# Patient Record
Sex: FEMALE | Race: WHITE | Employment: UNEMPLOYED | URBAN - METROPOLITAN AREA
[De-identification: names, ages, dates, MRNs, and addresses within clinical notes are randomized per-mention and may not be internally consistent; named-entity substitution may affect disease eponyms.]

---

## 2017-12-12 PROBLEM — E11.10 DKA, TYPE 2, NOT AT GOAL (HCC): Status: ACTIVE | Noted: 2017-12-12

## 2018-11-11 ENCOUNTER — APPOINTMENT (OUTPATIENT)
Dept: CT IMAGING | Age: 53
DRG: 263 | End: 2018-11-11
Payer: COMMERCIAL

## 2018-11-11 ENCOUNTER — HOSPITAL ENCOUNTER (INPATIENT)
Age: 53
LOS: 5 days | Discharge: HOME | DRG: 263 | End: 2018-11-16
Attending: EMERGENCY MEDICINE | Admitting: INTERNAL MEDICINE
Payer: COMMERCIAL

## 2018-11-11 DIAGNOSIS — K92.0 COFFEE GROUND EMESIS: ICD-10-CM

## 2018-11-11 DIAGNOSIS — K85.90 ACUTE PANCREATITIS, UNSPECIFIED COMPLICATION STATUS, UNSPECIFIED PANCREATITIS TYPE: ICD-10-CM

## 2018-11-11 DIAGNOSIS — E10.10 TYPE 1 DIABETES MELLITUS WITH KETOACIDOSIS WITHOUT COMA (HCC): Primary | ICD-10-CM

## 2018-11-11 DIAGNOSIS — D69.6 THROMBOCYTOPENIA (HCC): ICD-10-CM

## 2018-11-11 PROBLEM — R73.9 HYPERGLYCEMIA: Status: ACTIVE | Noted: 2018-11-11

## 2018-11-11 LAB
A/G RATIO: 0.8 (ref 1.1–2.2)
ABO/RH: NORMAL
ALBUMIN SERPL-MCNC: 3.8 G/DL (ref 3.4–5)
ALP BLD-CCNC: 122 U/L (ref 40–129)
ALT SERPL-CCNC: 39 U/L (ref 10–40)
ANION GAP SERPL CALCULATED.3IONS-SCNC: 38 MMOL/L (ref 3–16)
ANTIBODY SCREEN: NORMAL
AST SERPL-CCNC: 36 U/L (ref 15–37)
BASE EXCESS ARTERIAL: -4.2 MMOL/L (ref -3–3)
BASE EXCESS VENOUS: -2.4 MMOL/L (ref -3–3)
BASOPHILS ABSOLUTE: 0.1 K/UL (ref 0–0.2)
BASOPHILS RELATIVE PERCENT: 0.4 %
BETA-HYDROXYBUTYRATE: >8 MMOL/L (ref 0–0.27)
BILIRUB SERPL-MCNC: 0.5 MG/DL (ref 0–1)
BILIRUBIN URINE: NEGATIVE
BLOOD, URINE: NEGATIVE
BUN BLDV-MCNC: 17 MG/DL (ref 7–20)
CALCIUM SERPL-MCNC: 10.2 MG/DL (ref 8.3–10.6)
CARBOXYHEMOGLOBIN ARTERIAL: 1.5 % (ref 0–1.5)
CARBOXYHEMOGLOBIN: 3.1 % (ref 0–1.5)
CHLORIDE BLD-SCNC: 87 MMOL/L (ref 99–110)
CHP ED QC CHECK: YES
CLARITY: CLEAR
CO2: 18 MMOL/L (ref 21–32)
COLOR: YELLOW
CREAT SERPL-MCNC: 0.7 MG/DL (ref 0.6–1.1)
EOSINOPHILS ABSOLUTE: 0 K/UL (ref 0–0.6)
EOSINOPHILS RELATIVE PERCENT: 0 %
GFR AFRICAN AMERICAN: >60
GFR NON-AFRICAN AMERICAN: >60
GLOBULIN: 5 G/DL
GLUCOSE BLD-MCNC: 353 MG/DL
GLUCOSE BLD-MCNC: 353 MG/DL (ref 70–99)
GLUCOSE BLD-MCNC: 363 MG/DL (ref 70–99)
GLUCOSE BLD-MCNC: 440 MG/DL (ref 70–99)
GLUCOSE BLD-MCNC: 507 MG/DL (ref 70–99)
GLUCOSE BLD-MCNC: 595 MG/DL (ref 70–99)
GLUCOSE URINE: >=1000 MG/DL
HCO3 ARTERIAL: 19.1 MMOL/L (ref 21–29)
HCO3 VENOUS: 19.4 MMOL/L (ref 23–29)
HCT VFR BLD CALC: 40.3 % (ref 36–48)
HCT VFR BLD CALC: 45.9 % (ref 36–48)
HEMOGLOBIN, ART, EXTENDED: 13.4 G/DL (ref 12–16)
HEMOGLOBIN: 13.2 G/DL (ref 12–16)
HEMOGLOBIN: 15.2 G/DL (ref 12–16)
KETONES, URINE: >=80 MG/DL
LACTIC ACID: 1.4 MMOL/L (ref 0.4–2)
LACTIC ACID: 2.6 MMOL/L (ref 0.4–2)
LEUKOCYTE ESTERASE, URINE: NEGATIVE
LIPASE: 170 U/L (ref 13–60)
LYMPHOCYTES ABSOLUTE: 1.6 K/UL (ref 1–5.1)
LYMPHOCYTES RELATIVE PERCENT: 7.7 %
MCH RBC QN AUTO: 30.4 PG (ref 26–34)
MCHC RBC AUTO-ENTMCNC: 33.1 G/DL (ref 31–36)
MCV RBC AUTO: 92 FL (ref 80–100)
METHEMOGLOBIN ARTERIAL: 0.1 %
METHEMOGLOBIN VENOUS: 0.1 %
MICROSCOPIC EXAMINATION: ABNORMAL
MONOCYTES ABSOLUTE: 0.8 K/UL (ref 0–1.3)
MONOCYTES RELATIVE PERCENT: 3.7 %
NEUTROPHILS ABSOLUTE: 18.2 K/UL (ref 1.7–7.7)
NEUTROPHILS RELATIVE PERCENT: 88.2 %
NITRITE, URINE: NEGATIVE
O2 CONTENT ARTERIAL: 18 ML/DL
O2 CONTENT, VEN: 22 VOL %
O2 SAT, ARTERIAL: 97.5 %
O2 SAT, VEN: ABNORMAL %
O2 THERAPY: ABNORMAL
O2 THERAPY: ABNORMAL
OCCULT BLOOD, OTHER: ABNORMAL
PCO2 ARTERIAL: 29.6 MMHG (ref 35–45)
PCO2, VEN: 26.3 MMHG (ref 40–50)
PDW BLD-RTO: 13.4 % (ref 12.4–15.4)
PERFORMED ON: ABNORMAL
PH ARTERIAL: 7.42 (ref 7.35–7.45)
PH UA: 5.5
PH VENOUS: 7.48 (ref 7.35–7.45)
PH, GASTRIC: ABNORMAL
PLATELET # BLD: 248 K/UL (ref 135–450)
PMV BLD AUTO: 12.1 FL (ref 5–10.5)
PO2 ARTERIAL: 89.1 MMHG (ref 75–108)
PO2, VEN: 163 MMHG (ref 25–40)
POTASSIUM SERPL-SCNC: 3.8 MMOL/L (ref 3.5–5.1)
PROTEIN UA: NEGATIVE MG/DL
RBC # BLD: 4.99 M/UL (ref 4–5.2)
SODIUM BLD-SCNC: 143 MMOL/L (ref 136–145)
SPECIFIC GRAVITY UA: >1.03
TCO2 ARTERIAL: 44.9 MMOL/L
TCO2 CALC VENOUS: 45 MMOL/L
TOTAL PROTEIN: 8.8 G/DL (ref 6.4–8.2)
URINE REFLEX TO CULTURE: ABNORMAL
URINE TYPE: ABNORMAL
UROBILINOGEN, URINE: 0.2 E.U./DL
WBC # BLD: 20.6 K/UL (ref 4–11)

## 2018-11-11 PROCEDURE — C9113 INJ PANTOPRAZOLE SODIUM, VIA: HCPCS | Performed by: INTERNAL MEDICINE

## 2018-11-11 PROCEDURE — 6370000000 HC RX 637 (ALT 250 FOR IP): Performed by: INTERNAL MEDICINE

## 2018-11-11 PROCEDURE — 85014 HEMATOCRIT: CPT

## 2018-11-11 PROCEDURE — 36600 WITHDRAWAL OF ARTERIAL BLOOD: CPT

## 2018-11-11 PROCEDURE — 83690 ASSAY OF LIPASE: CPT

## 2018-11-11 PROCEDURE — 80053 COMPREHEN METABOLIC PANEL: CPT

## 2018-11-11 PROCEDURE — 36415 COLL VENOUS BLD VENIPUNCTURE: CPT

## 2018-11-11 PROCEDURE — 82803 BLOOD GASES ANY COMBINATION: CPT

## 2018-11-11 PROCEDURE — 6360000002 HC RX W HCPCS: Performed by: EMERGENCY MEDICINE

## 2018-11-11 PROCEDURE — 2580000003 HC RX 258: Performed by: PHYSICIAN ASSISTANT

## 2018-11-11 PROCEDURE — 86901 BLOOD TYPING SEROLOGIC RH(D): CPT

## 2018-11-11 PROCEDURE — 96361 HYDRATE IV INFUSION ADD-ON: CPT

## 2018-11-11 PROCEDURE — 96372 THER/PROPH/DIAG INJ SC/IM: CPT

## 2018-11-11 PROCEDURE — 86900 BLOOD TYPING SEROLOGIC ABO: CPT

## 2018-11-11 PROCEDURE — 2580000003 HC RX 258: Performed by: EMERGENCY MEDICINE

## 2018-11-11 PROCEDURE — 74176 CT ABD & PELVIS W/O CONTRAST: CPT

## 2018-11-11 PROCEDURE — 84478 ASSAY OF TRIGLYCERIDES: CPT

## 2018-11-11 PROCEDURE — 2000000000 HC ICU R&B

## 2018-11-11 PROCEDURE — 6360000002 HC RX W HCPCS: Performed by: INTERNAL MEDICINE

## 2018-11-11 PROCEDURE — 96375 TX/PRO/DX INJ NEW DRUG ADDON: CPT

## 2018-11-11 PROCEDURE — 83605 ASSAY OF LACTIC ACID: CPT

## 2018-11-11 PROCEDURE — 96376 TX/PRO/DX INJ SAME DRUG ADON: CPT

## 2018-11-11 PROCEDURE — 96374 THER/PROPH/DIAG INJ IV PUSH: CPT

## 2018-11-11 PROCEDURE — 6360000002 HC RX W HCPCS

## 2018-11-11 PROCEDURE — 6360000002 HC RX W HCPCS: Performed by: PHYSICIAN ASSISTANT

## 2018-11-11 PROCEDURE — 82271 OCCULT BLOOD OTHER SOURCES: CPT

## 2018-11-11 PROCEDURE — C9113 INJ PANTOPRAZOLE SODIUM, VIA: HCPCS | Performed by: EMERGENCY MEDICINE

## 2018-11-11 PROCEDURE — 85018 HEMOGLOBIN: CPT

## 2018-11-11 PROCEDURE — 81003 URINALYSIS AUTO W/O SCOPE: CPT

## 2018-11-11 PROCEDURE — 4500000025 HC ED LEVEL 5 PROCEDURE

## 2018-11-11 PROCEDURE — 86850 RBC ANTIBODY SCREEN: CPT

## 2018-11-11 PROCEDURE — 85025 COMPLETE CBC W/AUTO DIFF WBC: CPT

## 2018-11-11 PROCEDURE — 6370000000 HC RX 637 (ALT 250 FOR IP): Performed by: PHYSICIAN ASSISTANT

## 2018-11-11 PROCEDURE — 2580000003 HC RX 258: Performed by: INTERNAL MEDICINE

## 2018-11-11 PROCEDURE — 99285 EMERGENCY DEPT VISIT HI MDM: CPT

## 2018-11-11 PROCEDURE — 93005 ELECTROCARDIOGRAM TRACING: CPT | Performed by: EMERGENCY MEDICINE

## 2018-11-11 PROCEDURE — 82010 KETONE BODYS QUAN: CPT

## 2018-11-11 RX ORDER — ONDANSETRON 4 MG/1
8 TABLET, ORALLY DISINTEGRATING ORAL ONCE
Status: COMPLETED | OUTPATIENT
Start: 2018-11-11 | End: 2018-11-11

## 2018-11-11 RX ORDER — MORPHINE SULFATE 4 MG/ML
4 INJECTION, SOLUTION INTRAMUSCULAR; INTRAVENOUS ONCE
Status: COMPLETED | OUTPATIENT
Start: 2018-11-11 | End: 2018-11-11

## 2018-11-11 RX ORDER — 0.9 % SODIUM CHLORIDE 0.9 %
1000 INTRAVENOUS SOLUTION INTRAVENOUS ONCE
Status: COMPLETED | OUTPATIENT
Start: 2018-11-11 | End: 2018-11-11

## 2018-11-11 RX ORDER — MIRTAZAPINE 15 MG/1
35 TABLET, FILM COATED ORAL NIGHTLY
Status: DISCONTINUED | OUTPATIENT
Start: 2018-11-11 | End: 2018-11-16 | Stop reason: HOSPADM

## 2018-11-11 RX ORDER — MORPHINE SULFATE 4 MG/ML
INJECTION, SOLUTION INTRAMUSCULAR; INTRAVENOUS
Status: COMPLETED
Start: 2018-11-11 | End: 2018-11-11

## 2018-11-11 RX ORDER — CARVEDILOL 6.25 MG/1
6.25 TABLET ORAL 2 TIMES DAILY WITH MEALS
Status: ON HOLD | COMMUNITY
End: 2019-02-24 | Stop reason: HOSPADM

## 2018-11-11 RX ORDER — PANTOPRAZOLE SODIUM 40 MG/10ML
40 INJECTION, POWDER, LYOPHILIZED, FOR SOLUTION INTRAVENOUS 2 TIMES DAILY
Status: DISCONTINUED | OUTPATIENT
Start: 2018-11-11 | End: 2018-11-11

## 2018-11-11 RX ORDER — PANTOPRAZOLE SODIUM 40 MG/10ML
80 INJECTION, POWDER, LYOPHILIZED, FOR SOLUTION INTRAVENOUS ONCE
Status: COMPLETED | OUTPATIENT
Start: 2018-11-11 | End: 2018-11-11

## 2018-11-11 RX ORDER — ONDANSETRON 2 MG/ML
4 INJECTION INTRAMUSCULAR; INTRAVENOUS ONCE
Status: COMPLETED | OUTPATIENT
Start: 2018-11-11 | End: 2018-11-11

## 2018-11-11 RX ORDER — ONDANSETRON 4 MG/1
TABLET, ORALLY DISINTEGRATING ORAL
Status: COMPLETED
Start: 2018-11-11 | End: 2018-11-11

## 2018-11-11 RX ORDER — EPLERENONE 25 MG/1
25 TABLET, FILM COATED ORAL DAILY
Status: ON HOLD | COMMUNITY
End: 2019-02-24 | Stop reason: HOSPADM

## 2018-11-11 RX ORDER — LAMOTRIGINE 100 MG/1
100 TABLET ORAL DAILY
Status: DISCONTINUED | OUTPATIENT
Start: 2018-11-11 | End: 2018-11-11

## 2018-11-11 RX ORDER — SODIUM CHLORIDE 0.9 % (FLUSH) 0.9 %
10 SYRINGE (ML) INJECTION EVERY 12 HOURS SCHEDULED
Status: DISCONTINUED | OUTPATIENT
Start: 2018-11-11 | End: 2018-11-16 | Stop reason: HOSPADM

## 2018-11-11 RX ORDER — MORPHINE SULFATE 2 MG/ML
4 INJECTION, SOLUTION INTRAMUSCULAR; INTRAVENOUS ONCE
Status: COMPLETED | OUTPATIENT
Start: 2018-11-11 | End: 2018-11-11

## 2018-11-11 RX ORDER — SODIUM CHLORIDE, SODIUM LACTATE, POTASSIUM CHLORIDE, CALCIUM CHLORIDE 600; 310; 30; 20 MG/100ML; MG/100ML; MG/100ML; MG/100ML
INJECTION, SOLUTION INTRAVENOUS CONTINUOUS
Status: DISCONTINUED | OUTPATIENT
Start: 2018-11-11 | End: 2018-11-16

## 2018-11-11 RX ORDER — SODIUM CHLORIDE 0.9 % (FLUSH) 0.9 %
10 SYRINGE (ML) INJECTION PRN
Status: DISCONTINUED | OUTPATIENT
Start: 2018-11-11 | End: 2018-11-16 | Stop reason: HOSPADM

## 2018-11-11 RX ORDER — DEXTROSE MONOHYDRATE 50 MG/ML
100 INJECTION, SOLUTION INTRAVENOUS PRN
Status: DISCONTINUED | OUTPATIENT
Start: 2018-11-11 | End: 2018-11-16 | Stop reason: HOSPADM

## 2018-11-11 RX ORDER — DEXTROSE MONOHYDRATE 25 G/50ML
12.5 INJECTION, SOLUTION INTRAVENOUS PRN
Status: DISCONTINUED | OUTPATIENT
Start: 2018-11-11 | End: 2018-11-16 | Stop reason: HOSPADM

## 2018-11-11 RX ORDER — LOSARTAN POTASSIUM 50 MG/1
50 TABLET ORAL DAILY
Status: ON HOLD | COMMUNITY
End: 2019-02-24 | Stop reason: HOSPADM

## 2018-11-11 RX ORDER — ATORVASTATIN CALCIUM 40 MG/1
40 TABLET, FILM COATED ORAL DAILY
COMMUNITY
End: 2020-03-12 | Stop reason: SDUPTHER

## 2018-11-11 RX ORDER — ONDANSETRON 2 MG/ML
4 INJECTION INTRAMUSCULAR; INTRAVENOUS EVERY 6 HOURS PRN
Status: DISCONTINUED | OUTPATIENT
Start: 2018-11-11 | End: 2018-11-16 | Stop reason: HOSPADM

## 2018-11-11 RX ORDER — MORPHINE SULFATE 4 MG/ML
4 INJECTION, SOLUTION INTRAMUSCULAR; INTRAVENOUS ONCE
Status: DISCONTINUED | OUTPATIENT
Start: 2018-11-11 | End: 2018-11-11

## 2018-11-11 RX ORDER — NICOTINE POLACRILEX 4 MG
15 LOZENGE BUCCAL PRN
Status: DISCONTINUED | OUTPATIENT
Start: 2018-11-11 | End: 2018-11-16 | Stop reason: HOSPADM

## 2018-11-11 RX ADMIN — ONDANSETRON 4 MG: 2 INJECTION INTRAMUSCULAR; INTRAVENOUS at 17:38

## 2018-11-11 RX ADMIN — HYDROMORPHONE HYDROCHLORIDE 0.5 MG: 1 INJECTION, SOLUTION INTRAMUSCULAR; INTRAVENOUS; SUBCUTANEOUS at 20:56

## 2018-11-11 RX ADMIN — SODIUM CHLORIDE, POTASSIUM CHLORIDE, SODIUM LACTATE AND CALCIUM CHLORIDE: 600; 310; 30; 20 INJECTION, SOLUTION INTRAVENOUS at 20:42

## 2018-11-11 RX ADMIN — SODIUM CHLORIDE 1000 ML: 9 INJECTION, SOLUTION INTRAVENOUS at 17:39

## 2018-11-11 RX ADMIN — MORPHINE SULFATE 4 MG: 2 INJECTION, SOLUTION INTRAMUSCULAR; INTRAVENOUS at 16:43

## 2018-11-11 RX ADMIN — INSULIN LISPRO 5 UNITS: 100 INJECTION, SOLUTION INTRAVENOUS; SUBCUTANEOUS at 20:58

## 2018-11-11 RX ADMIN — SODIUM CHLORIDE 1000 ML: 9 INJECTION, SOLUTION INTRAVENOUS at 17:08

## 2018-11-11 RX ADMIN — PANTOPRAZOLE SODIUM 80 MG: 40 INJECTION, POWDER, FOR SOLUTION INTRAVENOUS at 17:19

## 2018-11-11 RX ADMIN — Medication 10 ML: at 21:47

## 2018-11-11 RX ADMIN — ONDANSETRON HYDROCHLORIDE 4 MG: 2 INJECTION, SOLUTION INTRAMUSCULAR; INTRAVENOUS at 21:30

## 2018-11-11 RX ADMIN — ONDANSETRON 8 MG: 4 TABLET, ORALLY DISINTEGRATING ORAL at 17:07

## 2018-11-11 RX ADMIN — MORPHINE SULFATE 4 MG: 4 INJECTION, SOLUTION INTRAMUSCULAR; INTRAVENOUS at 17:51

## 2018-11-11 RX ADMIN — MORPHINE SULFATE 4 MG: 4 INJECTION INTRAVENOUS at 17:51

## 2018-11-11 RX ADMIN — INSULIN GLARGINE 20 UNITS: 100 INJECTION, SOLUTION SUBCUTANEOUS at 20:58

## 2018-11-11 RX ADMIN — MIRTAZAPINE 37.5 MG: 15 TABLET, FILM COATED ORAL at 21:46

## 2018-11-11 RX ADMIN — SODIUM CHLORIDE 8 MG/HR: 9 INJECTION, SOLUTION INTRAVENOUS at 20:58

## 2018-11-11 RX ADMIN — MORPHINE SULFATE 4 MG: 4 INJECTION INTRAVENOUS at 19:20

## 2018-11-11 RX ADMIN — INSULIN HUMAN 10 UNITS: 100 INJECTION, SOLUTION PARENTERAL at 18:53

## 2018-11-11 ASSESSMENT — PAIN SCALES - GENERAL
PAINLEVEL_OUTOF10: 10

## 2018-11-11 ASSESSMENT — PAIN DESCRIPTION - PAIN TYPE
TYPE: ACUTE PAIN

## 2018-11-11 ASSESSMENT — ENCOUNTER SYMPTOMS
DIARRHEA: 0
NAUSEA: 1
COUGH: 0
VOMITING: 1
SHORTNESS OF BREATH: 0
BLOOD IN STOOL: 0
WHEEZING: 0
ABDOMINAL PAIN: 1
RHINORRHEA: 0

## 2018-11-11 ASSESSMENT — PAIN DESCRIPTION - PROGRESSION: CLINICAL_PROGRESSION: NOT CHANGED

## 2018-11-11 ASSESSMENT — PAIN DESCRIPTION - DESCRIPTORS
DESCRIPTORS: ACHING;BURNING
DESCRIPTORS: BURNING

## 2018-11-11 ASSESSMENT — PAIN DESCRIPTION - ORIENTATION: ORIENTATION: LEFT

## 2018-11-11 ASSESSMENT — PAIN DESCRIPTION - LOCATION
LOCATION: ABDOMEN

## 2018-11-11 NOTE — ED NOTES
Bed: 09  Expected date:   Expected time:   Means of arrival: Eliud  Comments:  [address]  11/11/18 1534

## 2018-11-11 NOTE — ED PROVIDER NOTES
urinating, dysuria and hematuria. Musculoskeletal: Negative for neck pain and neck stiffness. Skin: Negative for rash. Neurological: Positive for weakness. Negative for dizziness, light-headedness and headaches. Positives and Pertinent negatives as per HPI. Except as noted abovein the ROS, all other systems were reviewed and negative. PAST MEDICAL HISTORY     Past Medical History:   Diagnosis Date    Arthritis     CHF (congestive heart failure) ([facility] [address]     Depression     Diabetes mellitus (HCC)     Hepatitis C     Dr Kraig Fontaine follows; contracted from ex- (drug user)    MRSA (methicillin resistant Staphylococcus aureus) infection in 2000    was in urine and nares    Pneumonia     S/P colonoscopy with polypectomy 2/11    one polyp, Dr Kraig Fontaine. Recall 3 years. SURGICAL HISTORY     Past Surgical History:   Procedure Laterality Date    ABOVE KNEE AMPUTATION      AMPUTATION      right bka    JOINT REPLACEMENT  2006, 2010    right knee; Dr Rowena Lion.  KNEE ARTHROSCOPY      ridht knee X6    REVISION TOTAL KNEE ARTHROPLASTY  2/9/11    right         CURRENTMEDICATIONS       Previous Medications    ACETAMINOPHEN (TYLENOL) 325 MG TABLET    Take  by mouth every 4 hours as needed. B-D INS SYR HALF-UNIT .3CC/31G 31G X 5/16\" 0.3 ML MISC    145 Units    CALCIUM CARBONATE (TUMS) 500 MG CHEWABLE TABLET    Take 1 tablet by mouth daily. GABAPENTIN (NEURONTIN) 300 MG CAPSULE    Take 300 mg by mouth 3 times daily. INSULIN GLARGINE (LANTUS) 100 UNIT/ML INJECTION    Inject 45 Units into the skin nightly. INSULIN LISPRO (HUMALOG) 100 UNIT/ML INJECTION    Inject 5 Units into the skin 3 times daily (before meals). Give only if blood sugar is greater than 150    LAMOTRIGINE (LAMICTAL) 100 MG TABLET    Take 100 mg by mouth daily. MIRTAZAPINE (REMERON SOLTAB) 30 MG DISINTEGRATING TABLET    Take 35 mg by mouth nightly. NONFORMULARY    BP medication. Unknown name.     POTASSIUM CHLORIDE to auscultation bilaterally. She does report generalized abdominal pain, worse in the epigastrium with some voluntary guarding. No other peritoneal signs. She was given morphine and Zofran for symptomatically relief and will be reevaluated. Please see attending note for EKG interpretation. POC glucose 507. Patient started on empiric for resuscitation. CBC and CMP are remarkable for leukocytosis of 12.6, hyperglycemia at 595, gap of 38 and bicarb of 18. Beta hydroxybutyrate is greater than 8. ABG does show respiratory compensation with a pH of 7.47. Lipase 170. Gastric occult was positive. Patient was started on Protonix bolus and infusion, given total of 2 L normal saline and 10 units of insulin. On reevaluation, she was still complaining of pain and having active emesis. She given second dose of morphine and Zofran. I do believe she warrants admission for further evaluation and management of DKA as well as possible upper GI bleed with coffee-ground emesis and acute pancreatitis. I attending spoke with the hospitalist, Dr. Deri Gaucher, who is agreeable to this plan and will resume care of the patient at this time. Patient was also informed and is agreeable. She is stable for admission. The patient tolerated their visit well. They were seen and evaluated by the attending physician who agreed with the assessment and plan. The patient and / or thefamily were informed of the results of any tests, a time was given to answer questions, a plan was proposed and they agreed with plan. FINAL IMPRESSION      1. Type 1 diabetes mellitus with ketoacidosis without coma ([facility] [address]    2. Coffee ground emesis    3.  Acute pancreatitis, unspecified complication status, unspecified pancreatitis type          DISPOSITION/PLAN   DISPOSITION Admitted 11/11/2018 06:22:13 PM      PATIENT REFERREDTO:  Nishi Rodriguez MD  [address] [place] [address]  [phone]            DISCHARGE MEDICATIONS:  New Prescriptions    No

## 2018-11-12 ENCOUNTER — APPOINTMENT (OUTPATIENT)
Dept: GENERAL RADIOLOGY | Age: 53
DRG: 263 | End: 2018-11-12
Payer: COMMERCIAL

## 2018-11-12 LAB
A/G RATIO: 0.9 (ref 1.1–2.2)
ALBUMIN SERPL-MCNC: 3.1 G/DL (ref 3.4–5)
ALP BLD-CCNC: 77 U/L (ref 40–129)
ALT SERPL-CCNC: 28 U/L (ref 10–40)
AMYLASE: 54 U/L (ref 25–115)
ANION GAP SERPL CALCULATED.3IONS-SCNC: 17 MMOL/L (ref 3–16)
AST SERPL-CCNC: 34 U/L (ref 15–37)
BILIRUB SERPL-MCNC: 0.3 MG/DL (ref 0–1)
BUN BLDV-MCNC: 15 MG/DL (ref 7–20)
CALCIUM SERPL-MCNC: 8.4 MG/DL (ref 8.3–10.6)
CHLORIDE BLD-SCNC: 105 MMOL/L (ref 99–110)
CO2: 23 MMOL/L (ref 21–32)
CREAT SERPL-MCNC: 0.5 MG/DL (ref 0.6–1.1)
GFR AFRICAN AMERICAN: >60
GFR NON-AFRICAN AMERICAN: >60
GLOBULIN: 3.5 G/DL
GLUCOSE BLD-MCNC: 130 MG/DL (ref 70–99)
GLUCOSE BLD-MCNC: 191 MG/DL (ref 70–99)
GLUCOSE BLD-MCNC: 220 MG/DL (ref 70–99)
GLUCOSE BLD-MCNC: 238 MG/DL (ref 70–99)
GLUCOSE BLD-MCNC: 250 MG/DL (ref 70–99)
GLUCOSE BLD-MCNC: 291 MG/DL (ref 70–99)
HCT VFR BLD CALC: 34.5 % (ref 36–48)
HCT VFR BLD CALC: 35.9 % (ref 36–48)
HCT VFR BLD CALC: 37 % (ref 36–48)
HCT VFR BLD CALC: 38.9 % (ref 36–48)
HEMOGLOBIN: 11.3 G/DL (ref 12–16)
HEMOGLOBIN: 12 G/DL (ref 12–16)
HEMOGLOBIN: 12.2 G/DL (ref 12–16)
HEMOGLOBIN: 12.6 G/DL (ref 12–16)
LACTIC ACID: 1.1 MMOL/L (ref 0.4–2)
LIPASE: 103 U/L (ref 13–60)
MCH RBC QN AUTO: 29.6 PG (ref 26–34)
MCHC RBC AUTO-ENTMCNC: 33 G/DL (ref 31–36)
MCV RBC AUTO: 89.7 FL (ref 80–100)
PDW BLD-RTO: 13.3 % (ref 12.4–15.4)
PERFORMED ON: ABNORMAL
PLATELET # BLD: 168 K/UL (ref 135–450)
PMV BLD AUTO: 10 FL (ref 5–10.5)
POTASSIUM SERPL-SCNC: 3.5 MMOL/L (ref 3.5–5.1)
RBC # BLD: 4.12 M/UL (ref 4–5.2)
SODIUM BLD-SCNC: 145 MMOL/L (ref 136–145)
TOTAL PROTEIN: 6.6 G/DL (ref 6.4–8.2)
TRIGL SERPL-MCNC: 135 MG/DL (ref 0–150)
WBC # BLD: 14.8 K/UL (ref 4–11)

## 2018-11-12 PROCEDURE — 71045 X-RAY EXAM CHEST 1 VIEW: CPT

## 2018-11-12 PROCEDURE — 2580000003 HC RX 258: Performed by: INTERNAL MEDICINE

## 2018-11-12 PROCEDURE — 6370000000 HC RX 637 (ALT 250 FOR IP): Performed by: INTERNAL MEDICINE

## 2018-11-12 PROCEDURE — 36569 INSJ PICC 5 YR+ W/O IMAGING: CPT

## 2018-11-12 PROCEDURE — 93010 ELECTROCARDIOGRAM REPORT: CPT | Performed by: INTERNAL MEDICINE

## 2018-11-12 PROCEDURE — 1200000000 HC SEMI PRIVATE

## 2018-11-12 PROCEDURE — 80053 COMPREHEN METABOLIC PANEL: CPT

## 2018-11-12 PROCEDURE — C9113 INJ PANTOPRAZOLE SODIUM, VIA: HCPCS | Performed by: INTERNAL MEDICINE

## 2018-11-12 PROCEDURE — 82150 ASSAY OF AMYLASE: CPT

## 2018-11-12 PROCEDURE — 85018 HEMOGLOBIN: CPT

## 2018-11-12 PROCEDURE — 02HV33Z INSERTION OF INFUSION DEVICE INTO SUPERIOR VENA CAVA, PERCUTANEOUS APPROACH: ICD-10-PCS | Performed by: INTERNAL MEDICINE

## 2018-11-12 PROCEDURE — 6360000002 HC RX W HCPCS: Performed by: INTERNAL MEDICINE

## 2018-11-12 PROCEDURE — 85027 COMPLETE CBC AUTOMATED: CPT

## 2018-11-12 PROCEDURE — 83605 ASSAY OF LACTIC ACID: CPT

## 2018-11-12 PROCEDURE — 83690 ASSAY OF LIPASE: CPT

## 2018-11-12 PROCEDURE — 85014 HEMATOCRIT: CPT

## 2018-11-12 RX ORDER — PANTOPRAZOLE SODIUM 40 MG/10ML
40 INJECTION, POWDER, LYOPHILIZED, FOR SOLUTION INTRAVENOUS 2 TIMES DAILY
Status: DISCONTINUED | OUTPATIENT
Start: 2018-11-12 | End: 2018-11-15

## 2018-11-12 RX ORDER — SODIUM CHLORIDE 0.9 % (FLUSH) 0.9 %
10 SYRINGE (ML) INJECTION PRN
Status: DISCONTINUED | OUTPATIENT
Start: 2018-11-12 | End: 2018-11-16 | Stop reason: HOSPADM

## 2018-11-12 RX ORDER — LIDOCAINE HYDROCHLORIDE 10 MG/ML
5 INJECTION, SOLUTION EPIDURAL; INFILTRATION; INTRACAUDAL; PERINEURAL ONCE
Status: DISCONTINUED | OUTPATIENT
Start: 2018-11-12 | End: 2018-11-16 | Stop reason: HOSPADM

## 2018-11-12 RX ORDER — SODIUM CHLORIDE 0.9 % (FLUSH) 0.9 %
10 SYRINGE (ML) INJECTION EVERY 12 HOURS SCHEDULED
Status: DISCONTINUED | OUTPATIENT
Start: 2018-11-12 | End: 2018-11-16 | Stop reason: HOSPADM

## 2018-11-12 RX ADMIN — HYDROMORPHONE HYDROCHLORIDE 0.5 MG: 1 INJECTION, SOLUTION INTRAMUSCULAR; INTRAVENOUS; SUBCUTANEOUS at 20:44

## 2018-11-12 RX ADMIN — HYDROMORPHONE HYDROCHLORIDE 0.5 MG: 1 INJECTION, SOLUTION INTRAMUSCULAR; INTRAVENOUS; SUBCUTANEOUS at 15:28

## 2018-11-12 RX ADMIN — INSULIN LISPRO 2 UNITS: 100 INJECTION, SOLUTION INTRAVENOUS; SUBCUTANEOUS at 16:53

## 2018-11-12 RX ADMIN — Medication 10 ML: at 20:47

## 2018-11-12 RX ADMIN — SODIUM CHLORIDE, POTASSIUM CHLORIDE, SODIUM LACTATE AND CALCIUM CHLORIDE: 600; 310; 30; 20 INJECTION, SOLUTION INTRAVENOUS at 02:51

## 2018-11-12 RX ADMIN — ONDANSETRON HYDROCHLORIDE 4 MG: 2 INJECTION, SOLUTION INTRAMUSCULAR; INTRAVENOUS at 09:41

## 2018-11-12 RX ADMIN — SODIUM CHLORIDE, POTASSIUM CHLORIDE, SODIUM LACTATE AND CALCIUM CHLORIDE: 600; 310; 30; 20 INJECTION, SOLUTION INTRAVENOUS at 11:18

## 2018-11-12 RX ADMIN — SODIUM CHLORIDE, POTASSIUM CHLORIDE, SODIUM LACTATE AND CALCIUM CHLORIDE: 600; 310; 30; 20 INJECTION, SOLUTION INTRAVENOUS at 17:52

## 2018-11-12 RX ADMIN — Medication 10 ML: at 08:04

## 2018-11-12 RX ADMIN — INSULIN GLARGINE 20 UNITS: 100 INJECTION, SOLUTION SUBCUTANEOUS at 20:44

## 2018-11-12 RX ADMIN — SODIUM CHLORIDE 8 MG/HR: 9 INJECTION, SOLUTION INTRAVENOUS at 04:10

## 2018-11-12 RX ADMIN — HYDROMORPHONE HYDROCHLORIDE 0.5 MG: 1 INJECTION, SOLUTION INTRAMUSCULAR; INTRAVENOUS; SUBCUTANEOUS at 00:16

## 2018-11-12 RX ADMIN — HYDROMORPHONE HYDROCHLORIDE 0.5 MG: 1 INJECTION, SOLUTION INTRAMUSCULAR; INTRAVENOUS; SUBCUTANEOUS at 10:35

## 2018-11-12 RX ADMIN — HYDROMORPHONE HYDROCHLORIDE 0.5 MG: 1 INJECTION, SOLUTION INTRAMUSCULAR; INTRAVENOUS; SUBCUTANEOUS at 03:51

## 2018-11-12 RX ADMIN — SODIUM CHLORIDE, POTASSIUM CHLORIDE, SODIUM LACTATE AND CALCIUM CHLORIDE: 600; 310; 30; 20 INJECTION, SOLUTION INTRAVENOUS at 15:28

## 2018-11-12 RX ADMIN — MIRTAZAPINE 37.5 MG: 15 TABLET, FILM COATED ORAL at 20:42

## 2018-11-12 RX ADMIN — HYDROMORPHONE HYDROCHLORIDE 0.5 MG: 1 INJECTION, SOLUTION INTRAMUSCULAR; INTRAVENOUS; SUBCUTANEOUS at 07:41

## 2018-11-12 RX ADMIN — PANTOPRAZOLE SODIUM 40 MG: 40 INJECTION, POWDER, FOR SOLUTION INTRAVENOUS at 20:43

## 2018-11-12 RX ADMIN — ONDANSETRON HYDROCHLORIDE 4 MG: 2 INJECTION, SOLUTION INTRAMUSCULAR; INTRAVENOUS at 20:43

## 2018-11-12 RX ADMIN — INSULIN LISPRO 4 UNITS: 100 INJECTION, SOLUTION INTRAVENOUS; SUBCUTANEOUS at 07:43

## 2018-11-12 RX ADMIN — INSULIN LISPRO 4 UNITS: 100 INJECTION, SOLUTION INTRAVENOUS; SUBCUTANEOUS at 11:27

## 2018-11-12 RX ADMIN — ONDANSETRON HYDROCHLORIDE 4 MG: 2 INJECTION, SOLUTION INTRAMUSCULAR; INTRAVENOUS at 02:51

## 2018-11-12 ASSESSMENT — PAIN DESCRIPTION - ONSET: ONSET: ON-GOING

## 2018-11-12 ASSESSMENT — PAIN SCALES - GENERAL
PAINLEVEL_OUTOF10: 9
PAINLEVEL_OUTOF10: 0
PAINLEVEL_OUTOF10: 7
PAINLEVEL_OUTOF10: 9
PAINLEVEL_OUTOF10: 0
PAINLEVEL_OUTOF10: 8
PAINLEVEL_OUTOF10: 0

## 2018-11-12 ASSESSMENT — PAIN DESCRIPTION - ORIENTATION
ORIENTATION: LEFT
ORIENTATION: LEFT

## 2018-11-12 ASSESSMENT — PAIN DESCRIPTION - LOCATION
LOCATION: ABDOMEN

## 2018-11-12 ASSESSMENT — PAIN DESCRIPTION - DESCRIPTORS: DESCRIPTORS: ACHING;THROBBING;BURNING

## 2018-11-12 ASSESSMENT — PAIN DESCRIPTION - PAIN TYPE
TYPE: ACUTE PAIN

## 2018-11-12 NOTE — PROGRESS NOTES
30 DAY [facility] VISIT    Message left for patient to return call     Assessment: Pt meeting objective benchmarks, with the exception of mask leak.     Action plan: Waiting for patient to return call.  and Pt to have 6 month STM visit  Patient has no follow up visit scheduled.  Device settings:     Bilevel S ()    EPAP Fixed 11    IPAP Fixed 15          Objective measures: 14 day rolling measures      % compliance greater than four hours rolling average 14 days: 78.%     95% OF Leak in litres Rolling Average 14 Days: 40.24 lpm  last  upload     AHI Rolling Average 14 Day: 3.65 last  upload     Time mask on face 14 day average: 313 min        Objective measure goal  Compliance   Goal >70%  Leak   Goal < 10%  AHI  Goal < 5  Usage  Goal >240         None  · ONS intake: NPO  · Anthropometric Measures:  · Ht: 5' 2\" (157.5 cm)   · Current Body Wt: 115 lb (52.2 kg)  · BMI Classification: BMI 18.5 - 24.9 Normal Weight    Nutrition Interventions:   Continue NPO  Continued Inpatient Monitoring, Education declined    Nutrition Evaluation:   · Evaluation: Goals set   · Goals: Diet advanced by f/u with po 50% at meals    · Monitoring: Meal Intake, Weight, Nutrition Progression      Electronically signed by Vick Guillen RD, CNSC, LD on 11/12/18 at 11:45 AM    Contact Number: [phone]

## 2018-11-12 NOTE — PLAN OF CARE
Problem: Nutrition  Goal: Optimal nutrition therapy  Outcome: Ongoing  Nutrition Problem: Inadequate oral intake  Intervention: Food and/or Nutrient Delivery: Continue NPO  Nutritional Goals: Diet advanced by f/u with po 50% at meals

## 2018-11-12 NOTE — PROGRESS NOTES
Patient had emesis at start of shift, emesis clear, slightly yellow color, will give zofran when due again. Pt c/o abdominal pain, PRN dilaudid given. VSS, Pt updated on POC, v/u. Awaiting GI to round on patient.

## 2018-11-12 NOTE — ED NOTES
Pt still vomiting blood. pts sheets cleaned up and changed. Pt still refusing to change into gown. Pt given ice chips upon request. Patient resting comfortably with no signs of distress. Denies any needs at this time. Bed locked and in lowest position with both side rails raised. Call light within reach.      Mell Villarreal RN  11/11/18 0075

## 2018-11-12 NOTE — PROGRESS NOTES
Hospitalist Progress Note      PCP: Denver Neas, MD    Date of Admission: 11/11/2018    Chief Complaint: Nausea/vomiting/abdominal pain, high sugars       Hospital Course:   52 y.o. female with a history of uncontrolled diabetes mellitus, IV drug use, hepatitis C, depression started having upper abdominal pain moderate to severe 3 days ago. Since then she has been nauseous and throwing up. Not able to keep much down. She ran  out of her insulin yesterday. Still her sugars have been high to 400s. Has heartburn. Throwing up blood this morning. Has some diarrhea yesterday. She came in today as his symptoms are not getting any better.       Subjective: doing much better today. Still with some abdominal discmfort.        Medications:  Reviewed    Infusion Medications    lactated ringers 200 mL/hr at 11/12/18 1528    dextrose       Scheduled Medications    [START ON 11/13/2018] influenza virus vaccine  0.5 mL Intramuscular Once    lidocaine 1 % injection  5 mL Intradermal Once    sodium chloride flush  10 mL Intravenous 2 times per day    pantoprazole  40 mg Intravenous BID    mirtazapine  37.5 mg Oral Nightly    sodium chloride flush  10 mL Intravenous 2 times per day    insulin lispro  0-12 Units Subcutaneous TID WC    insulin lispro  0-6 Units Subcutaneous Nightly    insulin glargine  20 Units Subcutaneous Nightly     PRN Meds: sodium chloride flush, sodium chloride flush, magnesium hydroxide, ondansetron, HYDROmorphone, glucose, dextrose, glucagon (rDNA), dextrose      Intake/Output Summary (Last 24 hours) at 11/12/18 1611  Last data filed at 11/12/18 0807   Gross per 24 hour   Intake          1558.34 ml   Output             1150 ml   Net           408.34 ml       Physical Exam Performed:    /80   Pulse 74   Temp 97.8 °F (36.6 °C) (Temporal)   Resp 18   Ht 5' 2\" (1.575 m)   Wt 115 lb 4.8 oz (52.3 kg)   LMP 11/15/2017   SpO2 96%   BMI 21.09 kg/m²     General appearance: No apparent

## 2018-11-12 NOTE — ED NOTES
Assumed care of pt. Pt returned from CT. ST noted on monitor. Respirations even and unlabored. RXOV=467 at present. Pt continues to c/o epigastric burning 10/10 - no vomiting noted. Pain meds given per orders. Pt to be admitted to CVU. Will attempt to call report.       Tasha Haq RN  11/11/18 5624

## 2018-11-12 NOTE — PROGRESS NOTES
Assessment complete. Patient assisted back to bed with bed alarm on and call light in reach. Patient medicated for pain per order. Patient asking about food. Explained to patient that GI does not want her to eat or drink while she is still requiring pain and nausea medication. Vitals are stable. PICC nurse in to see patient.

## 2018-11-12 NOTE — CONSULTS
mouth      lamoTRIgine (LAMICTAL) 100 MG tablet Take 100 mg by mouth daily.  mirtazapine (REMERON SOLTAB) 30 MG disintegrating tablet Take 35 mg by mouth nightly.  gabapentin (NEURONTIN) 300 MG capsule Take 300 mg by mouth 3 times daily.  potassium chloride (KLOR-CON) 20 MEQ packet Take 20 mEq by mouth daily.  insulin lispro (HUMALOG) 100 UNIT/ML injection Inject 5 Units into the skin 3 times daily (before meals). Give only if blood sugar is greater than 150      insulin glargine (LANTUS) 100 UNIT/ML injection Inject 45 Units into the skin nightly.  B-D INS SYR HALF-UNIT .3CC/31G 31G X 5/16\" 0.3 ML MISC 145 Units      NONFORMULARY BP medication. Unknown name.  calcium carbonate (TUMS) 500 MG chewable tablet Take 1 tablet by mouth daily.  acetaminophen (TYLENOL) 325 MG tablet Take  by mouth every 4 hours as needed. Allergies  Allergies   Allergen Reactions    Darvocet [Propoxyphene N-Acetaminophen] Nausea Only    Naprosyn [Naproxen] Rash    Ultram [Tramadol Hcl] Rash        Family history     Family History   Problem Relation Age of Onset    Cancer Father     Seizures Brother     Cancer Sister         colon        Social   Social History   Substance Use Topics    Smoking status: Former Smoker     Quit date: 3/1/2011    Smokeless tobacco: Never Used      Comment: congratulated on quitting smoking1    Alcohol use No          Review of Systems    Constitutional: negative  Respiratory: negative  Cardiovascular: negative  Gastrointestinal: as above  Musculoskeletal:negative  Neurological: negative         Physical Exam  Blood pressure (!) 146/94, pulse 93, temperature 98.8 °F (37.1 °C), temperature source Temporal, resp. rate 23, height 5' 2\" (1.575 m), weight 115 lb 4.8 oz (52.3 kg), last menstrual period 11/15/2017, SpO2 99 %.     General appearance: alert, cooperative, no distress, appears stated age  Anicteric, No Jaundice  Head: Normocephalic, without obvious abnormality  Lungs: clear to auscultation bilaterally  Heart: regular rate and rhythm  Abdomen: soft, mild/mod epigastric tender with palpation, no rebound/gaurding. Extremities: no edema  Skin: warm and dry  Neuro: alert and oriented      Data Review:    Recent Labs      11/11/18   1705   11/12/18   0410  11/12/18   1015  11/12/18   1200   WBC  20.6*   --    --    --   14.8*   HGB  15.2   < >  12.6  12.0  12.2   HCT  45.9   < >  38.9  35.9*  37.0   MCV  92.0   --    --    --   89.7   PLT  248   --    --    --   168    < > = values in this interval not displayed. Recent Labs      11/11/18   1705  11/12/18   1200   NA  143  145   K  3.8  3.5   CL  87*  105   CO2  18*  23   BUN  17  15   CREATININE  0.7  0.5*     Recent Labs      11/11/18   1705  11/12/18   1200   AST  36  34   ALT  39  28   BILITOT  0.5  0.3   ALKPHOS  122  77     Recent Labs      11/11/18   1705  11/12/18   1200   LIPASE  170.0*  103.0*   AMYLASE   --   54     No results for input(s): PROTIME, INR in the last 72 hours. No results for input(s): PTT in the last 72 hours. No results for input(s): OCCULTBLD in the last 72 hours. Assessment / Plan :    1. Pancreatitis: initial episode mid October 2018 admit [facility], etiology there unclear although u/s did show sludge and admit lft mild elevated, no etoh, trig here normal, calcium here normal, lft here normal.   Rec:  - npo  - ivf  - pain control  - wean off pain meds  - as pain improves can start clears  - pharm d review ? meds trigger  - consider surg eval gb  - will repeat ct with iv contrast/pancreas protocol since recurrent and prior ct here and [facility] noncontrast; eval for underlying mass, etc.  [facility]    2. DKA: felt triggered by pancreatitis per [facility] note, here may have been pancreatitis although ran out of insulin as well. Rec:  - correction per primary team    3. Leukocytosis: improved, eval per primary team.    4. Hx hep c, ?  Hx hep

## 2018-11-13 ENCOUNTER — APPOINTMENT (OUTPATIENT)
Dept: CT IMAGING | Age: 53
DRG: 263 | End: 2018-11-13
Payer: COMMERCIAL

## 2018-11-13 ENCOUNTER — ANESTHESIA (OUTPATIENT)
Dept: ENDOSCOPY | Age: 53
DRG: 263 | End: 2018-11-13
Payer: COMMERCIAL

## 2018-11-13 ENCOUNTER — ANESTHESIA EVENT (OUTPATIENT)
Dept: ENDOSCOPY | Age: 53
DRG: 263 | End: 2018-11-13
Payer: COMMERCIAL

## 2018-11-13 VITALS — DIASTOLIC BLOOD PRESSURE: 72 MMHG | SYSTOLIC BLOOD PRESSURE: 118 MMHG | OXYGEN SATURATION: 100 %

## 2018-11-13 LAB
ANION GAP SERPL CALCULATED.3IONS-SCNC: 8 MMOL/L (ref 3–16)
BUN BLDV-MCNC: 13 MG/DL (ref 7–20)
CALCIUM SERPL-MCNC: 8.1 MG/DL (ref 8.3–10.6)
CHLORIDE BLD-SCNC: 107 MMOL/L (ref 99–110)
CO2: 30 MMOL/L (ref 21–32)
CREAT SERPL-MCNC: <0.5 MG/DL (ref 0.6–1.1)
GFR AFRICAN AMERICAN: >60
GFR NON-AFRICAN AMERICAN: >60
GLUCOSE BLD-MCNC: 107 MG/DL (ref 70–99)
GLUCOSE BLD-MCNC: 113 MG/DL (ref 70–99)
GLUCOSE BLD-MCNC: 114 MG/DL (ref 70–99)
GLUCOSE BLD-MCNC: 122 MG/DL (ref 70–99)
GLUCOSE BLD-MCNC: 134 MG/DL (ref 70–99)
GLUCOSE BLD-MCNC: 134 MG/DL (ref 70–99)
GLUCOSE BLD-MCNC: 232 MG/DL (ref 70–99)
GLUCOSE BLD-MCNC: 78 MG/DL (ref 70–99)
GLUCOSE BLD-MCNC: 93 MG/DL (ref 70–99)
HCT VFR BLD CALC: 35.1 % (ref 36–48)
HEMOGLOBIN: 11.7 G/DL (ref 12–16)
MAGNESIUM: 1.8 MG/DL (ref 1.8–2.4)
MCH RBC QN AUTO: 30.1 PG (ref 26–34)
MCHC RBC AUTO-ENTMCNC: 33.3 G/DL (ref 31–36)
MCV RBC AUTO: 90.3 FL (ref 80–100)
PDW BLD-RTO: 13.8 % (ref 12.4–15.4)
PERFORMED ON: ABNORMAL
PERFORMED ON: NORMAL
PLATELET # BLD: 151 K/UL (ref 135–450)
PMV BLD AUTO: 10.3 FL (ref 5–10.5)
POTASSIUM SERPL-SCNC: 2.9 MMOL/L (ref 3.5–5.1)
POTASSIUM SERPL-SCNC: 3.4 MMOL/L (ref 3.5–5.1)
RBC # BLD: 3.88 M/UL (ref 4–5.2)
SODIUM BLD-SCNC: 145 MMOL/L (ref 136–145)
WBC # BLD: 10.6 K/UL (ref 4–11)

## 2018-11-13 PROCEDURE — 83036 HEMOGLOBIN GLYCOSYLATED A1C: CPT

## 2018-11-13 PROCEDURE — C9113 INJ PANTOPRAZOLE SODIUM, VIA: HCPCS | Performed by: INTERNAL MEDICINE

## 2018-11-13 PROCEDURE — 3700000001 HC ADD 15 MINUTES (ANESTHESIA): Performed by: INTERNAL MEDICINE

## 2018-11-13 PROCEDURE — 7100000001 HC PACU RECOVERY - ADDTL 15 MIN: Performed by: INTERNAL MEDICINE

## 2018-11-13 PROCEDURE — 2580000003 HC RX 258: Performed by: INTERNAL MEDICINE

## 2018-11-13 PROCEDURE — 88312 SPECIAL STAINS GROUP 1: CPT

## 2018-11-13 PROCEDURE — 0DB58ZX EXCISION OF ESOPHAGUS, VIA NATURAL OR ARTIFICIAL OPENING ENDOSCOPIC, DIAGNOSTIC: ICD-10-PCS | Performed by: INTERNAL MEDICINE

## 2018-11-13 PROCEDURE — 99253 IP/OBS CNSLTJ NEW/EST LOW 45: CPT | Performed by: SURGERY

## 2018-11-13 PROCEDURE — 3609012400 HC EGD TRANSORAL BIOPSY SINGLE/MULTIPLE: Performed by: INTERNAL MEDICINE

## 2018-11-13 PROCEDURE — 2709999900 HC NON-CHARGEABLE SUPPLY: Performed by: INTERNAL MEDICINE

## 2018-11-13 PROCEDURE — 1200000000 HC SEMI PRIVATE

## 2018-11-13 PROCEDURE — 6370000000 HC RX 637 (ALT 250 FOR IP): Performed by: INTERNAL MEDICINE

## 2018-11-13 PROCEDURE — 6360000002 HC RX W HCPCS: Performed by: INTERNAL MEDICINE

## 2018-11-13 PROCEDURE — APPSS60 APP SPLIT SHARED TIME 46-60 MINUTES: Performed by: NURSE PRACTITIONER

## 2018-11-13 PROCEDURE — 6360000002 HC RX W HCPCS: Performed by: HOSPITALIST

## 2018-11-13 PROCEDURE — 6360000004 HC RX CONTRAST MEDICATION: Performed by: INTERNAL MEDICINE

## 2018-11-13 PROCEDURE — 7100000000 HC PACU RECOVERY - FIRST 15 MIN: Performed by: INTERNAL MEDICINE

## 2018-11-13 PROCEDURE — 6360000002 HC RX W HCPCS: Performed by: NURSE ANESTHETIST, CERTIFIED REGISTERED

## 2018-11-13 PROCEDURE — 88305 TISSUE EXAM BY PATHOLOGIST: CPT

## 2018-11-13 PROCEDURE — 83735 ASSAY OF MAGNESIUM: CPT

## 2018-11-13 PROCEDURE — 80048 BASIC METABOLIC PNL TOTAL CA: CPT

## 2018-11-13 PROCEDURE — 84132 ASSAY OF SERUM POTASSIUM: CPT

## 2018-11-13 PROCEDURE — APPNB30 APP NON BILLABLE TIME 0-30 MINS: Performed by: NURSE PRACTITIONER

## 2018-11-13 PROCEDURE — 36415 COLL VENOUS BLD VENIPUNCTURE: CPT

## 2018-11-13 PROCEDURE — 85027 COMPLETE CBC AUTOMATED: CPT

## 2018-11-13 PROCEDURE — 2580000003 HC RX 258: Performed by: NURSE ANESTHETIST, CERTIFIED REGISTERED

## 2018-11-13 PROCEDURE — 3700000000 HC ANESTHESIA ATTENDED CARE: Performed by: INTERNAL MEDICINE

## 2018-11-13 PROCEDURE — 74170 CT ABD WO CNTRST FLWD CNTRST: CPT

## 2018-11-13 PROCEDURE — 2500000003 HC RX 250 WO HCPCS: Performed by: NURSE ANESTHETIST, CERTIFIED REGISTERED

## 2018-11-13 RX ORDER — MEPERIDINE HYDROCHLORIDE 25 MG/ML
12.5 INJECTION INTRAMUSCULAR; INTRAVENOUS; SUBCUTANEOUS EVERY 5 MIN PRN
Status: DISCONTINUED | OUTPATIENT
Start: 2018-11-13 | End: 2018-11-13

## 2018-11-13 RX ORDER — SODIUM CHLORIDE 9 MG/ML
INJECTION, SOLUTION INTRAVENOUS CONTINUOUS PRN
Status: DISCONTINUED | OUTPATIENT
Start: 2018-11-13 | End: 2018-11-13 | Stop reason: SDUPTHER

## 2018-11-13 RX ORDER — HYDROMORPHONE HCL 110MG/55ML
0.5 PATIENT CONTROLLED ANALGESIA SYRINGE INTRAVENOUS
Status: DISCONTINUED | OUTPATIENT
Start: 2018-11-13 | End: 2018-11-15

## 2018-11-13 RX ORDER — FENTANYL CITRATE 50 UG/ML
INJECTION, SOLUTION INTRAMUSCULAR; INTRAVENOUS PRN
Status: DISCONTINUED | OUTPATIENT
Start: 2018-11-13 | End: 2018-11-13 | Stop reason: SDUPTHER

## 2018-11-13 RX ORDER — POTASSIUM CHLORIDE 7.45 MG/ML
10 INJECTION INTRAVENOUS
Status: COMPLETED | OUTPATIENT
Start: 2018-11-13 | End: 2018-11-14

## 2018-11-13 RX ORDER — PROPOFOL 10 MG/ML
INJECTION, EMULSION INTRAVENOUS PRN
Status: DISCONTINUED | OUTPATIENT
Start: 2018-11-13 | End: 2018-11-13 | Stop reason: SDUPTHER

## 2018-11-13 RX ORDER — POTASSIUM CHLORIDE 29.8 MG/ML
20 INJECTION INTRAVENOUS
Status: COMPLETED | OUTPATIENT
Start: 2018-11-13 | End: 2018-11-13

## 2018-11-13 RX ORDER — HYDRALAZINE HYDROCHLORIDE 20 MG/ML
5 INJECTION INTRAMUSCULAR; INTRAVENOUS EVERY 10 MIN PRN
Status: DISCONTINUED | OUTPATIENT
Start: 2018-11-13 | End: 2018-11-13

## 2018-11-13 RX ORDER — LIDOCAINE HYDROCHLORIDE 20 MG/ML
INJECTION, SOLUTION INFILTRATION; PERINEURAL PRN
Status: DISCONTINUED | OUTPATIENT
Start: 2018-11-13 | End: 2018-11-13 | Stop reason: SDUPTHER

## 2018-11-13 RX ORDER — POTASSIUM CHLORIDE 29.8 MG/ML
60 INJECTION INTRAVENOUS
Status: DISCONTINUED | OUTPATIENT
Start: 2018-11-13 | End: 2018-11-13

## 2018-11-13 RX ORDER — ONDANSETRON 2 MG/ML
4 INJECTION INTRAMUSCULAR; INTRAVENOUS
Status: DISCONTINUED | OUTPATIENT
Start: 2018-11-13 | End: 2018-11-13

## 2018-11-13 RX ORDER — LABETALOL HYDROCHLORIDE 5 MG/ML
5 INJECTION, SOLUTION INTRAVENOUS EVERY 10 MIN PRN
Status: DISCONTINUED | OUTPATIENT
Start: 2018-11-13 | End: 2018-11-13

## 2018-11-13 RX ADMIN — PROPOFOL 50 MG: 10 INJECTION, EMULSION INTRAVENOUS at 10:19

## 2018-11-13 RX ADMIN — PANTOPRAZOLE SODIUM 40 MG: 40 INJECTION, POWDER, FOR SOLUTION INTRAVENOUS at 22:23

## 2018-11-13 RX ADMIN — FENTANYL CITRATE 50 MCG: 50 INJECTION, SOLUTION INTRAMUSCULAR; INTRAVENOUS at 10:12

## 2018-11-13 RX ADMIN — PROPOFOL 50 MG: 10 INJECTION, EMULSION INTRAVENOUS at 10:09

## 2018-11-13 RX ADMIN — ONDANSETRON HYDROCHLORIDE 4 MG: 2 INJECTION, SOLUTION INTRAMUSCULAR; INTRAVENOUS at 18:14

## 2018-11-13 RX ADMIN — PROPOFOL 50 MG: 10 INJECTION, EMULSION INTRAVENOUS at 10:15

## 2018-11-13 RX ADMIN — Medication 10 ML: at 22:28

## 2018-11-13 RX ADMIN — Medication 10 ML: at 11:02

## 2018-11-13 RX ADMIN — PROPOFOL 50 MG: 10 INJECTION, EMULSION INTRAVENOUS at 10:07

## 2018-11-13 RX ADMIN — HYDROMORPHONE HYDROCHLORIDE 0.5 MG: 1 INJECTION, SOLUTION INTRAMUSCULAR; INTRAVENOUS; SUBCUTANEOUS at 07:38

## 2018-11-13 RX ADMIN — MIRTAZAPINE 37.5 MG: 15 TABLET, FILM COATED ORAL at 22:23

## 2018-11-13 RX ADMIN — POTASSIUM CHLORIDE 20 MEQ: 29.8 INJECTION, SOLUTION INTRAVENOUS at 06:11

## 2018-11-13 RX ADMIN — POTASSIUM CHLORIDE 10 MEQ: 7.46 INJECTION, SOLUTION INTRAVENOUS at 19:54

## 2018-11-13 RX ADMIN — INSULIN LISPRO 2 UNITS: 100 INJECTION, SOLUTION INTRAVENOUS; SUBCUTANEOUS at 22:39

## 2018-11-13 RX ADMIN — SODIUM CHLORIDE: 9 INJECTION, SOLUTION INTRAVENOUS at 10:04

## 2018-11-13 RX ADMIN — DEXTROSE MONOHYDRATE 100 ML/HR: 50 INJECTION, SOLUTION INTRAVENOUS at 01:25

## 2018-11-13 RX ADMIN — POTASSIUM CHLORIDE 10 MEQ: 7.46 INJECTION, SOLUTION INTRAVENOUS at 20:59

## 2018-11-13 RX ADMIN — INSULIN GLARGINE 20 UNITS: 100 INJECTION, SOLUTION SUBCUTANEOUS at 22:40

## 2018-11-13 RX ADMIN — PANTOPRAZOLE SODIUM 40 MG: 40 INJECTION, POWDER, FOR SOLUTION INTRAVENOUS at 11:02

## 2018-11-13 RX ADMIN — PROPOFOL 50 MG: 10 INJECTION, EMULSION INTRAVENOUS at 10:12

## 2018-11-13 RX ADMIN — HYDROMORPHONE HYDROCHLORIDE 0.5 MG: 1 INJECTION, SOLUTION INTRAMUSCULAR; INTRAVENOUS; SUBCUTANEOUS at 11:02

## 2018-11-13 RX ADMIN — HYDROMORPHONE HYDROCHLORIDE 0.5 MG: 2 INJECTION, SOLUTION INTRAMUSCULAR; INTRAVENOUS; SUBCUTANEOUS at 18:14

## 2018-11-13 RX ADMIN — HYDROMORPHONE HYDROCHLORIDE 0.5 MG: 1 INJECTION, SOLUTION INTRAMUSCULAR; INTRAVENOUS; SUBCUTANEOUS at 14:21

## 2018-11-13 RX ADMIN — POTASSIUM CHLORIDE 20 MEQ: 29.8 INJECTION, SOLUTION INTRAVENOUS at 07:37

## 2018-11-13 RX ADMIN — IOPAMIDOL 75 ML: 755 INJECTION, SOLUTION INTRAVENOUS at 22:55

## 2018-11-13 RX ADMIN — LIDOCAINE HYDROCHLORIDE 100 MG: 20 INJECTION, SOLUTION INFILTRATION; PERINEURAL at 10:07

## 2018-11-13 RX ADMIN — Medication 10 ML: at 22:10

## 2018-11-13 RX ADMIN — HYDROMORPHONE HYDROCHLORIDE 0.5 MG: 1 INJECTION, SOLUTION INTRAMUSCULAR; INTRAVENOUS; SUBCUTANEOUS at 01:25

## 2018-11-13 RX ADMIN — POTASSIUM CHLORIDE 20 MEQ: 29.8 INJECTION, SOLUTION INTRAVENOUS at 05:22

## 2018-11-13 RX ADMIN — ONDANSETRON HYDROCHLORIDE 4 MG: 2 INJECTION, SOLUTION INTRAMUSCULAR; INTRAVENOUS at 11:15

## 2018-11-13 RX ADMIN — HYDROMORPHONE HYDROCHLORIDE 0.5 MG: 2 INJECTION, SOLUTION INTRAMUSCULAR; INTRAVENOUS; SUBCUTANEOUS at 22:09

## 2018-11-13 RX ADMIN — Medication 10 ML: at 11:03

## 2018-11-13 ASSESSMENT — PULMONARY FUNCTION TESTS
PIF_VALUE: 0

## 2018-11-13 ASSESSMENT — PAIN DESCRIPTION - LOCATION
LOCATION: ABDOMEN

## 2018-11-13 ASSESSMENT — PAIN SCALES - GENERAL
PAINLEVEL_OUTOF10: 0
PAINLEVEL_OUTOF10: 9
PAINLEVEL_OUTOF10: 0
PAINLEVEL_OUTOF10: 9
PAINLEVEL_OUTOF10: 9
PAINLEVEL_OUTOF10: 8
PAINLEVEL_OUTOF10: 0
PAINLEVEL_OUTOF10: 0
PAINLEVEL_OUTOF10: 8
PAINLEVEL_OUTOF10: 9
PAINLEVEL_OUTOF10: 9
PAINLEVEL_OUTOF10: 0

## 2018-11-13 ASSESSMENT — PAIN DESCRIPTION - PROGRESSION
CLINICAL_PROGRESSION: NOT CHANGED
CLINICAL_PROGRESSION: GRADUALLY IMPROVING

## 2018-11-13 ASSESSMENT — PAIN DESCRIPTION - PAIN TYPE
TYPE: ACUTE PAIN

## 2018-11-13 ASSESSMENT — PAIN DESCRIPTION - DESCRIPTORS
DESCRIPTORS: BURNING;SHARP
DESCRIPTORS: ACHING
DESCRIPTORS: BURNING;SHARP
DESCRIPTORS: ACHING

## 2018-11-13 ASSESSMENT — PAIN DESCRIPTION - ORIENTATION
ORIENTATION: LEFT
ORIENTATION: MID;RIGHT
ORIENTATION: MID;RIGHT
ORIENTATION: LEFT

## 2018-11-13 ASSESSMENT — PAIN - FUNCTIONAL ASSESSMENT: PAIN_FUNCTIONAL_ASSESSMENT: 0-10

## 2018-11-13 NOTE — PLAN OF CARE
guarding absent, no masses palpated, normal bowel sounds,  no scars, and hernia absent  Extremities: no edema noted in bilateral lower extremities  SKIN:  no bruising or bleeding and normal skin color, texture, turgor    Labs:  CBC:    Recent Labs      11/11/18   1705   11/12/18   1200  11/12/18   1648  11/13/18   0325   WBC  20.6*   --   14.8*   --   10.6   HGB  15.2   < >  12.2  11.3*  11.7*   HCT  45.9   < >  37.0  34.5*  35.1*   PLT  248   --   168   --   151    < > = values in this interval not displayed. BMP:  Recent Labs      11/11/18   1705  11/11/18   1925  11/12/18   1200  11/13/18   0325   NA  143   --   145  145   K  3.8   --   3.5  2.9*   CL  87*   --   105  107   CO2  18*   --   23  30   BUN  17   --   15  13   CREATININE  0.7   --   0.5*  <0.5*   GLUCOSE  595*  353  250*  93     Hepatic: Recent Labs      11/11/18   1705  11/12/18   1200   AST  36  34   ALT  39  28   BILITOT  0.5  0.3   ALKPHOS  122  77     Amylase:   Recent Labs      11/12/18   1200   AMYLASE  54     Lipase:   Recent Labs      11/11/18   1705  11/12/18   1200   LIPASE  170.0*  103.0*     Mag:    No results for input(s): MG in the last 72 hours. Phos:   No results for input(s): PHOS in the last 72 hours. Coags: No results for input(s): INR, APTT in the last 72 hours. Imaging:  I have personally reviewed the following films:  XR CHEST PORTABLE  Narrative: EXAMINATION:  SINGLE XRAY VIEW OF THE CHEST    11/12/2018 4:29 pm    COMPARISON:  12/12/2017    HISTORY:  ORDERING SYSTEM PROVIDED HISTORY: PICC Placement - tip verification  TECHNOLOGIST PROVIDED HISTORY:  Reason for exam:->PICC Placement - tip verification  Reason for exam:->unable to print 3CG  Ordering Physician Provided Reason for Exam: PICC Placement - tip  verification - right; unable to print 3CG  Acuity: Unknown  Type of Exam: Unknown    FINDINGS:  A right arm PICC is in place with its tip projecting over the cavoatrial  junction. The heart size is normal, stable. Minimal left mid to lower lung  airspace disease has developed, likely atelectasis. The lungs are otherwise  clear. Impression: Interval placement of right arm PICC. Interval development of minimal left mid to lower lung airspace disease,  likely atelectasis. Pneumonia is considered less likely but not excluded. CT ABDOMEN PELVIS WO CONTRAST Additional Contrast? None  Addendum: ADDENDUM:   The results were reported to Dr. Reena Fink at 1:42 a.m. on November 12, 2018. Narrative: EXAMINATION:  CT OF THE ABDOMEN AND PELVIS WITHOUT CONTRAST 11/11/2018 7:14 pm    TECHNIQUE:  CT of the abdomen and pelvis was performed without the administration of  intravenous contrast. Multiplanar reformatted images are provided for review. Dose modulation, iterative reconstruction, and/or weight based adjustment of  the mA/kV was utilized to reduce the radiation dose to as low as reasonably  achievable. COMPARISON:  None    HISTORY:  ORDERING SYSTEM PROVIDED HISTORY: abd pain and hematemesis  TECHNOLOGIST PROVIDED HISTORY:  Additional Contrast?->None  Ordering Physician Provided Reason for Exam: nausea vomiting  abd pain  Acuity: Chronic  Type of Exam: Initial    FINDINGS:  Lower Chest: There is discoid atelectasis in the lingula. The lung bases are  clear. The heart is of normal size. No evidence of pericardial effusion. Organs: Limited evaluation of the solid organs without IV contrast.  There is  mild infiltration of fat surrounding the pancreas. Acute pancreatitis cannot  be excluded. The liver, gallbladder, spleen, and bilateral adrenal glands  are within normal limits. There is question of mild medullary nephrocalcinosis. There is right-sided  extra renal pelvis/parapelvic cyst.  There is no evidence of hydronephrosis  or obstructive uropathy. GI/Bowel: There is thickening of the distal esophagus, may be related to  esophagitis. There is a small hiatal hernia.   There is no evidence of bowel  obstruction or antiemetics--minimizing narcotics as tolerated  9. DVT prophylaxis with SCD's  10. Management of medical comorbid etiologies per primary team and consulting services    EDUCATION:  Educated patient on plan of care and disease process--all questions answered. The patient is not currently smoking. Recommend maintaining a smoke-free lifestyle. Plans discussed with patient and nursing. Reviewed and discuss with Dr. Melissa Barlow consult to follow.        Signed:  CHINA Lugo - CNP  11/13/2018 12:59 PM

## 2018-11-13 NOTE — PROGRESS NOTES
apparent distress, appears stated age and cooperative. HEENT: Pupils equal, round, and reactive to light. Conjunctivae/corneas clear. Neck: Supple, with full range of motion. No jugular venous distention. Trachea midline. Respiratory:  Normal respiratory effort. Clear to auscultation, bilaterally without Rales/Wheezes/Rhonchi. Cardiovascular: Regular rate and rhythm with normal S1/S2 without murmurs, rubs or gallops. Abdomen: Soft, non-tender, non-distended with normal bowel sounds. Musculoskeletal: No clubbing, cyanosis or edema bilaterally. Full range of motion without deformity. Skin: Skin color, texture, turgor normal.  No rashes or lesions. Neurologic:  Neurovascularly intact without any focal sensory/motor deficits. Cranial nerves: II-XII intact, grossly non-focal.  Psychiatric: Alert and oriented, thought content appropriate, normal insight  Capillary Refill: Brisk,< 3 seconds   Peripheral Pulses: +2 palpable, equal bilaterally       Labs:   Recent Labs      11/11/18   1705   11/12/18   1200  11/12/18   1648  11/13/18   0325   WBC  20.6*   --   14.8*   --   10.6   HGB  15.2   < >  12.2  11.3*  11.7*   HCT  45.9   < >  37.0  34.5*  35.1*   PLT  248   --   168   --   151    < > = values in this interval not displayed. Recent Labs      11/11/18   1705  11/12/18   1200  11/13/18   0325   NA  143  145  145   K  3.8  3.5  2.9*   CL  87*  105  107   CO2  18*  23  30   BUN  17  15  13   CREATININE  0.7  0.5*  <0.5*   CALCIUM  10.2  8.4  8.1*     Recent Labs      11/11/18   1705  11/12/18   1200   AST  36  34   ALT  39  28   BILITOT  0.5  0.3   ALKPHOS  122  77     No results for input(s): INR in the last 72 hours. No results for input(s): Marylen Silvan in the last 72 hours.     Urinalysis:      Lab Results   Component Value Date    NITRU Negative 11/11/2018    WBCUA 194 12/06/2017    BACTERIA 4+ 12/06/2017    RBCUA 5-10 12/06/2017    BLOODU Negative 11/11/2018    SPECGRAV >1.030 11/11/2018 Eval Status: eval and treat     Coty Fiore MD

## 2018-11-13 NOTE — PROGRESS NOTES
improve may need consider nj vs tpn next day or so  - ivf; decrease rate tomorrow  - pain control  - wean off pain meds as able  - as pain improves can start clears  - pharm d review ? meds trigger  - surg eval gb since sludge on u/s at [facility]  - will repeat ct with iv contrast/pancreas protocol since recurrent and prior ct here and [facility] noncontrast; eval for underlying mass, etc.-- not done yet, apparently to be done today  - later f/u [facility] eus as planned     2. DKA: felt triggered by pancreatitis per [facility] note, here may have been pancreatitis although ran out of insulin as well. Rec:  - correction per primary team     3. Leukocytosis: resolved.     4. Hx hep c, ? Hx hep b: I can't find hep b dna result in care everywhere. lft currently normal. Liver nl on noncontrast ct. F/u [facility] as scheduled.     5. ? Coffee ground emesis: egd today dictated, appears reflux/emesis induced esophagitis. Rec:  - iv ppi  - check mg  - follow up biopsies  - future tight glycemic control.       Derrick Jean MD  [facility] and [facility] [address] [facility]

## 2018-11-13 NOTE — ANESTHESIA PRE PROCEDURE
injection pen 0-6 Units  0-6 Units Subcutaneous Nightly Hira Aguilar MD   5 Units at 11/11/18 2058    insulin glargine (LANTUS) injection pen 20 Units  20 Units Subcutaneous Nightly Hira Aguilar MD   20 Units at 11/12/18 2044    glucose (GLUTOSE) 40 % oral gel 15 g  15 g Oral PRN Hira Aguilar MD        dextrose 50 % solution 12.5 g  12.5 g Intravenous PRN Hira Aguilar MD        glucagon (rDNA) injection 1 mg  1 mg Intramuscular PRN Hira Aguilar MD        dextrose 5 % solution  100 mL/hr Intravenous PRN Hira Aguilar  mL/hr at 11/13/18 0125 100 mL/hr at 11/13/18 0125       Allergies: Allergies   Allergen Reactions    Darvocet [Propoxyphene N-Acetaminophen] Nausea Only    Naprosyn [Naproxen] Rash    Ultram [Tramadol Hcl] Rash       Problem List:    Patient Active Problem List   Diagnosis Code    Diabetes mellitus ([facility] E11.9    Knee pain M25.569    Degenerative arthritis of knee M17.10    HTN (hypertension) I10    Other specified anemias D64.89    Gastroenteritis K52.9    DKA, type 2, not at goal ([facility] E11.10    Hyperglycemia R73.9       Past Medical History:        Diagnosis Date    Arthritis     CHF (congestive heart failure) ([facility]     Depression     Diabetes mellitus (HCC)     Hepatitis C     Dr Salvador Mitchell follows; contracted from ex- (drug user)    MRSA (methicillin resistant Staphylococcus aureus) infection in 2000    was in urine and nares    Pneumonia     S/P colonoscopy with polypectomy 2/11    one polyp, Dr Salvador Mitchell. Recall 3 years. Past Surgical History:        Procedure Laterality Date    ABOVE KNEE AMPUTATION      AMPUTATION      right bka    JOINT REPLACEMENT  2006, 2010    right knee; Dr Gwen Nieves.     KNEE ARTHROSCOPY      ridht knee X6    REVISION TOTAL KNEE ARTHROPLASTY  2/9/11    right       Social History:    Social History   Substance Use Topics    Smoking status: Former Smoker     Quit date: 3/1/2011    Smokeless tobacco: Never Used      Comment: congratulated on quitting smoking1    Alcohol use No                                Counseling given: Not Answered      Vital Signs (Current):   Vitals:    11/12/18 1530 11/12/18 1855 11/12/18 2343 11/13/18 0519   BP: 124/80 118/73 120/73 124/78   Pulse: 74 74 84 68   Resp: 18 16 20 18   Temp: 97.8 °F (36.6 °C) 98.9 °F (37.2 °C) 98.3 °F (36.8 °C) 98.2 °F (36.8 °C)   TempSrc: Temporal Oral Temporal Temporal   SpO2: 96% 95% 95% 95%   Weight:       Height:                                                  BP Readings from Last 3 Encounters:   11/13/18 124/78   12/14/17 (!) 151/88   12/06/17 111/75       NPO Status:                                                                                 BMI:   Wt Readings from Last 3 Encounters:   11/12/18 115 lb 4.8 oz (52.3 kg)   12/14/17 130 lb 1.1 oz (59 kg)   12/06/17 135 lb (61.2 kg)     Body mass index is 21.09 kg/m².     CBC:   Lab Results   Component Value Date    WBC 10.6 11/13/2018    RBC 3.88 11/13/2018    HGB 11.7 11/13/2018    HCT 35.1 11/13/2018    MCV 90.3 11/13/2018    RDW 13.8 11/13/2018     11/13/2018       CMP:   Lab Results   Component Value Date     11/13/2018    K 2.9 11/13/2018     11/13/2018    CO2 30 11/13/2018    BUN 13 11/13/2018    CREATININE <0.5 11/13/2018    GFRAA >60 11/13/2018    GFRAA >60 02/14/2012    AGRATIO 0.9 11/12/2018    LABGLOM >60 11/13/2018    LABGLOM 131.7 05/20/2011    GLUCOSE 93 11/13/2018    GLUCOSE 329 05/20/2011    PROT 6.6 11/12/2018    PROT 7.6 02/14/2012    CALCIUM 8.1 11/13/2018    BILITOT 0.3 11/12/2018    ALKPHOS 77 11/12/2018    AST 34 11/12/2018    ALT 28 11/12/2018       POC Tests:   Recent Labs      11/13/18   0605   POCGLU  107*       Coags:   Lab Results   Component Value Date    PROTIME 11.5 01/27/2011    INR 1.05 01/27/2011    APTT 27.6 01/27/2011       HCG (If Applicable):   Lab Results   Component Value Date    PREGTESTUR Neg 02/14/2012        ABGs:   Lab Results   Component Value Date    PHART 7.418 11/11/2018    PO2ART 89.1 11/11/2018    OXA0PAD 29.6 11/11/2018    FZH1LPN 19.1 11/11/2018    BEART -4.2 11/11/2018    L5JPQBRQ 97.5 11/11/2018        Type & Screen (If Applicable):  Lab Results   Component Value Date    LABABO B 01/27/2011    [address] Positive 01/27/2011       Anesthesia Evaluation  Patient summary reviewed and Nursing notes reviewed  Airway: Mallampati: II        Dental:          Pulmonary:   (+) pneumonia:                             Cardiovascular:  Exercise tolerance: poor (<4 METS),   (+) hypertension:, CHF:,                   Neuro/Psych:   (+) psychiatric history:            GI/Hepatic/Renal:   (+) hepatitis:, liver disease:,           Endo/Other:    (+) Diabetes, . Abdominal:           Vascular:                                        Anesthesia Plan      MAC     ASA 3       Induction: intravenous. MIPS: Prophylactic antiemetics administered. Anesthetic plan and risks discussed with patient. Plan discussed with CRNA.                   Leah Avalos MD   11/13/2018

## 2018-11-13 NOTE — OP NOTE
removed. The patient tolerated the procedure well with  no immediate complication. IMPRESSION:  1. Long linear esophageal ulcerations of the distal and mid esophagus  that appear most consistent with emesis and reflux change. 2.  Small hiatal hernia. 3.  Otherwise normal EGD. RECOMMENDATION:  1. Continue acid suppression medication. 2.  In the long term, we will need better glycemic control of her  diabetes to decrease gastroparesis and nausea and vomiting that can  exacerbate this condition. 3.  Follow up biopsy results. 4.  See pancreatitis recommendations per today's progress note.         [place] MD Ashley    D: 11/13/2018 10:36:23       T: 11/13/2018 11:13:23     [email or web address]  Job#: [id number]     Doc#: [id number]    CC:  Nishi Rodriguez MD

## 2018-11-14 ENCOUNTER — ANESTHESIA (OUTPATIENT)
Dept: OPERATING ROOM | Age: 53
DRG: 263 | End: 2018-11-14
Payer: COMMERCIAL

## 2018-11-14 ENCOUNTER — ANESTHESIA EVENT (OUTPATIENT)
Dept: OPERATING ROOM | Age: 53
DRG: 263 | End: 2018-11-14
Payer: COMMERCIAL

## 2018-11-14 ENCOUNTER — APPOINTMENT (OUTPATIENT)
Dept: GENERAL RADIOLOGY | Age: 53
DRG: 263 | End: 2018-11-14
Payer: COMMERCIAL

## 2018-11-14 VITALS
RESPIRATION RATE: 15 BRPM | OXYGEN SATURATION: 100 % | DIASTOLIC BLOOD PRESSURE: 83 MMHG | SYSTOLIC BLOOD PRESSURE: 136 MMHG | TEMPERATURE: 98.6 F

## 2018-11-14 PROBLEM — I25.10 CORONARY ARTERY DISEASE INVOLVING NATIVE CORONARY ARTERY OF NATIVE HEART WITHOUT ANGINA PECTORIS: Status: ACTIVE | Noted: 2018-11-14

## 2018-11-14 LAB
ANION GAP SERPL CALCULATED.3IONS-SCNC: 11 MMOL/L (ref 3–16)
BUN BLDV-MCNC: 6 MG/DL (ref 7–20)
CALCIUM SERPL-MCNC: 7.8 MG/DL (ref 8.3–10.6)
CHLORIDE BLD-SCNC: 105 MMOL/L (ref 99–110)
CO2: 24 MMOL/L (ref 21–32)
CREAT SERPL-MCNC: <0.5 MG/DL (ref 0.6–1.1)
EKG ATRIAL RATE: 83 BPM
EKG DIAGNOSIS: NORMAL
EKG P AXIS: 20 DEGREES
EKG P-R INTERVAL: 144 MS
EKG Q-T INTERVAL: 392 MS
EKG QRS DURATION: 76 MS
EKG QTC CALCULATION (BAZETT): 460 MS
EKG R AXIS: -28 DEGREES
EKG T AXIS: 63 DEGREES
EKG VENTRICULAR RATE: 83 BPM
ESTIMATED AVERAGE GLUCOSE: 446.9 MG/DL
GFR AFRICAN AMERICAN: >60
GFR NON-AFRICAN AMERICAN: >60
GLUCOSE BLD-MCNC: 122 MG/DL (ref 70–99)
GLUCOSE BLD-MCNC: 131 MG/DL (ref 70–99)
GLUCOSE BLD-MCNC: 139 MG/DL (ref 70–99)
GLUCOSE BLD-MCNC: 142 MG/DL (ref 70–99)
GLUCOSE BLD-MCNC: 151 MG/DL (ref 70–99)
GLUCOSE BLD-MCNC: 212 MG/DL (ref 70–99)
GLUCOSE BLD-MCNC: 214 MG/DL (ref 70–99)
HBA1C MFR BLD: 17.2 %
PERFORMED ON: ABNORMAL
POTASSIUM REFLEX MAGNESIUM: 3.8 MMOL/L (ref 3.5–5.1)
SODIUM BLD-SCNC: 140 MMOL/L (ref 136–145)
TROPONIN: <0.01 NG/ML

## 2018-11-14 PROCEDURE — 88307 TISSUE EXAM BY PATHOLOGIST: CPT

## 2018-11-14 PROCEDURE — 3700000000 HC ANESTHESIA ATTENDED CARE: Performed by: SURGERY

## 2018-11-14 PROCEDURE — 6370000000 HC RX 637 (ALT 250 FOR IP): Performed by: INTERNAL MEDICINE

## 2018-11-14 PROCEDURE — 80048 BASIC METABOLIC PNL TOTAL CA: CPT

## 2018-11-14 PROCEDURE — 47001 NDL BIOPSY LVR TM OTH MAJ PX: CPT | Performed by: SURGERY

## 2018-11-14 PROCEDURE — 94760 N-INVAS EAR/PLS OXIMETRY 1: CPT

## 2018-11-14 PROCEDURE — 6360000002 HC RX W HCPCS: Performed by: NURSE ANESTHETIST, CERTIFIED REGISTERED

## 2018-11-14 PROCEDURE — 2580000003 HC RX 258: Performed by: INTERNAL MEDICINE

## 2018-11-14 PROCEDURE — 0FB14ZX EXCISION OF RIGHT LOBE LIVER, PERCUTANEOUS ENDOSCOPIC APPROACH, DIAGNOSTIC: ICD-10-PCS | Performed by: SURGERY

## 2018-11-14 PROCEDURE — 2500000003 HC RX 250 WO HCPCS: Performed by: SURGERY

## 2018-11-14 PROCEDURE — 6360000002 HC RX W HCPCS: Performed by: SURGERY

## 2018-11-14 PROCEDURE — 2720000010 HC SURG SUPPLY STERILE: Performed by: SURGERY

## 2018-11-14 PROCEDURE — 3700000001 HC ADD 15 MINUTES (ANESTHESIA): Performed by: SURGERY

## 2018-11-14 PROCEDURE — 6360000002 HC RX W HCPCS: Performed by: INTERNAL MEDICINE

## 2018-11-14 PROCEDURE — 90686 IIV4 VACC NO PRSV 0.5 ML IM: CPT | Performed by: INTERNAL MEDICINE

## 2018-11-14 PROCEDURE — 6360000002 HC RX W HCPCS: Performed by: HOSPITALIST

## 2018-11-14 PROCEDURE — 2580000003 HC RX 258: Performed by: ANESTHESIOLOGY

## 2018-11-14 PROCEDURE — G0008 ADMIN INFLUENZA VIRUS VAC: HCPCS | Performed by: INTERNAL MEDICINE

## 2018-11-14 PROCEDURE — C9113 INJ PANTOPRAZOLE SODIUM, VIA: HCPCS | Performed by: INTERNAL MEDICINE

## 2018-11-14 PROCEDURE — 99254 IP/OBS CNSLTJ NEW/EST MOD 60: CPT | Performed by: INTERNAL MEDICINE

## 2018-11-14 PROCEDURE — 6360000002 HC RX W HCPCS: Performed by: ANESTHESIOLOGY

## 2018-11-14 PROCEDURE — 3600000004 HC SURGERY LEVEL 4 BASE: Performed by: SURGERY

## 2018-11-14 PROCEDURE — 6360000002 HC RX W HCPCS

## 2018-11-14 PROCEDURE — 93010 ELECTROCARDIOGRAM REPORT: CPT | Performed by: INTERNAL MEDICINE

## 2018-11-14 PROCEDURE — BF101ZZ FLUOROSCOPY OF BILE DUCTS USING LOW OSMOLAR CONTRAST: ICD-10-PCS | Performed by: SURGERY

## 2018-11-14 PROCEDURE — C1726 CATH, BAL DIL, NON-VASCULAR: HCPCS | Performed by: SURGERY

## 2018-11-14 PROCEDURE — 2580000003 HC RX 258: Performed by: SURGERY

## 2018-11-14 PROCEDURE — 1200000000 HC SEMI PRIVATE

## 2018-11-14 PROCEDURE — 2500000003 HC RX 250 WO HCPCS: Performed by: NURSE ANESTHETIST, CERTIFIED REGISTERED

## 2018-11-14 PROCEDURE — 84484 ASSAY OF TROPONIN QUANT: CPT

## 2018-11-14 PROCEDURE — 93005 ELECTROCARDIOGRAM TRACING: CPT | Performed by: INTERNAL MEDICINE

## 2018-11-14 PROCEDURE — 74300 X-RAY BILE DUCTS/PANCREAS: CPT

## 2018-11-14 PROCEDURE — 7100000000 HC PACU RECOVERY - FIRST 15 MIN: Performed by: SURGERY

## 2018-11-14 PROCEDURE — 0FT44ZZ RESECTION OF GALLBLADDER, PERCUTANEOUS ENDOSCOPIC APPROACH: ICD-10-PCS | Performed by: SURGERY

## 2018-11-14 PROCEDURE — 7100000001 HC PACU RECOVERY - ADDTL 15 MIN: Performed by: SURGERY

## 2018-11-14 PROCEDURE — 88313 SPECIAL STAINS GROUP 2: CPT

## 2018-11-14 PROCEDURE — 2709999900 HC NON-CHARGEABLE SUPPLY: Performed by: SURGERY

## 2018-11-14 PROCEDURE — 2580000003 HC RX 258: Performed by: NURSE ANESTHETIST, CERTIFIED REGISTERED

## 2018-11-14 PROCEDURE — 88304 TISSUE EXAM BY PATHOLOGIST: CPT

## 2018-11-14 PROCEDURE — 6370000000 HC RX 637 (ALT 250 FOR IP)

## 2018-11-14 PROCEDURE — C1773 RET DEV, INSERTABLE: HCPCS | Performed by: SURGERY

## 2018-11-14 PROCEDURE — C1894 INTRO/SHEATH, NON-LASER: HCPCS | Performed by: SURGERY

## 2018-11-14 PROCEDURE — 6360000004 HC RX CONTRAST MEDICATION: Performed by: SURGERY

## 2018-11-14 PROCEDURE — 47563 LAPARO CHOLECYSTECTOMY/GRAPH: CPT | Performed by: SURGERY

## 2018-11-14 PROCEDURE — 3600000014 HC SURGERY LEVEL 4 ADDTL 15MIN: Performed by: SURGERY

## 2018-11-14 RX ORDER — FENTANYL CITRATE 50 UG/ML
INJECTION, SOLUTION INTRAMUSCULAR; INTRAVENOUS
Status: COMPLETED
Start: 2018-11-14 | End: 2018-11-14

## 2018-11-14 RX ORDER — HYDROMORPHONE HCL 110MG/55ML
0.5 PATIENT CONTROLLED ANALGESIA SYRINGE INTRAVENOUS EVERY 5 MIN PRN
Status: COMPLETED | OUTPATIENT
Start: 2018-11-14 | End: 2018-11-14

## 2018-11-14 RX ORDER — CEFAZOLIN SODIUM 2 G/100ML
2 INJECTION, SOLUTION INTRAVENOUS ONCE
Status: COMPLETED | OUTPATIENT
Start: 2018-11-14 | End: 2018-11-14

## 2018-11-14 RX ORDER — SODIUM CHLORIDE 9 MG/ML
INJECTION, SOLUTION INTRAVENOUS CONTINUOUS PRN
Status: DISCONTINUED | OUTPATIENT
Start: 2018-11-14 | End: 2018-11-14 | Stop reason: SDUPTHER

## 2018-11-14 RX ORDER — ACETAMINOPHEN 80 MG
TABLET,CHEWABLE ORAL
Status: COMPLETED
Start: 2018-11-14 | End: 2018-11-14

## 2018-11-14 RX ORDER — DEXAMETHASONE SODIUM PHOSPHATE 4 MG/ML
INJECTION, SOLUTION INTRA-ARTICULAR; INTRALESIONAL; INTRAMUSCULAR; INTRAVENOUS; SOFT TISSUE PRN
Status: DISCONTINUED | OUTPATIENT
Start: 2018-11-14 | End: 2018-11-14 | Stop reason: SDUPTHER

## 2018-11-14 RX ORDER — GLYCOPYRROLATE 0.2 MG/ML
INJECTION INTRAMUSCULAR; INTRAVENOUS PRN
Status: DISCONTINUED | OUTPATIENT
Start: 2018-11-14 | End: 2018-11-14 | Stop reason: SDUPTHER

## 2018-11-14 RX ORDER — HYDROMORPHONE HCL 110MG/55ML
0.25 PATIENT CONTROLLED ANALGESIA SYRINGE INTRAVENOUS EVERY 5 MIN PRN
Status: DISCONTINUED | OUTPATIENT
Start: 2018-11-14 | End: 2018-11-14 | Stop reason: HOSPADM

## 2018-11-14 RX ORDER — FENTANYL CITRATE 50 UG/ML
INJECTION, SOLUTION INTRAMUSCULAR; INTRAVENOUS PRN
Status: DISCONTINUED | OUTPATIENT
Start: 2018-11-14 | End: 2018-11-14 | Stop reason: SDUPTHER

## 2018-11-14 RX ORDER — MAGNESIUM HYDROXIDE 1200 MG/15ML
LIQUID ORAL CONTINUOUS PRN
Status: COMPLETED | OUTPATIENT
Start: 2018-11-14 | End: 2018-11-14

## 2018-11-14 RX ORDER — LIDOCAINE HYDROCHLORIDE 10 MG/ML
1 INJECTION, SOLUTION EPIDURAL; INFILTRATION; INTRACAUDAL; PERINEURAL
Status: DISCONTINUED | OUTPATIENT
Start: 2018-11-14 | End: 2018-11-14 | Stop reason: HOSPADM

## 2018-11-14 RX ORDER — SUCCINYLCHOLINE CHLORIDE 20 MG/ML
INJECTION INTRAMUSCULAR; INTRAVENOUS PRN
Status: DISCONTINUED | OUTPATIENT
Start: 2018-11-14 | End: 2018-11-14 | Stop reason: SDUPTHER

## 2018-11-14 RX ORDER — SODIUM CHLORIDE 9 MG/ML
INJECTION, SOLUTION INTRAVENOUS CONTINUOUS
Status: DISCONTINUED | OUTPATIENT
Start: 2018-11-14 | End: 2018-11-15

## 2018-11-14 RX ORDER — PROPOFOL 10 MG/ML
INJECTION, EMULSION INTRAVENOUS PRN
Status: DISCONTINUED | OUTPATIENT
Start: 2018-11-14 | End: 2018-11-14 | Stop reason: SDUPTHER

## 2018-11-14 RX ORDER — NITROGLYCERIN 0.4 MG/1
0.4 TABLET SUBLINGUAL EVERY 5 MIN PRN
Status: DISCONTINUED | OUTPATIENT
Start: 2018-11-14 | End: 2018-11-16 | Stop reason: HOSPADM

## 2018-11-14 RX ORDER — ONDANSETRON 2 MG/ML
INJECTION INTRAMUSCULAR; INTRAVENOUS PRN
Status: DISCONTINUED | OUTPATIENT
Start: 2018-11-14 | End: 2018-11-14 | Stop reason: SDUPTHER

## 2018-11-14 RX ORDER — HYDROCODONE BITARTRATE AND ACETAMINOPHEN 5; 325 MG/1; MG/1
1 TABLET ORAL
Status: DISCONTINUED | OUTPATIENT
Start: 2018-11-14 | End: 2018-11-14 | Stop reason: HOSPADM

## 2018-11-14 RX ORDER — FENTANYL CITRATE 50 UG/ML
25 INJECTION, SOLUTION INTRAMUSCULAR; INTRAVENOUS EVERY 5 MIN PRN
Status: COMPLETED | OUTPATIENT
Start: 2018-11-14 | End: 2018-11-14

## 2018-11-14 RX ORDER — NEOSTIGMINE METHYLSULFATE 5 MG/5 ML
SYRINGE (ML) INTRAVENOUS PRN
Status: DISCONTINUED | OUTPATIENT
Start: 2018-11-14 | End: 2018-11-14 | Stop reason: SDUPTHER

## 2018-11-14 RX ORDER — ONDANSETRON 2 MG/ML
4 INJECTION INTRAMUSCULAR; INTRAVENOUS
Status: COMPLETED | OUTPATIENT
Start: 2018-11-14 | End: 2018-11-14

## 2018-11-14 RX ORDER — SODIUM CHLORIDE, SODIUM LACTATE, POTASSIUM CHLORIDE, CALCIUM CHLORIDE 600; 310; 30; 20 MG/100ML; MG/100ML; MG/100ML; MG/100ML
INJECTION, SOLUTION INTRAVENOUS CONTINUOUS PRN
Status: COMPLETED | OUTPATIENT
Start: 2018-11-14 | End: 2018-11-14

## 2018-11-14 RX ORDER — BUPIVACAINE HYDROCHLORIDE AND EPINEPHRINE 5; 5 MG/ML; UG/ML
INJECTION, SOLUTION EPIDURAL; INTRACAUDAL; PERINEURAL
Status: COMPLETED | OUTPATIENT
Start: 2018-11-14 | End: 2018-11-14

## 2018-11-14 RX ADMIN — INSULIN GLARGINE 20 UNITS: 100 INJECTION, SOLUTION SUBCUTANEOUS at 20:58

## 2018-11-14 RX ADMIN — ONDANSETRON HYDROCHLORIDE 4 MG: 2 INJECTION, SOLUTION INTRAMUSCULAR; INTRAVENOUS at 11:46

## 2018-11-14 RX ADMIN — PANTOPRAZOLE SODIUM 40 MG: 40 INJECTION, POWDER, FOR SOLUTION INTRAVENOUS at 08:42

## 2018-11-14 RX ADMIN — ONDANSETRON HYDROCHLORIDE 4 MG: 2 INJECTION, SOLUTION INTRAMUSCULAR; INTRAVENOUS at 03:40

## 2018-11-14 RX ADMIN — FENTANYL CITRATE 50 MCG: 50 INJECTION, SOLUTION INTRAMUSCULAR; INTRAVENOUS at 16:24

## 2018-11-14 RX ADMIN — INFLUENZA A VIRUS A/MICHIGAN/45/2015 X-275 (H1N1) ANTIGEN (FORMALDEHYDE INACTIVATED), INFLUENZA A VIRUS A/SINGAPORE/INFIMH-16-0019/2016 IVR-186 (H3N2) ANTIGEN (FORMALDEHYDE INACTIVATED), INFLUENZA B VIRUS B/PHUKET/3073/2013 ANTIGEN (FORMALDEHYDE INACTIVATED), AND INFLUENZA B VIRUS B/MARYLAND/15/2016 BX-69A ANTIGEN (FORMALDEHYDE INACTIVATED) 0.5 ML: 15; 15; 15; 15 INJECTION, SUSPENSION INTRAMUSCULAR at 11:46

## 2018-11-14 RX ADMIN — DEXAMETHASONE SODIUM PHOSPHATE 8 MG: 4 INJECTION, SOLUTION INTRAMUSCULAR; INTRAVENOUS at 16:13

## 2018-11-14 RX ADMIN — FENTANYL CITRATE 50 MCG: 50 INJECTION, SOLUTION INTRAMUSCULAR; INTRAVENOUS at 16:58

## 2018-11-14 RX ADMIN — CEFAZOLIN SODIUM 2 G: 2 INJECTION, SOLUTION INTRAVENOUS at 15:57

## 2018-11-14 RX ADMIN — HYDROMORPHONE HYDROCHLORIDE 0.5 MG: 2 INJECTION, SOLUTION INTRAMUSCULAR; INTRAVENOUS; SUBCUTANEOUS at 09:20

## 2018-11-14 RX ADMIN — HYDROMORPHONE HYDROCHLORIDE 0.5 MG: 2 INJECTION, SOLUTION INTRAMUSCULAR; INTRAVENOUS; SUBCUTANEOUS at 17:22

## 2018-11-14 RX ADMIN — Medication 10 ML: at 23:55

## 2018-11-14 RX ADMIN — PROPOFOL 100 MG: 10 INJECTION, EMULSION INTRAVENOUS at 16:04

## 2018-11-14 RX ADMIN — Medication 3 MG: at 16:55

## 2018-11-14 RX ADMIN — INSULIN LISPRO 4 UNITS: 100 INJECTION, SOLUTION INTRAVENOUS; SUBCUTANEOUS at 19:19

## 2018-11-14 RX ADMIN — MIRTAZAPINE 37.5 MG: 15 TABLET, FILM COATED ORAL at 20:56

## 2018-11-14 RX ADMIN — HYDROMORPHONE HYDROCHLORIDE 0.5 MG: 2 INJECTION, SOLUTION INTRAMUSCULAR; INTRAVENOUS; SUBCUTANEOUS at 17:37

## 2018-11-14 RX ADMIN — Medication 10 ML: at 08:43

## 2018-11-14 RX ADMIN — PANTOPRAZOLE SODIUM 40 MG: 40 INJECTION, POWDER, FOR SOLUTION INTRAVENOUS at 20:57

## 2018-11-14 RX ADMIN — SODIUM CHLORIDE, POTASSIUM CHLORIDE, SODIUM LACTATE AND CALCIUM CHLORIDE: 600; 310; 30; 20 INJECTION, SOLUTION INTRAVENOUS at 02:15

## 2018-11-14 RX ADMIN — Medication 10 ML: at 08:42

## 2018-11-14 RX ADMIN — FENTANYL CITRATE 25 MCG: 50 INJECTION INTRAMUSCULAR; INTRAVENOUS at 17:53

## 2018-11-14 RX ADMIN — HYDROMORPHONE HYDROCHLORIDE 0.5 MG: 2 INJECTION, SOLUTION INTRAMUSCULAR; INTRAVENOUS; SUBCUTANEOUS at 01:08

## 2018-11-14 RX ADMIN — SODIUM CHLORIDE: 9 INJECTION, SOLUTION INTRAVENOUS at 19:17

## 2018-11-14 RX ADMIN — GLYCOPYRROLATE 0.6 MG: 0.2 INJECTION, SOLUTION INTRAMUSCULAR; INTRAVENOUS at 16:55

## 2018-11-14 RX ADMIN — ONDANSETRON HYDROCHLORIDE 4 MG: 2 SOLUTION INTRAMUSCULAR; INTRAVENOUS at 16:13

## 2018-11-14 RX ADMIN — FENTANYL CITRATE 25 MCG: 50 INJECTION INTRAMUSCULAR; INTRAVENOUS at 17:59

## 2018-11-14 RX ADMIN — HYDROMORPHONE HYDROCHLORIDE 0.5 MG: 2 INJECTION, SOLUTION INTRAMUSCULAR; INTRAVENOUS; SUBCUTANEOUS at 12:35

## 2018-11-14 RX ADMIN — INSULIN LISPRO 2 UNITS: 100 INJECTION, SOLUTION INTRAVENOUS; SUBCUTANEOUS at 12:15

## 2018-11-14 RX ADMIN — SODIUM CHLORIDE, POTASSIUM CHLORIDE, SODIUM LACTATE AND CALCIUM CHLORIDE: 600; 310; 30; 20 INJECTION, SOLUTION INTRAVENOUS at 12:39

## 2018-11-14 RX ADMIN — HYDROMORPHONE HYDROCHLORIDE 0.5 MG: 2 INJECTION, SOLUTION INTRAMUSCULAR; INTRAVENOUS; SUBCUTANEOUS at 06:01

## 2018-11-14 RX ADMIN — POTASSIUM CHLORIDE 10 MEQ: 7.46 INJECTION, SOLUTION INTRAVENOUS at 01:11

## 2018-11-14 RX ADMIN — Medication: at 23:56

## 2018-11-14 RX ADMIN — FENTANYL CITRATE 25 MCG: 50 INJECTION INTRAMUSCULAR; INTRAVENOUS at 18:05

## 2018-11-14 RX ADMIN — FENTANYL CITRATE 50 MCG: 50 INJECTION, SOLUTION INTRAMUSCULAR; INTRAVENOUS at 16:04

## 2018-11-14 RX ADMIN — SUCCINYLCHOLINE CHLORIDE 80 MG: 20 INJECTION, SOLUTION INTRAMUSCULAR; INTRAVENOUS at 16:05

## 2018-11-14 RX ADMIN — NITROGLYCERIN 0.4 MG: 0.4 TABLET SUBLINGUAL at 12:14

## 2018-11-14 RX ADMIN — ONDANSETRON 4 MG: 2 INJECTION INTRAMUSCULAR; INTRAVENOUS at 17:39

## 2018-11-14 RX ADMIN — HYDROMORPHONE HYDROCHLORIDE 0.5 MG: 2 INJECTION, SOLUTION INTRAMUSCULAR; INTRAVENOUS; SUBCUTANEOUS at 23:55

## 2018-11-14 RX ADMIN — FENTANYL CITRATE 50 MCG: 50 INJECTION, SOLUTION INTRAMUSCULAR; INTRAVENOUS at 17:05

## 2018-11-14 RX ADMIN — HYDROMORPHONE HYDROCHLORIDE 0.5 MG: 2 INJECTION, SOLUTION INTRAMUSCULAR; INTRAVENOUS; SUBCUTANEOUS at 17:30

## 2018-11-14 RX ADMIN — FENTANYL CITRATE 50 MCG: 50 INJECTION, SOLUTION INTRAMUSCULAR; INTRAVENOUS at 16:56

## 2018-11-14 RX ADMIN — SODIUM CHLORIDE, POTASSIUM CHLORIDE, SODIUM LACTATE AND CALCIUM CHLORIDE: 600; 310; 30; 20 INJECTION, SOLUTION INTRAVENOUS at 07:38

## 2018-11-14 RX ADMIN — HYDROMORPHONE HYDROCHLORIDE 0.5 MG: 2 INJECTION, SOLUTION INTRAMUSCULAR; INTRAVENOUS; SUBCUTANEOUS at 17:14

## 2018-11-14 RX ADMIN — HYDROMORPHONE HYDROCHLORIDE 0.5 MG: 2 INJECTION, SOLUTION INTRAMUSCULAR; INTRAVENOUS; SUBCUTANEOUS at 20:57

## 2018-11-14 RX ADMIN — SODIUM CHLORIDE: 9 INJECTION, SOLUTION INTRAVENOUS at 15:59

## 2018-11-14 RX ADMIN — INSULIN LISPRO 2 UNITS: 100 INJECTION, SOLUTION INTRAVENOUS; SUBCUTANEOUS at 20:58

## 2018-11-14 ASSESSMENT — PULMONARY FUNCTION TESTS
PIF_VALUE: 3
PIF_VALUE: 21
PIF_VALUE: 21
PIF_VALUE: 14
PIF_VALUE: 2
PIF_VALUE: 21
PIF_VALUE: 21
PIF_VALUE: 1
PIF_VALUE: 2
PIF_VALUE: 21
PIF_VALUE: 20
PIF_VALUE: 22
PIF_VALUE: 13
PIF_VALUE: 13
PIF_VALUE: 14
PIF_VALUE: 21
PIF_VALUE: 19
PIF_VALUE: 14
PIF_VALUE: 3
PIF_VALUE: 14
PIF_VALUE: 17
PIF_VALUE: 21
PIF_VALUE: 16
PIF_VALUE: 20
PIF_VALUE: 21
PIF_VALUE: 47
PIF_VALUE: 13
PIF_VALUE: 13
PIF_VALUE: 32
PIF_VALUE: 16
PIF_VALUE: 2
PIF_VALUE: 14
PIF_VALUE: 20
PIF_VALUE: 21
PIF_VALUE: 21
PIF_VALUE: 19
PIF_VALUE: 3
PIF_VALUE: 19
PIF_VALUE: 21
PIF_VALUE: 24
PIF_VALUE: 0
PIF_VALUE: 21
PIF_VALUE: 14
PIF_VALUE: 23
PIF_VALUE: 22
PIF_VALUE: 13
PIF_VALUE: 2
PIF_VALUE: 1
PIF_VALUE: 15
PIF_VALUE: 21
PIF_VALUE: 22
PIF_VALUE: 1
PIF_VALUE: 2
PIF_VALUE: 20
PIF_VALUE: 14
PIF_VALUE: 1
PIF_VALUE: 14
PIF_VALUE: 0
PIF_VALUE: 19
PIF_VALUE: 3
PIF_VALUE: 21
PIF_VALUE: 20
PIF_VALUE: 7
PIF_VALUE: 1
PIF_VALUE: 21
PIF_VALUE: 1
PIF_VALUE: 21
PIF_VALUE: 0
PIF_VALUE: 2
PIF_VALUE: 21
PIF_VALUE: 14

## 2018-11-14 ASSESSMENT — PAIN DESCRIPTION - ORIENTATION
ORIENTATION: RIGHT;MID
ORIENTATION: MID;RIGHT
ORIENTATION: RIGHT;MID
ORIENTATION: MID;RIGHT
ORIENTATION: MID;RIGHT

## 2018-11-14 ASSESSMENT — PAIN DESCRIPTION - PROGRESSION
CLINICAL_PROGRESSION: NOT CHANGED
CLINICAL_PROGRESSION: GRADUALLY WORSENING
CLINICAL_PROGRESSION: NOT CHANGED

## 2018-11-14 ASSESSMENT — PAIN SCALES - GENERAL
PAINLEVEL_OUTOF10: 8
PAINLEVEL_OUTOF10: 8
PAINLEVEL_OUTOF10: 10
PAINLEVEL_OUTOF10: 8
PAINLEVEL_OUTOF10: 9
PAINLEVEL_OUTOF10: 9
PAINLEVEL_OUTOF10: 8
PAINLEVEL_OUTOF10: 8
PAINLEVEL_OUTOF10: 3
PAINLEVEL_OUTOF10: 7
PAINLEVEL_OUTOF10: 7
PAINLEVEL_OUTOF10: 8
PAINLEVEL_OUTOF10: 8
PAINLEVEL_OUTOF10: 9

## 2018-11-14 ASSESSMENT — PAIN DESCRIPTION - DESCRIPTORS
DESCRIPTORS: BURNING;SHARP
DESCRIPTORS: ACHING;BURNING
DESCRIPTORS: BURNING;SHARP
DESCRIPTORS: BURNING;ACHING
DESCRIPTORS: BURNING;SHARP

## 2018-11-14 ASSESSMENT — PAIN DESCRIPTION - ONSET: ONSET: ON-GOING

## 2018-11-14 ASSESSMENT — PAIN DESCRIPTION - PAIN TYPE
TYPE: ACUTE PAIN

## 2018-11-14 ASSESSMENT — PAIN DESCRIPTION - LOCATION
LOCATION: ABDOMEN

## 2018-11-14 NOTE — PLAN OF CARE
Problem: Falls - Risk of:  Goal: Will remain free from falls  Will remain free from falls   Outcome: Ongoing  Patient in bed with wheels locked in lowest position with alarm on, call light and belongings within reach. Problem: Pain:  Goal: Pain level will decrease  Pain level will decrease   Outcome: Ongoing  Patient able to report pain, 0-10 scale used, pharmacologic and non pharmacologic  Interventions in use and discussed with patient. Patient calls for pain medication every three hours. Problem: Risk for Impaired Skin Integrity  Goal: Tissue integrity - skin and mucous membranes  Structural intactness and normal physiological function of skin and  mucous membranes. Outcome: Ongoing  Skin remains dry and intact, no signs of breakdown noted. Patient encouraged to reposition every 2 hours and is assisted to do so when unable to reposition independently.

## 2018-11-14 NOTE — ANESTHESIA PRE PROCEDURE
Department of Anesthesiology  Preprocedure Note       Name:  Karlene Khan   Age:  52 y.o.  :  1965                                          MRN:  [id number]         Date:  2018      Surgeon: Manny Murphy):  Esthela Officer, MD    Procedure: LAPAROSCOPIC CHOLECYSTECTOMY WITH INTRAOPERATIVE CHOLANGIOGRAM (N/A )    Medications prior to admission:   Prior to Admission medications    Medication Sig Start Date End Date Taking? Authorizing Provider   atorvastatin (LIPITOR) 40 MG tablet Take 40 mg by mouth daily   Yes Historical Provider, MD   carvedilol (COREG) 6.25 MG tablet Take 6.25 mg by mouth 2 times daily (with meals)   Yes Historical Provider, MD   eplerenone (INSPRA) 25 MG tablet Take 25 mg by mouth daily   Yes Historical Provider, MD   ticagrelor (BRILINTA) 90 MG TABS tablet Take 90 mg by mouth 2 times daily   Yes Historical Provider, MD   losartan (COZAAR) 50 MG tablet Take 50 mg by mouth daily   Yes Historical Provider, MD   aspirin 81 MG tablet Take 81 mg by mouth daily   Yes Historical Provider, MD   promethazine (PHENERGAN) 25 MG tablet Take 25 mg by mouth 14  Yes Historical Provider, MD   lamoTRIgine (LAMICTAL) 100 MG tablet Take 100 mg by mouth daily. Yes Historical Provider, MD   mirtazapine (REMERON SOLTAB) 30 MG disintegrating tablet Take 35 mg by mouth nightly. Yes Historical Provider, MD   gabapentin (NEURONTIN) 300 MG capsule Take 300 mg by mouth 3 times daily. Yes Historical Provider, MD   potassium chloride (KLOR-CON) 20 MEQ packet Take 20 mEq by mouth daily. Yes Historical Provider, MD   insulin lispro (HUMALOG) 100 UNIT/ML injection Inject 5 Units into the skin 3 times daily (before meals). Give only if blood sugar is greater than 150   Yes Historical Provider, MD   insulin glargine (LANTUS) 100 UNIT/ML injection Inject 45 Units into the skin nightly.    Yes Historical Provider, MD LEÓN INS SYR HALF-UNIT .3CC/31G 31G X \" 0.3 ML MISC 145 Units 12/3/17 Historical Provider, MD   NONFORMULARY BP medication. Unknown name. Historical Provider, MD   calcium carbonate (TUMS) 500 MG chewable tablet Take 1 tablet by mouth daily. Historical Provider, MD   acetaminophen (TYLENOL) 325 MG tablet Take  by mouth every 4 hours as needed.       Historical Provider, MD       Current medications:    Current Facility-Administered Medications   Medication Dose Route Frequency Provider Last Rate Last Dose    nitroGLYCERIN (NITROSTAT) SL tablet 0.4 mg  0.4 mg Sublingual Q5 Min PRN Ce Robbins MD   0.4 mg at 11/14/18 1214    HYDROcodone-acetaminophen (NORCO) 5-325 MG per tablet 1 tablet  1 tablet Oral Once PRN Ceciliamira Severe, MD        ondansetron [facility]) injection 4 mg  4 mg Intravenous Once PRN Ceciliara Severe, MD        HYDROmorphone (DILAUDID) injection 0.25 mg  0.25 mg Intravenous Q5 Min PRN Cecilia Severe, MD        HYDROmorphone (DILAUDID) injection 0.5 mg  0.5 mg Intravenous Q5 Min PRN Ceciliara Severe, MD        HYDROmorphone (DILAUDID) injection 0.5 mg  0.5 mg Intravenous Q3H PRN Gareth Mobley MD   0.5 mg at 11/14/18 1235    lidocaine PF 1 % injection 5 mL  5 mL Intradermal Once Luis Amaya MD        sodium chloride flush 0.9 % injection 10 mL  10 mL Intravenous 2 times per day Luis Amaya MD   10 mL at 11/14/18 0842    sodium chloride flush 0.9 % injection 10 mL  10 mL Intravenous PRN Luis Amaya MD        pantoprazole (PROTONIX) injection 40 mg  40 mg Intravenous BID Luis Amaya MD   40 mg at 11/14/18 6329    mirtazapine (REMERON) tablet 37.5 mg  37.5 mg Oral Nightly Gareth Mobley MD   37.5 mg at 11/13/18 2223    sodium chloride flush 0.9 % injection 10 mL  10 mL Intravenous 2 times per day Gareth Mobley MD   10 mL at 11/14/18 0843    sodium chloride flush 0.9 % injection 10 mL  10 mL Intravenous PRN Gareth Mobley MD        magnesium hydroxide (MILK OF MAGNESIA) 400 MG/5ML suspension 30 mL CMP:   Lab Results   Component Value Date     11/14/2018    K 3.8 11/14/2018     11/14/2018    CO2 24 11/14/2018    BUN 6 11/14/2018    CREATININE <0.5 11/14/2018    GFRAA >60 11/14/2018    GFRAA >60 02/14/2012    AGRATIO 0.9 11/12/2018    LABGLOM >60 11/14/2018    LABGLOM 131.7 05/20/2011    GLUCOSE 151 11/14/2018    GLUCOSE 329 05/20/2011    PROT 6.6 11/12/2018    PROT 7.6 02/14/2012    CALCIUM 7.8 11/14/2018    BILITOT 0.3 11/12/2018    ALKPHOS 77 11/12/2018    AST 34 11/12/2018    ALT 28 11/12/2018       POC Tests:   Recent Labs      11/14/18   1439   POCGLU  131*       Coags:   Lab Results   Component Value Date    PROTIME 11.5 01/27/2011    INR 1.05 01/27/2011    APTT 27.6 01/27/2011       HCG (If Applicable):   Lab Results   Component Value Date    PREGTESTUR Neg 02/14/2012        ABGs:   Lab Results   Component Value Date    PHART 7.418 11/11/2018    PO2ART 89.1 11/11/2018    ZEN4FUW 29.6 11/11/2018    EER1IMB 19.1 11/11/2018    BEART -4.2 11/11/2018    [id number] 97.5 11/11/2018        Type & Screen (If Applicable):  Lab Results   Component Value Date    LABABO B 01/27/2011    [facility] RAFAEL Positive 01/27/2011       Anesthesia Evaluation  Patient summary reviewed no history of anesthetic complications:   Airway: Mallampati: II  TM distance: >3 FB   Neck ROM: full  Mouth opening: > = 3 FB Dental:    (+) upper dentures      Pulmonary:Negative Pulmonary ROS breath sounds clear to auscultation                             Cardiovascular:    (+) hypertension:, CAD:, CHF:,       ECG reviewed  Rhythm: regular  Rate: normal      Cleared by cardiology           ROS comment: 6/22/18 EF 57-76%, grade 1 diastolic dysfunction  STEMI 6/18 PCI of LAD     Neuro/Psych:      (-) seizures, TIA and CVA            ROS comment: H/o IVDA GI/Hepatic/Renal:   (+) hepatitis: C, liver disease:,          ROS comment: Pancreatitis  No  Vomiting today.    Endo/Other:    (+) Diabetes, blood dyscrasia: anticoagulation therapy:.,

## 2018-11-14 NOTE — PROGRESS NOTES
Patient resting quietly in bed. VSS. Patient meets discharge criteria for phase 1. Seen by anesthesia. OK to transfer to floor.

## 2018-11-14 NOTE — CONSULTS
conj/corn clear Ext  Ext nl, AT, no C/C/E   Nose Nares nl, no drain, NT Pulse 2+ and symmetric   Throat Lips, mucosa, tongue nl Skin Color/text/turg nl, no rash/lesions   Neck S/S, TM, NT, no bruit/JVD Psych Nl mood and affect   Lung CTA-B, unlabored, no DTP Lymph   No cervical or axillary LA   Ch wall NT, no deform Neuro  Nl gross M/S exam     LABS     CBC:  hgb 11.7  CMP: Nl  Trop Neg    ASSESSMENT AND PLAN   ~CAD   Date EF Results   Sx      Hx 6/18  SVPCI LAD for STEMI   [facility] 6/18  LM-nl, LAD 90% mid, Cx nl, RCA nl  PCI of LAD with Synergy [id number] PD to 3.0   MPI   None   TTE 12/11 6/18 55%  35%   Anterior akinesis, apical akinesis    Trop   Negative   Plan   No evidence for ACS  ASA 81mg daily ASAP when safe from GI standpoint  Discontinue brilinta   Start plavix 75mg daily ASAP when safe from GI  Followup with Dr. Vicente [facility] at [facility]   ~Hematemasis/Hep C/Pancreatitis  Per GI/med  ~HTN  Today BP [x] Controlled [] Borderline [] Uncontrolled   Counseling [x] Diet/Salt [x] Exercise [x] Weight    Plan Continue current medications at doses detailed above  ~Hyperchol  Goal LDL [] <100 [x] <70     Counseling [x] Diet [x] Weight [x] Exercise   Plan Continue current doses of meds listed above

## 2018-11-15 LAB
AMPHETAMINE SCREEN, URINE: ABNORMAL
BARBITURATE SCREEN URINE: ABNORMAL
BENZODIAZEPINE SCREEN, URINE: ABNORMAL
CANNABINOID SCREEN URINE: ABNORMAL
COCAINE METABOLITE SCREEN URINE: ABNORMAL
GLUCOSE BLD-MCNC: 166 MG/DL (ref 70–99)
GLUCOSE BLD-MCNC: 196 MG/DL (ref 70–99)
GLUCOSE BLD-MCNC: 217 MG/DL (ref 70–99)
GLUCOSE BLD-MCNC: 266 MG/DL (ref 70–99)
HCT VFR BLD CALC: 33 % (ref 36–48)
HEMOGLOBIN: 10.9 G/DL (ref 12–16)
Lab: ABNORMAL
MCH RBC QN AUTO: 29.6 PG (ref 26–34)
MCHC RBC AUTO-ENTMCNC: 33 G/DL (ref 31–36)
MCV RBC AUTO: 89.7 FL (ref 80–100)
METHADONE SCREEN, URINE: ABNORMAL
OPIATE SCREEN URINE: POSITIVE
OXYCODONE URINE: ABNORMAL
PDW BLD-RTO: 13.3 % (ref 12.4–15.4)
PERFORMED ON: ABNORMAL
PH UA: 6
PHENCYCLIDINE SCREEN URINE: ABNORMAL
PLATELET # BLD: 114 K/UL (ref 135–450)
PMV BLD AUTO: 10.1 FL (ref 5–10.5)
PROPOXYPHENE SCREEN: ABNORMAL
RBC # BLD: 3.68 M/UL (ref 4–5.2)
WBC # BLD: 8.3 K/UL (ref 4–11)

## 2018-11-15 PROCEDURE — 2580000003 HC RX 258: Performed by: INTERNAL MEDICINE

## 2018-11-15 PROCEDURE — 2580000003 HC RX 258: Performed by: ANESTHESIOLOGY

## 2018-11-15 PROCEDURE — 6360000002 HC RX W HCPCS: Performed by: SURGERY

## 2018-11-15 PROCEDURE — 99232 SBSQ HOSP IP/OBS MODERATE 35: CPT | Performed by: INTERNAL MEDICINE

## 2018-11-15 PROCEDURE — 6360000002 HC RX W HCPCS: Performed by: INTERNAL MEDICINE

## 2018-11-15 PROCEDURE — 6370000000 HC RX 637 (ALT 250 FOR IP): Performed by: INTERNAL MEDICINE

## 2018-11-15 PROCEDURE — 85027 COMPLETE CBC AUTOMATED: CPT

## 2018-11-15 PROCEDURE — C9113 INJ PANTOPRAZOLE SODIUM, VIA: HCPCS | Performed by: INTERNAL MEDICINE

## 2018-11-15 PROCEDURE — 6370000000 HC RX 637 (ALT 250 FOR IP): Performed by: SURGERY

## 2018-11-15 PROCEDURE — 80307 DRUG TEST PRSMV CHEM ANLYZR: CPT

## 2018-11-15 PROCEDURE — 36415 COLL VENOUS BLD VENIPUNCTURE: CPT

## 2018-11-15 PROCEDURE — 1200000000 HC SEMI PRIVATE

## 2018-11-15 PROCEDURE — 99024 POSTOP FOLLOW-UP VISIT: CPT | Performed by: SURGERY

## 2018-11-15 RX ORDER — CLOPIDOGREL BISULFATE 75 MG/1
75 TABLET ORAL DAILY
Status: DISCONTINUED | OUTPATIENT
Start: 2018-11-15 | End: 2018-11-16 | Stop reason: HOSPADM

## 2018-11-15 RX ORDER — PANTOPRAZOLE SODIUM 40 MG/1
40 TABLET, DELAYED RELEASE ORAL
Status: DISCONTINUED | OUTPATIENT
Start: 2018-11-15 | End: 2018-11-16 | Stop reason: HOSPADM

## 2018-11-15 RX ORDER — ASPIRIN 81 MG/1
81 TABLET, CHEWABLE ORAL DAILY
Status: DISCONTINUED | OUTPATIENT
Start: 2018-11-15 | End: 2018-11-16 | Stop reason: HOSPADM

## 2018-11-15 RX ORDER — HYDROCODONE BITARTRATE AND ACETAMINOPHEN 5; 325 MG/1; MG/1
2 TABLET ORAL EVERY 4 HOURS PRN
Status: DISCONTINUED | OUTPATIENT
Start: 2018-11-15 | End: 2018-11-16 | Stop reason: HOSPADM

## 2018-11-15 RX ORDER — HYDROCODONE BITARTRATE AND ACETAMINOPHEN 5; 325 MG/1; MG/1
1 TABLET ORAL EVERY 4 HOURS PRN
Status: DISCONTINUED | OUTPATIENT
Start: 2018-11-15 | End: 2018-11-16 | Stop reason: HOSPADM

## 2018-11-15 RX ORDER — BLOOD-GLUCOSE METER
1 KIT MISCELLANEOUS DAILY
Qty: 1 KIT | Refills: 2 | Status: ON HOLD | OUTPATIENT
Start: 2018-11-15 | End: 2019-07-03 | Stop reason: SDUPTHER

## 2018-11-15 RX ORDER — INSULIN GLARGINE 100 [IU]/ML
20 INJECTION, SOLUTION SUBCUTANEOUS NIGHTLY
Qty: 1 VIAL | Refills: 3 | Status: ON HOLD | OUTPATIENT
Start: 2018-11-15 | End: 2019-02-24

## 2018-11-15 RX ORDER — HYDROMORPHONE HCL 110MG/55ML
1 PATIENT CONTROLLED ANALGESIA SYRINGE INTRAVENOUS
Status: DISCONTINUED | OUTPATIENT
Start: 2018-11-15 | End: 2018-11-16 | Stop reason: HOSPADM

## 2018-11-15 RX ORDER — HYDROCODONE BITARTRATE AND ACETAMINOPHEN 5; 325 MG/1; MG/1
1-2 TABLET ORAL EVERY 4 HOURS PRN
Qty: 30 TABLET | Refills: 0 | Status: SHIPPED | OUTPATIENT
Start: 2018-11-15 | End: 2018-11-16

## 2018-11-15 RX ORDER — CARVEDILOL 6.25 MG/1
6.25 TABLET ORAL 2 TIMES DAILY WITH MEALS
Status: DISCONTINUED | OUTPATIENT
Start: 2018-11-15 | End: 2018-11-16 | Stop reason: HOSPADM

## 2018-11-15 RX ORDER — LOSARTAN POTASSIUM 25 MG/1
50 TABLET ORAL DAILY
Status: DISCONTINUED | OUTPATIENT
Start: 2018-11-15 | End: 2018-11-16 | Stop reason: HOSPADM

## 2018-11-15 RX ADMIN — PANTOPRAZOLE SODIUM 40 MG: 40 INJECTION, POWDER, FOR SOLUTION INTRAVENOUS at 08:43

## 2018-11-15 RX ADMIN — HYDROCODONE BITARTRATE AND ACETAMINOPHEN 2 TABLET: 5; 325 TABLET ORAL at 08:43

## 2018-11-15 RX ADMIN — INSULIN GLARGINE 20 UNITS: 100 INJECTION, SOLUTION SUBCUTANEOUS at 22:00

## 2018-11-15 RX ADMIN — HYDROCODONE BITARTRATE AND ACETAMINOPHEN 2 TABLET: 5; 325 TABLET ORAL at 16:18

## 2018-11-15 RX ADMIN — INSULIN LISPRO 2 UNITS: 100 INJECTION, SOLUTION INTRAVENOUS; SUBCUTANEOUS at 12:40

## 2018-11-15 RX ADMIN — INSULIN LISPRO 3 UNITS: 100 INJECTION, SOLUTION INTRAVENOUS; SUBCUTANEOUS at 22:01

## 2018-11-15 RX ADMIN — SODIUM CHLORIDE: 9 INJECTION, SOLUTION INTRAVENOUS at 04:12

## 2018-11-15 RX ADMIN — Medication 10 ML: at 08:44

## 2018-11-15 RX ADMIN — INSULIN LISPRO 2 UNITS: 100 INJECTION, SOLUTION INTRAVENOUS; SUBCUTANEOUS at 08:37

## 2018-11-15 RX ADMIN — CLOPIDOGREL 75 MG: 75 TABLET, FILM COATED ORAL at 08:43

## 2018-11-15 RX ADMIN — ONDANSETRON HYDROCHLORIDE 4 MG: 2 INJECTION, SOLUTION INTRAMUSCULAR; INTRAVENOUS at 00:01

## 2018-11-15 RX ADMIN — HYDROMORPHONE HYDROCHLORIDE 1 MG: 2 INJECTION INTRAMUSCULAR; INTRAVENOUS; SUBCUTANEOUS at 06:50

## 2018-11-15 RX ADMIN — MIRTAZAPINE 37.5 MG: 15 TABLET, FILM COATED ORAL at 21:58

## 2018-11-15 RX ADMIN — ASPIRIN 81 MG 81 MG: 81 TABLET ORAL at 16:14

## 2018-11-15 RX ADMIN — PANTOPRAZOLE SODIUM 40 MG: 40 TABLET, DELAYED RELEASE ORAL at 16:14

## 2018-11-15 RX ADMIN — HYDROMORPHONE HYDROCHLORIDE 1 MG: 2 INJECTION INTRAMUSCULAR; INTRAVENOUS; SUBCUTANEOUS at 03:18

## 2018-11-15 RX ADMIN — Medication 10 ML: at 22:07

## 2018-11-15 RX ADMIN — HYDROMORPHONE HYDROCHLORIDE 1 MG: 2 INJECTION INTRAMUSCULAR; INTRAVENOUS; SUBCUTANEOUS at 10:02

## 2018-11-15 RX ADMIN — INSULIN LISPRO 2 UNITS: 100 INJECTION, SOLUTION INTRAVENOUS; SUBCUTANEOUS at 18:04

## 2018-11-15 ASSESSMENT — PAIN SCALES - GENERAL
PAINLEVEL_OUTOF10: 5
PAINLEVEL_OUTOF10: 8
PAINLEVEL_OUTOF10: 7
PAINLEVEL_OUTOF10: 8
PAINLEVEL_OUTOF10: 7
PAINLEVEL_OUTOF10: 0
PAINLEVEL_OUTOF10: 7
PAINLEVEL_OUTOF10: 8
PAINLEVEL_OUTOF10: 7
PAINLEVEL_OUTOF10: 5

## 2018-11-15 ASSESSMENT — PAIN DESCRIPTION - PROGRESSION
CLINICAL_PROGRESSION: GRADUALLY IMPROVING
CLINICAL_PROGRESSION: GRADUALLY IMPROVING
CLINICAL_PROGRESSION: NOT CHANGED
CLINICAL_PROGRESSION: GRADUALLY IMPROVING
CLINICAL_PROGRESSION: NOT CHANGED

## 2018-11-15 ASSESSMENT — PAIN DESCRIPTION - PAIN TYPE: TYPE: ACUTE PAIN

## 2018-11-15 ASSESSMENT — PAIN DESCRIPTION - ORIENTATION: ORIENTATION: RIGHT

## 2018-11-15 ASSESSMENT — PAIN DESCRIPTION - LOCATION: LOCATION: ABDOMEN

## 2018-11-15 ASSESSMENT — PAIN DESCRIPTION - DESCRIPTORS: DESCRIPTORS: ACHING

## 2018-11-15 NOTE — CARE COORDINATION
Met w/patient at her request-pt stated she needed a PCP list-provided her w/list.  She was also asking about a new glucometer. Informed her that would likely need script for this-messaged MD regarding this. Pt stated no further needs at this time.   Electronically signed by BRITTNEE Ceja on 11/15/2018 at 12:44 PM

## 2018-11-15 NOTE — PLAN OF CARE
Problem: Falls - Risk of:  Goal: Will remain free from falls  Will remain free from falls   Outcome: Ongoing  Fall precautions in place, hourly rounding, call light and belongings in reach, bed in lowest position, wheels locked in place, side rails up x 2, walkways free of clutter. Patient uses a bed pan ad calls for assistance. Problem: Pain:  Goal: Pain level will decrease  Pain level will decrease   Outcome: Ongoing  Patient able to report pain, 0-10 scale used, pharmacologic and non pharmacologic  Interventions in use and discussed with patient. Patient complaining of surgical incision pain, medicated per MAR. Problem: Risk for Impaired Skin Integrity  Goal: Tissue integrity - skin and mucous membranes  Structural intactness and normal physiological function of skin and  mucous membranes. Outcome: Ongoing  Skin remains dry and intact, no signs of breakdown noted. Patient encouraged to reposition every 2 hours and is assisted to do so when unable to reposition independently.

## 2018-11-15 NOTE — PROGRESS NOTES
88/56   Pulse 101   Temp 98.6 °F (37 °C) (Temporal)   Resp 18   Ht 5' 2\" (1.575 m)   Wt 114 lb 3.2 oz (51.8 kg)   LMP 11/15/2017   SpO2 90%   BMI 20.89 kg/m²     General appearance: No apparent distress, appears stated age and cooperative. HEENT: Pupils equal, round, and reactive to light. Conjunctivae/corneas clear. Neck: Supple, with full range of motion. No jugular venous distention. Trachea midline. Respiratory:  Normal respiratory effort. Clear to auscultation, bilaterally without Rales/Wheezes/Rhonchi. Cardiovascular: Regular rate and rhythm with normal S1/S2 without murmurs, rubs or gallops. Abdomen: Soft, non-tender, non-distended with normal bowel sounds. Musculoskeletal: No clubbing, cyanosis or edema bilaterally. Full range of motion without deformity. Skin: Skin color, texture, turgor normal.  No rashes or lesions. Neurologic:  Neurovascularly intact without any focal sensory/motor deficits. Cranial nerves: II-XII intact, grossly non-focal.  Psychiatric: Alert and oriented, thought content appropriate, normal insight  Capillary Refill: Brisk,< 3 seconds   Peripheral Pulses: +2 palpable, equal bilaterally       Labs:   Recent Labs      11/13/18   0325  11/15/18   1018   WBC  10.6  8.3   HGB  11.7*  10.9*   HCT  35.1*  33.0*   PLT  151  114*     Recent Labs      11/13/18   0325  11/13/18   1536  11/14/18   0608   NA  145   --   140   K  2.9*  3.4*  3.8   CL  107   --   105   CO2  30   --   24   BUN  13   --   6*   CREATININE  <0.5*   --   <0.5*   CALCIUM  8.1*   --   7.8*     No results for input(s): AST, ALT, BILIDIR, BILITOT, ALKPHOS in the last 72 hours. No results for input(s): INR in the last 72 hours.   Recent Labs      11/14/18   1212   TROPONINI  <0.01       Urinalysis:      Lab Results   Component Value Date    NITRU Negative 11/11/2018    WBCUA 194 12/06/2017    BACTERIA 4+ 12/06/2017    RBCUA 5-10 12/06/2017    BLOODU Negative 11/11/2018    SPECGRAV >1.030 11/11/2018    GLUCOSEU 11/11/2018     1. Hematemesis   - resolved  - EGD on 11/13/2018 with emesis induced esophagitis  - hg is stable  Gi following    2. Acute pancreatitis/  Biliary pancreatitis  Some stranding on ct scan  S/p lap armando  Gen. surgery consulted     3. Diabetes mellitis   -poorly controlled. - SSI      4. Hep C  - never had treatment  -states the she would like to get this treated  - she has not used drugs in more than 3 years. 5. Chest pain:- atypical for cardiac etiology  Appreciate cardiology eval    6. CAD:- on asa, plavix     7.  Thrombocytopenia:- continue to monitor    DVT Prophylaxis: SCDs  Diet: DIET CARB CONTROL;  Code Status: Full Code    PT/OT Eval Status: eval and treat     Dispo - inpatient, d/c tomorrow, wtching bp and tcp    Phil Servin MD

## 2018-11-15 NOTE — PROGRESS NOTES
Messaged hospitalist as follows: Pt is falling asleep sitting up. BP was 78/51. Rechecked bp it is now 89/62. Pt has hx of drug use. Sister was in a couple of hrs ago. Will monitor pt.

## 2018-11-15 NOTE — PROGRESS NOTES
Irene Fagan is a 52 y.o. female patient.     Current Facility-Administered Medications   Medication Dose Route Frequency Provider Last Rate Last Dose    HYDROcodone-acetaminophen (NORCO) 5-325 MG per tablet 1 tablet  1 tablet Oral Q4H PRN Honey Darling MD        Or    HYDROcodone-acetaminophen [facility]) 5-325 MG per tablet 2 tablet  2 tablet Oral Q4H PRN Honey Darling MD   2 tablet at 11/15/18 0843    HYDROmorphone (DILAUDID) injection 1 mg  1 mg Intravenous Q3H PRN Honey Darling MD   1 mg at 11/15/18 0650    clopidogrel (PLAVIX) tablet 75 mg  75 mg Oral Daily Bryant Sanchez MD   75 mg at 11/15/18 0843    nitroGLYCERIN (NITROSTAT) SL tablet 0.4 mg  0.4 mg Sublingual Q5 Min PRN Bryant Sanchez MD   0.4 mg at 11/14/18 1214    0.9 % sodium chloride infusion   Intravenous Continuous Ryne Gatica  mL/hr at 11/15/18 0412      lidocaine PF 1 % injection 5 mL  5 mL Intradermal Once Donovan Olvera MD        sodium chloride flush 0.9 % injection 10 mL  10 mL Intravenous 2 times per day Donovan Olvera MD   10 mL at 11/15/18 0844    sodium chloride flush 0.9 % injection 10 mL  10 mL Intravenous PRN Donovan Olvera MD        pantoprazole (PROTONIX) injection 40 mg  40 mg Intravenous BID Donovan Olvera MD   40 mg at 11/15/18 0843    mirtazapine (REMERON) tablet 37.5 mg  37.5 mg Oral Nightly Olive Lyon MD   37.5 mg at 11/14/18 2056    sodium chloride flush 0.9 % injection 10 mL  10 mL Intravenous 2 times per day Olive Lyon MD   10 mL at 11/14/18 2355    sodium chloride flush 0.9 % injection 10 mL  10 mL Intravenous PRN Olive Lyon MD        magnesium hydroxide (MILK OF MAGNESIA) 400 MG/5ML suspension 30 mL  30 mL Oral Daily PRN Olive Lyon MD        ondansetron [facility]) injection 4 mg  4 mg Intravenous Q6H PRN Olive Lyon MD   4 mg at 11/15/18 0001    lactated ringers infusion   Intravenous Continuous Metzger Gash,

## 2018-11-15 NOTE — PROGRESS NOTES
elevated, no etoh, then admit here and ct without contrast mild pancreatitis, trig here normal, calcium here normal, lft here normal. Repeat CT scan with IV contrast to assess for any mass lesions/necrosis/ etc (since prior ct [facility] and here noncontrast) reviewed with Dr Manjinder Vincent and pancreas now normal with no inflammation/ no mass lesions/ no cysts/ no necrosis. ? Pancreatitis resolved vs overcall admit noncontrast ct. Nonetheless now pain resolved, pancreas nl. Rec:  - low fat diet when ok surg team  - pharm d review ? meds trigger  - surg eval gb since sludge on u/s at [facility]-- appreciate surg eval  -  f/u [facility] gi decide about eus or not as prior planned.     2. DKA: felt triggered by pancreatitis per [facility] note, here may have been pancreatitis although ran out of insulin as well. Rec:  - correction per primary team     3. Leukocytosis: resolved.     4. Hx hep c, ? Hx hep b: I can't find hep b dna result in care everywhere. lft currently normal. Liver nl on noncontrast ct and contrast ct. intraop liver mild nodular so liver biopsy done. Pt already seen at [address] [place] [address], has plans for f/u there, can f/u hep c/ liver bx/ ?hep b there as scheduled.     5. Coffee ground emesis at admit: egd 11/13 with linear shallow ulcers likely from emesis, small hh, otherwise normal egd. Mg nl. Biopsies esophagitis, neg langston's. Rec:  - change to po protonix bid.  - future tight glycemic control.  - ok resume brilinta or plavix etc if needed    6. Chest pain: cardio eval noted. [id number] [place]  resume plavix/brilinta etc from gi standpoint. 7. ? Colitis on ct: not clinically, likely artifact on ct perhaps from hydration, ct also called gastritis and stomach normal on egd. If has loose still will check c diff but hadn't been presenting issue. No abd pain now. Overall presenting sx epigastric pain resolved, pancreatitis resolved, esophagitis being treated, resume diet and ok for dc from my standpoint.  Will sign off, call if needed.   Bhavik Lane MD  [facility] and [address]

## 2018-11-15 NOTE — PROGRESS NOTES
Pt assessment completed and morning medications given. Ice applied to surgical site. Pain medication given PRN. Will continue to monitor.

## 2018-11-15 NOTE — CONSULTS
distress. VITAL SIGNS:  Temperature is 97.5, pulse 84, respirations 16, and blood  pressure 134/86. PSYCHIATRIC:  Mood and affect are appropriate. SKIN:  Normal to inspection and palpation. NEUROLOGIC:  Cranial nerves II through XII are intact. Normal  sensation. HEENT:  Conjunctivae are pink. Sclerae are nonicteric. External ears  are normal.  Hearing is normal.  NECK:  Supple. There is no thyromegaly. There are no cervical,  supraclavicular, or axillary adenopathy. CARDIOVASCULAR:  Regular rate and rhythm. Normal to palpation. LUNGS:  Clear to auscultation bilaterally. Normal respiratory effort. ABDOMEN:  Soft with epigastric tenderness. There are no peritoneal  findings. LABORATORY EVALUATION:  Showed a white blood count of 20.6. Her lipase  was 170. Liver function tests were unremarkable. Calcium is normal at  10.2. Triglycerides were also normal at 135. ASSESSMENT AND PLAN:  The patient is a 57-year-old female who presented  to the hospital with severe epigastric and left upper quadrant abdominal  pain associated with nausea, vomiting, and diarrhea. She was admitted  for similar symptoms to [facility] in 10/2018. Physical  examination reveals epigastric and left upper quadrant tenderness  without peritoneal findings. White blood count is elevated at 20.5. The patient's lipase is also elevated. CAT scan of the abdomen and  pelvis shows findings consistent with acute pancreatitis. Right upper  quadrant ultrasound shows gallbladder sludge. Upper endoscopy was  unremarkable. Overall, there is no etiology for the patient's pancreatitis. She does  not take alcohol and is on no new medications. Both her calcium and  triglyceride levels are unremarkable. The enzyme pattern is somewhat  atypical for a biliary pancreatitis; however, the gallbladder remains a  possibility as the etiology of her symptoms.   After discussion with the  patient and gastroenterology, we have

## 2018-11-15 NOTE — PROGRESS NOTES
Shift assessment complete, patient awake in bed requesting popsicles, tolerating diet well, has pain at the incision site, pain medication given per [facility]. Patient is unable to find her dentures at this time. States that she had to take them out in surgery and that is the last  Time she saw them. Will call surgery in the morning.  Fall precautions in place, hourly rounding, call light and belongings in reach, bed in lowest position, wheels locked in place, side rails up x 2, walkways free of clutter

## 2018-11-16 VITALS
TEMPERATURE: 97.1 F | SYSTOLIC BLOOD PRESSURE: 128 MMHG | DIASTOLIC BLOOD PRESSURE: 81 MMHG | OXYGEN SATURATION: 96 % | HEIGHT: 62 IN | RESPIRATION RATE: 16 BRPM | WEIGHT: 115 LBS | HEART RATE: 89 BPM | BODY MASS INDEX: 21.16 KG/M2

## 2018-11-16 LAB
ANION GAP SERPL CALCULATED.3IONS-SCNC: 7 MMOL/L (ref 3–16)
BUN BLDV-MCNC: 10 MG/DL (ref 7–20)
CALCIUM SERPL-MCNC: 7.3 MG/DL (ref 8.3–10.6)
CHLORIDE BLD-SCNC: 106 MMOL/L (ref 99–110)
CO2: 26 MMOL/L (ref 21–32)
CREAT SERPL-MCNC: 0.5 MG/DL (ref 0.6–1.1)
GFR AFRICAN AMERICAN: >60
GFR NON-AFRICAN AMERICAN: >60
GLUCOSE BLD-MCNC: 168 MG/DL (ref 70–99)
GLUCOSE BLD-MCNC: 222 MG/DL (ref 70–99)
GLUCOSE BLD-MCNC: 235 MG/DL (ref 70–99)
GLUCOSE BLD-MCNC: 235 MG/DL (ref 70–99)
HCT VFR BLD CALC: 32 % (ref 36–48)
HEMOGLOBIN: 10.6 G/DL (ref 12–16)
MCH RBC QN AUTO: 30.1 PG (ref 26–34)
MCHC RBC AUTO-ENTMCNC: 33.1 G/DL (ref 31–36)
MCV RBC AUTO: 90.9 FL (ref 80–100)
PDW BLD-RTO: 13.9 % (ref 12.4–15.4)
PERFORMED ON: ABNORMAL
PLATELET # BLD: 89 K/UL (ref 135–450)
PLATELET SLIDE REVIEW: ABNORMAL
PMV BLD AUTO: 10.2 FL (ref 5–10.5)
POTASSIUM SERPL-SCNC: 3.2 MMOL/L (ref 3.5–5.1)
RBC # BLD: 3.52 M/UL (ref 4–5.2)
SLIDE REVIEW: ABNORMAL
SODIUM BLD-SCNC: 139 MMOL/L (ref 136–145)
WBC # BLD: 5.4 K/UL (ref 4–11)

## 2018-11-16 PROCEDURE — 6370000000 HC RX 637 (ALT 250 FOR IP): Performed by: INTERNAL MEDICINE

## 2018-11-16 PROCEDURE — 2580000003 HC RX 258: Performed by: INTERNAL MEDICINE

## 2018-11-16 PROCEDURE — 85027 COMPLETE CBC AUTOMATED: CPT

## 2018-11-16 PROCEDURE — APPSS15 APP SPLIT SHARED TIME 0-15 MINUTES: Performed by: NURSE PRACTITIONER

## 2018-11-16 PROCEDURE — 80048 BASIC METABOLIC PNL TOTAL CA: CPT

## 2018-11-16 PROCEDURE — APPNB30 APP NON BILLABLE TIME 0-30 MINS: Performed by: NURSE PRACTITIONER

## 2018-11-16 PROCEDURE — 6370000000 HC RX 637 (ALT 250 FOR IP): Performed by: SURGERY

## 2018-11-16 RX ORDER — PANTOPRAZOLE SODIUM 40 MG/1
40 TABLET, DELAYED RELEASE ORAL DAILY
Qty: 30 TABLET | Refills: 3 | Status: SHIPPED | OUTPATIENT
Start: 2018-11-16 | End: 2018-11-16

## 2018-11-16 RX ORDER — POTASSIUM CHLORIDE 20 MEQ/1
40 TABLET, EXTENDED RELEASE ORAL PRN
Status: DISCONTINUED | OUTPATIENT
Start: 2018-11-16 | End: 2018-11-16 | Stop reason: HOSPADM

## 2018-11-16 RX ORDER — CLOPIDOGREL BISULFATE 75 MG/1
75 TABLET ORAL DAILY
Qty: 30 TABLET | Refills: 3 | Status: ON HOLD | OUTPATIENT
Start: 2018-11-17 | End: 2020-02-22 | Stop reason: HOSPADM

## 2018-11-16 RX ORDER — HYDROCODONE BITARTRATE AND ACETAMINOPHEN 5; 325 MG/1; MG/1
1-2 TABLET ORAL EVERY 4 HOURS PRN
Qty: 30 TABLET | Refills: 0 | Status: SHIPPED | OUTPATIENT
Start: 2018-11-16 | End: 2018-11-19

## 2018-11-16 RX ORDER — PANTOPRAZOLE SODIUM 40 MG/1
40 TABLET, DELAYED RELEASE ORAL DAILY
Qty: 30 TABLET | Refills: 3 | Status: ON HOLD | OUTPATIENT
Start: 2018-11-16 | End: 2019-06-29

## 2018-11-16 RX ORDER — POTASSIUM CHLORIDE 7.45 MG/ML
10 INJECTION INTRAVENOUS PRN
Status: DISCONTINUED | OUTPATIENT
Start: 2018-11-16 | End: 2018-11-16 | Stop reason: HOSPADM

## 2018-11-16 RX ADMIN — ASPIRIN 81 MG 81 MG: 81 TABLET ORAL at 08:05

## 2018-11-16 RX ADMIN — HYDROCODONE BITARTRATE AND ACETAMINOPHEN 1 TABLET: 5; 325 TABLET ORAL at 08:06

## 2018-11-16 RX ADMIN — Medication 10 ML: at 08:05

## 2018-11-16 RX ADMIN — PANTOPRAZOLE SODIUM 40 MG: 40 TABLET, DELAYED RELEASE ORAL at 06:17

## 2018-11-16 RX ADMIN — CARVEDILOL 6.25 MG: 6.25 TABLET, FILM COATED ORAL at 08:05

## 2018-11-16 RX ADMIN — INSULIN LISPRO 4 UNITS: 100 INJECTION, SOLUTION INTRAVENOUS; SUBCUTANEOUS at 08:07

## 2018-11-16 RX ADMIN — POTASSIUM CHLORIDE 40 MEQ: 1500 TABLET, EXTENDED RELEASE ORAL at 09:53

## 2018-11-16 RX ADMIN — INSULIN LISPRO 4 UNITS: 100 INJECTION, SOLUTION INTRAVENOUS; SUBCUTANEOUS at 12:54

## 2018-11-16 RX ADMIN — LOSARTAN POTASSIUM 50 MG: 25 TABLET ORAL at 08:05

## 2018-11-16 RX ADMIN — CLOPIDOGREL 75 MG: 75 TABLET, FILM COATED ORAL at 08:05

## 2018-11-16 RX ADMIN — Medication 10 ML: at 08:06

## 2018-11-16 RX ADMIN — HYDROCODONE BITARTRATE AND ACETAMINOPHEN 1 TABLET: 5; 325 TABLET ORAL at 01:43

## 2018-11-16 RX ADMIN — Medication 10 ML: at 08:10

## 2018-11-16 ASSESSMENT — PAIN DESCRIPTION - PROGRESSION
CLINICAL_PROGRESSION: GRADUALLY IMPROVING

## 2018-11-16 ASSESSMENT — PAIN SCALES - GENERAL
PAINLEVEL_OUTOF10: 0
PAINLEVEL_OUTOF10: 2
PAINLEVEL_OUTOF10: 8
PAINLEVEL_OUTOF10: 0
PAINLEVEL_OUTOF10: 0
PAINLEVEL_OUTOF10: 8

## 2018-11-16 NOTE — PLAN OF CARE
Problem: Pain:  Goal: Pain level will decrease  Pain level will decrease   Outcome: Ongoing  Pain level assessed. Intervention if needed. Pain level reassessed. Intervention modified if needed.   Patient satisfied with pain relief from 1 norco.

## 2018-11-16 NOTE — CONSULTS
[facility]   CONSULT NOTE    11/16/2018 10:27 AM    Patient Information: Meredith Carrillo   Date of Admit:  11/11/2018  Primary Care Physician:  Michelle Mcdowell MD  Requesting Physician:  Hermelindo Darling MD    Reason for consult:   Evaluation and recommendations for thrombocytopenia    Chief complaint:    Chief Complaint   Patient presents with    Abdominal Pain     [facility] pain and n/v for two days, states hx of pancreatitis. squad states 's, insulin controlled diabetic. Pt given 4mg IM zofran in route. History of Present Illness:  SIMONE Rey is a 52 y.o. female on Hermelindo Darling MD service who was admitted on 11/11/2018 for nausea, vomiting and abdominal pain. She had hematemesis. EGD showed esophagitis. She was diagnosed with acute pancreatitis. On 11/14/18 she had a lap cholecystectomy with resolution of abdominal pain. Platelet count has been normal until 11/15/18 when it was 114, today platelets are 57N. She does have Hep C, but thrombocytopenia has not been a problem in the past. She has history of CAD on asa and plavix. She has not had any exposure to heparin. Past Medical History:     has a past medical history of Arthritis; CHF (congestive heart failure) ([facility] [address] Depression; Diabetes mellitus ([facility] [address] Hepatitis C; MRSA (methicillin resistant Staphylococcus aureus) infection; Pneumonia; and S/P colonoscopy with polypectomy. Past Surgical History:    Past Surgical History:   Procedure Laterality Date    ABOVE KNEE AMPUTATION      AMPUTATION      right bka    JOINT REPLACEMENT  2006, 2010    right knee; Dr Joanne Patel.     KNEE ARTHROSCOPY      ridht knee X6    LIVER BIOPSY N/A 11/14/2018    LIVER BIOPSY LAPAROSCOPIC performed by Tad Cruz MD at [address] [place] [address] N/A 11/14/2018    LAPAROSCOPIC CHOLECYSTECTOMY WITH INTRAOPERATIVE CHOLANGIOGRAM performed by Tad Cruz MD at [facility] itching, rash  Psychiatric:  Negative for depression, anxiety. Endocrine:  Negative for polydipsia, polyuria, heat /cold intolerance. PHYSICAL EXAM:    Vitals:  Vitals:    11/16/18 0745   BP: 112/77   Pulse: 82   Resp:    Temp:    SpO2:         Intake/Output Summary (Last 24 hours) at 11/16/18 1027  Last data filed at 11/16/18 0907   Gross per 24 hour   Intake             1169 ml   Output              500 ml   Net              669 ml    Wt Readings from Last 3 Encounters:   11/16/18 115 lb (52.2 kg)   12/14/17 130 lb 1.1 oz (59 kg)   12/06/17 135 lb (61.2 kg)        General appearance: Appears comfortable. Well nourished  Eyes: Sclera clear, pupils equal  ENT: Moist mucus membranes, no thrush  Neck: Trachea midline, symmetrical  Cardiovascular: Regular rhythm, normal S1, S2. No murmur, gallop, rub. No edema in  lower extremities  Respiratory: Clear to auscultation bilaterally. No wheeze. Good inspiratory effort  Gastrointestinal: Abdomen soft, not tender, not distended, normal bowel sounds  Musculoskeletal: No cyanosis in digits, warm extremities  Neurologic: Cranial nerves grossly intact, no motor or speech deficits. Psychiatric: Normal affect. Alert and oriented to time, place and person.   Skin: Warm, dry, normal turgor, no rash    DATA:  CBC: Lab Results   Component Value Date    WBC 5.4 11/16/2018    RBC 3.52 11/16/2018    HGB 10.6 11/16/2018    HCT 32.0 11/16/2018    MCV 90.9 11/16/2018    MCH 30.1 11/16/2018    MCHC 33.1 11/16/2018    RDW 13.9 11/16/2018    PLT 89 11/16/2018    MPV 10.2 11/16/2018     BMP:  Lab Results   Component Value Date     11/16/2018    K 3.2 11/16/2018    K 3.8 11/14/2018     11/16/2018    CO2 26 11/16/2018    BUN 10 11/16/2018    CREATININE 0.5 11/16/2018    CALCIUM 7.3 11/16/2018    GFRAA >60 11/16/2018    GFRAA >60 02/14/2012    LABGLOM >60 11/16/2018    LABGLOM 131.7 05/20/2011    GLUCOSE 235 11/16/2018    GLUCOSE 329 05/20/2011     Magnesium:   Lab Results Component Value Date    MG 1.80 11/13/2018    MG 1.80 12/13/2017    MG 2.00 12/13/2017     LIVER PROFILE: No results for input(s): AST, ALT, LIPASE, BILIDIR, BILITOT, ALKPHOS in the last 72 hours. Invalid input(s): AMYLASE,  ALB  PT/INR:    Lab Results   Component Value Date    PROTIME 11.5 01/27/2011    INR 1.05 01/27/2011       IMPRESSION/RECOMMENDATIONS:    Active Problems:    Hyperglycemia    Acute pancreatitis    Coronary artery disease involving native coronary artery of native heart without angina pectoris  Resolved Problems:    * No resolved hospital problems. *       Thrombocytopenia - likely consumptive, no signs of bleeding, no exposure to heparin,   would continue to monitor. OK to discharge as long as platelet count is closely monitored, would suggest checking plt count on Monday. She can f/u with [facility] as outpatient if platelets do not improve. This plan was discussed with the patient and he/she verbalized understanding. Thank you for allowing us to participate in the care of this patient. Lizy Champion, CNP  [facility]      Reviewed the labs with our nurse practitioner. Plan was discussed. Domenico Hodges.  Raj Farias MD, [address]  Hematology and Oncology  [facility]  [phone]

## 2018-11-16 NOTE — PROGRESS NOTES
CLINICAL PHARMACY NOTE: MEDS TO [address] Patient?: No  Total # of Prescriptions Filled: 5   The following medications were delivered to the patient:  · plavix  · freestlye meter, strips, lancets  · humalog   Total # of Interventions Completed: 1  Time Spent (min): 45    Additional Documentation:    Spoke with patient regarding norco script, she had been on suboxone previously. She agreed for us to hold norco incase she has pain but does not need at this time.

## 2018-11-16 NOTE — PROGRESS NOTES
4208  11/16/18   0623   NA   --   140  139   K  3.4*  3.8  3.2*   CL   --   105  106   CO2   --   24  26   BUN   --   6*  10   CREATININE   --   <0.5*  0.5*   GLUCOSE   --   151*  235*     Hepatic: No results for input(s): AST, ALT, ALB, BILITOT, ALKPHOS in the last 72 hours. Amylase: No results for input(s): AMYLASE in the last 72 hours. Lipase: No results for input(s): LIPASE in the last 72 hours. Mag:      Recent Labs      11/13/18   1536   MG  1.80      Phos:   No results for input(s): PHOS in the last 72 hours. Coags: No results for input(s): INR, APTT in the last 72 hours. Cultures:  Anaerobic culture  No results for input(s): LABANAE in the last 72 hours. Blood culture  No results for input(s): BC in the last 72 hours. Blood culture 2  No results for input(s): BLOODCULT2 in the last 72 hours. Body fluid culture  No results for input(s): BLOODCULT2 in the last 72 hours. Surgical culture  No results for input(s): CXSURG in the last 72 hours. Fecal occult  No results for input(s): OCCULTBLDFEC in the last 72 hours. Gram stain  No results for input(s): LABGRAM in the last 72 hours. Stool culture 1  No results for input(s): CXST in the last 72 hours. Stool culture 2  No results for input(s): STOOLCULT2 in the last 72 hours. Urine culture  No results for input(s): LABURIN in the last 72 hours. Wound abscess  No results for input(s): WNDABS in the last 72 hours. Pathology: In process    Imaging:  I have personally reviewed the following films:    Ct Abdomen Pelvis Wo Contrast Additional Contrast? None    Addendum Date: 11/12/2018    ADDENDUM: The results were reported to Dr. Nikole Echols at 1:42 a.m. on November 12, 2018.      Result Date: 11/12/2018  EXAMINATION: CT OF THE ABDOMEN AND PELVIS WITHOUT CONTRAST 11/11/2018 7:14 pm TECHNIQUE: CT of the abdomen and pelvis was performed without the administration of intravenous contrast. Multiplanar reformatted images are provided for

## 2018-11-17 NOTE — DISCHARGE SUMMARY
Additional Contrast?->None Ordering Physician Provided Reason for Exam: nausea vomiting  abd pain Acuity: Chronic Type of Exam: Initial FINDINGS: Lower Chest: There is discoid atelectasis in the lingula. The lung bases are clear. The heart is of normal size. No evidence of pericardial effusion. Organs: Limited evaluation of the solid organs without IV contrast.  There is mild infiltration of fat surrounding the pancreas. Acute pancreatitis cannot be excluded. The liver, gallbladder, spleen, and bilateral adrenal glands are within normal limits. There is question of mild medullary nephrocalcinosis. There is right-sided extra renal pelvis/parapelvic cyst.  There is no evidence of hydronephrosis or obstructive uropathy. GI/Bowel: There is thickening of the distal esophagus, may be related to esophagitis. There is a small hiatal hernia. There is no evidence of bowel obstruction or pneumoperitoneum. There is no evidence of acute appendicitis. There is no evidence of acute diverticulitis. Pelvis: The urinary bladder is unremarkable. The pelvic structures are otherwise grossly within normal limits. There is no evidence of free pelvic fluid. Peritoneum/Retroperitoneum: A right femoral catheter is in place. There is no evidence of ascites or pneumoperitoneum. The abdominal aorta is of normal size. There are nonenlarged mesenteric and retroperitoneal lymph nodes, likely reactive. Bones/Soft Tissues: There are old healed fractures of the left pelvis. There is no acute osseous abnormality. There is no acute soft tissue abnormality. Small hiatal hernia. Thickening of the distal esophagus, may be related to esophagitis. Recommend follow-up with endoscopy to exclude underlying mass. Mild infiltration of fat surrounding the pancreas. Mild acute pancreatitis cannot be excluded. No evidence of fluid collection or abscess. Recommend correlation with amylase lipase.  Limited evaluation of the solid organs and

## 2018-11-21 LAB
EKG ATRIAL RATE: 112 BPM
EKG DIAGNOSIS: NORMAL
EKG P AXIS: 57 DEGREES
EKG P-R INTERVAL: 140 MS
EKG Q-T INTERVAL: 354 MS
EKG QRS DURATION: 88 MS
EKG QTC CALCULATION (BAZETT): 483 MS
EKG R AXIS: -24 DEGREES
EKG T AXIS: 49 DEGREES
EKG VENTRICULAR RATE: 112 BPM

## 2019-02-14 ENCOUNTER — HOSPITAL ENCOUNTER (INPATIENT)
Age: 54
LOS: 9 days | Discharge: HOME | DRG: 420 | End: 2019-02-24
Attending: EMERGENCY MEDICINE | Admitting: INTERNAL MEDICINE
Payer: COMMERCIAL

## 2019-02-14 ENCOUNTER — APPOINTMENT (OUTPATIENT)
Dept: CT IMAGING | Age: 54
DRG: 420 | End: 2019-02-14
Payer: COMMERCIAL

## 2019-02-14 ENCOUNTER — APPOINTMENT (OUTPATIENT)
Dept: GENERAL RADIOLOGY | Age: 54
DRG: 420 | End: 2019-02-14
Payer: COMMERCIAL

## 2019-02-14 DIAGNOSIS — N17.9 AKI (ACUTE KIDNEY INJURY) (HCC): ICD-10-CM

## 2019-02-14 DIAGNOSIS — E11.01: Primary | ICD-10-CM

## 2019-02-14 LAB
A/G RATIO: 0.8 (ref 1.1–2.2)
ACETAMINOPHEN LEVEL: <5 UG/ML (ref 10–30)
ALBUMIN SERPL-MCNC: 3.6 G/DL (ref 3.4–5)
ALP BLD-CCNC: 144 U/L (ref 40–129)
ALT SERPL-CCNC: 55 U/L (ref 10–40)
AMMONIA: 22 UMOL/L (ref 11–51)
AMPHETAMINE SCREEN, URINE: NORMAL
ANION GAP SERPL CALCULATED.3IONS-SCNC: 27 MMOL/L (ref 3–16)
AST SERPL-CCNC: 105 U/L (ref 15–37)
BARBITURATE SCREEN URINE: NORMAL
BASE EXCESS VENOUS: 6.8 MMOL/L (ref -3–3)
BASOPHILS ABSOLUTE: 0 K/UL (ref 0–0.2)
BASOPHILS RELATIVE PERCENT: 0.2 %
BENZODIAZEPINE SCREEN, URINE: NORMAL
BETA-HYDROXYBUTYRATE: 5.2 MMOL/L (ref 0–0.27)
BILIRUB SERPL-MCNC: 0.4 MG/DL (ref 0–1)
BILIRUBIN URINE: ABNORMAL
BLOOD, URINE: NEGATIVE
BUN BLDV-MCNC: 81 MG/DL (ref 7–20)
CALCIUM SERPL-MCNC: 9.1 MG/DL (ref 8.3–10.6)
CANNABINOID SCREEN URINE: NORMAL
CARBOXYHEMOGLOBIN: 3.4 % (ref 0–1.5)
CHLORIDE BLD-SCNC: 90 MMOL/L (ref 99–110)
CLARITY: CLEAR
CO2: 31 MMOL/L (ref 21–32)
COCAINE METABOLITE SCREEN URINE: NORMAL
COLOR: YELLOW
CREAT SERPL-MCNC: 2 MG/DL (ref 0.6–1.1)
EOSINOPHILS ABSOLUTE: 0 K/UL (ref 0–0.6)
EOSINOPHILS RELATIVE PERCENT: 0 %
GFR AFRICAN AMERICAN: 31
GFR NON-AFRICAN AMERICAN: 26
GLOBULIN: 4.7 G/DL
GLUCOSE BLD-MCNC: 1148 MG/DL (ref 70–99)
GLUCOSE BLD-MCNC: >600 MG/DL (ref 70–99)
GLUCOSE URINE: >=1000 MG/DL
HCO3 VENOUS: 32.9 MMOL/L (ref 23–29)
HCT VFR BLD CALC: 56.1 % (ref 36–48)
HEMOGLOBIN, VEN, REDUCED: 13 %
HEMOGLOBIN: 16.9 G/DL (ref 12–16)
KETONES, URINE: 15 MG/DL
LACTIC ACID: 3.8 MMOL/L (ref 0.4–2)
LEUKOCYTE ESTERASE, URINE: NEGATIVE
LYMPHOCYTES ABSOLUTE: 1.5 K/UL (ref 1–5.1)
LYMPHOCYTES RELATIVE PERCENT: 9 %
Lab: NORMAL
MAGNESIUM: 4.4 MG/DL (ref 1.8–2.4)
MCH RBC QN AUTO: 29.6 PG (ref 26–34)
MCHC RBC AUTO-ENTMCNC: 30.2 G/DL (ref 31–36)
MCV RBC AUTO: 98.2 FL (ref 80–100)
METHADONE SCREEN, URINE: NORMAL
METHEMOGLOBIN VENOUS: 0 %
MICROSCOPIC EXAMINATION: ABNORMAL
MONOCYTES ABSOLUTE: 0.5 K/UL (ref 0–1.3)
MONOCYTES RELATIVE PERCENT: 3.2 %
NEUTROPHILS ABSOLUTE: 14.7 K/UL (ref 1.7–7.7)
NEUTROPHILS RELATIVE PERCENT: 87.6 %
NITRITE, URINE: NEGATIVE
O2 CONTENT, VEN: 21 VOL %
O2 SAT, VEN: 87 %
O2 THERAPY: ABNORMAL
OPIATE SCREEN URINE: NORMAL
OXYCODONE URINE: NORMAL
PCO2, VEN: 49.8 MMHG (ref 40–50)
PDW BLD-RTO: 14.2 % (ref 12.4–15.4)
PERFORMED ON: ABNORMAL
PH UA: 5
PH UA: 5
PH VENOUS: 7.43 (ref 7.35–7.45)
PHENCYCLIDINE SCREEN URINE: NORMAL
PLATELET # BLD: 221 K/UL (ref 135–450)
PMV BLD AUTO: 11.9 FL (ref 5–10.5)
PO2, VEN: 52.3 MMHG (ref 25–40)
POTASSIUM SERPL-SCNC: 4.3 MMOL/L (ref 3.5–5.1)
PROPOXYPHENE SCREEN: NORMAL
PROTEIN UA: NEGATIVE MG/DL
RBC # BLD: 5.71 M/UL (ref 4–5.2)
SALICYLATE, SERUM: <0.3 MG/DL (ref 15–30)
SODIUM BLD-SCNC: 148 MMOL/L (ref 136–145)
SPECIFIC GRAVITY UA: >1.03
TCO2 CALC VENOUS: 77 MMOL/L
TOTAL PROTEIN: 8.3 G/DL (ref 6.4–8.2)
TROPONIN: <0.01 NG/ML
URINE REFLEX TO CULTURE: ABNORMAL
URINE TYPE: ABNORMAL
UROBILINOGEN, URINE: 0.2 E.U./DL
WBC # BLD: 16.8 K/UL (ref 4–11)

## 2019-02-14 PROCEDURE — 02HV33Z INSERTION OF INFUSION DEVICE INTO SUPERIOR VENA CAVA, PERCUTANEOUS APPROACH: ICD-10-PCS | Performed by: RADIOLOGY

## 2019-02-14 PROCEDURE — 81003 URINALYSIS AUTO W/O SCOPE: CPT

## 2019-02-14 PROCEDURE — 85025 COMPLETE CBC W/AUTO DIFF WBC: CPT

## 2019-02-14 PROCEDURE — 83735 ASSAY OF MAGNESIUM: CPT

## 2019-02-14 PROCEDURE — 36415 COLL VENOUS BLD VENIPUNCTURE: CPT

## 2019-02-14 PROCEDURE — 96361 HYDRATE IV INFUSION ADD-ON: CPT

## 2019-02-14 PROCEDURE — 83930 ASSAY OF BLOOD OSMOLALITY: CPT

## 2019-02-14 PROCEDURE — 71045 X-RAY EXAM CHEST 1 VIEW: CPT

## 2019-02-14 PROCEDURE — 51702 INSERT TEMP BLADDER CATH: CPT

## 2019-02-14 PROCEDURE — 2580000003 HC RX 258

## 2019-02-14 PROCEDURE — 6370000000 HC RX 637 (ALT 250 FOR IP): Performed by: EMERGENCY MEDICINE

## 2019-02-14 PROCEDURE — 82803 BLOOD GASES ANY COMBINATION: CPT

## 2019-02-14 PROCEDURE — 96366 THER/PROPH/DIAG IV INF ADDON: CPT

## 2019-02-14 PROCEDURE — 99285 EMERGENCY DEPT VISIT HI MDM: CPT

## 2019-02-14 PROCEDURE — 2580000003 HC RX 258: Performed by: EMERGENCY MEDICINE

## 2019-02-14 PROCEDURE — G0480 DRUG TEST DEF 1-7 CLASSES: HCPCS

## 2019-02-14 PROCEDURE — 82140 ASSAY OF AMMONIA: CPT

## 2019-02-14 PROCEDURE — 96375 TX/PRO/DX INJ NEW DRUG ADDON: CPT

## 2019-02-14 PROCEDURE — 6360000002 HC RX W HCPCS: Performed by: EMERGENCY MEDICINE

## 2019-02-14 PROCEDURE — 80053 COMPREHEN METABOLIC PANEL: CPT

## 2019-02-14 PROCEDURE — 70450 CT HEAD/BRAIN W/O DYE: CPT

## 2019-02-14 PROCEDURE — 82010 KETONE BODYS QUAN: CPT

## 2019-02-14 PROCEDURE — 93005 ELECTROCARDIOGRAM TRACING: CPT | Performed by: INTERNAL MEDICINE

## 2019-02-14 PROCEDURE — 83605 ASSAY OF LACTIC ACID: CPT

## 2019-02-14 PROCEDURE — 80307 DRUG TEST PRSMV CHEM ANLYZR: CPT

## 2019-02-14 PROCEDURE — 96365 THER/PROPH/DIAG IV INF INIT: CPT

## 2019-02-14 PROCEDURE — 84484 ASSAY OF TROPONIN QUANT: CPT

## 2019-02-14 RX ORDER — NALOXONE HYDROCHLORIDE 1 MG/ML
2 INJECTION INTRAMUSCULAR; INTRAVENOUS; SUBCUTANEOUS ONCE
Status: COMPLETED | OUTPATIENT
Start: 2019-02-14 | End: 2019-02-14

## 2019-02-14 RX ORDER — 0.9 % SODIUM CHLORIDE 0.9 %
2000 INTRAVENOUS SOLUTION INTRAVENOUS ONCE
Status: COMPLETED | OUTPATIENT
Start: 2019-02-14 | End: 2019-02-15

## 2019-02-14 RX ORDER — 0.9 % SODIUM CHLORIDE 0.9 %
3000 INTRAVENOUS SOLUTION INTRAVENOUS ONCE
Status: COMPLETED | OUTPATIENT
Start: 2019-02-14 | End: 2019-02-15

## 2019-02-14 RX ORDER — NALOXONE HYDROCHLORIDE 1 MG/ML
INJECTION INTRAMUSCULAR; INTRAVENOUS; SUBCUTANEOUS
Status: DISCONTINUED
Start: 2019-02-14 | End: 2019-02-15

## 2019-02-14 RX ORDER — SODIUM CHLORIDE 9 MG/ML
INJECTION, SOLUTION INTRAVENOUS
Status: COMPLETED
Start: 2019-02-14 | End: 2019-02-15

## 2019-02-14 RX ADMIN — NALOXONE HYDROCHLORIDE 2 MG: 1 INJECTION PARENTERAL at 21:10

## 2019-02-14 RX ADMIN — SODIUM CHLORIDE 3000 ML: 9 INJECTION, SOLUTION INTRAVENOUS at 22:33

## 2019-02-14 RX ADMIN — NALOXONE HYDROCHLORIDE 2 MG: 1 INJECTION PARENTERAL at 21:05

## 2019-02-14 RX ADMIN — Medication 2000 ML: at 21:30

## 2019-02-14 RX ADMIN — SODIUM CHLORIDE 0.1 UNITS/KG/HR: 9 INJECTION, SOLUTION INTRAVENOUS at 22:29

## 2019-02-14 RX ADMIN — SODIUM CHLORIDE 2000 ML: 9 INJECTION, SOLUTION INTRAVENOUS at 21:30

## 2019-02-15 ENCOUNTER — APPOINTMENT (OUTPATIENT)
Dept: CT IMAGING | Age: 54
DRG: 420 | End: 2019-02-15
Payer: COMMERCIAL

## 2019-02-15 PROBLEM — E87.0 HYPERNATREMIA: Status: ACTIVE | Noted: 2019-02-15

## 2019-02-15 PROBLEM — E11.01: Status: ACTIVE | Noted: 2019-02-15

## 2019-02-15 PROBLEM — E86.0 DEHYDRATION: Status: ACTIVE | Noted: 2019-02-15

## 2019-02-15 PROBLEM — R79.89 ABNORMAL LFTS: Status: ACTIVE | Noted: 2019-02-15

## 2019-02-15 PROBLEM — N17.9 ACUTE KIDNEY INJURY (HCC): Status: ACTIVE | Noted: 2019-02-15

## 2019-02-15 LAB
ANION GAP SERPL CALCULATED.3IONS-SCNC: 10 MMOL/L (ref 3–16)
ANION GAP SERPL CALCULATED.3IONS-SCNC: 10 MMOL/L (ref 3–16)
ANION GAP SERPL CALCULATED.3IONS-SCNC: 8 MMOL/L (ref 3–16)
ANION GAP SERPL CALCULATED.3IONS-SCNC: 9 MMOL/L (ref 3–16)
ANION GAP SERPL CALCULATED.3IONS-SCNC: ABNORMAL MMOL/L (ref 3–16)
BASE EXCESS VENOUS: 8.3 MMOL/L (ref -3–3)
BETA-HYDROXYBUTYRATE: 0.7 MMOL/L (ref 0–0.27)
BUN BLDV-MCNC: 33 MG/DL (ref 7–20)
BUN BLDV-MCNC: 38 MG/DL (ref 7–20)
BUN BLDV-MCNC: 49 MG/DL (ref 7–20)
BUN BLDV-MCNC: 58 MG/DL (ref 7–20)
BUN BLDV-MCNC: 69 MG/DL (ref 7–20)
BUN BLDV-MCNC: ABNORMAL MG/DL (ref 7–20)
CALCIUM SERPL-MCNC: 7.4 MG/DL (ref 8.3–10.6)
CALCIUM SERPL-MCNC: 7.5 MG/DL (ref 8.3–10.6)
CALCIUM SERPL-MCNC: 7.6 MG/DL (ref 8.3–10.6)
CALCIUM SERPL-MCNC: 8 MG/DL (ref 8.3–10.6)
CALCIUM SERPL-MCNC: 8 MG/DL (ref 8.3–10.6)
CALCIUM SERPL-MCNC: ABNORMAL MG/DL (ref 8.3–10.6)
CARBOXYHEMOGLOBIN: 1.4 % (ref 0–1.5)
CHLORIDE BLD-SCNC: 112 MMOL/L (ref 99–110)
CHLORIDE BLD-SCNC: 114 MMOL/L (ref 99–110)
CHLORIDE BLD-SCNC: 115 MMOL/L (ref 99–110)
CHLORIDE BLD-SCNC: 117 MMOL/L (ref 99–110)
CHLORIDE BLD-SCNC: 119 MMOL/L (ref 99–110)
CHLORIDE BLD-SCNC: ABNORMAL MMOL/L (ref 99–110)
CO2: 30 MMOL/L (ref 21–32)
CO2: 31 MMOL/L (ref 21–32)
CO2: 32 MMOL/L (ref 21–32)
CO2: 33 MMOL/L (ref 21–32)
CO2: 36 MMOL/L (ref 21–32)
CO2: ABNORMAL MMOL/L (ref 21–32)
CREAT SERPL-MCNC: 0.7 MG/DL (ref 0.6–1.1)
CREAT SERPL-MCNC: 0.7 MG/DL (ref 0.6–1.1)
CREAT SERPL-MCNC: 0.9 MG/DL (ref 0.6–1.1)
CREAT SERPL-MCNC: 1.1 MG/DL (ref 0.6–1.1)
CREAT SERPL-MCNC: 1.5 MG/DL (ref 0.6–1.1)
CREAT SERPL-MCNC: ABNORMAL MG/DL (ref 0.6–1.1)
EKG ATRIAL RATE: 104 BPM
EKG DIAGNOSIS: NORMAL
EKG P AXIS: 43 DEGREES
EKG P-R INTERVAL: 120 MS
EKG Q-T INTERVAL: 398 MS
EKG QRS DURATION: 88 MS
EKG QTC CALCULATION (BAZETT): 523 MS
EKG R AXIS: -22 DEGREES
EKG T AXIS: 43 DEGREES
EKG VENTRICULAR RATE: 104 BPM
GFR AFRICAN AMERICAN: 43
GFR AFRICAN AMERICAN: >60
GFR AFRICAN AMERICAN: ABNORMAL
GFR NON-AFRICAN AMERICAN: 36
GFR NON-AFRICAN AMERICAN: 51
GFR NON-AFRICAN AMERICAN: >60
GFR NON-AFRICAN AMERICAN: ABNORMAL
GLUCOSE BLD-MCNC: 137 MG/DL (ref 70–99)
GLUCOSE BLD-MCNC: 157 MG/DL (ref 70–99)
GLUCOSE BLD-MCNC: 179 MG/DL (ref 70–99)
GLUCOSE BLD-MCNC: 200 MG/DL (ref 70–99)
GLUCOSE BLD-MCNC: 232 MG/DL (ref 70–99)
GLUCOSE BLD-MCNC: 236 MG/DL (ref 70–99)
GLUCOSE BLD-MCNC: 236 MG/DL (ref 70–99)
GLUCOSE BLD-MCNC: 282 MG/DL (ref 70–99)
GLUCOSE BLD-MCNC: 294 MG/DL (ref 70–99)
GLUCOSE BLD-MCNC: 301 MG/DL (ref 70–99)
GLUCOSE BLD-MCNC: 313 MG/DL (ref 70–99)
GLUCOSE BLD-MCNC: 318 MG/DL (ref 70–99)
GLUCOSE BLD-MCNC: 345 MG/DL (ref 70–99)
GLUCOSE BLD-MCNC: 375 MG/DL (ref 70–99)
GLUCOSE BLD-MCNC: 447 MG/DL (ref 70–99)
GLUCOSE BLD-MCNC: 470 MG/DL (ref 70–99)
GLUCOSE BLD-MCNC: 473 MG/DL (ref 70–99)
GLUCOSE BLD-MCNC: 536 MG/DL (ref 70–99)
GLUCOSE BLD-MCNC: 731 MG/DL (ref 70–99)
GLUCOSE BLD-MCNC: 82 MG/DL (ref 70–99)
GLUCOSE BLD-MCNC: 95 MG/DL (ref 70–99)
GLUCOSE BLD-MCNC: >600 MG/DL (ref 70–99)
GLUCOSE BLD-MCNC: ABNORMAL MG/DL (ref 70–99)
HCO3 VENOUS: 35.5 MMOL/L (ref 23–29)
HEMOGLOBIN, VEN, REDUCED: 39 %
LACTIC ACID: 1.5 MMOL/L (ref 0.4–2)
LACTIC ACID: 1.5 MMOL/L (ref 0.4–2)
LIPASE: 481 U/L (ref 13–60)
MAGNESIUM: 2.3 MG/DL (ref 1.8–2.4)
MAGNESIUM: 2.5 MG/DL (ref 1.8–2.4)
MAGNESIUM: 2.9 MG/DL (ref 1.8–2.4)
MAGNESIUM: 2.9 MG/DL (ref 1.8–2.4)
MAGNESIUM: ABNORMAL MG/DL (ref 1.8–2.4)
METHEMOGLOBIN VENOUS: 0.5 %
O2 CONTENT, VEN: 11 VOL %
O2 SAT, VEN: 61 %
O2 THERAPY: ABNORMAL
OSMOLALITY: 414 MOSM/KG (ref 278–305)
PCO2, VEN: 59.5 MMHG (ref 40–50)
PERFORMED ON: ABNORMAL
PERFORMED ON: NORMAL
PH VENOUS: 7.38 (ref 7.35–7.45)
PHOSPHORUS: 2.7 MG/DL (ref 2.5–4.9)
PHOSPHORUS: 3.4 MG/DL (ref 2.5–4.9)
PO2, VEN: 33.3 MMHG (ref 25–40)
POTASSIUM REFLEX MAGNESIUM: 3.1 MMOL/L (ref 3.5–5.1)
POTASSIUM SERPL-SCNC: 3 MMOL/L (ref 3.5–5.1)
POTASSIUM SERPL-SCNC: 3.3 MMOL/L (ref 3.5–5.1)
POTASSIUM SERPL-SCNC: 3.3 MMOL/L (ref 3.5–5.1)
POTASSIUM SERPL-SCNC: 3.9 MMOL/L (ref 3.5–5.1)
POTASSIUM SERPL-SCNC: ABNORMAL MMOL/L (ref 3.5–5.1)
SODIUM BLD-SCNC: 154 MMOL/L (ref 136–145)
SODIUM BLD-SCNC: 154 MMOL/L (ref 136–145)
SODIUM BLD-SCNC: 160 MMOL/L (ref 136–145)
SODIUM BLD-SCNC: 161 MMOL/L (ref 136–145)
SODIUM BLD-SCNC: 163 MMOL/L (ref 136–145)
SODIUM BLD-SCNC: ABNORMAL MMOL/L (ref 136–145)
TCO2 CALC VENOUS: 84 MMOL/L

## 2019-02-15 PROCEDURE — 93010 ELECTROCARDIOGRAM REPORT: CPT | Performed by: INTERNAL MEDICINE

## 2019-02-15 PROCEDURE — 83735 ASSAY OF MAGNESIUM: CPT

## 2019-02-15 PROCEDURE — 6360000002 HC RX W HCPCS: Performed by: INTERNAL MEDICINE

## 2019-02-15 PROCEDURE — 74176 CT ABD & PELVIS W/O CONTRAST: CPT

## 2019-02-15 PROCEDURE — 83690 ASSAY OF LIPASE: CPT

## 2019-02-15 PROCEDURE — C9113 INJ PANTOPRAZOLE SODIUM, VIA: HCPCS | Performed by: INTERNAL MEDICINE

## 2019-02-15 PROCEDURE — 82310 ASSAY OF CALCIUM: CPT

## 2019-02-15 PROCEDURE — 84100 ASSAY OF PHOSPHORUS: CPT

## 2019-02-15 PROCEDURE — 82010 KETONE BODYS QUAN: CPT

## 2019-02-15 PROCEDURE — 82803 BLOOD GASES ANY COMBINATION: CPT

## 2019-02-15 PROCEDURE — 84132 ASSAY OF SERUM POTASSIUM: CPT

## 2019-02-15 PROCEDURE — 82435 ASSAY OF BLOOD CHLORIDE: CPT

## 2019-02-15 PROCEDURE — 2000000000 HC ICU R&B

## 2019-02-15 PROCEDURE — 80048 BASIC METABOLIC PNL TOTAL CA: CPT

## 2019-02-15 PROCEDURE — 96366 THER/PROPH/DIAG IV INF ADDON: CPT

## 2019-02-15 PROCEDURE — 84520 ASSAY OF UREA NITROGEN: CPT

## 2019-02-15 PROCEDURE — 6370000000 HC RX 637 (ALT 250 FOR IP): Performed by: INTERNAL MEDICINE

## 2019-02-15 PROCEDURE — 83605 ASSAY OF LACTIC ACID: CPT

## 2019-02-15 PROCEDURE — 2580000003 HC RX 258: Performed by: INTERNAL MEDICINE

## 2019-02-15 PROCEDURE — 82565 ASSAY OF CREATININE: CPT

## 2019-02-15 PROCEDURE — 82374 ASSAY BLOOD CARBON DIOXIDE: CPT

## 2019-02-15 PROCEDURE — 99223 1ST HOSP IP/OBS HIGH 75: CPT | Performed by: INTERNAL MEDICINE

## 2019-02-15 PROCEDURE — 82947 ASSAY GLUCOSE BLOOD QUANT: CPT

## 2019-02-15 PROCEDURE — 2580000003 HC RX 258

## 2019-02-15 PROCEDURE — 2500000003 HC RX 250 WO HCPCS: Performed by: INTERNAL MEDICINE

## 2019-02-15 PROCEDURE — 36415 COLL VENOUS BLD VENIPUNCTURE: CPT

## 2019-02-15 PROCEDURE — 84295 ASSAY OF SERUM SODIUM: CPT

## 2019-02-15 RX ORDER — MIRTAZAPINE 15 MG/1
35 TABLET, FILM COATED ORAL NIGHTLY
Status: DISCONTINUED | OUTPATIENT
Start: 2019-02-15 | End: 2019-02-24 | Stop reason: HOSPADM

## 2019-02-15 RX ORDER — POTASSIUM CHLORIDE 7.45 MG/ML
10 INJECTION INTRAVENOUS PRN
Status: DISCONTINUED | OUTPATIENT
Start: 2019-02-15 | End: 2019-02-15

## 2019-02-15 RX ORDER — HEPARIN SODIUM 5000 [USP'U]/ML
5000 INJECTION, SOLUTION INTRAVENOUS; SUBCUTANEOUS EVERY 8 HOURS SCHEDULED
Status: COMPLETED | OUTPATIENT
Start: 2019-02-15 | End: 2019-02-15

## 2019-02-15 RX ORDER — DEXTROSE AND SODIUM CHLORIDE 5; .45 G/100ML; G/100ML
INJECTION, SOLUTION INTRAVENOUS CONTINUOUS
Status: DISCONTINUED | OUTPATIENT
Start: 2019-02-15 | End: 2019-02-17

## 2019-02-15 RX ORDER — CLOPIDOGREL BISULFATE 75 MG/1
75 TABLET ORAL DAILY
Status: DISCONTINUED | OUTPATIENT
Start: 2019-02-15 | End: 2019-02-24 | Stop reason: HOSPADM

## 2019-02-15 RX ORDER — DEXTROSE MONOHYDRATE 50 MG/ML
INJECTION, SOLUTION INTRAVENOUS CONTINUOUS
Status: DISCONTINUED | OUTPATIENT
Start: 2019-02-15 | End: 2019-02-15

## 2019-02-15 RX ORDER — SODIUM CHLORIDE 450 MG/100ML
INJECTION, SOLUTION INTRAVENOUS CONTINUOUS
Status: DISCONTINUED | OUTPATIENT
Start: 2019-02-15 | End: 2019-02-15

## 2019-02-15 RX ORDER — PANTOPRAZOLE SODIUM 40 MG/10ML
40 INJECTION, POWDER, LYOPHILIZED, FOR SOLUTION INTRAVENOUS 2 TIMES DAILY
Status: DISCONTINUED | OUTPATIENT
Start: 2019-02-15 | End: 2019-02-16

## 2019-02-15 RX ORDER — DEXTROSE MONOHYDRATE 25 G/50ML
12.5 INJECTION, SOLUTION INTRAVENOUS PRN
Status: DISCONTINUED | OUTPATIENT
Start: 2019-02-15 | End: 2019-02-24 | Stop reason: HOSPADM

## 2019-02-15 RX ORDER — PANTOPRAZOLE SODIUM 40 MG/1
40 TABLET, DELAYED RELEASE ORAL DAILY
Status: DISCONTINUED | OUTPATIENT
Start: 2019-02-15 | End: 2019-02-15

## 2019-02-15 RX ORDER — CARVEDILOL 6.25 MG/1
6.25 TABLET ORAL 2 TIMES DAILY WITH MEALS
Status: DISCONTINUED | OUTPATIENT
Start: 2019-02-15 | End: 2019-02-15

## 2019-02-15 RX ORDER — ONDANSETRON 2 MG/ML
4 INJECTION INTRAMUSCULAR; INTRAVENOUS EVERY 6 HOURS PRN
Status: DISCONTINUED | OUTPATIENT
Start: 2019-02-15 | End: 2019-02-24 | Stop reason: HOSPADM

## 2019-02-15 RX ORDER — SODIUM CHLORIDE 0.9 % (FLUSH) 0.9 %
SYRINGE (ML) INJECTION
Status: COMPLETED
Start: 2019-02-15 | End: 2019-02-15

## 2019-02-15 RX ORDER — ACETAMINOPHEN 325 MG/1
650 TABLET ORAL EVERY 4 HOURS PRN
Status: DISCONTINUED | OUTPATIENT
Start: 2019-02-15 | End: 2019-02-24 | Stop reason: HOSPADM

## 2019-02-15 RX ORDER — DEXTROSE AND SODIUM CHLORIDE 5; .45 G/100ML; G/100ML
INJECTION, SOLUTION INTRAVENOUS CONTINUOUS PRN
Status: DISCONTINUED | OUTPATIENT
Start: 2019-02-15 | End: 2019-02-15

## 2019-02-15 RX ORDER — MAGNESIUM SULFATE 1 G/100ML
1 INJECTION INTRAVENOUS PRN
Status: DISCONTINUED | OUTPATIENT
Start: 2019-02-15 | End: 2019-02-24 | Stop reason: HOSPADM

## 2019-02-15 RX ORDER — ASPIRIN 81 MG/1
81 TABLET, CHEWABLE ORAL DAILY
Status: DISCONTINUED | OUTPATIENT
Start: 2019-02-15 | End: 2019-02-24 | Stop reason: HOSPADM

## 2019-02-15 RX ORDER — LAMOTRIGINE 100 MG/1
100 TABLET ORAL DAILY
Status: DISCONTINUED | OUTPATIENT
Start: 2019-02-15 | End: 2019-02-24 | Stop reason: HOSPADM

## 2019-02-15 RX ORDER — POTASSIUM CHLORIDE 29.8 MG/ML
20 INJECTION INTRAVENOUS PRN
Status: DISCONTINUED | OUTPATIENT
Start: 2019-02-15 | End: 2019-02-24 | Stop reason: HOSPADM

## 2019-02-15 RX ORDER — GABAPENTIN 300 MG/1
300 CAPSULE ORAL 3 TIMES DAILY
Status: DISCONTINUED | OUTPATIENT
Start: 2019-02-15 | End: 2019-02-15

## 2019-02-15 RX ORDER — EPLERENONE 25 MG/1
25 TABLET, FILM COATED ORAL DAILY
Status: DISCONTINUED | OUTPATIENT
Start: 2019-02-15 | End: 2019-02-18

## 2019-02-15 RX ADMIN — Medication 10 ML: at 11:40

## 2019-02-15 RX ADMIN — HEPARIN SODIUM 5000 UNITS: 5000 INJECTION INTRAVENOUS; SUBCUTANEOUS at 14:38

## 2019-02-15 RX ADMIN — POTASSIUM CHLORIDE 20 MEQ: 29.8 INJECTION, SOLUTION INTRAVENOUS at 20:24

## 2019-02-15 RX ADMIN — DEXTROSE MONOHYDRATE: 50 INJECTION, SOLUTION INTRAVENOUS at 07:56

## 2019-02-15 RX ADMIN — POTASSIUM CHLORIDE 20 MEQ: 29.8 INJECTION, SOLUTION INTRAVENOUS at 06:39

## 2019-02-15 RX ADMIN — MIRTAZAPINE 37.5 MG: 15 TABLET, FILM COATED ORAL at 20:02

## 2019-02-15 RX ADMIN — HEPARIN SODIUM 5000 UNITS: 5000 INJECTION INTRAVENOUS; SUBCUTANEOUS at 21:09

## 2019-02-15 RX ADMIN — SODIUM CHLORIDE: 4.5 INJECTION, SOLUTION INTRAVENOUS at 03:16

## 2019-02-15 RX ADMIN — ONDANSETRON 4 MG: 2 INJECTION INTRAMUSCULAR; INTRAVENOUS at 17:49

## 2019-02-15 RX ADMIN — POTASSIUM CHLORIDE 20 MEQ: 29.8 INJECTION, SOLUTION INTRAVENOUS at 03:17

## 2019-02-15 RX ADMIN — PANTOPRAZOLE SODIUM 40 MG: 40 INJECTION, POWDER, FOR SOLUTION INTRAVENOUS at 20:04

## 2019-02-15 RX ADMIN — PANTOPRAZOLE SODIUM 40 MG: 40 INJECTION, POWDER, FOR SOLUTION INTRAVENOUS at 10:49

## 2019-02-15 RX ADMIN — CALCIUM GLUCONATE 1 G: 98 INJECTION, SOLUTION INTRAVENOUS at 14:38

## 2019-02-15 RX ADMIN — DEXTROSE AND SODIUM CHLORIDE: 5; 450 INJECTION, SOLUTION INTRAVENOUS at 16:53

## 2019-02-15 RX ADMIN — HEPARIN SODIUM 5000 UNITS: 5000 INJECTION INTRAVENOUS; SUBCUTANEOUS at 06:39

## 2019-02-15 RX ADMIN — POTASSIUM CHLORIDE 20 MEQ: 29.8 INJECTION, SOLUTION INTRAVENOUS at 21:32

## 2019-02-15 RX ADMIN — POTASSIUM CHLORIDE 20 MEQ: 29.8 INJECTION, SOLUTION INTRAVENOUS at 12:48

## 2019-02-15 RX ADMIN — LIDOCAINE HYDROCHLORIDE: 20 SOLUTION ORAL; TOPICAL at 10:37

## 2019-02-15 RX ADMIN — DEXTROSE MONOHYDRATE: 50 INJECTION, SOLUTION INTRAVENOUS at 12:48

## 2019-02-15 RX ADMIN — POTASSIUM CHLORIDE 20 MEQ: 29.8 INJECTION, SOLUTION INTRAVENOUS at 11:22

## 2019-02-15 RX ADMIN — ONDANSETRON 4 MG: 2 INJECTION INTRAMUSCULAR; INTRAVENOUS at 10:50

## 2019-02-15 RX ADMIN — SODIUM CHLORIDE 0.75 UNITS/HR: 9 INJECTION, SOLUTION INTRAVENOUS at 03:18

## 2019-02-15 RX ADMIN — Medication 0.01 MCG/KG/MIN: at 12:54

## 2019-02-15 RX ADMIN — ACETAMINOPHEN 650 MG: 325 TABLET, FILM COATED ORAL at 20:02

## 2019-02-15 RX ADMIN — ACETAMINOPHEN 650 MG: 325 TABLET, FILM COATED ORAL at 04:27

## 2019-02-15 ASSESSMENT — PAIN SCALES - GENERAL
PAINLEVEL_OUTOF10: 2
PAINLEVEL_OUTOF10: 7
PAINLEVEL_OUTOF10: 1
PAINLEVEL_OUTOF10: 7
PAINLEVEL_OUTOF10: 6
PAINLEVEL_OUTOF10: 10
PAINLEVEL_OUTOF10: 3
PAINLEVEL_OUTOF10: 2

## 2019-02-15 ASSESSMENT — PAIN DESCRIPTION - ONSET: ONSET: ON-GOING

## 2019-02-15 ASSESSMENT — PAIN DESCRIPTION - PROGRESSION: CLINICAL_PROGRESSION: NOT CHANGED

## 2019-02-15 ASSESSMENT — PAIN DESCRIPTION - FREQUENCY: FREQUENCY: CONTINUOUS

## 2019-02-15 ASSESSMENT — PAIN DESCRIPTION - ORIENTATION: ORIENTATION: MID;UPPER

## 2019-02-15 ASSESSMENT — PAIN DESCRIPTION - LOCATION: LOCATION: CHEST

## 2019-02-15 ASSESSMENT — PAIN DESCRIPTION - PAIN TYPE: TYPE: CHRONIC PAIN

## 2019-02-15 ASSESSMENT — PAIN DESCRIPTION - DESCRIPTORS: DESCRIPTORS: DISCOMFORT

## 2019-02-16 LAB
ANION GAP SERPL CALCULATED.3IONS-SCNC: 7 MMOL/L (ref 3–16)
BETA-HYDROXYBUTYRATE: 0.2 MMOL/L (ref 0–0.27)
BUN BLDV-MCNC: 15 MG/DL (ref 7–20)
BUN BLDV-MCNC: 23 MG/DL (ref 7–20)
BUN BLDV-MCNC: 27 MG/DL (ref 7–20)
CALCIUM SERPL-MCNC: 7.6 MG/DL (ref 8.3–10.6)
CALCIUM SERPL-MCNC: 7.8 MG/DL (ref 8.3–10.6)
CALCIUM SERPL-MCNC: 7.9 MG/DL (ref 8.3–10.6)
CHLORIDE BLD-SCNC: 111 MMOL/L (ref 99–110)
CHLORIDE BLD-SCNC: 113 MMOL/L (ref 99–110)
CHLORIDE BLD-SCNC: 114 MMOL/L (ref 99–110)
CO2: 26 MMOL/L (ref 21–32)
CO2: 28 MMOL/L (ref 21–32)
CO2: 30 MMOL/L (ref 21–32)
CREAT SERPL-MCNC: 0.5 MG/DL (ref 0.6–1.1)
CREAT SERPL-MCNC: 0.6 MG/DL (ref 0.6–1.1)
CREAT SERPL-MCNC: 0.6 MG/DL (ref 0.6–1.1)
ESTIMATED AVERAGE GLUCOSE: 432.6 MG/DL
GFR AFRICAN AMERICAN: >60
GFR NON-AFRICAN AMERICAN: >60
GLUCOSE BLD-MCNC: 114 MG/DL (ref 70–99)
GLUCOSE BLD-MCNC: 142 MG/DL (ref 70–99)
GLUCOSE BLD-MCNC: 147 MG/DL (ref 70–99)
GLUCOSE BLD-MCNC: 148 MG/DL (ref 70–99)
GLUCOSE BLD-MCNC: 156 MG/DL (ref 70–99)
GLUCOSE BLD-MCNC: 159 MG/DL (ref 70–99)
GLUCOSE BLD-MCNC: 160 MG/DL (ref 70–99)
GLUCOSE BLD-MCNC: 164 MG/DL (ref 70–99)
GLUCOSE BLD-MCNC: 166 MG/DL (ref 70–99)
GLUCOSE BLD-MCNC: 167 MG/DL (ref 70–99)
GLUCOSE BLD-MCNC: 180 MG/DL (ref 70–99)
GLUCOSE BLD-MCNC: 193 MG/DL (ref 70–99)
GLUCOSE BLD-MCNC: 217 MG/DL (ref 70–99)
GLUCOSE BLD-MCNC: 258 MG/DL (ref 70–99)
HBA1C MFR BLD: 16.7 %
MAGNESIUM: 2.2 MG/DL (ref 1.8–2.4)
PERFORMED ON: ABNORMAL
PHOSPHORUS: 0.7 MG/DL (ref 2.5–4.9)
PHOSPHORUS: 1.7 MG/DL (ref 2.5–4.9)
POTASSIUM SERPL-SCNC: 3.5 MMOL/L (ref 3.5–5.1)
POTASSIUM SERPL-SCNC: 4 MMOL/L (ref 3.5–5.1)
POTASSIUM SERPL-SCNC: 4.2 MMOL/L (ref 3.5–5.1)
SODIUM BLD-SCNC: 144 MMOL/L (ref 136–145)
SODIUM BLD-SCNC: 148 MMOL/L (ref 136–145)
SODIUM BLD-SCNC: 151 MMOL/L (ref 136–145)

## 2019-02-16 PROCEDURE — 6360000002 HC RX W HCPCS: Performed by: INTERNAL MEDICINE

## 2019-02-16 PROCEDURE — 83735 ASSAY OF MAGNESIUM: CPT

## 2019-02-16 PROCEDURE — 99233 SBSQ HOSP IP/OBS HIGH 50: CPT | Performed by: INTERNAL MEDICINE

## 2019-02-16 PROCEDURE — 80048 BASIC METABOLIC PNL TOTAL CA: CPT

## 2019-02-16 PROCEDURE — 6370000000 HC RX 637 (ALT 250 FOR IP): Performed by: INTERNAL MEDICINE

## 2019-02-16 PROCEDURE — 2580000003 HC RX 258: Performed by: INTERNAL MEDICINE

## 2019-02-16 PROCEDURE — 2000000000 HC ICU R&B

## 2019-02-16 PROCEDURE — 82010 KETONE BODYS QUAN: CPT

## 2019-02-16 PROCEDURE — 84100 ASSAY OF PHOSPHORUS: CPT

## 2019-02-16 PROCEDURE — 2500000003 HC RX 250 WO HCPCS: Performed by: INTERNAL MEDICINE

## 2019-02-16 PROCEDURE — 83036 HEMOGLOBIN GLYCOSYLATED A1C: CPT

## 2019-02-16 RX ORDER — NICOTINE POLACRILEX 4 MG
15 LOZENGE BUCCAL PRN
Status: DISCONTINUED | OUTPATIENT
Start: 2019-02-16 | End: 2019-02-24 | Stop reason: HOSPADM

## 2019-02-16 RX ORDER — ATORVASTATIN CALCIUM 40 MG/1
40 TABLET, FILM COATED ORAL NIGHTLY
Status: DISCONTINUED | OUTPATIENT
Start: 2019-02-16 | End: 2019-02-24 | Stop reason: HOSPADM

## 2019-02-16 RX ORDER — PANTOPRAZOLE SODIUM 40 MG/1
40 TABLET, DELAYED RELEASE ORAL
Status: DISCONTINUED | OUTPATIENT
Start: 2019-02-16 | End: 2019-02-24 | Stop reason: HOSPADM

## 2019-02-16 RX ORDER — DEXTROSE MONOHYDRATE 50 MG/ML
100 INJECTION, SOLUTION INTRAVENOUS PRN
Status: DISCONTINUED | OUTPATIENT
Start: 2019-02-16 | End: 2019-02-24 | Stop reason: HOSPADM

## 2019-02-16 RX ORDER — CALCIUM CARBONATE 200(500)MG
500 TABLET,CHEWABLE ORAL 3 TIMES DAILY PRN
Status: DISCONTINUED | OUTPATIENT
Start: 2019-02-16 | End: 2019-02-24 | Stop reason: HOSPADM

## 2019-02-16 RX ORDER — GABAPENTIN 300 MG/1
300 CAPSULE ORAL 3 TIMES DAILY
Status: DISCONTINUED | OUTPATIENT
Start: 2019-02-16 | End: 2019-02-24 | Stop reason: HOSPADM

## 2019-02-16 RX ORDER — BUPRENORPHINE AND NALOXONE 8; 2 MG/1; MG/1
1 FILM, SOLUBLE BUCCAL; SUBLINGUAL 2 TIMES DAILY
Status: DISCONTINUED | OUTPATIENT
Start: 2019-02-16 | End: 2019-02-24 | Stop reason: HOSPADM

## 2019-02-16 RX ORDER — DEXTROSE MONOHYDRATE 25 G/50ML
12.5 INJECTION, SOLUTION INTRAVENOUS PRN
Status: DISCONTINUED | OUTPATIENT
Start: 2019-02-16 | End: 2019-02-24 | Stop reason: HOSPADM

## 2019-02-16 RX ADMIN — CALCIUM GLUCONATE 2 G: 98 INJECTION, SOLUTION INTRAVENOUS at 16:20

## 2019-02-16 RX ADMIN — POTASSIUM CHLORIDE 20 MEQ: 29.8 INJECTION, SOLUTION INTRAVENOUS at 15:34

## 2019-02-16 RX ADMIN — INSULIN GLARGINE 20 UNITS: 100 INJECTION, SOLUTION SUBCUTANEOUS at 20:46

## 2019-02-16 RX ADMIN — CLOPIDOGREL 75 MG: 75 TABLET, FILM COATED ORAL at 08:31

## 2019-02-16 RX ADMIN — INSULIN LISPRO 2 UNITS: 100 INJECTION, SOLUTION INTRAVENOUS; SUBCUTANEOUS at 13:04

## 2019-02-16 RX ADMIN — GABAPENTIN 300 MG: 300 CAPSULE ORAL at 08:31

## 2019-02-16 RX ADMIN — ONDANSETRON 4 MG: 2 INJECTION INTRAMUSCULAR; INTRAVENOUS at 00:08

## 2019-02-16 RX ADMIN — BUPRENORPHINE HYDROCHLORIDE, NALOXONE HYDROCHLORIDE 1 FILM: 8; 2 FILM, SOLUBLE BUCCAL; SUBLINGUAL at 08:53

## 2019-02-16 RX ADMIN — ANTACID TABLETS 500 MG: 500 TABLET, CHEWABLE ORAL at 05:36

## 2019-02-16 RX ADMIN — GABAPENTIN 300 MG: 300 CAPSULE ORAL at 20:50

## 2019-02-16 RX ADMIN — DESMOPRESSIN ACETATE 40 MG: 0.2 TABLET ORAL at 20:50

## 2019-02-16 RX ADMIN — LAMOTRIGINE 100 MG: 100 TABLET ORAL at 08:31

## 2019-02-16 RX ADMIN — SODIUM PHOSPHATE, MONOBASIC, MONOHYDRATE 20 MMOL: 276; 142 INJECTION, SOLUTION INTRAVENOUS at 05:38

## 2019-02-16 RX ADMIN — INSULIN LISPRO 3 UNITS: 100 INJECTION, SOLUTION INTRAVENOUS; SUBCUTANEOUS at 08:37

## 2019-02-16 RX ADMIN — PANTOPRAZOLE SODIUM 40 MG: 40 TABLET, DELAYED RELEASE ORAL at 16:25

## 2019-02-16 RX ADMIN — ENOXAPARIN SODIUM 40 MG: 40 INJECTION SUBCUTANEOUS at 08:31

## 2019-02-16 RX ADMIN — MIRTAZAPINE 37.5 MG: 15 TABLET, FILM COATED ORAL at 20:50

## 2019-02-16 RX ADMIN — INSULIN LISPRO 4 UNITS: 100 INJECTION, SOLUTION INTRAVENOUS; SUBCUTANEOUS at 13:05

## 2019-02-16 RX ADMIN — GABAPENTIN 300 MG: 300 CAPSULE ORAL at 13:08

## 2019-02-16 RX ADMIN — BUPRENORPHINE HYDROCHLORIDE, NALOXONE HYDROCHLORIDE 1 FILM: 8; 2 FILM, SOLUBLE BUCCAL; SUBLINGUAL at 17:39

## 2019-02-16 RX ADMIN — DEXTROSE AND SODIUM CHLORIDE: 5; 450 INJECTION, SOLUTION INTRAVENOUS at 19:01

## 2019-02-16 RX ADMIN — ASPIRIN 81 MG 81 MG: 81 TABLET ORAL at 08:31

## 2019-02-16 RX ADMIN — INSULIN LISPRO 4 UNITS: 100 INJECTION, SOLUTION INTRAVENOUS; SUBCUTANEOUS at 18:04

## 2019-02-16 RX ADMIN — INSULIN LISPRO 4 UNITS: 100 INJECTION, SOLUTION INTRAVENOUS; SUBCUTANEOUS at 08:50

## 2019-02-16 RX ADMIN — DEXTROSE AND SODIUM CHLORIDE: 5; 450 INJECTION, SOLUTION INTRAVENOUS at 03:03

## 2019-02-16 RX ADMIN — POTASSIUM CHLORIDE 20 MEQ: 29.8 INJECTION, SOLUTION INTRAVENOUS at 16:38

## 2019-02-16 RX ADMIN — INSULIN HUMAN 10 UNITS: 100 INJECTION, SUSPENSION SUBCUTANEOUS at 08:55

## 2019-02-16 RX ADMIN — ACETAMINOPHEN 650 MG: 325 TABLET, FILM COATED ORAL at 01:58

## 2019-02-16 RX ADMIN — INSULIN LISPRO 3 UNITS: 100 INJECTION, SOLUTION INTRAVENOUS; SUBCUTANEOUS at 20:45

## 2019-02-16 ASSESSMENT — PAIN SCALES - WONG BAKER
WONGBAKER_NUMERICALRESPONSE: 2
WONGBAKER_NUMERICALRESPONSE: 2
WONGBAKER_NUMERICALRESPONSE: 0
WONGBAKER_NUMERICALRESPONSE: 2
WONGBAKER_NUMERICALRESPONSE: 4
WONGBAKER_NUMERICALRESPONSE: 2

## 2019-02-16 ASSESSMENT — PAIN SCALES - GENERAL
PAINLEVEL_OUTOF10: 2
PAINLEVEL_OUTOF10: 0
PAINLEVEL_OUTOF10: 3
PAINLEVEL_OUTOF10: 0

## 2019-02-17 LAB
ANION GAP SERPL CALCULATED.3IONS-SCNC: 9 MMOL/L (ref 3–16)
BUN BLDV-MCNC: 9 MG/DL (ref 7–20)
CALCIUM SERPL-MCNC: 8.4 MG/DL (ref 8.3–10.6)
CHLORIDE BLD-SCNC: 110 MMOL/L (ref 99–110)
CO2: 26 MMOL/L (ref 21–32)
CREAT SERPL-MCNC: 0.6 MG/DL (ref 0.6–1.1)
GFR AFRICAN AMERICAN: >60
GFR NON-AFRICAN AMERICAN: >60
GLUCOSE BLD-MCNC: 132 MG/DL (ref 70–99)
GLUCOSE BLD-MCNC: 133 MG/DL (ref 70–99)
GLUCOSE BLD-MCNC: 158 MG/DL (ref 70–99)
GLUCOSE BLD-MCNC: 224 MG/DL (ref 70–99)
GLUCOSE BLD-MCNC: 241 MG/DL (ref 70–99)
GLUCOSE BLD-MCNC: 342 MG/DL (ref 70–99)
HCT VFR BLD CALC: 38 % (ref 36–48)
HEMOGLOBIN: 12.3 G/DL (ref 12–16)
MCH RBC QN AUTO: 29.5 PG (ref 26–34)
MCHC RBC AUTO-ENTMCNC: 32.3 G/DL (ref 31–36)
MCV RBC AUTO: 91.4 FL (ref 80–100)
PDW BLD-RTO: 12.8 % (ref 12.4–15.4)
PERFORMED ON: ABNORMAL
PHOSPHORUS: 2.8 MG/DL (ref 2.5–4.9)
PLATELET # BLD: 94 K/UL (ref 135–450)
PLATELET SLIDE REVIEW: ABNORMAL
PMV BLD AUTO: 9.9 FL (ref 5–10.5)
POTASSIUM SERPL-SCNC: 3.4 MMOL/L (ref 3.5–5.1)
RBC # BLD: 4.16 M/UL (ref 4–5.2)
SLIDE REVIEW: ABNORMAL
SODIUM BLD-SCNC: 145 MMOL/L (ref 136–145)
WBC # BLD: 11.5 K/UL (ref 4–11)

## 2019-02-17 PROCEDURE — 36592 COLLECT BLOOD FROM PICC: CPT

## 2019-02-17 PROCEDURE — 6370000000 HC RX 637 (ALT 250 FOR IP): Performed by: INTERNAL MEDICINE

## 2019-02-17 PROCEDURE — 2580000003 HC RX 258: Performed by: INTERNAL MEDICINE

## 2019-02-17 PROCEDURE — 2580000003 HC RX 258

## 2019-02-17 PROCEDURE — 80048 BASIC METABOLIC PNL TOTAL CA: CPT

## 2019-02-17 PROCEDURE — 6360000002 HC RX W HCPCS: Performed by: INTERNAL MEDICINE

## 2019-02-17 PROCEDURE — 84100 ASSAY OF PHOSPHORUS: CPT

## 2019-02-17 PROCEDURE — 2000000000 HC ICU R&B

## 2019-02-17 PROCEDURE — 99233 SBSQ HOSP IP/OBS HIGH 50: CPT | Performed by: INTERNAL MEDICINE

## 2019-02-17 PROCEDURE — 2500000003 HC RX 250 WO HCPCS: Performed by: INTERNAL MEDICINE

## 2019-02-17 PROCEDURE — 85027 COMPLETE CBC AUTOMATED: CPT

## 2019-02-17 RX ORDER — SODIUM CHLORIDE 450 MG/100ML
INJECTION, SOLUTION INTRAVENOUS CONTINUOUS
Status: DISCONTINUED | OUTPATIENT
Start: 2019-02-17 | End: 2019-02-18

## 2019-02-17 RX ORDER — SODIUM CHLORIDE 0.9 % (FLUSH) 0.9 %
SYRINGE (ML) INJECTION
Status: COMPLETED
Start: 2019-02-17 | End: 2019-02-17

## 2019-02-17 RX ADMIN — INSULIN LISPRO 4 UNITS: 100 INJECTION, SOLUTION INTRAVENOUS; SUBCUTANEOUS at 12:21

## 2019-02-17 RX ADMIN — DESMOPRESSIN ACETATE 40 MG: 0.2 TABLET ORAL at 20:22

## 2019-02-17 RX ADMIN — INSULIN LISPRO 4 UNITS: 100 INJECTION, SOLUTION INTRAVENOUS; SUBCUTANEOUS at 16:51

## 2019-02-17 RX ADMIN — INSULIN GLARGINE 20 UNITS: 100 INJECTION, SOLUTION SUBCUTANEOUS at 20:23

## 2019-02-17 RX ADMIN — PANTOPRAZOLE SODIUM 40 MG: 40 TABLET, DELAYED RELEASE ORAL at 05:18

## 2019-02-17 RX ADMIN — INSULIN LISPRO 1 UNITS: 100 INJECTION, SOLUTION INTRAVENOUS; SUBCUTANEOUS at 20:22

## 2019-02-17 RX ADMIN — PANTOPRAZOLE SODIUM 40 MG: 40 TABLET, DELAYED RELEASE ORAL at 16:50

## 2019-02-17 RX ADMIN — GABAPENTIN 300 MG: 300 CAPSULE ORAL at 16:45

## 2019-02-17 RX ADMIN — Medication 0.2 MCG/KG/MIN: at 21:59

## 2019-02-17 RX ADMIN — ENOXAPARIN SODIUM 40 MG: 40 INJECTION SUBCUTANEOUS at 10:04

## 2019-02-17 RX ADMIN — POTASSIUM CHLORIDE 20 MEQ: 29.8 INJECTION, SOLUTION INTRAVENOUS at 06:12

## 2019-02-17 RX ADMIN — LAMOTRIGINE 100 MG: 100 TABLET ORAL at 10:04

## 2019-02-17 RX ADMIN — ASPIRIN 81 MG 81 MG: 81 TABLET ORAL at 10:04

## 2019-02-17 RX ADMIN — INSULIN LISPRO 4 UNITS: 100 INJECTION, SOLUTION INTRAVENOUS; SUBCUTANEOUS at 16:50

## 2019-02-17 RX ADMIN — INSULIN LISPRO 4 UNITS: 100 INJECTION, SOLUTION INTRAVENOUS; SUBCUTANEOUS at 10:24

## 2019-02-17 RX ADMIN — POTASSIUM CHLORIDE 20 MEQ: 29.8 INJECTION, SOLUTION INTRAVENOUS at 07:15

## 2019-02-17 RX ADMIN — GABAPENTIN 300 MG: 300 CAPSULE ORAL at 20:22

## 2019-02-17 RX ADMIN — BUPRENORPHINE HYDROCHLORIDE, NALOXONE HYDROCHLORIDE 1 FILM: 8; 2 FILM, SOLUBLE BUCCAL; SUBLINGUAL at 16:45

## 2019-02-17 RX ADMIN — MIRTAZAPINE 37.5 MG: 15 TABLET, FILM COATED ORAL at 20:22

## 2019-02-17 RX ADMIN — Medication 10 ML: at 10:05

## 2019-02-17 RX ADMIN — CLOPIDOGREL 75 MG: 75 TABLET, FILM COATED ORAL at 10:04

## 2019-02-17 RX ADMIN — SODIUM CHLORIDE: 4.5 INJECTION, SOLUTION INTRAVENOUS at 10:12

## 2019-02-17 RX ADMIN — GABAPENTIN 300 MG: 300 CAPSULE ORAL at 10:04

## 2019-02-17 RX ADMIN — BUPRENORPHINE HYDROCHLORIDE, NALOXONE HYDROCHLORIDE 1 FILM: 8; 2 FILM, SOLUBLE BUCCAL; SUBLINGUAL at 08:43

## 2019-02-17 RX ADMIN — INSULIN LISPRO 8 UNITS: 100 INJECTION, SOLUTION INTRAVENOUS; SUBCUTANEOUS at 12:20

## 2019-02-17 ASSESSMENT — PAIN SCALES - GENERAL
PAINLEVEL_OUTOF10: 0

## 2019-02-18 LAB
ANION GAP SERPL CALCULATED.3IONS-SCNC: 10 MMOL/L (ref 3–16)
BUN BLDV-MCNC: 9 MG/DL (ref 7–20)
CALCIUM SERPL-MCNC: 8.6 MG/DL (ref 8.3–10.6)
CHLORIDE BLD-SCNC: 107 MMOL/L (ref 99–110)
CO2: 26 MMOL/L (ref 21–32)
CREAT SERPL-MCNC: 0.6 MG/DL (ref 0.6–1.1)
GFR AFRICAN AMERICAN: >60
GFR NON-AFRICAN AMERICAN: >60
GLUCOSE BLD-MCNC: 186 MG/DL (ref 70–99)
GLUCOSE BLD-MCNC: 189 MG/DL (ref 70–99)
GLUCOSE BLD-MCNC: 211 MG/DL (ref 70–99)
GLUCOSE BLD-MCNC: 322 MG/DL (ref 70–99)
GLUCOSE BLD-MCNC: 330 MG/DL (ref 70–99)
GLUCOSE BLD-MCNC: 384 MG/DL (ref 70–99)
HCT VFR BLD CALC: 37.4 % (ref 36–48)
HEMOGLOBIN: 12.2 G/DL (ref 12–16)
MCH RBC QN AUTO: 29.7 PG (ref 26–34)
MCHC RBC AUTO-ENTMCNC: 32.6 G/DL (ref 31–36)
MCV RBC AUTO: 91.2 FL (ref 80–100)
PDW BLD-RTO: 13 % (ref 12.4–15.4)
PERFORMED ON: ABNORMAL
PLATELET # BLD: 83 K/UL (ref 135–450)
PMV BLD AUTO: 10.6 FL (ref 5–10.5)
POTASSIUM SERPL-SCNC: 4 MMOL/L (ref 3.5–5.1)
RBC # BLD: 4.1 M/UL (ref 4–5.2)
SODIUM BLD-SCNC: 143 MMOL/L (ref 136–145)
WBC # BLD: 9.9 K/UL (ref 4–11)

## 2019-02-18 PROCEDURE — 2580000003 HC RX 258: Performed by: INTERNAL MEDICINE

## 2019-02-18 PROCEDURE — 6370000000 HC RX 637 (ALT 250 FOR IP): Performed by: INTERNAL MEDICINE

## 2019-02-18 PROCEDURE — 85027 COMPLETE CBC AUTOMATED: CPT

## 2019-02-18 PROCEDURE — 2000000000 HC ICU R&B

## 2019-02-18 PROCEDURE — 36592 COLLECT BLOOD FROM PICC: CPT

## 2019-02-18 PROCEDURE — 80048 BASIC METABOLIC PNL TOTAL CA: CPT

## 2019-02-18 PROCEDURE — 99233 SBSQ HOSP IP/OBS HIGH 50: CPT | Performed by: INTERNAL MEDICINE

## 2019-02-18 PROCEDURE — 6360000002 HC RX W HCPCS: Performed by: INTERNAL MEDICINE

## 2019-02-18 RX ORDER — SODIUM CHLORIDE 9 MG/ML
INJECTION, SOLUTION INTRAVENOUS CONTINUOUS
Status: DISCONTINUED | OUTPATIENT
Start: 2019-02-18 | End: 2019-02-18

## 2019-02-18 RX ORDER — SODIUM CHLORIDE 9 MG/ML
INJECTION, SOLUTION INTRAVENOUS ONCE
Status: COMPLETED | OUTPATIENT
Start: 2019-02-18 | End: 2019-02-18

## 2019-02-18 RX ORDER — SODIUM CHLORIDE 9 MG/ML
INJECTION, SOLUTION INTRAVENOUS CONTINUOUS
Status: DISCONTINUED | OUTPATIENT
Start: 2019-02-18 | End: 2019-02-19

## 2019-02-18 RX ADMIN — PANTOPRAZOLE SODIUM 40 MG: 40 TABLET, DELAYED RELEASE ORAL at 17:10

## 2019-02-18 RX ADMIN — ACETAMINOPHEN 650 MG: 325 TABLET, FILM COATED ORAL at 20:12

## 2019-02-18 RX ADMIN — INSULIN LISPRO 2 UNITS: 100 INJECTION, SOLUTION INTRAVENOUS; SUBCUTANEOUS at 17:13

## 2019-02-18 RX ADMIN — BUPRENORPHINE HYDROCHLORIDE, NALOXONE HYDROCHLORIDE 1 FILM: 8; 2 FILM, SOLUBLE BUCCAL; SUBLINGUAL at 17:49

## 2019-02-18 RX ADMIN — SODIUM CHLORIDE: 4.5 INJECTION, SOLUTION INTRAVENOUS at 05:21

## 2019-02-18 RX ADMIN — PANTOPRAZOLE SODIUM 40 MG: 40 TABLET, DELAYED RELEASE ORAL at 05:21

## 2019-02-18 RX ADMIN — CLOPIDOGREL 75 MG: 75 TABLET, FILM COATED ORAL at 08:35

## 2019-02-18 RX ADMIN — ENOXAPARIN SODIUM 40 MG: 40 INJECTION SUBCUTANEOUS at 10:56

## 2019-02-18 RX ADMIN — BUPRENORPHINE HYDROCHLORIDE, NALOXONE HYDROCHLORIDE 1 FILM: 8; 2 FILM, SOLUBLE BUCCAL; SUBLINGUAL at 08:43

## 2019-02-18 RX ADMIN — MIRTAZAPINE 37.5 MG: 15 TABLET, FILM COATED ORAL at 20:11

## 2019-02-18 RX ADMIN — INSULIN LISPRO 10 UNITS: 100 INJECTION, SOLUTION INTRAVENOUS; SUBCUTANEOUS at 13:01

## 2019-02-18 RX ADMIN — SODIUM CHLORIDE: 9 INJECTION, SOLUTION INTRAVENOUS at 10:56

## 2019-02-18 RX ADMIN — INSULIN LISPRO 8 UNITS: 100 INJECTION, SOLUTION INTRAVENOUS; SUBCUTANEOUS at 08:34

## 2019-02-18 RX ADMIN — LAMOTRIGINE 100 MG: 100 TABLET ORAL at 08:36

## 2019-02-18 RX ADMIN — SODIUM CHLORIDE: 9 INJECTION, SOLUTION INTRAVENOUS at 17:57

## 2019-02-18 RX ADMIN — GABAPENTIN 300 MG: 300 CAPSULE ORAL at 20:10

## 2019-02-18 RX ADMIN — DESMOPRESSIN ACETATE 40 MG: 0.2 TABLET ORAL at 20:10

## 2019-02-18 RX ADMIN — GABAPENTIN 300 MG: 300 CAPSULE ORAL at 13:04

## 2019-02-18 RX ADMIN — SODIUM CHLORIDE: 9 INJECTION, SOLUTION INTRAVENOUS at 21:27

## 2019-02-18 RX ADMIN — ASPIRIN 81 MG 81 MG: 81 TABLET ORAL at 08:36

## 2019-02-18 RX ADMIN — INSULIN LISPRO 4 UNITS: 100 INJECTION, SOLUTION INTRAVENOUS; SUBCUTANEOUS at 08:35

## 2019-02-18 RX ADMIN — GABAPENTIN 300 MG: 300 CAPSULE ORAL at 08:35

## 2019-02-18 RX ADMIN — INSULIN LISPRO 1 UNITS: 100 INJECTION, SOLUTION INTRAVENOUS; SUBCUTANEOUS at 21:23

## 2019-02-18 ASSESSMENT — PAIN SCALES - GENERAL
PAINLEVEL_OUTOF10: 0
PAINLEVEL_OUTOF10: 7
PAINLEVEL_OUTOF10: 7
PAINLEVEL_OUTOF10: 0

## 2019-02-18 ASSESSMENT — PAIN DESCRIPTION - DESCRIPTORS: DESCRIPTORS: HEADACHE

## 2019-02-18 ASSESSMENT — PAIN DESCRIPTION - LOCATION
LOCATION: HEAD
LOCATION: NECK

## 2019-02-19 ENCOUNTER — APPOINTMENT (OUTPATIENT)
Dept: GENERAL RADIOLOGY | Age: 54
DRG: 420 | End: 2019-02-19
Payer: COMMERCIAL

## 2019-02-19 ENCOUNTER — APPOINTMENT (OUTPATIENT)
Dept: CT IMAGING | Age: 54
DRG: 420 | End: 2019-02-19
Payer: COMMERCIAL

## 2019-02-19 LAB
ANION GAP SERPL CALCULATED.3IONS-SCNC: 10 MMOL/L (ref 3–16)
BILIRUBIN URINE: NEGATIVE
BLOOD, URINE: NEGATIVE
BUN BLDV-MCNC: 10 MG/DL (ref 7–20)
CALCIUM SERPL-MCNC: 8.1 MG/DL (ref 8.3–10.6)
CHLORIDE BLD-SCNC: 113 MMOL/L (ref 99–110)
CLARITY: CLEAR
CO2: 25 MMOL/L (ref 21–32)
COLOR: YELLOW
CREAT SERPL-MCNC: <0.5 MG/DL (ref 0.6–1.1)
EPITHELIAL CELLS, UA: 3 /HPF (ref 0–5)
GFR AFRICAN AMERICAN: >60
GFR NON-AFRICAN AMERICAN: >60
GLUCOSE BLD-MCNC: 139 MG/DL (ref 70–99)
GLUCOSE BLD-MCNC: 140 MG/DL (ref 70–99)
GLUCOSE BLD-MCNC: 146 MG/DL (ref 70–99)
GLUCOSE BLD-MCNC: 218 MG/DL (ref 70–99)
GLUCOSE BLD-MCNC: 271 MG/DL (ref 70–99)
GLUCOSE URINE: 500 MG/DL
HCT VFR BLD CALC: 32.6 % (ref 36–48)
HEMOGLOBIN: 10.8 G/DL (ref 12–16)
HYALINE CASTS: 1 /LPF (ref 0–8)
KETONES, URINE: NEGATIVE MG/DL
LACTIC ACID: 2.5 MMOL/L (ref 0.4–2)
LEUKOCYTE ESTERASE, URINE: ABNORMAL
MAGNESIUM: 2.1 MG/DL (ref 1.8–2.4)
MCH RBC QN AUTO: 30.4 PG (ref 26–34)
MCHC RBC AUTO-ENTMCNC: 33 G/DL (ref 31–36)
MCV RBC AUTO: 92 FL (ref 80–100)
MICROSCOPIC EXAMINATION: YES
NITRITE, URINE: NEGATIVE
PDW BLD-RTO: 12.7 % (ref 12.4–15.4)
PERFORMED ON: ABNORMAL
PH UA: 5.5
PLATELET # BLD: 89 K/UL (ref 135–450)
PMV BLD AUTO: 11.2 FL (ref 5–10.5)
POTASSIUM REFLEX MAGNESIUM: 3.2 MMOL/L (ref 3.5–5.1)
PROTEIN UA: NEGATIVE MG/DL
RBC # BLD: 3.54 M/UL (ref 4–5.2)
RBC UA: 1 /HPF (ref 0–4)
SODIUM BLD-SCNC: 148 MMOL/L (ref 136–145)
SPECIFIC GRAVITY UA: >1.03
URINE REFLEX TO CULTURE: YES
URINE TYPE: ABNORMAL
UROBILINOGEN, URINE: 0.2 E.U./DL
WBC # BLD: 7.6 K/UL (ref 4–11)
WBC UA: 23 /HPF (ref 0–5)
YEAST: PRESENT /HPF

## 2019-02-19 PROCEDURE — 6370000000 HC RX 637 (ALT 250 FOR IP): Performed by: INTERNAL MEDICINE

## 2019-02-19 PROCEDURE — 71045 X-RAY EXAM CHEST 1 VIEW: CPT

## 2019-02-19 PROCEDURE — 2580000003 HC RX 258: Performed by: INTERNAL MEDICINE

## 2019-02-19 PROCEDURE — 83735 ASSAY OF MAGNESIUM: CPT

## 2019-02-19 PROCEDURE — 87086 URINE CULTURE/COLONY COUNT: CPT

## 2019-02-19 PROCEDURE — 6360000004 HC RX CONTRAST MEDICATION: Performed by: INTERNAL MEDICINE

## 2019-02-19 PROCEDURE — 81001 URINALYSIS AUTO W/SCOPE: CPT

## 2019-02-19 PROCEDURE — 70491 CT SOFT TISSUE NECK W/DYE: CPT

## 2019-02-19 PROCEDURE — 6360000002 HC RX W HCPCS: Performed by: INTERNAL MEDICINE

## 2019-02-19 PROCEDURE — 80048 BASIC METABOLIC PNL TOTAL CA: CPT

## 2019-02-19 PROCEDURE — 85027 COMPLETE CBC AUTOMATED: CPT

## 2019-02-19 PROCEDURE — 99233 SBSQ HOSP IP/OBS HIGH 50: CPT | Performed by: INTERNAL MEDICINE

## 2019-02-19 PROCEDURE — 2000000000 HC ICU R&B

## 2019-02-19 PROCEDURE — 83605 ASSAY OF LACTIC ACID: CPT

## 2019-02-19 RX ORDER — LINEZOLID 600 MG/1
600 TABLET, FILM COATED ORAL EVERY 12 HOURS SCHEDULED
Status: DISCONTINUED | OUTPATIENT
Start: 2019-02-19 | End: 2019-02-21

## 2019-02-19 RX ORDER — KETOROLAC TROMETHAMINE 15 MG/ML
30 INJECTION, SOLUTION INTRAMUSCULAR; INTRAVENOUS EVERY 6 HOURS PRN
Status: DISCONTINUED | OUTPATIENT
Start: 2019-02-19 | End: 2019-02-24 | Stop reason: HOSPADM

## 2019-02-19 RX ORDER — SODIUM CHLORIDE 9 MG/ML
INJECTION, SOLUTION INTRAVENOUS
Status: DISPENSED
Start: 2019-02-19 | End: 2019-02-20

## 2019-02-19 RX ORDER — DEXTROSE AND SODIUM CHLORIDE 5; .45 G/100ML; G/100ML
INJECTION, SOLUTION INTRAVENOUS CONTINUOUS
Status: DISCONTINUED | OUTPATIENT
Start: 2019-02-19 | End: 2019-02-20

## 2019-02-19 RX ADMIN — POTASSIUM CHLORIDE 20 MEQ: 29.8 INJECTION, SOLUTION INTRAVENOUS at 09:39

## 2019-02-19 RX ADMIN — ENOXAPARIN SODIUM 40 MG: 40 INJECTION SUBCUTANEOUS at 09:38

## 2019-02-19 RX ADMIN — BUPRENORPHINE HYDROCHLORIDE, NALOXONE HYDROCHLORIDE 1 FILM: 8; 2 FILM, SOLUBLE BUCCAL; SUBLINGUAL at 10:25

## 2019-02-19 RX ADMIN — DESMOPRESSIN ACETATE 40 MG: 0.2 TABLET ORAL at 20:21

## 2019-02-19 RX ADMIN — KETOROLAC TROMETHAMINE 30 MG: 15 INJECTION, SOLUTION INTRAMUSCULAR; INTRAVENOUS at 18:35

## 2019-02-19 RX ADMIN — POTASSIUM CHLORIDE 20 MEQ: 29.8 INJECTION, SOLUTION INTRAVENOUS at 12:45

## 2019-02-19 RX ADMIN — GABAPENTIN 300 MG: 300 CAPSULE ORAL at 20:22

## 2019-02-19 RX ADMIN — BUPRENORPHINE HYDROCHLORIDE, NALOXONE HYDROCHLORIDE 1 FILM: 8; 2 FILM, SOLUBLE BUCCAL; SUBLINGUAL at 16:36

## 2019-02-19 RX ADMIN — LAMOTRIGINE 100 MG: 100 TABLET ORAL at 09:38

## 2019-02-19 RX ADMIN — DEXTROSE AND SODIUM CHLORIDE: 5; 450 INJECTION, SOLUTION INTRAVENOUS at 09:39

## 2019-02-19 RX ADMIN — LINEZOLID 600 MG: 600 TABLET, FILM COATED ORAL at 20:21

## 2019-02-19 RX ADMIN — DEXTROSE AND SODIUM CHLORIDE: 5; 450 INJECTION, SOLUTION INTRAVENOUS at 18:35

## 2019-02-19 RX ADMIN — GABAPENTIN 300 MG: 300 CAPSULE ORAL at 09:38

## 2019-02-19 RX ADMIN — ACETAMINOPHEN 650 MG: 325 TABLET, FILM COATED ORAL at 14:38

## 2019-02-19 RX ADMIN — GABAPENTIN 300 MG: 300 CAPSULE ORAL at 14:38

## 2019-02-19 RX ADMIN — CLOPIDOGREL 75 MG: 75 TABLET, FILM COATED ORAL at 09:38

## 2019-02-19 RX ADMIN — INSULIN LISPRO 2 UNITS: 100 INJECTION, SOLUTION INTRAVENOUS; SUBCUTANEOUS at 16:36

## 2019-02-19 RX ADMIN — IOPAMIDOL 75 ML: 755 INJECTION, SOLUTION INTRAVENOUS at 15:40

## 2019-02-19 RX ADMIN — INSULIN LISPRO 6 UNITS: 100 INJECTION, SOLUTION INTRAVENOUS; SUBCUTANEOUS at 09:51

## 2019-02-19 RX ADMIN — PANTOPRAZOLE SODIUM 40 MG: 40 TABLET, DELAYED RELEASE ORAL at 16:53

## 2019-02-19 RX ADMIN — ACETAMINOPHEN 650 MG: 325 TABLET, FILM COATED ORAL at 21:52

## 2019-02-19 RX ADMIN — ASPIRIN 81 MG 81 MG: 81 TABLET ORAL at 09:38

## 2019-02-19 RX ADMIN — MIRTAZAPINE 37.5 MG: 15 TABLET, FILM COATED ORAL at 20:21

## 2019-02-19 RX ADMIN — INSULIN LISPRO 2 UNITS: 100 INJECTION, SOLUTION INTRAVENOUS; SUBCUTANEOUS at 20:30

## 2019-02-19 RX ADMIN — PANTOPRAZOLE SODIUM 40 MG: 40 TABLET, DELAYED RELEASE ORAL at 06:39

## 2019-02-19 RX ADMIN — SODIUM CHLORIDE 3 G: 900 INJECTION INTRAVENOUS at 20:30

## 2019-02-19 RX ADMIN — ACETAMINOPHEN 650 MG: 325 TABLET, FILM COATED ORAL at 06:39

## 2019-02-19 ASSESSMENT — PAIN SCALES - GENERAL
PAINLEVEL_OUTOF10: 0
PAINLEVEL_OUTOF10: 4
PAINLEVEL_OUTOF10: 7
PAINLEVEL_OUTOF10: 3
PAINLEVEL_OUTOF10: 8
PAINLEVEL_OUTOF10: 5
PAINLEVEL_OUTOF10: 5
PAINLEVEL_OUTOF10: 0
PAINLEVEL_OUTOF10: 3
PAINLEVEL_OUTOF10: 0
PAINLEVEL_OUTOF10: 5
PAINLEVEL_OUTOF10: 2
PAINLEVEL_OUTOF10: 8
PAINLEVEL_OUTOF10: 3

## 2019-02-19 ASSESSMENT — PAIN DESCRIPTION - LOCATION
LOCATION: NECK

## 2019-02-19 ASSESSMENT — PAIN DESCRIPTION - PAIN TYPE
TYPE: ACUTE PAIN

## 2019-02-19 ASSESSMENT — PAIN DESCRIPTION - ORIENTATION
ORIENTATION: RIGHT
ORIENTATION: RIGHT

## 2019-02-20 LAB
A/G RATIO: 0.6 (ref 1.1–2.2)
ALBUMIN SERPL-MCNC: 2 G/DL (ref 3.4–5)
ALP BLD-CCNC: 121 U/L (ref 40–129)
ALT SERPL-CCNC: 14 U/L (ref 10–40)
ANION GAP SERPL CALCULATED.3IONS-SCNC: 10 MMOL/L (ref 3–16)
ANION GAP SERPL CALCULATED.3IONS-SCNC: 9 MMOL/L (ref 3–16)
AST SERPL-CCNC: 15 U/L (ref 15–37)
BILIRUB SERPL-MCNC: <0.2 MG/DL (ref 0–1)
BUN BLDV-MCNC: 13 MG/DL (ref 7–20)
BUN BLDV-MCNC: 18 MG/DL (ref 7–20)
CALCIUM SERPL-MCNC: 7.6 MG/DL (ref 8.3–10.6)
CALCIUM SERPL-MCNC: 7.7 MG/DL (ref 8.3–10.6)
CHLORIDE BLD-SCNC: 106 MMOL/L (ref 99–110)
CHLORIDE BLD-SCNC: 108 MMOL/L (ref 99–110)
CO2: 21 MMOL/L (ref 21–32)
CO2: 23 MMOL/L (ref 21–32)
CREAT SERPL-MCNC: 0.5 MG/DL (ref 0.6–1.1)
CREAT SERPL-MCNC: 0.6 MG/DL (ref 0.6–1.1)
GFR AFRICAN AMERICAN: >60
GFR AFRICAN AMERICAN: >60
GFR NON-AFRICAN AMERICAN: >60
GFR NON-AFRICAN AMERICAN: >60
GLOBULIN: 3.4 G/DL
GLUCOSE BLD-MCNC: 106 MG/DL (ref 70–99)
GLUCOSE BLD-MCNC: 116 MG/DL (ref 70–99)
GLUCOSE BLD-MCNC: 140 MG/DL (ref 70–99)
GLUCOSE BLD-MCNC: 142 MG/DL (ref 70–99)
GLUCOSE BLD-MCNC: 227 MG/DL (ref 70–99)
GLUCOSE BLD-MCNC: 256 MG/DL (ref 70–99)
GLUCOSE BLD-MCNC: 277 MG/DL (ref 70–99)
GLUCOSE BLD-MCNC: 295 MG/DL (ref 70–99)
LIPASE: 69 U/L (ref 13–60)
PERFORMED ON: ABNORMAL
POTASSIUM REFLEX MAGNESIUM: 4.3 MMOL/L (ref 3.5–5.1)
POTASSIUM SERPL-SCNC: 4.4 MMOL/L (ref 3.5–5.1)
SODIUM BLD-SCNC: 137 MMOL/L (ref 136–145)
SODIUM BLD-SCNC: 140 MMOL/L (ref 136–145)
TOTAL PROTEIN: 5.4 G/DL (ref 6.4–8.2)

## 2019-02-20 PROCEDURE — 2500000003 HC RX 250 WO HCPCS: Performed by: INTERNAL MEDICINE

## 2019-02-20 PROCEDURE — 6360000002 HC RX W HCPCS: Performed by: INTERNAL MEDICINE

## 2019-02-20 PROCEDURE — 6370000000 HC RX 637 (ALT 250 FOR IP): Performed by: INTERNAL MEDICINE

## 2019-02-20 PROCEDURE — 80053 COMPREHEN METABOLIC PANEL: CPT

## 2019-02-20 PROCEDURE — 2580000003 HC RX 258: Performed by: INTERNAL MEDICINE

## 2019-02-20 PROCEDURE — 83690 ASSAY OF LIPASE: CPT

## 2019-02-20 PROCEDURE — 36592 COLLECT BLOOD FROM PICC: CPT

## 2019-02-20 PROCEDURE — 2580000003 HC RX 258

## 2019-02-20 PROCEDURE — 2000000000 HC ICU R&B

## 2019-02-20 PROCEDURE — 99233 SBSQ HOSP IP/OBS HIGH 50: CPT | Performed by: INTERNAL MEDICINE

## 2019-02-20 RX ORDER — POLYVINYL ALCOHOL 14 MG/ML
1 SOLUTION/ DROPS OPHTHALMIC PRN
Status: DISCONTINUED | OUTPATIENT
Start: 2019-02-20 | End: 2019-02-24 | Stop reason: HOSPADM

## 2019-02-20 RX ORDER — 0.9 % SODIUM CHLORIDE 0.9 %
1000 INTRAVENOUS SOLUTION INTRAVENOUS ONCE
Status: DISCONTINUED | OUTPATIENT
Start: 2019-02-20 | End: 2019-02-24 | Stop reason: HOSPADM

## 2019-02-20 RX ORDER — SODIUM CHLORIDE 0.9 % (FLUSH) 0.9 %
SYRINGE (ML) INJECTION
Status: DISPENSED
Start: 2019-02-20 | End: 2019-02-20

## 2019-02-20 RX ORDER — SODIUM CHLORIDE 9 MG/ML
INJECTION, SOLUTION INTRAVENOUS
Status: DISPENSED
Start: 2019-02-20 | End: 2019-02-21

## 2019-02-20 RX ORDER — SODIUM CHLORIDE 450 MG/100ML
INJECTION, SOLUTION INTRAVENOUS
Status: COMPLETED
Start: 2019-02-20 | End: 2019-02-20

## 2019-02-20 RX ORDER — SODIUM CHLORIDE 450 MG/100ML
INJECTION, SOLUTION INTRAVENOUS CONTINUOUS
Status: DISCONTINUED | OUTPATIENT
Start: 2019-02-20 | End: 2019-02-23

## 2019-02-20 RX ADMIN — SODIUM CHLORIDE: 4.5 INJECTION, SOLUTION INTRAVENOUS at 09:34

## 2019-02-20 RX ADMIN — ENOXAPARIN SODIUM 40 MG: 40 INJECTION SUBCUTANEOUS at 09:36

## 2019-02-20 RX ADMIN — LINEZOLID 600 MG: 600 TABLET, FILM COATED ORAL at 20:23

## 2019-02-20 RX ADMIN — ASPIRIN 81 MG 81 MG: 81 TABLET ORAL at 09:35

## 2019-02-20 RX ADMIN — CLOPIDOGREL 75 MG: 75 TABLET, FILM COATED ORAL at 09:35

## 2019-02-20 RX ADMIN — SODIUM CHLORIDE: 4.5 INJECTION, SOLUTION INTRAVENOUS at 00:28

## 2019-02-20 RX ADMIN — ACETAMINOPHEN 650 MG: 325 TABLET, FILM COATED ORAL at 20:23

## 2019-02-20 RX ADMIN — GABAPENTIN 300 MG: 300 CAPSULE ORAL at 20:23

## 2019-02-20 RX ADMIN — PANTOPRAZOLE SODIUM 40 MG: 40 TABLET, DELAYED RELEASE ORAL at 15:59

## 2019-02-20 RX ADMIN — TAZOBACTAM SODIUM AND PIPERACILLIN SODIUM 3.38 G: 375; 3 INJECTION, SOLUTION INTRAVENOUS at 13:03

## 2019-02-20 RX ADMIN — GABAPENTIN 300 MG: 300 CAPSULE ORAL at 09:35

## 2019-02-20 RX ADMIN — BUPRENORPHINE HYDROCHLORIDE, NALOXONE HYDROCHLORIDE 1 FILM: 8; 2 FILM, SOLUBLE BUCCAL; SUBLINGUAL at 15:59

## 2019-02-20 RX ADMIN — GABAPENTIN 300 MG: 300 CAPSULE ORAL at 14:42

## 2019-02-20 RX ADMIN — MIRTAZAPINE 37.5 MG: 15 TABLET, FILM COATED ORAL at 20:23

## 2019-02-20 RX ADMIN — PANTOPRAZOLE SODIUM 40 MG: 40 TABLET, DELAYED RELEASE ORAL at 06:15

## 2019-02-20 RX ADMIN — SODIUM CHLORIDE 3 G: 900 INJECTION INTRAVENOUS at 02:48

## 2019-02-20 RX ADMIN — LINEZOLID 600 MG: 600 TABLET, FILM COATED ORAL at 09:35

## 2019-02-20 RX ADMIN — SODIUM CHLORIDE 3 G: 900 INJECTION INTRAVENOUS at 09:34

## 2019-02-20 RX ADMIN — KETOROLAC TROMETHAMINE 30 MG: 15 INJECTION, SOLUTION INTRAMUSCULAR; INTRAVENOUS at 09:35

## 2019-02-20 RX ADMIN — KETOROLAC TROMETHAMINE 30 MG: 15 INJECTION, SOLUTION INTRAMUSCULAR; INTRAVENOUS at 00:27

## 2019-02-20 RX ADMIN — DESMOPRESSIN ACETATE 40 MG: 0.2 TABLET ORAL at 20:23

## 2019-02-20 RX ADMIN — TAZOBACTAM SODIUM AND PIPERACILLIN SODIUM 3.38 G: 375; 3 INJECTION, SOLUTION INTRAVENOUS at 19:31

## 2019-02-20 RX ADMIN — KETOROLAC TROMETHAMINE 30 MG: 15 INJECTION, SOLUTION INTRAMUSCULAR; INTRAVENOUS at 17:53

## 2019-02-20 RX ADMIN — BUPRENORPHINE HYDROCHLORIDE, NALOXONE HYDROCHLORIDE 1 FILM: 8; 2 FILM, SOLUBLE BUCCAL; SUBLINGUAL at 09:59

## 2019-02-20 RX ADMIN — SODIUM CHLORIDE: 4.5 INJECTION, SOLUTION INTRAVENOUS at 20:06

## 2019-02-20 RX ADMIN — LAMOTRIGINE 100 MG: 100 TABLET ORAL at 09:35

## 2019-02-20 ASSESSMENT — PAIN SCALES - GENERAL
PAINLEVEL_OUTOF10: 4
PAINLEVEL_OUTOF10: 5
PAINLEVEL_OUTOF10: 6
PAINLEVEL_OUTOF10: 6
PAINLEVEL_OUTOF10: 3
PAINLEVEL_OUTOF10: 0
PAINLEVEL_OUTOF10: 9
PAINLEVEL_OUTOF10: 5

## 2019-02-20 ASSESSMENT — PAIN DESCRIPTION - ORIENTATION
ORIENTATION: RIGHT
ORIENTATION: RIGHT;ANTERIOR
ORIENTATION: RIGHT

## 2019-02-20 ASSESSMENT — PAIN DESCRIPTION - PAIN TYPE
TYPE: ACUTE PAIN

## 2019-02-20 ASSESSMENT — PAIN DESCRIPTION - LOCATION
LOCATION: NECK

## 2019-02-20 ASSESSMENT — PAIN DESCRIPTION - FREQUENCY: FREQUENCY: CONTINUOUS

## 2019-02-21 LAB
ANION GAP SERPL CALCULATED.3IONS-SCNC: 8 MMOL/L (ref 3–16)
BUN BLDV-MCNC: 21 MG/DL (ref 7–20)
CALCIUM SERPL-MCNC: 7.5 MG/DL (ref 8.3–10.6)
CHLORIDE BLD-SCNC: 111 MMOL/L (ref 99–110)
CO2: 23 MMOL/L (ref 21–32)
CORTISOL TOTAL: 3.4 UG/DL
CREAT SERPL-MCNC: 0.7 MG/DL (ref 0.6–1.1)
GFR AFRICAN AMERICAN: >60
GFR NON-AFRICAN AMERICAN: >60
GLUCOSE BLD-MCNC: 139 MG/DL (ref 70–99)
GLUCOSE BLD-MCNC: 169 MG/DL (ref 70–99)
GLUCOSE BLD-MCNC: 187 MG/DL (ref 70–99)
GLUCOSE BLD-MCNC: 194 MG/DL (ref 70–99)
GLUCOSE BLD-MCNC: 218 MG/DL (ref 70–99)
GLUCOSE BLD-MCNC: 279 MG/DL (ref 70–99)
GLUCOSE BLD-MCNC: 75 MG/DL (ref 70–99)
HCT VFR BLD CALC: 30.2 % (ref 36–48)
HEMOGLOBIN: 10 G/DL (ref 12–16)
LACTIC ACID: 1.4 MMOL/L (ref 0.4–2)
LEFT VENTRICULAR EJECTION FRACTION HIGH VALUE: 65 %
LEFT VENTRICULAR EJECTION FRACTION MODE: NORMAL
LV EF: 60 %
LV EF: 63 %
LVEF MODALITY: NORMAL
MCH RBC QN AUTO: 30 PG (ref 26–34)
MCHC RBC AUTO-ENTMCNC: 33 G/DL (ref 31–36)
MCV RBC AUTO: 90.7 FL (ref 80–100)
ORGANISM: ABNORMAL
PDW BLD-RTO: 12.9 % (ref 12.4–15.4)
PERFORMED ON: ABNORMAL
PERFORMED ON: NORMAL
PLATELET # BLD: 172 K/UL (ref 135–450)
PMV BLD AUTO: 9.6 FL (ref 5–10.5)
POTASSIUM REFLEX MAGNESIUM: 4.7 MMOL/L (ref 3.5–5.1)
RBC # BLD: 3.33 M/UL (ref 4–5.2)
SODIUM BLD-SCNC: 142 MMOL/L (ref 136–145)
URINE CULTURE, ROUTINE: ABNORMAL
URINE CULTURE, ROUTINE: ABNORMAL
WBC # BLD: 6.7 K/UL (ref 4–11)

## 2019-02-21 PROCEDURE — 36592 COLLECT BLOOD FROM PICC: CPT

## 2019-02-21 PROCEDURE — 80048 BASIC METABOLIC PNL TOTAL CA: CPT

## 2019-02-21 PROCEDURE — 2500000003 HC RX 250 WO HCPCS: Performed by: INTERNAL MEDICINE

## 2019-02-21 PROCEDURE — 82533 TOTAL CORTISOL: CPT

## 2019-02-21 PROCEDURE — 6360000002 HC RX W HCPCS: Performed by: INTERNAL MEDICINE

## 2019-02-21 PROCEDURE — 6370000000 HC RX 637 (ALT 250 FOR IP): Performed by: INTERNAL MEDICINE

## 2019-02-21 PROCEDURE — 99233 SBSQ HOSP IP/OBS HIGH 50: CPT | Performed by: INTERNAL MEDICINE

## 2019-02-21 PROCEDURE — 6370000000 HC RX 637 (ALT 250 FOR IP): Performed by: OTOLARYNGOLOGY

## 2019-02-21 PROCEDURE — 93306 TTE W/DOPPLER COMPLETE: CPT

## 2019-02-21 PROCEDURE — 85027 COMPLETE CBC AUTOMATED: CPT

## 2019-02-21 PROCEDURE — 2000000000 HC ICU R&B

## 2019-02-21 PROCEDURE — 99255 IP/OBS CONSLTJ NEW/EST HI 80: CPT | Performed by: INTERNAL MEDICINE

## 2019-02-21 PROCEDURE — 83605 ASSAY OF LACTIC ACID: CPT

## 2019-02-21 PROCEDURE — 99253 IP/OBS CNSLTJ NEW/EST LOW 45: CPT | Performed by: OTOLARYNGOLOGY

## 2019-02-21 PROCEDURE — 2580000003 HC RX 258: Performed by: INTERNAL MEDICINE

## 2019-02-21 RX ORDER — GUAIFENESIN 600 MG/1
600 TABLET, EXTENDED RELEASE ORAL 2 TIMES DAILY
Status: DISCONTINUED | OUTPATIENT
Start: 2019-02-21 | End: 2019-02-24 | Stop reason: HOSPADM

## 2019-02-21 RX ORDER — METHYLPREDNISOLONE SODIUM SUCCINATE 40 MG/ML
40 INJECTION, POWDER, LYOPHILIZED, FOR SOLUTION INTRAMUSCULAR; INTRAVENOUS EVERY 8 HOURS
Status: DISCONTINUED | OUTPATIENT
Start: 2019-02-21 | End: 2019-02-22

## 2019-02-21 RX ADMIN — KETOROLAC TROMETHAMINE 30 MG: 15 INJECTION, SOLUTION INTRAMUSCULAR; INTRAVENOUS at 19:06

## 2019-02-21 RX ADMIN — MIRTAZAPINE 37.5 MG: 15 TABLET, FILM COATED ORAL at 20:29

## 2019-02-21 RX ADMIN — METHYLPREDNISOLONE SODIUM SUCCINATE 40 MG: 40 INJECTION, POWDER, FOR SOLUTION INTRAMUSCULAR; INTRAVENOUS at 17:34

## 2019-02-21 RX ADMIN — PANTOPRAZOLE SODIUM 40 MG: 40 TABLET, DELAYED RELEASE ORAL at 15:38

## 2019-02-21 RX ADMIN — CLOPIDOGREL 75 MG: 75 TABLET, FILM COATED ORAL at 09:14

## 2019-02-21 RX ADMIN — TAZOBACTAM SODIUM AND PIPERACILLIN SODIUM 3.38 G: 375; 3 INJECTION, SOLUTION INTRAVENOUS at 17:44

## 2019-02-21 RX ADMIN — SODIUM CHLORIDE: 4.5 INJECTION, SOLUTION INTRAVENOUS at 19:07

## 2019-02-21 RX ADMIN — GABAPENTIN 300 MG: 300 CAPSULE ORAL at 15:38

## 2019-02-21 RX ADMIN — BUPRENORPHINE HYDROCHLORIDE, NALOXONE HYDROCHLORIDE 1 FILM: 8; 2 FILM, SOLUBLE BUCCAL; SUBLINGUAL at 07:39

## 2019-02-21 RX ADMIN — Medication 0.03 MCG/KG/MIN: at 09:31

## 2019-02-21 RX ADMIN — KETOROLAC TROMETHAMINE 30 MG: 15 INJECTION, SOLUTION INTRAMUSCULAR; INTRAVENOUS at 00:16

## 2019-02-21 RX ADMIN — PANTOPRAZOLE SODIUM 40 MG: 40 TABLET, DELAYED RELEASE ORAL at 06:54

## 2019-02-21 RX ADMIN — POLYVINYL ALCOHOL 1 DROP: 14 SOLUTION/ DROPS OPHTHALMIC at 00:06

## 2019-02-21 RX ADMIN — ACETAMINOPHEN 650 MG: 325 TABLET, FILM COATED ORAL at 22:11

## 2019-02-21 RX ADMIN — TAZOBACTAM SODIUM AND PIPERACILLIN SODIUM 3.38 G: 375; 3 INJECTION, SOLUTION INTRAVENOUS at 09:31

## 2019-02-21 RX ADMIN — GUAIFENESIN 600 MG: 600 TABLET, EXTENDED RELEASE ORAL at 09:14

## 2019-02-21 RX ADMIN — GUAIFENESIN 600 MG: 600 TABLET, EXTENDED RELEASE ORAL at 20:29

## 2019-02-21 RX ADMIN — ASPIRIN 81 MG 81 MG: 81 TABLET ORAL at 09:14

## 2019-02-21 RX ADMIN — TAZOBACTAM SODIUM AND PIPERACILLIN SODIUM 3.38 G: 375; 3 INJECTION, SOLUTION INTRAVENOUS at 01:42

## 2019-02-21 RX ADMIN — GABAPENTIN 300 MG: 300 CAPSULE ORAL at 20:30

## 2019-02-21 RX ADMIN — LAMOTRIGINE 100 MG: 100 TABLET ORAL at 09:33

## 2019-02-21 RX ADMIN — GABAPENTIN 300 MG: 300 CAPSULE ORAL at 09:31

## 2019-02-21 RX ADMIN — KETOROLAC TROMETHAMINE 30 MG: 15 INJECTION, SOLUTION INTRAMUSCULAR; INTRAVENOUS at 06:54

## 2019-02-21 RX ADMIN — ENOXAPARIN SODIUM 40 MG: 40 INJECTION SUBCUTANEOUS at 09:30

## 2019-02-21 RX ADMIN — DESMOPRESSIN ACETATE 40 MG: 0.2 TABLET ORAL at 20:30

## 2019-02-21 RX ADMIN — KETOROLAC TROMETHAMINE 30 MG: 15 INJECTION, SOLUTION INTRAMUSCULAR; INTRAVENOUS at 12:45

## 2019-02-21 RX ADMIN — SODIUM CHLORIDE: 4.5 INJECTION, SOLUTION INTRAVENOUS at 07:54

## 2019-02-21 RX ADMIN — BUPRENORPHINE HYDROCHLORIDE, NALOXONE HYDROCHLORIDE 1 FILM: 8; 2 FILM, SOLUBLE BUCCAL; SUBLINGUAL at 16:07

## 2019-02-21 ASSESSMENT — PAIN DESCRIPTION - LOCATION
LOCATION: NECK

## 2019-02-21 ASSESSMENT — PAIN SCALES - GENERAL
PAINLEVEL_OUTOF10: 6
PAINLEVEL_OUTOF10: 0
PAINLEVEL_OUTOF10: 3
PAINLEVEL_OUTOF10: 5
PAINLEVEL_OUTOF10: 7
PAINLEVEL_OUTOF10: 7
PAINLEVEL_OUTOF10: 6
PAINLEVEL_OUTOF10: 7
PAINLEVEL_OUTOF10: 7
PAINLEVEL_OUTOF10: 0
PAINLEVEL_OUTOF10: 3
PAINLEVEL_OUTOF10: 4
PAINLEVEL_OUTOF10: 5
PAINLEVEL_OUTOF10: 3
PAINLEVEL_OUTOF10: 7

## 2019-02-21 ASSESSMENT — PAIN DESCRIPTION - PAIN TYPE
TYPE: ACUTE PAIN

## 2019-02-21 ASSESSMENT — PAIN DESCRIPTION - ONSET: ONSET: ON-GOING

## 2019-02-21 ASSESSMENT — PAIN DESCRIPTION - DESCRIPTORS: DESCRIPTORS: HEADACHE

## 2019-02-21 ASSESSMENT — PAIN DESCRIPTION - ORIENTATION
ORIENTATION: RIGHT;ANTERIOR
ORIENTATION: RIGHT

## 2019-02-22 LAB
CORTISOL TOTAL: 6.9 UG/DL
GLUCOSE BLD-MCNC: 139 MG/DL (ref 70–99)
GLUCOSE BLD-MCNC: 166 MG/DL (ref 70–99)
GLUCOSE BLD-MCNC: 217 MG/DL (ref 70–99)
GLUCOSE BLD-MCNC: 222 MG/DL (ref 70–99)
GLUCOSE BLD-MCNC: 363 MG/DL (ref 70–99)
GLUCOSE BLD-MCNC: 407 MG/DL (ref 70–99)
PERFORMED ON: ABNORMAL

## 2019-02-22 PROCEDURE — 6360000002 HC RX W HCPCS: Performed by: INTERNAL MEDICINE

## 2019-02-22 PROCEDURE — 6370000000 HC RX 637 (ALT 250 FOR IP): Performed by: INTERNAL MEDICINE

## 2019-02-22 PROCEDURE — 2000000000 HC ICU R&B

## 2019-02-22 PROCEDURE — 2580000003 HC RX 258: Performed by: INTERNAL MEDICINE

## 2019-02-22 PROCEDURE — 99233 SBSQ HOSP IP/OBS HIGH 50: CPT | Performed by: INTERNAL MEDICINE

## 2019-02-22 PROCEDURE — 2500000003 HC RX 250 WO HCPCS: Performed by: INTERNAL MEDICINE

## 2019-02-22 PROCEDURE — 6370000000 HC RX 637 (ALT 250 FOR IP): Performed by: OTOLARYNGOLOGY

## 2019-02-22 PROCEDURE — 2580000003 HC RX 258

## 2019-02-22 PROCEDURE — 82533 TOTAL CORTISOL: CPT

## 2019-02-22 RX ORDER — MIDODRINE HYDROCHLORIDE 5 MG/1
10 TABLET ORAL
Status: DISCONTINUED | OUTPATIENT
Start: 2019-02-22 | End: 2019-02-23

## 2019-02-22 RX ORDER — AMOXICILLIN AND CLAVULANATE POTASSIUM 875; 125 MG/1; MG/1
1 TABLET, FILM COATED ORAL EVERY 12 HOURS SCHEDULED
Status: DISCONTINUED | OUTPATIENT
Start: 2019-02-22 | End: 2019-02-24 | Stop reason: HOSPADM

## 2019-02-22 RX ORDER — METHYLPREDNISOLONE 4 MG/1
8 TABLET ORAL DAILY
Status: DISCONTINUED | OUTPATIENT
Start: 2019-02-23 | End: 2019-02-24 | Stop reason: HOSPADM

## 2019-02-22 RX ORDER — METHYLPREDNISOLONE SODIUM SUCCINATE 40 MG/ML
40 INJECTION, POWDER, LYOPHILIZED, FOR SOLUTION INTRAMUSCULAR; INTRAVENOUS DAILY
Status: DISCONTINUED | OUTPATIENT
Start: 2019-02-23 | End: 2019-02-22

## 2019-02-22 RX ORDER — SODIUM CHLORIDE 0.9 % (FLUSH) 0.9 %
SYRINGE (ML) INJECTION
Status: COMPLETED
Start: 2019-02-22 | End: 2019-02-22

## 2019-02-22 RX ORDER — POLYETHYLENE GLYCOL 3350 17 G/17G
17 POWDER, FOR SOLUTION ORAL ONCE
Status: COMPLETED | OUTPATIENT
Start: 2019-02-22 | End: 2019-02-22

## 2019-02-22 RX ORDER — METHYLPREDNISOLONE 4 MG/1
16 TABLET ORAL DAILY
Status: DISCONTINUED | OUTPATIENT
Start: 2019-02-23 | End: 2019-02-24

## 2019-02-22 RX ADMIN — ACETAMINOPHEN 650 MG: 325 TABLET, FILM COATED ORAL at 20:47

## 2019-02-22 RX ADMIN — INSULIN LISPRO 2 UNITS: 100 INJECTION, SOLUTION INTRAVENOUS; SUBCUTANEOUS at 13:15

## 2019-02-22 RX ADMIN — KETOROLAC TROMETHAMINE 30 MG: 15 INJECTION, SOLUTION INTRAMUSCULAR; INTRAVENOUS at 05:17

## 2019-02-22 RX ADMIN — TAZOBACTAM SODIUM AND PIPERACILLIN SODIUM 3.38 G: 375; 3 INJECTION, SOLUTION INTRAVENOUS at 01:59

## 2019-02-22 RX ADMIN — METHYLPREDNISOLONE SODIUM SUCCINATE 40 MG: 40 INJECTION, POWDER, FOR SOLUTION INTRAMUSCULAR; INTRAVENOUS at 00:59

## 2019-02-22 RX ADMIN — SODIUM CHLORIDE: 4.5 INJECTION, SOLUTION INTRAVENOUS at 04:30

## 2019-02-22 RX ADMIN — POLYETHYLENE GLYCOL 3350 17 G: 17 POWDER, FOR SOLUTION ORAL at 08:45

## 2019-02-22 RX ADMIN — GABAPENTIN 300 MG: 300 CAPSULE ORAL at 08:46

## 2019-02-22 RX ADMIN — DESMOPRESSIN ACETATE 40 MG: 0.2 TABLET ORAL at 20:41

## 2019-02-22 RX ADMIN — GABAPENTIN 300 MG: 300 CAPSULE ORAL at 20:41

## 2019-02-22 RX ADMIN — ENOXAPARIN SODIUM 40 MG: 40 INJECTION SUBCUTANEOUS at 08:45

## 2019-02-22 RX ADMIN — GUAIFENESIN 600 MG: 600 TABLET, EXTENDED RELEASE ORAL at 08:46

## 2019-02-22 RX ADMIN — LAMOTRIGINE 100 MG: 100 TABLET ORAL at 08:46

## 2019-02-22 RX ADMIN — CLOPIDOGREL 75 MG: 75 TABLET, FILM COATED ORAL at 08:46

## 2019-02-22 RX ADMIN — BUPRENORPHINE HYDROCHLORIDE, NALOXONE HYDROCHLORIDE 1 FILM: 8; 2 FILM, SOLUBLE BUCCAL; SUBLINGUAL at 09:16

## 2019-02-22 RX ADMIN — AMOXICILLIN AND CLAVULANATE POTASSIUM 1 TABLET: 875; 125 TABLET, FILM COATED ORAL at 20:41

## 2019-02-22 RX ADMIN — GABAPENTIN 300 MG: 300 CAPSULE ORAL at 17:31

## 2019-02-22 RX ADMIN — MIDODRINE HYDROCHLORIDE 10 MG: 5 TABLET ORAL at 17:31

## 2019-02-22 RX ADMIN — Medication 10 ML: at 05:17

## 2019-02-22 RX ADMIN — INSULIN LISPRO 4 UNITS: 100 INJECTION, SOLUTION INTRAVENOUS; SUBCUTANEOUS at 16:55

## 2019-02-22 RX ADMIN — ASPIRIN 81 MG 81 MG: 81 TABLET ORAL at 08:47

## 2019-02-22 RX ADMIN — MIRTAZAPINE 37.5 MG: 15 TABLET, FILM COATED ORAL at 20:41

## 2019-02-22 RX ADMIN — GUAIFENESIN 600 MG: 600 TABLET, EXTENDED RELEASE ORAL at 20:41

## 2019-02-22 RX ADMIN — SODIUM CHLORIDE: 4.5 INJECTION, SOLUTION INTRAVENOUS at 19:34

## 2019-02-22 RX ADMIN — METHYLPREDNISOLONE SODIUM SUCCINATE 40 MG: 40 INJECTION, POWDER, FOR SOLUTION INTRAMUSCULAR; INTRAVENOUS at 08:46

## 2019-02-22 RX ADMIN — PANTOPRAZOLE SODIUM 40 MG: 40 TABLET, DELAYED RELEASE ORAL at 08:46

## 2019-02-22 RX ADMIN — PANTOPRAZOLE SODIUM 40 MG: 40 TABLET, DELAYED RELEASE ORAL at 17:31

## 2019-02-22 RX ADMIN — TAZOBACTAM SODIUM AND PIPERACILLIN SODIUM 3.38 G: 375; 3 INJECTION, SOLUTION INTRAVENOUS at 08:46

## 2019-02-22 RX ADMIN — BUPRENORPHINE HYDROCHLORIDE, NALOXONE HYDROCHLORIDE 1 FILM: 8; 2 FILM, SOLUBLE BUCCAL; SUBLINGUAL at 16:56

## 2019-02-22 ASSESSMENT — PAIN DESCRIPTION - PROGRESSION
CLINICAL_PROGRESSION: NOT CHANGED

## 2019-02-22 ASSESSMENT — PAIN DESCRIPTION - FREQUENCY: FREQUENCY: INTERMITTENT

## 2019-02-22 ASSESSMENT — PAIN SCALES - GENERAL
PAINLEVEL_OUTOF10: 0
PAINLEVEL_OUTOF10: 8
PAINLEVEL_OUTOF10: 0
PAINLEVEL_OUTOF10: 3
PAINLEVEL_OUTOF10: 3
PAINLEVEL_OUTOF10: 0
PAINLEVEL_OUTOF10: 0
PAINLEVEL_OUTOF10: 2

## 2019-02-22 ASSESSMENT — PAIN DESCRIPTION - PAIN TYPE: TYPE: ACUTE PAIN

## 2019-02-22 ASSESSMENT — PAIN DESCRIPTION - LOCATION: LOCATION: NECK

## 2019-02-22 ASSESSMENT — PAIN DESCRIPTION - DESCRIPTORS: DESCRIPTORS: HEADACHE

## 2019-02-22 ASSESSMENT — PAIN DESCRIPTION - ONSET: ONSET: ON-GOING

## 2019-02-23 LAB
ANION GAP SERPL CALCULATED.3IONS-SCNC: 7 MMOL/L (ref 3–16)
BUN BLDV-MCNC: 17 MG/DL (ref 7–20)
CALCIUM SERPL-MCNC: 7.6 MG/DL (ref 8.3–10.6)
CHLORIDE BLD-SCNC: 113 MMOL/L (ref 99–110)
CO2: 24 MMOL/L (ref 21–32)
CREAT SERPL-MCNC: <0.5 MG/DL (ref 0.6–1.1)
GFR AFRICAN AMERICAN: >60
GFR NON-AFRICAN AMERICAN: >60
GLUCOSE BLD-MCNC: 115 MG/DL (ref 70–99)
GLUCOSE BLD-MCNC: 138 MG/DL (ref 70–99)
GLUCOSE BLD-MCNC: 162 MG/DL (ref 70–99)
GLUCOSE BLD-MCNC: 193 MG/DL (ref 70–99)
GLUCOSE BLD-MCNC: 221 MG/DL (ref 70–99)
HCT VFR BLD CALC: 26.7 % (ref 36–48)
HEMOGLOBIN: 8.9 G/DL (ref 12–16)
MCH RBC QN AUTO: 30.8 PG (ref 26–34)
MCHC RBC AUTO-ENTMCNC: 33.3 G/DL (ref 31–36)
MCV RBC AUTO: 92.6 FL (ref 80–100)
PDW BLD-RTO: 13 % (ref 12.4–15.4)
PERFORMED ON: ABNORMAL
PLATELET # BLD: 274 K/UL (ref 135–450)
PMV BLD AUTO: 9 FL (ref 5–10.5)
POTASSIUM REFLEX MAGNESIUM: 3.9 MMOL/L (ref 3.5–5.1)
RBC # BLD: 2.88 M/UL (ref 4–5.2)
SODIUM BLD-SCNC: 144 MMOL/L (ref 136–145)
WBC # BLD: 8.5 K/UL (ref 4–11)

## 2019-02-23 PROCEDURE — 2000000000 HC ICU R&B

## 2019-02-23 PROCEDURE — 6370000000 HC RX 637 (ALT 250 FOR IP): Performed by: INTERNAL MEDICINE

## 2019-02-23 PROCEDURE — 6360000002 HC RX W HCPCS: Performed by: INTERNAL MEDICINE

## 2019-02-23 PROCEDURE — 99232 SBSQ HOSP IP/OBS MODERATE 35: CPT | Performed by: INTERNAL MEDICINE

## 2019-02-23 PROCEDURE — 80048 BASIC METABOLIC PNL TOTAL CA: CPT

## 2019-02-23 PROCEDURE — 85027 COMPLETE CBC AUTOMATED: CPT

## 2019-02-23 PROCEDURE — 2580000003 HC RX 258: Performed by: INTERNAL MEDICINE

## 2019-02-23 PROCEDURE — 6370000000 HC RX 637 (ALT 250 FOR IP): Performed by: OTOLARYNGOLOGY

## 2019-02-23 RX ORDER — LACTOBACILLUS RHAMNOSUS GG 10B CELL
1 CAPSULE ORAL 2 TIMES DAILY WITH MEALS
Status: DISCONTINUED | OUTPATIENT
Start: 2019-02-23 | End: 2019-02-24 | Stop reason: HOSPADM

## 2019-02-23 RX ORDER — MIDODRINE HYDROCHLORIDE 5 MG/1
10 TABLET ORAL
Status: DISCONTINUED | OUTPATIENT
Start: 2019-02-23 | End: 2019-02-24

## 2019-02-23 RX ADMIN — ENOXAPARIN SODIUM 40 MG: 40 INJECTION SUBCUTANEOUS at 08:38

## 2019-02-23 RX ADMIN — AMOXICILLIN AND CLAVULANATE POTASSIUM 1 TABLET: 875; 125 TABLET, FILM COATED ORAL at 08:39

## 2019-02-23 RX ADMIN — GUAIFENESIN 600 MG: 600 TABLET, EXTENDED RELEASE ORAL at 08:39

## 2019-02-23 RX ADMIN — Medication 1 CAPSULE: at 13:24

## 2019-02-23 RX ADMIN — INSULIN LISPRO 4 UNITS: 100 INJECTION, SOLUTION INTRAVENOUS; SUBCUTANEOUS at 08:29

## 2019-02-23 RX ADMIN — BUPRENORPHINE HYDROCHLORIDE, NALOXONE HYDROCHLORIDE 1 FILM: 8; 2 FILM, SOLUBLE BUCCAL; SUBLINGUAL at 09:06

## 2019-02-23 RX ADMIN — GABAPENTIN 300 MG: 300 CAPSULE ORAL at 21:35

## 2019-02-23 RX ADMIN — PANTOPRAZOLE SODIUM 40 MG: 40 TABLET, DELAYED RELEASE ORAL at 06:16

## 2019-02-23 RX ADMIN — MIDODRINE HYDROCHLORIDE 10 MG: 5 TABLET ORAL at 17:04

## 2019-02-23 RX ADMIN — BUPRENORPHINE HYDROCHLORIDE, NALOXONE HYDROCHLORIDE 1 FILM: 8; 2 FILM, SOLUBLE BUCCAL; SUBLINGUAL at 17:01

## 2019-02-23 RX ADMIN — INSULIN LISPRO 2 UNITS: 100 INJECTION, SOLUTION INTRAVENOUS; SUBCUTANEOUS at 18:30

## 2019-02-23 RX ADMIN — MIRTAZAPINE 37.5 MG: 15 TABLET, FILM COATED ORAL at 21:34

## 2019-02-23 RX ADMIN — GUAIFENESIN 600 MG: 600 TABLET, EXTENDED RELEASE ORAL at 21:35

## 2019-02-23 RX ADMIN — GABAPENTIN 300 MG: 300 CAPSULE ORAL at 13:24

## 2019-02-23 RX ADMIN — ASPIRIN 81 MG 81 MG: 81 TABLET ORAL at 08:38

## 2019-02-23 RX ADMIN — Medication 1 CAPSULE: at 17:04

## 2019-02-23 RX ADMIN — AMOXICILLIN AND CLAVULANATE POTASSIUM 1 TABLET: 875; 125 TABLET, FILM COATED ORAL at 21:35

## 2019-02-23 RX ADMIN — MIDODRINE HYDROCHLORIDE 10 MG: 5 TABLET ORAL at 13:24

## 2019-02-23 RX ADMIN — LAMOTRIGINE 100 MG: 100 TABLET ORAL at 08:39

## 2019-02-23 RX ADMIN — METHYLPREDNISOLONE 8 MG: 4 TABLET ORAL at 18:06

## 2019-02-23 RX ADMIN — SODIUM CHLORIDE: 4.5 INJECTION, SOLUTION INTRAVENOUS at 03:33

## 2019-02-23 RX ADMIN — CLOPIDOGREL 75 MG: 75 TABLET, FILM COATED ORAL at 08:39

## 2019-02-23 RX ADMIN — INSULIN LISPRO 2 UNITS: 100 INJECTION, SOLUTION INTRAVENOUS; SUBCUTANEOUS at 13:17

## 2019-02-23 RX ADMIN — METHYLPREDNISOLONE 16 MG: 4 TABLET ORAL at 06:16

## 2019-02-23 RX ADMIN — DESMOPRESSIN ACETATE 40 MG: 0.2 TABLET ORAL at 21:34

## 2019-02-23 RX ADMIN — PANTOPRAZOLE SODIUM 40 MG: 40 TABLET, DELAYED RELEASE ORAL at 17:04

## 2019-02-23 RX ADMIN — MIDODRINE HYDROCHLORIDE 10 MG: 5 TABLET ORAL at 08:39

## 2019-02-23 RX ADMIN — GABAPENTIN 300 MG: 300 CAPSULE ORAL at 08:38

## 2019-02-23 ASSESSMENT — PAIN SCALES - GENERAL
PAINLEVEL_OUTOF10: 0

## 2019-02-24 VITALS
RESPIRATION RATE: 17 BRPM | DIASTOLIC BLOOD PRESSURE: 85 MMHG | OXYGEN SATURATION: 99 % | HEART RATE: 84 BPM | HEIGHT: 62 IN | BODY MASS INDEX: 27.02 KG/M2 | TEMPERATURE: 97.5 F | WEIGHT: 146.83 LBS | SYSTOLIC BLOOD PRESSURE: 132 MMHG

## 2019-02-24 LAB
GLUCOSE BLD-MCNC: 282 MG/DL (ref 70–99)
PERFORMED ON: ABNORMAL

## 2019-02-24 PROCEDURE — 6370000000 HC RX 637 (ALT 250 FOR IP): Performed by: INTERNAL MEDICINE

## 2019-02-24 PROCEDURE — 6360000002 HC RX W HCPCS: Performed by: INTERNAL MEDICINE

## 2019-02-24 PROCEDURE — 6370000000 HC RX 637 (ALT 250 FOR IP): Performed by: OTOLARYNGOLOGY

## 2019-02-24 RX ORDER — INSULIN GLARGINE 100 [IU]/ML
35 INJECTION, SOLUTION SUBCUTANEOUS NIGHTLY
Qty: 1 VIAL | Refills: 3 | Status: SHIPPED | OUTPATIENT
Start: 2019-02-24 | End: 2019-09-11 | Stop reason: DRUGHIGH

## 2019-02-24 RX ORDER — LACTOBACILLUS RHAMNOSUS GG 10B CELL
1 CAPSULE ORAL 2 TIMES DAILY WITH MEALS
Qty: 10 CAPSULE | Refills: 0 | Status: SHIPPED | OUTPATIENT
Start: 2019-02-24 | End: 2019-03-01

## 2019-02-24 RX ORDER — AMOXICILLIN AND CLAVULANATE POTASSIUM 875; 125 MG/1; MG/1
1 TABLET, FILM COATED ORAL EVERY 12 HOURS SCHEDULED
Qty: 10 TABLET | Refills: 0 | Status: SHIPPED | OUTPATIENT
Start: 2019-02-24 | End: 2019-02-24

## 2019-02-24 RX ORDER — MIRTAZAPINE 30 MG/1
35 TABLET, FILM COATED ORAL NIGHTLY
Qty: 30 TABLET | Refills: 3 | Status: SHIPPED | OUTPATIENT
Start: 2019-02-24 | End: 2020-05-11 | Stop reason: ALTCHOICE

## 2019-02-24 RX ORDER — GUAIFENESIN 600 MG/1
600 TABLET, EXTENDED RELEASE ORAL 2 TIMES DAILY
Qty: 6 TABLET | Refills: 0 | Status: SHIPPED | OUTPATIENT
Start: 2019-02-24 | End: 2019-02-27

## 2019-02-24 RX ORDER — MIDODRINE HYDROCHLORIDE 2.5 MG/1
2.5 TABLET ORAL 3 TIMES DAILY
Qty: 21 TABLET | Refills: 0 | Status: ON HOLD | OUTPATIENT
Start: 2019-02-24 | End: 2019-07-03 | Stop reason: HOSPADM

## 2019-02-24 RX ORDER — METHYLPREDNISOLONE 4 MG/1
4 TABLET ORAL NIGHTLY
Qty: 30 TABLET | Refills: 0 | Status: SHIPPED | OUTPATIENT
Start: 2019-02-24 | End: 2019-03-26

## 2019-02-24 RX ORDER — METHYLPREDNISOLONE 8 MG/1
8 TABLET ORAL EVERY MORNING
Qty: 30 TABLET | Refills: 0 | Status: SHIPPED | OUTPATIENT
Start: 2019-02-24 | End: 2019-03-26

## 2019-02-24 RX ORDER — AMOXICILLIN AND CLAVULANATE POTASSIUM 875; 125 MG/1; MG/1
1 TABLET, FILM COATED ORAL EVERY 12 HOURS SCHEDULED
Qty: 10 TABLET | Refills: 0 | Status: SHIPPED | OUTPATIENT
Start: 2019-02-24 | End: 2019-03-01

## 2019-02-24 RX ORDER — METHYLPREDNISOLONE 4 MG/1
4 TABLET ORAL NIGHTLY
Qty: 30 TABLET | Refills: 0 | Status: SHIPPED | OUTPATIENT
Start: 2019-02-24 | End: 2019-02-24

## 2019-02-24 RX ORDER — INSULIN GLARGINE 100 [IU]/ML
35 INJECTION, SOLUTION SUBCUTANEOUS NIGHTLY
Qty: 1 VIAL | Refills: 3 | Status: SHIPPED | OUTPATIENT
Start: 2019-02-24 | End: 2019-02-24

## 2019-02-24 RX ADMIN — CLOPIDOGREL 75 MG: 75 TABLET, FILM COATED ORAL at 10:07

## 2019-02-24 RX ADMIN — PANTOPRAZOLE SODIUM 40 MG: 40 TABLET, DELAYED RELEASE ORAL at 06:28

## 2019-02-24 RX ADMIN — ASPIRIN 81 MG 81 MG: 81 TABLET ORAL at 10:07

## 2019-02-24 RX ADMIN — METHYLPREDNISOLONE 8 MG: 4 TABLET ORAL at 10:25

## 2019-02-24 RX ADMIN — Medication 1 CAPSULE: at 10:07

## 2019-02-24 RX ADMIN — INSULIN LISPRO 6 UNITS: 100 INJECTION, SOLUTION INTRAVENOUS; SUBCUTANEOUS at 09:18

## 2019-02-24 RX ADMIN — AMOXICILLIN AND CLAVULANATE POTASSIUM 1 TABLET: 875; 125 TABLET, FILM COATED ORAL at 10:07

## 2019-02-24 RX ADMIN — MIDODRINE HYDROCHLORIDE 10 MG: 5 TABLET ORAL at 10:06

## 2019-02-24 RX ADMIN — ENOXAPARIN SODIUM 40 MG: 40 INJECTION SUBCUTANEOUS at 10:08

## 2019-02-24 RX ADMIN — GUAIFENESIN 600 MG: 600 TABLET, EXTENDED RELEASE ORAL at 10:07

## 2019-02-24 RX ADMIN — BUPRENORPHINE HYDROCHLORIDE, NALOXONE HYDROCHLORIDE 1 FILM: 8; 2 FILM, SOLUBLE BUCCAL; SUBLINGUAL at 10:06

## 2019-02-24 RX ADMIN — LAMOTRIGINE 100 MG: 100 TABLET ORAL at 10:07

## 2019-02-24 RX ADMIN — GABAPENTIN 300 MG: 300 CAPSULE ORAL at 10:07

## 2019-02-24 ASSESSMENT — PAIN SCALES - GENERAL
PAINLEVEL_OUTOF10: 0
PAINLEVEL_OUTOF10: 0

## 2019-03-17 PROBLEM — E86.0 DEHYDRATION: Status: RESOLVED | Noted: 2019-02-15 | Resolved: 2019-03-17

## 2019-06-28 ENCOUNTER — APPOINTMENT (OUTPATIENT)
Dept: CT IMAGING | Age: 54
DRG: 175 | End: 2019-06-28
Payer: COMMERCIAL

## 2019-06-28 ENCOUNTER — HOSPITAL ENCOUNTER (INPATIENT)
Age: 54
LOS: 4 days | Discharge: HOME | DRG: 175 | End: 2019-07-03
Attending: EMERGENCY MEDICINE | Admitting: INTERNAL MEDICINE
Payer: COMMERCIAL

## 2019-06-28 DIAGNOSIS — R10.30 LOWER ABDOMINAL PAIN: ICD-10-CM

## 2019-06-28 DIAGNOSIS — R73.9 HYPERGLYCEMIA: Primary | ICD-10-CM

## 2019-06-28 DIAGNOSIS — E27.40 ADRENAL INSUFFICIENCY (HCC): ICD-10-CM

## 2019-06-28 DIAGNOSIS — I95.9 HYPOTENSION, UNSPECIFIED HYPOTENSION TYPE: ICD-10-CM

## 2019-06-28 PROBLEM — R10.9 ABDOMINAL PAIN: Status: ACTIVE | Noted: 2019-06-28

## 2019-06-28 LAB
A/G RATIO: 0.7 (ref 1.1–2.2)
ALBUMIN SERPL-MCNC: 3.7 G/DL (ref 3.4–5)
ALP BLD-CCNC: 128 U/L (ref 40–129)
ALT SERPL-CCNC: 17 U/L (ref 10–40)
ANION GAP SERPL CALCULATED.3IONS-SCNC: 18 MMOL/L (ref 3–16)
AST SERPL-CCNC: 18 U/L (ref 15–37)
BACTERIA: ABNORMAL /HPF
BASE EXCESS VENOUS: -2 MMOL/L (ref -3–3)
BASOPHILS ABSOLUTE: 0 K/UL (ref 0–0.2)
BASOPHILS RELATIVE PERCENT: 0.4 %
BETA-HYDROXYBUTYRATE: 3.4 MMOL/L (ref 0–0.27)
BILIRUB SERPL-MCNC: 0.4 MG/DL (ref 0–1)
BILIRUBIN URINE: ABNORMAL
BLOOD, URINE: NEGATIVE
BUN BLDV-MCNC: 37 MG/DL (ref 7–20)
CALCIUM SERPL-MCNC: 9.4 MG/DL (ref 8.3–10.6)
CARBOXYHEMOGLOBIN: 4.5 % (ref 0–1.5)
CHLORIDE BLD-SCNC: 90 MMOL/L (ref 99–110)
CLARITY: CLEAR
CO2: 22 MMOL/L (ref 21–32)
COLOR: YELLOW
CREAT SERPL-MCNC: 0.7 MG/DL (ref 0.6–1.1)
EOSINOPHILS ABSOLUTE: 0 K/UL (ref 0–0.6)
EOSINOPHILS RELATIVE PERCENT: 0 %
EPITHELIAL CELLS, UA: 12 /HPF (ref 0–5)
GFR AFRICAN AMERICAN: >60
GFR NON-AFRICAN AMERICAN: >60
GLOBULIN: 5 G/DL
GLUCOSE BLD-MCNC: 341 MG/DL (ref 70–99)
GLUCOSE BLD-MCNC: 383 MG/DL (ref 70–99)
GLUCOSE BLD-MCNC: 421 MG/DL (ref 70–99)
GLUCOSE BLD-MCNC: 518 MG/DL (ref 70–99)
GLUCOSE BLD-MCNC: 560 MG/DL (ref 70–99)
GLUCOSE BLD-MCNC: >600 MG/DL (ref 70–99)
GLUCOSE URINE: >=1000 MG/DL
HCO3 VENOUS: 21.7 MMOL/L (ref 23–29)
HCT VFR BLD CALC: 40.2 % (ref 36–48)
HEMOGLOBIN: 13.4 G/DL (ref 12–16)
HYALINE CASTS: 4 /LPF (ref 0–8)
KETONES, URINE: 15 MG/DL
LACTIC ACID: 1.3 MMOL/L (ref 0.4–2)
LEUKOCYTE ESTERASE, URINE: NEGATIVE
LIPASE: 34 U/L (ref 13–60)
LYMPHOCYTES ABSOLUTE: 1.1 K/UL (ref 1–5.1)
LYMPHOCYTES RELATIVE PERCENT: 17.1 %
MCH RBC QN AUTO: 29.5 PG (ref 26–34)
MCHC RBC AUTO-ENTMCNC: 33.4 G/DL (ref 31–36)
MCV RBC AUTO: 88.3 FL (ref 80–100)
METHEMOGLOBIN VENOUS: 0.2 %
MICROSCOPIC EXAMINATION: YES
MONOCYTES ABSOLUTE: 0.1 K/UL (ref 0–1.3)
MONOCYTES RELATIVE PERCENT: 2.2 %
NEUTROPHILS ABSOLUTE: 5.3 K/UL (ref 1.7–7.7)
NEUTROPHILS RELATIVE PERCENT: 80.3 %
NITRITE, URINE: NEGATIVE
O2 CONTENT, VEN: 20 VOL %
O2 SAT, VEN: 100 %
O2 THERAPY: ABNORMAL
PCO2, VEN: 33.6 MMHG (ref 40–50)
PDW BLD-RTO: 13.9 % (ref 12.4–15.4)
PERFORMED ON: ABNORMAL
PH UA: 5.5 (ref 5–8)
PH VENOUS: 7.42 (ref 7.35–7.45)
PLATELET # BLD: 197 K/UL (ref 135–450)
PMV BLD AUTO: 11.1 FL (ref 5–10.5)
PO2, VEN: 147 MMHG (ref 25–40)
POTASSIUM REFLEX MAGNESIUM: 4.6 MMOL/L (ref 3.5–5.1)
PROTEIN UA: 30 MG/DL
RBC # BLD: 4.55 M/UL (ref 4–5.2)
RBC UA: 1 /HPF (ref 0–4)
REASON FOR REJECTION: NORMAL
REJECTED TEST: NORMAL
SODIUM BLD-SCNC: 130 MMOL/L (ref 136–145)
SPECIFIC GRAVITY UA: >1.03 (ref 1–1.03)
TCO2 CALC VENOUS: 51 MMOL/L
TOTAL PROTEIN: 8.7 G/DL (ref 6.4–8.2)
URINE REFLEX TO CULTURE: ABNORMAL
URINE TYPE: ABNORMAL
UROBILINOGEN, URINE: 0.2 E.U./DL
WBC # BLD: 6.6 K/UL (ref 4–11)
WBC UA: 4 /HPF (ref 0–5)
YEAST: PRESENT /HPF

## 2019-06-28 PROCEDURE — 36415 COLL VENOUS BLD VENIPUNCTURE: CPT

## 2019-06-28 PROCEDURE — 96374 THER/PROPH/DIAG INJ IV PUSH: CPT

## 2019-06-28 PROCEDURE — 99285 EMERGENCY DEPT VISIT HI MDM: CPT

## 2019-06-28 PROCEDURE — 74177 CT ABD & PELVIS W/CONTRAST: CPT

## 2019-06-28 PROCEDURE — 83605 ASSAY OF LACTIC ACID: CPT

## 2019-06-28 PROCEDURE — 85025 COMPLETE CBC W/AUTO DIFF WBC: CPT

## 2019-06-28 PROCEDURE — 81001 URINALYSIS AUTO W/SCOPE: CPT

## 2019-06-28 PROCEDURE — 6370000000 HC RX 637 (ALT 250 FOR IP): Performed by: NURSE PRACTITIONER

## 2019-06-28 PROCEDURE — 6360000004 HC RX CONTRAST MEDICATION: Performed by: NURSE PRACTITIONER

## 2019-06-28 PROCEDURE — 6360000002 HC RX W HCPCS: Performed by: NURSE PRACTITIONER

## 2019-06-28 PROCEDURE — 96376 TX/PRO/DX INJ SAME DRUG ADON: CPT

## 2019-06-28 PROCEDURE — G0378 HOSPITAL OBSERVATION PER HR: HCPCS

## 2019-06-28 PROCEDURE — 82010 KETONE BODYS QUAN: CPT

## 2019-06-28 PROCEDURE — 83690 ASSAY OF LIPASE: CPT

## 2019-06-28 PROCEDURE — 96375 TX/PRO/DX INJ NEW DRUG ADDON: CPT

## 2019-06-28 PROCEDURE — 6360000002 HC RX W HCPCS: Performed by: EMERGENCY MEDICINE

## 2019-06-28 PROCEDURE — 96361 HYDRATE IV INFUSION ADD-ON: CPT

## 2019-06-28 PROCEDURE — 80053 COMPREHEN METABOLIC PANEL: CPT

## 2019-06-28 PROCEDURE — 2580000003 HC RX 258: Performed by: NURSE PRACTITIONER

## 2019-06-28 PROCEDURE — 82803 BLOOD GASES ANY COMBINATION: CPT

## 2019-06-28 RX ORDER — 0.9 % SODIUM CHLORIDE 0.9 %
1000 INTRAVENOUS SOLUTION INTRAVENOUS ONCE
Status: COMPLETED | OUTPATIENT
Start: 2019-06-28 | End: 2019-06-28

## 2019-06-28 RX ORDER — ASPIRIN 81 MG/1
81 TABLET ORAL DAILY
Status: DISCONTINUED | OUTPATIENT
Start: 2019-06-29 | End: 2019-07-03 | Stop reason: HOSPADM

## 2019-06-28 RX ORDER — ONDANSETRON 2 MG/ML
4 INJECTION INTRAMUSCULAR; INTRAVENOUS EVERY 6 HOURS PRN
Status: DISCONTINUED | OUTPATIENT
Start: 2019-06-28 | End: 2019-07-03 | Stop reason: HOSPADM

## 2019-06-28 RX ORDER — NICOTINE POLACRILEX 4 MG
15 LOZENGE BUCCAL PRN
Status: DISCONTINUED | OUTPATIENT
Start: 2019-06-28 | End: 2019-07-03 | Stop reason: HOSPADM

## 2019-06-28 RX ORDER — OXYCODONE HYDROCHLORIDE AND ACETAMINOPHEN 5; 325 MG/1; MG/1
1 TABLET ORAL ONCE
Status: COMPLETED | OUTPATIENT
Start: 2019-06-28 | End: 2019-06-28

## 2019-06-28 RX ORDER — PANTOPRAZOLE SODIUM 40 MG/1
40 TABLET, DELAYED RELEASE ORAL
Status: DISCONTINUED | OUTPATIENT
Start: 2019-06-29 | End: 2019-07-03 | Stop reason: HOSPADM

## 2019-06-28 RX ORDER — MIRTAZAPINE 15 MG/1
30 TABLET, FILM COATED ORAL NIGHTLY
Status: DISCONTINUED | OUTPATIENT
Start: 2019-06-29 | End: 2019-07-03 | Stop reason: HOSPADM

## 2019-06-28 RX ORDER — MORPHINE SULFATE 4 MG/ML
4 INJECTION, SOLUTION INTRAMUSCULAR; INTRAVENOUS EVERY 30 MIN PRN
Status: DISCONTINUED | OUTPATIENT
Start: 2019-06-28 | End: 2019-06-28

## 2019-06-28 RX ORDER — DEXTROSE MONOHYDRATE 50 MG/ML
100 INJECTION, SOLUTION INTRAVENOUS PRN
Status: DISCONTINUED | OUTPATIENT
Start: 2019-06-28 | End: 2019-07-03 | Stop reason: HOSPADM

## 2019-06-28 RX ORDER — DEXTROSE MONOHYDRATE 25 G/50ML
12.5 INJECTION, SOLUTION INTRAVENOUS PRN
Status: DISCONTINUED | OUTPATIENT
Start: 2019-06-28 | End: 2019-07-03 | Stop reason: HOSPADM

## 2019-06-28 RX ORDER — ATORVASTATIN CALCIUM 40 MG/1
40 TABLET, FILM COATED ORAL NIGHTLY
Status: DISCONTINUED | OUTPATIENT
Start: 2019-06-29 | End: 2019-06-29

## 2019-06-28 RX ORDER — MIDODRINE HYDROCHLORIDE 5 MG/1
2.5 TABLET ORAL 3 TIMES DAILY
Status: DISCONTINUED | OUTPATIENT
Start: 2019-06-29 | End: 2019-06-28 | Stop reason: ALTCHOICE

## 2019-06-28 RX ORDER — SODIUM CHLORIDE 0.9 % (FLUSH) 0.9 %
10 SYRINGE (ML) INJECTION PRN
Status: DISCONTINUED | OUTPATIENT
Start: 2019-06-28 | End: 2019-07-03 | Stop reason: HOSPADM

## 2019-06-28 RX ORDER — CLOPIDOGREL BISULFATE 75 MG/1
75 TABLET ORAL DAILY
Status: DISCONTINUED | OUTPATIENT
Start: 2019-06-29 | End: 2019-07-03 | Stop reason: HOSPADM

## 2019-06-28 RX ORDER — SODIUM CHLORIDE 0.9 % (FLUSH) 0.9 %
10 SYRINGE (ML) INJECTION EVERY 12 HOURS SCHEDULED
Status: DISCONTINUED | OUTPATIENT
Start: 2019-06-29 | End: 2019-07-03 | Stop reason: HOSPADM

## 2019-06-28 RX ORDER — LAMOTRIGINE 100 MG/1
100 TABLET ORAL DAILY
Status: DISCONTINUED | OUTPATIENT
Start: 2019-06-29 | End: 2019-07-03 | Stop reason: HOSPADM

## 2019-06-28 RX ORDER — ONDANSETRON 2 MG/ML
4 INJECTION INTRAMUSCULAR; INTRAVENOUS EVERY 30 MIN PRN
Status: COMPLETED | OUTPATIENT
Start: 2019-06-28 | End: 2019-06-28

## 2019-06-28 RX ADMIN — MORPHINE SULFATE 4 MG: 4 INJECTION INTRAVENOUS at 13:43

## 2019-06-28 RX ADMIN — SODIUM CHLORIDE 1000 ML: 9 INJECTION, SOLUTION INTRAVENOUS at 19:28

## 2019-06-28 RX ADMIN — INSULIN HUMAN 10 UNITS: 100 INJECTION, SOLUTION PARENTERAL at 19:28

## 2019-06-28 RX ADMIN — MORPHINE SULFATE 4 MG: 4 INJECTION INTRAVENOUS at 17:39

## 2019-06-28 RX ADMIN — HYDROCORTISONE SODIUM SUCCINATE 100 MG: 100 INJECTION, POWDER, FOR SOLUTION INTRAMUSCULAR; INTRAVENOUS at 22:00

## 2019-06-28 RX ADMIN — ONDANSETRON 4 MG: 2 INJECTION INTRAMUSCULAR; INTRAVENOUS at 13:43

## 2019-06-28 RX ADMIN — OXYCODONE HYDROCHLORIDE AND ACETAMINOPHEN 1 TABLET: 5; 325 TABLET ORAL at 19:31

## 2019-06-28 RX ADMIN — ONDANSETRON 4 MG: 2 INJECTION INTRAMUSCULAR; INTRAVENOUS at 17:39

## 2019-06-28 RX ADMIN — IOPAMIDOL 75 ML: 755 INJECTION, SOLUTION INTRAVENOUS at 17:26

## 2019-06-28 RX ADMIN — SODIUM CHLORIDE 1000 ML: 9 INJECTION, SOLUTION INTRAVENOUS at 18:41

## 2019-06-28 RX ADMIN — SODIUM CHLORIDE 1000 ML: 9 INJECTION, SOLUTION INTRAVENOUS at 20:52

## 2019-06-28 ASSESSMENT — PAIN SCALES - GENERAL
PAINLEVEL_OUTOF10: 2
PAINLEVEL_OUTOF10: 9
PAINLEVEL_OUTOF10: 9
PAINLEVEL_OUTOF10: 6
PAINLEVEL_OUTOF10: 9

## 2019-06-28 ASSESSMENT — ENCOUNTER SYMPTOMS
VOMITING: 0
RHINORRHEA: 0
SHORTNESS OF BREATH: 0
NAUSEA: 1
ABDOMINAL PAIN: 1
SORE THROAT: 0
CONSTIPATION: 0
DIARRHEA: 0
BLOOD IN STOOL: 0

## 2019-06-28 NOTE — ED PROVIDER NOTES
person, place, and time. Coordination normal.   Skin: Skin is warm and dry. She is not diaphoretic. No pallor. Psychiatric: She has a normal mood and affect. Her behavior is normal.   Nursing note and vitals reviewed.       DIAGNOSTIC RESULTS   LABS:    Labs Reviewed   URINE RT REFLEX TO CULTURE - Abnormal; Notable for the following components:       Result Value    Glucose, Ur >=1000 (*)     Bilirubin Urine SMALL (*)     Ketones, Urine 15 (*)     Protein, UA 30 (*)     All other components within normal limits    Narrative:     Performed at:  [facility] [address],  [facility]   Phone [phone]   CBC WITH AUTO DIFFERENTIAL - Abnormal; Notable for the following components:    MPV 11.1 (*)     All other components within normal limits    Narrative:     Performed at:  [facility] [address],  [facility]   Phone [phone]   MICROSCOPIC URINALYSIS - Abnormal; Notable for the following components:    Bacteria, UA RARE (*)     Yeast, UA Present (*)     Epi Cells 12 (*)     All other components within normal limits    Narrative:     Performed at:  [facility] [address],  [facility]   Phone [phone]   COMPREHENSIVE METABOLIC PANEL W/ REFLEX TO MG FOR LOW K - Abnormal; Notable for the following components:    Sodium 130 (*)     Chloride 90 (*)     Anion Gap 18 (*)     Glucose 560 (*)     BUN 37 (*)     Total Protein 8.7 (*)     Albumin/Globulin Ratio 0.7 (*)     All other components within normal limits    Narrative:     Performed at:  [facility] [address],  [facility]   Phone [phone]   BETA-HYDROXYBUTYRATE - Abnormal; Notable for the following components:    Beta-Hydroxybutyrate 3.40 (*)     All other components within normal limits    Narrative:     Performed at:  [facility]  [address]   Phone [phone]   BLOOD GAS, VENOUS - Abnormal; Notable for the following components:    pCO2, Rowdy 33.6 (*)     pO2, Rowdy 147.0 (*)     HCO3, Venous 21.7 (*)     Carboxyhemoglobin 4.5 (*)     All other components within normal limits    Narrative:     Performed at:  [facility]  [address], [place]   Phone [phone]   POCT GLUCOSE - Abnormal; Notable for the following components:    POC Glucose 518 (*)     All other components within normal limits    Narrative:     Performed at:  [facility]  [address], [place]   Phone [phone]   POCT GLUCOSE - Abnormal; Notable for the following components:    POC Glucose >600 (*)     All other components within normal limits    Narrative:     Performed at:  [facility]  [address], [place]   Phone [phone]   LACTIC ACID, PLASMA    Narrative:     Performed at:  [facility]  [address] [employer] [place] [employer], [place]   Phone [phone]    Narrative:     Felton Cassidy. [id number],  Rejected Test Name humblechana waters/Called to: makayla pérez, 06/28/2019 14:02,  by Cara Perez  Performed at:  [facility]  [address], [place]   Phone [phone]   LIPASE    Narrative:     Performed at:  [facility]  [address] [place] [employer], [place]   Phone [phone]   POCT GLUCOSE   POCT GLUCOSE       All other labs werewithin normal range or not returned as of this dictation. EKG: All EKG's are interpreted by the Emergency Department Physician who either signs or Co-signs this chart in the absence of acardiologist.  Please see their note for interpretation of EKG.       RADIOLOGY:   Interpretation per the Radiologist below, if available

## 2019-06-29 PROBLEM — E43 SEVERE MALNUTRITION (HCC): Status: ACTIVE | Noted: 2019-06-29

## 2019-06-29 PROBLEM — E27.40 ADRENAL INSUFFICIENCY (HCC): Status: ACTIVE | Noted: 2019-06-29

## 2019-06-29 LAB
ANION GAP SERPL CALCULATED.3IONS-SCNC: 9 MMOL/L (ref 3–16)
BUN BLDV-MCNC: 32 MG/DL (ref 7–20)
CALCIUM SERPL-MCNC: 8.2 MG/DL (ref 8.3–10.6)
CHLORIDE BLD-SCNC: 103 MMOL/L (ref 99–110)
CO2: 24 MMOL/L (ref 21–32)
CREAT SERPL-MCNC: 0.6 MG/DL (ref 0.6–1.1)
ESTIMATED AVERAGE GLUCOSE: 392.4 MG/DL
GFR AFRICAN AMERICAN: >60
GFR NON-AFRICAN AMERICAN: >60
GLUCOSE BLD-MCNC: 122 MG/DL (ref 70–99)
GLUCOSE BLD-MCNC: 224 MG/DL (ref 70–99)
GLUCOSE BLD-MCNC: 249 MG/DL (ref 70–99)
GLUCOSE BLD-MCNC: 329 MG/DL (ref 70–99)
GLUCOSE BLD-MCNC: 465 MG/DL (ref 70–99)
GLUCOSE BLD-MCNC: 51 MG/DL (ref 70–99)
GLUCOSE BLD-MCNC: 85 MG/DL (ref 70–99)
GLUCOSE BLD-MCNC: 94 MG/DL (ref 70–99)
GLUCOSE BLD-MCNC: >600 MG/DL (ref 70–99)
HBA1C MFR BLD: 15.3 %
PERFORMED ON: ABNORMAL
PERFORMED ON: NORMAL
PERFORMED ON: NORMAL
POTASSIUM SERPL-SCNC: 4.2 MMOL/L (ref 3.5–5.1)
SODIUM BLD-SCNC: 136 MMOL/L (ref 136–145)

## 2019-06-29 PROCEDURE — 6370000000 HC RX 637 (ALT 250 FOR IP): Performed by: INTERNAL MEDICINE

## 2019-06-29 PROCEDURE — 6360000002 HC RX W HCPCS: Performed by: INTERNAL MEDICINE

## 2019-06-29 PROCEDURE — 6360000002 HC RX W HCPCS: Performed by: PHYSICIAN ASSISTANT

## 2019-06-29 PROCEDURE — 83036 HEMOGLOBIN GLYCOSYLATED A1C: CPT

## 2019-06-29 PROCEDURE — 80048 BASIC METABOLIC PNL TOTAL CA: CPT

## 2019-06-29 PROCEDURE — 6370000000 HC RX 637 (ALT 250 FOR IP): Performed by: PHYSICIAN ASSISTANT

## 2019-06-29 PROCEDURE — 2700000000 HC OXYGEN THERAPY PER DAY

## 2019-06-29 PROCEDURE — 1200000000 HC SEMI PRIVATE

## 2019-06-29 PROCEDURE — 2580000003 HC RX 258: Performed by: PHYSICIAN ASSISTANT

## 2019-06-29 PROCEDURE — 2580000003 HC RX 258: Performed by: INTERNAL MEDICINE

## 2019-06-29 PROCEDURE — 36415 COLL VENOUS BLD VENIPUNCTURE: CPT

## 2019-06-29 PROCEDURE — 96376 TX/PRO/DX INJ SAME DRUG ADON: CPT

## 2019-06-29 PROCEDURE — 93005 ELECTROCARDIOGRAM TRACING: CPT | Performed by: INTERNAL MEDICINE

## 2019-06-29 RX ORDER — ROSUVASTATIN CALCIUM 20 MG/1
20 TABLET, COATED ORAL NIGHTLY
Status: DISCONTINUED | OUTPATIENT
Start: 2019-06-29 | End: 2019-07-03 | Stop reason: HOSPADM

## 2019-06-29 RX ORDER — BUPRENORPHINE AND NALOXONE 8; 2 MG/1; MG/1
1 FILM, SOLUBLE BUCCAL; SUBLINGUAL 2 TIMES DAILY
Status: DISCONTINUED | OUTPATIENT
Start: 2019-06-29 | End: 2019-07-03 | Stop reason: HOSPADM

## 2019-06-29 RX ORDER — METHYLPREDNISOLONE 4 MG/1
2 TABLET ORAL
Status: DISCONTINUED | OUTPATIENT
Start: 2019-06-29 | End: 2019-07-03 | Stop reason: HOSPADM

## 2019-06-29 RX ORDER — NITROGLYCERIN 80 MG/1
1 PATCH TRANSDERMAL DAILY
Status: DISCONTINUED | OUTPATIENT
Start: 2019-06-30 | End: 2019-07-03 | Stop reason: HOSPADM

## 2019-06-29 RX ORDER — NITROGLYCERIN 0.4 MG/1
TABLET SUBLINGUAL
Status: DISPENSED
Start: 2019-06-29 | End: 2019-06-30

## 2019-06-29 RX ORDER — POTASSIUM CHLORIDE 20 MEQ/1
20 TABLET, EXTENDED RELEASE ORAL
Status: DISCONTINUED | OUTPATIENT
Start: 2019-06-29 | End: 2019-06-30

## 2019-06-29 RX ORDER — METHYLPREDNISOLONE 4 MG/1
4 TABLET ORAL DAILY
Status: DISCONTINUED | OUTPATIENT
Start: 2019-06-30 | End: 2019-07-03 | Stop reason: HOSPADM

## 2019-06-29 RX ORDER — NITROGLYCERIN 0.4 MG/1
0.4 TABLET SUBLINGUAL EVERY 5 MIN PRN
Status: DISCONTINUED | OUTPATIENT
Start: 2019-06-29 | End: 2019-07-03 | Stop reason: HOSPADM

## 2019-06-29 RX ORDER — SODIUM CHLORIDE 9 MG/ML
INJECTION, SOLUTION INTRAVENOUS CONTINUOUS
Status: DISCONTINUED | OUTPATIENT
Start: 2019-06-29 | End: 2019-07-03 | Stop reason: HOSPADM

## 2019-06-29 RX ORDER — ACETAMINOPHEN 500 MG
1000 TABLET ORAL EVERY 6 HOURS PRN
Status: DISCONTINUED | OUTPATIENT
Start: 2019-06-29 | End: 2019-07-03 | Stop reason: HOSPADM

## 2019-06-29 RX ADMIN — ONDANSETRON 4 MG: 2 INJECTION INTRAMUSCULAR; INTRAVENOUS at 12:50

## 2019-06-29 RX ADMIN — MIRTAZAPINE 30 MG: 15 TABLET, FILM COATED ORAL at 00:59

## 2019-06-29 RX ADMIN — POTASSIUM CHLORIDE 20 MEQ: 1500 TABLET, EXTENDED RELEASE ORAL at 18:20

## 2019-06-29 RX ADMIN — MIRTAZAPINE 30 MG: 15 TABLET, FILM COATED ORAL at 22:18

## 2019-06-29 RX ADMIN — POTASSIUM CHLORIDE 20 MEQ: 1500 TABLET, EXTENDED RELEASE ORAL at 12:48

## 2019-06-29 RX ADMIN — ACETAMINOPHEN 1000 MG: 500 TABLET, FILM COATED ORAL at 12:50

## 2019-06-29 RX ADMIN — METHYLPREDNISOLONE 2 MG: 4 TABLET ORAL at 22:19

## 2019-06-29 RX ADMIN — POTASSIUM CHLORIDE 20 MEQ: 1500 TABLET, EXTENDED RELEASE ORAL at 08:40

## 2019-06-29 RX ADMIN — INSULIN LISPRO 6 UNITS: 100 INJECTION, SOLUTION INTRAVENOUS; SUBCUTANEOUS at 15:50

## 2019-06-29 RX ADMIN — ACETAMINOPHEN 1000 MG: 500 TABLET, FILM COATED ORAL at 02:36

## 2019-06-29 RX ADMIN — ASPIRIN 81 MG: 81 TABLET ORAL at 08:40

## 2019-06-29 RX ADMIN — LAMOTRIGINE 100 MG: 100 TABLET ORAL at 08:41

## 2019-06-29 RX ADMIN — INSULIN LISPRO 4 UNITS: 100 INJECTION, SOLUTION INTRAVENOUS; SUBCUTANEOUS at 08:48

## 2019-06-29 RX ADMIN — HYDROCORTISONE SODIUM SUCCINATE 10 MG: 100 INJECTION, POWDER, FOR SOLUTION INTRAMUSCULAR; INTRAVENOUS at 12:47

## 2019-06-29 RX ADMIN — INSULIN LISPRO 10 UNITS: 100 INJECTION, SOLUTION INTRAVENOUS; SUBCUTANEOUS at 08:48

## 2019-06-29 RX ADMIN — CLOPIDOGREL 75 MG: 75 TABLET, FILM COATED ORAL at 08:41

## 2019-06-29 RX ADMIN — INSULIN GLARGINE 35 UNITS: 100 INJECTION, SOLUTION SUBCUTANEOUS at 01:00

## 2019-06-29 RX ADMIN — HYDROCORTISONE SODIUM SUCCINATE 15 MG: 100 INJECTION, POWDER, FOR SOLUTION INTRAMUSCULAR; INTRAVENOUS at 08:39

## 2019-06-29 RX ADMIN — NITROGLYCERIN 0.4 MG: 0.4 TABLET, ORALLY DISINTEGRATING SUBLINGUAL at 20:06

## 2019-06-29 RX ADMIN — HYDROCORTISONE SODIUM SUCCINATE 5 MG: 100 INJECTION, POWDER, FOR SOLUTION INTRAMUSCULAR; INTRAVENOUS at 18:21

## 2019-06-29 RX ADMIN — ROSUVASTATIN CALCIUM 20 MG: 20 TABLET, FILM COATED ORAL at 22:18

## 2019-06-29 RX ADMIN — BUPRENORPHINE HYDROCHLORIDE, NALOXONE HYDROCHLORIDE 1 FILM: 8; 2 FILM, SOLUBLE BUCCAL; SUBLINGUAL at 22:19

## 2019-06-29 RX ADMIN — INSULIN LISPRO 4 UNITS: 100 INJECTION, SOLUTION INTRAVENOUS; SUBCUTANEOUS at 01:00

## 2019-06-29 RX ADMIN — SODIUM CHLORIDE: 9 INJECTION, SOLUTION INTRAVENOUS at 17:09

## 2019-06-29 RX ADMIN — DESMOPRESSIN ACETATE 40 MG: 0.2 TABLET ORAL at 00:59

## 2019-06-29 RX ADMIN — SODIUM CHLORIDE: 9 INJECTION, SOLUTION INTRAVENOUS at 02:36

## 2019-06-29 RX ADMIN — Medication 10 ML: at 22:18

## 2019-06-29 RX ADMIN — ENOXAPARIN SODIUM 40 MG: 40 INJECTION SUBCUTANEOUS at 08:41

## 2019-06-29 RX ADMIN — BUPRENORPHINE HYDROCHLORIDE, NALOXONE HYDROCHLORIDE 1 FILM: 8; 2 FILM, SOLUBLE BUCCAL; SUBLINGUAL at 08:41

## 2019-06-29 RX ADMIN — INSULIN LISPRO 6 UNITS: 100 INJECTION, SOLUTION INTRAVENOUS; SUBCUTANEOUS at 18:29

## 2019-06-29 RX ADMIN — Medication 10 ML: at 00:59

## 2019-06-29 RX ADMIN — ONDANSETRON 4 MG: 2 INJECTION INTRAMUSCULAR; INTRAVENOUS at 00:58

## 2019-06-29 ASSESSMENT — PAIN SCALES - GENERAL
PAINLEVEL_OUTOF10: 2
PAINLEVEL_OUTOF10: 7
PAINLEVEL_OUTOF10: 5
PAINLEVEL_OUTOF10: 6
PAINLEVEL_OUTOF10: 7
PAINLEVEL_OUTOF10: 0

## 2019-06-29 ASSESSMENT — PAIN DESCRIPTION - DESCRIPTORS
DESCRIPTORS_2: THROBBING
DESCRIPTORS: ACHING
DESCRIPTORS: POUNDING
DESCRIPTORS_2: THROBBING
DESCRIPTORS: POUNDING

## 2019-06-29 ASSESSMENT — PAIN DESCRIPTION - LOCATION
LOCATION: ABDOMEN
LOCATION: ABDOMEN
LOCATION_2: BACK
LOCATION: ABDOMEN
LOCATION_2: BACK

## 2019-06-29 ASSESSMENT — PAIN DESCRIPTION - PAIN TYPE
TYPE: ACUTE PAIN
TYPE_2: ACUTE PAIN

## 2019-06-29 ASSESSMENT — PAIN DESCRIPTION - ORIENTATION
ORIENTATION_2: LEFT
ORIENTATION_2: LEFT
ORIENTATION: LEFT

## 2019-06-29 ASSESSMENT — PAIN DESCRIPTION - FREQUENCY: FREQUENCY: INTERMITTENT

## 2019-06-29 ASSESSMENT — PAIN DESCRIPTION - INTENSITY
RATING_2: 7
RATING_2: 7

## 2019-06-29 NOTE — PROGRESS NOTES
[facility] PROGRESS NOTE    6/29/2019 12:21 PM        Name: Shalini Baez . Admitted: 6/28/2019  Primary Care Provider: Paz Watts MD (Tel: [phone])      Subjective:  .   Patient is lying in bed  Patient report improvement of her abdominal cramps  Patient has not had her breakfast or her meal yet      Reviewed interval ancillary notes    Current Medications    buprenorphine-naloxone (SUBOXONE) 8-2 MG SL film 1 Film BID   0.9 % sodium chloride infusion Continuous   acetaminophen (TYLENOL) tablet 1,000 mg Q6H PRN   hydrocortisone sodium succinate PF (SOLU-CORTEF) injection 15 mg QAM AC   hydrocortisone sodium succinate PF (SOLU-CORTEF) injection 10 mg Lunch   hydrocortisone sodium succinate PF (SOLU-CORTEF) injection 5 mg QPM   potassium chloride (KLOR-CON M) extended release tablet 20 mEq TID WC   aspirin EC tablet 81 mg Daily   atorvastatin (LIPITOR) tablet 40 mg Nightly   clopidogrel (PLAVIX) tablet 75 mg Daily   insulin glargine (LANTUS) injection pen 35 Units Nightly   insulin lispro (HUMALOG) injection pen 10 Units TID AC   lamoTRIgine (LAMICTAL) tablet 100 mg Daily   mirtazapine (REMERON) tablet 30 mg Nightly   pantoprazole (PROTONIX) tablet 40 mg QAM AC   sodium chloride flush 0.9 % injection 10 mL 2 times per day   sodium chloride flush 0.9 % injection 10 mL PRN   ondansetron (ZOFRAN) injection 4 mg Q6H PRN   enoxaparin (LOVENOX) injection 40 mg Daily   insulin lispro (HUMALOG) injection pen 0-12 Units TID WC   insulin lispro (HUMALOG) injection pen 0-6 Units Nightly   glucose (GLUTOSE) 40 % oral gel 15 g PRN   dextrose 50 % IV solution PRN   glucagon (rDNA) injection 1 mg PRN   dextrose 5 % solution PRN       Objective:  /75   Pulse 82   Temp 98.4 °F (36.9 °C) (Oral)   Resp 14   Ht 5' 10\" (1.778 m)   Wt 121 lb 8 oz (55.1 kg)   LMP 11/15/2017   SpO2 95%   BMI 17.43 kg/m²     Intake/Output

## 2019-06-29 NOTE — ED PROVIDER NOTES
greater than 150 1 vial 3    insulin lispro (HUMALOG) 100 UNIT/ML pen Inject 0-12 Units into the skin 3 times daily (with meals) 5 pen 3    clopidogrel (PLAVIX) 75 MG tablet Take 1 tablet by mouth daily 30 tablet 3    atorvastatin (LIPITOR) 40 MG tablet Take 40 mg by mouth daily      aspirin 81 MG tablet Take 81 mg by mouth daily      promethazine (PHENERGAN) 25 MG tablet Take 25 mg by mouth      lamoTRIgine (LAMICTAL) 100 MG tablet Take 100 mg by mouth daily.  gabapentin (NEURONTIN) 300 MG capsule Take 300 mg by mouth 3 times daily.  midodrine (PROAMATINE) 2.5 MG tablet Take 1 tablet by mouth 3 times daily for 7 days 21 tablet 0    glucose monitoring kit (FREESTYLE) monitoring kit 1 kit by Does not apply route daily 1 kit 2    B-D INS SYR HALF-UNIT .3CC/31G 31G X 5/16\" 0.3 ML MISC 145 Units      calcium carbonate (TUMS) 500 MG chewable tablet Take 1 tablet by mouth daily.  acetaminophen (TYLENOL) 325 MG tablet Take  by mouth every 4 hours as needed. PHYSICAL EXAM  Vitals: /88  Pulse 109   Temp 98.6 (Oral)   Resp 12   Ht 5' 10\" (1.778 m)   Wt 134 lb (60.8 kg)   LMP 11/15/2017   SpO2 97%   BMI 19.23 kg/m²   Constitutional:  62 y.o. female alert, cooperative  HENT:  Atraumatic scalp, mucous membranes   Eyes:   Conjunctiva clear, no icterus  Neck:  Supple, no visible JVD, no signs of injury  Cardiovascular:  Regular, no rubs, no discernible murmur  Thorax & Lungs:  No accessory muscle usage, clear  Abdomen:  Soft, non distended, NT  Back:  No deformity  Genitalia:  Deferred  Rectal:  Deferred  Extremities:  No cyanosis, no edema, amputee on right  Skin:  Warm, dry  Neurologic:  Alert, no slurred speech  Psychiatric:  Affect appropriate    Medical Decision Making and Plan:  Briefly, this is an 62 y. o.female who presented with nausea and vomiting. Uncontrolled DM noted. Also had hypotension.   Review of chart shows that she has adrenal insufficiency, endocrine mirtazapine (REMERON) tablet 30 mg (30 mg Oral Given 6/29/19 0059)   pantoprazole (PROTONIX) tablet 40 mg (has no administration in time range)   sodium chloride flush 0.9 % injection 10 mL (has no administration in time range)   sodium chloride flush 0.9 % injection 10 mL (10 mLs Intravenous Given 6/29/19 0059)   ondansetron (ZOFRAN) injection 4 mg (4 mg Intravenous Given 6/29/19 0058)   enoxaparin (LOVENOX) injection 40 mg (has no administration in time range)   insulin lispro (HUMALOG) injection pen 0-12 Units (has no administration in time range)   insulin lispro (HUMALOG) injection pen 0-6 Units (4 Units Subcutaneous Given 6/29/19 0100)   glucose (GLUTOSE) 40 % oral gel 15 g (has no administration in time range)   dextrose 50 % IV solution (has no administration in time range)   glucagon (rDNA) injection 1 mg (has no administration in time range)   dextrose 5 % solution (has no administration in time range)   buprenorphine-naloxone (SUBOXONE) 8-2 MG SL film 1 Film (has no administration in time range)   0.9 % sodium chloride infusion (has no administration in time range)   ondansetron (ZOFRAN) injection 4 mg (4 mg Intravenous Given 6/28/19 1739)   iopamidol (ISOVUE-370) 76 % injection 75 mL (75 mLs Intravenous Given 6/28/19 1726)   0.9 % sodium chloride bolus (0 mLs Intravenous Stopped 6/28/19 1920)   insulin regular (HUMULIN R;NOVOLIN R) injection 10 Units (10 Units Intravenous Given 6/28/19 1928)   0.9 % sodium chloride bolus (0 mLs Intravenous Stopped 6/28/19 2042)   oxyCODONE-acetaminophen (PERCOCET) 5-325 MG per tablet 1 tablet (1 tablet Oral Given 6/28/19 1931)   0.9 % sodium chloride bolus (0 mLs Intravenous Stopped 6/28/19 2201)   hydrocortisone sodium succinate PF (SOLU-CORTEF) injection 100 mg (100 mg Intravenous Given 6/28/19 2200)     FINAL IMPRESSION:    1. Hyperglycemia    2. Lower abdominal pain    3. Hypotension, unspecified hypotension type    4.  Adrenal insufficiency ([address]          Kalee

## 2019-06-29 NOTE — PROGRESS NOTES
Received report from ER, admitted to 5T, admission paperwork complete. Oriented to room, bed, and call light. Complaints of nausea, gave prn med. Assisted with bedpan. Passed scheduled meds. Complaints of abdominal pain and back pain, plan to get order for pan pain med. Got snack and drink per request. Denies other needs, call light in reach, bed alarm engaged.

## 2019-06-29 NOTE — PROGRESS NOTES
Nutrition Assessment    Type and Reason for Visit: Initial, Positive Nutrition Screen    Nutrition Recommendations:   1. Continue current diet  2. Encourage po  3. RD ordered Ensure HP BID - Chocolate    Nutrition Assessment: +IP for poor appetite and wt loss. Pt states that her appetite has been poor for the past few weeks. Pt haslost 25lbs in 5 months, -17% change in wt. Pt was experiencing n/v pta. Pt would like a supplement during admission. Pt is nutritionally compromised d/t poor intake and wt loss. Malnutrition Assessment:  · Malnutrition Status: Meets the criteria for severe malnutrition  · Context: Chronic illness  · Findings of the 6 clinical characteristics of malnutrition (Minimum of 2 out of 6 clinical characteristics is required to make the diagnosis of moderate or severe Protein Calorie Malnutrition based on AND/ASPEN Guidelines):  1. Energy Intake-Less than or equal to 75% of estimated energy requirement, Greater than or equal to 3 months    2. Weight Loss-10% loss or greater(-17%),    3. Fat Loss-Mild subcutaneous fat loss, Orbital  4. Muscle Loss-Mild muscle mass loss, Temples (temporalis muscle), Clavicles (pectoralis and deltoids)  5. Fluid Accumulation-No significant fluid accumulation,    6.  Strength-Not measured    Nutrition Risk Level: High    Nutrient Needs:  · Estimated Daily Total Kcal: 1650 -1925  · Estimated Daily Protein (g): 102 gms(1.5gms)  · Estimated Daily Total Fluid (ml/day): 1 ml/kcal    Nutrition Diagnosis:   · Problem: Severe malnutrition  · Etiology: related to Insufficient energy/nutrient consumption     Signs and symptoms:  as evidenced by Weight loss greater than or equal to 10% in 6 months, Diet history of poor intake    Objective Information:  · Nutrition-Focused Physical Findings: No edema.  I/O's: -150ml  · Wound Type: None  · Current Nutrition Therapies:  · Oral Diet Orders: Carb Control 4 Carbs/Meal   · Oral Diet intake: 26-50%  · Oral Nutrition

## 2019-06-29 NOTE — ED NOTES
Report called to ANNETTE, RN verbalized understanding and denied any need for further information, patient visible on tele monitor on unit, patient to be transported to unit at this time      Юлия Aragon RN  06/28/19 [id number]

## 2019-06-29 NOTE — H&P
midodrine (PROAMATINE) 2.5 MG tablet Take 1 tablet by mouth 3 times daily for 7 days 2/24/19 3/3/19  Reina Vera MD   insulin glargine (LANTUS) 100 UNIT/ML injection vial Inject 35 Units into the skin nightly 2/24/19   Andra Vera MD   insulin lispro (HUMALOG) 100 UNIT/ML injection vial Inject 10 Units into the skin 3 times daily (before meals) Give only if blood sugar is greater than 150 2/24/19   Andra Vera MD   insulin lispro (HUMALOG) 100 UNIT/ML pen Inject 0-12 Units into the skin 3 times daily (with meals) 2/24/19   Gordon Cunningham MD   clopidogrel (PLAVIX) 75 MG tablet Take 1 tablet by mouth daily 11/17/18   Miguelina Saul MD   pantoprazole (PROTONIX) 40 MG tablet Take 1 tablet by mouth daily 11/16/18   Miguelina Saul MD   glucose monitoring kit (FREESTYLE) monitoring kit 1 kit by Does not apply route daily 11/15/18   Miguelina Saul MD   atorvastatin (LIPITOR) 40 MG tablet Take 40 mg by mouth daily    Historical Provider, MD   aspirin 81 MG tablet Take 81 mg by mouth daily    Historical Provider, MD   promethazine (PHENERGAN) 25 MG tablet Take 25 mg by mouth 12/6/14   Historical Provider, MD LEÓN INS SYR HALF-UNIT .3CC/31G 31G X 5/16\" 0.3 ML MISC 145 Units 12/3/17   Historical Provider, MD   lamoTRIgine (LAMICTAL) 100 MG tablet Take 100 mg by mouth daily. Historical Provider, MD   gabapentin (NEURONTIN) 300 MG capsule Take 300 mg by mouth 3 times daily. Historical Provider, MD   calcium carbonate (TUMS) 500 MG chewable tablet Take 1 tablet by mouth daily. Historical Provider, MD   acetaminophen (TYLENOL) 325 MG tablet Take  by mouth every 4 hours as needed. Historical Provider, MD   potassium chloride (KLOR-CON) 20 MEQ packet Take 20 mEq by mouth daily. Historical Provider, MD       Allergies:  Darvocet [propoxyphene n-acetaminophen];  Naprosyn [naproxen]; and Ultram [tramadol hcl]    Social History:      TOBACCO:   reports that she quit smoking about 8 years ENZYMES  No results for input(s): TROPONINI in the last 72 hours. LIPIDS  Cholesterol, Total   Date/Time Value Ref Range Status   05/20/2011 12:50  0 - 200 mg/dL Final     Triglycerides   Date/Time Value Ref Range Status   11/11/2018 10:15  0 - 150 mg/dL Final     HDL   Date/Time Value Ref Range Status   05/20/2011 12:50 PM 36 (L) >40 mg/dL Final     LDL Calculated   Date/Time Value Ref Range Status   05/20/2011 12:50 PM 92 0 - 100 mg/dL Final         Radiology:     CT ABDOMEN PELVIS W IV CONTRAST Additional Contrast? None   Final Result   No evidence of acute intra-abdominopelvic inflammatory process or fluid   collection. ASSESSMENT/PLAN:    DM2 with hyperglycemia  Last HbA1c was 16.7 (eAG 432.6 mg/dL)  Continue home lantus   Accuchecks, SSI  Hypoglycemia protocol   Check HbA1c    Adrenal insufficiency  Solu-cortef given in ED  Endocrinology consulted    Abdominal pain  LFTs, lipase wnl  UA negative for UTI  CT abdomen negative for acute process  Treat symptomatically; improved per patient  Recommend avoiding additional narcotics if possible given history    Elevated BUN/Cr ratio  Likely d/t dehydration  Check fecal occult blood  IVF    Hyponatremia  Due to pseudohyponatremia  Repeat BMP in AM    Hx of IVDU  Continue home suboxone    CAD  Continue home ASA, plavix, statin  No BB d/t hx of hypotension      DVT prophylaxis: Lovenox   GI prophylaxis: Protonix  Probiotic if on abx: Not indicated    Diet: DIET CARB CONTROL;  Code Status: Full Code    Consults:  IP CONSULT TO HOSPITALIST  IP CONSULT TO HOSPITALIST  IP CONSULT TO ENDOCRINOLOGY    Disposition: Admit to Inpatient  ELOS: Greater than two midnights due to medical therapy    Shree Hannah PA-C    Thank you Jillian Ramey MD for the opportunity to be involved in this patient's care. If you have any questions or concerns please feel free to contact me at [phone].

## 2019-06-30 LAB
ALBUMIN SERPL-MCNC: 3 G/DL (ref 3.4–5)
ANION GAP SERPL CALCULATED.3IONS-SCNC: 10 MMOL/L (ref 3–16)
BASOPHILS ABSOLUTE: 0 K/UL (ref 0–0.2)
BASOPHILS RELATIVE PERCENT: 0.3 %
BUN BLDV-MCNC: 18 MG/DL (ref 7–20)
CALCIUM SERPL-MCNC: 8 MG/DL (ref 8.3–10.6)
CHLORIDE BLD-SCNC: 104 MMOL/L (ref 99–110)
CO2: 24 MMOL/L (ref 21–32)
CREAT SERPL-MCNC: <0.5 MG/DL (ref 0.6–1.1)
EOSINOPHILS ABSOLUTE: 0 K/UL (ref 0–0.6)
EOSINOPHILS RELATIVE PERCENT: 0.4 %
GFR AFRICAN AMERICAN: >60
GFR NON-AFRICAN AMERICAN: >60
GLUCOSE BLD-MCNC: 102 MG/DL (ref 70–99)
GLUCOSE BLD-MCNC: 107 MG/DL (ref 70–99)
GLUCOSE BLD-MCNC: 115 MG/DL (ref 70–99)
GLUCOSE BLD-MCNC: 98 MG/DL (ref 70–99)
HCT VFR BLD CALC: 42.3 % (ref 36–48)
HEMOGLOBIN: 14.1 G/DL (ref 12–16)
LYMPHOCYTES ABSOLUTE: 3.4 K/UL (ref 1–5.1)
LYMPHOCYTES RELATIVE PERCENT: 49.5 %
MAGNESIUM: 1.9 MG/DL (ref 1.8–2.4)
MCH RBC QN AUTO: 29.7 PG (ref 26–34)
MCHC RBC AUTO-ENTMCNC: 33.3 G/DL (ref 31–36)
MCV RBC AUTO: 89.1 FL (ref 80–100)
MONOCYTES ABSOLUTE: 0.4 K/UL (ref 0–1.3)
MONOCYTES RELATIVE PERCENT: 6.2 %
NEUTROPHILS ABSOLUTE: 3 K/UL (ref 1.7–7.7)
NEUTROPHILS RELATIVE PERCENT: 43.6 %
PDW BLD-RTO: 13.7 % (ref 12.4–15.4)
PERFORMED ON: ABNORMAL
PERFORMED ON: ABNORMAL
PERFORMED ON: NORMAL
PHOSPHORUS: 2 MG/DL (ref 2.5–4.9)
PLATELET # BLD: 168 K/UL (ref 135–450)
PMV BLD AUTO: 10.7 FL (ref 5–10.5)
POTASSIUM SERPL-SCNC: 4.3 MMOL/L (ref 3.5–5.1)
RBC # BLD: 4.75 M/UL (ref 4–5.2)
SODIUM BLD-SCNC: 138 MMOL/L (ref 136–145)
TROPONIN: 0.03 NG/ML
TROPONIN: 0.04 NG/ML
WBC # BLD: 6.9 K/UL (ref 4–11)

## 2019-06-30 PROCEDURE — 6360000002 HC RX W HCPCS: Performed by: PHYSICIAN ASSISTANT

## 2019-06-30 PROCEDURE — 6370000000 HC RX 637 (ALT 250 FOR IP): Performed by: INTERNAL MEDICINE

## 2019-06-30 PROCEDURE — 2580000003 HC RX 258: Performed by: PHYSICIAN ASSISTANT

## 2019-06-30 PROCEDURE — 36415 COLL VENOUS BLD VENIPUNCTURE: CPT

## 2019-06-30 PROCEDURE — 84484 ASSAY OF TROPONIN QUANT: CPT

## 2019-06-30 PROCEDURE — 83735 ASSAY OF MAGNESIUM: CPT

## 2019-06-30 PROCEDURE — 6360000002 HC RX W HCPCS: Performed by: INTERNAL MEDICINE

## 2019-06-30 PROCEDURE — 1200000000 HC SEMI PRIVATE

## 2019-06-30 PROCEDURE — 85025 COMPLETE CBC W/AUTO DIFF WBC: CPT

## 2019-06-30 PROCEDURE — 80069 RENAL FUNCTION PANEL: CPT

## 2019-06-30 PROCEDURE — 2580000003 HC RX 258: Performed by: INTERNAL MEDICINE

## 2019-06-30 PROCEDURE — 99255 IP/OBS CONSLTJ NEW/EST HI 80: CPT | Performed by: INTERNAL MEDICINE

## 2019-06-30 RX ORDER — PREGABALIN 50 MG/1
50 CAPSULE ORAL 2 TIMES DAILY
Status: DISCONTINUED | OUTPATIENT
Start: 2019-06-30 | End: 2019-07-03 | Stop reason: HOSPADM

## 2019-06-30 RX ADMIN — PANTOPRAZOLE SODIUM 40 MG: 40 TABLET, DELAYED RELEASE ORAL at 07:35

## 2019-06-30 RX ADMIN — POTASSIUM CHLORIDE 20 MEQ: 1500 TABLET, EXTENDED RELEASE ORAL at 08:31

## 2019-06-30 RX ADMIN — POTASSIUM CHLORIDE 20 MEQ: 1500 TABLET, EXTENDED RELEASE ORAL at 11:51

## 2019-06-30 RX ADMIN — ENOXAPARIN SODIUM 40 MG: 40 INJECTION SUBCUTANEOUS at 08:31

## 2019-06-30 RX ADMIN — INSULIN GLARGINE 15 UNITS: 100 INJECTION, SOLUTION SUBCUTANEOUS at 09:02

## 2019-06-30 RX ADMIN — LAMOTRIGINE 100 MG: 100 TABLET ORAL at 08:31

## 2019-06-30 RX ADMIN — METHYLPREDNISOLONE 4 MG: 4 TABLET ORAL at 07:35

## 2019-06-30 RX ADMIN — CLOPIDOGREL 75 MG: 75 TABLET, FILM COATED ORAL at 08:31

## 2019-06-30 RX ADMIN — SODIUM CHLORIDE: 9 INJECTION, SOLUTION INTRAVENOUS at 03:27

## 2019-06-30 RX ADMIN — METHYLPREDNISOLONE 2 MG: 4 TABLET ORAL at 19:07

## 2019-06-30 RX ADMIN — BUPRENORPHINE HYDROCHLORIDE, NALOXONE HYDROCHLORIDE 1 FILM: 8; 2 FILM, SOLUBLE BUCCAL; SUBLINGUAL at 22:19

## 2019-06-30 RX ADMIN — Medication 10 ML: at 22:20

## 2019-06-30 RX ADMIN — POTASSIUM & SODIUM PHOSPHATES POWDER PACK 280-160-250 MG 250 MG: 280-160-250 PACK at 19:07

## 2019-06-30 RX ADMIN — MIRTAZAPINE 30 MG: 15 TABLET, FILM COATED ORAL at 22:20

## 2019-06-30 RX ADMIN — ROSUVASTATIN CALCIUM 20 MG: 20 TABLET, FILM COATED ORAL at 22:20

## 2019-06-30 RX ADMIN — ASPIRIN 81 MG: 81 TABLET ORAL at 08:31

## 2019-06-30 RX ADMIN — BUPRENORPHINE HYDROCHLORIDE, NALOXONE HYDROCHLORIDE 1 FILM: 8; 2 FILM, SOLUBLE BUCCAL; SUBLINGUAL at 08:28

## 2019-06-30 RX ADMIN — PREGABALIN 50 MG: 50 CAPSULE ORAL at 22:19

## 2019-06-30 ASSESSMENT — PAIN SCALES - GENERAL
PAINLEVEL_OUTOF10: 5
PAINLEVEL_OUTOF10: 0
PAINLEVEL_OUTOF10: 0

## 2019-06-30 NOTE — PROGRESS NOTES
Endocrinology Progress    Patient feels better with now normal blood sugars and normotensive and no further chest pain on nitroglycerin patch. Pulse 85 and /75 Resp 18; Temp 98.2 deg F    HEENT: EOM's intact  Neck: No goiter nor adenopathy  Chest: Clear  Heart: S1 and S2  Abdomen nontender  Ext: right AKA    Assessment:  1. Had troponin 0.03 slightly elevated and progress cardiac work-up; pain relief with nitroglycerin and the abnormal troponin are consistent with angina but her EKG did not suggest acute MI to my eye; agree further cardiac evaluation very reasonable in light of known CAD history with prior MI and PTCA last July (2018). 2. Secondary adrenal insufficiency, doing well so far with Medrol in usual maintenance doses. 3. Opiate dependence controlled on suboxone 8 mg daily  4. Type 2 DM with improved glycemic cotrol, lucia would like an insulin pump and Trena is currently overing VGo so we should be able to help with this. She's doing better with Lantus 15 units twice daily so far. 5. Hypophosphatemia, hypoalbumnemia. Plan:  1. Cardiac eval per cardiology (Dr. Ras Headley) incudng repeat EKG and echocardiogram, probable stress test  2. See in offie about possible insulin pump. 3. Continue current glycemic control  and steroid replacement. Will check HbA1c.  4. Add LDH to be sure no MI in last 5 days. 5. Would continue rosuvastatin for hyperlipidemia. 6. Lipid profile can be pursued outpatient. 7. Add phosphate replacement to meals. RAHEEL Juarez M.D.

## 2019-06-30 NOTE — CONSULTS
displaced  +CWT L 3/4SC joints  Heart tones are crisp and normal  Cervical veins are not engorged  Normal S1S2, No S3, No Murmur  Peripheral pulses are symmetrical and full  Extremities: There is no clubbing, cyanosis of the extremities. No edema  Femoral Arteries: 2+ and equal  RAKA  Abdomen:  No masses or tenderness  Liver/Spleen: No Abnormalities Noted  Neurological/Psychiatric:  Alert and oriented in all spheres  Moves all extremities well  Exhibits normal gait balance and coordination  No abnormalities of mood, affect, memory    All testing and labs listed below were personally reviewed. Assessment:     1. Hyperglycemia    2. Lower abdominal pain    3. Hypotension, unspecified hypotension type    4. Adrenal insufficiency (HCC)    5.      Chest pain-Atypical clinical features with reproducible discomfort with palpation of the chest wall. Perhaps exacerbated by prolonged vomiting. 6.      Hx of AMI with Stenting of LAD with POBA of jailed diagonal  7/2018 at [facility]. 7.      Apical WMA with EF 50%    Plan: Ice to sore chest wall  Miky ECG and troponin in AM  Lexiscan Myoview  Echo    Thank you for allowing me to participate in the care of this individual.      Jeana Siddiqi M.D., [facility].

## 2019-07-01 PROBLEM — I20.0 UNSTABLE ANGINA (HCC): Status: ACTIVE | Noted: 2019-07-01

## 2019-07-01 PROBLEM — Z79.4 UNCONTROLLED TYPE 2 DIABETES MELLITUS WITH HYPERGLYCEMIA, WITH LONG-TERM CURRENT USE OF INSULIN (HCC): Status: ACTIVE | Noted: 2019-07-01

## 2019-07-01 PROBLEM — E11.65 UNCONTROLLED TYPE 2 DIABETES MELLITUS WITH HYPERGLYCEMIA, WITH LONG-TERM CURRENT USE OF INSULIN (HCC): Status: ACTIVE | Noted: 2019-07-01

## 2019-07-01 PROBLEM — G89.4 CHRONIC PAIN SYNDROME: Status: ACTIVE | Noted: 2019-07-01

## 2019-07-01 PROBLEM — K59.00 CONSTIPATION: Status: ACTIVE | Noted: 2019-07-01

## 2019-07-01 LAB
ALBUMIN SERPL-MCNC: 2.7 G/DL (ref 3.4–5)
ANION GAP SERPL CALCULATED.3IONS-SCNC: 9 MMOL/L (ref 3–16)
BASOPHILS ABSOLUTE: 0 K/UL (ref 0–0.2)
BASOPHILS RELATIVE PERCENT: 0.5 %
BUN BLDV-MCNC: 12 MG/DL (ref 7–20)
CALCIUM SERPL-MCNC: 8.1 MG/DL (ref 8.3–10.6)
CHLORIDE BLD-SCNC: 105 MMOL/L (ref 99–110)
CO2: 23 MMOL/L (ref 21–32)
CREAT SERPL-MCNC: <0.5 MG/DL (ref 0.6–1.1)
EKG ATRIAL RATE: 100 BPM
EKG ATRIAL RATE: 82 BPM
EKG DIAGNOSIS: NORMAL
EKG DIAGNOSIS: NORMAL
EKG P AXIS: 18 DEGREES
EKG P AXIS: 32 DEGREES
EKG P-R INTERVAL: 144 MS
EKG P-R INTERVAL: 146 MS
EKG Q-T INTERVAL: 346 MS
EKG Q-T INTERVAL: 394 MS
EKG QRS DURATION: 76 MS
EKG QRS DURATION: 86 MS
EKG QTC CALCULATION (BAZETT): 446 MS
EKG QTC CALCULATION (BAZETT): 460 MS
EKG R AXIS: -53 DEGREES
EKG R AXIS: -58 DEGREES
EKG T AXIS: 8 DEGREES
EKG T AXIS: 83 DEGREES
EKG VENTRICULAR RATE: 100 BPM
EKG VENTRICULAR RATE: 82 BPM
EOSINOPHILS ABSOLUTE: 0 K/UL (ref 0–0.6)
EOSINOPHILS RELATIVE PERCENT: 0.9 %
ESTIMATED AVERAGE GLUCOSE: 369.5 MG/DL
GFR AFRICAN AMERICAN: >60
GFR NON-AFRICAN AMERICAN: >60
GLUCOSE BLD-MCNC: 188 MG/DL (ref 70–99)
GLUCOSE BLD-MCNC: 195 MG/DL (ref 70–99)
GLUCOSE BLD-MCNC: 251 MG/DL (ref 70–99)
GLUCOSE BLD-MCNC: 279 MG/DL (ref 70–99)
GLUCOSE BLD-MCNC: 290 MG/DL (ref 70–99)
GLUCOSE BLD-MCNC: 379 MG/DL (ref 70–99)
HBA1C MFR BLD: 14.5 %
HCT VFR BLD CALC: 38.8 % (ref 36–48)
HEMOGLOBIN: 12.9 G/DL (ref 12–16)
LACTATE DEHYDROGENASE: 166 U/L (ref 100–190)
LEFT VENTRICULAR EJECTION FRACTION MODE: NORMAL
LV EF: 35 %
LYMPHOCYTES ABSOLUTE: 3 K/UL (ref 1–5.1)
LYMPHOCYTES RELATIVE PERCENT: 55.3 %
MAGNESIUM: 1.9 MG/DL (ref 1.8–2.4)
MCH RBC QN AUTO: 29 PG (ref 26–34)
MCHC RBC AUTO-ENTMCNC: 33.3 G/DL (ref 31–36)
MCV RBC AUTO: 87.3 FL (ref 80–100)
MONOCYTES ABSOLUTE: 0.4 K/UL (ref 0–1.3)
MONOCYTES RELATIVE PERCENT: 6.5 %
NEUTROPHILS ABSOLUTE: 2 K/UL (ref 1.7–7.7)
NEUTROPHILS RELATIVE PERCENT: 36.8 %
PDW BLD-RTO: 13.9 % (ref 12.4–15.4)
PERFORMED ON: ABNORMAL
PHOSPHORUS: 2.6 MG/DL (ref 2.5–4.9)
PLATELET # BLD: 159 K/UL (ref 135–450)
PMV BLD AUTO: 10 FL (ref 5–10.5)
POTASSIUM SERPL-SCNC: 4.5 MMOL/L (ref 3.5–5.1)
RBC # BLD: 4.44 M/UL (ref 4–5.2)
SODIUM BLD-SCNC: 137 MMOL/L (ref 136–145)
TROPONIN: 0.03 NG/ML
WBC # BLD: 5.5 K/UL (ref 4–11)

## 2019-07-01 PROCEDURE — 83036 HEMOGLOBIN GLYCOSYLATED A1C: CPT

## 2019-07-01 PROCEDURE — 6360000002 HC RX W HCPCS

## 2019-07-01 PROCEDURE — 02703ZZ DILATION OF CORONARY ARTERY, ONE ARTERY, PERCUTANEOUS APPROACH: ICD-10-PCS | Performed by: ANESTHESIOLOGY

## 2019-07-01 PROCEDURE — 6370000000 HC RX 637 (ALT 250 FOR IP): Performed by: INTERNAL MEDICINE

## 2019-07-01 PROCEDURE — 2500000003 HC RX 250 WO HCPCS

## 2019-07-01 PROCEDURE — 2140000000 HC CCU INTERMEDIATE R&B

## 2019-07-01 PROCEDURE — 99153 MOD SED SAME PHYS/QHP EA: CPT

## 2019-07-01 PROCEDURE — 6360000002 HC RX W HCPCS: Performed by: PHYSICIAN ASSISTANT

## 2019-07-01 PROCEDURE — 99152 MOD SED SAME PHYS/QHP 5/>YRS: CPT | Performed by: INTERNAL MEDICINE

## 2019-07-01 PROCEDURE — 2580000003 HC RX 258: Performed by: PHYSICIAN ASSISTANT

## 2019-07-01 PROCEDURE — 2709999900 HC NON-CHARGEABLE SUPPLY

## 2019-07-01 PROCEDURE — 99152 MOD SED SAME PHYS/QHP 5/>YRS: CPT

## 2019-07-01 PROCEDURE — 6360000004 HC RX CONTRAST MEDICATION: Performed by: INTERNAL MEDICINE

## 2019-07-01 PROCEDURE — 85025 COMPLETE CBC W/AUTO DIFF WBC: CPT

## 2019-07-01 PROCEDURE — 84484 ASSAY OF TROPONIN QUANT: CPT

## 2019-07-01 PROCEDURE — 83735 ASSAY OF MAGNESIUM: CPT

## 2019-07-01 PROCEDURE — 93010 ELECTROCARDIOGRAM REPORT: CPT | Performed by: INTERNAL MEDICINE

## 2019-07-01 PROCEDURE — 93005 ELECTROCARDIOGRAM TRACING: CPT | Performed by: INTERNAL MEDICINE

## 2019-07-01 PROCEDURE — 36415 COLL VENOUS BLD VENIPUNCTURE: CPT

## 2019-07-01 PROCEDURE — 93458 L HRT ARTERY/VENTRICLE ANGIO: CPT | Performed by: INTERNAL MEDICINE

## 2019-07-01 PROCEDURE — 6360000002 HC RX W HCPCS: Performed by: INTERNAL MEDICINE

## 2019-07-01 PROCEDURE — 99232 SBSQ HOSP IP/OBS MODERATE 35: CPT | Performed by: INTERNAL MEDICINE

## 2019-07-01 PROCEDURE — 6370000000 HC RX 637 (ALT 250 FOR IP)

## 2019-07-01 PROCEDURE — 93458 L HRT ARTERY/VENTRICLE ANGIO: CPT

## 2019-07-01 PROCEDURE — 4A023N7 MEASUREMENT OF CARDIAC SAMPLING AND PRESSURE, LEFT HEART, PERCUTANEOUS APPROACH: ICD-10-PCS | Performed by: ANESTHESIOLOGY

## 2019-07-01 PROCEDURE — B2111ZZ FLUOROSCOPY OF MULTIPLE CORONARY ARTERIES USING LOW OSMOLAR CONTRAST: ICD-10-PCS | Performed by: ANESTHESIOLOGY

## 2019-07-01 PROCEDURE — C1769 GUIDE WIRE: HCPCS

## 2019-07-01 PROCEDURE — C1894 INTRO/SHEATH, NON-LASER: HCPCS

## 2019-07-01 PROCEDURE — 83615 LACTATE (LD) (LDH) ENZYME: CPT

## 2019-07-01 PROCEDURE — 80069 RENAL FUNCTION PANEL: CPT

## 2019-07-01 RX ORDER — POLYETHYLENE GLYCOL 3350 17 G/17G
17 POWDER, FOR SOLUTION ORAL 2 TIMES DAILY
Status: DISCONTINUED | OUTPATIENT
Start: 2019-07-01 | End: 2019-07-03 | Stop reason: HOSPADM

## 2019-07-01 RX ADMIN — METHYLPREDNISOLONE 2 MG: 4 TABLET ORAL at 17:46

## 2019-07-01 RX ADMIN — INSULIN GLARGINE 15 UNITS: 100 INJECTION, SOLUTION SUBCUTANEOUS at 08:40

## 2019-07-01 RX ADMIN — LAMOTRIGINE 100 MG: 100 TABLET ORAL at 08:11

## 2019-07-01 RX ADMIN — PREGABALIN 50 MG: 50 CAPSULE ORAL at 21:17

## 2019-07-01 RX ADMIN — POLYETHYLENE GLYCOL 3350 17 G: 17 POWDER, FOR SOLUTION ORAL at 21:17

## 2019-07-01 RX ADMIN — PANTOPRAZOLE SODIUM 40 MG: 40 TABLET, DELAYED RELEASE ORAL at 05:42

## 2019-07-01 RX ADMIN — INSULIN LISPRO 1 UNITS: 100 INJECTION, SOLUTION INTRAVENOUS; SUBCUTANEOUS at 17:43

## 2019-07-01 RX ADMIN — CLOPIDOGREL 75 MG: 75 TABLET, FILM COATED ORAL at 08:11

## 2019-07-01 RX ADMIN — SODIUM CHLORIDE: 9 INJECTION, SOLUTION INTRAVENOUS at 01:11

## 2019-07-01 RX ADMIN — POTASSIUM & SODIUM PHOSPHATES POWDER PACK 280-160-250 MG 250 MG: 280-160-250 PACK at 17:44

## 2019-07-01 RX ADMIN — INSULIN GLARGINE 15 UNITS: 100 INJECTION, SOLUTION SUBCUTANEOUS at 01:11

## 2019-07-01 RX ADMIN — INSULIN LISPRO 3 UNITS: 100 INJECTION, SOLUTION INTRAVENOUS; SUBCUTANEOUS at 12:11

## 2019-07-01 RX ADMIN — SODIUM CHLORIDE: 9 INJECTION, SOLUTION INTRAVENOUS at 11:39

## 2019-07-01 RX ADMIN — METHYLPREDNISOLONE 4 MG: 4 TABLET ORAL at 05:42

## 2019-07-01 RX ADMIN — ROSUVASTATIN CALCIUM 20 MG: 20 TABLET, FILM COATED ORAL at 21:17

## 2019-07-01 RX ADMIN — PREGABALIN 50 MG: 50 CAPSULE ORAL at 08:11

## 2019-07-01 RX ADMIN — IOPAMIDOL 31 ML: 755 INJECTION, SOLUTION INTRAVENOUS at 16:57

## 2019-07-01 RX ADMIN — ASPIRIN 81 MG: 81 TABLET ORAL at 08:11

## 2019-07-01 RX ADMIN — INSULIN LISPRO 5 UNITS: 100 INJECTION, SOLUTION INTRAVENOUS; SUBCUTANEOUS at 08:39

## 2019-07-01 RX ADMIN — INSULIN GLARGINE 15 UNITS: 100 INJECTION, SOLUTION SUBCUTANEOUS at 21:24

## 2019-07-01 RX ADMIN — BUPRENORPHINE HYDROCHLORIDE, NALOXONE HYDROCHLORIDE 1 FILM: 8; 2 FILM, SOLUBLE BUCCAL; SUBLINGUAL at 21:17

## 2019-07-01 RX ADMIN — BUPRENORPHINE HYDROCHLORIDE, NALOXONE HYDROCHLORIDE 1 FILM: 8; 2 FILM, SOLUBLE BUCCAL; SUBLINGUAL at 08:11

## 2019-07-01 RX ADMIN — MIRTAZAPINE 30 MG: 15 TABLET, FILM COATED ORAL at 21:17

## 2019-07-01 ASSESSMENT — PAIN SCALES - GENERAL
PAINLEVEL_OUTOF10: 0

## 2019-07-01 NOTE — PRE SEDATION
right bka    JOINT REPLACEMENT  2006, 2010    right knee; Dr Palmira Schafer.     KNEE ARTHROSCOPY      ridht knee X6    LIVER BIOPSY N/A 11/14/2018    LIVER BIOPSY LAPAROSCOPIC performed by Sofia Choudhary MD at [facility] N/A 11/14/2018    LAPAROSCOPIC CHOLECYSTECTOMY WITH INTRAOPERATIVE CHOLANGIOGRAM performed by Sofia Choudhary MD at [facility]  2/9/11    right    UPPER GASTROINTESTINAL ENDOSCOPY  11/13/2018    UPPER GASTROINTESTINAL ENDOSCOPY N/A 11/13/2018    EGD BIOPSY performed by Ammy Dinh MD at [id number] [facility] ENDOSCOPY         Medications:  Current Facility-Administered Medications   Medication Dose Route Frequency Provider Last Rate Last Dose    regadenoson (LEXISCAN) injection 0.4 mg  0.4 mg Intravenous ONCE PRN Sumeet Lewis MD        naloxegol (MOVANTIK) tablet 25 mg  25 mg Oral QAM Karina Dorantes MD        polyethylene glycol (GLYCOLAX) packet 17 g  17 g Oral BID Karina Dorantes MD        perflutren lipid microspheres (DEFINITY) injection 1.65 mg  1.5 mL Intravenous ONCE PRN Sumeet Lewis MD        pregabalin (LYRICA) capsule 50 mg  50 mg Oral BID Rafael Cooper MD   50 mg at 07/01/19 0811    potassium & sodium phosphates (PHOS-NAK) 280-160-250 MG packet 250 mg  1 packet Oral TID  Rafael Cooper MD   250 mg at 06/30/19 1907    buprenorphine-naloxone (SUBOXONE) 8-2 MG SL film 1 Film  1 Film Sublingual BID Deja Young PA-C   1 Film at 07/01/19 0811    0.9 % sodium chloride infusion   Intravenous Continuous Deja Young PA-C 100 mL/hr at 07/01/19 1139      acetaminophen (TYLENOL) tablet 1,000 mg  1,000 mg Oral Q6H PRN ZARINA Del CidC   1,000 mg at 06/29/19 1250    insulin lispro (HUMALOG) injection pen 0-6 Units  0-6 Units Subcutaneous TID  Reina Persaud MD   3 Units at 07/01/19 1211    insulin lispro (HUMALOG) injection pen 8 Units  8 Units Subcutaneous TID  Reina Persaud MD   8 Units at 06/30/19 1727    methylPREDNISolone (MEDROL) tablet 4 mg  4 mg Oral Daily Lady Jabier MD   4 mg at 07/01/19 0542    methylPREDNISolone (MEDROL) tablet 2 mg  2 mg Oral Dinner Lady Jabier MD   2 mg at 06/30/19 1907    nitroGLYCERIN (NITROSTAT) SL tablet 0.4 mg  0.4 mg Sublingual Q5 Min PRN Lady Jabier MD   0.4 mg at 06/29/19 2006    rosuvastatin (CRESTOR) tablet 20 mg  20 mg Oral Nightly Lady Jabier MD   20 mg at 06/30/19 2220    nitroGLYCERIN (NITRODUR) patch REMOVAL   Transdermal Nightly Lady Jabier MD        nitroGLYCERIN (NITRODUR) 0.4 MG/HR 1 patch  1 patch Transdermal Daily Lady Jaiber MD   1 patch at 06/30/19 0829    insulin glargine (LANTUS) injection pen 15 Units  15 Units Subcutaneous Q12H Lady Jabier MD   15 Units at 07/01/19 0840    aspirin EC tablet 81 mg  81 mg Oral Daily Santosh Millan MD   81 mg at 07/01/19 0811    clopidogrel (PLAVIX) tablet 75 mg  75 mg Oral Daily Santosh Millan MD   75 mg at 07/01/19 0811    lamoTRIgine (LAMICTAL) tablet 100 mg  100 mg Oral Daily Santosh Millan MD   100 mg at 07/01/19 0811    mirtazapine (REMERON) tablet 30 mg  30 mg Oral Nightly Santosh Millan MD   30 mg at 06/30/19 2220    pantoprazole (PROTONIX) tablet 40 mg  40 mg Oral QAM AC Santosh Millan MD   40 mg at 07/01/19 0542    sodium chloride flush 0.9 % injection 10 mL  10 mL Intravenous 2 times per day Christian Clarke MD   10 mL at 06/30/19 2220    sodium chloride flush 0.9 % injection 10 mL  10 mL Intravenous PRN Santosh Millan MD   10 mL at 06/29/19 0059    ondansetron (ZOFRAN) injection 4 mg  4 mg Intravenous Q6H PRN Santosh Millan MD   4 mg at 06/29/19 1250    enoxaparin (LOVENOX) injection 40 mg  40 mg Subcutaneous Daily Santosh Millan MD   40 mg at 06/30/19 0831    glucose (GLUTOSE) 40 % oral gel 15 g  15 g Oral PRN Santosh Millan MD        dextrose 50 % IV solution  12.5 g

## 2019-07-01 NOTE — PROGRESS NOTES
[facility] PROGRESS NOTE    7/1/2019 1:31 PM        Name: Tatiana Childs . Admitted: 6/28/2019  Primary Care Provider: Tenisha Sultana MD (Tel: [phone])    Brief Course:  63 yo F with UC DM 2 on insulin with neuropathy and retinopathy, CAD, secondary adrenal insufficiency, CPS on Suboxone, bipolar disorder who p/w abdominal pain. Admitted as inpatient. Followed by Endocrine and Cardiology. [place] to have unstable angina, for [facility] on 7/1/19. CC:  Ab pain    Subjective:  . Patient is anxious. No CP, SOB, HA or abdominal pain. No fevers.     Reviewed interval ancillary notes    Current Medications    regadenoson (LEXISCAN) injection 0.4 mg ONCE PRN   naloxegol (MOVANTIK) tablet 25 mg QAM   polyethylene glycol (GLYCOLAX) packet 17 g BID   perflutren lipid microspheres (DEFINITY) injection 1.65 mg ONCE PRN   pregabalin (LYRICA) capsule 50 mg BID   potassium & sodium phosphates (PHOS-NAK) 280-160-250 MG packet 250 mg TID WC   buprenorphine-naloxone (SUBOXONE) 8-2 MG SL film 1 Film BID   0.9 % sodium chloride infusion Continuous   acetaminophen (TYLENOL) tablet 1,000 mg Q6H PRN   insulin lispro (HUMALOG) injection pen 0-6 Units TID WC   insulin lispro (HUMALOG) injection pen 8 Units TID AC   methylPREDNISolone (MEDROL) tablet 4 mg Daily   methylPREDNISolone (MEDROL) tablet 2 mg Dinner   nitroGLYCERIN (NITROSTAT) SL tablet 0.4 mg Q5 Min PRN   rosuvastatin (CRESTOR) tablet 20 mg Nightly   nitroGLYCERIN (NITRODUR) patch REMOVAL Nightly   nitroGLYCERIN (NITRODUR) 0.4 MG/HR 1 patch Daily   insulin glargine (LANTUS) injection pen 15 Units Q12H   aspirin EC tablet 81 mg Daily   clopidogrel (PLAVIX) tablet 75 mg Daily   lamoTRIgine (LAMICTAL) tablet 100 mg Daily   mirtazapine (REMERON) tablet 30 mg Nightly   pantoprazole (PROTONIX) tablet 40 mg QAM AC   sodium chloride flush 0.9 % injection 10 mL 2 times per day

## 2019-07-01 NOTE — OP NOTE
[address]   Procedure Note      Procedure: [facility]  Indication: Cp, abnormal EKG  Consent: Verbal and/or written consent obtained prior to procedure. Sedation: Minimal conscious sedation utilized for comfort. Complic: None  EBL:  <79YG  Specimens: None  Fluoro: 3.4min  Contrast: 31 cc  Access: RCFA - RRA too small <2mm  Findings:    LM Normal   % instent mid, R-L collaterals   Cx Normal   RCA Normal   LVG 35-40%, anteroapical and inferoapical akinesis   EDP 12 mmHg  Severe Ca++:None  Post Cath Dx:100% LAD  Intervention: None  Med Rec: Viability study to determine whether PCI of LAD merited    Will order for tomorrow.

## 2019-07-01 NOTE — PROGRESS NOTES
[address]  Daily Progress Note    Admit Date:  6/28/2019     CC:  CP/CAD, HTN, CHOL  Subj:  Today, feeling better. Reports presenting constellation of sx similar to MI.  EKG with marked changes from 2/19 with elevated troponin.       Obj:   /87   Pulse 77   Temp 98.4 °F (36.9 °C) (Oral)   Resp 16   Ht 5' 10\" (1.778 m)   Wt 121 lb 8 oz (55.1 kg)   LMP 11/15/2017   SpO2 97%   BMI 17.43 kg/m²       Intake/Output Summary (Last 24 hours) at 7/1/2019 0803  Last data filed at 7/1/2019 0753  Gross per 24 hour   Intake 3006 ml   Output 3700 ml   Net -694 ml     Gen Alert, coop, no distress Heart  RRR, no MRG, nl apical impulse   Head NC, AT, no abnorm Abd  Soft, NT, +BS, no mass, no OM   Eyes PERRLA, conj/corn clear Ext  R amp   Nose Nares nl, no drain, NT Pulse 2+ and symmetric   Throat Lips, mucosa, tongue nl Skin Color/text/turg nl, no rash/lesions   Neck S/S, TM, NT, no bruit/JVD Psych Nl mood and affect   Lung CTA-B, unlabored, no DTP Lymph   No cervical or axillary LA   Ch wall NT, no deform Neuro  Nl gross M/S exam     Medications:    pregabalin  50 mg Oral BID    potassium & sodium phosphates  1 packet Oral TID WC    buprenorphine-naloxone  1 Film Sublingual BID    insulin lispro  0-6 Units Subcutaneous TID WC    insulin lispro  8 Units Subcutaneous TID AC    methylPREDNISolone  4 mg Oral Daily    methylPREDNISolone  2 mg Oral Dinner    rosuvastatin  20 mg Oral Nightly    nitroGLYCERIN   Transdermal Nightly    nitroGLYCERIN  1 patch Transdermal Daily    insulin glargine  15 Units Subcutaneous Q12H    aspirin  81 mg Oral Daily    clopidogrel  75 mg Oral Daily    lamoTRIgine  100 mg Oral Daily    mirtazapine  30 mg Oral Nightly    pantoprazole  40 mg Oral QAM AC    sodium chloride flush  10 mL Intravenous 2 times per day    enoxaparin  40 mg Subcutaneous Daily      sodium chloride 100 mL/hr at 07/01/19 0111    dextrose         Lab Data:  CBC:   Recent Labs

## 2019-07-02 LAB
ALBUMIN SERPL-MCNC: 3.1 G/DL (ref 3.4–5)
ANION GAP SERPL CALCULATED.3IONS-SCNC: 10 MMOL/L (ref 3–16)
BASOPHILS ABSOLUTE: 0 K/UL (ref 0–0.2)
BASOPHILS RELATIVE PERCENT: 0.4 %
BUN BLDV-MCNC: 17 MG/DL (ref 7–20)
CALCIUM SERPL-MCNC: 8.3 MG/DL (ref 8.3–10.6)
CHLORIDE BLD-SCNC: 104 MMOL/L (ref 99–110)
CHOLESTEROL, TOTAL: 102 MG/DL (ref 0–199)
CO2: 29 MMOL/L (ref 21–32)
CREAT SERPL-MCNC: 0.6 MG/DL (ref 0.6–1.1)
EOSINOPHILS ABSOLUTE: 0.1 K/UL (ref 0–0.6)
EOSINOPHILS RELATIVE PERCENT: 1 %
GFR AFRICAN AMERICAN: >60
GFR NON-AFRICAN AMERICAN: >60
GLUCOSE BLD-MCNC: 127 MG/DL (ref 70–99)
GLUCOSE BLD-MCNC: 171 MG/DL (ref 70–99)
GLUCOSE BLD-MCNC: 330 MG/DL (ref 70–99)
GLUCOSE BLD-MCNC: 382 MG/DL (ref 70–99)
GLUCOSE BLD-MCNC: 78 MG/DL (ref 70–99)
HCT VFR BLD CALC: 38.4 % (ref 36–48)
HDLC SERPL-MCNC: 39 MG/DL (ref 40–60)
HEMOGLOBIN: 12.6 G/DL (ref 12–16)
LDL CHOLESTEROL CALCULATED: 39 MG/DL
LEFT VENTRICULAR EJECTION FRACTION HIGH VALUE: 40 %
LEFT VENTRICULAR EJECTION FRACTION MODE: NORMAL
LV EF: 40 %
LVEF MODALITY: NORMAL
LYMPHOCYTES ABSOLUTE: 2.6 K/UL (ref 1–5.1)
LYMPHOCYTES RELATIVE PERCENT: 42.4 %
MAGNESIUM: 1.8 MG/DL (ref 1.8–2.4)
MCH RBC QN AUTO: 28.9 PG (ref 26–34)
MCHC RBC AUTO-ENTMCNC: 33 G/DL (ref 31–36)
MCV RBC AUTO: 87.7 FL (ref 80–100)
MONOCYTES ABSOLUTE: 0.4 K/UL (ref 0–1.3)
MONOCYTES RELATIVE PERCENT: 6.3 %
NEUTROPHILS ABSOLUTE: 3 K/UL (ref 1.7–7.7)
NEUTROPHILS RELATIVE PERCENT: 49.9 %
PDW BLD-RTO: 13.9 % (ref 12.4–15.4)
PERFORMED ON: ABNORMAL
PERFORMED ON: NORMAL
PHOSPHORUS: 3.7 MG/DL (ref 2.5–4.9)
PLATELET # BLD: 155 K/UL (ref 135–450)
PMV BLD AUTO: 10.5 FL (ref 5–10.5)
POTASSIUM SERPL-SCNC: 5.1 MMOL/L (ref 3.5–5.1)
RBC # BLD: 4.37 M/UL (ref 4–5.2)
SODIUM BLD-SCNC: 143 MMOL/L (ref 136–145)
TRIGL SERPL-MCNC: 119 MG/DL (ref 0–150)
VLDLC SERPL CALC-MCNC: 24 MG/DL
WBC # BLD: 6.1 K/UL (ref 4–11)

## 2019-07-02 PROCEDURE — 6360000002 HC RX W HCPCS: Performed by: PHYSICIAN ASSISTANT

## 2019-07-02 PROCEDURE — 99232 SBSQ HOSP IP/OBS MODERATE 35: CPT | Performed by: INTERNAL MEDICINE

## 2019-07-02 PROCEDURE — 85025 COMPLETE CBC W/AUTO DIFF WBC: CPT

## 2019-07-02 PROCEDURE — 83735 ASSAY OF MAGNESIUM: CPT

## 2019-07-02 PROCEDURE — 6360000002 HC RX W HCPCS: Performed by: INTERNAL MEDICINE

## 2019-07-02 PROCEDURE — 6370000000 HC RX 637 (ALT 250 FOR IP): Performed by: INTERNAL MEDICINE

## 2019-07-02 PROCEDURE — 3430000000 HC RX DIAGNOSTIC RADIOPHARMACEUTICAL: Performed by: INTERNAL MEDICINE

## 2019-07-02 PROCEDURE — A9505 TL201 THALLIUM: HCPCS | Performed by: INTERNAL MEDICINE

## 2019-07-02 PROCEDURE — 2580000003 HC RX 258: Performed by: INTERNAL MEDICINE

## 2019-07-02 PROCEDURE — 2140000000 HC CCU INTERMEDIATE R&B

## 2019-07-02 PROCEDURE — 93306 TTE W/DOPPLER COMPLETE: CPT

## 2019-07-02 PROCEDURE — 80069 RENAL FUNCTION PANEL: CPT

## 2019-07-02 PROCEDURE — 80061 LIPID PANEL: CPT

## 2019-07-02 PROCEDURE — 94760 N-INVAS EAR/PLS OXIMETRY 1: CPT

## 2019-07-02 PROCEDURE — 78452 HT MUSCLE IMAGE SPECT MULT: CPT | Performed by: INTERNAL MEDICINE

## 2019-07-02 RX ORDER — ACETAMINOPHEN 80 MG
TABLET,CHEWABLE ORAL
Status: COMPLETED
Start: 2019-07-02 | End: 2019-07-02

## 2019-07-02 RX ORDER — THALLOUS CHLORIDE TL-201 1 MCI/ML
4 INJECTION, POWDER, LYOPHILIZED, FOR SOLUTION INTRAVENOUS
Status: COMPLETED | OUTPATIENT
Start: 2019-07-02 | End: 2019-07-02

## 2019-07-02 RX ADMIN — INSULIN GLARGINE 16 UNITS: 100 INJECTION, SOLUTION SUBCUTANEOUS at 21:41

## 2019-07-02 RX ADMIN — PANTOPRAZOLE SODIUM 40 MG: 40 TABLET, DELAYED RELEASE ORAL at 05:48

## 2019-07-02 RX ADMIN — METHYLPREDNISOLONE 4 MG: 4 TABLET ORAL at 05:48

## 2019-07-02 RX ADMIN — MAGNESIUM OXIDE TAB 400 MG (241.3 MG ELEMENTAL MG) 400 MG: 400 (241.3 MG) TAB at 08:41

## 2019-07-02 RX ADMIN — Medication: at 16:47

## 2019-07-02 RX ADMIN — MIRTAZAPINE 30 MG: 15 TABLET, FILM COATED ORAL at 21:37

## 2019-07-02 RX ADMIN — ENOXAPARIN SODIUM 40 MG: 40 INJECTION SUBCUTANEOUS at 08:42

## 2019-07-02 RX ADMIN — METHYLPREDNISOLONE 2 MG: 4 TABLET ORAL at 16:48

## 2019-07-02 RX ADMIN — NALOXEGOL OXALATE 25 MG: 25 TABLET, FILM COATED ORAL at 08:42

## 2019-07-02 RX ADMIN — ROSUVASTATIN CALCIUM 20 MG: 20 TABLET, FILM COATED ORAL at 21:38

## 2019-07-02 RX ADMIN — Medication 10 ML: at 08:40

## 2019-07-02 RX ADMIN — LAMOTRIGINE 100 MG: 100 TABLET ORAL at 08:41

## 2019-07-02 RX ADMIN — POTASSIUM & SODIUM PHOSPHATES POWDER PACK 280-160-250 MG 250 MG: 280-160-250 PACK at 16:49

## 2019-07-02 RX ADMIN — CLOPIDOGREL 75 MG: 75 TABLET, FILM COATED ORAL at 08:41

## 2019-07-02 RX ADMIN — INSULIN GLARGINE 16 UNITS: 100 INJECTION, SOLUTION SUBCUTANEOUS at 08:29

## 2019-07-02 RX ADMIN — INSULIN LISPRO 5 UNITS: 100 INJECTION, SOLUTION INTRAVENOUS; SUBCUTANEOUS at 08:30

## 2019-07-02 RX ADMIN — POTASSIUM & SODIUM PHOSPHATES POWDER PACK 280-160-250 MG 250 MG: 280-160-250 PACK at 08:42

## 2019-07-02 RX ADMIN — ASPIRIN 81 MG: 81 TABLET ORAL at 08:41

## 2019-07-02 RX ADMIN — BUPRENORPHINE HYDROCHLORIDE, NALOXONE HYDROCHLORIDE 1 FILM: 8; 2 FILM, SOLUBLE BUCCAL; SUBLINGUAL at 08:45

## 2019-07-02 RX ADMIN — THALLOUS CHLORIDE TL-201 4 MILLICURIE: 1 INJECTION, POWDER, LYOPHILIZED, FOR SOLUTION INTRAVENOUS at 10:55

## 2019-07-02 RX ADMIN — INSULIN LISPRO 1 UNITS: 100 INJECTION, SOLUTION INTRAVENOUS; SUBCUTANEOUS at 13:03

## 2019-07-02 RX ADMIN — BUPRENORPHINE HYDROCHLORIDE, NALOXONE HYDROCHLORIDE 1 FILM: 8; 2 FILM, SOLUBLE BUCCAL; SUBLINGUAL at 21:37

## 2019-07-02 RX ADMIN — PREGABALIN 50 MG: 50 CAPSULE ORAL at 08:40

## 2019-07-02 RX ADMIN — Medication 10 ML: at 21:39

## 2019-07-02 RX ADMIN — PREGABALIN 50 MG: 50 CAPSULE ORAL at 21:37

## 2019-07-02 RX ADMIN — POTASSIUM & SODIUM PHOSPHATES POWDER PACK 280-160-250 MG 250 MG: 280-160-250 PACK at 13:01

## 2019-07-02 ASSESSMENT — PAIN SCALES - GENERAL
PAINLEVEL_OUTOF10: 0

## 2019-07-03 VITALS
HEIGHT: 70 IN | RESPIRATION RATE: 16 BRPM | SYSTOLIC BLOOD PRESSURE: 91 MMHG | WEIGHT: 121.5 LBS | BODY MASS INDEX: 17.39 KG/M2 | OXYGEN SATURATION: 98 % | DIASTOLIC BLOOD PRESSURE: 65 MMHG | TEMPERATURE: 97.6 F | HEART RATE: 108 BPM

## 2019-07-03 LAB
ALBUMIN SERPL-MCNC: 3.2 G/DL (ref 3.4–5)
ANION GAP SERPL CALCULATED.3IONS-SCNC: 12 MMOL/L (ref 3–16)
BASOPHILS ABSOLUTE: 0 K/UL (ref 0–0.2)
BASOPHILS RELATIVE PERCENT: 0.5 %
BUN BLDV-MCNC: 19 MG/DL (ref 7–20)
CALCIUM SERPL-MCNC: 8.8 MG/DL (ref 8.3–10.6)
CHLORIDE BLD-SCNC: 100 MMOL/L (ref 99–110)
CO2: 28 MMOL/L (ref 21–32)
CREAT SERPL-MCNC: <0.5 MG/DL (ref 0.6–1.1)
EOSINOPHILS ABSOLUTE: 0.1 K/UL (ref 0–0.6)
EOSINOPHILS RELATIVE PERCENT: 1.4 %
GFR AFRICAN AMERICAN: >60
GFR NON-AFRICAN AMERICAN: >60
GLUCOSE BLD-MCNC: 111 MG/DL (ref 70–99)
GLUCOSE BLD-MCNC: 193 MG/DL (ref 70–99)
GLUCOSE BLD-MCNC: 215 MG/DL (ref 70–99)
GLUCOSE BLD-MCNC: 251 MG/DL (ref 70–99)
GLUCOSE BLD-MCNC: 345 MG/DL (ref 70–99)
HCT VFR BLD CALC: 39.6 % (ref 36–48)
HEMOGLOBIN: 13.2 G/DL (ref 12–16)
LYMPHOCYTES ABSOLUTE: 3.3 K/UL (ref 1–5.1)
LYMPHOCYTES RELATIVE PERCENT: 43.5 %
MAGNESIUM: 2 MG/DL (ref 1.8–2.4)
MCH RBC QN AUTO: 29.6 PG (ref 26–34)
MCHC RBC AUTO-ENTMCNC: 33.3 G/DL (ref 31–36)
MCV RBC AUTO: 89 FL (ref 80–100)
MONOCYTES ABSOLUTE: 0.4 K/UL (ref 0–1.3)
MONOCYTES RELATIVE PERCENT: 4.7 %
NEUTROPHILS ABSOLUTE: 3.7 K/UL (ref 1.7–7.7)
NEUTROPHILS RELATIVE PERCENT: 49.9 %
PDW BLD-RTO: 14.2 % (ref 12.4–15.4)
PERFORMED ON: ABNORMAL
PHOSPHORUS: 4.6 MG/DL (ref 2.5–4.9)
PLATELET # BLD: 148 K/UL (ref 135–450)
PMV BLD AUTO: 10.7 FL (ref 5–10.5)
POTASSIUM SERPL-SCNC: 4.8 MMOL/L (ref 3.5–5.1)
RBC # BLD: 4.45 M/UL (ref 4–5.2)
SODIUM BLD-SCNC: 140 MMOL/L (ref 136–145)
WBC # BLD: 7.5 K/UL (ref 4–11)

## 2019-07-03 PROCEDURE — 6370000000 HC RX 637 (ALT 250 FOR IP): Performed by: INTERNAL MEDICINE

## 2019-07-03 PROCEDURE — 6360000002 HC RX W HCPCS: Performed by: INTERNAL MEDICINE

## 2019-07-03 PROCEDURE — 83735 ASSAY OF MAGNESIUM: CPT

## 2019-07-03 PROCEDURE — 2580000003 HC RX 258: Performed by: INTERNAL MEDICINE

## 2019-07-03 PROCEDURE — 99232 SBSQ HOSP IP/OBS MODERATE 35: CPT | Performed by: INTERNAL MEDICINE

## 2019-07-03 PROCEDURE — 85025 COMPLETE CBC W/AUTO DIFF WBC: CPT

## 2019-07-03 PROCEDURE — 6360000002 HC RX W HCPCS: Performed by: PHYSICIAN ASSISTANT

## 2019-07-03 PROCEDURE — 94760 N-INVAS EAR/PLS OXIMETRY 1: CPT

## 2019-07-03 PROCEDURE — 80069 RENAL FUNCTION PANEL: CPT

## 2019-07-03 RX ORDER — METHYLPREDNISOLONE 4 MG/1
4 TABLET ORAL DAILY
Qty: 6 TABLET | Refills: 0 | Status: SHIPPED | OUTPATIENT
Start: 2019-07-04 | End: 2019-07-10

## 2019-07-03 RX ORDER — POLYVINYL ALCOHOL 14 MG/ML
1 SOLUTION/ DROPS OPHTHALMIC PRN
Status: DISCONTINUED | OUTPATIENT
Start: 2019-07-03 | End: 2019-07-03 | Stop reason: HOSPADM

## 2019-07-03 RX ORDER — PANTOPRAZOLE SODIUM 40 MG/1
40 TABLET, DELAYED RELEASE ORAL
Qty: 30 TABLET | Refills: 0 | Status: SHIPPED | OUTPATIENT
Start: 2019-07-04 | End: 2020-05-11 | Stop reason: ALTCHOICE

## 2019-07-03 RX ORDER — NITROGLYCERIN 0.4 MG/1
TABLET SUBLINGUAL
Qty: 25 TABLET | Refills: 3 | Status: SHIPPED | OUTPATIENT
Start: 2019-07-03 | End: 2020-08-04 | Stop reason: SDUPTHER

## 2019-07-03 RX ORDER — BLOOD-GLUCOSE METER
1 KIT MISCELLANEOUS
Qty: 1 KIT | Refills: 0 | Status: SHIPPED | OUTPATIENT
Start: 2019-07-03 | End: 2020-05-11 | Stop reason: ALTCHOICE

## 2019-07-03 RX ORDER — LANCETS 28 GAUGE
1 EACH MISCELLANEOUS
Qty: 100 EACH | Refills: 5 | Status: SHIPPED | OUTPATIENT
Start: 2019-07-03

## 2019-07-03 RX ADMIN — POTASSIUM & SODIUM PHOSPHATES POWDER PACK 280-160-250 MG 250 MG: 280-160-250 PACK at 12:20

## 2019-07-03 RX ADMIN — PREGABALIN 50 MG: 50 CAPSULE ORAL at 08:20

## 2019-07-03 RX ADMIN — PANTOPRAZOLE SODIUM 40 MG: 40 TABLET, DELAYED RELEASE ORAL at 07:10

## 2019-07-03 RX ADMIN — INSULIN LISPRO 4 UNITS: 100 INJECTION, SOLUTION INTRAVENOUS; SUBCUTANEOUS at 08:28

## 2019-07-03 RX ADMIN — Medication 10 ML: at 08:22

## 2019-07-03 RX ADMIN — POTASSIUM & SODIUM PHOSPHATES POWDER PACK 280-160-250 MG 250 MG: 280-160-250 PACK at 17:53

## 2019-07-03 RX ADMIN — CLOPIDOGREL 75 MG: 75 TABLET, FILM COATED ORAL at 08:20

## 2019-07-03 RX ADMIN — INSULIN GLARGINE 16 UNITS: 100 INJECTION, SOLUTION SUBCUTANEOUS at 08:29

## 2019-07-03 RX ADMIN — LAMOTRIGINE 100 MG: 100 TABLET ORAL at 08:21

## 2019-07-03 RX ADMIN — METHYLPREDNISOLONE 4 MG: 4 TABLET ORAL at 07:34

## 2019-07-03 RX ADMIN — MAGNESIUM OXIDE TAB 400 MG (241.3 MG ELEMENTAL MG) 400 MG: 400 (241.3 MG) TAB at 08:23

## 2019-07-03 RX ADMIN — ENOXAPARIN SODIUM 40 MG: 40 INJECTION SUBCUTANEOUS at 08:22

## 2019-07-03 RX ADMIN — METHYLPREDNISOLONE 2 MG: 4 TABLET ORAL at 18:32

## 2019-07-03 RX ADMIN — ASPIRIN 81 MG: 81 TABLET ORAL at 08:20

## 2019-07-03 RX ADMIN — POTASSIUM & SODIUM PHOSPHATES POWDER PACK 280-160-250 MG 250 MG: 280-160-250 PACK at 08:58

## 2019-07-03 RX ADMIN — NALOXEGOL OXALATE 25 MG: 25 TABLET, FILM COATED ORAL at 08:20

## 2019-07-03 RX ADMIN — INSULIN LISPRO 2 UNITS: 100 INJECTION, SOLUTION INTRAVENOUS; SUBCUTANEOUS at 12:06

## 2019-07-03 RX ADMIN — BUPRENORPHINE HYDROCHLORIDE, NALOXONE HYDROCHLORIDE 1 FILM: 8; 2 FILM, SOLUBLE BUCCAL; SUBLINGUAL at 08:22

## 2019-07-03 ASSESSMENT — PAIN SCALES - GENERAL
PAINLEVEL_OUTOF10: 0

## 2019-07-03 NOTE — DISCHARGE SUMMARY
Hospital Medicine Discharge Summary    Patient: Елена Larios     Gender: female  : 1965   Age: 62 y.o. MRN: [id number]    Admitting Physician: Lois Ho MD  Discharge Physician: Dionne Serna MD     Code Status: Full Code     Admit Date: 2019   Discharge Date:   7/3/19    Disposition:  Home    Discharge Diagnoses: Active Hospital Problems    Diagnosis Date Noted    Unstable angina ([facility] [address] [I20.0] 2019    Chronic pain syndrome [G89.4] 2019    Constipation [K59.00] 2019    Uncontrolled type 2 diabetes mellitus with hyperglycemia, with long-term current use of insulin (HCC) [E11.65, Z79.4] 2019    Adrenal insufficiency ([facility] [address] [E27.40] 2019    Severe malnutrition ([facility] [address] [E43] 2019    Abdominal pain [R10.9] 2019    Coronary artery disease involving native coronary artery of native heart without angina pectoris [I25.10] 2018    HTN (hypertension) [I10] 2012       Follow-up appointments:  one week    Outpatient to do list: F/U with PCP, Endocrine and Cardio    Condition at Discharge:  Stable    Hospital Course:   63 yo F with UC DM 2 on insulin with neuropathy and retinopathy, CAD, secondary adrenal insufficiency, CPS on Suboxone, bipolar disorder who p/w abdominal pain. Admitted as inpatient. Followed by Endocrine and Cardiology. [place] to have unstable angina, S/P LHC on 19 with 100% LAD occlusion. NM Viability study on  into 7/3 demonstrated that that PCI not possible. Will continued medical management. F/U with PCP, Cardio and Endocrine. Discharge Medications:   Current Discharge Medication List      START taking these medications    Details   FREESTYLE LANCETS MISC 1 each by Other route 4 times daily (before meals and nightly) Patient to check blood sugar 4 times a day and PRN, he is treated with multiple daily injections of insulin that require correction dosing and has had hypoglycemia.              A1C turgor  Extremity exam shows brisk capillary refill. Peripheral pulses are palpable in lower extremities         Labs: For convenience and continuity at follow-up the following most recent labs are provided:    Lab Results   Component Value Date    WBC 7.5 07/03/2019    HGB 13.2 07/03/2019    HCT 39.6 07/03/2019    MCV 89.0 07/03/2019     07/03/2019     07/03/2019    K 4.8 07/03/2019    K 4.6 06/28/2019     07/03/2019    CO2 28 07/03/2019    BUN 19 07/03/2019    CREATININE <0.5 07/03/2019    CALCIUM 8.8 07/03/2019    PHOS 4.6 07/03/2019    BNP 15 11/10/2013    ALKPHOS 128 06/28/2019    ALT 17 06/28/2019    AST 18 06/28/2019    BILITOT 0.4 06/28/2019    BILIDIR 0.30 11/09/2013    LABALBU 3.2 07/03/2019    LDLCALC 39 07/02/2019    TRIG 119 07/02/2019     Lab Results   Component Value Date    INR 1.05 01/27/2011       Radiology:  Ct Abdomen Pelvis W Iv Contrast Additional Contrast? None    Result Date: 6/28/2019  EXAMINATION: CT OF THE ABDOMEN AND PELVIS WITH CONTRAST 6/28/2019 5:26 pm TECHNIQUE: CT of the abdomen and pelvis was performed with the administration of intravenous contrast. Multiplanar reformatted images are provided for review. Dose modulation, iterative reconstruction, and/or weight based adjustment of the mA/kV was utilized to reduce the radiation dose to as low as reasonably achievable. COMPARISON: Prior studies including most recent 02/15/2019 HISTORY: ORDERING SYSTEM PROVIDED HISTORY: abd pain LLQ TECHNOLOGIST PROVIDED HISTORY: Additional Contrast?->None Ordering Physician Provided Reason for Exam: abd pain LLQ Acuity: Unknown Type of Exam: Unknown Abdominal pain, nausea and vomiting for the past 2 days. History of cholecystectomy November 2018, liver biopsy November 2018 FINDINGS: Lower Chest: The pendant lower lobe airspace disease likely represents atelectasis. 4 mm noncalcified right middle lobe nodule is unchanged since November 2013 CT chest Organs:  The liver, spleen,

## 2019-07-03 NOTE — PROGRESS NOTES
Per Frederic Randle to d/c pt. MD stated to have hospitalist notified to complete d/c documentation. Dr. Marlene Griffin notified per [employer].     Brecken Floyde Ken  7/3/2019

## 2019-07-03 NOTE — PROGRESS NOTES
[facility] Education Nurse  Consult Note       NAME:  Dori Viveros  MEDICAL RECORD NUMBER:  [id number]  AGE: 62 y.o. GENDER: female  : 1965  TODAY'S DATE:  7/3/2019    Subjective:     Reason for Educator consult ---  Evaluation and Assessment:    Dori Viveros is a 62 y.o. female referred by:   [] Physician  [x] Nursing  [] Other:      Patient states diagnosed with diabetes 17 years ago. Patient was previously a pain pill addict, has been clean for two years. Patient reports high stress, was living in a motel up until a month ago, reports improving stress. Glucometer has been lost since the move, patient reports needing new glucometer, strips, and lancets. Patient has enough pen needles, but is requesting pens instead of vial and syringe due to past history. Patient appreciated endocrinology consult this admission, and plans to follow with Dr. Hector Lopez after discharge with hopes for an insulin pump. A1C is 14.5- patient reports she follows a carb control diet at home, takes her insulin as prescribed, and was checking her blood sugar 3x a day before she lost her glucometer, blood sugars reading in the 300s and 400s. Endocrinology is consulted, previously adjusted patient's insulin. Patient given booklet of written material regarding diabetes titled \"Living Well With Diabetes\". PAST MEDICAL HISTORY      Diagnosis Date    Arthritis     [facility]congestive heart failure) ([facility] [address]     Depression     Diabetes mellitus (HCC)     Hepatitis C     Dr Olga Vitale follows; contracted from ex- (drug user)    Hypertension     MRSA (methicillin resistant Staphylococcus aureus) infection in     was in urine and nares    Pneumonia     S/P colonoscopy with polypectomy     one polyp, Dr Olga Vitale. Recall 3 years.        PAST SURGICAL HISTORY  Past Surgical History:   Procedure Laterality Date    ABOVE KNEE AMPUTATION      AMPUTATION      right bka    JOINT REPLACEMENT  ,     right knee; appointment.

## 2019-07-03 NOTE — PROGRESS NOTES
lamoTRIgine (LAMICTAL) tablet 100 mg Daily   mirtazapine (REMERON) tablet 30 mg Nightly   pantoprazole (PROTONIX) tablet 40 mg QAM AC   sodium chloride flush 0.9 % injection 10 mL 2 times per day   sodium chloride flush 0.9 % injection 10 mL PRN   ondansetron (ZOFRAN) injection 4 mg Q6H PRN   enoxaparin (LOVENOX) injection 40 mg Daily   glucose (GLUTOSE) 40 % oral gel 15 g PRN   dextrose 50 % IV solution PRN   glucagon (rDNA) injection 1 mg PRN   dextrose 5 % solution PRN       Objective:  /69   Pulse 98   Temp 97.3 °F (36.3 °C) (Temporal)   Resp 13   Ht 5' 10\" (1.778 m)   Wt 121 lb 8 oz (55.1 kg)   LMP 11/15/2017   SpO2 98%   BMI 17.43 kg/m²     Intake/Output Summary (Last 24 hours) at 7/2/2019 2345  Last data filed at 7/2/2019 0445  Gross per 24 hour   Intake 240 ml   Output --   Net 240 ml      Wt Readings from Last 3 Encounters:   06/28/19 121 lb 8 oz (55.1 kg)   02/24/19 146 lb 13.2 oz (66.6 kg)   11/16/18 115 lb (52.2 kg)       General appearance:  Appears comfortable, pleasant, NAD, sitting in chair  Eyes: Sclera clear. Pupils equal.  ENT: Moist oral mucosa. Trachea midline, no adenopathy. Cardiovascular: Regular rhythm, normal S1, S2. No murmur. No edema in lower extremities  Respiratory: Not using accessory muscles. Good inspiratory effort. Clear to auscultation bilaterally, no wheeze or crackles. GI: Abdomen soft, no tenderness, not distended, normal bowel sounds  Musculoskeletal: R AKA  Neurology: Grossly intact. No speech or motor deficits  Psych: Normal affect. Alert and oriented in time, place and person  Skin: Warm, dry, normal turgor  Extremity exam shows brisk capillary refill.   Peripheral pulses are palpable in lower extremities     Labs and Tests:  CBC:   Recent Labs     06/30/19  0608 07/01/19  0232 07/02/19  0528   WBC 6.9 5.5 6.1   HGB 14.1 12.9 12.6    159 155     BMP:    Recent Labs     06/30/19  0608 07/01/19  0232 07/02/19  0528    137 143   K 4.3 4.5 5.1  105 104   CO2 24 23 29   BUN 18 12 17   CREATININE <0.5* <0.5* 0.6   GLUCOSE 107* 279* 330*     Hepatic:   No results for input(s): AST, ALT, ALB, BILITOT, ALKPHOS in the last 72 hours. CT ABDOMEN PELVIS W IV CONTRAST Additional Contrast? None   Final Result   No evidence of acute intra-abdominopelvic inflammatory process or fluid   collection. NM MYOCARDIAL VIABILITY    (Results Pending)         Problem List  Principal Problem:    Unstable angina (HCC)  Active Problems:    HTN (hypertension)    Coronary artery disease involving native coronary artery of native heart without angina pectoris    Abdominal pain    Adrenal insufficiency (HCC)    Severe malnutrition (HCC)    Chronic pain syndrome    Constipation    Uncontrolled type 2 diabetes mellitus with hyperglycemia, with long-term current use of insulin ([facility] [address]  Resolved Problems:    * No resolved hospital problems. *       Assessment & Plan:   1. F/U NM Viability study  2. Cont Movantik for opiate induced constipation  3. Cont Medrol per Endocrine  4. Cont Lantus 16 U bid and Humalog 9 U with meals  5. Cont ASA, Plavix, Crestor. Add B-blocker when ok with Cardio  6. Resumed Miralax bid    IV Access: Peripheral  Pulido: No  Diet: DIET CARDIAC;  Code:Full Code  DVT PPX Lovenox  Disposition Home    Discussed with patient, nursing and CM. [facility] tomorrow is possible. Will see what Cardio thinks. Home when ok with Cardiology.       Rissa Mccormick MD   7/2/2019 11:45 PM

## 2019-07-03 NOTE — PROGRESS NOTES
Shift assessment complete. VSS. Pt denies any pain at this time. Call light within reach, non skid sock on, bed alarm engaged. R groin site CDI. Pt denies any needs at this time. Will continue to monitor.

## 2019-07-03 NOTE — PROGRESS NOTES
Nutrition Assessment    Type and Reason for Visit: Reassess    Nutrition Recommendations:   1. Continue Cardiac diet  2. Encourage po  3. Should po decline, restart supplement- Ensure Enlive    Nutrition Assessment: Pt reports improved appetite. PO has increased to % at meals. Pt had been receiving supplement and liked it, however it was d/c'ed after being NPO. Pt declines restart of supplement. Pt denies any current n/v. Pt feeling better and wt stable at 121lbs. Although pt appetite has increased, pt remains nutritionally compromised d/t previous poor intake and wt change of -17% in 5 months. Malnutrition Assessment:  · Malnutrition Status: Meets the criteria for severe malnutrition  · Context: Chronic illness  · Findings of the 6 clinical characteristics of malnutrition (Minimum of 2 out of 6 clinical characteristics is required to make the diagnosis of moderate or severe Protein Calorie Malnutrition based on AND/ASPEN Guidelines):  1. Energy Intake-Less than or equal to 75% of estimated energy requirement, Greater than or equal to 3 months    2. Weight Loss-10% loss or greater(-17%),    3. Fat Loss-Mild subcutaneous fat loss, Orbital  4. Muscle Loss-Mild muscle mass loss, Temples (temporalis muscle), Clavicles (pectoralis and deltoids)  5. Fluid Accumulation-No significant fluid accumulation,    6.  Strength-Not measured    Nutrition Risk Level: High    Nutrient Needs:  · Estimated Daily Total Kcal: 9626-7097  · Estimated Daily Protein (g): 102gms(1.5gms)  · Estimated Daily Total Fluid (ml/day): 1 ml/kcal    Nutrition Diagnosis:   · Problem: Severe malnutrition  · Etiology: related to Insufficient energy/nutrient consumption     Signs and symptoms:  as evidenced by Diet history of poor intake, Weight loss greater than or equal to 10% in 6 months(and alternating po of 50-75% to %)    Objective Information:  · Nutrition-Focused Physical Findings: No edema.  I/O's: +655 ml  · Wound Type:

## 2019-07-03 NOTE — PROGRESS NOTES
Discharge paperwork reviewed with pt. Answered questions. PIV removed, pt disconnected from heart monitor. Awaiting pt ride to d/c to home.     Scot Clay  7/3/2019

## 2019-07-03 NOTE — PROGRESS NOTES
concerning   Hx 6/18   SVPCI LAD for STEMI   [facility] 6/18   LM-nl, LAD 90% mid, Cx nl, RCA nl  PCI of LAD with Synergy [id number] PD to 3.0   MPI     None   TTE 12/11 6/18 55%  35%    Anterior akinesis, apical akinesis    Trop     0.03-0.04-0.03   Plan     Viability study pending  If anteroapical wall viable, consider PCI   ~Hep C/IVDU  Per GI/med  ~HTN  Today BP Controlled   Counseling Counseled on diet/salt, exercise and ideal body weight   Plan Continue current meds at doses above   ~Hyperchol  Goal LDL <70   Counseling Counseled on diet, exercise and ideal body weight   Plan Continue statin

## 2019-07-03 NOTE — PLAN OF CARE
Problem: Falls - Risk of:  Goal: Will remain free from falls  Description  Will remain free from falls  7/3/2019 0846 by Donte Forrester RN  Outcome: Ongoing  Note:   Pt is medium fall risk, see Jack score. Pt A&O x4, independent in ADLs, has right AKA, pt able to safely stand and pivot for transfers independently. Non-skid footwear on left foot. Instructed pt to call with needs or for assistance when necessary. Expressed understanding. Call light and personal belongings within reach. Will continue to monitor. Problem: Pain:  Description  Pain management should include both nonpharmacologic and pharmacologic interventions. Goal: Pain level will decrease  Description  Pain level will decrease  Outcome: Ongoing  Note:   Pt denies pain so far this AM.  Pt able to appropriately identify and properly notify RN if pain occurs. Will continue to monitor. Problem: Serum Glucose Level - Abnormal:  Goal: Ability to maintain appropriate glucose levels will improve  Description  Ability to maintain appropriate glucose levels will improve  7/3/2019 0846 by Donte Forrester RN  Outcome: Ongoing  Note:   AM blood glucose 345. Insulin given as ordered, see MAR. Will continue to monitor blood sugar ACHS. Diabetes educator consulted. Endocrinology on board. Will continue to monitor.

## 2019-09-11 ENCOUNTER — APPOINTMENT (OUTPATIENT)
Dept: GENERAL RADIOLOGY | Age: 54
DRG: 145 | End: 2019-09-11
Payer: COMMERCIAL

## 2019-09-11 ENCOUNTER — HOSPITAL ENCOUNTER (INPATIENT)
Age: 54
LOS: 1 days | Discharge: HOME | DRG: 145 | End: 2019-09-12
Attending: EMERGENCY MEDICINE | Admitting: INTERNAL MEDICINE
Payer: COMMERCIAL

## 2019-09-11 ENCOUNTER — APPOINTMENT (OUTPATIENT)
Dept: CT IMAGING | Age: 54
DRG: 145 | End: 2019-09-11
Payer: COMMERCIAL

## 2019-09-11 DIAGNOSIS — R07.9 ACUTE CHEST PAIN: ICD-10-CM

## 2019-09-11 DIAGNOSIS — J06.9 VIRAL UPPER RESPIRATORY ILLNESS: Primary | ICD-10-CM

## 2019-09-11 PROBLEM — J96.21 ACUTE ON CHRONIC RESPIRATORY FAILURE WITH HYPOXIA (HCC): Status: ACTIVE | Noted: 2019-09-11

## 2019-09-11 LAB
A/G RATIO: 0.9 (ref 1.1–2.2)
ALBUMIN SERPL-MCNC: 3.9 G/DL (ref 3.4–5)
ALP BLD-CCNC: 134 U/L (ref 40–129)
ALT SERPL-CCNC: 22 U/L (ref 10–40)
ANION GAP SERPL CALCULATED.3IONS-SCNC: 12 MMOL/L (ref 3–16)
AST SERPL-CCNC: 27 U/L (ref 15–37)
BASE EXCESS ARTERIAL: -1.1 MMOL/L (ref -3–3)
BASOPHILS ABSOLUTE: 0 K/UL (ref 0–0.2)
BASOPHILS RELATIVE PERCENT: 0.4 %
BETA-HYDROXYBUTYRATE: 0.3 MMOL/L (ref 0–0.27)
BILIRUB SERPL-MCNC: 0.4 MG/DL (ref 0–1)
BILIRUBIN URINE: NEGATIVE
BLOOD, URINE: NEGATIVE
BUN BLDV-MCNC: 20 MG/DL (ref 7–20)
CALCIUM SERPL-MCNC: 9.1 MG/DL (ref 8.3–10.6)
CARBOXYHEMOGLOBIN ARTERIAL: 0.1 % (ref 0–1.5)
CHLORIDE BLD-SCNC: 99 MMOL/L (ref 99–110)
CLARITY: CLEAR
CO2: 23 MMOL/L (ref 21–32)
COLOR: YELLOW
CREAT SERPL-MCNC: 0.6 MG/DL (ref 0.6–1.1)
EKG ATRIAL RATE: 94 BPM
EKG DIAGNOSIS: NORMAL
EKG P AXIS: 31 DEGREES
EKG P-R INTERVAL: 134 MS
EKG Q-T INTERVAL: 352 MS
EKG QRS DURATION: 80 MS
EKG QTC CALCULATION (BAZETT): 440 MS
EKG R AXIS: -65 DEGREES
EKG T AXIS: 48 DEGREES
EKG VENTRICULAR RATE: 94 BPM
EOSINOPHILS ABSOLUTE: 0.1 K/UL (ref 0–0.6)
EOSINOPHILS RELATIVE PERCENT: 0.9 %
GFR AFRICAN AMERICAN: >60
GFR NON-AFRICAN AMERICAN: >60
GLOBULIN: 4.3 G/DL
GLUCOSE BLD-MCNC: 186 MG/DL (ref 70–99)
GLUCOSE BLD-MCNC: 328 MG/DL (ref 70–99)
GLUCOSE BLD-MCNC: 354 MG/DL (ref 70–99)
GLUCOSE BLD-MCNC: 632 MG/DL (ref 70–99)
GLUCOSE URINE: >=1000 MG/DL
HCO3 ARTERIAL: 22.7 MMOL/L (ref 21–29)
HCT VFR BLD CALC: 48.7 % (ref 36–48)
HEMOGLOBIN, ART, EXTENDED: 14 G/DL (ref 12–16)
HEMOGLOBIN: 15.9 G/DL (ref 12–16)
KETONES, URINE: NEGATIVE MG/DL
LEUKOCYTE ESTERASE, URINE: NEGATIVE
LIPASE: 13 U/L (ref 13–60)
LYMPHOCYTES ABSOLUTE: 2.8 K/UL (ref 1–5.1)
LYMPHOCYTES RELATIVE PERCENT: 29.6 %
MAGNESIUM: 2 MG/DL (ref 1.8–2.4)
MCH RBC QN AUTO: 29.9 PG (ref 26–34)
MCHC RBC AUTO-ENTMCNC: 32.8 G/DL (ref 31–36)
MCV RBC AUTO: 91.3 FL (ref 80–100)
METHEMOGLOBIN ARTERIAL: 0.2 %
MICROSCOPIC EXAMINATION: ABNORMAL
MONOCYTES ABSOLUTE: 0.5 K/UL (ref 0–1.3)
MONOCYTES RELATIVE PERCENT: 5.6 %
NEUTROPHILS ABSOLUTE: 6.1 K/UL (ref 1.7–7.7)
NEUTROPHILS RELATIVE PERCENT: 63.5 %
NITRITE, URINE: NEGATIVE
O2 CONTENT ARTERIAL: 19 ML/DL
O2 SAT, ARTERIAL: 94.4 %
O2 THERAPY: ABNORMAL
PCO2 ARTERIAL: 34.6 MMHG (ref 35–45)
PDW BLD-RTO: 13.2 % (ref 12.4–15.4)
PERFORMED ON: ABNORMAL
PH ARTERIAL: 7.43 (ref 7.35–7.45)
PH UA: 6 (ref 5–8)
PLATELET # BLD: 112 K/UL (ref 135–450)
PMV BLD AUTO: 11.4 FL (ref 5–10.5)
PO2 ARTERIAL: 80.2 MMHG (ref 75–108)
POTASSIUM REFLEX MAGNESIUM: 3.5 MMOL/L (ref 3.5–5.1)
PROCALCITONIN: 0.08 NG/ML (ref 0–0.15)
PROTEIN UA: NEGATIVE MG/DL
RBC # BLD: 5.33 M/UL (ref 4–5.2)
SODIUM BLD-SCNC: 134 MMOL/L (ref 136–145)
SPECIFIC GRAVITY UA: >1.03 (ref 1–1.03)
TCO2 ARTERIAL: 53.2 MMOL/L
TOTAL PROTEIN: 8.2 G/DL (ref 6.4–8.2)
TROPONIN: <0.01 NG/ML
TROPONIN: <0.01 NG/ML
URINE REFLEX TO CULTURE: ABNORMAL
URINE TYPE: ABNORMAL
UROBILINOGEN, URINE: 0.2 E.U./DL
WBC # BLD: 9.6 K/UL (ref 4–11)

## 2019-09-11 PROCEDURE — 80053 COMPREHEN METABOLIC PANEL: CPT

## 2019-09-11 PROCEDURE — 96361 HYDRATE IV INFUSION ADD-ON: CPT

## 2019-09-11 PROCEDURE — 84145 PROCALCITONIN (PCT): CPT

## 2019-09-11 PROCEDURE — 83036 HEMOGLOBIN GLYCOSYLATED A1C: CPT

## 2019-09-11 PROCEDURE — 6370000000 HC RX 637 (ALT 250 FOR IP): Performed by: EMERGENCY MEDICINE

## 2019-09-11 PROCEDURE — 2580000003 HC RX 258: Performed by: EMERGENCY MEDICINE

## 2019-09-11 PROCEDURE — 6370000000 HC RX 637 (ALT 250 FOR IP): Performed by: INTERNAL MEDICINE

## 2019-09-11 PROCEDURE — 96375 TX/PRO/DX INJ NEW DRUG ADDON: CPT

## 2019-09-11 PROCEDURE — 36415 COLL VENOUS BLD VENIPUNCTURE: CPT

## 2019-09-11 PROCEDURE — 93005 ELECTROCARDIOGRAM TRACING: CPT | Performed by: EMERGENCY MEDICINE

## 2019-09-11 PROCEDURE — 85025 COMPLETE CBC W/AUTO DIFF WBC: CPT

## 2019-09-11 PROCEDURE — 6360000002 HC RX W HCPCS: Performed by: INTERNAL MEDICINE

## 2019-09-11 PROCEDURE — 6360000002 HC RX W HCPCS: Performed by: EMERGENCY MEDICINE

## 2019-09-11 PROCEDURE — 2060000000 HC ICU INTERMEDIATE R&B

## 2019-09-11 PROCEDURE — 2580000003 HC RX 258: Performed by: INTERNAL MEDICINE

## 2019-09-11 PROCEDURE — 36600 WITHDRAWAL OF ARTERIAL BLOOD: CPT

## 2019-09-11 PROCEDURE — 83735 ASSAY OF MAGNESIUM: CPT

## 2019-09-11 PROCEDURE — 83690 ASSAY OF LIPASE: CPT

## 2019-09-11 PROCEDURE — 82010 KETONE BODYS QUAN: CPT

## 2019-09-11 PROCEDURE — 84484 ASSAY OF TROPONIN QUANT: CPT

## 2019-09-11 PROCEDURE — 82803 BLOOD GASES ANY COMBINATION: CPT

## 2019-09-11 PROCEDURE — 6360000004 HC RX CONTRAST MEDICATION: Performed by: EMERGENCY MEDICINE

## 2019-09-11 PROCEDURE — 99285 EMERGENCY DEPT VISIT HI MDM: CPT

## 2019-09-11 PROCEDURE — 93010 ELECTROCARDIOGRAM REPORT: CPT | Performed by: INTERNAL MEDICINE

## 2019-09-11 PROCEDURE — 81003 URINALYSIS AUTO W/O SCOPE: CPT

## 2019-09-11 PROCEDURE — 94761 N-INVAS EAR/PLS OXIMETRY MLT: CPT

## 2019-09-11 PROCEDURE — 71046 X-RAY EXAM CHEST 2 VIEWS: CPT

## 2019-09-11 PROCEDURE — 71260 CT THORAX DX C+: CPT

## 2019-09-11 PROCEDURE — 96374 THER/PROPH/DIAG INJ IV PUSH: CPT

## 2019-09-11 PROCEDURE — 94640 AIRWAY INHALATION TREATMENT: CPT

## 2019-09-11 RX ORDER — BUPRENORPHINE HYDROCHLORIDE AND NALOXONE HYDROCHLORIDE DIHYDRATE 8; 2 MG/1; MG/1
1 TABLET SUBLINGUAL 2 TIMES DAILY
COMMUNITY

## 2019-09-11 RX ORDER — PANTOPRAZOLE SODIUM 40 MG/1
40 TABLET, DELAYED RELEASE ORAL
Status: DISCONTINUED | OUTPATIENT
Start: 2019-09-12 | End: 2019-09-12 | Stop reason: HOSPADM

## 2019-09-11 RX ORDER — METHYLPREDNISOLONE 4 MG/1
4 TABLET ORAL DAILY
Status: ON HOLD | COMMUNITY
End: 2019-09-12 | Stop reason: HOSPADM

## 2019-09-11 RX ORDER — SODIUM CHLORIDE 9 MG/ML
INJECTION, SOLUTION INTRAVENOUS CONTINUOUS
Status: DISCONTINUED | OUTPATIENT
Start: 2019-09-11 | End: 2019-09-12

## 2019-09-11 RX ORDER — MIRTAZAPINE 15 MG/1
35 TABLET, FILM COATED ORAL NIGHTLY
Status: DISCONTINUED | OUTPATIENT
Start: 2019-09-11 | End: 2019-09-12 | Stop reason: HOSPADM

## 2019-09-11 RX ORDER — DEXTROSE MONOHYDRATE 50 MG/ML
100 INJECTION, SOLUTION INTRAVENOUS PRN
Status: DISCONTINUED | OUTPATIENT
Start: 2019-09-11 | End: 2019-09-12 | Stop reason: HOSPADM

## 2019-09-11 RX ORDER — ONDANSETRON 2 MG/ML
4 INJECTION INTRAMUSCULAR; INTRAVENOUS EVERY 6 HOURS PRN
Status: DISCONTINUED | OUTPATIENT
Start: 2019-09-11 | End: 2019-09-12 | Stop reason: HOSPADM

## 2019-09-11 RX ORDER — PREGABALIN 75 MG/1
75 CAPSULE ORAL 3 TIMES DAILY
COMMUNITY
End: 2020-05-11 | Stop reason: ALTCHOICE

## 2019-09-11 RX ORDER — GUAIFENESIN 600 MG/1
600 TABLET, EXTENDED RELEASE ORAL ONCE
Status: COMPLETED | OUTPATIENT
Start: 2019-09-11 | End: 2019-09-11

## 2019-09-11 RX ORDER — 0.9 % SODIUM CHLORIDE 0.9 %
1000 INTRAVENOUS SOLUTION INTRAVENOUS ONCE
Status: COMPLETED | OUTPATIENT
Start: 2019-09-11 | End: 2019-09-11

## 2019-09-11 RX ORDER — ALBUTEROL SULFATE 2.5 MG/3ML
2.5 SOLUTION RESPIRATORY (INHALATION)
Status: DISCONTINUED | OUTPATIENT
Start: 2019-09-11 | End: 2019-09-12 | Stop reason: HOSPADM

## 2019-09-11 RX ORDER — PREGABALIN 75 MG/1
75 CAPSULE ORAL 3 TIMES DAILY
Status: DISCONTINUED | OUTPATIENT
Start: 2019-09-11 | End: 2019-09-12 | Stop reason: HOSPADM

## 2019-09-11 RX ORDER — DEXTROSE MONOHYDRATE 25 G/50ML
12.5 INJECTION, SOLUTION INTRAVENOUS PRN
Status: DISCONTINUED | OUTPATIENT
Start: 2019-09-11 | End: 2019-09-12 | Stop reason: HOSPADM

## 2019-09-11 RX ORDER — LAMOTRIGINE 100 MG/1
100 TABLET ORAL DAILY
Status: DISCONTINUED | OUTPATIENT
Start: 2019-09-11 | End: 2019-09-12 | Stop reason: HOSPADM

## 2019-09-11 RX ORDER — BUPRENORPHINE AND NALOXONE 8; 2 MG/1; MG/1
1 FILM, SOLUBLE BUCCAL; SUBLINGUAL 2 TIMES DAILY
Status: DISCONTINUED | OUTPATIENT
Start: 2019-09-11 | End: 2019-09-12 | Stop reason: HOSPADM

## 2019-09-11 RX ORDER — METHYLPREDNISOLONE 4 MG/1
2 TABLET ORAL EVERY EVENING
Status: ON HOLD | COMMUNITY
End: 2020-02-22 | Stop reason: HOSPADM

## 2019-09-11 RX ORDER — ONDANSETRON 2 MG/ML
4 INJECTION INTRAMUSCULAR; INTRAVENOUS ONCE
Status: COMPLETED | OUTPATIENT
Start: 2019-09-11 | End: 2019-09-11

## 2019-09-11 RX ORDER — MORPHINE SULFATE 4 MG/ML
4 INJECTION, SOLUTION INTRAMUSCULAR; INTRAVENOUS ONCE
Status: COMPLETED | OUTPATIENT
Start: 2019-09-11 | End: 2019-09-11

## 2019-09-11 RX ORDER — POTASSIUM CHLORIDE 20 MEQ/1
40 TABLET, EXTENDED RELEASE ORAL 2 TIMES DAILY WITH MEALS
Status: DISCONTINUED | OUTPATIENT
Start: 2019-09-11 | End: 2019-09-11

## 2019-09-11 RX ORDER — CLOPIDOGREL BISULFATE 75 MG/1
75 TABLET ORAL DAILY
Status: DISCONTINUED | OUTPATIENT
Start: 2019-09-12 | End: 2019-09-12 | Stop reason: HOSPADM

## 2019-09-11 RX ORDER — MIRTAZAPINE 15 MG/1
TABLET, FILM COATED ORAL
Status: DISPENSED
Start: 2019-09-11 | End: 2019-09-12

## 2019-09-11 RX ORDER — ATORVASTATIN CALCIUM 40 MG/1
40 TABLET, FILM COATED ORAL DAILY
Status: DISCONTINUED | OUTPATIENT
Start: 2019-09-12 | End: 2019-09-12 | Stop reason: HOSPADM

## 2019-09-11 RX ORDER — NICOTINE POLACRILEX 4 MG
15 LOZENGE BUCCAL PRN
Status: DISCONTINUED | OUTPATIENT
Start: 2019-09-11 | End: 2019-09-12 | Stop reason: HOSPADM

## 2019-09-11 RX ORDER — ASPIRIN 81 MG/1
81 TABLET ORAL DAILY
Status: DISCONTINUED | OUTPATIENT
Start: 2019-09-12 | End: 2019-09-12 | Stop reason: HOSPADM

## 2019-09-11 RX ORDER — ACETAMINOPHEN 325 MG/1
650 TABLET ORAL EVERY 4 HOURS PRN
Status: DISCONTINUED | OUTPATIENT
Start: 2019-09-11 | End: 2019-09-12 | Stop reason: HOSPADM

## 2019-09-11 RX ORDER — SODIUM CHLORIDE 0.9 % (FLUSH) 0.9 %
10 SYRINGE (ML) INJECTION PRN
Status: DISCONTINUED | OUTPATIENT
Start: 2019-09-11 | End: 2019-09-12 | Stop reason: HOSPADM

## 2019-09-11 RX ORDER — SODIUM CHLORIDE 0.9 % (FLUSH) 0.9 %
10 SYRINGE (ML) INJECTION EVERY 12 HOURS SCHEDULED
Status: DISCONTINUED | OUTPATIENT
Start: 2019-09-11 | End: 2019-09-12 | Stop reason: HOSPADM

## 2019-09-11 RX ORDER — IPRATROPIUM BROMIDE AND ALBUTEROL SULFATE 2.5; .5 MG/3ML; MG/3ML
1 SOLUTION RESPIRATORY (INHALATION)
Status: DISCONTINUED | OUTPATIENT
Start: 2019-09-11 | End: 2019-09-12 | Stop reason: HOSPADM

## 2019-09-11 RX ORDER — CALCIUM CARBONATE 200(500)MG
1 TABLET,CHEWABLE ORAL DAILY
Status: DISCONTINUED | OUTPATIENT
Start: 2019-09-12 | End: 2019-09-12 | Stop reason: HOSPADM

## 2019-09-11 RX ADMIN — LAMOTRIGINE 100 MG: 100 TABLET ORAL at 19:05

## 2019-09-11 RX ADMIN — INSULIN LISPRO 8 UNITS: 100 INJECTION, SOLUTION INTRAVENOUS; SUBCUTANEOUS at 19:06

## 2019-09-11 RX ADMIN — SODIUM CHLORIDE 1000 ML: 9 INJECTION, SOLUTION INTRAVENOUS at 18:29

## 2019-09-11 RX ADMIN — IOPAMIDOL 75 ML: 755 INJECTION, SOLUTION INTRAVENOUS at 16:03

## 2019-09-11 RX ADMIN — ACETAMINOPHEN 650 MG: 325 TABLET, FILM COATED ORAL at 18:12

## 2019-09-11 RX ADMIN — INSULIN LISPRO 1 UNITS: 100 INJECTION, SOLUTION INTRAVENOUS; SUBCUTANEOUS at 22:58

## 2019-09-11 RX ADMIN — Medication 10 ML: at 22:47

## 2019-09-11 RX ADMIN — IPRATROPIUM BROMIDE AND ALBUTEROL SULFATE 1 AMPULE: .5; 3 SOLUTION RESPIRATORY (INHALATION) at 21:49

## 2019-09-11 RX ADMIN — GUAIFENESIN 600 MG: 600 TABLET, EXTENDED RELEASE ORAL at 19:05

## 2019-09-11 RX ADMIN — MIRTAZAPINE 37.5 MG: 15 TABLET, FILM COATED ORAL at 22:42

## 2019-09-11 RX ADMIN — ENOXAPARIN SODIUM 40 MG: 40 INJECTION SUBCUTANEOUS at 22:47

## 2019-09-11 RX ADMIN — PREGABALIN 75 MG: 75 CAPSULE ORAL at 22:46

## 2019-09-11 RX ADMIN — INSULIN GLARGINE 20 UNITS: 100 INJECTION, SOLUTION SUBCUTANEOUS at 22:59

## 2019-09-11 RX ADMIN — MORPHINE SULFATE 4 MG: 4 INJECTION INTRAVENOUS at 12:50

## 2019-09-11 RX ADMIN — SODIUM CHLORIDE: 9 INJECTION, SOLUTION INTRAVENOUS at 18:50

## 2019-09-11 RX ADMIN — ONDANSETRON 4 MG: 2 INJECTION INTRAMUSCULAR; INTRAVENOUS at 12:48

## 2019-09-11 RX ADMIN — SODIUM CHLORIDE 1000 ML: 9 INJECTION, SOLUTION INTRAVENOUS at 12:48

## 2019-09-11 RX ADMIN — INSULIN LISPRO 10 UNITS: 100 INJECTION, SOLUTION INTRAVENOUS; SUBCUTANEOUS at 19:08

## 2019-09-11 RX ADMIN — INSULIN HUMAN 6 UNITS: 100 INJECTION, SOLUTION PARENTERAL at 13:25

## 2019-09-11 RX ADMIN — BUPRENORPHINE HYDROCHLORIDE, NALOXONE HYDROCHLORIDE 1 FILM: 8; 2 FILM, SOLUBLE BUCCAL; SUBLINGUAL at 22:46

## 2019-09-11 ASSESSMENT — PAIN SCALES - GENERAL
PAINLEVEL_OUTOF10: 7
PAINLEVEL_OUTOF10: 0
PAINLEVEL_OUTOF10: 7
PAINLEVEL_OUTOF10: 7

## 2019-09-11 ASSESSMENT — PAIN DESCRIPTION - PAIN TYPE
TYPE: ACUTE PAIN
TYPE: ACUTE PAIN

## 2019-09-11 ASSESSMENT — PAIN DESCRIPTION - LOCATION
LOCATION: CHEST
LOCATION: BREAST

## 2019-09-11 ASSESSMENT — PAIN DESCRIPTION - DESCRIPTORS: DESCRIPTORS: PRESSURE

## 2019-09-11 ASSESSMENT — PAIN - FUNCTIONAL ASSESSMENT: PAIN_FUNCTIONAL_ASSESSMENT: ACTIVITIES ARE NOT PREVENTED

## 2019-09-11 ASSESSMENT — HEART SCORE: ECG: 1

## 2019-09-11 NOTE — H&P
sputum, wheezing without fever or chills. Symptoms have been present for about 2 days. She was recently prescribed steroid, presents to the emergency room with blood glucose greater than 600 today. She has had some headache with cough. Chest x-ray was unremarkable in the emergency room. She was noted to be hypoxic with oxygen saturation as low as 82% on room air during ER evaluation. Past Medical History:      Diagnosis Date    Arthritis     CHF (congestive heart failure) ([address]     Depression     Diabetes mellitus (HCC)     Hepatitis C     Dr Lurline Schirmer follows; contracted from ex- (drug user)    Hypertension     MRSA (methicillin resistant Staphylococcus aureus) infection in 2000    was in urine and nares    Pneumonia     S/P colonoscopy with polypectomy 2/11    one polyp, Dr Lurline Schirmer. Recall 3 years. Past Surgical History:      Procedure Laterality Date    ABOVE KNEE AMPUTATION      AMPUTATION      right bka    JOINT REPLACEMENT  2006, 2010    right knee; Dr Luis E Read.  KNEE ARTHROSCOPY      ridht knee X6    LIVER BIOPSY N/A 11/14/2018    LIVER BIOPSY LAPAROSCOPIC performed by Rafal Adam MD at [address] [place] [address] N/A 11/14/2018    LAPAROSCOPIC CHOLECYSTECTOMY WITH INTRAOPERATIVE CHOLANGIOGRAM performed by Rafal Adam MD at [address] [place] [address]  2/9/11    right    UPPER GASTROINTESTINAL ENDOSCOPY  11/13/2018    UPPER GASTROINTESTINAL ENDOSCOPY N/A 11/13/2018    EGD BIOPSY performed by Lexy Herron MD at [address] [place] [address]       Medications (prior to admission):  Prior to Admission medications    Medication Sig Start Date End Date Taking? Authorizing Provider   pregabalin (LYRICA) 75 MG capsule Take 75 mg by mouth 3 times daily.    Yes Historical Provider, MD   insulin detemir (LEVEMIR FLEXTOUCH) 100 UNIT/ML injection pen Inject 15 Units into the skin 2 times daily   Yes Historical Provider, MD   methylPREDNISolone (MEDROL) 4 MG tablet Take 4 mg by mouth daily   Yes Historical Provider, MD   methylPREDNISolone (MEDROL) 4 MG tablet Take 2 mg by mouth every evening   Yes Historical Provider, MD   buprenorphine-naloxone (SUBOXONE) 8-2 MG SUBL SL tablet Place 1 tablet under the tongue 2 times daily. Yes Historical Provider, MD   pantoprazole (PROTONIX) 40 MG tablet Take 1 tablet by mouth every morning (before breakfast) 7/4/19  Yes Jean Carlos Romeo MD   mirtazapine (REMERON SOLTAB) 30 MG disintegrating tablet Take 1 tablet by mouth nightly 2/24/19  Yes Reina Wagoner MD   insulin lispro (HUMALOG) 100 UNIT/ML injection vial Inject 10 Units into the skin 3 times daily (before meals) Give only if blood sugar is greater than 150 2/24/19  Yes Tracy Wagoner MD   clopidogrel (PLAVIX) 75 MG tablet Take 1 tablet by mouth daily 11/17/18  Yes Malvin Gonzales MD   atorvastatin (LIPITOR) 40 MG tablet Take 40 mg by mouth daily   Yes Historical Provider, MD   aspirin 81 MG tablet Take 81 mg by mouth daily   Yes Historical Provider, MD   lamoTRIgine (LAMICTAL) 100 MG tablet Take 100 mg by mouth daily. Yes Historical Provider, MD   acetaminophen (TYLENOL) 325 MG tablet Take  by mouth every 4 hours as needed. Yes Historical Provider, MD   FREESTYLE LANCETS MISC 1 each by Other route 4 times daily (before meals and nightly) Patient to check blood sugar 4 times a day and PRN, he is treated with multiple daily injections of insulin that require correction dosing and has had hypoglycemia. A1C  14.5  ICD code- E11.65, Z79.4 7/3/19   Jean Carlos Romeo MD   blood glucose test strips (FREESTYLE LITE) strip 1 each by Other route 4 times daily (before meals and nightly) Patient to check blood sugar 4 times a day and PRN, he is treated with multiple daily injections of insulin that require correction dosing and has had hypoglycemia.              A1C  14.5  ICD code- E11.65, Z79.4 7/3/19   Jean Carlos Romeo MD   glucose monitoring kit (FREESTYLE) monitoring kit

## 2019-09-11 NOTE — ED NOTES
Patient into ER for \"overall feeling bad\" patient has a long medical history, recently here for high blood glucose, patient reports head ache and chest pain. IV placed, medicated per MAR, medicated per MAR. Will continue to monitor.       Nancy Bautista RN  09/11/19 7703
by mouth every 4 hours as needed. FREESTYLE LANCETS MISC 1 each by Other route 4 times daily (before meals and nightly) Patient to check blood sugar 4 times a day and PRN, he is treated with multiple daily injections of insulin that require correction dosing and has had hypoglycemia. A1C  14.5  ICD code- E11.65, Z79.4 100 each 5    blood glucose test strips (FREESTYLE LITE) strip 1 each by Other route 4 times daily (before meals and nightly) Patient to check blood sugar 4 times a day and PRN, he is treated with multiple daily injections of insulin that require correction dosing and has had hypoglycemia. A1C  14.5  ICD code- E11.65, Z79.4 100 each 5    glucose monitoring kit (FREESTYLE) monitoring kit 1 kit by Does not apply route 4 times daily (before meals and nightly) Patient to check blood sugar 4 times a day and PRN, he is treated with multiple daily injections of insulin that require correction dosing and has had hypoglycemia. A1C  14.5  ICD code- E11.65, Z79.4 1 kit 0    nitroGLYCERIN (NITROSTAT) 0.4 MG SL tablet up to max of 3 total doses. If no relief after 1 dose, call 911. 25 tablet 3    [DISCONTINUED] Buprenorphine HCl-Naloxone HCl (SUBOXONE SL) Place 8 mg under the tongue 2 times daily      [DISCONTINUED] insulin glargine (LANTUS) 100 UNIT/ML injection vial Inject 35 Units into the skin nightly 1 vial 3    [DISCONTINUED] insulin lispro (HUMALOG) 100 UNIT/ML pen Inject 0-12 Units into the skin 3 times daily (with meals) 5 pen 3    B-D INS SYR HALF-UNIT .3CC/31G 31G X 5/16\" 0.3 ML MISC 145 Units      calcium carbonate (TUMS) 500 MG chewable tablet Take 1 tablet by mouth daily. [DISCONTINUED] gabapentin (NEURONTIN) 300 MG capsule Take 300 mg by mouth 3 times daily.

## 2019-09-11 NOTE — ED PROVIDER NOTES
bibasilar atelectasis. XR CHEST STANDARD (2 VW)   Final Result   1. Redemonstration of subsegmental atelectasis versus scarring in the lingula. 2. The lungs are without pneumothorax, pleural effusion or new focal airspace   opacity.              Labs  Results for orders placed or performed during the hospital encounter of 09/11/19   CBC Auto Differential   Result Value Ref Range    WBC 9.6 4.0 - 11.0 K/uL    RBC 5.33 (H) 4.00 - 5.20 M/uL    Hemoglobin 15.9 12.0 - 16.0 g/dL    Hematocrit 48.7 (H) 36.0 - 48.0 %    MCV 91.3 80.0 - 100.0 fL    MCH 29.9 26.0 - 34.0 pg    MCHC 32.8 31.0 - 36.0 g/dL    RDW 13.2 12.4 - 15.4 %    Platelets 668 (L) 611 - 450 K/uL    MPV 11.4 (H) 5.0 - 10.5 fL    Neutrophils % 63.5 %    Lymphocytes % 29.6 %    Monocytes % 5.6 %    Eosinophils % 0.9 %    Basophils % 0.4 %    Neutrophils Absolute 6.1 1.7 - 7.7 K/uL    Lymphocytes Absolute 2.8 1.0 - 5.1 K/uL    Monocytes Absolute 0.5 0.0 - 1.3 K/uL    Eosinophils Absolute 0.1 0.0 - 0.6 K/uL    Basophils Absolute 0.0 0.0 - 0.2 K/uL   Comprehensive Metabolic Panel w/ Reflex to MG   Result Value Ref Range    Sodium 134 (L) 136 - 145 mmol/L    Potassium reflex Magnesium 3.5 3.5 - 5.1 mmol/L    Chloride 99 99 - 110 mmol/L    CO2 23 21 - 32 mmol/L    Anion Gap 12 3 - 16    Glucose 632 (HH) 70 - 99 mg/dL    BUN 20 7 - 20 mg/dL    CREATININE 0.6 0.6 - 1.1 mg/dL    GFR Non-African American >60 >60    GFR African American >60 >60    Calcium 9.1 8.3 - 10.6 mg/dL    Total Protein 8.2 6.4 - 8.2 g/dL    Alb 3.9 3.4 - 5.0 g/dL    Albumin/Globulin Ratio 0.9 (L) 1.1 - 2.2    Total Bilirubin 0.4 0.0 - 1.0 mg/dL    Alkaline Phosphatase 134 (H) 40 - 129 U/L    ALT 22 10 - 40 U/L    AST 27 15 - 37 U/L    Globulin 4.3 g/dL   Lipase   Result Value Ref Range    Lipase 13.0 13.0 - 60.0 U/L   Troponin   Result Value Ref Range    Troponin <0.01 <0.01 ng/mL   Beta-Hydroxybutyrate   Result Value Ref Range    Beta-Hydroxybutyrate 0.30 (H) 0.00 - 0.27 mmol/L 5' 2\" (1.575 m), weight 130 lb (59 kg), last menstrual period 11/15/2017, SpO2 96 %. Disposition:  DISPOSITION Admitted 09/11/2019 02:52:11 PM      Patient Referrals:  No follow-up provider specified. Discharge Medications:  Current Discharge Medication List          Discontinued Medications:  Current Discharge Medication List          This chart was generated using the [address] [place] [address] dictation system. I created this record but it may contain dictation errors given the limitations of this technology.     Angela Romero DO (electronically signed)  Attending Emergency Physician       Angela Romero DO  09/12/19 0136

## 2019-09-12 VITALS
BODY MASS INDEX: 23.98 KG/M2 | TEMPERATURE: 97 F | RESPIRATION RATE: 16 BRPM | HEART RATE: 80 BPM | SYSTOLIC BLOOD PRESSURE: 161 MMHG | DIASTOLIC BLOOD PRESSURE: 100 MMHG | OXYGEN SATURATION: 98 % | WEIGHT: 130.3 LBS | HEIGHT: 62 IN

## 2019-09-12 PROBLEM — I10 HYPERTENSION, UNCONTROLLED: Status: ACTIVE | Noted: 2019-09-12

## 2019-09-12 PROBLEM — T17.500A MUCUS PLUGGING OF BRONCHI: Status: ACTIVE | Noted: 2019-09-12

## 2019-09-12 PROBLEM — Z89.611 HX OF AKA (ABOVE KNEE AMPUTATION), RIGHT (HCC): Status: ACTIVE | Noted: 2019-09-12

## 2019-09-12 PROBLEM — Z87.891 FORMER SMOKER: Status: ACTIVE | Noted: 2019-09-12

## 2019-09-12 PROBLEM — D69.6 THROMBOCYTOPENIA (HCC): Status: ACTIVE | Noted: 2019-09-12

## 2019-09-12 PROBLEM — J98.11 ATELECTASIS: Status: ACTIVE | Noted: 2019-09-12

## 2019-09-12 LAB
ANION GAP SERPL CALCULATED.3IONS-SCNC: 9 MMOL/L (ref 3–16)
BASOPHILS ABSOLUTE: 0 K/UL (ref 0–0.2)
BASOPHILS RELATIVE PERCENT: 0.4 %
BUN BLDV-MCNC: 18 MG/DL (ref 7–20)
CALCIUM SERPL-MCNC: 8.3 MG/DL (ref 8.3–10.6)
CHLORIDE BLD-SCNC: 103 MMOL/L (ref 99–110)
CO2: 25 MMOL/L (ref 21–32)
CREAT SERPL-MCNC: <0.5 MG/DL (ref 0.6–1.1)
EOSINOPHILS ABSOLUTE: 0.1 K/UL (ref 0–0.6)
EOSINOPHILS RELATIVE PERCENT: 1.7 %
ESTIMATED AVERAGE GLUCOSE: 312.1 MG/DL
GFR AFRICAN AMERICAN: >60
GFR NON-AFRICAN AMERICAN: >60
GLUCOSE BLD-MCNC: 197 MG/DL (ref 70–99)
GLUCOSE BLD-MCNC: 249 MG/DL (ref 70–99)
GLUCOSE BLD-MCNC: 268 MG/DL (ref 70–99)
GLUCOSE BLD-MCNC: 366 MG/DL (ref 70–99)
GLUCOSE BLD-MCNC: 382 MG/DL (ref 70–99)
HBA1C MFR BLD: 12.5 %
HCT VFR BLD CALC: 39.5 % (ref 36–48)
HEMOGLOBIN: 13.1 G/DL (ref 12–16)
LYMPHOCYTES ABSOLUTE: 2.9 K/UL (ref 1–5.1)
LYMPHOCYTES RELATIVE PERCENT: 41.7 %
MCH RBC QN AUTO: 29.9 PG (ref 26–34)
MCHC RBC AUTO-ENTMCNC: 33.3 G/DL (ref 31–36)
MCV RBC AUTO: 89.7 FL (ref 80–100)
MONOCYTES ABSOLUTE: 0.3 K/UL (ref 0–1.3)
MONOCYTES RELATIVE PERCENT: 4.9 %
NEUTROPHILS ABSOLUTE: 3.6 K/UL (ref 1.7–7.7)
NEUTROPHILS RELATIVE PERCENT: 51.3 %
PDW BLD-RTO: 12.7 % (ref 12.4–15.4)
PERFORMED ON: ABNORMAL
PLATELET # BLD: 93 K/UL (ref 135–450)
PLATELET SLIDE REVIEW: ABNORMAL
PMV BLD AUTO: 12.2 FL (ref 5–10.5)
POTASSIUM SERPL-SCNC: 3.6 MMOL/L (ref 3.5–5.1)
RAPID INFLUENZA  B AGN: NEGATIVE
RAPID INFLUENZA A AGN: NEGATIVE
RBC # BLD: 4.4 M/UL (ref 4–5.2)
SLIDE REVIEW: ABNORMAL
SODIUM BLD-SCNC: 137 MMOL/L (ref 136–145)
TROPONIN: <0.01 NG/ML
WBC # BLD: 7 K/UL (ref 4–11)

## 2019-09-12 PROCEDURE — 94640 AIRWAY INHALATION TREATMENT: CPT

## 2019-09-12 PROCEDURE — 87804 INFLUENZA ASSAY W/OPTIC: CPT

## 2019-09-12 PROCEDURE — 2580000003 HC RX 258: Performed by: INTERNAL MEDICINE

## 2019-09-12 PROCEDURE — 99254 IP/OBS CNSLTJ NEW/EST MOD 60: CPT | Performed by: INTERNAL MEDICINE

## 2019-09-12 PROCEDURE — 94761 N-INVAS EAR/PLS OXIMETRY MLT: CPT

## 2019-09-12 PROCEDURE — 6370000000 HC RX 637 (ALT 250 FOR IP): Performed by: INTERNAL MEDICINE

## 2019-09-12 PROCEDURE — 80048 BASIC METABOLIC PNL TOTAL CA: CPT

## 2019-09-12 PROCEDURE — 94669 MECHANICAL CHEST WALL OSCILL: CPT

## 2019-09-12 PROCEDURE — 6360000002 HC RX W HCPCS: Performed by: INTERNAL MEDICINE

## 2019-09-12 PROCEDURE — 94150 VITAL CAPACITY TEST: CPT

## 2019-09-12 PROCEDURE — 84484 ASSAY OF TROPONIN QUANT: CPT

## 2019-09-12 PROCEDURE — 85025 COMPLETE CBC W/AUTO DIFF WBC: CPT

## 2019-09-12 PROCEDURE — 36415 COLL VENOUS BLD VENIPUNCTURE: CPT

## 2019-09-12 RX ORDER — GUAIFENESIN 600 MG/1
600 TABLET, EXTENDED RELEASE ORAL 2 TIMES DAILY
Qty: 10 TABLET | Refills: 0 | Status: SHIPPED | OUTPATIENT
Start: 2019-09-12 | End: 2019-09-17

## 2019-09-12 RX ORDER — METHYLPREDNISOLONE 4 MG/1
2 TABLET ORAL
Status: DISCONTINUED | OUTPATIENT
Start: 2019-09-12 | End: 2019-09-12 | Stop reason: HOSPADM

## 2019-09-12 RX ORDER — METHYLPREDNISOLONE 4 MG/1
4 TABLET ORAL EVERY MORNING
Status: DISCONTINUED | OUTPATIENT
Start: 2019-09-12 | End: 2019-09-12

## 2019-09-12 RX ORDER — METHYLPREDNISOLONE 4 MG/1
2 TABLET ORAL
Status: DISCONTINUED | OUTPATIENT
Start: 2019-09-13 | End: 2019-09-12

## 2019-09-12 RX ADMIN — SODIUM CHLORIDE: 9 INJECTION, SOLUTION INTRAVENOUS at 02:04

## 2019-09-12 RX ADMIN — METHYLPREDNISOLONE 2 MG: 4 TABLET ORAL at 17:38

## 2019-09-12 RX ADMIN — DESMOPRESSIN ACETATE 40 MG: 0.2 TABLET ORAL at 08:55

## 2019-09-12 RX ADMIN — IPRATROPIUM BROMIDE AND ALBUTEROL SULFATE 1 AMPULE: .5; 3 SOLUTION RESPIRATORY (INHALATION) at 15:24

## 2019-09-12 RX ADMIN — PREGABALIN 75 MG: 75 CAPSULE ORAL at 08:55

## 2019-09-12 RX ADMIN — INSULIN LISPRO 6 UNITS: 100 INJECTION, SOLUTION INTRAVENOUS; SUBCUTANEOUS at 09:01

## 2019-09-12 RX ADMIN — Medication 10 ML: at 08:56

## 2019-09-12 RX ADMIN — PREGABALIN 75 MG: 75 CAPSULE ORAL at 12:46

## 2019-09-12 RX ADMIN — INSULIN LISPRO 3 UNITS: 100 INJECTION, SOLUTION INTRAVENOUS; SUBCUTANEOUS at 17:33

## 2019-09-12 RX ADMIN — IPRATROPIUM BROMIDE AND ALBUTEROL SULFATE 1 AMPULE: .5; 3 SOLUTION RESPIRATORY (INHALATION) at 09:10

## 2019-09-12 RX ADMIN — INSULIN GLARGINE 20 UNITS: 100 INJECTION, SOLUTION SUBCUTANEOUS at 09:01

## 2019-09-12 RX ADMIN — BUPRENORPHINE HYDROCHLORIDE, NALOXONE HYDROCHLORIDE 1 FILM: 8; 2 FILM, SOLUBLE BUCCAL; SUBLINGUAL at 08:55

## 2019-09-12 RX ADMIN — INSULIN LISPRO 6 UNITS: 100 INJECTION, SOLUTION INTRAVENOUS; SUBCUTANEOUS at 12:22

## 2019-09-12 RX ADMIN — LAMOTRIGINE 100 MG: 100 TABLET ORAL at 08:55

## 2019-09-12 RX ADMIN — IPRATROPIUM BROMIDE AND ALBUTEROL SULFATE 1 AMPULE: .5; 3 SOLUTION RESPIRATORY (INHALATION) at 11:39

## 2019-09-12 RX ADMIN — METHYLPREDNISOLONE 2 MG: 4 TABLET ORAL at 12:46

## 2019-09-12 RX ADMIN — PANTOPRAZOLE SODIUM 40 MG: 40 TABLET, DELAYED RELEASE ORAL at 06:47

## 2019-09-12 RX ADMIN — CLOPIDOGREL 75 MG: 75 TABLET, FILM COATED ORAL at 08:55

## 2019-09-12 RX ADMIN — ASPIRIN 81 MG: 81 TABLET, COATED ORAL at 08:55

## 2019-09-12 RX ADMIN — INSULIN LISPRO 10 UNITS: 100 INJECTION, SOLUTION INTRAVENOUS; SUBCUTANEOUS at 09:06

## 2019-09-12 RX ADMIN — ANTACID TABLETS 500 MG: 500 TABLET, CHEWABLE ORAL at 08:55

## 2019-09-12 ASSESSMENT — PAIN SCALES - GENERAL
PAINLEVEL_OUTOF10: 0

## 2019-09-12 NOTE — DISCHARGE SUMMARY
mirtazapine (REMERON SOLTAB) 30 MG disintegrating tablet Take 1 tablet by mouth nightly  Qty: 30 tablet, Refills: 3      insulin lispro (HUMALOG) 100 UNIT/ML injection vial Inject 10 Units into the skin 3 times daily (before meals) Give only if blood sugar is greater than 150  Qty: 1 vial, Refills: 3      clopidogrel (PLAVIX) 75 MG tablet Take 1 tablet by mouth daily  Qty: 30 tablet, Refills: 3      atorvastatin (LIPITOR) 40 MG tablet Take 40 mg by mouth daily      aspirin 81 MG tablet Take 81 mg by mouth daily      lamoTRIgine (LAMICTAL) 100 MG tablet Take 100 mg by mouth daily. acetaminophen (TYLENOL) 325 MG tablet Take  by mouth every 4 hours as needed. FREESTYLE LANCETS MISC 1 each by Other route 4 times daily (before meals and nightly) Patient to check blood sugar 4 times a day and PRN, he is treated with multiple daily injections of insulin that require correction dosing and has had hypoglycemia. A1C  14.5  ICD code- E11.65, Z79.4  Qty: 100 each, Refills: 5      blood glucose test strips (FREESTYLE LITE) strip 1 each by Other route 4 times daily (before meals and nightly) Patient to check blood sugar 4 times a day and PRN, he is treated with multiple daily injections of insulin that require correction dosing and has had hypoglycemia. A1C  14.5  ICD code- E11.65, Z79.4  Qty: 100 each, Refills: 5      glucose monitoring kit (FREESTYLE) monitoring kit 1 kit by Does not apply route 4 times daily (before meals and nightly) Patient to check blood sugar 4 times a day and PRN, he is treated with multiple daily injections of insulin that require correction dosing and has had hypoglycemia. A1C  14.5  ICD code- E11.65, Z79.4  Qty: 1 kit, Refills: 0      nitroGLYCERIN (NITROSTAT) 0.4 MG SL tablet up to max of 3 total doses. If no relief after 1 dose, call 911.   Qty: 25 tablet, Refills: 3      B-D INS SYR HALF-UNIT .3CC/31G 31G X 5/16\" 0.3 ML MISC 145 Units      calcium

## 2019-09-12 NOTE — CONSULTS
INPATIENT PULMONARY CRITICAL CARE CONSULT NOTE      Chief Complaint/Referring Provider:  Patient is being seen at the request of Marino Mccracken for a consultation for COPD exacerbation     Presenting HPI: Patient came to the hospital with increasing cough/chestion/sob/headaches     As per admitting provider-This is a pleasant 62 y.o. female with history of CAD, diabetes type 2, prior tobacco use disorder (quit tobacco use last year), who presents to the emergency room with complaints of sharp left-sided chest pain that is associated with cough that is productive of greenish sputum, wheezing without fever or chills. Symptoms have been present for about 2 days. She was recently prescribed steroid, presents to the emergency room with blood glucose greater than 600 today. She has had some headache with cough. Chest x-ray was unremarkable in the emergency room. She was noted to be hypoxic with oxygen saturation as low as 82% on room air during ER evaluation.     Patient when seen this morning states that she was not feeling well for last 2 days time, patient was having increased headaches along with that patient did not have any blurring of vision or any sensation as if there is a curtain in the front of the eyes, patient did have some sinus congestion, patient was not having any otalgia, patient on questioning further states that she was also having increased cough with chest congestion along with shortness of breath and wheezing, patient was not able to expectorate any secretions before the time of admission, patient was also having some pleuritic chest pain, patient had a feverish feeling, patient does not have any epistaxis or hemoptysis, patient did not have any significant abdominal pain nausea vomiting, patient did not have any significant dysuria or hematuria, patient did not have any hematochezia or melena, patient did not have any confusion or lethargy, patient's symptoms were progressive in nature which made pancreatitis     Hyperglycemia 2018    DKA, type 2, not at goal [facility]) 2017    HTN (hypertension) 2012    Other specified anemias 2012    Diabetes mellitus ([facility] 2011    Knee pain 2011    Degenerative arthritis of knee 2011       Past Medical History:   Diagnosis Date    Arthritis     CHF (congestive heart failure) ([facility]     Depression     Diabetes mellitus (HCC)     Hepatitis C     Dr Lu Rogers follows; contracted from ex- (drug user)    Hypertension     MRSA (methicillin resistant Staphylococcus aureus) infection in     was in urine and nares    Pneumonia     S/P colonoscopy with polypectomy     one polyp, Dr Lu Rogers. Recall 3 years. Past Surgical History:   Procedure Laterality Date    ABOVE KNEE AMPUTATION      AMPUTATION      right bka    JOINT REPLACEMENT  ,     right knee; Dr Prabhu Gatica.     KNEE ARTHROSCOPY      ridht knee X6    LIVER BIOPSY N/A 2018    LIVER BIOPSY LAPAROSCOPIC performed by Bakari Dang MD at [facility] N/A 2018    LAPAROSCOPIC CHOLECYSTECTOMY WITH INTRAOPERATIVE CHOLANGIOGRAM performed by Bakari Dang MD at [address] [facility]  11    right    UPPER GASTROINTESTINAL ENDOSCOPY  2018    UPPER GASTROINTESTINAL ENDOSCOPY N/A 2018    EGD BIOPSY performed by Antony Alonzo MD at [address]        Family History   Problem Relation Age of Onset    Cancer Father     Seizures Brother     Cancer Sister         colon        Social History     Tobacco Use    Smoking status: Former Smoker     Last attempt to quit: 3/1/2011     Years since quittin.5    Smokeless tobacco: Never Used    Tobacco comment: congratulated on quitting smoking1   Substance Use Topics    Alcohol use: No        Allergies   Allergen Reactions    Darvocet [Propoxyphene N-Acetaminophen] Nausea Only    Naprosyn [Naproxen] Rash    Ultram mg/dL  8.3       Results for [facility] (MRN [id number]) as of 9/12/2019 14:05   Ref. Range 7/1/2019 02:32 7/2/2019 05:28 7/3/2019 05:12 9/11/2019 12:12 9/12/2019 05:04   WBC Latest Ref Range: 4.0 - 11.0 K/uL 5.5 6.1 7.5 9.6 7.0   RBC Latest Ref Range: 4.00 - 5.20 M/uL 4.44 4.37 4.45 5.33 (H) 4.40   Hemoglobin Quant Latest Ref Range: 12.0 - 16.0 g/dL 12.9 12.6 13.2 15.9 13.1   Hematocrit Latest Ref Range: 36.0 - 48.0 % 38.8 38.4 39.6 48.7 (H) 39.5   MCV Latest Ref Range: 80.0 - 100.0 fL 87.3 87.7 89.0 91.3 89.7   MCH Latest Ref Range: 26.0 - 34.0 pg 29.0 28.9 29.6 29.9 29.9   MCHC Latest Ref Range: 31.0 - 36.0 g/dL 33.3 33.0 33.3 32.8 33.3   MPV Latest Ref Range: 5.0 - 10.5 fL 10.0 10.5 10.7 (H) 11.4 (H) 12.2 (H)   RDW Latest Ref Range: 12.4 - 15.4 % 13.9 13.9 14.2 13.2 12.7   Platelet Count Latest Ref Range: 135 - 450 K/uL 159 155 148 112 (L) 93 (L)   Neutrophils % Latest Units: % 36.8 49.9 49.9 63.5 51.3   Lymphocyte % Latest Units: % 55.3 42.4 43.5 29.6 41.7   Monocytes % Latest Units: % 6.5 6.3 4.7 5.6 4.9   Eosinophils % Latest Units: % 0.9 1.0 1.4 0.9 1.7   Basophils % Latest Units: % 0.5 0.4 0.5 0.4 0.4   Neutrophils Absolute Latest Ref Range: 1.7 - 7.7 K/uL 2.0 3.0 3.7 6.1 3.6   Results for [facility] (MRN [id number]) as of 9/12/2019 14:05   Ref. Range 9/12/2019 06:50   Rapid Influenza A Ag Latest Ref Range: Negative  Negative   Rapid Influenza B Ag Latest Ref Range: Negative  Negative   RAPID INFLUENZA A/B ANTIGENS Unknown Rpt   Results for [facility] (MRN [id number]) as of 9/12/2019 14:05   Ref.  Range 9/11/2019 20:49   O2 Therapy Unknown See comment   Hemoglobin, Art, Extended Latest Ref Range: 12.0 - 16.0 g/dL 14.0   pH, Arterial Latest Ref Range: 7.350 - 7.450  7.426   pCO2, Arterial Latest Ref Range: 35.0 - 45.0 mmHg 34.6 (L)   pO2, Arterial Latest Ref Range: 75.0 - 108.0 mmHg 80.2   HCO3, Arterial Latest Ref Range: 21.0 - 29.0 mmol/L 22.7   TCO2 (calc), Art Latest Ref Range: Not Hypertension, uncontrolled    Hx of AKA (above knee amputation), right (HCC)  Resolved Problems:    * No resolved hospital problems.  *          Plan:   · Oxygen supplementation, if required, to keep saturation between 90 to 94% only  · Pulmonary toilet  · Patient was told about the CT of the chest along with findings, differential diagnosis and implications  · Patient's chest congestion has improved  · No need for any bronchoscopy  · Patient to be given incentive spirometry  · Patient does not have any white count or fever at this time and will not subject the patient to any antibiotic therapy from pulmonary standpoint of view  · Bronchodilators  · Patient is on a low-dose of Medrol at this time which can be continued and to be reassessed as per clinical status and blood sugars  · Will reassess about continuation of steroids  · Patient's blood pressure is not controlled and patient is not taking any medications for that-would recommend PCP /internal medicine team to trended and do the needful  · Lantus insulin as per blood glucose monitoring  · Blood glucose monitoring with sliding scale insulin  · The dose of Lantus insulin may have to be titrated up as patient's blood sugars are not controlled  · Incentive spirometry added to the regimen  · Patient's platelet counts are progressively becoming worse and needs to be trended  · Will hold on Lovenox for now  · Patient has a history of opiate use in the past and has been on Suboxone-management as per IM  · SCDs in the left leg to be placed  · PUD prophylaxis    Case d/w patient/family and nursing         Electronically signed by:  Anusha Short MD    9/12/2019    2:10 PM.

## 2020-02-19 ENCOUNTER — HOSPITAL ENCOUNTER (INPATIENT)
Age: 55
LOS: 2 days | Discharge: HOME | DRG: 175 | End: 2020-02-22
Attending: EMERGENCY MEDICINE | Admitting: INTERNAL MEDICINE
Payer: COMMERCIAL

## 2020-02-19 ENCOUNTER — APPOINTMENT (OUTPATIENT)
Dept: GENERAL RADIOLOGY | Age: 55
DRG: 175 | End: 2020-02-19
Payer: COMMERCIAL

## 2020-02-19 PROBLEM — R07.9 CHEST PAIN: Status: ACTIVE | Noted: 2020-02-19

## 2020-02-19 LAB
ANION GAP SERPL CALCULATED.3IONS-SCNC: 11 MMOL/L (ref 3–16)
BACTERIA: ABNORMAL /HPF
BASE EXCESS VENOUS: 4.2 MMOL/L (ref -3–3)
BASOPHILS ABSOLUTE: 0.1 K/UL (ref 0–0.2)
BASOPHILS RELATIVE PERCENT: 1.2 %
BETA-HYDROXYBUTYRATE: 0.1 MMOL/L (ref 0–0.27)
BILIRUBIN URINE: ABNORMAL
BLOOD, URINE: NEGATIVE
BUN BLDV-MCNC: 21 MG/DL (ref 7–20)
CALCIUM SERPL-MCNC: 8.7 MG/DL (ref 8.3–10.6)
CARBOXYHEMOGLOBIN: 6.9 % (ref 0–1.5)
CHLORIDE BLD-SCNC: 96 MMOL/L (ref 99–110)
CLARITY: ABNORMAL
CO2: 25 MMOL/L (ref 21–32)
COLOR: YELLOW
CREAT SERPL-MCNC: <0.5 MG/DL (ref 0.6–1.1)
EOSINOPHILS ABSOLUTE: 0.1 K/UL (ref 0–0.6)
EOSINOPHILS RELATIVE PERCENT: 1.3 %
EPITHELIAL CELLS, UA: 9 /HPF (ref 0–5)
GFR AFRICAN AMERICAN: >60
GFR NON-AFRICAN AMERICAN: >60
GLUCOSE BLD-MCNC: 179 MG/DL (ref 70–99)
GLUCOSE BLD-MCNC: 459 MG/DL (ref 70–99)
GLUCOSE BLD-MCNC: 510 MG/DL (ref 70–99)
GLUCOSE URINE: >=1000 MG/DL
HCO3 VENOUS: 28.3 MMOL/L (ref 23–29)
HCT VFR BLD CALC: 44.4 % (ref 36–48)
HEMOGLOBIN: 14.7 G/DL (ref 12–16)
HYALINE CASTS: 6 /LPF (ref 0–8)
KETONES, URINE: ABNORMAL MG/DL
LEUKOCYTE ESTERASE, URINE: NEGATIVE
LIPASE: 11 U/L (ref 13–60)
LYMPHOCYTES ABSOLUTE: 3.5 K/UL (ref 1–5.1)
LYMPHOCYTES RELATIVE PERCENT: 44.5 %
MCH RBC QN AUTO: 29.6 PG (ref 26–34)
MCHC RBC AUTO-ENTMCNC: 33.1 G/DL (ref 31–36)
MCV RBC AUTO: 89.4 FL (ref 80–100)
METHEMOGLOBIN VENOUS: 0.1 %
MICROSCOPIC EXAMINATION: YES
MONOCYTES ABSOLUTE: 0.4 K/UL (ref 0–1.3)
MONOCYTES RELATIVE PERCENT: 4.7 %
NEUTROPHILS ABSOLUTE: 3.8 K/UL (ref 1.7–7.7)
NEUTROPHILS RELATIVE PERCENT: 48.3 %
NITRITE, URINE: NEGATIVE
O2 CONTENT, VEN: 19 VOL %
O2 SAT, VEN: 100 %
O2 THERAPY: ABNORMAL
PCO2, VEN: 39.2 MMHG (ref 40–50)
PDW BLD-RTO: 13.2 % (ref 12.4–15.4)
PERFORMED ON: ABNORMAL
PERFORMED ON: ABNORMAL
PH UA: 6 (ref 5–8)
PH VENOUS: 7.47 (ref 7.35–7.45)
PLATELET # BLD: 148 K/UL (ref 135–450)
PLATELET SLIDE REVIEW: ADEQUATE
PMV BLD AUTO: 11.2 FL (ref 5–10.5)
PO2, VEN: 141 MMHG (ref 25–40)
POTASSIUM SERPL-SCNC: 4.8 MMOL/L (ref 3.5–5.1)
PROTEIN UA: 30 MG/DL
RBC # BLD: 4.96 M/UL (ref 4–5.2)
RBC UA: 5 /HPF (ref 0–4)
REASON FOR REJECTION: NORMAL
REJECTED TEST: NORMAL
SLIDE REVIEW: ABNORMAL
SODIUM BLD-SCNC: 132 MMOL/L (ref 136–145)
SPECIFIC GRAVITY UA: >1.03 (ref 1–1.03)
TCO2 CALC VENOUS: 66 MMOL/L
TROPONIN: <0.01 NG/ML
TROPONIN: <0.01 NG/ML
URINE REFLEX TO CULTURE: ABNORMAL
URINE TYPE: ABNORMAL
UROBILINOGEN, URINE: 2 E.U./DL
WBC # BLD: 7.9 K/UL (ref 4–11)
WBC UA: 5 /HPF (ref 0–5)

## 2020-02-19 PROCEDURE — 83690 ASSAY OF LIPASE: CPT

## 2020-02-19 PROCEDURE — G0378 HOSPITAL OBSERVATION PER HR: HCPCS

## 2020-02-19 PROCEDURE — 84484 ASSAY OF TROPONIN QUANT: CPT

## 2020-02-19 PROCEDURE — 71046 X-RAY EXAM CHEST 2 VIEWS: CPT

## 2020-02-19 PROCEDURE — 85025 COMPLETE CBC W/AUTO DIFF WBC: CPT

## 2020-02-19 PROCEDURE — 6370000000 HC RX 637 (ALT 250 FOR IP): Performed by: EMERGENCY MEDICINE

## 2020-02-19 PROCEDURE — 6360000002 HC RX W HCPCS: Performed by: NURSE PRACTITIONER

## 2020-02-19 PROCEDURE — 93005 ELECTROCARDIOGRAM TRACING: CPT | Performed by: EMERGENCY MEDICINE

## 2020-02-19 PROCEDURE — 96374 THER/PROPH/DIAG INJ IV PUSH: CPT

## 2020-02-19 PROCEDURE — 99285 EMERGENCY DEPT VISIT HI MDM: CPT

## 2020-02-19 PROCEDURE — 36415 COLL VENOUS BLD VENIPUNCTURE: CPT

## 2020-02-19 PROCEDURE — 82803 BLOOD GASES ANY COMBINATION: CPT

## 2020-02-19 PROCEDURE — 2580000003 HC RX 258: Performed by: NURSE PRACTITIONER

## 2020-02-19 PROCEDURE — 82010 KETONE BODYS QUAN: CPT

## 2020-02-19 PROCEDURE — 2500000003 HC RX 250 WO HCPCS: Performed by: NURSE PRACTITIONER

## 2020-02-19 PROCEDURE — 96375 TX/PRO/DX INJ NEW DRUG ADDON: CPT

## 2020-02-19 PROCEDURE — 80048 BASIC METABOLIC PNL TOTAL CA: CPT

## 2020-02-19 PROCEDURE — 81001 URINALYSIS AUTO W/SCOPE: CPT

## 2020-02-19 RX ORDER — ATORVASTATIN CALCIUM 40 MG/1
40 TABLET, FILM COATED ORAL NIGHTLY
Status: DISCONTINUED | OUTPATIENT
Start: 2020-02-19 | End: 2020-02-22 | Stop reason: HOSPADM

## 2020-02-19 RX ORDER — POLYETHYLENE GLYCOL 3350 17 G/17G
17 POWDER, FOR SOLUTION ORAL DAILY PRN
Status: DISCONTINUED | OUTPATIENT
Start: 2020-02-19 | End: 2020-02-22 | Stop reason: HOSPADM

## 2020-02-19 RX ORDER — CLOPIDOGREL BISULFATE 75 MG/1
75 TABLET ORAL DAILY
Status: DISCONTINUED | OUTPATIENT
Start: 2020-02-20 | End: 2020-02-20

## 2020-02-19 RX ORDER — SODIUM CHLORIDE 0.9 % (FLUSH) 0.9 %
10 SYRINGE (ML) INJECTION EVERY 12 HOURS SCHEDULED
Status: DISCONTINUED | OUTPATIENT
Start: 2020-02-19 | End: 2020-02-22 | Stop reason: HOSPADM

## 2020-02-19 RX ORDER — ASPIRIN 81 MG/1
243 TABLET, CHEWABLE ORAL ONCE
Status: COMPLETED | OUTPATIENT
Start: 2020-02-19 | End: 2020-02-19

## 2020-02-19 RX ORDER — 0.9 % SODIUM CHLORIDE 0.9 %
1000 INTRAVENOUS SOLUTION INTRAVENOUS ONCE
Status: COMPLETED | OUTPATIENT
Start: 2020-02-19 | End: 2020-02-19

## 2020-02-19 RX ORDER — ASPIRIN 81 MG/1
81 TABLET, CHEWABLE ORAL DAILY
Status: DISCONTINUED | OUTPATIENT
Start: 2020-02-20 | End: 2020-02-22 | Stop reason: HOSPADM

## 2020-02-19 RX ORDER — ACETAMINOPHEN 325 MG/1
650 TABLET ORAL EVERY 6 HOURS PRN
Status: DISCONTINUED | OUTPATIENT
Start: 2020-02-19 | End: 2020-02-22 | Stop reason: HOSPADM

## 2020-02-19 RX ORDER — PANTOPRAZOLE SODIUM 40 MG/1
40 TABLET, DELAYED RELEASE ORAL
Status: DISCONTINUED | OUTPATIENT
Start: 2020-02-20 | End: 2020-02-22 | Stop reason: HOSPADM

## 2020-02-19 RX ORDER — MIRTAZAPINE 15 MG/1
30 TABLET, FILM COATED ORAL NIGHTLY
Status: DISCONTINUED | OUTPATIENT
Start: 2020-02-19 | End: 2020-02-22 | Stop reason: HOSPADM

## 2020-02-19 RX ORDER — ASPIRIN 81 MG/1
81 TABLET, CHEWABLE ORAL DAILY
Status: DISCONTINUED | OUTPATIENT
Start: 2020-02-20 | End: 2020-02-19 | Stop reason: SDUPTHER

## 2020-02-19 RX ORDER — ONDANSETRON 2 MG/ML
4 INJECTION INTRAMUSCULAR; INTRAVENOUS EVERY 30 MIN PRN
Status: DISCONTINUED | OUTPATIENT
Start: 2020-02-19 | End: 2020-02-19 | Stop reason: HOSPADM

## 2020-02-19 RX ORDER — ATORVASTATIN CALCIUM 40 MG/1
40 TABLET, FILM COATED ORAL NIGHTLY
Status: DISCONTINUED | OUTPATIENT
Start: 2020-02-19 | End: 2020-02-19 | Stop reason: SDUPTHER

## 2020-02-19 RX ORDER — PROMETHAZINE HYDROCHLORIDE 25 MG/1
12.5 TABLET ORAL EVERY 6 HOURS PRN
Status: DISCONTINUED | OUTPATIENT
Start: 2020-02-19 | End: 2020-02-22 | Stop reason: HOSPADM

## 2020-02-19 RX ORDER — ACETAMINOPHEN 650 MG/1
650 SUPPOSITORY RECTAL EVERY 6 HOURS PRN
Status: DISCONTINUED | OUTPATIENT
Start: 2020-02-19 | End: 2020-02-22 | Stop reason: HOSPADM

## 2020-02-19 RX ORDER — LAMOTRIGINE 100 MG/1
100 TABLET ORAL DAILY
Status: DISCONTINUED | OUTPATIENT
Start: 2020-02-20 | End: 2020-02-22 | Stop reason: HOSPADM

## 2020-02-19 RX ORDER — MORPHINE SULFATE 4 MG/ML
4 INJECTION, SOLUTION INTRAMUSCULAR; INTRAVENOUS ONCE
Status: COMPLETED | OUTPATIENT
Start: 2020-02-19 | End: 2020-02-19

## 2020-02-19 RX ORDER — SODIUM CHLORIDE 0.9 % (FLUSH) 0.9 %
10 SYRINGE (ML) INJECTION PRN
Status: DISCONTINUED | OUTPATIENT
Start: 2020-02-19 | End: 2020-02-22 | Stop reason: HOSPADM

## 2020-02-19 RX ORDER — ACETAMINOPHEN 325 MG/1
650 TABLET ORAL EVERY 6 HOURS PRN
Status: DISCONTINUED | OUTPATIENT
Start: 2020-02-19 | End: 2020-02-19 | Stop reason: ALTCHOICE

## 2020-02-19 RX ORDER — ONDANSETRON 2 MG/ML
4 INJECTION INTRAMUSCULAR; INTRAVENOUS EVERY 6 HOURS PRN
Status: DISCONTINUED | OUTPATIENT
Start: 2020-02-19 | End: 2020-02-22 | Stop reason: HOSPADM

## 2020-02-19 RX ORDER — NITROGLYCERIN 0.4 MG/1
0.4 TABLET SUBLINGUAL EVERY 5 MIN PRN
Status: DISCONTINUED | OUTPATIENT
Start: 2020-02-19 | End: 2020-02-22 | Stop reason: HOSPADM

## 2020-02-19 RX ORDER — PREGABALIN 75 MG/1
75 CAPSULE ORAL 3 TIMES DAILY
Status: DISCONTINUED | OUTPATIENT
Start: 2020-02-19 | End: 2020-02-22 | Stop reason: HOSPADM

## 2020-02-19 RX ADMIN — FAMOTIDINE 20 MG: 10 INJECTION, SOLUTION INTRAVENOUS at 20:17

## 2020-02-19 RX ADMIN — SODIUM CHLORIDE 1000 ML: 9 INJECTION, SOLUTION INTRAVENOUS at 20:17

## 2020-02-19 RX ADMIN — ASPIRIN 81 MG 243 MG: 81 TABLET ORAL at 22:02

## 2020-02-19 RX ADMIN — INSULIN HUMAN 8 UNITS: 100 INJECTION, SOLUTION PARENTERAL at 22:10

## 2020-02-19 RX ADMIN — ONDANSETRON 4 MG: 2 INJECTION INTRAMUSCULAR; INTRAVENOUS at 20:17

## 2020-02-19 RX ADMIN — MORPHINE SULFATE 4 MG: 4 INJECTION INTRAVENOUS at 20:17

## 2020-02-19 ASSESSMENT — ENCOUNTER SYMPTOMS
CHEST TIGHTNESS: 0
VOMITING: 1
DIARRHEA: 0
NAUSEA: 1
SHORTNESS OF BREATH: 0
ABDOMINAL PAIN: 1

## 2020-02-19 ASSESSMENT — PAIN DESCRIPTION - DESCRIPTORS: DESCRIPTORS: STABBING

## 2020-02-19 ASSESSMENT — PAIN SCALES - GENERAL
PAINLEVEL_OUTOF10: 8
PAINLEVEL_OUTOF10: 0
PAINLEVEL_OUTOF10: 8

## 2020-02-19 ASSESSMENT — PAIN DESCRIPTION - LOCATION: LOCATION: CHEST

## 2020-02-19 ASSESSMENT — PAIN DESCRIPTION - PAIN TYPE
TYPE: ACUTE PAIN
TYPE: ACUTE PAIN

## 2020-02-19 ASSESSMENT — PAIN DESCRIPTION - FREQUENCY: FREQUENCY: INTERMITTENT

## 2020-02-20 LAB
CHOLESTEROL, TOTAL: 121 MG/DL (ref 0–199)
EKG ATRIAL RATE: 76 BPM
EKG ATRIAL RATE: 89 BPM
EKG DIAGNOSIS: NORMAL
EKG DIAGNOSIS: NORMAL
EKG P AXIS: 42 DEGREES
EKG P AXIS: 50 DEGREES
EKG P-R INTERVAL: 150 MS
EKG P-R INTERVAL: 158 MS
EKG Q-T INTERVAL: 380 MS
EKG Q-T INTERVAL: 402 MS
EKG QRS DURATION: 88 MS
EKG QRS DURATION: 90 MS
EKG QTC CALCULATION (BAZETT): 427 MS
EKG QTC CALCULATION (BAZETT): 489 MS
EKG R AXIS: -45 DEGREES
EKG R AXIS: 252 DEGREES
EKG T AXIS: 84 DEGREES
EKG T AXIS: 96 DEGREES
EKG VENTRICULAR RATE: 76 BPM
EKG VENTRICULAR RATE: 89 BPM
GLUCOSE BLD-MCNC: 187 MG/DL (ref 70–99)
GLUCOSE BLD-MCNC: 327 MG/DL (ref 70–99)
HCT VFR BLD CALC: 40.5 % (ref 36–48)
HDLC SERPL-MCNC: 34 MG/DL (ref 40–60)
HEMOGLOBIN: 13.4 G/DL (ref 12–16)
LDL CHOLESTEROL CALCULATED: 64 MG/DL
LEFT VENTRICULAR EJECTION FRACTION MODE: NORMAL
LV EF: 40 %
MCH RBC QN AUTO: 29.6 PG (ref 26–34)
MCHC RBC AUTO-ENTMCNC: 33.1 G/DL (ref 31–36)
MCV RBC AUTO: 89.3 FL (ref 80–100)
PDW BLD-RTO: 13.1 % (ref 12.4–15.4)
PERFORMED ON: ABNORMAL
PERFORMED ON: ABNORMAL
PLATELET # BLD: 113 K/UL (ref 135–450)
PMV BLD AUTO: 11.2 FL (ref 5–10.5)
POC ACT LR: 171 SEC
POC ACT LR: 261 SEC
RBC # BLD: 4.53 M/UL (ref 4–5.2)
TRIGL SERPL-MCNC: 117 MG/DL (ref 0–150)
TROPONIN: <0.01 NG/ML
VLDLC SERPL CALC-MCNC: 23 MG/DL
WBC # BLD: 4.5 K/UL (ref 4–11)

## 2020-02-20 PROCEDURE — 97530 THERAPEUTIC ACTIVITIES: CPT

## 2020-02-20 PROCEDURE — 2500000003 HC RX 250 WO HCPCS

## 2020-02-20 PROCEDURE — C1753 CATH, INTRAVAS ULTRASOUND: HCPCS

## 2020-02-20 PROCEDURE — 80061 LIPID PANEL: CPT

## 2020-02-20 PROCEDURE — B2111ZZ FLUOROSCOPY OF MULTIPLE CORONARY ARTERIES USING LOW OSMOLAR CONTRAST: ICD-10-PCS | Performed by: INTERNAL MEDICINE

## 2020-02-20 PROCEDURE — 2709999900 HC NON-CHARGEABLE SUPPLY

## 2020-02-20 PROCEDURE — 97165 OT EVAL LOW COMPLEX 30 MIN: CPT

## 2020-02-20 PROCEDURE — 36415 COLL VENOUS BLD VENIPUNCTURE: CPT

## 2020-02-20 PROCEDURE — 99152 MOD SED SAME PHYS/QHP 5/>YRS: CPT

## 2020-02-20 PROCEDURE — 84484 ASSAY OF TROPONIN QUANT: CPT

## 2020-02-20 PROCEDURE — 2580000003 HC RX 258: Performed by: INTERNAL MEDICINE

## 2020-02-20 PROCEDURE — 6360000004 HC RX CONTRAST MEDICATION: Performed by: INTERNAL MEDICINE

## 2020-02-20 PROCEDURE — 92928 PRQ TCAT PLMT NTRAC ST 1 LES: CPT

## 2020-02-20 PROCEDURE — 4A023N7 MEASUREMENT OF CARDIAC SAMPLING AND PRESSURE, LEFT HEART, PERCUTANEOUS APPROACH: ICD-10-PCS | Performed by: INTERNAL MEDICINE

## 2020-02-20 PROCEDURE — 93005 ELECTROCARDIOGRAM TRACING: CPT | Performed by: INTERNAL MEDICINE

## 2020-02-20 PROCEDURE — C1725 CATH, TRANSLUMIN NON-LASER: HCPCS

## 2020-02-20 PROCEDURE — 92943 PRQ TRLUML REVSC CH OCC ANT: CPT | Performed by: INTERNAL MEDICINE

## 2020-02-20 PROCEDURE — 92978 ENDOLUMINL IVUS OCT C 1ST: CPT | Performed by: INTERNAL MEDICINE

## 2020-02-20 PROCEDURE — C1874 STENT, COATED/COV W/DEL SYS: HCPCS

## 2020-02-20 PROCEDURE — 93010 ELECTROCARDIOGRAM REPORT: CPT | Performed by: INTERNAL MEDICINE

## 2020-02-20 PROCEDURE — C1769 GUIDE WIRE: HCPCS

## 2020-02-20 PROCEDURE — B2151ZZ FLUOROSCOPY OF LEFT HEART USING LOW OSMOLAR CONTRAST: ICD-10-PCS | Performed by: INTERNAL MEDICINE

## 2020-02-20 PROCEDURE — 2580000003 HC RX 258

## 2020-02-20 PROCEDURE — 85347 COAGULATION TIME ACTIVATED: CPT

## 2020-02-20 PROCEDURE — 92921 HC PRQ CARDIAC ANGIO ADDL ART: CPT

## 2020-02-20 PROCEDURE — 76937 US GUIDE VASCULAR ACCESS: CPT | Performed by: INTERNAL MEDICINE

## 2020-02-20 PROCEDURE — 93458 L HRT ARTERY/VENTRICLE ANGIO: CPT

## 2020-02-20 PROCEDURE — 92978 ENDOLUMINL IVUS OCT C 1ST: CPT

## 2020-02-20 PROCEDURE — 6370000000 HC RX 637 (ALT 250 FOR IP): Performed by: NURSE PRACTITIONER

## 2020-02-20 PROCEDURE — 027034Z DILATION OF CORONARY ARTERY, ONE ARTERY WITH DRUG-ELUTING INTRALUMINAL DEVICE, PERCUTANEOUS APPROACH: ICD-10-PCS | Performed by: INTERNAL MEDICINE

## 2020-02-20 PROCEDURE — 92928 PRQ TCAT PLMT NTRAC ST 1 LES: CPT | Performed by: INTERNAL MEDICINE

## 2020-02-20 PROCEDURE — 6370000000 HC RX 637 (ALT 250 FOR IP): Performed by: INTERNAL MEDICINE

## 2020-02-20 PROCEDURE — 2580000003 HC RX 258: Performed by: NURSE PRACTITIONER

## 2020-02-20 PROCEDURE — 99152 MOD SED SAME PHYS/QHP 5/>YRS: CPT | Performed by: INTERNAL MEDICINE

## 2020-02-20 PROCEDURE — C1894 INTRO/SHEATH, NON-LASER: HCPCS

## 2020-02-20 PROCEDURE — 6370000000 HC RX 637 (ALT 250 FOR IP)

## 2020-02-20 PROCEDURE — 93458 L HRT ARTERY/VENTRICLE ANGIO: CPT | Performed by: INTERNAL MEDICINE

## 2020-02-20 PROCEDURE — 99254 IP/OBS CNSLTJ NEW/EST MOD 60: CPT | Performed by: INTERNAL MEDICINE

## 2020-02-20 PROCEDURE — 1200000000 HC SEMI PRIVATE

## 2020-02-20 PROCEDURE — 6360000002 HC RX W HCPCS

## 2020-02-20 PROCEDURE — 99153 MOD SED SAME PHYS/QHP EA: CPT

## 2020-02-20 PROCEDURE — 85027 COMPLETE CBC AUTOMATED: CPT

## 2020-02-20 PROCEDURE — 6360000002 HC RX W HCPCS: Performed by: INTERNAL MEDICINE

## 2020-02-20 PROCEDURE — C1887 CATHETER, GUIDING: HCPCS

## 2020-02-20 RX ORDER — BUPRENORPHINE AND NALOXONE 8; 2 MG/1; MG/1
1 FILM, SOLUBLE BUCCAL; SUBLINGUAL 2 TIMES DAILY
Status: DISCONTINUED | OUTPATIENT
Start: 2020-02-20 | End: 2020-02-22 | Stop reason: HOSPADM

## 2020-02-20 RX ORDER — INSULIN LISPRO 100 [IU]/ML
0-6 INJECTION, SOLUTION INTRAVENOUS; SUBCUTANEOUS
Status: DISCONTINUED | OUTPATIENT
Start: 2020-02-20 | End: 2020-02-21

## 2020-02-20 RX ORDER — DEXTROSE MONOHYDRATE 50 MG/ML
100 INJECTION, SOLUTION INTRAVENOUS PRN
Status: DISCONTINUED | OUTPATIENT
Start: 2020-02-20 | End: 2020-02-22 | Stop reason: HOSPADM

## 2020-02-20 RX ORDER — CARVEDILOL 3.12 MG/1
3.12 TABLET ORAL 2 TIMES DAILY WITH MEALS
Status: DISCONTINUED | OUTPATIENT
Start: 2020-02-20 | End: 2020-02-22 | Stop reason: HOSPADM

## 2020-02-20 RX ORDER — 0.9 % SODIUM CHLORIDE 0.9 %
1000 INTRAVENOUS SOLUTION INTRAVENOUS ONCE
Status: COMPLETED | OUTPATIENT
Start: 2020-02-20 | End: 2020-02-20

## 2020-02-20 RX ORDER — INSULIN LISPRO 100 [IU]/ML
10 INJECTION, SOLUTION INTRAVENOUS; SUBCUTANEOUS
Status: DISCONTINUED | OUTPATIENT
Start: 2020-02-20 | End: 2020-02-22 | Stop reason: HOSPADM

## 2020-02-20 RX ORDER — LISINOPRIL 5 MG/1
2.5 TABLET ORAL DAILY
Status: DISCONTINUED | OUTPATIENT
Start: 2020-02-20 | End: 2020-02-21

## 2020-02-20 RX ORDER — INSULIN LISPRO 100 [IU]/ML
0-3 INJECTION, SOLUTION INTRAVENOUS; SUBCUTANEOUS NIGHTLY
Status: DISCONTINUED | OUTPATIENT
Start: 2020-02-20 | End: 2020-02-21

## 2020-02-20 RX ORDER — DEXTROSE MONOHYDRATE 25 G/50ML
12.5 INJECTION, SOLUTION INTRAVENOUS PRN
Status: DISCONTINUED | OUTPATIENT
Start: 2020-02-20 | End: 2020-02-22 | Stop reason: HOSPADM

## 2020-02-20 RX ORDER — NICOTINE POLACRILEX 4 MG
15 LOZENGE BUCCAL PRN
Status: DISCONTINUED | OUTPATIENT
Start: 2020-02-20 | End: 2020-02-22 | Stop reason: HOSPADM

## 2020-02-20 RX ADMIN — PREGABALIN 75 MG: 75 CAPSULE ORAL at 09:41

## 2020-02-20 RX ADMIN — INSULIN GLARGINE 18 UNITS: 100 INJECTION, SOLUTION SUBCUTANEOUS at 00:52

## 2020-02-20 RX ADMIN — CARVEDILOL 3.12 MG: 3.12 TABLET, FILM COATED ORAL at 17:00

## 2020-02-20 RX ADMIN — LISINOPRIL 2.5 MG: 5 TABLET ORAL at 17:00

## 2020-02-20 RX ADMIN — INSULIN LISPRO 10 UNITS: 100 INJECTION, SOLUTION INTRAVENOUS; SUBCUTANEOUS at 17:21

## 2020-02-20 RX ADMIN — IOPAMIDOL 128 ML: 755 INJECTION, SOLUTION INTRAVENOUS at 16:05

## 2020-02-20 RX ADMIN — INSULIN LISPRO 4 UNITS: 100 INJECTION, SOLUTION INTRAVENOUS; SUBCUTANEOUS at 17:21

## 2020-02-20 RX ADMIN — Medication 10 ML: at 09:42

## 2020-02-20 RX ADMIN — MIRTAZAPINE 30 MG: 15 TABLET, FILM COATED ORAL at 22:58

## 2020-02-20 RX ADMIN — MIRTAZAPINE 30 MG: 15 TABLET, FILM COATED ORAL at 00:52

## 2020-02-20 RX ADMIN — Medication 10 ML: at 00:55

## 2020-02-20 RX ADMIN — LAMOTRIGINE 100 MG: 100 TABLET ORAL at 09:42

## 2020-02-20 RX ADMIN — ASPIRIN 81 MG 81 MG: 81 TABLET ORAL at 09:42

## 2020-02-20 RX ADMIN — CLOPIDOGREL 75 MG: 75 TABLET, FILM COATED ORAL at 09:42

## 2020-02-20 RX ADMIN — BUPRENORPHINE AND NALOXONE 1 FILM: 8; 2 FILM BUCCAL; SUBLINGUAL at 00:52

## 2020-02-20 RX ADMIN — INSULIN LISPRO 1 UNITS: 100 INJECTION, SOLUTION INTRAVENOUS; SUBCUTANEOUS at 20:40

## 2020-02-20 RX ADMIN — SODIUM CHLORIDE 1000 ML: 9 INJECTION, SOLUTION INTRAVENOUS at 19:00

## 2020-02-20 RX ADMIN — PREGABALIN 75 MG: 75 CAPSULE ORAL at 17:01

## 2020-02-20 RX ADMIN — INSULIN GLARGINE 18 UNITS: 100 INJECTION, SOLUTION SUBCUTANEOUS at 20:40

## 2020-02-20 RX ADMIN — DESMOPRESSIN ACETATE 40 MG: 0.2 TABLET ORAL at 00:52

## 2020-02-20 RX ADMIN — INSULIN GLARGINE 18 UNITS: 100 INJECTION, SOLUTION SUBCUTANEOUS at 09:42

## 2020-02-20 RX ADMIN — PREGABALIN 75 MG: 75 CAPSULE ORAL at 00:52

## 2020-02-20 RX ADMIN — BUPRENORPHINE AND NALOXONE 1 FILM: 8; 2 FILM BUCCAL; SUBLINGUAL at 09:46

## 2020-02-20 RX ADMIN — BUPRENORPHINE AND NALOXONE 1 FILM: 8; 2 FILM BUCCAL; SUBLINGUAL at 22:58

## 2020-02-20 RX ADMIN — ENOXAPARIN SODIUM 40 MG: 40 INJECTION SUBCUTANEOUS at 09:41

## 2020-02-20 RX ADMIN — DESMOPRESSIN ACETATE 40 MG: 0.2 TABLET ORAL at 22:58

## 2020-02-20 ASSESSMENT — PAIN SCALES - GENERAL
PAINLEVEL_OUTOF10: 0
PAINLEVEL_OUTOF10: 0
PAINLEVEL_OUTOF10: 7
PAINLEVEL_OUTOF10: 7

## 2020-02-20 ASSESSMENT — PAIN DESCRIPTION - LOCATION
LOCATION: CHEST
LOCATION: CHEST

## 2020-02-20 ASSESSMENT — PAIN DESCRIPTION - PAIN TYPE
TYPE: ACUTE PAIN
TYPE: ACUTE PAIN

## 2020-02-20 ASSESSMENT — HEART SCORE: ECG: 1

## 2020-02-20 NOTE — PROGRESS NOTES
balloons  IVUS of LAD performed for instent thrombosis revealing 2.25stent in 4.0 vessel. IVUS reveals vessel 4.0 just proximal to stent and tapering to 2.25 just distal to stent    CXR 2/19/2020:   FINDINGS:   The heart and pulmonary vascularity are within normal limits.  There are no   focal areas of consolidation or pleural effusion. The osseous structures are   intact. Lingular scarring is similar to the prior exam.       Problem List  Active Problems:    Chest pain  Resolved Problems:    * No resolved hospital problems. *       Assessment & Plan:   1. Chest pain / CAD. [facility] today revealed in-stent stenosis, ISIDRO placed o LAD/D1. Continue DAPT. BMP in am.   2. Diabetes mellitus II. Uncontrolled.  on admission. Continue BID Lantus, resume mealtime coverage and add low correction sliding scale. Continue to monitor. Consult diabetic educator and dietician. Limit carbs to 60/meal.   3. Hypertension. Controlled. Continue lisinopril. Continue to follow. 4. Hyperlipidemia. LDL 64 (2/20/20). Continue statin. 5. Hx opoid addiction. On suboxone. Continue. 6. Bacteriuria. 1+ bacteria on UA. Asymptomatic. Afebrile, no leukocytosis.      Admit patient to hospital.      Diet: DIET CARDIAC;  Code:Full Code  DVT PPX: on enoxaparin      CHINA Shukla - CNP   2/20/2020 3:59 PM

## 2020-02-20 NOTE — DISCHARGE INSTR - COC
Continuity of Care Form    Patient Name: Sophia Paz   :  1965  MRN:  [id number]    Admit date:  2020  Discharge date:  ***    Code Status Order: Full Code   Advance Directives:   Advance Care Flowsheet Documentation     Date/Time Healthcare Directive Type of Healthcare Directive Copy in [facility] [address] Agent's Name Healthcare Agent's Phone Number    [phone]  No, patient does not have an advance directive for healthcare treatment -- -- -- -- --          Admitting Physician:  Lauren Wolf MD  PCP: No primary care provider on file. Discharging Nurse: [facility] Unit/Room#: [id number]/3321-01  Discharging Unit Phone Number: ***    Emergency Contact:   Extended Emergency Contact Information  Primary Emergency Contact: Allen Yung   [address] Phone: [phone]  Mobile Phone: [phone]  Relation: Brother/Sister  Secondary Emergency Contact: Donna Warren  Address: [address] [place] [address] Phone: [phone]  Mobile Phone: [phone]  Relation: Child    Past Surgical History:  Past Surgical History:   Procedure Laterality Date    ABOVE KNEE AMPUTATION      AMPUTATION      right bka    JOINT REPLACEMENT  ,     right knee; Dr Ruby Desir.     KNEE ARTHROSCOPY      ridht knee X6    LIVER BIOPSY N/A 2018    LIVER BIOPSY LAPAROSCOPIC performed by Yasmeen Jimenez MD at [address] [facility] N/A 2018    LAPAROSCOPIC CHOLECYSTECTOMY WITH INTRAOPERATIVE CHOLANGIOGRAM performed by Yasmeen Jimenez MD at [address] [place] [address]  11    right    UPPER GASTROINTESTINAL ENDOSCOPY  2018    UPPER GASTROINTESTINAL ENDOSCOPY N/A 2018    EGD BIOPSY performed by Mandy Helms MD at [address] [place] [address]       Immunization History:   Immunization History   Administered Date(s) Administered    Influenza Virus Vaccine 11/28/2011    Influenza, Zaid Pena, 6 mo and older, IM, PF (Flulaval, Fluarix) 11/14/2018    Pneumococcal Polysaccharide ([id number]) 11/28/2011, 09/15/2014       Active Problems:  Patient Active Problem List   Diagnosis Code    Diabetes mellitus ([facility] E11.9    Knee pain M25.569    Degenerative arthritis of knee M17.10    HTN (hypertension) I10    Other specified anemias D64.89    DKA, type 2, not at goal [facility]) E11.10    Hyperglycemia R73.9    Acute pancreatitis K85.90    Coronary artery disease involving native coronary artery of native heart without angina pectoris I25.10    Diabetes, coma, hyperosmolar ([facility] E11.01    Hypernatremia E87.0    Acute kidney injury ([facility] N17.9    Abnormal LFTs R94.5    Diabetic ketoacidosis without coma associated with type 1 diabetes mellitus ([facility]) E10.10    Esophagitis K20.9    Hypotension I95.9    Controlled type 1 diabetes mellitus with hyperglycemia ([facility]) E10.65    Sialadenitis K11.20    Abdominal pain R10.9    Adrenal insufficiency ([facility]) E27.40    Severe malnutrition ([facility]) E43    Unstable angina ([facility]) I20.0    Chronic pain syndrome G89.4    Constipation K59.00    Uncontrolled type 2 diabetes mellitus with hyperglycemia, with long-term current use of insulin ([facility]) E11.65, Z79.4    Acute on chronic respiratory failure with hypoxia ([facility]) J96.21    Mucus plugging of bronchi J98.09    Atelectasis J98.11    Former smoker Z87.891    DM (diabetes mellitus), secondary uncontrolled ([facility] E13.65    Thrombocytopenia ([facility]) D69.6    Hypertension, uncontrolled I10    Hx of AKA (above knee amputation), right ([facility] Z89.611    Chest pain R07.9       Isolation/Infection:   Isolation          No Isolation        Patient Infection Status     Infection Onset Added Last Indicated Last Indicated By Review Planned Expiration Resolved Resolved By    None active    Resolved    MRSA  02/04/11 02/04/11 Carin Roam Justice   11/12/18 Yesy Hood RN Urine CX MRSA + 2011  2:08 PM [facility]. Nurse Assessment:  Last Vital Signs: BP (!) 147/88   Pulse 70   Temp 98 °F (36.7 °C) (Oral)   Resp 18   Ht 5' 4\" (1.626 m)   Wt 126 lb (57.2 kg)   LMP 11/15/2017   SpO2 93%   BMI 21.63 kg/m²     Last documented pain score (0-10 scale): Pain Level: 7  Last Weight:   Wt Readings from Last 1 Encounters:   20 126 lb (57.2 kg)     Mental Status:  {IP PT MENTAL STATUS:}    IV Access:  { WANDA IV ACCESS:850834437}    Nursing Mobility/ADLs:  Walking   {CHP DME UOZ}  Transfer  {CHP DME TSPV:579149177}  Bathing  {CHP DME MKVO:710787889}  Dressing  {CHP DME LGDZ:261782955}  Toileting  {CHP DME FFLL:457012129}  Feeding  {CHP DME FQNK:660529683}  Med Admin  {P DME XDIX:859011278}  Med Delivery   { WANDA MED Delivery:711318274}    Wound Care Documentation and Therapy:  Incision 18 Abdomen (Active)   Number of days: 462       Wound 02/15/19 Sacrum Medial Stage II (Active)   Number of days: 370        Elimination:  Continence:   · Bowel: {YES / SB:15776}  · Bladder: {YES / AA:27337}  Urinary Catheter: {Urinary Catheter:521141573}   Colostomy/Ileostomy/Ileal Conduit: {YES / WON:66453}       Date of Last BM: ***    Intake/Output Summary (Last 24 hours) at 2020 1115  Last data filed at 2020 2222  Gross per 24 hour   Intake 1000 ml   Output --   Net 1000 ml     I/O last 3 completed shifts:   In: 1000 [IV Piggyback:1000]  Out: -     Safety Concerns:     [address] Safety Concerns:824261821}    Impairments/Disabilities:      [address] Impairments/Disabilities:219763175}    Nutrition Therapy:  Current Nutrition Therapy:   [address] [employer] Diet List:907981055}    Routes of Feeding: {CHP DME Other Feedings:913701753}  Liquids: {Slp liquid thickness:72480}  Daily Fluid Restriction: {CHP DME Yes amt example:372138938}  Last Modified Barium Swallow with Video (Video Swallowing Test): {Done Not Done QUCC:193937975}    Treatments at the Time of Hospital Discharge:   Respiratory Treatments: ***  Oxygen Therapy:  {Therapy; copd oxygen:75701}  Ventilator:    { CC Vent RZUM:218150420}    Rehab Therapies: {THERAPEUTIC INTERVENTION:5594219668}  Weight Bearing Status/Restrictions: 50Cristal DWYER Weight Bearin}  Other Medical Equipment (for information only, NOT a DME order):  {EQUIPMENT:041932533}  Other Treatments: ***    Patient's personal belongings (please select all that are sent with patient):  {P DME Belongings:214924613}    RN SIGNATURE:  {Esignature:933913271}    CASE MANAGEMENT/SOCIAL WORK SECTION    Inpatient Status Date: ***    Readmission Risk Assessment Score:  Readmission Risk              Risk of Unplanned Readmission:        19           Discharging to Facility/ Agency   · Name:   · Address:  · Phone:  · Fax:    Dialysis Facility (if applicable)   · Name:  · Address:  · Dialysis Schedule:  · Phone:  · Fax:    / signature: {Esignature:512183526}    PHYSICIAN SECTION    Prognosis: {Prognosis:9927092786}    Condition at Discharge: 50Cristal Gentile Patient Condition:831839584}    Rehab Potential (if transferring to Rehab): {Prognosis:8178675328}    Recommended Labs or Other Treatments After Discharge: ***    Physician Certification: I certify the above information and transfer of Kelsie De La O  is necessary for the continuing treatment of the diagnosis listed and that she requires {Admit to Appropriate Level of Care:01446} for {GREATER/LESS:533760617} 30 days.      Update Admission H&P: {CHP DME Changes in VZBFE:941873273}    PHYSICIAN SIGNATURE:  {Esignature:823590439}

## 2020-02-20 NOTE — H&P
Hospital Medicine History & Physical      PCP: No primary care provider on file. Date of Admission: 2/19/2020    Date of Service: Pt seen/examined on 2/20/2020 and Placed in Observation. Chief Complaint:  chext pain       History Of Present Illness:    Cricket Reyes is a 62 y.o. female presents the emergency department complaint of left-sided abdominal pain for 3 or 4 days with nausea vomiting. States that her blood sugar is high. She is also complaining of some left-sided chest pain associated possibly with vomiting. She reports onset of chest pain and abdominal pain 3 or 4 days ago, denies diarrhea or constipation. She rates her pain as an 8 of 10. States that it is sharp and stabbing. 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     Denies any headache, fever, lightheadedness, dizziness, visual disturbances. No neck or back pain. No shortness of breath, cough, or congestion. No diarrhea, constipation, or dysuria. No rash.       Past Medical History:        Diagnosis Date    Arthritis     [facility]est[facility] failure) ([facility] [address]     Depression     Diabetes mellitus (HCC)     Hepatitis C     Dr Angelia Schwarz follows; contracted from ex- (drug user)    Hypertension     MRSA (methicillin resistant Staphylococcus aureus) infection in 2000    was in urine and nares    Pneumonia     S/P colonoscopy with polypectomy 2/11    one polyp, Dr Angelia Schwarz. Recall 3 years. Past Surgical History:        Procedure Laterality Date    ABOVE KNEE AMPUTATION      AMPUTATION      right bka    JOINT REPLACEMENT  2006, 2010    right knee; Dr Gay Zapata.     KNEE ARTHROSCOPY      ridht knee X6    LIVER BIOPSY N/A 11/14/2018    LIVER BIOPSY LAPAROSCOPIC performed by Nella Mcdonnell MD at [facility] Where  MO LAP,CHOLECYSTECTOMY N/A 11/14/2018    LAPAROSCOPIC CHOLECYSTECTOMY WITH INTRAOPERATIVE CHOLANGIOGRAM performed by Marcus Garnica MD at [facility]  2/9/11    right    UPPER GASTROINTESTINAL ENDOSCOPY  11/13/2018    UPPER GASTROINTESTINAL ENDOSCOPY N/A 11/13/2018    EGD BIOPSY performed by Sandie Love MD at [address] [place] [address]       Medications Prior to Admission:    Prior to Admission medications    Medication Sig Start Date End Date Taking? Authorizing Provider   insulin lispro (HUMALOG) 100 UNIT/ML pen Inject 12 Units into the skin 3 times daily (before meals) 9/12/19  Yes Jaida Hassan MD   methylPREDNISolone (MEDROL) 2 MG tablet Take 1 tablet by mouth daily (with breakfast) 9/13/19  Yes Jaida Hassan MD   pregabalin (LYRICA) 75 MG capsule Take 75 mg by mouth 3 times daily. Yes Historical Provider, MD   methylPREDNISolone (MEDROL) 4 MG tablet Take 2 mg by mouth every evening   Yes Historical Provider, MD   buprenorphine-naloxone (SUBOXONE) 8-2 MG SUBL SL tablet Place 1 tablet under the tongue 2 times daily. Yes Historical Provider, MD   nitroGLYCERIN (NITROSTAT) 0.4 MG SL tablet up to max of 3 total doses. If no relief after 1 dose, call 911. 7/3/19  Yes Allison Loya MD   mirtazapine (REMERON SOLTAB) 30 MG disintegrating tablet Take 1 tablet by mouth nightly 2/24/19  Yes Reina Lazo MD   insulin lispro (HUMALOG) 100 UNIT/ML injection vial Inject 10 Units into the skin 3 times daily (before meals) Give only if blood sugar is greater than 150 2/24/19  Yes Melinda Lazo MD   clopidogrel (PLAVIX) 75 MG tablet Take 1 tablet by mouth daily 11/17/18  Yes Dunia Vargas MD   atorvastatin (LIPITOR) 40 MG tablet Take 40 mg by mouth daily   Yes Historical Provider, MD   aspirin 81 MG tablet Take 81 mg by mouth daily   Yes Historical Provider, MD   lamoTRIgine (LAMICTAL) 100 MG tablet Take 100 mg by mouth daily.    Yes Historical Provider, MD insulin glargine (LANTUS) 100 UNIT/ML injection pen Inject 18 Units into the skin 2 times daily 9/12/19   Meredith Malhotra MD   FREESTYLE LANCETS MISC 1 each by Other route 4 times daily (before meals and nightly) Patient to check blood sugar 4 times a day and PRN, he is treated with multiple daily injections of insulin that require correction dosing and has had hypoglycemia. A1C  14.5  ICD code- E11.65, Z79.4 7/3/19   Andre Malone MD   blood glucose test strips (FREESTYLE LITE) strip 1 each by Other route 4 times daily (before meals and nightly) Patient to check blood sugar 4 times a day and PRN, he is treated with multiple daily injections of insulin that require correction dosing and has had hypoglycemia. A1C  14.5  ICD code- E11.65, Z79.4 7/3/19   Andre Malone MD   glucose monitoring kit (FREESTYLE) monitoring kit 1 kit by Does not apply route 4 times daily (before meals and nightly) Patient to check blood sugar 4 times a day and PRN, he is treated with multiple daily injections of insulin that require correction dosing and has had hypoglycemia. A1C  14.5  ICD code- E11.65, Z79.4 7/3/19   Andre Malone MD   pantoprazole (PROTONIX) 40 MG tablet Take 1 tablet by mouth every morning (before breakfast) 7/4/19   Andre Malone MD   B-D INS SYR HALF-UNIT .3CC/31G 31G X 5/16\" 0.3 ML MISC 145 Units 12/3/17   Historical Provider, MD   calcium carbonate (TUMS) 500 MG chewable tablet Take 1 tablet by mouth daily. Historical Provider, MD   acetaminophen (TYLENOL) 325 MG tablet Take  by mouth every 4 hours as needed. Historical Provider, MD       Allergies:  Darvocet [propoxyphene n-acetaminophen]; Naprosyn [naproxen]; and Ultram [tramadol hcl]    Social History:  The patient currently lives at home     TOBACCO:   reports that she quit smoking about 8 years ago. She has never used smokeless tobacco.  ETOH:   reports no history of alcohol use.       Family History:  Reviewed in undeterminedLateral infarct , age undeterminedAbnormal ECG    CBC   Recent Labs     02/19/20  1845   WBC 7.9   HGB 14.7   HCT 44.4         RENAL  Recent Labs     02/19/20 1944   *   K 4.8   CL 96*   CO2 25   BUN 21*   CREATININE <0.5*     LFT'S  No results for input(s): AST, ALT, ALB, BILIDIR, BILITOT, ALKPHOS in the last 72 hours. COAG  No results for input(s): INR in the last 72 hours. CARDIAC ENZYMES  Recent Labs     02/19/20  1944 02/19/20  2309 02/20/20  0201   TROPONINI <0.01 <0.01 <0.01       U/A:    Lab Results   Component Value Date    COLORU YELLOW 02/19/2020    WBCUA 5 02/19/2020    RBCUA 5 02/19/2020    BACTERIA 1+ 02/19/2020    CLARITYU CLOUDY 02/19/2020    SPECGRAV >1.030 02/19/2020    LEUKOCYTESUR Negative 02/19/2020    BLOODU Negative 02/19/2020    GLUCOSEU >=1000 02/19/2020    GLUCOSEU NEGATIVE 02/14/2012       ABG    Lab Results   Component Value Date    RSG4IXA 22.7 09/11/2019    BEART -1.1 09/11/2019    U2NXBMFG 94.4 09/11/2019    PHART 7.426 09/11/2019    XWA5KYY 34.6 09/11/2019    PO2ART 80.2 09/11/2019    SXZ2LIA 53.2 09/11/2019           Active Hospital Problems    Diagnosis Date Noted    Chest pain [R07.9] 02/19/2020         PHYSICIANS CERTIFICATION:    I certify that Fernando Casarez is expected to be hospitalized for less than 2 midnights based on the following assessment and plan:      ASSESSMENT/PLAN:    Chest pain  - patient with recent stenting in July  -most worriesome would be in stent restenosis  - trend enzymes  - consult to cardiology  ? Stress or angiogram    Diabetes mellitus 2  - resume home meds  - lantus and prandial insulin      Suboxone  - taking it  -can cover for 72 hours here. DVT Prophylaxis: lovneox  Diet: Diet NPO, After Midnight  Code Status: Full Code  PT/OT Eval Status: not at this time     Dispo - rule out MI        Jesica Love MD    Thank you No primary care provider on file. for the opportunity to be involved in this patient's care.  If you have any questions or concerns please feel free to contact me at [phone]

## 2020-02-20 NOTE — PROGRESS NOTES
Occupational Therapy   Occupational Therapy Initial Assessment/Discharge Summary  Date: 2020   Patient Name: Huong Rosas  MRN: [id number]     : 1965    Date of Service: 2020    Discharge Recommendations: Huong Rosas scored a 24/24 on the AM-Located within [facility] ADL Inpatient form. At this time, no further OT is recommended upon discharge due to pt functioning at baseline level. Recommend patient returns to prior setting with prior services. OT Equipment Recommendations  Equipment Needed: Yes  Mobility Devices: Wheelchair;ADL Assistive Devices  Wheelchair: Standard  ADL Assistive Devices: Transfer Tub Bench  Other: Pt's manual w/c is from  and has malfunctioning L break. Recommend new standard w/c for pt to maximize pt's safety in functional mobility as this is pt's primary means of mobility. Assessment   Assessment: Pt is at or near baseline level of function. Anticipate no further OT at this time. Prognosis: Good  Decision Making: Low Complexity  OT Education: OT Role;Equipment  Patient Education: d/c  Patient verbalized and demonstrated independent understanding of all recommendations taught. No Skilled OT: At baseline function; Safe to return home; No OT goals identified  REQUIRES OT FOLLOW UP: No  Activity Tolerance  Activity Tolerance: Patient Tolerated treatment well  Safety Devices  Safety Devices in place: Yes  Type of devices: Left in bed;Call light within reach;Nurse notified(low fall risk)           Patient Diagnosis(es): The primary encounter diagnosis was Generalized abdominal pain. Diagnoses of Nausea and vomiting, intractability of vomiting not specified, unspecified vomiting type, Hyperglycemia, and Acute chest pain were also pertinent to this visit.      has a past medical history of Arthritis, CHF (congestive heart failure) ([facility] Depression, Diabetes mellitus ([facility] [address] Hepatitis C, Hypertension, MRSA (methicillin resistant Staphylococcus aureus) infection, Pneumonia, and S/P baseline  ADL  Additional Comments: pt declined additional ADLs, reported that she recently toileted from [facility] without difficulty  Tone RUE  RUE Tone: Normotonic  Tone LUE  LUE Tone: Normotonic  Coordination  Movements Are Fluid And Coordinated: Yes     Bed mobility  Scooting: Independent  Comment: pt seated upright in bed upon arrival, and returned to sitting upright after BSC transfer     Vision - Basic Assessment  Prior Vision: Wears glasses only for reading  Visual History: Cataracts  Patient Visual Report: No visual complaint reported. Cognition  Overall Cognitive Status: WFL        Sensation  Overall Sensation Status: WFL(UEs WFL)        LUE AROM (degrees)  LUE AROM : WFL  RUE AROM (degrees)  RUE AROM : WFL  LUE Strength  Gross LUE Strength: WFL  RUE Strength  Gross RUE Strength: WFL                                 AM-PAC Score        AM-PAC Inpatient Daily Activity Raw Score: 24 (02/20/20 1048)  AM-PAC Inpatient ADL T-Scale Score : 57.54 (02/20/20 1048)  ADL Inpatient CMS 0-100% Score: 0 (02/20/20 1048)  ADL Inpatient CMS G-Code Modifier : [address] (02/20/20 1048)    Goals  Short term goals  Time Frame for Short term goals: No acute OT goals identified.        Therapy Time   Individual Concurrent Group Co-treatment   Time In 1004         Time Out 1027         Minutes 23               Timed Code Treatment Minutes:   8    Total Treatment Minutes:  [address] [facility] [address] [facility], [place], [place] [id number]

## 2020-02-20 NOTE — CARE COORDINATION
[facility]    Spoke with patient regarding [facility] services. Patient declined services. DC planner made aware. Electronically signed by Hanna Kaur LPN on 8/95/23 at 51:35 AM        [facility] Nurse  [phone]

## 2020-02-20 NOTE — CARE COORDINATION
Discharge Planning Assessment  Discharge Planning Assessment  RN/SW discharge planner met with patient/ (and family member) to discuss reason for admission, current living situation, and potential needs at the time of discharge    Demographics/Insurance verified Yes [employer]     Current type of dwelling:first floor apartment     Patient from ECF/SW confirmed with:n/a    Living arrangements:lives with brother and sister     Level of function/Support:independent and family is supportive. PCP:pt states she sees Dr. Orma Schlatter (unsure of spelling)  in [facility] -pt has listed in system no pcp CM contacted Denilson East to help verify and change if needed     Last Visit to PCP: 2 months ago    DME: has a wheelchair, wants a new one d/t broken brakes. Pt cannot remember name of company that supplied. Brian East with [employer], pt not active with him. Physician notified of situation. CM recommended finding out what company w/c obtained from and getting new one through them or fixed. States her brother tried to fix it. Also recommended medical supply stores such as [employer] which can offer monthly payments. Pt verbalizes understanding     Active with any community resources/agencies/skilled home care:none - per physician pt declined home care     Medication compliance issues: participating in meds to bed     Financial issues that could impact healthcare:none         Tentative discharge plan:  Discussed and provided facilities of choice if transition to a skilled nursing facility is required at the time of discharge - n/a      Discussed with patient and/or family that on the day of discharge home tentative time of discharge will be between 10 AM and noon. Transportation at the time of discharge: friend in private vehicle.      Marianna Regalado RN, BSN  [phone]

## 2020-02-20 NOTE — ED PROVIDER NOTES
Rafa Jules        Pt Name: Ade Paulino  MRN: [id number]  [place] 1965  Date of evaluation: 2/19/2020  Provider: CHINA Parada - CNP  PCP: Rhea De La Rosa MD    This patient was seen and evaluated by the attending physician Temi Yao, [address]       Chief Complaint   Patient presents with    Chest Pain     pt into ER with chest pain and nausea, pt took 1 nitro and ASA at home, also thinks her bloos sugar is high       HISTORY OF PRESENT ILLNESS   (Location, Timing/Onset, Context/Setting, Quality, Duration, Modifying Factors, Severity, Associated Signs and Symptoms)  Note limiting factors. Ade Paulino is a 62 y.o. female presents the emergency department complaint of left-sided abdominal pain for 3 or 4 days with nausea vomiting. States that her blood sugar is high. She is also complaining of some left-sided chest pain associated possibly with vomiting. She reports onset of chest pain and abdominal pain 3 or 4 days ago, denies diarrhea or constipation. She rates her pain as an 8 of 10. States that it is sharp and stabbing. 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 Denies any headache, fever, lightheadedness, dizziness, visual disturbances. No neck or back pain. No shortness of breath, cough, or congestion. No diarrhea, constipation, or dysuria. No rash. Nursing Notes were all reviewed and agreed with or any disagreements were addressed in the HPI. REVIEW OF SYSTEMS    (2-9 systems for level 4, 10 or more for level 5)     Review of Systems   Constitutional: Negative for activity change, chills and fever. Respiratory: Negative for chest tightness and shortness of breath. Cardiovascular: Positive for chest pain. Gastrointestinal: Positive for abdominal pain, nausea and vomiting. Negative for diarrhea. buprenorphine-naloxone (SUBOXONE) 8-2 MG SUBL SL tablet Place 1 tablet under the tongue 2 times daily. nitroGLYCERIN (NITROSTAT) 0.4 MG SL tablet up to max of 3 total doses. If no relief after 1 dose, call 911. Qty: 25 tablet, Refills: 3      mirtazapine (REMERON SOLTAB) 30 MG disintegrating tablet Take 1 tablet by mouth nightly  Qty: 30 tablet, Refills: 3      insulin lispro (HUMALOG) 100 UNIT/ML injection vial Inject 10 Units into the skin 3 times daily (before meals) Give only if blood sugar is greater than 150  Qty: 1 vial, Refills: 3      clopidogrel (PLAVIX) 75 MG tablet Take 1 tablet by mouth daily  Qty: 30 tablet, Refills: 3      atorvastatin (LIPITOR) 40 MG tablet Take 40 mg by mouth daily      aspirin 81 MG tablet Take 81 mg by mouth daily      lamoTRIgine (LAMICTAL) 100 MG tablet Take 100 mg by mouth daily. insulin glargine (LANTUS) 100 UNIT/ML injection pen Inject 18 Units into the skin 2 times daily  Qty: 5 pen, Refills: 3      FREESTYLE LANCETS MISC 1 each by Other route 4 times daily (before meals and nightly) Patient to check blood sugar 4 times a day and PRN, he is treated with multiple daily injections of insulin that require correction dosing and has had hypoglycemia. A1C  14.5  ICD code- E11.65, Z79.4  Qty: 100 each, Refills: 5      blood glucose test strips (FREESTYLE LITE) strip 1 each by Other route 4 times daily (before meals and nightly) Patient to check blood sugar 4 times a day and PRN, he is treated with multiple daily injections of insulin that require correction dosing and has had hypoglycemia. A1C  14.5  ICD code- E11.65, Z79.4  Qty: 100 each, Refills: 5      glucose monitoring kit (FREESTYLE) monitoring kit 1 kit by Does not apply route 4 times daily (before meals and nightly) Patient to check blood sugar 4 times a day and PRN, he is treated with multiple daily injections of insulin that require correction dosing and has had hypoglycemia. A1C  14.5  ICD code- E11.65, Z79.4  Qty: 1 kit, Refills: 0      pantoprazole (PROTONIX) 40 MG tablet Take 1 tablet by mouth every morning (before breakfast)  Qty: 30 tablet, Refills: 0      B-D INS SYR HALF-UNIT .3CC/31G 31G X /16\" 0.3 ML MISC 145 Units      calcium carbonate (TUMS) 500 MG chewable tablet Take 1 tablet by mouth daily. acetaminophen (TYLENOL) 325 MG tablet Take  by mouth every 4 hours as needed. !! - Potential duplicate medications found. Please discuss with provider. ALLERGIES     Darvocet [propoxyphene n-acetaminophen]; Naprosyn [naproxen]; and Ultram [tramadol hcl]    FAMILYHISTORY       Family History   Problem Relation Age of Onset    Cancer Father     Seizures Brother     Cancer Sister         colon          SOCIAL HISTORY       Social History     Tobacco Use    Smoking status: Former Smoker     Last attempt to quit: 3/1/2011     Years since quittin.9    Smokeless tobacco: Never Used    Tobacco comment: congratulated on quitting smoking1   Substance Use Topics    Alcohol use: No    Drug use: Yes     Types: Opiates      Comment: clean for 2 years        SCREENINGS    [place] Coma Scale  Eye Opening: Spontaneous  Best Verbal Response: Oriented  Best Motor Response: Obeys commands  Nelson Coma Scale Score: 15 Heart Score for chest pain patients  History: Moderately Suspicious  ECG: Non-Specifc repolarization disturbance/LBTB/PM  Patient Age: > 39 and < 65 years  *Risk factors for Atherosclerotic disease: Diabetes Mellitus, Coronary Artery Disease  Risk Factors: > 3 Risk factors or history of atherosclerotic disease*  Troponin: < 1X normal limit  Heart Score Total: 5      PHYSICAL EXAM    (up to 7 for level 4, 8 or more for level 5)     ED Triage Vitals [20 1549]   BP Temp Temp src Pulse Resp SpO2 Height Weight   105/68 99.2 °F (37.3 °C) -- 94 17 96 % -- --       Physical Exam  Vitals signs and nursing note reviewed.    Constitutional: Appearance: She is well-developed. She is not diaphoretic. HENT:      Head: Normocephalic and atraumatic. Right Ear: External ear normal.      Left Ear: External ear normal.   Eyes:      General:         Right eye: No discharge. Left eye: No discharge. Neck:      Musculoskeletal: Normal range of motion and neck supple. Vascular: No JVD. Cardiovascular:      Rate and Rhythm: Normal rate and regular rhythm. Pulses: Normal pulses. Heart sounds: Normal heart sounds. Pulmonary:      Effort: Pulmonary effort is normal. No respiratory distress. Breath sounds: Normal breath sounds. Abdominal:      Palpations: Abdomen is soft. Tenderness: There is abdominal tenderness. Musculoskeletal: Normal range of motion. Skin:     General: Skin is warm and dry. Coloration: Skin is not pale. Neurological:      Mental Status: She is alert and oriented to person, place, and time. Psychiatric:         Behavior: Behavior normal.         DIAGNOSTIC RESULTS   LABS:    Labs Reviewed   CBC WITH AUTO DIFFERENTIAL - Abnormal; Notable for the following components:       Result Value    MPV 11.2 (*)     All other components within normal limits    Narrative:     CALL  [facility] tel. [phone],  Rejected BMP TROP Name/Called to:  Alfonso Damon, 02/19/2020 19:05, by  [facility]  Performed at:  [facility] [address], [facility], [address]   Phone [phone]   BASIC METABOLIC PANEL - Abnormal; Notable for the following components:    Sodium 132 (*)     Chloride 96 (*)     Glucose 459 (*)     BUN 21 (*)     CREATININE <0.5 (*)     All other components within normal limits    Narrative:     Performed at:  [facility]  [address], [address]   Phone [phone]   LIPASE - Abnormal; Notable for the following components:    Lipase 11.0 (*)     All other components within normal limits    Narrative: Performed at:  [facility] [address],  [place], [address]   Phone [phone]   POCT GLUCOSE - Abnormal; Notable for the following components:    POC Glucose 179 (*)     All other components within normal limits    Narrative:     Performed at:  [facility] [address],  [place], [address]   Phone [phone]   POCT GLUCOSE - Abnormal; Notable for the following components:    POC Glucose 327 (*)     All other components within normal limits    Narrative:     Performed at:  [facility] [address],  [place], [address]   Phone [phone]    Narrative:     Savana Quintero  [facility] tel. [phone],  Rejected BMP TROP Name/Called to:  Baudilio Williamson, 02/19/2020 19:05, by  [facility]  Performed at:  [facility] [address],  [place], [address]   Phone [phone]   TROPONIN    Narrative:     Performed at:  [facility] [address],  [place], [address]   Phone [phone]   BETA-HYDROXYBUTYRATE    Narrative:     Performed at:  [facility] [address],  [place], [address]   Phone [phone]   TROPONIN    Narrative:     Performed at:  [facility] [address],  [place], [address]   Phone [phone]   TROPONIN    Narrative:     Performed at:  [facility] [address],  [place], [address]   Phone [phone]   CBC   BASIC METABOLIC PANEL   POC ACT-LR    Narrative:     Performed at:  [facility] [address],  [place], [address]   Phone [phone]   POC ACT-LR    Narrative:     Performed at:  [facility] [address],  [place], [address]   Phone [phone]   POCT GLUCOSE       All other labs were within normal range or not returned as of this dictation. EKG: All EKG's are interpreted by the Emergency Department Physician in the absence of a cardiologist.  Please see their note for interpretation of EKG. RADIOLOGY:   Non-plain film images such as CT, Ultrasound and MRI are read by the radiologist. Plain radiographic images are visualized and preliminarily interpreted by the ED Provider with the below findings:        Interpretation per the Radiologist below, if available at the time of this note:    XR CHEST STANDARD (2 VW)   Final Result   No acute abnormality           Xr Chest Standard (2 Vw)    Result Date: 2/19/2020  EXAMINATION: TWO XRAY VIEWS OF THE CHEST 2/19/2020 4:31 pm COMPARISON: 09/11/2019 HISTORY: ORDERING SYSTEM PROVIDED HISTORY: chest pain TECHNOLOGIST PROVIDED HISTORY: Reason for exam:->chest pain Reason for Exam: Chest Pain (pt into ER with chest pain and nausea, pt took 1 nitro and ASA at home, also thinks her bloos sugar is high Acuity: Unknown Type of Exam: Unknown FINDINGS: The heart and pulmonary vascularity are within normal limits. There are no focal areas of consolidation or pleural effusion. The osseous structures are intact. Lingular scarring is similar to the prior exam.     No acute abnormality           PROCEDURES   Unless otherwise noted below, none     Procedures   PROCEDURE:  CANNULATION OF EXTERNAL JUGULAR VEIN  Raquel Craig or their surrogate had an opportunity to ask questions, and the risks, benefits, and alternatives were discussed. The puncture site was prepped to maintain a clean field. I accessed the 18 external jugular vein with an left-gauge catheter. The catheter was easily flushed with normal saline. There were no complications during the procedure and an acceptable blood specimen was obtained.       CRITICAL CARE TIME   N/A    CONSULTS:  IP CONSULT TO HOSPITALIST  IP CONSULT TO CARDIOLOGY  IP CONSULT TO SPIRITUAL SERVICES  IP CONSULT TO SOCIAL WORK  IP CONSULT TO 1700)   ticagrelor (BRILINTA) tablet 90 mg (has no administration in time range)   insulin lispro (1 Unit Dial) 10 Units (10 Units Subcutaneous Given 2/20/20 1721)   glucose (GLUTOSE) 40 % oral gel 15 g (has no administration in time range)   dextrose 50 % IV solution (has no administration in time range)   glucagon (rDNA) injection 1 mg (has no administration in time range)   dextrose 5 % solution (has no administration in time range)   insulin lispro (1 Unit Dial) 0-6 Units (4 Units Subcutaneous Given 2/20/20 1721)   insulin lispro (1 Unit Dial) 0-3 Units (has no administration in time range)   morphine injection 4 mg (4 mg Intravenous Given 2/19/20 2017)   famotidine (PEPCID) injection 20 mg (20 mg Intravenous Given 2/19/20 2017)   0.9 % sodium chloride bolus (0 mLs Intravenous Stopped 2/19/20 2222)   aspirin chewable tablet 243 mg (243 mg Oral Given 2/19/20 2202)   insulin regular (HUMULIN R;NOVOLIN R) injection 8 Units (8 Units Intravenous Given 2/19/20 2210)   iopamidol (ISOVUE-370) 76 % injection 128 mL (128 mLs Other Given by Other 2/20/20 1605)       Briefly, this is a 62year old female that presents the emergency department complaint of left-sided abdominal pain for 3 or 4 days with nausea vomiting. States that her blood sugar is high. She is also complaining of some left-sided chest pain associated possibly with vomiting. She reports onset of chest pain and abdominal pain 3 or 4 days ago, denies diarrhea or constipation. She rates her pain as an 8 of 10. States that it is sharp and stabbing. 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 IV was started by me as the nurses did have difficulty. She is given a liter of IV fluids, pain medication as well as nausea medication and Pepcid. Blood sugar will be rechecked after IV fluids. Urinalysis shows trace ketones. Blood gas shows pH of 7.466.   No anion gap with normal bicarb. The patient is not in DKA. Negative troponin. Lipase 11.0. CBC is unremarkable. XR CHEST STANDARD (2 VW) (Final result)   Result time 02/19/20 16:39:50   Final result by Jorge Hale MD (02/19/20 16:39:50)                Impression:    No acute abnormality              She will be dispositioned by attending physician as her visit does exceed the length of my shift. FINAL IMPRESSION      1. Generalized abdominal pain    2. Nausea and vomiting, intractability of vomiting not specified, unspecified vomiting type    3. Hyperglycemia    4. Acute chest pain          DISPOSITION/PLAN   DISPOSITION        PATIENT REFERREDTO:  No follow-up provider specified.     DISCHARGE MEDICATIONS:  Current Discharge Medication List          DISCONTINUED MEDICATIONS:  Current Discharge Medication List                 (Please note that portions of this note were completed with a voice recognition program.  Efforts were made to edit the dictations but occasionally words are mis-transcribed.)    CHINA Bridges CNP (electronically signed)           CHINA Bridges CNP  02/19/20 2802       CHINA Bridges CNP  02/20/20 0701

## 2020-02-20 NOTE — PROGRESS NOTES
Referral received for information regarding Advance Directives. LW and POAHC forms given and explained. Also provided printed information about the forms and process. During conversation patient shared that she is going to surgery in next half hour. She decided to not complete LW and that form was removed and shredded. Since time was short I asked her to tell me () who she would want as her [facility], she said she would want her daughter: Dolly Schmidtrosina, [phone]. This was also communicated to nurse. Spiritual support given through encouragement, prayer and pastoral presence. She seemed grateful for opportunity to pray.

## 2020-02-20 NOTE — PRE SEDATION
Brief Pre-Op Note/Sedation Assessment      Amilcar Lung  1965  [id number]      [id number]  1:24 PM    Planned Procedure: Cardiac Catheterization Procedure    Post Procedure Plan: Return to same level of care    Consent: I have discussed with the patient and/or the patient representative the indication, alternatives, and the possible risks and/or complications of the planned procedure and the anesthesia methods. The patient and/or patient representative appear to understand and agree to proceed. Chief Complaint: Chest Pain/Pressure  Anginal Equivalent  Dyspnea on Exertion  Dyspnea      Indications for Cath Procedure:  New Onset Angina <= 2 months and Worsening Angina  Anginal Classification within 2 weeks:  CCS III - Symptoms with everyday living activities, i.e., moderate limitation  NYHA Heart Failure Class within 2 weeks: No symptoms  Is Cath Lab Visit Valve-related?: No  Surgical Risk: Low  Functional Type: < 4 METS    Anti- Anginal Meds within 2 weeks:   Yes: Aspirin and Statin (Any)    Stress or Imaging Studies Performed:  None     Vital Signs:  BP (!) 147/88   Pulse 70   Temp 98 °F (36.7 °C) (Oral)   Resp 18   Ht 5' 4\" (1.626 m)   Wt 126 lb (57.2 kg)   LMP 11/15/2017   SpO2 93%   BMI 21.63 kg/m²     Allergies: Allergies   Allergen Reactions    Darvocet [Propoxyphene N-Acetaminophen] Nausea Only    Naprosyn [Naproxen] Rash    Ultram [Tramadol Hcl] Rash       Past Medical History:  Past Medical History:   Diagnosis Date    Arthritis     [facility]congest[facility] failure) ([facility] [address]     Depression     Diabetes mellitus (HCC)     Hepatitis C     Dr Ophelia Lam follows; contracted from ex- (drug user)    Hypertension     MRSA (methicillin resistant Staphylococcus aureus) infection in 2000    was in urine and nares    Pneumonia     S/P colonoscopy with polypectomy 2/11    one polyp, Dr Ophelia Lam. Recall 3 years.          Surgical History:  Past Surgical History:   Procedure Laterality

## 2020-02-20 NOTE — OP NOTE
[address]   Procedure Note      Procedure: [facility], PCI of LAD/Diag, IVUS LAD  Indication: Unstable angina  Consent: Verbal and/or written consent obtained prior to procedure. Sedation: Minimal conscious sedation utilized for comfort. Complic: None  EBL:  <36MN  Specimens: None  Fluoro:  18.2 min  Contrast: 128 cc  Access:  RRA  Ultrasound: Ultrasound guidance used to determine aforementioned artery patency, size (>2mm), anatomic variations and ideal puncture location. Real-time ultrasound utilized concurrent with vascular needle entry into the artery. Image(s) permanently recorded and reported in the patient chart. Findings:   LM Normal    % mid instent, 99% D1 with WILMA 1 flow   CX normal   RCA 20% mid    LVG 40%, anteroapical hypokinesis   LVEDP 10mmHg    Intervention:   PCI of LAD with 2.5X38 Xience ISIDRO overlapping prior stent  Kissing balloon angioplasty LAD/Diag with 3.0 and 2.0 balloons  IVUS of LAD performed for instent thrombosis revealing 2.25stent in 4.0 vessel. IVUS reveals vessel 4.0 just proximal to stent and tapering to 2.25 just distal to stent    Post Cath Dx:   Severe LAD/D disease    Med Rec:   Recommendation Indicated?  Not Given Due To:   Dual antiplatelet therapy for >1 year yes    High dose statin therapy yes    Beta blocker yes    ACE/ARB for depressed EF yes      Non-Med Rec:   Recommendation   Noninvasive assessment of EF if LVG deferred as IP/OP as appropriate   Avoidance of first and second hand smoke   Maintain healthy lifestyle with focus on diet, exercise and weight loss

## 2020-02-21 ENCOUNTER — APPOINTMENT (OUTPATIENT)
Dept: CT IMAGING | Age: 55
DRG: 175 | End: 2020-02-21
Payer: COMMERCIAL

## 2020-02-21 LAB
ALBUMIN SERPL-MCNC: 3.4 G/DL (ref 3.4–5)
ALP BLD-CCNC: 131 U/L (ref 40–129)
ALT SERPL-CCNC: 14 U/L (ref 10–40)
ANION GAP SERPL CALCULATED.3IONS-SCNC: 13 MMOL/L (ref 3–16)
AST SERPL-CCNC: 26 U/L (ref 15–37)
BILIRUB SERPL-MCNC: 0.3 MG/DL (ref 0–1)
BILIRUBIN DIRECT: <0.2 MG/DL (ref 0–0.3)
BILIRUBIN, INDIRECT: ABNORMAL MG/DL (ref 0–1)
BUN BLDV-MCNC: 16 MG/DL (ref 7–20)
CALCIUM SERPL-MCNC: 8.5 MG/DL (ref 8.3–10.6)
CHLORIDE BLD-SCNC: 98 MMOL/L (ref 99–110)
CO2: 25 MMOL/L (ref 21–32)
CREAT SERPL-MCNC: <0.5 MG/DL (ref 0.6–1.1)
EKG ATRIAL RATE: 74 BPM
EKG DIAGNOSIS: NORMAL
EKG P AXIS: 36 DEGREES
EKG P-R INTERVAL: 166 MS
EKG Q-T INTERVAL: 392 MS
EKG QRS DURATION: 84 MS
EKG QTC CALCULATION (BAZETT): 435 MS
EKG R AXIS: -49 DEGREES
EKG T AXIS: 90 DEGREES
EKG VENTRICULAR RATE: 74 BPM
GFR AFRICAN AMERICAN: >60
GFR NON-AFRICAN AMERICAN: >60
GLUCOSE BLD-MCNC: 111 MG/DL (ref 70–99)
GLUCOSE BLD-MCNC: 132 MG/DL (ref 70–99)
GLUCOSE BLD-MCNC: 366 MG/DL (ref 70–99)
GLUCOSE BLD-MCNC: 420 MG/DL (ref 70–99)
GLUCOSE BLD-MCNC: 76 MG/DL (ref 70–99)
HCT VFR BLD CALC: 44.6 % (ref 36–48)
HEMOGLOBIN: 14.5 G/DL (ref 12–16)
LIPASE: 37 U/L (ref 13–60)
MCH RBC QN AUTO: 29.3 PG (ref 26–34)
MCHC RBC AUTO-ENTMCNC: 32.5 G/DL (ref 31–36)
MCV RBC AUTO: 90.4 FL (ref 80–100)
PDW BLD-RTO: 13.2 % (ref 12.4–15.4)
PERFORMED ON: ABNORMAL
PERFORMED ON: NORMAL
PLATELET # BLD: 110 K/UL (ref 135–450)
PMV BLD AUTO: 11.8 FL (ref 5–10.5)
POTASSIUM SERPL-SCNC: 3.6 MMOL/L (ref 3.5–5.1)
RBC # BLD: 4.93 M/UL (ref 4–5.2)
SODIUM BLD-SCNC: 136 MMOL/L (ref 136–145)
TOTAL PROTEIN: 7.3 G/DL (ref 6.4–8.2)
WBC # BLD: 8.7 K/UL (ref 4–11)

## 2020-02-21 PROCEDURE — 1200000000 HC SEMI PRIVATE

## 2020-02-21 PROCEDURE — 6370000000 HC RX 637 (ALT 250 FOR IP): Performed by: INTERNAL MEDICINE

## 2020-02-21 PROCEDURE — 93010 ELECTROCARDIOGRAM REPORT: CPT | Performed by: INTERNAL MEDICINE

## 2020-02-21 PROCEDURE — 80048 BASIC METABOLIC PNL TOTAL CA: CPT

## 2020-02-21 PROCEDURE — 85027 COMPLETE CBC AUTOMATED: CPT

## 2020-02-21 PROCEDURE — 6360000002 HC RX W HCPCS: Performed by: INTERNAL MEDICINE

## 2020-02-21 PROCEDURE — 6370000000 HC RX 637 (ALT 250 FOR IP): Performed by: NURSE PRACTITIONER

## 2020-02-21 PROCEDURE — 74150 CT ABDOMEN W/O CONTRAST: CPT

## 2020-02-21 PROCEDURE — 99232 SBSQ HOSP IP/OBS MODERATE 35: CPT | Performed by: INTERNAL MEDICINE

## 2020-02-21 PROCEDURE — 2580000003 HC RX 258: Performed by: INTERNAL MEDICINE

## 2020-02-21 PROCEDURE — 36415 COLL VENOUS BLD VENIPUNCTURE: CPT

## 2020-02-21 PROCEDURE — 80076 HEPATIC FUNCTION PANEL: CPT

## 2020-02-21 PROCEDURE — 6360000002 HC RX W HCPCS: Performed by: NURSE PRACTITIONER

## 2020-02-21 PROCEDURE — 83690 ASSAY OF LIPASE: CPT

## 2020-02-21 RX ORDER — METHYLPREDNISOLONE 4 MG/1
2 TABLET ORAL 2 TIMES DAILY WITH MEALS
Status: DISCONTINUED | OUTPATIENT
Start: 2020-02-21 | End: 2020-02-21

## 2020-02-21 RX ORDER — KETOROLAC TROMETHAMINE 30 MG/ML
30 INJECTION, SOLUTION INTRAMUSCULAR; INTRAVENOUS ONCE
Status: COMPLETED | OUTPATIENT
Start: 2020-02-21 | End: 2020-02-21

## 2020-02-21 RX ORDER — POTASSIUM CHLORIDE 20 MEQ/1
20 TABLET, EXTENDED RELEASE ORAL 2 TIMES DAILY WITH MEALS
Status: DISCONTINUED | OUTPATIENT
Start: 2020-02-21 | End: 2020-02-22 | Stop reason: HOSPADM

## 2020-02-21 RX ORDER — METHYLPREDNISOLONE 4 MG/1
4 TABLET ORAL DAILY
Status: DISCONTINUED | OUTPATIENT
Start: 2020-02-22 | End: 2020-02-22 | Stop reason: HOSPADM

## 2020-02-21 RX ORDER — METHYLPREDNISOLONE 4 MG/1
2 TABLET ORAL EVERY EVENING
Status: DISCONTINUED | OUTPATIENT
Start: 2020-02-21 | End: 2020-02-22 | Stop reason: HOSPADM

## 2020-02-21 RX ORDER — OXYCODONE HYDROCHLORIDE AND ACETAMINOPHEN 5; 325 MG/1; MG/1
1 TABLET ORAL ONCE
Status: COMPLETED | OUTPATIENT
Start: 2020-02-21 | End: 2020-02-21

## 2020-02-21 RX ORDER — MORPHINE SULFATE 4 MG/ML
4 INJECTION, SOLUTION INTRAMUSCULAR; INTRAVENOUS ONCE
Status: COMPLETED | OUTPATIENT
Start: 2020-02-21 | End: 2020-02-21

## 2020-02-21 RX ORDER — INSULIN LISPRO 100 [IU]/ML
0-6 INJECTION, SOLUTION INTRAVENOUS; SUBCUTANEOUS NIGHTLY
Status: DISCONTINUED | OUTPATIENT
Start: 2020-02-21 | End: 2020-02-22 | Stop reason: HOSPADM

## 2020-02-21 RX ORDER — INSULIN LISPRO 100 [IU]/ML
0-12 INJECTION, SOLUTION INTRAVENOUS; SUBCUTANEOUS
Status: DISCONTINUED | OUTPATIENT
Start: 2020-02-21 | End: 2020-02-22 | Stop reason: HOSPADM

## 2020-02-21 RX ADMIN — PREGABALIN 75 MG: 75 CAPSULE ORAL at 21:48

## 2020-02-21 RX ADMIN — PREGABALIN 75 MG: 75 CAPSULE ORAL at 00:28

## 2020-02-21 RX ADMIN — OXYCODONE HYDROCHLORIDE AND ACETAMINOPHEN 1 TABLET: 5; 325 TABLET ORAL at 04:24

## 2020-02-21 RX ADMIN — ACETAMINOPHEN 650 MG: 325 TABLET, FILM COATED ORAL at 02:48

## 2020-02-21 RX ADMIN — METHYLPREDNISOLONE 2 MG: 4 TABLET ORAL at 16:49

## 2020-02-21 RX ADMIN — MORPHINE SULFATE 4 MG: 4 INJECTION INTRAVENOUS at 06:45

## 2020-02-21 RX ADMIN — CARVEDILOL 3.12 MG: 3.12 TABLET, FILM COATED ORAL at 08:45

## 2020-02-21 RX ADMIN — DESMOPRESSIN ACETATE 40 MG: 0.2 TABLET ORAL at 21:48

## 2020-02-21 RX ADMIN — INSULIN LISPRO 10 UNITS: 100 INJECTION, SOLUTION INTRAVENOUS; SUBCUTANEOUS at 12:03

## 2020-02-21 RX ADMIN — PANTOPRAZOLE SODIUM 40 MG: 40 TABLET, DELAYED RELEASE ORAL at 05:47

## 2020-02-21 RX ADMIN — ENOXAPARIN SODIUM 40 MG: 40 INJECTION SUBCUTANEOUS at 08:47

## 2020-02-21 RX ADMIN — ASPIRIN 81 MG 81 MG: 81 TABLET ORAL at 08:45

## 2020-02-21 RX ADMIN — Medication 10 ML: at 08:47

## 2020-02-21 RX ADMIN — LAMOTRIGINE 100 MG: 100 TABLET ORAL at 08:44

## 2020-02-21 RX ADMIN — TICAGRELOR 90 MG: 90 TABLET ORAL at 08:44

## 2020-02-21 RX ADMIN — BUPRENORPHINE AND NALOXONE 1 FILM: 8; 2 FILM BUCCAL; SUBLINGUAL at 21:49

## 2020-02-21 RX ADMIN — Medication 10 ML: at 21:49

## 2020-02-21 RX ADMIN — BUPRENORPHINE AND NALOXONE 1 FILM: 8; 2 FILM BUCCAL; SUBLINGUAL at 08:45

## 2020-02-21 RX ADMIN — TICAGRELOR 90 MG: 90 TABLET ORAL at 21:48

## 2020-02-21 RX ADMIN — ONDANSETRON 4 MG: 2 INJECTION INTRAMUSCULAR; INTRAVENOUS at 05:47

## 2020-02-21 RX ADMIN — PREGABALIN 75 MG: 75 CAPSULE ORAL at 13:50

## 2020-02-21 RX ADMIN — INSULIN LISPRO 12 UNITS: 100 INJECTION, SOLUTION INTRAVENOUS; SUBCUTANEOUS at 08:49

## 2020-02-21 RX ADMIN — PREGABALIN 75 MG: 75 CAPSULE ORAL at 08:44

## 2020-02-21 RX ADMIN — LIDOCAINE HYDROCHLORIDE: 20 SOLUTION ORAL; TOPICAL at 04:24

## 2020-02-21 RX ADMIN — INSULIN GLARGINE 18 UNITS: 100 INJECTION, SOLUTION SUBCUTANEOUS at 08:48

## 2020-02-21 RX ADMIN — INSULIN LISPRO 10 UNITS: 100 INJECTION, SOLUTION INTRAVENOUS; SUBCUTANEOUS at 08:49

## 2020-02-21 RX ADMIN — MIRTAZAPINE 30 MG: 15 TABLET, FILM COATED ORAL at 21:48

## 2020-02-21 RX ADMIN — KETOROLAC TROMETHAMINE 30 MG: 30 INJECTION, SOLUTION INTRAMUSCULAR; INTRAVENOUS at 08:45

## 2020-02-21 RX ADMIN — INSULIN LISPRO 10 UNITS: 100 INJECTION, SOLUTION INTRAVENOUS; SUBCUTANEOUS at 16:50

## 2020-02-21 ASSESSMENT — PAIN SCALES - GENERAL
PAINLEVEL_OUTOF10: 9
PAINLEVEL_OUTOF10: 10
PAINLEVEL_OUTOF10: 5
PAINLEVEL_OUTOF10: 0
PAINLEVEL_OUTOF10: 10
PAINLEVEL_OUTOF10: 10
PAINLEVEL_OUTOF10: 0
PAINLEVEL_OUTOF10: 10
PAINLEVEL_OUTOF10: 0
PAINLEVEL_OUTOF10: 0
PAINLEVEL_OUTOF10: 9

## 2020-02-21 ASSESSMENT — PAIN DESCRIPTION - PAIN TYPE
TYPE: ACUTE PAIN

## 2020-02-21 ASSESSMENT — PAIN DESCRIPTION - PROGRESSION: CLINICAL_PROGRESSION: NOT CHANGED

## 2020-02-21 ASSESSMENT — PAIN DESCRIPTION - DESCRIPTORS
DESCRIPTORS: SHARP

## 2020-02-21 ASSESSMENT — PAIN DESCRIPTION - ORIENTATION
ORIENTATION: UPPER

## 2020-02-21 ASSESSMENT — PAIN DESCRIPTION - LOCATION
LOCATION: ABDOMEN

## 2020-02-21 NOTE — PROGRESS NOTES
Shift assessment complete. SBP remains in 90s. Pt denies any lightheadedness or dizziness. 1L NS bolus completed. Will give Lyrica 1-2 hrs after other HS medications. Call light within reach, non skid sock on. PT stands and pivots to [facility] independently. Will continue to monitor.

## 2020-02-21 NOTE — PLAN OF CARE
Patient discussed with her endocrinologist Dr Urmila Irvin. He is currently away at a conference and unavailable to consult in hospital.     He explained that blood sugars are uncontrolled due to recent stress from hospitalization. Recommends increasing Lantus to 25 units q 12 hours. Continue mealtime insulin and increase dose if indicated. Also discussed diagnosis of adrenal insufficiency which he states is secondary to opiod use and suboxone. Reviewed that methylprednisolone was resumed today. Reviewed BP readings and he recommends increasing methylprednisolone to 4 mg when she first gets up and 2 mg before supper. Dr Hilary Kirkland said if any further issues or questions to call his cell directly at [phone].

## 2020-02-21 NOTE — CONSULTS
RN provided Cardiac Rehab flyer to patient after reviewed with patient. All questions answered at this time.
kit (FREESTYLE) monitoring kit 1 kit by Does not apply route 4 times daily (before meals and nightly) Patient to check blood sugar 4 times a day and PRN, he is treated with multiple daily injections of insulin that require correction dosing and has had hypoglycemia. A1C  14.5  ICD code- E11.65, Z79.4 7/3/19   Kashmir Carrington MD   pantoprazole (PROTONIX) 40 MG tablet Take 1 tablet by mouth every morning (before breakfast) 7/4/19   Kashmir Carrington MD   B-D INS SYR HALF-UNIT .3CC/31G 31G X 5/16\" 0.3 ML MISC 145 Units 12/3/17   Historical Provider, MD   calcium carbonate (TUMS) 500 MG chewable tablet Take 1 tablet by mouth daily. Historical Provider, MD   acetaminophen (TYLENOL) 325 MG tablet Take  by mouth every 4 hours as needed. Historical Provider, MD       PHYSICAL EXAM        Vitals:    02/20/20 0940   BP: (!) 147/88   Pulse: 70   Resp: 18   Temp: 98 °F (36.7 °C)   SpO2: 93%    Weight: 126 lb (57.2 kg)     Gen Alert, coop, no distress Heart  Rrr, no mrg   Head NC, AT, no abnorm Abd  Soft, NT, +BS, no mass, no OM   Eyes PERRLA, conj/corn clear Ext  Ext nl, AT, no C/C/E   Nose Nares nl, no drain, NT Pulse 2+ and symmetric   Throat Lips, mucosa, tongue nl Skin Color/text/turg nl, no rash/lesions   Neck S/S, TM, NT, no bruit/JVD Psych Nl mood and affect   Lung CTA-B, unlabored, no DTP Lymph   No cervical or axillary LA   Ch wall NT, no deform Neuro  Nl gross M/S exam     LABS     Reviewed relevant and available CV labs    ASSESSMENT AND PLAN     ~CAD  Hx  6/18 PCI of LAD with subsequent closure of LAD stent  C 7/19 100% occlusion LAD with collaterals instent (troponins negative)  Viab 7/19 Predominate scar in the anteroapical wall  Plan Given recurrent pain and dynamic anterolateral EKG changes, [facility]   Discussed R/B/O in detail.   ~HepC/IVDU  Per med  ~HTN  Elevated  Plan Management after [facility]  ~CHOL  On statin  Plan Continue statin

## 2020-02-21 NOTE — PROGRESS NOTES
[facility]  Daily Progress Note    Admit Date:  2/19/2020     CC:  CAD, HTN, CHOL  Subj:  Today, no cp or sob. Nauseated and vomited this morning. Obj:   BP (!) 166/98   Pulse 93   Temp 98.1 °F (36.7 °C) (Temporal)   Resp 16   Ht 5' 4\" (1.626 m)   Wt 128 lb 9.6 oz (58.3 kg)   LMP 11/15/2017   SpO2 93%   BMI 22.07 kg/m²       Intake/Output Summary (Last 24 hours) at 2/21/2020 6054  Last data filed at 2/21/2020 0015  Gross per 24 hour   Intake 480 ml   Output 1000 ml   Net -520 ml     Gen Alert, coop, no distress Heart  RRR, no MRG, nl apical impulse   Head NC, AT, no abnorm Abd  Soft, NT, +BS, no mass, no OM   Eyes PERRLA, conj/corn clear Ext  Ext nl, AT, no C/C/E   Nose Nares nl, no drain, NT Pulse S/p amp   Throat Lips, mucosa, tongue nl Skin Color/text/turg nl, no rash/lesions   Neck S/S, TM, NT, no bruit/JVD Psych Nl mood and affect   Lung CTA-B, unlabored, no DTP Lymph   No cervical or axillary LA   Ch wall NT, no deform Neuro  Nl gross M/S exam     CVMeds: Coreg 3.125bid, lisinopril 2.5, brilinta 90bid, liptor 40, asa 81     Lab Data: Relevant and available CV data reviewed  CBC:   Recent Labs     02/19/20  1845 02/20/20  0453 02/21/20  0507   WBC 7.9 4.5 8.7   HGB 14.7 13.4 14.5    113* 110*     BMP:    Recent Labs     02/19/20  1944 02/21/20  0507   * 136   K 4.8 3.6   CO2 25 25   BUN 21* 16   CREATININE <0.5* <0.5*     BNP:  No results for input(s): BNP in the last 72 hours.     ASSESSMENT AND PLAN     ~CAD  Hx  6/18 PCI of LAD with subsequent closure of LAD stent  LHC 7/19 100% occlusion LAD with collaterals instent (troponins negative)  Viab 7/19 Predominate scar in the anteroapical wall  Plan Discharge on DAPT including brilinta given stent thrombosis on plavix   Continue ACEI, BB, Statin  ~HepC/IVDU  Per med  ~HTN  controlled  Plan Continue current meds  ~CHOL  On statin  Plan Continue statin  ~Nausea and vomiting  Workup per medicine  Ensure patient keeping antiplatelets down to avoid stent thrombosis    ~Disposition  OK to discharge from cardiac standpoint after GI issues resolve  Call with questions.   FU in several weeks

## 2020-02-21 NOTE — PLAN OF CARE
Problem: Pain:  Goal: Pain level will decrease  Description  Pain level will decrease  2/21/2020 0940 by Klarissa Cabello RN  Outcome: Ongoing  Note:   1x dose of tramadol administered for abdominal pain.  Will continue to monitor

## 2020-02-21 NOTE — PROGRESS NOTES
[facility] Nurse  Consult Note       NAME:  Jose Luis Garrido  MEDICAL RECORD NUMBER:  [id number]  AGE: 62 y.o. GENDER: female  : 1965  TODAY'S DATE:  2020    Subjective:     Reason for Educator consult ---  Evaluation and Assessment:    Jose Luis Garrido is a 62 y.o. female referred by:   [] Physician  [x] Nursing  [] Other:      Patient states diagnosed with diabetes in . The patient does have a glucometer at home and states testing 3 times a day. Pt is aware of S/S of hyper- and hypoglycemia and the proper treatment of hyper- and hypoglycemia. Explained A1C values and goals. Patient states not counting carbohydrates. Previously refused dietitian   Discussed exercise, sick day management, following dietary plan, following up with PCP. Discussed medications including insulin. States at home she has been using a V-go but she does not know which one  Also is concerned that she has not been given her steroids here. Review of past record shows she has adrenal insufficiency. Discussed health benefits of non smoking. Recommend maintaining a smoke-free lifestyle. PAST MEDICAL HISTORY      Diagnosis Date    Arthritis     CHF [facility]est[facility] failure) ([facility] [address]     Depression     Diabetes mellitus (HCC)     Hepatitis C     Dr Red Auguste follows; contracted from ex- (drug user)    Hypertension     MRSA (methicillin resistant Staphylococcus aureus) infection in     was in urine and nares    Pneumonia     S/P colonoscopy with polypectomy     one polyp, Dr Red Auguste. Recall 3 years. PAST SURGICAL HISTORY  Past Surgical History:   Procedure Laterality Date    ABOVE KNEE AMPUTATION      AMPUTATION      right bka    JOINT REPLACEMENT  ,     right knee; Dr Morales Bonilla.     KNEE ARTHROSCOPY      rid knee X6    LIVER BIOPSY N/A 2018    LIVER BIOPSY LAPAROSCOPIC performed by Bree Jones MD at [address] [place] [address] N/A 2018 LAPAROSCOPIC CHOLECYSTECTOMY WITH INTRAOPERATIVE CHOLANGIOGRAM performed by Ramos Niño MD at [facility]  11    right    UPPER GASTROINTESTINAL ENDOSCOPY  2018    UPPER GASTROINTESTINAL ENDOSCOPY N/A 2018    EGD BIOPSY performed by Vanna Maxwell MD at [address] [place] [address] HISTORY  Family History   Problem Relation Age of Onset    Cancer Father     Seizures Brother     Cancer Sister         colon       SOCIAL HISTORY  Social History     Tobacco Use    Smoking status: Former Smoker     Last attempt to quit: 3/1/2011     Years since quittin.9    Smokeless tobacco: Never Used    Tobacco comment: congratulated on quitting smoking1   Substance Use Topics    Alcohol use: No    Drug use: Yes     Types: Opiates      Comment: clean for 2 years        ALLERGIES  Allergies   Allergen Reactions    Darvocet [Propoxyphene N-Acetaminophen] Nausea Only    Naprosyn [Naproxen] Rash    Ultram [Tramadol Hcl] Rash       MEDICATIONS  No current facility-administered medications on file prior to encounter. Current Outpatient Medications on File Prior to Encounter   Medication Sig Dispense Refill    insulin lispro (HUMALOG) 100 UNIT/ML pen Inject 12 Units into the skin 3 times daily (before meals) 5 pen 3    methylPREDNISolone (MEDROL) 2 MG tablet Take 1 tablet by mouth daily (with breakfast) 30 tablet 2    pregabalin (LYRICA) 75 MG capsule Take 75 mg by mouth 3 times daily.  methylPREDNISolone (MEDROL) 4 MG tablet Take 2 mg by mouth every evening      buprenorphine-naloxone (SUBOXONE) 8-2 MG SUBL SL tablet Place 1 tablet under the tongue 2 times daily.  nitroGLYCERIN (NITROSTAT) 0.4 MG SL tablet up to max of 3 total doses.  If no relief after 1 dose, call 911. 25 tablet 3    mirtazapine (REMERON SOLTAB) 30 MG disintegrating tablet Take 1 tablet by mouth nightly 30 tablet 3    insulin lispro (HUMALOG) 100 UNIT/ML injection vial Inject 10 Units into the skin 3 times daily (before meals) Give only if blood sugar is greater than 150 1 vial 3    clopidogrel (PLAVIX) 75 MG tablet Take 1 tablet by mouth daily 30 tablet 3    atorvastatin (LIPITOR) 40 MG tablet Take 40 mg by mouth daily      aspirin 81 MG tablet Take 81 mg by mouth daily      lamoTRIgine (LAMICTAL) 100 MG tablet Take 100 mg by mouth daily.  insulin glargine (LANTUS) 100 UNIT/ML injection pen Inject 18 Units into the skin 2 times daily 5 pen 3    FREESTYLE LANCETS MISC 1 each by Other route 4 times daily (before meals and nightly) Patient to check blood sugar 4 times a day and PRN, he is treated with multiple daily injections of insulin that require correction dosing and has had hypoglycemia. A1C  14.5  ICD code- E11.65, Z79.4 100 each 5    blood glucose test strips (FREESTYLE LITE) strip 1 each by Other route 4 times daily (before meals and nightly) Patient to check blood sugar 4 times a day and PRN, he is treated with multiple daily injections of insulin that require correction dosing and has had hypoglycemia. A1C  14.5  ICD code- E11.65, Z79.4 100 each 5    glucose monitoring kit (FREESTYLE) monitoring kit 1 kit by Does not apply route 4 times daily (before meals and nightly) Patient to check blood sugar 4 times a day and PRN, he is treated with multiple daily injections of insulin that require correction dosing and has had hypoglycemia. A1C  14.5  ICD code- E11.65, Z79.4 1 kit 0    pantoprazole (PROTONIX) 40 MG tablet Take 1 tablet by mouth every morning (before breakfast) 30 tablet 0    B-D INS SYR HALF-UNIT .3CC/31G 31G X 5/16\" 0.3 ML MISC 145 Units      calcium carbonate (TUMS) 500 MG chewable tablet Take 1 tablet by mouth daily.  acetaminophen (TYLENOL) 325 MG tablet Take  by mouth every 4 hours as needed.            Objective:     LABS    CBC:   Lab Results   Component Value Date    WBC 8.7 02/21/2020    RBC 4.93 02/21/2020    HGB 14.5 02/21/2020    HCT 44.6 02/21/2020    MCV 90.4 02/21/2020    MCH 29.3 02/21/2020    MCHC 32.5 02/21/2020    RDW 13.2 02/21/2020     02/21/2020    MPV 11.8 02/21/2020     CMP:    Lab Results   Component Value Date     02/21/2020    K 3.6 02/21/2020    K 3.5 09/11/2019    CL 98 02/21/2020    CO2 25 02/21/2020    BUN 16 02/21/2020    CREATININE <0.5 02/21/2020    GFRAA >60 02/21/2020    GFRAA >60 02/14/2012    AGRATIO 0.9 09/11/2019    LABGLOM >60 02/21/2020    LABGLOM 131.7 05/20/2011    GLUCOSE 366 02/21/2020    GLUCOSE 329 05/20/2011    PROT 7.3 02/21/2020    PROT 7.6 02/14/2012    LABALBU 3.4 02/21/2020    CALCIUM 8.5 02/21/2020    BILITOT 0.3 02/21/2020    ALKPHOS 131 02/21/2020    AST 26 02/21/2020    ALT 14 02/21/2020       HgBA1c:    Lab Results   Component Value Date    LABA1C 12.5 09/11/2019       This patient's last creatinine was   Recent Labs     02/21/20  0507   CREATININE <0.5*        Recent Blood sugars have been   Lab Results   Component Value Date    POCGLU 111 02/21/2020    POCGLU 420 02/21/2020    POCGLU 187 02/20/2020    POCGLU 327 02/20/2020    POCGLU 179 02/19/2020    POCGLU 510 02/19/2020    POCGLU 197 09/12/2019    POCGLU 249 09/12/2019     Lab Results   Component Value Date    GLUCOSE 366 02/21/2020    GLUCOSE 459 02/19/2020    GLUCOSE 382 09/12/2019    GLUCOSE 632 09/11/2019    GLUCOSE 251 07/03/2019    GLUCOSE 330 07/02/2019    GLUCOSE 329 05/20/2011            Assessment:     Patient Active Problem List   Diagnosis    Diabetes mellitus ([address]    Knee pain    Degenerative arthritis of knee    HTN (hypertension)    Other specified anemias    DKA, type 2, not at goal [facility])    Hyperglycemia    Acute pancreatitis    Coronary artery disease involving native coronary artery of native heart without angina pectoris    Diabetes, coma, hyperosmolar (HCC)    Hypernatremia    Acute kidney injury ([facility] [address]    Abnormal LFTs    Diabetic ketoacidosis without coma

## 2020-02-21 NOTE — PROGRESS NOTES
flush 0.9 % injection 10 mL, 2 times per day  sodium chloride flush 0.9 % injection 10 mL, PRN  acetaminophen (TYLENOL) tablet 650 mg, Q6H PRN  acetaminophen (TYLENOL) suppository 650 mg, Q6H PRN  polyethylene glycol (GLYCOLAX) packet 17 g, Daily PRN  promethazine (PHENERGAN) tablet 12.5 mg, Q6H PRN  ondansetron (ZOFRAN) injection 4 mg, Q6H PRN  aspirin chewable tablet 81 mg, Daily  enoxaparin (LOVENOX) injection 40 mg, Daily        Objective:  BP (!) 149/67   Pulse 98   Temp 98.1 °F (36.7 °C) (Temporal)   Resp 16   Ht 5' 4\" (1.626 m)   Wt 128 lb 9.6 oz (58.3 kg)   LMP 11/15/2017   SpO2 93%   BMI 22.07 kg/m²     Intake/Output Summary (Last 24 hours) at 2/21/2020 0744  Last data filed at 2/21/2020 0015  Gross per 24 hour   Intake 480 ml   Output 1000 ml   Net -520 ml      Wt Readings from Last 3 Encounters:   02/21/20 128 lb 9.6 oz (58.3 kg)   09/12/19 130 lb 4.8 oz (59.1 kg)   06/28/19 121 lb 8 oz (55.1 kg)     General:  Awake, alert, oriented in NAD  Skin:  Warm and dry. No unusual bruising or rash  Neck:  Supple. No JVD appreciated  Chest:  Normal effort. Clear to auscultation  Cardiovascular:  RRR, normal S1/S2, no murmur/gallop/rub  Abdomen:  Soft, + tenderness across upper abdomen, +bowel sounds  Extremities:  No edema, right AKA  Neurological: No focal deficits  Psychological: Normal mood and affect    Labs and Tests:  CBC:   Recent Labs     02/19/20  1845 02/20/20  0453 02/21/20  0507   WBC 7.9 4.5 8.7   HGB 14.7 13.4 14.5    113* 110*     BMP:    Recent Labs     02/19/20  1944 02/21/20  0507   * 136   K 4.8 3.6   CL 96* 98*   CO2 25 25   BUN 21* 16   CREATININE <0.5* <0.5*   GLUCOSE 459* 366*     Hepatic: No results for input(s): AST, ALT, ALB, BILITOT, ALKPHOS in the last 72 hours. Results for Melinda Lynne (MRN [id number]) as of 2/21/2020 07:47   Ref.  Range 2/19/2020 19:33 2/19/2020 23:08 2/20/2020 16:30 2/20/2020 20:32 2/21/2020 07:32   POC Glucose Latest Ref Range: 70 - 99 methylprednisolone as taking at home.          Diet: DIET CARDIAC; Carb Control: 4 carb choices (60 gms)/meal  Code:Full Code  DVT PPX: on enoxaparin      CHINA Bhatt CNP   2/21/2020 7:44 AM

## 2020-02-22 VITALS
OXYGEN SATURATION: 97 % | RESPIRATION RATE: 16 BRPM | TEMPERATURE: 98.1 F | SYSTOLIC BLOOD PRESSURE: 168 MMHG | HEART RATE: 94 BPM | WEIGHT: 129.2 LBS | DIASTOLIC BLOOD PRESSURE: 101 MMHG | HEIGHT: 64 IN | BODY MASS INDEX: 22.06 KG/M2

## 2020-02-22 LAB
GLUCOSE BLD-MCNC: 246 MG/DL (ref 70–99)
GLUCOSE BLD-MCNC: 281 MG/DL (ref 70–99)
GLUCOSE BLD-MCNC: 334 MG/DL (ref 70–99)
GLUCOSE BLD-MCNC: 344 MG/DL (ref 70–99)
PERFORMED ON: ABNORMAL

## 2020-02-22 PROCEDURE — 6370000000 HC RX 637 (ALT 250 FOR IP): Performed by: INTERNAL MEDICINE

## 2020-02-22 PROCEDURE — 2580000003 HC RX 258: Performed by: INTERNAL MEDICINE

## 2020-02-22 PROCEDURE — 6360000002 HC RX W HCPCS: Performed by: NURSE PRACTITIONER

## 2020-02-22 PROCEDURE — 6360000002 HC RX W HCPCS: Performed by: INTERNAL MEDICINE

## 2020-02-22 PROCEDURE — 6370000000 HC RX 637 (ALT 250 FOR IP): Performed by: NURSE PRACTITIONER

## 2020-02-22 RX ORDER — POLYETHYLENE GLYCOL 3350 17 G/17G
17 POWDER, FOR SOLUTION ORAL DAILY PRN
Qty: 527 G | Refills: 1 | Status: SHIPPED | OUTPATIENT
Start: 2020-02-22 | End: 2020-03-23

## 2020-02-22 RX ORDER — PROMETHAZINE HYDROCHLORIDE 12.5 MG/1
12.5 TABLET ORAL EVERY 6 HOURS PRN
Qty: 10 TABLET | Refills: 0 | Status: SHIPPED | OUTPATIENT
Start: 2020-02-22 | End: 2020-02-22

## 2020-02-22 RX ORDER — METHYLPREDNISOLONE 4 MG/1
4 TABLET ORAL DAILY
Qty: 30 TABLET | Refills: 0 | Status: SHIPPED | OUTPATIENT
Start: 2020-02-23 | End: 2020-03-12

## 2020-02-22 RX ORDER — CARVEDILOL 3.12 MG/1
3.12 TABLET ORAL 2 TIMES DAILY WITH MEALS
Qty: 60 TABLET | Refills: 3 | Status: SHIPPED | OUTPATIENT
Start: 2020-02-22 | End: 2020-02-22

## 2020-02-22 RX ORDER — INSULIN LISPRO 100 [IU]/ML
10 INJECTION, SOLUTION INTRAVENOUS; SUBCUTANEOUS ONCE
Status: DISCONTINUED | OUTPATIENT
Start: 2020-02-22 | End: 2020-02-22 | Stop reason: HOSPADM

## 2020-02-22 RX ORDER — CARVEDILOL 3.12 MG/1
3.12 TABLET ORAL 2 TIMES DAILY WITH MEALS
Qty: 60 TABLET | Refills: 0 | Status: SHIPPED | OUTPATIENT
Start: 2020-02-22 | End: 2020-03-12 | Stop reason: SDUPTHER

## 2020-02-22 RX ORDER — PROMETHAZINE HYDROCHLORIDE 12.5 MG/1
12.5 TABLET ORAL EVERY 6 HOURS PRN
Qty: 10 TABLET | Refills: 0 | Status: SHIPPED | OUTPATIENT
Start: 2020-02-22 | End: 2020-02-29

## 2020-02-22 RX ORDER — METHYLPREDNISOLONE 4 MG/1
4 TABLET ORAL DAILY
Qty: 30 TABLET | Refills: 0 | Status: SHIPPED | OUTPATIENT
Start: 2020-02-23 | End: 2020-02-22

## 2020-02-22 RX ADMIN — ASPIRIN 81 MG 81 MG: 81 TABLET ORAL at 08:18

## 2020-02-22 RX ADMIN — METHYLPREDNISOLONE 4 MG: 4 TABLET ORAL at 08:19

## 2020-02-22 RX ADMIN — LAMOTRIGINE 100 MG: 100 TABLET ORAL at 08:18

## 2020-02-22 RX ADMIN — BUPRENORPHINE AND NALOXONE 1 FILM: 8; 2 FILM BUCCAL; SUBLINGUAL at 08:28

## 2020-02-22 RX ADMIN — CARVEDILOL 3.12 MG: 3.12 TABLET, FILM COATED ORAL at 08:18

## 2020-02-22 RX ADMIN — POTASSIUM CHLORIDE 20 MEQ: 1500 TABLET, EXTENDED RELEASE ORAL at 08:18

## 2020-02-22 RX ADMIN — PREGABALIN 75 MG: 75 CAPSULE ORAL at 14:26

## 2020-02-22 RX ADMIN — POTASSIUM CHLORIDE 20 MEQ: 1500 TABLET, EXTENDED RELEASE ORAL at 00:08

## 2020-02-22 RX ADMIN — PREGABALIN 75 MG: 75 CAPSULE ORAL at 08:18

## 2020-02-22 RX ADMIN — INSULIN LISPRO 10 UNITS: 100 INJECTION, SOLUTION INTRAVENOUS; SUBCUTANEOUS at 11:22

## 2020-02-22 RX ADMIN — INSULIN LISPRO 4 UNITS: 100 INJECTION, SOLUTION INTRAVENOUS; SUBCUTANEOUS at 08:21

## 2020-02-22 RX ADMIN — Medication 10 ML: at 08:19

## 2020-02-22 RX ADMIN — INSULIN LISPRO 10 UNITS: 100 INJECTION, SOLUTION INTRAVENOUS; SUBCUTANEOUS at 08:22

## 2020-02-22 RX ADMIN — ENOXAPARIN SODIUM 40 MG: 40 INJECTION SUBCUTANEOUS at 08:17

## 2020-02-22 RX ADMIN — INSULIN LISPRO 8 UNITS: 100 INJECTION, SOLUTION INTRAVENOUS; SUBCUTANEOUS at 11:21

## 2020-02-22 RX ADMIN — PANTOPRAZOLE SODIUM 40 MG: 40 TABLET, DELAYED RELEASE ORAL at 06:03

## 2020-02-22 RX ADMIN — TICAGRELOR 90 MG: 90 TABLET ORAL at 08:18

## 2020-02-22 ASSESSMENT — PAIN SCALES - GENERAL
PAINLEVEL_OUTOF10: 0

## 2020-02-22 NOTE — PLAN OF CARE
Problem: Musculor/Skeletal Functional Status  Goal: Absence of falls  Outcome: Ongoing     Problem: Cardiovascular  Goal: Hemodynamic stability  Outcome: Ongoing  Note:   Pt admitted for chest pain. Pt had angiogram on 2/20 through right wrist. Pt had stent placed. Will monitor. Problem: Respiratory  Goal: O2 Sat > 90%  Outcome: Ongoing  Note:   Pt not requiring O2 at this time. O2 >90% Will monitor.

## 2020-02-22 NOTE — PROGRESS NOTES
Data- discharge order received, pt verbalized agreement to discharge, disposition to previous residence, no needs for HHC/DME. Action- discharge instructions prepared and given to pt, pt verbalized understanding. Medication information packet given r/t NEW and/or CHANGED prescriptions emphasizing name/purpose/side effects, pt verbalized understanding. Discharge instruction summary: Diet- carb control, Activity- as tolerated, Primary Care Physician as follows: Pari Carranza -[phone] f/u appointment to be made by the pt, prescription medications filled in pt preferred pharmacy. Response- Pt belongings gathered, IV removed. Disposition is home (no HHC/DME needs), transported with [employer] taken to lobby via w/c w/ daughter, no complications.

## 2020-02-22 NOTE — CARE COORDINATION
Patient discharged 2/22/2020  to home. All discharge needs met per case management.     DIONISIO Noe, CCM, RN  [facility]  [phone]

## 2020-02-22 NOTE — DISCHARGE SUMMARY
[facility]    Patient Demographics    Patient. Amilcar Fisher  Date of Birth. 1965  MRN. [id number]     Primary care provider. Alfredo Boogie MD  (Tel: [phone])    Admit date: 2/19/2020    Discharge date (blank if same as Note Date): Note Date: 2/22/2020     Reason for Hospitalization. Chief Complaint   Patient presents with    Chest Pain     pt into ER with chest pain and nausea, pt took 1 nitro and ASA at home, also thinks her bloos sugar is high         Significant Findings. Active Problems:    Chest pain  Resolved Problems:    * No resolved hospital problems. *       Problems and results from this hospitalization that need follow up. 1. None     Significant test results and incidental findings. 1.   CT ABDOMEN WO CONTRAST Additional Contrast? None   Final Result   1. Trace perihepatic ascites. 2. Findings concerning for renal failure. XR CHEST STANDARD (2 VW)   Final Result   No acute abnormality           Invasive procedures and treatments. 1. None     Problem-based Hospital Course. Patient is a 63 yo female with hx CAD/stent (7/2019), DM, hep C, HTN, right AKA. She is on Suboxone at home. She presented to hospital with chest and abdominal pain accompanied with nausea. Her blood sugar was elevated to 500. She r/o for MI but noted to have anterolateral EKG changes. LHC yesterday with in-stent stenosis and had ISIDRO placed to LAD/D1. 2. Chest pain / CAD. S/P ISIDRO yesterday to in-stent stenosis LAD/D1 . Continue DAPT. 3. Diabetes mellitus II. Uncontrolled. BG ranging from 170-420. Continue BID Lantus, and mealtime insulin. Increase sliding scale to medium correction. Continue to monitor. Consults diabetic educator and dietician pendng. Continue to limit carbs to 60/meal.   4. Abdominal pain. Accompanied with nausea, emesis x 1. No improvement with morphine or Percocet. Patient is on suboxone.  W  5. CT abdomen is negative tablet  Commonly known as:  PHENERGAN  Take 1 tablet by mouth every 6 hours as needed for Nausea  Notes to patient:  Use: Nausea and vomiting  Side effects: bleeding or bruising more easily, drowsiness, dizziness     ticagrelor 90 MG Tabs tablet  Commonly known as:  BRILINTA  Take 1 tablet by mouth 2 times daily  Notes to patient:  Use: prevents closing of your stent  Side effects: Bleeding or bruising more easily        CHANGE how you take these medications    insulin glargine 100 UNIT/ML injection pen  Commonly known as:  LANTUS  Inject 25 Units into the skin 2 times daily  What changed:  how much to take  Notes to patient:  Use: treats diabetes or high blood sugar. Long acting insulin  Side effects: Low blood sugar, irritation at the injection site     * methylPREDNISolone 2 MG tablet  Commonly known as:  MEDROL  Take 1 tablet by mouth every evening for 6 days  What changed:  when to take this  Notes to patient:  Use: Decrease inflammation   Side effects: upset stomach, high blood sugars, weight gain, mood changes, and increased chances of infection     * methylPREDNISolone 4 MG tablet  Commonly known as:  MEDROL  Take 1 tablet by mouth daily for 6 days  Start taking on:  February 23, 2020  What changed:    · how much to take  · when to take this         * This list has 2 medication(s) that are the same as other medications prescribed for you. Read the directions carefully, and ask your doctor or other care provider to review them with you.             CONTINUE taking these medications    aspirin 81 MG tablet     atorvastatin 40 MG tablet  Commonly known as:  LIPITOR     B-D INS SYR HALF-UNIT .3CC/31G 31G X 5/16\" 0.3 ML Misc  Generic drug:  Insulin Syringe-Needle U-100     blood glucose test strips strip  Commonly known as:  FREESTYLE LITE  1 each by Other route 4 times daily (before meals and nightly) Patient to check blood sugar 4 times a day and PRN, he is treated with multiple daily injections of insulin that require correction dosing and has had hypoglycemia. A1C  14.5  ICD code- E11.65, Z79.4     buprenorphine-naloxone 8-2 MG Subl SL tablet  Commonly known as:  SUBOXONE     calcium carbonate 500 MG chewable tablet  Commonly known as:  TUMS     FreeStyle Lancets Misc  1 each by Other route 4 times daily (before meals and nightly) Patient to check blood sugar 4 times a day and PRN, he is treated with multiple daily injections of insulin that require correction dosing and has had hypoglycemia. A1C  14.5  ICD code- E11.65, Z79.4     glucose monitoring kit monitoring kit  1 kit by Does not apply route 4 times daily (before meals and nightly) Patient to check blood sugar 4 times a day and PRN, he is treated with multiple daily injections of insulin that require correction dosing and has had hypoglycemia. A1C  14.5  ICD code- E11.65, Z79.4     * insulin lispro 100 UNIT/ML injection vial  Commonly known as:  HUMALOG  Inject 10 Units into the skin 3 times daily (before meals) Give only if blood sugar is greater than 150     * insulin lispro 100 UNIT/ML pen  Commonly known as:  HUMALOG  Inject 12 Units into the skin 3 times daily (before meals)     LaMICtal 100 MG tablet  Generic drug:  lamoTRIgine     mirtazapine 30 MG disintegrating tablet  Commonly known as:  Remeron SolTab  Take 1 tablet by mouth nightly     nitroGLYCERIN 0.4 MG SL tablet  Commonly known as:  NITROSTAT  up to max of 3 total doses. If no relief after 1 dose, call 911. pantoprazole 40 MG tablet  Commonly known as:  PROTONIX  Take 1 tablet by mouth every morning (before breakfast)     pregabalin 75 MG capsule  Commonly known as:  LYRICA     Tylenol 325 MG tablet  Generic drug:  acetaminophen         * This list has 2 medication(s) that are the same as other medications prescribed for you. Read the directions carefully, and ask your doctor or other care provider to review them with you.             STOP taking these medications    clopidogrel 75 MG tablet  Commonly known as:  PLAVIX           Where to Get Your Medications      These medications were sent to [facility], [address] [place] [address], [place] [id number]    Phone:  [phone]   · carvedilol 3.125 MG tablet  · insulin glargine 100 UNIT/ML injection pen  · methylPREDNISolone 2 MG tablet  · methylPREDNISolone 4 MG tablet  · polyethylene glycol packet  · promethazine 12.5 MG tablet  · ticagrelor 90 MG Tabs tablet         Discharge recommendations given to patient. Follow Up. PCP , CARDIOLOGY in 1 week   Disposition. home  Activity. activity as tolerated  Diet: DIET CARDIAC; Carb Control: 3 carb choices (45 gms)/meal      Spent 27  minutes in discharge process.     Signed:  Leah Patel MD     2/22/2020 1:51 PM

## 2020-03-11 PROBLEM — E78.00 HYPERCHOLESTEREMIA: Status: ACTIVE | Noted: 2020-03-11

## 2020-03-11 PROBLEM — I25.10 CORONARY ARTERY DISEASE DUE TO LIPID RICH PLAQUE: Status: ACTIVE | Noted: 2020-03-11

## 2020-03-11 PROBLEM — I25.83 CORONARY ARTERY DISEASE DUE TO LIPID RICH PLAQUE: Status: ACTIVE | Noted: 2020-03-11

## 2020-03-11 NOTE — PROGRESS NOTES
[address]     H+P // CONSULT // OUTPATIENT VISIT // Adilene Mcmullen VISIT     Referring Doctor Cricket Donnelly MD   Encounter Type Followup     CHIEF COMPLAINT     Visit Type Chronic   Symptoms None   Problems CAD, HTN, CHOL     HISTORY OF PRESENT ILLNESS      GEN - Doing well. No cp, sob. Feels great since PCI.  CAD - s/p PCI of LAD . Denies cp, sob, dizziness, syncope, palpitations.  HTN - Ambulatory BP readings in good range. No HA or dizziness.  CHOL - Last cholesterol reviewed and in good range. Tolerating statin without side effects.  MED - Compliant with CV meds listed below without notable side effects. HISTORY/ALLERGIES/ROS     MedHx:   has a past medical history of Arthritis, CHF (congestive heart failure) ([facility] [address] Depression, Diabetes mellitus ([facility] [address] Hepatitis C, Hypertension, MRSA (methicillin resistant Staphylococcus aureus) infection, Pneumonia, and S/P colonoscopy with polypectomy. SurgHx:  has a past surgical history that includes Knee arthroscopy; Revision total knee arthroplasty (2/9/11); joint replacement (2006, 2010); amputation; above knee amputation; Upper gastrointestinal endoscopy (11/13/2018); Upper gastrointestinal endoscopy (N/A, 11/13/2018); pr lap,cholecystectomy (N/A, 11/14/2018); and liver biopsy (N/A, 11/14/2018). SocHx:   reports that she quit smoking about 9 years ago. She has never used smokeless tobacco. She reports current drug use. Drug: Opiates . She reports that she does not drink alcohol. FamHx:  family history includes Cancer in her father and sister; Seizures in her brother. Allergies: Darvocet [propoxyphene n-acetaminophen];  Naprosyn [naproxen]; and Ultram [tramadol hcl]   ROS:  [x]Full ROS obtained and negative except as mentioned in HPI     MEDICATIONS      Current Outpatient Medications   Medication Sig Dispense Refill    polyethylene glycol (GLYCOLAX) packet Take 17 g by mouth daily as needed for Constipation 527 g 1    carvedilol (COREG) 3.125 MG tablet Take 1 tablet by mouth 2 times daily (with meals) 60 tablet 0    methylPREDNISolone (MEDROL) 4 MG tablet Take 1 tablet by mouth daily 30 tablet 0    methylPREDNISolone (MEDROL) 2 MG tablet Take 1 tablet by mouth every evening 30 tablet 0    ticagrelor (BRILINTA) 90 MG TABS tablet Take 1 tablet by mouth 2 times daily 60 tablet 0    insulin glargine (LANTUS) 100 UNIT/ML injection pen Inject 25 Units into the skin 2 times daily 5 pen 0    insulin lispro (HUMALOG) 100 UNIT/ML pen Inject 12 Units into the skin 3 times daily (before meals) 5 pen 3    pregabalin (LYRICA) 75 MG capsule Take 75 mg by mouth 3 times daily.  buprenorphine-naloxone (SUBOXONE) 8-2 MG SUBL SL tablet Place 1 tablet under the tongue 2 times daily.  FREESTYLE LANCETS MISC 1 each by Other route 4 times daily (before meals and nightly) Patient to check blood sugar 4 times a day and PRN, he is treated with multiple daily injections of insulin that require correction dosing and has had hypoglycemia. A1C  14.5  ICD code- E11.65, Z79.4 100 each 5    blood glucose test strips (FREESTYLE LITE) strip 1 each by Other route 4 times daily (before meals and nightly) Patient to check blood sugar 4 times a day and PRN, he is treated with multiple daily injections of insulin that require correction dosing and has had hypoglycemia. A1C  14.5  ICD code- E11.65, Z79.4 100 each 5    glucose monitoring kit (FREESTYLE) monitoring kit 1 kit by Does not apply route 4 times daily (before meals and nightly) Patient to check blood sugar 4 times a day and PRN, he is treated with multiple daily injections of insulin that require correction dosing and has had hypoglycemia.              A1C  14.5  ICD code- E11.65, Z79.4 1 kit 0    pantoprazole (PROTONIX) 40 MG tablet Take 1 tablet by mouth every morning (before breakfast) 30 tablet 0    nitroGLYCERIN (NITROSTAT) 0.4 MG SL tablet up to max of 3 total doses. If no relief after 1 dose, call 911. 25 tablet 3    mirtazapine (REMERON SOLTAB) 30 MG disintegrating tablet Take 1 tablet by mouth nightly 30 tablet 3    insulin lispro (HUMALOG) 100 UNIT/ML injection vial Inject 10 Units into the skin 3 times daily (before meals) Give only if blood sugar is greater than 150 1 vial 3    atorvastatin (LIPITOR) 40 MG tablet Take 40 mg by mouth daily      aspirin 81 MG tablet Take 81 mg by mouth daily      B-D INS SYR HALF-UNIT .3CC/31G 31G X 5/16\" 0.3 ML MISC 145 Units      lamoTRIgine (LAMICTAL) 100 MG tablet Take 100 mg by mouth daily.  calcium carbonate (TUMS) 500 MG chewable tablet Take 1 tablet by mouth daily.  acetaminophen (TYLENOL) 325 MG tablet Take  by mouth every 4 hours as needed. No current facility-administered medications for this visit. Reviewed with patient and will remain unchanged except as mentioned in A/P  PHYSICAL EXAM     Vitals:    03/12/20 1456   BP: 98/60   Pulse: 77   SpO2: 96%      Gen Alert, coop, no distress Heart  Rrr, no mrg   Head NC, AT, no abnorm Abd  Soft, NT, +BS, no mass, no OM   Eyes PER, conj/corn clear Ext  Ext nl, AT, no C/C/E   Nose Nares nl, no drain, NT Pulse 2+ and symmetric   Throat Lips, mucosa, tongue nl Skin Col/text/turg nl, no vis rash/les   Neck S/S, TM, NT, no bruit/JVD Psych Nl mood and affect   Lung CTA-B, unlabored, no DTP Lymph   No cervical or axillary LA   Ch wall NT, no deform Neuro  Nl gross M/S exam     ASSESSMENT AND PLAN     **~CAD   Date EF Results   Sx   No concerning   LHC 6/18 7/19 2/20   35%  40% PCI of LAD for STEMI ([facility])  100% instent midLAD Vicki Felipe)  PCI of LAD, kissing balloon angioplasty LAD/Diag. IVUS of LAD for instent thrombosis Vicki Felipe)   Rian Sickle 6/19  Mod-Large defect that involves anterior wall and apex, Slight improvement in the mid anterior wall at 24 hours suggesting some viability.  Remainder of anterior wall and apex appears to be scar   TTE 12/11 6/18 55%  35%   Anterior akinesis, apical akinesis   Plan   Continue aggressive medical treatment at doses above   ~HTN  Today BP Controlled   Counseling Counseled on diet/salt, exercise and ideal body weight   Plan Continue current meds at doses above   ~Hyperchol  Goal LDL <70   Counseling Counseled on diet, exercise and ideal body weight   Plan PCP liver/lipid surveillance, continue current meds at doses above   2/20 HDL:34, LDL:64  ~Hep C  Hx of IVDA  ~Compliance  Discussed importance of compliance with meds/diet/salt/exercise; avoidance of tobacco/alcohol/drugs  Plan Patient expressed understanding  ~Followup  Interval:  6 months    [address] Daphnie Khalil, am scribing for and in the presence of Elba Coleman MD.   SignedDaphnie 03/11/20 2:08 PM   Provider Nikhil Broussard is working as a scribe for and in the presence of carmela Coleman MD). Working as a scribe, Daphnie Black may have prepopulated components of this note with my historical  intellectual property under my direct supervision. Any additions to this intellectual property were performed in my presence and at my direction.   Furthermore, the content and accuracy of this note have been reviewed by carmela Coleman MD).  3/12/2020 3:03 PM

## 2020-03-12 ENCOUNTER — TELEPHONE (OUTPATIENT)
Dept: CARDIOLOGY CLINIC | Age: 55
End: 2020-03-12

## 2020-03-12 ENCOUNTER — OFFICE VISIT (OUTPATIENT)
Dept: CARDIOLOGY CLINIC | Age: 55
End: 2020-03-12
Payer: COMMERCIAL

## 2020-03-12 VITALS
HEIGHT: 62 IN | DIASTOLIC BLOOD PRESSURE: 60 MMHG | WEIGHT: 135 LBS | BODY MASS INDEX: 24.84 KG/M2 | OXYGEN SATURATION: 96 % | HEART RATE: 77 BPM | SYSTOLIC BLOOD PRESSURE: 98 MMHG

## 2020-03-12 PROCEDURE — G8427 DOCREV CUR MEDS BY ELIG CLIN: HCPCS | Performed by: INTERNAL MEDICINE

## 2020-03-12 PROCEDURE — 1111F DSCHRG MED/CURRENT MED MERGE: CPT | Performed by: INTERNAL MEDICINE

## 2020-03-12 PROCEDURE — 1036F TOBACCO NON-USER: CPT | Performed by: INTERNAL MEDICINE

## 2020-03-12 PROCEDURE — 99214 OFFICE O/P EST MOD 30 MIN: CPT | Performed by: INTERNAL MEDICINE

## 2020-03-12 PROCEDURE — G8484 FLU IMMUNIZE NO ADMIN: HCPCS | Performed by: INTERNAL MEDICINE

## 2020-03-12 PROCEDURE — 3017F COLORECTAL CA SCREEN DOC REV: CPT | Performed by: INTERNAL MEDICINE

## 2020-03-12 PROCEDURE — G8420 CALC BMI NORM PARAMETERS: HCPCS | Performed by: INTERNAL MEDICINE

## 2020-03-12 RX ORDER — CARVEDILOL 3.12 MG/1
3.12 TABLET ORAL 2 TIMES DAILY WITH MEALS
Qty: 180 TABLET | Refills: 3 | Status: ON HOLD | OUTPATIENT
Start: 2020-03-12 | End: 2020-09-25 | Stop reason: SDUPTHER

## 2020-03-12 RX ORDER — ATORVASTATIN CALCIUM 40 MG/1
40 TABLET, FILM COATED ORAL DAILY
Qty: 90 TABLET | Refills: 3 | Status: SHIPPED | OUTPATIENT
Start: 2020-03-12

## 2020-03-12 NOTE — LETTER
Date EF Results   Sx   No concerning   [facility] 6/18 7/19 2/20   35%  40% PCI of LAD for STEMI ([facility])  100% instent midLAD Kendra Sanchez)  PCI of LAD, kissing balloon angioplasty LAD/Diag. IVUS of LAD for instent thrombosis Kendra Sanchez)   Maricruz Noel 6/19  Mod-Large defect that involves anterior wall and apex, Slight improvement in the mid anterior wall at 24 hours suggesting some viability. Remainder of anterior wall and apex appears to be scar   TTE 12/11 6/18 55%  35%   Anterior akinesis, apical akinesis   Plan   Continue aggressive medical treatment at doses above   ~HTN  Today BP Controlled   Counseling Counseled on diet/salt, exercise and ideal body weight   Plan Continue current meds at doses above   ~Hyperchol  Goal LDL <70   Counseling Counseled on diet, exercise and ideal body weight   Plan PCP liver/lipid surveillance, continue current meds at doses above   2/20 HDL:34, LDL:64  ~Hep C  Hx of IVDA  ~Compliance  Discussed importance of compliance with meds/diet/salt/exercise; avoidance of tobacco/alcohol/drugs  Plan Patient expressed understanding  ~Followup  Interval:  6 months    [address] Kian Khalil, am scribing for and in the presence of Jacob Ortiz MD.   SignedKian 03/11/20 2:08 PM   Provider Rachid Rodriguez is working as a scribe for and in the presence of carmela Ortiz MD). Working as a scribe, Kian Baugh may have prepopulated components of this note with my historical  intellectual property under my direct supervision. Any additions to this intellectual property were performed in my presence and at my direction. Furthermore, the content and accuracy of this note have been reviewed by me Jacob Ortiz MD).  3/12/2020 3:03 PM        If you have questions, please do not hesitate to call me. I look forward to following Enmanuel Barnett along with you.     Sincerely,        Hortensia Fofana MD

## 2020-05-11 ENCOUNTER — APPOINTMENT (OUTPATIENT)
Dept: CT IMAGING | Age: 55
DRG: 420 | End: 2020-05-11
Payer: COMMERCIAL

## 2020-05-11 ENCOUNTER — APPOINTMENT (OUTPATIENT)
Dept: GENERAL RADIOLOGY | Age: 55
DRG: 420 | End: 2020-05-11
Payer: COMMERCIAL

## 2020-05-11 ENCOUNTER — HOSPITAL ENCOUNTER (INPATIENT)
Age: 55
LOS: 1 days | Discharge: HOME | DRG: 420 | End: 2020-05-12
Attending: EMERGENCY MEDICINE | Admitting: INTERNAL MEDICINE
Payer: COMMERCIAL

## 2020-05-11 PROBLEM — E10.10 DKA, TYPE 1, NOT AT GOAL (HCC): Status: ACTIVE | Noted: 2020-05-11

## 2020-05-11 LAB
A/G RATIO: 0.9 (ref 1.1–2.2)
ALBUMIN SERPL-MCNC: 3.7 G/DL (ref 3.4–5)
ALBUMIN SERPL-MCNC: 4.2 G/DL (ref 3.4–5)
ALP BLD-CCNC: 148 U/L (ref 40–129)
ALT SERPL-CCNC: 62 U/L (ref 10–40)
ANION GAP SERPL CALCULATED.3IONS-SCNC: 13 MMOL/L (ref 3–16)
ANION GAP SERPL CALCULATED.3IONS-SCNC: 13 MMOL/L (ref 3–16)
ANION GAP SERPL CALCULATED.3IONS-SCNC: 22 MMOL/L (ref 3–16)
AST SERPL-CCNC: 46 U/L (ref 15–37)
BACTERIA: ABNORMAL /HPF
BASE EXCESS VENOUS: -3.9 MMOL/L (ref -3–3)
BASOPHILS ABSOLUTE: 0 K/UL (ref 0–0.2)
BASOPHILS RELATIVE PERCENT: 0.3 %
BETA-HYDROXYBUTYRATE: 7.7 MMOL/L (ref 0–0.27)
BILIRUB SERPL-MCNC: 0.6 MG/DL (ref 0–1)
BILIRUBIN URINE: NEGATIVE
BLOOD, URINE: NEGATIVE
BUN BLDV-MCNC: 17 MG/DL (ref 7–20)
BUN BLDV-MCNC: 17 MG/DL (ref 7–20)
BUN BLDV-MCNC: 18 MG/DL (ref 7–20)
CALCIUM SERPL-MCNC: 8.3 MG/DL (ref 8.3–10.6)
CALCIUM SERPL-MCNC: 8.9 MG/DL (ref 8.3–10.6)
CALCIUM SERPL-MCNC: 9.8 MG/DL (ref 8.3–10.6)
CARBOXYHEMOGLOBIN: 3.6 % (ref 0–1.5)
CHLORIDE BLD-SCNC: 100 MMOL/L (ref 99–110)
CHLORIDE BLD-SCNC: 100 MMOL/L (ref 99–110)
CHLORIDE BLD-SCNC: 89 MMOL/L (ref 99–110)
CHP ED QC CHECK: YES
CLARITY: CLEAR
CO2: 26 MMOL/L (ref 21–32)
COLOR: YELLOW
CREAT SERPL-MCNC: 0.8 MG/DL (ref 0.6–1.1)
CREAT SERPL-MCNC: 0.8 MG/DL (ref 0.6–1.1)
CREAT SERPL-MCNC: 1 MG/DL (ref 0.6–1.1)
EKG ATRIAL RATE: 108 BPM
EKG DIAGNOSIS: NORMAL
EKG P AXIS: 65 DEGREES
EKG P-R INTERVAL: 140 MS
EKG Q-T INTERVAL: 382 MS
EKG QRS DURATION: 80 MS
EKG QTC CALCULATION (BAZETT): 511 MS
EKG R AXIS: -87 DEGREES
EKG T AXIS: 40 DEGREES
EKG VENTRICULAR RATE: 108 BPM
EOSINOPHILS ABSOLUTE: 0 K/UL (ref 0–0.6)
EOSINOPHILS RELATIVE PERCENT: 0 %
EPITHELIAL CELLS, UA: 3 /HPF (ref 0–5)
GFR AFRICAN AMERICAN: >60
GFR NON-AFRICAN AMERICAN: 57
GFR NON-AFRICAN AMERICAN: >60
GFR NON-AFRICAN AMERICAN: >60
GLOBULIN: 4.6 G/DL
GLUCOSE BLD-MCNC: 265 MG/DL (ref 70–99)
GLUCOSE BLD-MCNC: 351 MG/DL (ref 70–99)
GLUCOSE BLD-MCNC: 353 MG/DL (ref 70–99)
GLUCOSE BLD-MCNC: 379 MG/DL (ref 70–99)
GLUCOSE BLD-MCNC: 409 MG/DL (ref 70–99)
GLUCOSE BLD-MCNC: 442 MG/DL (ref 70–99)
GLUCOSE BLD-MCNC: 474 MG/DL (ref 70–99)
GLUCOSE BLD-MCNC: 552 MG/DL (ref 70–99)
GLUCOSE BLD-MCNC: 572 MG/DL
GLUCOSE BLD-MCNC: 572 MG/DL (ref 70–99)
GLUCOSE BLD-MCNC: 823 MG/DL (ref 70–99)
GLUCOSE BLD-MCNC: >600 MG/DL (ref 70–99)
GLUCOSE URINE: >=1000 MG/DL
HCG QUALITATIVE: NEGATIVE
HCO3 VENOUS: 26.4 MMOL/L (ref 23–29)
HCT VFR BLD CALC: 51 % (ref 36–48)
HEMOGLOBIN, VEN, REDUCED: 31 %
HEMOGLOBIN: 16.6 G/DL (ref 12–16)
KETONES, URINE: >=80 MG/DL
LEUKOCYTE ESTERASE, URINE: NEGATIVE
LIPASE: 97 U/L (ref 13–60)
LYMPHOCYTES ABSOLUTE: 1.8 K/UL (ref 1–5.1)
LYMPHOCYTES RELATIVE PERCENT: 12.9 %
MAGNESIUM: 1.9 MG/DL (ref 1.8–2.4)
MAGNESIUM: 2.2 MG/DL (ref 1.8–2.4)
MCH RBC QN AUTO: 29.4 PG (ref 26–34)
MCHC RBC AUTO-ENTMCNC: 32.5 G/DL (ref 31–36)
MCV RBC AUTO: 90.6 FL (ref 80–100)
METHEMOGLOBIN VENOUS: 0.4 %
MICROSCOPIC EXAMINATION: YES
MONOCYTES ABSOLUTE: 0.3 K/UL (ref 0–1.3)
MONOCYTES RELATIVE PERCENT: 1.9 %
NEUTROPHILS ABSOLUTE: 11.5 K/UL (ref 1.7–7.7)
NEUTROPHILS RELATIVE PERCENT: 84.9 %
NITRITE, URINE: NEGATIVE
O2 CONTENT, VEN: 16 VOL %
O2 SAT, VEN: 68 %
O2 THERAPY: ABNORMAL
PCO2, VEN: 68.9 MMHG (ref 40–50)
PDW BLD-RTO: 13.8 % (ref 12.4–15.4)
PERFORMED ON: ABNORMAL
PH UA: 6 (ref 5–8)
PH VENOUS: 7.19 (ref 7.35–7.45)
PHOSPHORUS: 2.3 MG/DL (ref 2.5–4.9)
PHOSPHORUS: 3.1 MG/DL (ref 2.5–4.9)
PLATELET # BLD: 155 K/UL (ref 135–450)
PMV BLD AUTO: 12.9 FL (ref 5–10.5)
PO2, VEN: 41.3 MMHG (ref 25–40)
POTASSIUM REFLEX MAGNESIUM: 5.2 MMOL/L (ref 3.5–5.1)
POTASSIUM SERPL-SCNC: 3.1 MMOL/L (ref 3.5–5.1)
POTASSIUM SERPL-SCNC: 3.3 MMOL/L (ref 3.5–5.1)
PROTEIN UA: 30 MG/DL
RBC # BLD: 5.63 M/UL (ref 4–5.2)
RBC UA: 3 /HPF (ref 0–4)
SODIUM BLD-SCNC: 137 MMOL/L (ref 136–145)
SODIUM BLD-SCNC: 139 MMOL/L (ref 136–145)
SODIUM BLD-SCNC: 139 MMOL/L (ref 136–145)
SPECIFIC GRAVITY UA: >1.03 (ref 1–1.03)
TCO2 CALC VENOUS: 64 MMOL/L
TOTAL PROTEIN: 8.8 G/DL (ref 6.4–8.2)
TROPONIN: <0.01 NG/ML
URINE REFLEX TO CULTURE: ABNORMAL
URINE TYPE: ABNORMAL
UROBILINOGEN, URINE: 0.2 E.U./DL
WBC # BLD: 13.5 K/UL (ref 4–11)
WBC UA: 5 /HPF (ref 0–5)

## 2020-05-11 PROCEDURE — 2580000003 HC RX 258: Performed by: INTERNAL MEDICINE

## 2020-05-11 PROCEDURE — G0378 HOSPITAL OBSERVATION PER HR: HCPCS

## 2020-05-11 PROCEDURE — 71045 X-RAY EXAM CHEST 1 VIEW: CPT

## 2020-05-11 PROCEDURE — 93005 ELECTROCARDIOGRAM TRACING: CPT | Performed by: EMERGENCY MEDICINE

## 2020-05-11 PROCEDURE — 96365 THER/PROPH/DIAG IV INF INIT: CPT

## 2020-05-11 PROCEDURE — 6360000002 HC RX W HCPCS: Performed by: INTERNAL MEDICINE

## 2020-05-11 PROCEDURE — 2580000003 HC RX 258: Performed by: PHYSICIAN ASSISTANT

## 2020-05-11 PROCEDURE — 99285 EMERGENCY DEPT VISIT HI MDM: CPT

## 2020-05-11 PROCEDURE — 84484 ASSAY OF TROPONIN QUANT: CPT

## 2020-05-11 PROCEDURE — 93010 ELECTROCARDIOGRAM REPORT: CPT | Performed by: INTERNAL MEDICINE

## 2020-05-11 PROCEDURE — 85025 COMPLETE CBC W/AUTO DIFF WBC: CPT

## 2020-05-11 PROCEDURE — 96375 TX/PRO/DX INJ NEW DRUG ADDON: CPT

## 2020-05-11 PROCEDURE — 74176 CT ABD & PELVIS W/O CONTRAST: CPT

## 2020-05-11 PROCEDURE — 84100 ASSAY OF PHOSPHORUS: CPT

## 2020-05-11 PROCEDURE — 80053 COMPREHEN METABOLIC PANEL: CPT

## 2020-05-11 PROCEDURE — 82803 BLOOD GASES ANY COMBINATION: CPT

## 2020-05-11 PROCEDURE — 96366 THER/PROPH/DIAG IV INF ADDON: CPT

## 2020-05-11 PROCEDURE — 84703 CHORIONIC GONADOTROPIN ASSAY: CPT

## 2020-05-11 PROCEDURE — 2000000000 HC ICU R&B

## 2020-05-11 PROCEDURE — 6370000000 HC RX 637 (ALT 250 FOR IP): Performed by: INTERNAL MEDICINE

## 2020-05-11 PROCEDURE — 83690 ASSAY OF LIPASE: CPT

## 2020-05-11 PROCEDURE — 81001 URINALYSIS AUTO W/SCOPE: CPT

## 2020-05-11 PROCEDURE — 82010 KETONE BODYS QUAN: CPT

## 2020-05-11 PROCEDURE — 83735 ASSAY OF MAGNESIUM: CPT

## 2020-05-11 PROCEDURE — 36415 COLL VENOUS BLD VENIPUNCTURE: CPT

## 2020-05-11 PROCEDURE — 6370000000 HC RX 637 (ALT 250 FOR IP): Performed by: PHYSICIAN ASSISTANT

## 2020-05-11 RX ORDER — BUPRENORPHINE AND NALOXONE 8; 2 MG/1; MG/1
1 FILM, SOLUBLE BUCCAL; SUBLINGUAL DAILY
Status: DISCONTINUED | OUTPATIENT
Start: 2020-05-12 | End: 2020-05-12 | Stop reason: HOSPADM

## 2020-05-11 RX ORDER — LAMOTRIGINE 100 MG/1
100 TABLET ORAL DAILY
Status: DISCONTINUED | OUTPATIENT
Start: 2020-05-12 | End: 2020-05-12 | Stop reason: HOSPADM

## 2020-05-11 RX ORDER — DEXTROSE AND SODIUM CHLORIDE 5; .45 G/100ML; G/100ML
INJECTION, SOLUTION INTRAVENOUS CONTINUOUS PRN
Status: DISCONTINUED | OUTPATIENT
Start: 2020-05-11 | End: 2020-05-12 | Stop reason: HOSPADM

## 2020-05-11 RX ORDER — PREGABALIN 100 MG/1
100 CAPSULE ORAL 3 TIMES DAILY
COMMUNITY

## 2020-05-11 RX ORDER — MAGNESIUM SULFATE 1 G/100ML
1 INJECTION INTRAVENOUS PRN
Status: DISCONTINUED | OUTPATIENT
Start: 2020-05-11 | End: 2020-05-12 | Stop reason: HOSPADM

## 2020-05-11 RX ORDER — SODIUM CHLORIDE 9 MG/ML
INJECTION, SOLUTION INTRAVENOUS CONTINUOUS
Status: DISCONTINUED | OUTPATIENT
Start: 2020-05-11 | End: 2020-05-12 | Stop reason: HOSPADM

## 2020-05-11 RX ORDER — MIRTAZAPINE 15 MG/1
7.5 TABLET, FILM COATED ORAL NIGHTLY
Status: DISCONTINUED | OUTPATIENT
Start: 2020-05-11 | End: 2020-05-12 | Stop reason: HOSPADM

## 2020-05-11 RX ORDER — SODIUM CHLORIDE 0.9 % (FLUSH) 0.9 %
10 SYRINGE (ML) INJECTION PRN
Status: DISCONTINUED | OUTPATIENT
Start: 2020-05-11 | End: 2020-05-12 | Stop reason: HOSPADM

## 2020-05-11 RX ORDER — ONDANSETRON 2 MG/ML
4 INJECTION INTRAMUSCULAR; INTRAVENOUS EVERY 6 HOURS PRN
Status: DISCONTINUED | OUTPATIENT
Start: 2020-05-11 | End: 2020-05-12 | Stop reason: HOSPADM

## 2020-05-11 RX ORDER — DEXTROSE MONOHYDRATE 50 MG/ML
100 INJECTION, SOLUTION INTRAVENOUS PRN
Status: DISCONTINUED | OUTPATIENT
Start: 2020-05-11 | End: 2020-05-12 | Stop reason: SDUPTHER

## 2020-05-11 RX ORDER — POLYETHYLENE GLYCOL 3350 17 G/17G
17 POWDER, FOR SOLUTION ORAL DAILY PRN
Status: DISCONTINUED | OUTPATIENT
Start: 2020-05-11 | End: 2020-05-12 | Stop reason: HOSPADM

## 2020-05-11 RX ORDER — POTASSIUM CHLORIDE 20 MEQ/1
40 TABLET, EXTENDED RELEASE ORAL PRN
Status: DISCONTINUED | OUTPATIENT
Start: 2020-05-11 | End: 2020-05-12

## 2020-05-11 RX ORDER — CARVEDILOL 3.12 MG/1
3.12 TABLET ORAL 2 TIMES DAILY WITH MEALS
Status: DISCONTINUED | OUTPATIENT
Start: 2020-05-11 | End: 2020-05-12 | Stop reason: HOSPADM

## 2020-05-11 RX ORDER — HYDROXYZINE HYDROCHLORIDE 25 MG/1
25 TABLET, FILM COATED ORAL 3 TIMES DAILY PRN
Status: ON HOLD | COMMUNITY
End: 2020-08-21 | Stop reason: ALTCHOICE

## 2020-05-11 RX ORDER — INSULIN GLARGINE 100 [IU]/ML
15 INJECTION, SOLUTION SUBCUTANEOUS 2 TIMES DAILY
COMMUNITY

## 2020-05-11 RX ORDER — ASPIRIN 81 MG/1
81 TABLET, CHEWABLE ORAL DAILY
Status: DISCONTINUED | OUTPATIENT
Start: 2020-05-12 | End: 2020-05-12 | Stop reason: HOSPADM

## 2020-05-11 RX ORDER — ACETAMINOPHEN 325 MG/1
650 TABLET ORAL EVERY 6 HOURS PRN
Status: DISCONTINUED | OUTPATIENT
Start: 2020-05-11 | End: 2020-05-12 | Stop reason: HOSPADM

## 2020-05-11 RX ORDER — ATORVASTATIN CALCIUM 40 MG/1
40 TABLET, FILM COATED ORAL NIGHTLY
Status: DISCONTINUED | OUTPATIENT
Start: 2020-05-12 | End: 2020-05-12 | Stop reason: HOSPADM

## 2020-05-11 RX ORDER — SODIUM CHLORIDE 9 MG/ML
INJECTION, SOLUTION INTRAVENOUS CONTINUOUS
Status: DISCONTINUED | OUTPATIENT
Start: 2020-05-11 | End: 2020-05-12

## 2020-05-11 RX ORDER — INSULIN LISPRO 100 [IU]/ML
10 INJECTION, SOLUTION INTRAVENOUS; SUBCUTANEOUS
COMMUNITY

## 2020-05-11 RX ORDER — 0.9 % SODIUM CHLORIDE 0.9 %
1000 INTRAVENOUS SOLUTION INTRAVENOUS ONCE
Status: COMPLETED | OUTPATIENT
Start: 2020-05-11 | End: 2020-05-11

## 2020-05-11 RX ORDER — ACETAMINOPHEN 650 MG/1
650 SUPPOSITORY RECTAL EVERY 6 HOURS PRN
Status: DISCONTINUED | OUTPATIENT
Start: 2020-05-11 | End: 2020-05-12 | Stop reason: HOSPADM

## 2020-05-11 RX ORDER — HYDROXYZINE HYDROCHLORIDE 25 MG/1
25 TABLET, FILM COATED ORAL 3 TIMES DAILY PRN
Status: DISCONTINUED | OUTPATIENT
Start: 2020-05-11 | End: 2020-05-12 | Stop reason: HOSPADM

## 2020-05-11 RX ORDER — POTASSIUM CHLORIDE 7.45 MG/ML
10 INJECTION INTRAVENOUS PRN
Status: DISCONTINUED | OUTPATIENT
Start: 2020-05-11 | End: 2020-05-12

## 2020-05-11 RX ORDER — MIRTAZAPINE 15 MG/1
30 TABLET, FILM COATED ORAL NIGHTLY
COMMUNITY

## 2020-05-11 RX ORDER — MORPHINE SULFATE 2 MG/ML
2 INJECTION, SOLUTION INTRAMUSCULAR; INTRAVENOUS EVERY 4 HOURS PRN
Status: DISCONTINUED | OUTPATIENT
Start: 2020-05-11 | End: 2020-05-12 | Stop reason: HOSPADM

## 2020-05-11 RX ORDER — PREGABALIN 100 MG/1
100 CAPSULE ORAL 2 TIMES DAILY
Status: DISCONTINUED | OUTPATIENT
Start: 2020-05-11 | End: 2020-05-12 | Stop reason: HOSPADM

## 2020-05-11 RX ORDER — SODIUM CHLORIDE 0.9 % (FLUSH) 0.9 %
10 SYRINGE (ML) INJECTION EVERY 12 HOURS SCHEDULED
Status: DISCONTINUED | OUTPATIENT
Start: 2020-05-11 | End: 2020-05-12 | Stop reason: HOSPADM

## 2020-05-11 RX ORDER — PROMETHAZINE HYDROCHLORIDE 25 MG/1
12.5 TABLET ORAL EVERY 6 HOURS PRN
Status: DISCONTINUED | OUTPATIENT
Start: 2020-05-11 | End: 2020-05-12 | Stop reason: HOSPADM

## 2020-05-11 RX ORDER — NICOTINE POLACRILEX 4 MG
15 LOZENGE BUCCAL PRN
Status: DISCONTINUED | OUTPATIENT
Start: 2020-05-11 | End: 2020-05-12 | Stop reason: ALTCHOICE

## 2020-05-11 RX ORDER — DEXTROSE MONOHYDRATE 25 G/50ML
12.5 INJECTION, SOLUTION INTRAVENOUS PRN
Status: DISCONTINUED | OUTPATIENT
Start: 2020-05-11 | End: 2020-05-12 | Stop reason: SDUPTHER

## 2020-05-11 RX ORDER — POTASSIUM CHLORIDE 7.45 MG/ML
10 INJECTION INTRAVENOUS PRN
Status: DISCONTINUED | OUTPATIENT
Start: 2020-05-11 | End: 2020-05-12 | Stop reason: HOSPADM

## 2020-05-11 RX ADMIN — PREGABALIN 100 MG: 100 CAPSULE ORAL at 20:28

## 2020-05-11 RX ADMIN — POTASSIUM CHLORIDE 10 MEQ: 7.46 INJECTION, SOLUTION INTRAVENOUS at 23:50

## 2020-05-11 RX ADMIN — MIRTAZAPINE 7.5 MG: 15 TABLET, FILM COATED ORAL at 20:28

## 2020-05-11 RX ADMIN — TICAGRELOR 90 MG: 90 TABLET ORAL at 22:01

## 2020-05-11 RX ADMIN — ONDANSETRON 4 MG: 2 INJECTION INTRAMUSCULAR; INTRAVENOUS at 20:32

## 2020-05-11 RX ADMIN — SODIUM CHLORIDE 1000 ML: 9 INJECTION, SOLUTION INTRAVENOUS at 14:49

## 2020-05-11 RX ADMIN — SODIUM CHLORIDE: 9 INJECTION, SOLUTION INTRAVENOUS at 17:58

## 2020-05-11 RX ADMIN — Medication 10 ML: at 20:29

## 2020-05-11 RX ADMIN — MORPHINE SULFATE 2 MG: 2 INJECTION, SOLUTION INTRAMUSCULAR; INTRAVENOUS at 18:50

## 2020-05-11 RX ADMIN — SODIUM CHLORIDE 0.1 UNITS/KG/HR: 9 INJECTION, SOLUTION INTRAVENOUS at 15:08

## 2020-05-11 RX ADMIN — SODIUM CHLORIDE: 9 INJECTION, SOLUTION INTRAVENOUS at 20:28

## 2020-05-11 ASSESSMENT — PAIN SCALES - GENERAL
PAINLEVEL_OUTOF10: 0
PAINLEVEL_OUTOF10: 0
PAINLEVEL_OUTOF10: 8

## 2020-05-11 NOTE — ED PROVIDER NOTES
out of 10. Denies any aggravating alleviating factors. States the pain slightly radiates into her back. Nursing Notes were all reviewed and agreed with or any disagreements were addressed in the HPI. REVIEW OF SYSTEMS    (2-9 systems for level 4, 10 or more for level 5)     Review of Systems    Positives and Pertinent negatives as per HPI. Except as noted above in the ROS, all other systems were reviewed and negative. PAST MEDICAL HISTORY     Past Medical History:   Diagnosis Date    Arthritis     [facility]est[facility]) ([facility] [address]     Depression     Diabetes mellitus (HCC)     Hepatitis C     Dr Pat Gilbert follows; contracted from ex- (drug user)    Hypercholesteremia 3/11/2020    Hypertension     MRSA (methicillin resistant Staphylococcus aureus) infection in 2000    was in urine and nares    Pneumonia     S/P colonoscopy with polypectomy 2/11    one polyp, Dr Pat Gilbert. Recall 3 years. SURGICAL HISTORY     Past Surgical History:   Procedure Laterality Date    ABOVE KNEE AMPUTATION      AMPUTATION      right bka    JOINT REPLACEMENT  2006, 2010    right knee; Dr Brad Jenkins.     KNEE ARTHROSCOPY      ridht knee X6    LIVER BIOPSY N/A 11/14/2018    LIVER BIOPSY LAPAROSCOPIC performed by Barbara Tyler MD at [facility] N/A 11/14/2018    LAPAROSCOPIC CHOLECYSTECTOMY WITH INTRAOPERATIVE CHOLANGIOGRAM performed by Barbara Tyler MD at [facility]  2/9/11    right    UPPER GASTROINTESTINAL ENDOSCOPY  11/13/2018    UPPER GASTROINTESTINAL ENDOSCOPY N/A 11/13/2018    EGD BIOPSY performed by Gasper Ansari MD at [address]       Previous Medications    ASPIRIN 81 MG TABLET    Take 1 tablet by mouth daily    ATORVASTATIN (LIPITOR) 40 MG TABLET    Take 1 tablet by mouth daily    BLOOD GLUCOSE TEST STRIPS (FREESTYLE LITE) STRIP    1 each by Other route 4 times daily (before meals and following components:       Result Value    WBC 13.5 (*)     RBC 5.63 (*)     Hemoglobin 16.6 (*)     Hematocrit 51.0 (*)     MPV 12.9 (*)     Neutrophils Absolute 11.5 (*)     All other components within normal limits    Narrative:     Performed at:  [facility] [address], [facility], [address]   Phone [phone]   COMPREHENSIVE METABOLIC PANEL W/ REFLEX TO MG FOR LOW K - Abnormal; Notable for the following components:    Potassium reflex Magnesium 5.2 (*)     Chloride 89 (*)     Anion Gap 22 (*)     Glucose 823 (*)     GFR Non- 57 (*)     Total Protein 8.8 (*)     Albumin/Globulin Ratio 0.9 (*)     Alkaline Phosphatase 148 (*)     ALT 62 (*)     AST 46 (*)     All other components within normal limits    Narrative:     CALL  [facility] tel. [phone],  Chemistry results called to and read back by MERRY ross, 05/11/2020  15:01, by [facility]  Performed at:  [facility] [address], [facility], [address]   Phone [phone]   LIPASE - Abnormal; Notable for the following components:    Lipase 97.0 (*)     All other components within normal limits    Narrative:     Performed at:  [facility] [address], [facility], [address]   Phone [phone]   URINE RT REFLEX TO CULTURE - Abnormal; Notable for the following components:    Glucose, Ur >=1000 (*)     Ketones, Urine >=80 (*)     Protein, UA 30 (*)     All other components within normal limits    Narrative:     Performed at:  [facility] [address], [facility], [address]   Phone [phone]   BETA-HYDROXYBUTYRATE - Abnormal; Notable for the following components:    Beta-Hydroxybutyrate 7.70 (*)     All other components within normal limits    Narrative:     Performed at:  [facility] [address], [facility], [address]   Phone [phone]   BLOOD GAS, VENOUS - Abnormal; Notable for the following components:    pH, Rowdy 7.192 (*)     pCO2, Rowdy 68.9 (*)     pO2, Rowdy 41.3 (*)     Base Excess, Rowdy -3.9 (*)     Carboxyhemoglobin 3.6 (*)     All other components within normal limits    Narrative:     Bg Krause  [facility] tel. [phone],  Chemistry results called to and read back by MERRY Sweet, 05/11/2020  14:37, by Kenisha Carlisle  Performed at:  [facility] [address], [facility], [address]   Phone [phone]   MICROSCOPIC URINALYSIS - Abnormal; Notable for the following components:    Bacteria, UA 1+ (*)     All other components within normal limits    Narrative:     Performed at:  [facility] [address], [facility], [address]   Phone [phone]   POCT GLUCOSE - Abnormal; Notable for the following components:    POC Glucose 572 (*)     All other components within normal limits    Narrative:     Performed at:  [facility] [address], [facility], [address]   Phone [phone]   POCT GLUCOSE - Abnormal; Notable for the following components:    POC Glucose >600 (*)     All other components within normal limits    Narrative:     Performed at:  [facility] [address], [facility], [address]   Phone [phone]   POCT GLUCOSE - Normal   HCG, SERUM, QUALITATIVE    Narrative:     Performed at:  [facility] [address], [facility], [address]   Phone [phone]   TROPONIN    Narrative:     Performed at:  [facility] [address], [facility], [address]   Phone [phone]   RENAL FUNCTION PANEL   RENAL FUNCTION PANEL   MAGNESIUM   MAGNESIUM   RENAL FUNCTION PANEL   MAGNESIUM   POCT GLUCOSE   POCT GLUCOSE   POCT GLUCOSE   POCT GLUCOSE   POCT GLUCOSE   POCT GLUCOSE   POCT GLUCOSE   POCT GLUCOSE   POCT GLUCOSE   POCT GLUCOSE   POCT GLUCOSE   POCT GLUCOSE POCT GLUCOSE   POCT GLUCOSE       All other labs were within normal range or not returned as of this dictation. EKG: All EKG's are interpreted by the Emergency Department Physician in the absence of a cardiologist.  Please see their note for interpretation of EKG. RADIOLOGY:   Non-plain film images such as CT, Ultrasound and MRI are read by the radiologist. Plain radiographic images are visualized and preliminarily interpreted by the ED Provider with the below findings:        Interpretation per the Radiologist below, if available at the time of this note:    CT ABDOMEN PELVIS WO CONTRAST Additional Contrast? None   Final Result   No acute abdominopelvic abnormality. XR CHEST PORTABLE   Final Result   No evidence of pneumonia           Xr Chest Portable    Result Date: 5/11/2020  EXAMINATION: ONE XRAY VIEW OF THE CHEST 5/11/2020 1:32 pm COMPARISON: 02/19/2020 HISTORY: ORDERING SYSTEM PROVIDED HISTORY: sob TECHNOLOGIST PROVIDED HISTORY: Reason for exam:->sob Reason for Exam: Emesis (pt with c/o n/v/d began yesterday- unk fever- no ill contacts- diabetic- Acuity: Unknown Type of Exam: Unknown FINDINGS: Heart size and pulmonary vasculature within normal limits. Coronary artery stent noted. Scarring versus atelectasis in the inferior left upper lobe. Right lung clear.   No suspicious infiltrate     No evidence of pneumonia           PROCEDURES   Unless otherwise noted below, none     Procedures    CRITICAL CARE TIME   N/A    CONSULTS: Dr. Luli Cadet and DIFFERENTIAL DIAGNOSIS/MDM:   Vitals:    Vitals:    05/11/20 1304 05/11/20 1412   BP: 87/67 (!) 107/19   Pulse: 106 103   Resp: 16 13   Temp: 97.8 °F (36.6 °C)    TempSrc: Oral    SpO2: 93% 94%   Weight: 135 lb (61.2 kg)    Height: 5' 2\" (1.575 m)        Patient was given the following medications:  Medications   glucose (GLUTOSE) 40 % oral gel 15 g (has no administration in time range)   dextrose 50 % IV solution (has no administration in time range)   glucagon (rDNA) injection 1 mg (has no administration in time range)   dextrose 5 % solution (has no administration in time range)   insulin regular (HUMULIN R;NOVOLIN R) 100 Units in sodium chloride 0.9 % 100 mL infusion (0.1 Units/kg/hr × 61.2 kg Intravenous New Bag 5/11/20 1378)   potassium chloride (KLOR-CON M) extended release tablet 40 mEq (has no administration in time range)     Or   potassium bicarb-citric acid (EFFER-K) effervescent tablet 40 mEq (has no administration in time range)     Or   potassium chloride 10 mEq/100 mL IVPB (Peripheral Line) (has no administration in time range)   magnesium sulfate 1 g in dextrose 5% 100 mL IVPB (has no administration in time range)   sodium phosphate 9.78 mmol in dextrose 5 % 250 mL IVPB (has no administration in time range)     Or   sodium phosphate 19.59 mmol in dextrose 5 % 250 mL IVPB (has no administration in time range)   0.9 % sodium chloride infusion (has no administration in time range)   sodium chloride flush 0.9 % injection 10 mL (has no administration in time range)   sodium chloride flush 0.9 % injection 10 mL (has no administration in time range)   acetaminophen (TYLENOL) tablet 650 mg (has no administration in time range)     Or   acetaminophen (TYLENOL) suppository 650 mg (has no administration in time range)   polyethylene glycol (GLYCOLAX) packet 17 g (has no administration in time range)   promethazine (PHENERGAN) tablet 12.5 mg (has no administration in time range)     Or   ondansetron (ZOFRAN) injection 4 mg (has no administration in time range)   enoxaparin (LOVENOX) injection 40 mg (has no administration in time range)   morphine (PF) injection 2 mg (has no administration in time range)   0.9 % sodium chloride bolus (1,000 mLs Intravenous New Bag 5/11/20 1383)       Patient presented with nausea, vomiting, diarrhea. Glucose initially was over 500.   She states that her insulin was (LANTUS) 100 UNIT/ML INJECTION PEN    Inject 25 Units into the skin 2 times daily    INSULIN LISPRO (HUMALOG) 100 UNIT/ML INJECTION VIAL    Inject 10 Units into the skin 3 times daily (before meals) Give only if blood sugar is greater than 150    INSULIN LISPRO (HUMALOG) 100 UNIT/ML PEN    Inject 12 Units into the skin 3 times daily (before meals)    MIRTAZAPINE (REMERON SOLTAB) 30 MG DISINTEGRATING TABLET    Take 1 tablet by mouth nightly    PANTOPRAZOLE (PROTONIX) 40 MG TABLET    Take 1 tablet by mouth every morning (before breakfast)    PREGABALIN (LYRICA) 75 MG CAPSULE    Take 75 mg by mouth 3 times daily.               (Please note that portions of this note were completed with a voice recognition program.  Efforts were made to edit the dictations but occasionally words are mis-transcribed.)    Magen Capps PA-C (electronically signed)            Magen Capps PA-C  05/11/20 [id number]

## 2020-05-11 NOTE — H&P
failure (most recent ejection fraction of 40% in February 2020), history of depression, chronic hepatitis C, hyperlipidemia, essential hypertension, history of diabetes type 1, history of recurrent DKA, who presents to the emergency room with complaints of generalized weakness, nausea, vomiting, abdominal discomfort. She reports her insulin was recently switched about a month ago. She reports compliant with medication use. No chest pain or shortness of breath. In the emergency room, she was noted to have findings consistent with DKA. She has been admitted for further management. Past Medical History:      Diagnosis Date    Arthritis     [facility] [address]     Depression     Diabetes mellitus (HCC)     Hepatitis C     Dr Desi Watts follows; contracted from ex- (drug user)    Hypercholesteremia 3/11/2020    Hypertension     MRSA (methicillin resistant Staphylococcus aureus) infection in 2000    was in urine and nares    Pneumonia     S/P colonoscopy with polypectomy 2/11    one polyp, Dr Desi Watts. Recall 3 years. Past Surgical History:      Procedure Laterality Date    ABOVE KNEE AMPUTATION      AMPUTATION      right bka    JOINT REPLACEMENT  2006, 2010    right knee; Dr Arthur Ventura.  KNEE ARTHROSCOPY      ridht knee X6    LIVER BIOPSY N/A 11/14/2018    LIVER BIOPSY LAPAROSCOPIC performed by Cassandra Wade MD at [address] [place] [address] N/A 11/14/2018    LAPAROSCOPIC CHOLECYSTECTOMY WITH INTRAOPERATIVE CHOLANGIOGRAM performed by Cassandra Wade MD at [address] [place] [address]  2/9/11    right    UPPER GASTROINTESTINAL ENDOSCOPY  11/13/2018    UPPER GASTROINTESTINAL ENDOSCOPY N/A 11/13/2018    EGD BIOPSY performed by Ashok Thurston MD at [address] [place] [address]       Medications (prior to admission):  Prior to Admission medications    Medication Sig Start Date End Date Taking?  Authorizing Provider   hydrOXYzine (ATARAX) 25 MG tablet Take

## 2020-05-11 NOTE — ED NOTES
Bed: 20  Expected date:   Expected time:   Means of arrival:   Comments:  [address] when clean      Quintin Verdugo RN  05/11/20 0016

## 2020-05-11 NOTE — ED PROVIDER NOTES
[address] [facility] Emergency Department      Pt Name: Yolanda Elder  MRN: [id number]  Sofyagfvictor hugo 1965  Date of evaluation: 5/11/2020  Provider: Rani Turner MD  I independently performed a history and physical on Yolanda lEder. All diagnostic, treatment, and disposition decisions were made by myself in conjunction with the advanced practice provider. HPI: Yolanda Elder presented with   Chief Complaint   Patient presents with    Emesis     pt with c/o n/v/d began yesterday- unk fever- no ill contacts- diabetic-      Yolanda Elder has a past medical history of Arthritis, CHF (congestive heart failure) ([facility] [address] Depression, Diabetes mellitus ([facility] [address] Hepatitis C, Hypercholesteremia (3/11/2020), Hypertension, MRSA (methicillin resistant Staphylococcus aureus) infection (in 2000), Pneumonia, and S/P colonoscopy with polypectomy (2/11). She has a past surgical history that includes Knee arthroscopy; Revision total knee arthroplasty (2/9/11); joint replacement (2006, 2010); amputation; above knee amputation; Upper gastrointestinal endoscopy (11/13/2018); Upper gastrointestinal endoscopy (N/A, 11/13/2018); pr lap,cholecystectomy (N/A, 11/14/2018); and liver biopsy (N/A, 11/14/2018). No current facility-administered medications on file prior to encounter. Current Outpatient Medications on File Prior to Encounter   Medication Sig Dispense Refill    hydrOXYzine (ATARAX) 25 MG tablet Take 25 mg by mouth 3 times daily as needed for Itching      mirtazapine (REMERON) 15 MG tablet Take 7.5 mg by mouth nightly      pregabalin (LYRICA) 100 MG capsule Take 100 mg by mouth 2 times daily.  insulin glargine (LANTUS SOLOSTAR) 100 UNIT/ML injection pen Inject 15 Units into the skin 2 times daily      insulin lispro, 1 Unit Dial, (HUMALOG KWIKPEN) 100 UNIT/ML SOPN Inject 10 Units into the skin 3 times daily (before meals) Inject 10 units three times daily with each meal in addition to sliding scale.       PM       Donay Covarrubias MD  05/11/20 [id number]

## 2020-05-12 VITALS
HEART RATE: 88 BPM | WEIGHT: 117.1 LBS | OXYGEN SATURATION: 96 % | HEIGHT: 62 IN | DIASTOLIC BLOOD PRESSURE: 70 MMHG | SYSTOLIC BLOOD PRESSURE: 92 MMHG | BODY MASS INDEX: 21.55 KG/M2 | TEMPERATURE: 98.1 F | RESPIRATION RATE: 29 BRPM

## 2020-05-12 LAB
ANION GAP SERPL CALCULATED.3IONS-SCNC: 6 MMOL/L (ref 3–16)
ANION GAP SERPL CALCULATED.3IONS-SCNC: 7 MMOL/L (ref 3–16)
ANION GAP SERPL CALCULATED.3IONS-SCNC: 9 MMOL/L (ref 3–16)
ATYPICAL LYMPHOCYTE RELATIVE PERCENT: 1 % (ref 0–6)
BANDED NEUTROPHILS RELATIVE PERCENT: 2 % (ref 0–7)
BASOPHILS ABSOLUTE: 0 K/UL (ref 0–0.2)
BASOPHILS RELATIVE PERCENT: 0 %
BUN BLDV-MCNC: 16 MG/DL (ref 7–20)
BUN BLDV-MCNC: 17 MG/DL (ref 7–20)
BUN BLDV-MCNC: 18 MG/DL (ref 7–20)
CALCIUM SERPL-MCNC: 7.9 MG/DL (ref 8.3–10.6)
CALCIUM SERPL-MCNC: 8 MG/DL (ref 8.3–10.6)
CALCIUM SERPL-MCNC: 8.4 MG/DL (ref 8.3–10.6)
CHLORIDE BLD-SCNC: 100 MMOL/L (ref 99–110)
CHLORIDE BLD-SCNC: 102 MMOL/L (ref 99–110)
CHLORIDE BLD-SCNC: 102 MMOL/L (ref 99–110)
CO2: 25 MMOL/L (ref 21–32)
CO2: 26 MMOL/L (ref 21–32)
CO2: 28 MMOL/L (ref 21–32)
CREAT SERPL-MCNC: 0.6 MG/DL (ref 0.6–1.1)
EOSINOPHILS ABSOLUTE: 0.1 K/UL (ref 0–0.6)
EOSINOPHILS RELATIVE PERCENT: 1 %
ESTIMATED AVERAGE GLUCOSE: 406.8 MG/DL
GFR AFRICAN AMERICAN: >60
GFR NON-AFRICAN AMERICAN: >60
GLUCOSE BLD-MCNC: 129 MG/DL (ref 70–99)
GLUCOSE BLD-MCNC: 131 MG/DL (ref 70–99)
GLUCOSE BLD-MCNC: 132 MG/DL (ref 70–99)
GLUCOSE BLD-MCNC: 169 MG/DL (ref 70–99)
GLUCOSE BLD-MCNC: 171 MG/DL (ref 70–99)
GLUCOSE BLD-MCNC: 177 MG/DL (ref 70–99)
GLUCOSE BLD-MCNC: 181 MG/DL (ref 70–99)
GLUCOSE BLD-MCNC: 187 MG/DL (ref 70–99)
GLUCOSE BLD-MCNC: 190 MG/DL (ref 70–99)
GLUCOSE BLD-MCNC: 198 MG/DL (ref 70–99)
GLUCOSE BLD-MCNC: 222 MG/DL (ref 70–99)
GLUCOSE BLD-MCNC: 268 MG/DL (ref 70–99)
GLUCOSE BLD-MCNC: 270 MG/DL (ref 70–99)
GLUCOSE BLD-MCNC: 298 MG/DL (ref 70–99)
HBA1C MFR BLD: 15.8 %
HCT VFR BLD CALC: 39.4 % (ref 36–48)
HEMATOLOGY PATH CONSULT: NORMAL
HEMATOLOGY PATH CONSULT: YES
HEMOGLOBIN: 13.4 G/DL (ref 12–16)
LYMPHOCYTES ABSOLUTE: 6.1 K/UL (ref 1–5.1)
LYMPHOCYTES RELATIVE PERCENT: 42 %
MAGNESIUM: 1.8 MG/DL (ref 1.8–2.4)
MCH RBC QN AUTO: 29.1 PG (ref 26–34)
MCHC RBC AUTO-ENTMCNC: 33.9 G/DL (ref 31–36)
MCV RBC AUTO: 85.8 FL (ref 80–100)
MONOCYTES ABSOLUTE: 0 K/UL (ref 0–1.3)
MONOCYTES RELATIVE PERCENT: 0 %
NEUTROPHILS ABSOLUTE: 8 K/UL (ref 1.7–7.7)
NEUTROPHILS RELATIVE PERCENT: 54 %
PDW BLD-RTO: 13.4 % (ref 12.4–15.4)
PERFORMED ON: ABNORMAL
PHOSPHORUS: 2 MG/DL (ref 2.5–4.9)
PHOSPHORUS: 2.4 MG/DL (ref 2.5–4.9)
PHOSPHORUS: 2.4 MG/DL (ref 2.5–4.9)
PLATELET # BLD: 126 K/UL (ref 135–450)
PLATELET SLIDE REVIEW: ADEQUATE
PMV BLD AUTO: 11.8 FL (ref 5–10.5)
POTASSIUM SERPL-SCNC: 3.6 MMOL/L (ref 3.5–5.1)
POTASSIUM SERPL-SCNC: 3.8 MMOL/L (ref 3.5–5.1)
POTASSIUM SERPL-SCNC: 3.9 MMOL/L (ref 3.5–5.1)
RBC # BLD: 4.6 M/UL (ref 4–5.2)
RBC # BLD: NORMAL 10*6/UL
SLIDE REVIEW: ABNORMAL
SODIUM BLD-SCNC: 134 MMOL/L (ref 136–145)
SODIUM BLD-SCNC: 135 MMOL/L (ref 136–145)
SODIUM BLD-SCNC: 136 MMOL/L (ref 136–145)
WBC # BLD: 14.3 K/UL (ref 4–11)

## 2020-05-12 PROCEDURE — 96366 THER/PROPH/DIAG IV INF ADDON: CPT

## 2020-05-12 PROCEDURE — 6370000000 HC RX 637 (ALT 250 FOR IP): Performed by: INTERNAL MEDICINE

## 2020-05-12 PROCEDURE — 96372 THER/PROPH/DIAG INJ SC/IM: CPT

## 2020-05-12 PROCEDURE — 83735 ASSAY OF MAGNESIUM: CPT

## 2020-05-12 PROCEDURE — 6360000002 HC RX W HCPCS: Performed by: INTERNAL MEDICINE

## 2020-05-12 PROCEDURE — 97530 THERAPEUTIC ACTIVITIES: CPT

## 2020-05-12 PROCEDURE — 84100 ASSAY OF PHOSPHORUS: CPT

## 2020-05-12 PROCEDURE — 97161 PT EVAL LOW COMPLEX 20 MIN: CPT

## 2020-05-12 PROCEDURE — G0378 HOSPITAL OBSERVATION PER HR: HCPCS

## 2020-05-12 PROCEDURE — 2500000003 HC RX 250 WO HCPCS: Performed by: INTERNAL MEDICINE

## 2020-05-12 PROCEDURE — 2580000003 HC RX 258: Performed by: INTERNAL MEDICINE

## 2020-05-12 PROCEDURE — 85025 COMPLETE CBC W/AUTO DIFF WBC: CPT

## 2020-05-12 PROCEDURE — 36415 COLL VENOUS BLD VENIPUNCTURE: CPT

## 2020-05-12 PROCEDURE — 83036 HEMOGLOBIN GLYCOSYLATED A1C: CPT

## 2020-05-12 PROCEDURE — 80048 BASIC METABOLIC PNL TOTAL CA: CPT

## 2020-05-12 PROCEDURE — 96368 THER/DIAG CONCURRENT INF: CPT

## 2020-05-12 PROCEDURE — 96376 TX/PRO/DX INJ SAME DRUG ADON: CPT

## 2020-05-12 RX ORDER — INSULIN LISPRO 100 [IU]/ML
0-6 INJECTION, SOLUTION INTRAVENOUS; SUBCUTANEOUS NIGHTLY
Status: DISCONTINUED | OUTPATIENT
Start: 2020-05-12 | End: 2020-05-12 | Stop reason: HOSPADM

## 2020-05-12 RX ORDER — NICOTINE POLACRILEX 4 MG
15 LOZENGE BUCCAL PRN
Status: DISCONTINUED | OUTPATIENT
Start: 2020-05-12 | End: 2020-05-12 | Stop reason: HOSPADM

## 2020-05-12 RX ORDER — DEXTROSE MONOHYDRATE 25 G/50ML
12.5 INJECTION, SOLUTION INTRAVENOUS PRN
Status: DISCONTINUED | OUTPATIENT
Start: 2020-05-12 | End: 2020-05-12 | Stop reason: HOSPADM

## 2020-05-12 RX ORDER — INSULIN LISPRO 100 [IU]/ML
0-12 INJECTION, SOLUTION INTRAVENOUS; SUBCUTANEOUS
Status: DISCONTINUED | OUTPATIENT
Start: 2020-05-12 | End: 2020-05-12 | Stop reason: HOSPADM

## 2020-05-12 RX ORDER — MORPHINE SULFATE 2 MG/ML
2 INJECTION, SOLUTION INTRAMUSCULAR; INTRAVENOUS ONCE
Status: COMPLETED | OUTPATIENT
Start: 2020-05-12 | End: 2020-05-12

## 2020-05-12 RX ORDER — DEXTROSE MONOHYDRATE 50 MG/ML
100 INJECTION, SOLUTION INTRAVENOUS PRN
Status: DISCONTINUED | OUTPATIENT
Start: 2020-05-12 | End: 2020-05-12 | Stop reason: HOSPADM

## 2020-05-12 RX ORDER — INSULIN LISPRO 100 [IU]/ML
10 INJECTION, SOLUTION INTRAVENOUS; SUBCUTANEOUS
Status: DISCONTINUED | OUTPATIENT
Start: 2020-05-12 | End: 2020-05-12 | Stop reason: HOSPADM

## 2020-05-12 RX ADMIN — POTASSIUM CHLORIDE 10 MEQ: 7.46 INJECTION, SOLUTION INTRAVENOUS at 00:13

## 2020-05-12 RX ADMIN — SODIUM PHOSPHATE, MONOBASIC, MONOHYDRATE 9.78 MMOL: 276; 142 INJECTION, SOLUTION INTRAVENOUS at 05:20

## 2020-05-12 RX ADMIN — CARVEDILOL 3.12 MG: 3.12 TABLET, FILM COATED ORAL at 08:30

## 2020-05-12 RX ADMIN — PREGABALIN 100 MG: 100 CAPSULE ORAL at 08:35

## 2020-05-12 RX ADMIN — POTASSIUM CHLORIDE 10 MEQ: 7.46 INJECTION, SOLUTION INTRAVENOUS at 05:02

## 2020-05-12 RX ADMIN — TICAGRELOR 90 MG: 90 TABLET ORAL at 08:30

## 2020-05-12 RX ADMIN — LAMOTRIGINE 100 MG: 100 TABLET ORAL at 08:30

## 2020-05-12 RX ADMIN — Medication 10 ML: at 08:37

## 2020-05-12 RX ADMIN — DEXTROSE AND SODIUM CHLORIDE: 5; 450 INJECTION, SOLUTION INTRAVENOUS at 00:07

## 2020-05-12 RX ADMIN — POTASSIUM CHLORIDE 10 MEQ: 7.46 INJECTION, SOLUTION INTRAVENOUS at 08:25

## 2020-05-12 RX ADMIN — INSULIN GLARGINE 15 UNITS: 100 INJECTION, SOLUTION SUBCUTANEOUS at 10:24

## 2020-05-12 RX ADMIN — INSULIN LISPRO 2 UNITS: 100 INJECTION, SOLUTION INTRAVENOUS; SUBCUTANEOUS at 11:16

## 2020-05-12 RX ADMIN — BUPRENORPHINE AND NALOXONE 1 FILM: 8; 2 FILM BUCCAL; SUBLINGUAL at 08:31

## 2020-05-12 RX ADMIN — POTASSIUM CHLORIDE 10 MEQ: 7.46 INJECTION, SOLUTION INTRAVENOUS at 03:29

## 2020-05-12 RX ADMIN — ASPIRIN 81 MG 81 MG: 81 TABLET ORAL at 08:30

## 2020-05-12 RX ADMIN — POTASSIUM CHLORIDE 10 MEQ: 7.46 INJECTION, SOLUTION INTRAVENOUS at 01:57

## 2020-05-12 RX ADMIN — MORPHINE SULFATE 2 MG: 2 INJECTION, SOLUTION INTRAMUSCULAR; INTRAVENOUS at 08:30

## 2020-05-12 RX ADMIN — INSULIN LISPRO 10 UNITS: 100 INJECTION, SOLUTION INTRAVENOUS; SUBCUTANEOUS at 11:15

## 2020-05-12 RX ADMIN — MORPHINE SULFATE 2 MG: 2 INJECTION, SOLUTION INTRAMUSCULAR; INTRAVENOUS at 10:39

## 2020-05-12 RX ADMIN — ENOXAPARIN SODIUM 40 MG: 40 INJECTION SUBCUTANEOUS at 08:30

## 2020-05-12 RX ADMIN — ACETAMINOPHEN 650 MG: 325 TABLET, FILM COATED ORAL at 10:33

## 2020-05-12 ASSESSMENT — PAIN SCALES - GENERAL
PAINLEVEL_OUTOF10: 8
PAINLEVEL_OUTOF10: 8
PAINLEVEL_OUTOF10: 0
PAINLEVEL_OUTOF10: 8
PAINLEVEL_OUTOF10: 0
PAINLEVEL_OUTOF10: 8
PAINLEVEL_OUTOF10: 0
PAINLEVEL_OUTOF10: 8
PAINLEVEL_OUTOF10: 0

## 2020-05-12 ASSESSMENT — PAIN DESCRIPTION - DESCRIPTORS: DESCRIPTORS: CRAMPING

## 2020-05-12 ASSESSMENT — PAIN DESCRIPTION - LOCATION
LOCATION: ABDOMEN
LOCATION: ABDOMEN;HAND

## 2020-05-12 ASSESSMENT — PAIN DESCRIPTION - ORIENTATION
ORIENTATION: LEFT
ORIENTATION: LOWER

## 2020-05-12 ASSESSMENT — PAIN DESCRIPTION - FREQUENCY: FREQUENCY: INTERMITTENT

## 2020-05-12 ASSESSMENT — PAIN DESCRIPTION - PAIN TYPE: TYPE: ACUTE PAIN

## 2020-05-12 NOTE — CARE COORDINATION
Discharge planning note:    PT Equipment Recommendations  Equipment Needed: Yes  Mobility Devices: Wheelchair  Wheelchair: Wheelchair Cushion Pressure Relieving  Other: Pt needs an 18 inch w/c.  (her hip width is < 17 inches and her thigh length is 19 inches). 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 She is unable to ambulate with a RW or cane. She can propel a w/c with her arms and it fits in her home.      Need DME orders    UPDATE:  W/C will be delivered tomorrow to patients home by:    Name:  Esteban Dasilvapayamnico  Phone:  [phone]  Fax:      [phone]    Reji Rashid RN BSN  Case Management

## 2020-05-12 NOTE — DISCHARGE SUMMARY
Rales/Wheezes/Rhonchi. Cardiovascular:  Regular rate and rhythm with normal S1/S2 without murmurs, rubs or gallops. Abdomen: Soft, non-tender, non-distended with normal bowel sounds. Musculoskeletal:  No clubbing, cyanosis or edema. Right AKA  Skin: Skin color, texture, turgor normal.  No rashes or lesions. Neurologic:  Neurovascularly intact without any focal sensory/motor deficits. Cranial nerves: II-XII intact, grossly non-focal.  Psychiatric:  Alert and oriented, thought content appropriate, normal insight  Capillary Refill: Brisk,< 3 seconds   Peripheral Pulses: +2 palpable, equal bilaterally       Labs: For convenience and continuity at follow-up the following most recent labs are provided:      CBC:    Lab Results   Component Value Date    WBC 14.3 05/12/2020    HGB 13.4 05/12/2020    HCT 39.4 05/12/2020     05/12/2020       Renal:    Lab Results   Component Value Date     05/12/2020    K 3.9 05/12/2020    K 5.2 05/11/2020     05/12/2020    CO2 26 05/12/2020    BUN 16 05/12/2020    CREATININE 0.6 05/12/2020    CALCIUM 8.0 05/12/2020    PHOS 2.4 05/12/2020         Significant Diagnostic Studies    Radiology:   CT ABDOMEN PELVIS WO CONTRAST Additional Contrast? None   Final Result   No acute abdominopelvic abnormality. XR CHEST PORTABLE   Final Result   No evidence of pneumonia                Consults:     IP CONSULT TO HOSPITALIST    Disposition: Home    Condition at Discharge: Stable    Discharge Instructions/Follow-up: Primary care physician    Code Status:  Full Code     Activity: activity as tolerated    Diet: diabetic diet      Discharge Medications:     Current Discharge Medication List           Details   hydrOXYzine (ATARAX) 25 MG tablet Take 25 mg by mouth 3 times daily as needed for Itching      mirtazapine (REMERON) 15 MG tablet Take 7.5 mg by mouth nightly      pregabalin (LYRICA) 100 MG capsule Take 100 mg by mouth 2 times daily.       insulin glargine (LANTUS

## 2020-05-12 NOTE — PROGRESS NOTES
Reassessment completed, no acute changes noted. VSS and afebrile. BP remains soft, MD aware. Insulin gtt continues, see MAR. Lab at bedside for Q4H draw. Resting comfortably at this time. Fresh ice chips provided. Denies additional needs. Will continue to monitor.  Sumeet Briggs RN, BSN, CCRN-[facility], 4:54 AM

## 2020-05-12 NOTE — CARE COORDINATION
[address] [place] [address] received a referral to this patient for a wheelchair. Wheelchair has been arranged for delivery to pt's home on 5/13/20. Thank you for the referral.  Electronically signed by Rachel Rosenberg on 5/12/2020 at 3:23 PM  Cell ph# [phone]    NOTE: After 5:00 pm, Weekends, Holidays: Call [facility] at [phone] to coordinate delivery of home medical equipment.

## 2020-05-12 NOTE — PROGRESS NOTES
Physical Therapy    Facility/Department: [facility] ICU  Initial Assessment/Discharge Summary    NAME: Nancie Hope  : 1965  MRN: [id number]    Date of Service: 2020    Discharge Recommendations: Nancie Hope scored a 18/24 on the AM-PAC short mobility form. At this time, no further PT is recommended upon discharge as the pt is safe to return home with a new w/c. Recommend patient returns to prior setting with prior services. PT Equipment Recommendations  Equipment Needed: Yes  Mobility Devices: Wheelchair  Wheelchair: Wheelchair Cushion Pressure Relieving  Other: Pt needs an 18 inch w/c.  (her hip width is < 17 inches and her thigh length is 19 inches). 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 She is unable to ambulate with a RW or cane. She can propel a w/c with her arms and it fits in her home. Assessment   Body structures, Functions, Activity limitations: Increased pain;Decreased endurance;Decreased balance  Assessment: The pt presents with decreased activity tolerance and increased pain in her abdomen. She is independent with w/c mobility and ADLs at a w/c level. She will benefit from outpatient PT if she obtains a new prosthesis. She needs a new w/c. Prognosis: Good  Decision Making: Low Complexity  PT Education: Home Exercise Program;PT Role  Patient Education: pt verbalized understanding   Barriers to Learning: none  REQUIRES PT FOLLOW UP: No  Activity Tolerance  Activity Tolerance: Patient Tolerated treatment well;Patient limited by endurance  Activity Tolerance: pt fatigued with sitting in the chair, pt had a cramp in LLE after mobility (pt is NPO at this time)       Patient Diagnosis(es): The primary encounter diagnosis was Diabetic ketoacidosis without coma associated with other specified diabetes mellitus ([facility] [address] A diagnosis of Nausea vomiting and diarrhea was also pertinent to this visit.      has a past medical history of Arthritis, CHF (congestive heart failure) ([facility] [address] Depression, Diabetes mellitus ([facility] [address] Hepatitis C, Hypercholesteremia, Hypertension, MRSA (methicillin resistant Staphylococcus aureus) infection, Pneumonia, and S/P colonoscopy with polypectomy. has a past surgical history that includes Knee arthroscopy; Revision total knee arthroplasty (2/9/11); joint replacement (2006, 2010); amputation; above knee amputation; Upper gastrointestinal endoscopy (11/13/2018); Upper gastrointestinal endoscopy (N/A, 11/13/2018); pr lap,cholecystectomy (N/A, 11/14/2018); and liver biopsy (N/A, 11/14/2018). Restrictions  Restrictions/Precautions  Restrictions/Precautions: Fall Risk(high fall risk)  Position Activity Restriction  Other position/activity restrictions: The pt was admitted with nausea/vomitting. She was found to have diabetic ketoacidosis.    Vision/Hearing  Vision: Impaired  Vision Exceptions: Wears glasses for reading(needs cataract surgery)  Hearing: Within functional limits     Subjective  General  Chart Reviewed: Yes  Family / Caregiver Present: No  Diagnosis: DKA  Follows Commands: Within Functional Limits  Other (Comment): pt was slightly impulsive with trying to transfer before PT/OT moved the IV pole and other lines  General Comment  Comments: pt was supine in bed upon PT arrival, agreeable to PT  Subjective  Subjective: pt reported abdominal pain and L hand pain, RN in room, hand pain was due to IV   Pain Screening  Patient Currently in Pain: Yes  Pain Assessment  Pain Assessment: 0-10  Pain Level: 8  Pain Location: Abdomen;Hand  Pain Orientation: Left  Vital Signs  Patient Currently in Pain: Yes  Pre Treatment Pain Screening  Intervention List: Nurse/physician notified    Orientation  Orientation  Overall Orientation Status: Within Functional Limits  Social/Functional History  Social/Functional History  Lives With: Family(brother and sister)  Home Layout: One level  Home Access: Level entry  Bathroom Shower/Tub: Tub/Shower unit  [place]

## 2020-05-12 NOTE — PROGRESS NOTES
Assessment reviewed, VSS, respirations easy. Patient c/o pain to left leg, and of being extremely hungry. Awaiting lunch trays, given some crackers packets and juice. PRN pain medication not available at this time, will continue to monitor.

## 2020-05-12 NOTE — PROGRESS NOTES
Hypercholesteremia, Hypertension, MRSA (methicillin resistant Staphylococcus aureus) infection, Pneumonia, and S/P colonoscopy with polypectomy. has a past surgical history that includes Knee arthroscopy; Revision total knee arthroplasty (2/9/11); joint replacement (2006, 2010); amputation; above knee amputation; Upper gastrointestinal endoscopy (11/13/2018); Upper gastrointestinal endoscopy (N/A, 11/13/2018); pr lap,cholecystectomy (N/A, 11/14/2018); and liver biopsy (N/A, 11/14/2018). Treatment Diagnosis: decreased independence with ADL due to DKA      Restrictions  Restrictions/Precautions  Restrictions/Precautions: Fall Risk(high fall risk)  Position Activity Restriction  Other position/activity restrictions: The pt was admitted with nausea/vomitting. She was found to have diabetic ketoacidosis.      Subjective      Patient Currently in Pain: Yes  Pain Assessment  Pain Assessment: 0-10  Pain Level: 8  Patient's Stated Pain Goal: No pain  Pain Type: Acute pain  Pain Location: Abdomen;Hand  Pain Orientation: Left  Pain Descriptors: Cramping  Pain Frequency: Intermittent  Vital Signs  Temp: 97.4 °F (36.3 °C)  Temp Source: Temporal  Pulse: 98  Heart Rate Source: Monitor  Resp: 24  BP: 118/86  BP Location: Right Arm  BP Upper/Lower: Upper  MAP (mmHg): 94  Patient Position: Semi fowlers  Level of Consciousness: Alert  MEWS Score: 1  Patient Currently in Pain: Yes  Oxygen Therapy  SpO2: 95 %  Pulse Oximeter Device Mode: Intermittent  Pulse Oximeter Device Location: Finger  O2 Device: None (Room air)  Social/Functional History  Social/Functional History  Lives With: Family(brother and sister)  Home Layout: One level  Home Access: Level entry  Bathroom Shower/Tub: Tub/Shower unit  Bathroom Toilet: Standard  Bathroom Equipment: Grab bars in shower, Tub transfer bench  Bathroom Accessibility: Wheelchair accessible(w/c can fit all the way to the toilet but BR door remains open)  Home Equipment: Wheelchair-manual(w/c is

## 2020-05-12 NOTE — PROGRESS NOTES
Multiplier 0.01 x4. BG Q2H per protocol, next due 0800.  Grace Baird RN, BSN, CCRN-[facility], 7:03 AM

## 2020-05-12 NOTE — PROGRESS NOTES
Message sent to ICU Hospitalist as follows:    DKA patient. IVF ordered at 75ml/hr d/t hx CHF. sBP 80s. Can we increase rate? Normal IVF is 250ml/hr. Awaiting response/new orders.  Grace Baird RN, BSN, CCRN-[facility], 9:13 PM

## 2020-05-12 NOTE — DISCHARGE INSTR - COC
Continuity of Care Form    Patient Name: Dania Schuler   :  1965  MRN:  [id number]    Admit date:  2020  Discharge date:  ***    Code Status Order: Full Code   Advance Directives:   Advance Care Flowsheet Documentation     Date/Time Healthcare Directive Type of Healthcare Directive Copy in [address] [place] [address] [address] Agent's Name Healthcare Agent's Phone Number    20 [phone]  No, patient does not have an advance directive for healthcare treatment -- -- -- -- --          Admitting Physician:  Sailaja Valenzuela MD  PCP: Walker Harris MD    Discharging Nurse: [facility] Unit/Room#: SKC-5568/8945-01  Discharging Unit Phone Number: ***    Emergency Contact:   Extended Emergency Contact Information  Primary Emergency Contact: Troy Duron [address] Phone: [phone]  Mobile Phone: [phone]  Relation: Brother/Sister  Secondary Emergency Contact: Donna Warren  Address: [address] [place] [address] Phone: [phone]  Mobile Phone: [phone]  Relation: Child    Past Surgical History:  Past Surgical History:   Procedure Laterality Date    ABOVE KNEE AMPUTATION      AMPUTATION      right bka    JOINT REPLACEMENT  ,     right knee; Dr Catalino Mederos.     KNEE ARTHROSCOPY      ridht knee X6    LIVER BIOPSY N/A 2018    LIVER BIOPSY LAPAROSCOPIC performed by Hannah Mckeon MD at [facility] N/A 2018    LAPAROSCOPIC CHOLECYSTECTOMY WITH INTRAOPERATIVE CHOLANGIOGRAM performed by Hannah Mckeon MD at [facility]  11    right    UPPER GASTROINTESTINAL ENDOSCOPY  2018    UPPER GASTROINTESTINAL ENDOSCOPY N/A 2018    EGD BIOPSY performed by Maryam Mcmahan MD at [address]       Immunization History:   Immunization History   Administered Date(s) Administered    Influenza Virus Vaccine 11/28/2011    Influenza, Katelyn Purpura, 6 mo and older, IM, PF (Flulaval, Fluarix) 11/14/2018    Pneumococcal Polysaccharide ([id number]) 11/28/2011, 09/15/2014       Active Problems:  Patient Active Problem List   Diagnosis Code    Diabetes mellitus ([facility] E11.9    Knee pain M25.569    Degenerative arthritis of knee M17.10    HTN (hypertension) I10    Other specified anemias D64.89    DKA, type 2, not at goal [facility]) E11.10    Hyperglycemia R73.9    Acute pancreatitis K85.90    Coronary artery disease involving native coronary artery of native heart without angina pectoris I25.10    Diabetes, coma, hyperosmolar ([facility]) E11.01    Hypernatremia E87.0    Acute kidney injury ([facility] N17.9    Abnormal LFTs R94.5    Diabetic ketoacidosis without coma associated with type 1 diabetes mellitus ([facility]) E10.10    Esophagitis K20.9    Hypotension I95.9    Controlled type 1 diabetes mellitus with hyperglycemia ([facility]) E10.65    Sialadenitis K11.20    Abdominal pain R10.9    Adrenal insufficiency ([facility]) E27.40    Unstable angina ([facility]) I20.0    Chronic pain syndrome G89.4    Constipation K59.00    Uncontrolled type 2 diabetes mellitus with hyperglycemia, with long-term current use of insulin ([facility]) E11.65, Z79.4    Acute on chronic respiratory failure with hypoxia ([facility]) J96.21    Mucus plugging of bronchi J98.09    Atelectasis J98.11    Former smoker Z87.891    DM (diabetes mellitus), secondary uncontrolled ([facility]) E13.65    Thrombocytopenia ([facility]) D69.6    Essential hypertension I10    Hx of AKA (above knee amputation), right ([facility] Z89.611    Chest pain R07.9    Coronary artery disease due to lipid rich plaque I25.10, I25.83    Hypercholesteremia E78.00    DKA, type 1, not at goal [facility]) E10.10       Isolation/Infection:   Isolation          No Isolation        Patient Infection Status     Infection Onset Added Last Indicated Last Indicated By Review Planned Expiration Resolved Resolved By    None active

## 2020-08-01 ENCOUNTER — APPOINTMENT (OUTPATIENT)
Dept: CT IMAGING | Age: 55
DRG: 194 | End: 2020-08-01
Payer: COMMERCIAL

## 2020-08-01 ENCOUNTER — HOSPITAL ENCOUNTER (INPATIENT)
Age: 55
LOS: 1 days | Discharge: HOME | DRG: 194 | End: 2020-08-02
Attending: EMERGENCY MEDICINE | Admitting: HOSPITALIST
Payer: COMMERCIAL

## 2020-08-01 PROBLEM — I50.9 ACUTE DECOMPENSATED HEART FAILURE (HCC): Status: ACTIVE | Noted: 2020-08-01

## 2020-08-01 LAB
ALBUMIN SERPL-MCNC: 3.8 G/DL (ref 3.4–5)
ALP BLD-CCNC: 142 U/L (ref 40–129)
ALT SERPL-CCNC: 15 U/L (ref 10–40)
ANION GAP SERPL CALCULATED.3IONS-SCNC: 9 MMOL/L (ref 3–16)
APTT: 29.1 SEC (ref 24.2–36.2)
AST SERPL-CCNC: 22 U/L (ref 15–37)
BASE EXCESS VENOUS: 5 MMOL/L (ref -3–3)
BASOPHILS ABSOLUTE: 0.1 K/UL (ref 0–0.2)
BASOPHILS RELATIVE PERCENT: 0.9 %
BETA-HYDROXYBUTYRATE: 0.1 MMOL/L (ref 0–0.27)
BILIRUB SERPL-MCNC: 0.4 MG/DL (ref 0–1)
BILIRUBIN DIRECT: <0.2 MG/DL (ref 0–0.3)
BILIRUBIN URINE: NEGATIVE
BILIRUBIN, INDIRECT: ABNORMAL MG/DL (ref 0–1)
BLOOD, URINE: ABNORMAL
BUN BLDV-MCNC: 10 MG/DL (ref 7–20)
CALCIUM SERPL-MCNC: 9.4 MG/DL (ref 8.3–10.6)
CARBOXYHEMOGLOBIN: 12.8 % (ref 0–1.5)
CHLORIDE BLD-SCNC: 93 MMOL/L (ref 99–110)
CLARITY: CLEAR
CO2: 31 MMOL/L (ref 21–32)
COLOR: YELLOW
COMMENT UA: NORMAL
CREAT SERPL-MCNC: <0.5 MG/DL (ref 0.6–1.1)
EOSINOPHILS ABSOLUTE: 0.1 K/UL (ref 0–0.6)
EOSINOPHILS RELATIVE PERCENT: 1.6 %
EPITHELIAL CELLS, UA: NORMAL /HPF (ref 0–5)
GFR AFRICAN AMERICAN: >60
GFR NON-AFRICAN AMERICAN: >60
GLUCOSE BLD-MCNC: 520 MG/DL (ref 70–99)
GLUCOSE BLD-MCNC: 632 MG/DL (ref 70–99)
GLUCOSE URINE: >=1000 MG/DL
HCO3 VENOUS: 33 MMOL/L (ref 23–29)
HCT VFR BLD CALC: 44.9 % (ref 36–48)
HEMOGLOBIN, VEN, REDUCED: 4 %
HEMOGLOBIN: 14.9 G/DL (ref 12–16)
INR BLD: 0.94 (ref 0.86–1.14)
KETONES, URINE: NEGATIVE MG/DL
LEUKOCYTE ESTERASE, URINE: NEGATIVE
LIPASE: 13 U/L (ref 13–60)
LYMPHOCYTES ABSOLUTE: 2.4 K/UL (ref 1–5.1)
LYMPHOCYTES RELATIVE PERCENT: 30.5 %
MCH RBC QN AUTO: 30.8 PG (ref 26–34)
MCHC RBC AUTO-ENTMCNC: 33 G/DL (ref 31–36)
MCV RBC AUTO: 93.3 FL (ref 80–100)
METHEMOGLOBIN VENOUS: 0.1 %
MICROSCOPIC EXAMINATION: YES
MONOCYTES ABSOLUTE: 0.3 K/UL (ref 0–1.3)
MONOCYTES RELATIVE PERCENT: 4.3 %
NEUTROPHILS ABSOLUTE: 4.9 K/UL (ref 1.7–7.7)
NEUTROPHILS RELATIVE PERCENT: 62.7 %
NITRITE, URINE: NEGATIVE
O2 CONTENT, VEN: 16 VOL %
O2 SAT, VEN: 95 %
O2 THERAPY: ABNORMAL
PCO2, VEN: 62.9 MMHG (ref 40–50)
PDW BLD-RTO: 13.2 % (ref 12.4–15.4)
PERFORMED ON: ABNORMAL
PH UA: 6 (ref 5–8)
PH VENOUS: 7.33 (ref 7.35–7.45)
PLATELET # BLD: 121 K/UL (ref 135–450)
PMV BLD AUTO: 11.3 FL (ref 5–10.5)
PO2, VEN: 68.2 MMHG (ref 25–40)
POTASSIUM REFLEX MAGNESIUM: 3.9 MMOL/L (ref 3.5–5.1)
PRO-BNP: 944 PG/ML (ref 0–124)
PROTEIN UA: NEGATIVE MG/DL
PROTHROMBIN TIME: 10.9 SEC (ref 10–13.2)
RBC # BLD: 4.82 M/UL (ref 4–5.2)
RBC UA: NORMAL /HPF (ref 0–4)
SODIUM BLD-SCNC: 133 MMOL/L (ref 136–145)
SPECIFIC GRAVITY UA: >1.03 (ref 1–1.03)
TCO2 CALC VENOUS: 78 MMOL/L
TOTAL PROTEIN: 8.2 G/DL (ref 6.4–8.2)
TROPONIN: <0.01 NG/ML
URINE REFLEX TO CULTURE: ABNORMAL
URINE TYPE: ABNORMAL
UROBILINOGEN, URINE: 0.2 E.U./DL
WBC # BLD: 7.9 K/UL (ref 4–11)
WBC UA: NORMAL /HPF (ref 0–5)

## 2020-08-01 PROCEDURE — 82010 KETONE BODYS QUAN: CPT

## 2020-08-01 PROCEDURE — 93005 ELECTROCARDIOGRAM TRACING: CPT | Performed by: EMERGENCY MEDICINE

## 2020-08-01 PROCEDURE — 81001 URINALYSIS AUTO W/SCOPE: CPT

## 2020-08-01 PROCEDURE — 6360000004 HC RX CONTRAST MEDICATION: Performed by: EMERGENCY MEDICINE

## 2020-08-01 PROCEDURE — 1200000000 HC SEMI PRIVATE

## 2020-08-01 PROCEDURE — 85610 PROTHROMBIN TIME: CPT

## 2020-08-01 PROCEDURE — 99285 EMERGENCY DEPT VISIT HI MDM: CPT

## 2020-08-01 PROCEDURE — 80076 HEPATIC FUNCTION PANEL: CPT

## 2020-08-01 PROCEDURE — 96374 THER/PROPH/DIAG INJ IV PUSH: CPT

## 2020-08-01 PROCEDURE — 6370000000 HC RX 637 (ALT 250 FOR IP): Performed by: EMERGENCY MEDICINE

## 2020-08-01 PROCEDURE — 83690 ASSAY OF LIPASE: CPT

## 2020-08-01 PROCEDURE — 85025 COMPLETE CBC W/AUTO DIFF WBC: CPT

## 2020-08-01 PROCEDURE — 83880 ASSAY OF NATRIURETIC PEPTIDE: CPT

## 2020-08-01 PROCEDURE — 82803 BLOOD GASES ANY COMBINATION: CPT

## 2020-08-01 PROCEDURE — 80048 BASIC METABOLIC PNL TOTAL CA: CPT

## 2020-08-01 PROCEDURE — 85730 THROMBOPLASTIN TIME PARTIAL: CPT

## 2020-08-01 PROCEDURE — 71260 CT THORAX DX C+: CPT

## 2020-08-01 PROCEDURE — 84484 ASSAY OF TROPONIN QUANT: CPT

## 2020-08-01 RX ORDER — ASPIRIN 81 MG/1
324 TABLET, CHEWABLE ORAL ONCE
Status: COMPLETED | OUTPATIENT
Start: 2020-08-01 | End: 2020-08-01

## 2020-08-01 RX ORDER — FUROSEMIDE 10 MG/ML
40 INJECTION INTRAMUSCULAR; INTRAVENOUS ONCE
Status: COMPLETED | OUTPATIENT
Start: 2020-08-01 | End: 2020-08-02

## 2020-08-01 RX ADMIN — INSULIN HUMAN 5 UNITS: 100 INJECTION, SOLUTION PARENTERAL at 22:28

## 2020-08-01 RX ADMIN — IOPAMIDOL 75 ML: 755 INJECTION, SOLUTION INTRAVENOUS at 22:12

## 2020-08-01 RX ADMIN — ASPIRIN 324 MG: 81 TABLET, CHEWABLE ORAL at 20:47

## 2020-08-01 RX ADMIN — NITROGLYCERIN 1 INCH: 20 OINTMENT TOPICAL at 20:47

## 2020-08-01 ASSESSMENT — PAIN SCALES - WONG BAKER: WONGBAKER_NUMERICALRESPONSE: 6

## 2020-08-01 NOTE — ED PROVIDER NOTES
[facility] Emergency Department    CHIEF COMPLAINT  Chief Complaint   Patient presents with    Chest Pain     chest pain since 0400 with sob. denies fever or cough       HISTORY OF PRESENT ILLNESS  Isabela Anders is a 60 y.o. female  who presents to the ED complaining of hypoxia in triage without baseline oxygen requirement. She reports she is a diabetic and has a history of MI, CHF, CAD and pneumonia, HTN, HLD and hepatitis C. Early this morning around 0400 she reports onset of chest pains and shortness of breath. CP described as a pressure like sensation to the left side of her chest, \"like somebody is gouging me with something pointy. \"  No acute abdominal pains. No nausea/vomiting. Incidentally some diarrhea yesterday but not today. No fevers or coughing. She has not really been wheezing much. She is on a nicotine patch and trying to quit smoking for the past week. History of R AKA due to DM complication in the past.  No history of DVT or PE. No other complaints, modifying factors or associated symptoms. I have reviewed the following from the nursing documentation. Past Medical History:   Diagnosis Date    Arthritis     CHF (congestive heart failure) ([facility] [address]     Depression     Diabetes mellitus (HCC)     Hepatitis C     Dr Nikki Matta follows; contracted from ex- (drug user)    Hypercholesteremia 3/11/2020    Hypertension     MRSA (methicillin resistant Staphylococcus aureus) infection in 2000    was in urine and nares    Pneumonia     S/P colonoscopy with polypectomy 2/11    one polyp, Dr Nikki Matta. Recall 3 years. Past Surgical History:   Procedure Laterality Date    ABOVE KNEE AMPUTATION      AMPUTATION      right bka    JOINT REPLACEMENT  2006, 2010    right knee; Dr Ayo Norton.     KNEE ARTHROSCOPY      ridht knee X6    LIVER BIOPSY N/A 11/14/2018    LIVER BIOPSY LAPAROSCOPIC performed by Christine Cruz MD at [address] [place] [address] 2018    LAPAROSCOPIC CHOLECYSTECTOMY WITH INTRAOPERATIVE CHOLANGIOGRAM performed by Gavi Guaman MD at [facility]  11    right    UPPER GASTROINTESTINAL ENDOSCOPY  2018    UPPER GASTROINTESTINAL ENDOSCOPY N/A 2018    EGD BIOPSY performed by Darcie Espinoza MD at [address] ENDOSCOPY     Family History   Problem Relation Age of Onset    Cancer Father     Seizures Brother     Cancer Sister         colon     Social History     Socioeconomic History    Marital status: Single     Spouse name: Not on file    Number of children: Not on file    Years of education: Not on file    Highest education level: Not on file   Occupational History    Not on file   Social Needs    Financial resource strain: Not on file    Food insecurity     Worry: Not on file     Inability: Not on file    Transportation needs     Medical: Not on file     Non-medical: Not on file   Tobacco Use    Smoking status: Former Smoker     Last attempt to quit: 3/1/2011     Years since quittin.4    Smokeless tobacco: Never Used    Tobacco comment: congratulated on quitting smoking1   Substance and Sexual Activity    Alcohol use: No    Drug use: Yes     Types: Opiates      Comment: clean for 2 years     Sexual activity: Not Currently   Lifestyle    Physical activity     Days per week: Not on file     Minutes per session: Not on file    Stress: Not on file   Relationships    Social connections     Talks on phone: Not on file     Gets together: Not on file     Attends [employer]ism service: Not on file     Active member of club or organization: Not on file     Attends meetings of clubs or organizations: Not on file     Relationship status: Not on file    Intimate partner violence     Fear of current or ex partner: Not on file     Emotionally abused: Not on file     Physically abused: Not on file     Forced sexual activity: Not on file   Other Topics Concern    Not on file Social History Narrative    Not on file     Current Facility-Administered Medications   Medication Dose Route Frequency Provider Last Rate Last Dose    cefTRIAXone (ROCEPHIN) 1 g in sterile water 10 mL IV syringe  1 g Intravenous Once Misty Reynolds MD        azithromycin [facility]) 500 mg in D5W 250ml Vial Mate  500 mg Intravenous Once Misty Reynolds MD        furosemide (LASIX) injection 40 mg  40 mg Intravenous Once Misty Reynolds MD         Current Outpatient Medications   Medication Sig Dispense Refill    hydrOXYzine (ATARAX) 25 MG tablet Take 25 mg by mouth 3 times daily as needed for Itching      mirtazapine (REMERON) 15 MG tablet Take 7.5 mg by mouth nightly      pregabalin (LYRICA) 100 MG capsule Take 100 mg by mouth 2 times daily.  insulin glargine (LANTUS SOLOSTAR) 100 UNIT/ML injection pen Inject 15 Units into the skin 2 times daily      insulin lispro, 1 Unit Dial, (HUMALOG KWIKPEN) 100 UNIT/ML SOPN Inject 10 Units into the skin 3 times daily (before meals) Inject 10 units three times daily with each meal in addition to sliding scale.  ticagrelor (BRILINTA) 90 MG TABS tablet Take 1 tablet by mouth 2 times daily 60 tablet 5    aspirin 81 MG tablet Take 1 tablet by mouth daily 90 tablet 1    carvedilol (COREG) 3.125 MG tablet Take 1 tablet by mouth 2 times daily (with meals) 180 tablet 3    atorvastatin (LIPITOR) 40 MG tablet Take 1 tablet by mouth daily 90 tablet 3    buprenorphine-naloxone (SUBOXONE) 8-2 MG SUBL SL tablet Place 1 tablet under the tongue daily.  FREESTYLE LANCETS MISC 1 each by Other route 4 times daily (before meals and nightly) Patient to check blood sugar 4 times a day and PRN, he is treated with multiple daily injections of insulin that require correction dosing and has had hypoglycemia.              A1C  14.5  ICD code- E11.65, Z79.4 100 each 5    blood glucose test strips (FREESTYLE LITE) strip 1 each by Other route 4 times daily (before meals and nightly) Patient to check blood sugar 4 times a day and PRN, he is treated with multiple daily injections of insulin that require correction dosing and has had hypoglycemia. A1C  14.5  ICD code- E11.65, Z79.4 100 each 5    nitroGLYCERIN (NITROSTAT) 0.4 MG SL tablet up to max of 3 total doses. If no relief after 1 dose, call 911. 25 tablet 3    lamoTRIgine (LAMICTAL) 100 MG tablet Take 100 mg by mouth daily. Allergies   Allergen Reactions    Darvocet [Propoxyphene N-Acetaminophen] Nausea Only    Naprosyn [Naproxen] Rash    Ultram [Tramadol Hcl] Rash       REVIEW OF SYSTEMS  10 systems reviewed, pertinent positives per HPI otherwise noted to be negative. PHYSICAL EXAM  /83   Pulse 80   Temp 98.3 °F (36.8 °C) (Oral)   Resp 15   Ht 5' 2\" (1.575 m)   Wt 118 lb (53.5 kg)   LMP 11/15/2017   SpO2 96%   BMI 21.58 kg/m²    GENERAL APPEARANCE: Awake and alert. Cooperative. No distress. HENT: Normocephalic. Atraumatic. Mucous membranes are dry. NECK: Supple. EYES: PERRL. EOM's grossly intact. HEART/CHEST: RRR. No murmurs. No chest wall tenderness. LUNGS: Respirations mildly labored, minimal rhonchi, no rales/wheezing. Good air exchange. Speaking comfortably in full sentences. ABDOMEN: No tenderness. Soft. Non-distended. No masses. No organomegaly. No guarding or rebound. Normal bowel sounds throughout. MUSCULOSKELETAL: No extremity edema. Compartments soft. No deformity. No tenderness in the extremities. All extremities neurovascularly intact. S/p R AKA noted. SKIN: Warm and dry. No acute rashes. NEUROLOGICAL: Alert and oriented. CN's 2-12 intact. No gross facial drooping. Strength 5/5, sensation intact. 2 plus DTR's in knees bilaterally. Gait not assessed. PSYCHIATRIC: Normal mood and affect. LABS  I have reviewed all labs for this visit.    Results for orders placed or performed during the hospital encounter of 08/01/20   CBC auto differential Result Value Ref Range    WBC 7.9 4.0 - 11.0 K/uL    RBC 4.82 4.00 - 5.20 M/uL    Hemoglobin 14.9 12.0 - 16.0 g/dL    Hematocrit 44.9 36.0 - 48.0 %    MCV 93.3 80.0 - 100.0 fL    MCH 30.8 26.0 - 34.0 pg    MCHC 33.0 31.0 - 36.0 g/dL    RDW 13.2 12.4 - 15.4 %    Platelets 814 (L) 817 - 450 K/uL    MPV 11.3 (H) 5.0 - 10.5 fL    Neutrophils % 62.7 %    Lymphocytes % 30.5 %    Monocytes % 4.3 %    Eosinophils % 1.6 %    Basophils % 0.9 %    Neutrophils Absolute 4.9 1.7 - 7.7 K/uL    Lymphocytes Absolute 2.4 1.0 - 5.1 K/uL    Monocytes Absolute 0.3 0.0 - 1.3 K/uL    Eosinophils Absolute 0.1 0.0 - 0.6 K/uL    Basophils Absolute 0.1 0.0 - 0.2 K/uL   Basic Metabolic Panel w/ Reflex to MG   Result Value Ref Range    Sodium 133 (L) 136 - 145 mmol/L    Potassium reflex Magnesium 3.9 3.5 - 5.1 mmol/L    Chloride 93 (L) 99 - 110 mmol/L    CO2 31 21 - 32 mmol/L    Anion Gap 9 3 - 16    Glucose 632 (HH) 70 - 99 mg/dL    BUN 10 7 - 20 mg/dL    CREATININE <0.5 (L) 0.6 - 1.1 mg/dL    GFR Non-African American >60 >60    GFR African American >60 >60    Calcium 9.4 8.3 - 10.6 mg/dL   Hepatic function panel   Result Value Ref Range    Total Protein 8.2 6.4 - 8.2 g/dL    Alb 3.8 3.4 - 5.0 g/dL    Alkaline Phosphatase 142 (H) 40 - 129 U/L    ALT 15 10 - 40 U/L    AST 22 15 - 37 U/L    Total Bilirubin 0.4 0.0 - 1.0 mg/dL    Bilirubin, Direct <0.2 0.0 - 0.3 mg/dL    Bilirubin, Indirect see below 0.0 - 1.0 mg/dL   Lipase   Result Value Ref Range    Lipase 13.0 13.0 - 60.0 U/L   Troponin   Result Value Ref Range    Troponin <0.01 <0.01 ng/mL   Brain Natriuretic Peptide   Result Value Ref Range    Pro- (H) 0 - 124 pg/mL   Protime-INR   Result Value Ref Range    Protime 10.9 10.0 - 13.2 sec    INR 0.94 0.86 - 1.14   APTT   Result Value Ref Range    aPTT 29.1 24.2 - 36.2 sec   Blood gas, venous   Result Value Ref Range    pH, Rowdy 7.327 (L) 7.350 - 7.450    pCO2, Rowdy 62.9 (H) 40.0 - 50.0 mmHg    pO2, Rowdy 68.2 (H) 25.0 - 40.0 mmHg    HCO3, Venous 33.0 (H) 23.0 - 29.0 mmol/L    Base Excess, Rowdy 5.0 (H) -3.0 - 3.0 mmol/L    O2 Sat, Rowdy 95 Not Established %    Carboxyhemoglobin 12.8 (H) 0.0 - 1.5 %    MetHgb, Rowdy 0.1 <1.5 %    TC02 (Calc), Rowdy 78 Not Established mmol/L    O2 Content, Rowdy 16 Not Established VOL %    O2 Therapy Unknown     Hemoglobin, Rowdy, Reduced 4 %   Beta-Hydroxybutyrate   Result Value Ref Range    Beta-Hydroxybutyrate 0.10 0.00 - 0.27 mmol/L   Urinalysis Reflex to Culture    Specimen: Urine, clean catch   Result Value Ref Range    Color, UA YELLOW Straw/Yellow    Clarity, UA Clear Clear    Glucose, Ur >=1000 (A) Negative mg/dL    Bilirubin Urine Negative Negative    Ketones, Urine Negative Negative mg/dL    Specific Gravity, UA >1.030 1.005 - 1.030    Blood, Urine TRACE (A) Negative    pH, UA 6.0 5.0 - 8.0    Protein, UA Negative Negative mg/dL    Urobilinogen, Urine 0.2 <2.0 E.U./dL    Nitrite, Urine Negative Negative    Leukocyte Esterase, Urine Negative Negative    Microscopic Examination YES     Urine Type NotGiven    POCT Glucose   Result Value Ref Range    POC Glucose 520 (H) 70 - 99 mg/dl    Performed on ACCU-CHEK    EKG 12 Lead   Result Value Ref Range    Ventricular Rate 88 BPM    Atrial Rate 88 BPM    P-R Interval 154 ms    QRS Duration 88 ms    Q-T Interval 392 ms    QTc Calculation (Bazett) 474 ms    P Axis 53 degrees    R Axis -61 degrees    T Axis 93 degrees    Diagnosis       Normal sinus rhythmPossible Left atrial enlargementLeft axis deviationSeptal infarct , age undeterminedAbnormal ECG     The 12 lead EKG was interpreted by me as follows:  Rate: normal with a rate of 88  Rhythm: sinus  Axis: left  Intervals: normal VT, narrow QRS, normal QTc, lafb pattern  ST segments: no ST elevations or depressions  T waves: flat lead I, inverted aVL  Non-specific T wave changes: present   Baseline artifact  Prior EKG comparison: EKG dated 5/11/20 is not significantly different    RADIOLOGY    Ct Chest Pulmonary Embolism W however did find findings concerning for possible pulmonary edema versus infectious etiology. Patient was therefore covered with community-acquired coverage antibiotics, Lasix, and COVID swab was obtained which is currently pending. Patient will be admitted. Aspirin and nitro were already administered. During every aspect of this patient encounter, full droplet plus PPE precautions were used by myself. During the patient's ED course, the patient was given:  Medications   cefTRIAXone (ROCEPHIN) 1 g in sterile water 10 mL IV syringe (has no administration in time range)   azithromycin (ZITHROMAX) 500 mg in D5W 250ml Vial Mate (has no administration in time range)   furosemide (LASIX) injection 40 mg (has no administration in time range)   aspirin chewable tablet 324 mg (324 mg Oral Given 8/1/20 2047)   nitroglycerin (NITRO-BID) 2 % ointment 1 inch (1 inch Topical Given 8/1/20 2047)   insulin regular (HUMULIN R;NOVOLIN R) injection 5 Units (5 Units Intravenous Given 8/1/20 2228)   iopamidol (ISOVUE-370) 76 % injection 75 mL (75 mLs Intravenous Given 8/1/20 2212)        CLINICAL IMPRESSION  1. Hypoxia    2. Acute chest pain    3. Hyperglycemia    4. Acute pulmonary edema (HCC)    5. Suspected COVID-19 virus infection        Blood pressure 130/83, pulse 80, temperature 98.3 °F (36.8 °C), temperature source Oral, resp. rate 15, height 5' 2\" (1.575 m), weight 118 lb (53.5 kg), last menstrual period 11/15/2017, SpO2 96 %. Tere Aaron was admitted in fair condition. The plan is to admit to the hospital at this time under the hospitalist service. Hospitalist accepted the patient and will take over the patient's care. The total critical care time spent while evaluating and treating this patient was at least 36 minutes. This excludes time spent doing separately billable procedures.   This includes time at the bedside, data interpretation, medication management, obtaining critical history from

## 2020-08-02 VITALS
DIASTOLIC BLOOD PRESSURE: 59 MMHG | TEMPERATURE: 97 F | RESPIRATION RATE: 14 BRPM | OXYGEN SATURATION: 92 % | WEIGHT: 120.3 LBS | HEART RATE: 78 BPM | HEIGHT: 62 IN | BODY MASS INDEX: 22.14 KG/M2 | SYSTOLIC BLOOD PRESSURE: 102 MMHG

## 2020-08-02 PROBLEM — T58.91XA CARBOXYHEMOGLOBINEMIA: Status: ACTIVE | Noted: 2020-08-02

## 2020-08-02 LAB
A/G RATIO: 0.9 (ref 1.1–2.2)
ALBUMIN SERPL-MCNC: 3.5 G/DL (ref 3.4–5)
ALP BLD-CCNC: 118 U/L (ref 40–129)
ALT SERPL-CCNC: 15 U/L (ref 10–40)
ANION GAP SERPL CALCULATED.3IONS-SCNC: 8 MMOL/L (ref 3–16)
APTT: 22.6 SEC (ref 24.2–36.2)
AST SERPL-CCNC: 22 U/L (ref 15–37)
BASE EXCESS ARTERIAL: 5.1 MMOL/L (ref -3–3)
BASOPHILS ABSOLUTE: 0 K/UL (ref 0–0.2)
BASOPHILS RELATIVE PERCENT: 0.4 %
BILIRUB SERPL-MCNC: <0.2 MG/DL (ref 0–1)
BUN BLDV-MCNC: 11 MG/DL (ref 7–20)
CALCIUM SERPL-MCNC: 9.2 MG/DL (ref 8.3–10.6)
CARBOXYHEMOGLOBIN ARTERIAL: 8.9 % (ref 0–1.5)
CARBOXYHEMOGLOBIN: 5.1 % (ref 0–1.5)
CARBOXYHEMOGLOBIN: 9.9 % (ref 0–1.5)
CHLORIDE BLD-SCNC: 97 MMOL/L (ref 99–110)
CO2: 31 MMOL/L (ref 21–32)
CREAT SERPL-MCNC: <0.5 MG/DL (ref 0.6–1.1)
D DIMER: <200 NG/ML DDU (ref 0–229)
EKG ATRIAL RATE: 88 BPM
EKG DIAGNOSIS: NORMAL
EKG P AXIS: 53 DEGREES
EKG P-R INTERVAL: 154 MS
EKG Q-T INTERVAL: 392 MS
EKG QRS DURATION: 88 MS
EKG QTC CALCULATION (BAZETT): 474 MS
EKG R AXIS: -61 DEGREES
EKG T AXIS: 93 DEGREES
EKG VENTRICULAR RATE: 88 BPM
EOSINOPHILS ABSOLUTE: 0.2 K/UL (ref 0–0.6)
EOSINOPHILS RELATIVE PERCENT: 2.6 %
FERRITIN: 40.8 NG/ML (ref 15–150)
FIBRINOGEN: 282 MG/DL (ref 200–397)
GFR AFRICAN AMERICAN: >60
GFR NON-AFRICAN AMERICAN: >60
GLOBULIN: 4.1 G/DL
GLUCOSE BLD-MCNC: 101 MG/DL (ref 70–99)
GLUCOSE BLD-MCNC: 229 MG/DL (ref 70–99)
GLUCOSE BLD-MCNC: 235 MG/DL (ref 70–99)
GLUCOSE BLD-MCNC: 420 MG/DL (ref 70–99)
HCO3 ARTERIAL: 31.9 MMOL/L (ref 21–29)
HCT VFR BLD CALC: 42.2 % (ref 36–48)
HEMOGLOBIN, ART, EXTENDED: 14 G/DL (ref 12–16)
HEMOGLOBIN: 13.9 G/DL (ref 12–16)
INR BLD: 0.98 (ref 0.86–1.14)
IRON SATURATION: 15 % (ref 15–50)
IRON: 49 UG/DL (ref 37–145)
LACTATE DEHYDROGENASE: 178 U/L (ref 100–190)
LACTIC ACID: 1.8 MMOL/L (ref 0.4–2)
LYMPHOCYTES ABSOLUTE: 3.2 K/UL (ref 1–5.1)
LYMPHOCYTES RELATIVE PERCENT: 35.8 %
MCH RBC QN AUTO: 29.8 PG (ref 26–34)
MCHC RBC AUTO-ENTMCNC: 33 G/DL (ref 31–36)
MCV RBC AUTO: 90.4 FL (ref 80–100)
METHEMOGLOBIN ARTERIAL: 0.3 %
MONOCYTES ABSOLUTE: 0.6 K/UL (ref 0–1.3)
MONOCYTES RELATIVE PERCENT: 6.7 %
NEUTROPHILS ABSOLUTE: 4.9 K/UL (ref 1.7–7.7)
NEUTROPHILS RELATIVE PERCENT: 54.5 %
O2 CONTENT ARTERIAL: 18 ML/DL
O2 SAT, ARTERIAL: 98.6 %
O2 THERAPY: ABNORMAL
PCO2 ARTERIAL: 55.2 MMHG (ref 35–45)
PDW BLD-RTO: 13.1 % (ref 12.4–15.4)
PERFORMED ON: ABNORMAL
PH ARTERIAL: 7.37 (ref 7.35–7.45)
PLATELET # BLD: 124 K/UL (ref 135–450)
PMV BLD AUTO: 11.6 FL (ref 5–10.5)
PO2 ARTERIAL: 99.3 MMHG (ref 75–108)
POTASSIUM REFLEX MAGNESIUM: 3.6 MMOL/L (ref 3.5–5.1)
PROCALCITONIN: 0.07 NG/ML (ref 0–0.15)
PROTHROMBIN TIME: 11.4 SEC (ref 10–13.2)
RBC # BLD: 4.67 M/UL (ref 4–5.2)
SODIUM BLD-SCNC: 136 MMOL/L (ref 136–145)
T4 FREE: 1.3 NG/DL (ref 0.9–1.8)
TCO2 ARTERIAL: 75.4 MMOL/L
TOTAL IRON BINDING CAPACITY: 322 UG/DL (ref 260–445)
TOTAL PROTEIN: 7.6 G/DL (ref 6.4–8.2)
TROPONIN: <0.01 NG/ML
TROPONIN: <0.01 NG/ML
TSH SERPL DL<=0.05 MIU/L-ACNC: 1.14 UIU/ML (ref 0.27–4.2)
WBC # BLD: 9 K/UL (ref 4–11)

## 2020-08-02 PROCEDURE — 82803 BLOOD GASES ANY COMBINATION: CPT

## 2020-08-02 PROCEDURE — 2700000000 HC OXYGEN THERAPY PER DAY

## 2020-08-02 PROCEDURE — U0002 COVID-19 LAB TEST NON-CDC: HCPCS

## 2020-08-02 PROCEDURE — 82375 ASSAY CARBOXYHB QUANT: CPT

## 2020-08-02 PROCEDURE — 99254 IP/OBS CNSLTJ NEW/EST MOD 60: CPT | Performed by: INTERNAL MEDICINE

## 2020-08-02 PROCEDURE — 84439 ASSAY OF FREE THYROXINE: CPT

## 2020-08-02 PROCEDURE — 6370000000 HC RX 637 (ALT 250 FOR IP): Performed by: HOSPITALIST

## 2020-08-02 PROCEDURE — 84484 ASSAY OF TROPONIN QUANT: CPT

## 2020-08-02 PROCEDURE — 2580000003 HC RX 258: Performed by: HOSPITALIST

## 2020-08-02 PROCEDURE — 84145 PROCALCITONIN (PCT): CPT

## 2020-08-02 PROCEDURE — 85384 FIBRINOGEN ACTIVITY: CPT

## 2020-08-02 PROCEDURE — 2580000003 HC RX 258: Performed by: EMERGENCY MEDICINE

## 2020-08-02 PROCEDURE — 36415 COLL VENOUS BLD VENIPUNCTURE: CPT

## 2020-08-02 PROCEDURE — 85025 COMPLETE CBC W/AUTO DIFF WBC: CPT

## 2020-08-02 PROCEDURE — 83615 LACTATE (LD) (LDH) ENZYME: CPT

## 2020-08-02 PROCEDURE — 6360000002 HC RX W HCPCS: Performed by: EMERGENCY MEDICINE

## 2020-08-02 PROCEDURE — 83550 IRON BINDING TEST: CPT

## 2020-08-02 PROCEDURE — U0003 INFECTIOUS AGENT DETECTION BY NUCLEIC ACID (DNA OR RNA); SEVERE ACUTE RESPIRATORY SYNDROME CORONAVIRUS 2 (SARS-COV-2) (CORONAVIRUS DISEASE [COVID-19]), AMPLIFIED PROBE TECHNIQUE, MAKING USE OF HIGH THROUGHPUT TECHNOLOGIES AS DESCRIBED BY CMS-2020-01-R: HCPCS

## 2020-08-02 PROCEDURE — 87040 BLOOD CULTURE FOR BACTERIA: CPT

## 2020-08-02 PROCEDURE — 93010 ELECTROCARDIOGRAM REPORT: CPT | Performed by: INTERNAL MEDICINE

## 2020-08-02 PROCEDURE — 80053 COMPREHEN METABOLIC PANEL: CPT

## 2020-08-02 PROCEDURE — 85610 PROTHROMBIN TIME: CPT

## 2020-08-02 PROCEDURE — 6360000002 HC RX W HCPCS: Performed by: HOSPITALIST

## 2020-08-02 PROCEDURE — 85379 FIBRIN DEGRADATION QUANT: CPT

## 2020-08-02 PROCEDURE — 87150 DNA/RNA AMPLIFIED PROBE: CPT

## 2020-08-02 PROCEDURE — 83605 ASSAY OF LACTIC ACID: CPT

## 2020-08-02 PROCEDURE — C9113 INJ PANTOPRAZOLE SODIUM, VIA: HCPCS | Performed by: HOSPITALIST

## 2020-08-02 PROCEDURE — 85730 THROMBOPLASTIN TIME PARTIAL: CPT

## 2020-08-02 PROCEDURE — 84443 ASSAY THYROID STIM HORMONE: CPT

## 2020-08-02 PROCEDURE — 94761 N-INVAS EAR/PLS OXIMETRY MLT: CPT

## 2020-08-02 PROCEDURE — 82728 ASSAY OF FERRITIN: CPT

## 2020-08-02 PROCEDURE — 83540 ASSAY OF IRON: CPT

## 2020-08-02 RX ORDER — ACETAMINOPHEN 325 MG/1
650 TABLET ORAL EVERY 6 HOURS PRN
Status: DISCONTINUED | OUTPATIENT
Start: 2020-08-02 | End: 2020-08-02 | Stop reason: HOSPADM

## 2020-08-02 RX ORDER — POTASSIUM CHLORIDE 20 MEQ/1
40 TABLET, EXTENDED RELEASE ORAL PRN
Status: DISCONTINUED | OUTPATIENT
Start: 2020-08-02 | End: 2020-08-02 | Stop reason: HOSPADM

## 2020-08-02 RX ORDER — SODIUM CHLORIDE 0.9 % (FLUSH) 0.9 %
10 SYRINGE (ML) INJECTION PRN
Status: DISCONTINUED | OUTPATIENT
Start: 2020-08-02 | End: 2020-08-02 | Stop reason: HOSPADM

## 2020-08-02 RX ORDER — DEXTROSE MONOHYDRATE 50 MG/ML
100 INJECTION, SOLUTION INTRAVENOUS PRN
Status: DISCONTINUED | OUTPATIENT
Start: 2020-08-02 | End: 2020-08-02 | Stop reason: HOSPADM

## 2020-08-02 RX ORDER — DEXTROSE MONOHYDRATE 25 G/50ML
12.5 INJECTION, SOLUTION INTRAVENOUS PRN
Status: DISCONTINUED | OUTPATIENT
Start: 2020-08-02 | End: 2020-08-02 | Stop reason: HOSPADM

## 2020-08-02 RX ORDER — PREGABALIN 100 MG/1
100 CAPSULE ORAL 2 TIMES DAILY
Status: DISCONTINUED | OUTPATIENT
Start: 2020-08-02 | End: 2020-08-02 | Stop reason: HOSPADM

## 2020-08-02 RX ORDER — INSULIN LISPRO 100 [IU]/ML
0.08 INJECTION, SOLUTION INTRAVENOUS; SUBCUTANEOUS
Status: DISCONTINUED | OUTPATIENT
Start: 2020-08-02 | End: 2020-08-02 | Stop reason: HOSPADM

## 2020-08-02 RX ORDER — ONDANSETRON 4 MG/1
4 TABLET, ORALLY DISINTEGRATING ORAL EVERY 8 HOURS PRN
Status: DISCONTINUED | OUTPATIENT
Start: 2020-08-02 | End: 2020-08-02 | Stop reason: HOSPADM

## 2020-08-02 RX ORDER — INSULIN LISPRO 100 [IU]/ML
0-12 INJECTION, SOLUTION INTRAVENOUS; SUBCUTANEOUS EVERY 4 HOURS
Status: DISCONTINUED | OUTPATIENT
Start: 2020-08-02 | End: 2020-08-02 | Stop reason: HOSPADM

## 2020-08-02 RX ORDER — ACETAMINOPHEN 325 MG/1
650 TABLET ORAL EVERY 6 HOURS PRN
Status: DISCONTINUED | OUTPATIENT
Start: 2020-08-02 | End: 2020-08-02 | Stop reason: SDUPTHER

## 2020-08-02 RX ORDER — ONDANSETRON 2 MG/ML
4 INJECTION INTRAMUSCULAR; INTRAVENOUS EVERY 6 HOURS PRN
Status: DISCONTINUED | OUTPATIENT
Start: 2020-08-02 | End: 2020-08-02 | Stop reason: HOSPADM

## 2020-08-02 RX ORDER — POTASSIUM CHLORIDE 7.45 MG/ML
10 INJECTION INTRAVENOUS PRN
Status: DISCONTINUED | OUTPATIENT
Start: 2020-08-02 | End: 2020-08-02 | Stop reason: HOSPADM

## 2020-08-02 RX ORDER — NICOTINE 21 MG/24HR
1 PATCH, TRANSDERMAL 24 HOURS TRANSDERMAL DAILY
Status: DISCONTINUED | OUTPATIENT
Start: 2020-08-02 | End: 2020-08-02 | Stop reason: HOSPADM

## 2020-08-02 RX ORDER — FUROSEMIDE 10 MG/ML
40 INJECTION INTRAMUSCULAR; INTRAVENOUS 2 TIMES DAILY
Status: DISCONTINUED | OUTPATIENT
Start: 2020-08-02 | End: 2020-08-02 | Stop reason: HOSPADM

## 2020-08-02 RX ORDER — ASPIRIN 81 MG/1
81 TABLET ORAL DAILY
Status: DISCONTINUED | OUTPATIENT
Start: 2020-08-03 | End: 2020-08-02 | Stop reason: HOSPADM

## 2020-08-02 RX ORDER — PANTOPRAZOLE SODIUM 40 MG/10ML
40 INJECTION, POWDER, LYOPHILIZED, FOR SOLUTION INTRAVENOUS DAILY
Status: DISCONTINUED | OUTPATIENT
Start: 2020-08-02 | End: 2020-08-02 | Stop reason: HOSPADM

## 2020-08-02 RX ORDER — ACETAMINOPHEN 650 MG/1
650 SUPPOSITORY RECTAL EVERY 6 HOURS PRN
Status: DISCONTINUED | OUTPATIENT
Start: 2020-08-02 | End: 2020-08-02 | Stop reason: SDUPTHER

## 2020-08-02 RX ORDER — NICOTINE POLACRILEX 4 MG
15 LOZENGE BUCCAL PRN
Status: DISCONTINUED | OUTPATIENT
Start: 2020-08-02 | End: 2020-08-02 | Stop reason: HOSPADM

## 2020-08-02 RX ORDER — ATORVASTATIN CALCIUM 40 MG/1
40 TABLET, FILM COATED ORAL DAILY
Status: DISCONTINUED | OUTPATIENT
Start: 2020-08-03 | End: 2020-08-02 | Stop reason: HOSPADM

## 2020-08-02 RX ORDER — NITROGLYCERIN 0.4 MG/1
0.4 TABLET SUBLINGUAL EVERY 5 MIN PRN
Status: DISCONTINUED | OUTPATIENT
Start: 2020-08-02 | End: 2020-08-02

## 2020-08-02 RX ORDER — LAMOTRIGINE 100 MG/1
100 TABLET ORAL DAILY
Status: DISCONTINUED | OUTPATIENT
Start: 2020-08-02 | End: 2020-08-02

## 2020-08-02 RX ORDER — SODIUM CHLORIDE 0.9 % (FLUSH) 0.9 %
10 SYRINGE (ML) INJECTION EVERY 12 HOURS SCHEDULED
Status: DISCONTINUED | OUTPATIENT
Start: 2020-08-02 | End: 2020-08-02 | Stop reason: HOSPADM

## 2020-08-02 RX ORDER — MIRTAZAPINE 15 MG/1
7.5 TABLET, FILM COATED ORAL NIGHTLY
Status: DISCONTINUED | OUTPATIENT
Start: 2020-08-02 | End: 2020-08-02 | Stop reason: HOSPADM

## 2020-08-02 RX ORDER — ACETAMINOPHEN 650 MG/1
650 SUPPOSITORY RECTAL EVERY 6 HOURS PRN
Status: DISCONTINUED | OUTPATIENT
Start: 2020-08-02 | End: 2020-08-02 | Stop reason: HOSPADM

## 2020-08-02 RX ORDER — DEXAMETHASONE 4 MG/1
6 TABLET ORAL DAILY
Status: DISCONTINUED | OUTPATIENT
Start: 2020-08-02 | End: 2020-08-02 | Stop reason: HOSPADM

## 2020-08-02 RX ORDER — SENNA PLUS 8.6 MG/1
1 TABLET ORAL DAILY PRN
Status: DISCONTINUED | OUTPATIENT
Start: 2020-08-02 | End: 2020-08-02 | Stop reason: HOSPADM

## 2020-08-02 RX ORDER — CARVEDILOL 3.12 MG/1
3.12 TABLET ORAL 2 TIMES DAILY WITH MEALS
Status: DISCONTINUED | OUTPATIENT
Start: 2020-08-02 | End: 2020-08-02 | Stop reason: HOSPADM

## 2020-08-02 RX ADMIN — PANTOPRAZOLE SODIUM 40 MG: 40 INJECTION, POWDER, FOR SOLUTION INTRAVENOUS at 09:50

## 2020-08-02 RX ADMIN — FUROSEMIDE 40 MG: 10 INJECTION, SOLUTION INTRAMUSCULAR; INTRAVENOUS at 09:51

## 2020-08-02 RX ADMIN — INSULIN LISPRO 4 UNITS: 100 INJECTION, SOLUTION INTRAVENOUS; SUBCUTANEOUS at 14:27

## 2020-08-02 RX ADMIN — Medication 10 ML: at 09:52

## 2020-08-02 RX ADMIN — ENOXAPARIN SODIUM 30 MG: 30 INJECTION SUBCUTANEOUS at 09:50

## 2020-08-02 RX ADMIN — DEXAMETHASONE 6 MG: 4 TABLET ORAL at 04:00

## 2020-08-02 RX ADMIN — FUROSEMIDE 40 MG: 10 INJECTION, SOLUTION INTRAMUSCULAR; INTRAVENOUS at 00:27

## 2020-08-02 RX ADMIN — Medication 1 G: at 00:31

## 2020-08-02 RX ADMIN — AZITHROMYCIN MONOHYDRATE 500 MG: 500 INJECTION, POWDER, LYOPHILIZED, FOR SOLUTION INTRAVENOUS at 00:30

## 2020-08-02 RX ADMIN — Medication 1 G: at 14:25

## 2020-08-02 RX ADMIN — TICAGRELOR 90 MG: 90 TABLET ORAL at 14:26

## 2020-08-02 RX ADMIN — PREGABALIN 100 MG: 100 CAPSULE ORAL at 14:26

## 2020-08-02 ASSESSMENT — PAIN SCALES - GENERAL
PAINLEVEL_OUTOF10: 0

## 2020-08-02 NOTE — H&P
HOSPITALISTS HISTORY AND PHYSICAL    8/2/2020 2:35 AM    Patient Information:  Lucy Leblanc is a 60 y.o. female [id number]  PCP:  Natalya Chery MD (Tel: [phone] )    Chief complaint:    Chief Complaint   Patient presents with    Chest Pain     chest pain since 0400 with sob. denies fever or cough         History of Present Illness:  Demetrio Pompa is a 60 y.o. female who presented to the ED with a complaint of chest pressure and shortness of breath ongoing for 2 days. Of note the patient has an extensive past history including uncontrolled diabetes, CAD status post MI, CHF, HTN, HLD, and polysubstance abuse with hepatitis C. Upon arrival to the ED she was noted to be hypoxic on room air with SPO2 in the low 80s. Labs were collected and once again revealed acute hyperglycemia with a glucose greater than 600 as well as a new finding of BNP elevation at 944. Chest CT scan was obtained and notable for alveolar and interstitial edema, as well as possible lingular airspace disease and pulmonary nodule. Patient is an extremely poor historian and was noted to be nodding off during exam by this physician. She will be admitted to the CDU overflow for treatment of flash pulmonary edema, possible pneumonia rule out COVID-19 infection, and elevated carboxyhemoglobin. History obtained from patient and epic chart  Old medical records show multiple hospitalizations related to poor diabetes management; she is status post right AKA; history of opiate abuse with prior ED visits for unintentional overdose of heroin      REVIEW OF SYSTEMS:   Constitutional: Negative for fever,chills or night sweats  ENT: Negative for rhinorrhea, epistaxis, hoarseness, sore throat.   Respiratory: Positive for shortness of breath,wheezing  Cardiovascular: Positive for chest pain, palpitations   Gastrointestinal: Negative for nausea, vomiting, diarrhea  Genitourinary: Negative for polyuria, dysuria   Hematologic/Lymphatic: Negative for bleeding tendency, easy bruising  Musculoskeletal: Positive for myalgias and arthralgias  Neurologic: Negative for confusion,dysarthria. Skin: Negative for itching,rash  Psychiatric: Positive history of polysubstance abuse  Endocrine: Positive for polydipsia,polyuria    Past Medical History:   has a past medical history of Arthritis, CHF (congestive heart failure) ([facility] [address] Depression, Diabetes mellitus ([facility] [address] Hepatitis C, Hypercholesteremia, Hypertension, MRSA (methicillin resistant Staphylococcus aureus) infection, Pneumonia, and S/P colonoscopy with polypectomy. Past Surgical History:   has a past surgical history that includes Knee arthroscopy; Revision total knee arthroplasty (2/9/11); joint replacement (2006, 2010); amputation; above knee amputation; Upper gastrointestinal endoscopy (11/13/2018); Upper gastrointestinal endoscopy (N/A, 11/13/2018); pr lap,cholecystectomy (N/A, 11/14/2018); and liver biopsy (N/A, 11/14/2018). Medications:  No current facility-administered medications on file prior to encounter. Current Outpatient Medications on File Prior to Encounter   Medication Sig Dispense Refill    hydrOXYzine (ATARAX) 25 MG tablet Take 25 mg by mouth 3 times daily as needed for Itching      mirtazapine (REMERON) 15 MG tablet Take 7.5 mg by mouth nightly      pregabalin (LYRICA) 100 MG capsule Take 100 mg by mouth 2 times daily.  insulin glargine (LANTUS SOLOSTAR) 100 UNIT/ML injection pen Inject 15 Units into the skin 2 times daily      insulin lispro, 1 Unit Dial, (HUMALOG KWIKPEN) 100 UNIT/ML SOPN Inject 10 Units into the skin 3 times daily (before meals) Inject 10 units three times daily with each meal in addition to sliding scale.       ticagrelor (BRILINTA) 90 MG TABS tablet Take 1 tablet by mouth 2 times daily 60 tablet 5    aspirin 81 MG tablet Take 1 tablet by mouth daily 90 tablet 1    carvedilol (COREG) 3.125 MG tablet Take 1 tablet by mouth 2 times daily (with meals) 180 tablet 3    atorvastatin (LIPITOR) 40 MG tablet Take 1 tablet by mouth daily 90 tablet 3    buprenorphine-naloxone (SUBOXONE) 8-2 MG SUBL SL tablet Place 1 tablet under the tongue daily.  FREESTYLE LANCETS MISC 1 each by Other route 4 times daily (before meals and nightly) Patient to check blood sugar 4 times a day and PRN, he is treated with multiple daily injections of insulin that require correction dosing and has had hypoglycemia. A1C  14.5  ICD code- E11.65, Z79.4 100 each 5    blood glucose test strips (FREESTYLE LITE) strip 1 each by Other route 4 times daily (before meals and nightly) Patient to check blood sugar 4 times a day and PRN, he is treated with multiple daily injections of insulin that require correction dosing and has had hypoglycemia. A1C  14.5  ICD code- E11.65, Z79.4 100 each 5    nitroGLYCERIN (NITROSTAT) 0.4 MG SL tablet up to max of 3 total doses. If no relief after 1 dose, call 911. 25 tablet 3    lamoTRIgine (LAMICTAL) 100 MG tablet Take 100 mg by mouth daily. Allergies: Allergies   Allergen Reactions    Darvocet [Propoxyphene N-Acetaminophen] Nausea Only    Naprosyn [Naproxen] Rash    Ultram [Tramadol Hcl] Rash        Social History:  Patient Lives  reports that she has been smoking. She has never used smokeless tobacco. She reports current drug use. Drug: Opiates . She reports that she does not drink alcohol. Family History:  family history includes Cancer in her father and sister; Seizures in her brother.   Rex    Physical Exam:  BP 93/70   Pulse 76   Temp 97.8 °F (36.6 °C) (Temporal)   Resp 14   Ht 5' 2\" (1.575 m)   Wt 120 lb 4.8 oz (54.6 kg)   LMP 11/15/2017   SpO2 98%   BMI 22.00 kg/m²     General appearance: Chronically ill female; disheveled  Eyes: Sclera clear, pupils equal  ENT: Moist mucus membranes, no thrush. Trachea midline. Cardiovascular: Regular rhythm, soft S1-S2; no edema in lower extremities  Respiratory: Decreased breath sounds throughout; expiratory wheeze; no rales  Gastrointestinal: Abdomen soft, non-tender, not distended, normal bowel sounds  Musculoskeletal: No cyanosis in digits, neck supple; status post right AKA  Neurology: Cranial nerves grossly intact. Somnolent/nodding off; No speech or motor deficits  Skin: Warm, dry, normal turgor, no rash  Brisk capillary refill, peripheral pulses diminished  Labs:  CBC:   Lab Results   Component Value Date    WBC 7.9 08/01/2020    RBC 4.82 08/01/2020    HGB 14.9 08/01/2020    HCT 44.9 08/01/2020    MCV 93.3 08/01/2020    MCH 30.8 08/01/2020    MCHC 33.0 08/01/2020    RDW 13.2 08/01/2020     08/01/2020    MPV 11.3 08/01/2020     BMP:    Lab Results   Component Value Date     08/01/2020    K 3.9 08/01/2020    CL 93 08/01/2020    CO2 31 08/01/2020    BUN 10 08/01/2020    CREATININE <0.5 08/01/2020    CALCIUM 9.4 08/01/2020    GFRAA >60 08/01/2020    GFRAA >60 02/14/2012    LABGLOM >60 08/01/2020    LABGLOM 131.7 05/20/2011    GLUCOSE 632 08/01/2020    GLUCOSE 329 05/20/2011     CT CHEST PULMONARY EMBOLISM W CONTRAST   Preliminary Result   Negative for acute pulmonary embolism. Findings are most suspicious for interstitial and alveolar pulmonary edema. Lingular atelectasis and/or scarring, increased from prior exams. Difficult   to exclude superimposed airspace disease within atelectatic lung on this exam   due to phase of contrast, although suspicion is low. 11 mm average diameter nodular opacity at the left lung apex is new since   9/11/2019. This is indeterminate though may represent focal alveolar edema   or nidus of infection.   Recommend three-month follow-up CT chest.      RECOMMENDATIONS:   Fleischner Society guidelines for follow-up and management of incidentally   detected pulmonary nodules:      Single Solid Nodule: Nodule size greater than 8 mm         In a low-risk patient, consider CT at 3 months, PET/CT, or tissue sampling. In a high-risk patient, consider CT at 3 months, PET/CT, or tissue sampling.      - Low risk patients include individuals with minimal or absent history of   smoking and other known risk factors. - High risk patients include individuals with a history or smoking or known   risk factors. Radiology 2017 http://pubs. rsna.org/doi/full/10.1148/radiol. 7244887500           Chest Xray:   EKG:    I visualized CXR images and EKG strips    Ventricular Rate  88 P BPM  QTc Calculation (Bazett)  474 P ms    Atrial Rate  88 P BPM  P [place]  53 P degrees    P-R Interval  154 P ms  R Axis  -61 P degrees    QRS Duration  88 P ms  T Axis  93 P degrees    Q-T Interval  392 P ms  Diagnosis  Normal sinus rhythmPossible Left atrial enlargementLeft axis deviationSeptal infarct , age undete       Discussed case  with ED attending    Problem List  Principal Problem:    Acute on chronic respiratory failure with hypoxia (HCC)  Active Problems:    Uncontrolled type 2 diabetes mellitus with hyperglycemia, with long-term current use of insulin (HCC)    Acute decompensated heart failure (HCC)    Carboxyhemoglobinemia  Resolved Problems:    * No resolved hospital problems. *        Assessment/Plan:     1. Acute on chronic respiratory failure with hypoxia and elevated carboxyhemoglobin  - Patient placed on high flow O2 at 15 L; serial carboxyhemoglobin measurement  - Combivent MDI 4 times daily  - Decadron 6 mg daily pending COVID-19 testing  -Incentive spirometry scheduled  -Continuous pulse oximetry    2. Acute decompensated CHF  - Patient admitted under heart failure order set  -Continuous telemetry monitoring with strict I's and O's  - Continue Coreg  - Nitropaste 1/2 inch every 6 hours  -Lasix 40 mg IV every 12  - Echo scheduled for a.m.  -Cardiology consult placed    3.   Acute hyperglycemia with uncontrolled type 2

## 2020-08-02 NOTE — ED NOTES
Report given to [facility] RN.       Kira Brewster RN  08/02/20 1041 Thomas MERRY  08/02/20 0259

## 2020-08-02 NOTE — CONSULTS
reports previous drug use. Drug: Opiates . She reports that she does not drink alcohol. Family History:  family history includes Cancer in her father and sister; Seizures in her brother. Medications:   [START ON 8/3/2020] aspirin  81 mg Oral Daily    [START ON 8/3/2020] atorvastatin  40 mg Oral Daily    carvedilol  3.125 mg Oral BID WC    mirtazapine  7.5 mg Oral Nightly    pregabalin  100 mg Oral BID    ticagrelor  90 mg Oral BID    insulin lispro  0.08 Units/kg Subcutaneous TID WC    insulin lispro  0-12 Units Subcutaneous Q4H    sodium chloride flush  10 mL Intravenous 2 times per day    nicotine  1 patch Transdermal Daily    pantoprazole  40 mg Intravenous Daily    furosemide  40 mg Intravenous BID    nitroglycerin  0.5 inch Topical 4 times per day    enoxaparin  30 mg Subcutaneous BID    dexamethasone  6 mg Oral Daily    azithromycin  500 mg Intravenous Q24H    cefTRIAXone (ROCEPHIN) IV  1 g Intravenous Q12H    insulin glargine  25 Units Subcutaneous BID         Allergies:  Darvocet [propoxyphene n-acetaminophen]; Naprosyn [naproxen]; and Ultram [tramadol hcl]     ? Medications and dosages reviewed. ROS:  ?Full ROS obtained and negative except as mentioned in HPI      Physical Examination:    Vitals:    08/02/20 1207   BP: 102/59   Pulse:    Resp: 14   Temp:    SpO2: 92%        Due to the current efforts to prevent transmission of COVID-19 and also the need to preserve PPE for other caregivers, a face-to-face encounter with the patient was not performed. That being said, all relevant records and diagnostic tests were reviewed, including laboratory results and imaging. Please reference any relevant documentation elsewhere. Care will be coordinated with the primary service  . All testing and labs listed below were personally reviewed. CTPA    Impression:  Negative for acute pulmonary embolism. Findings are most suspicious for interstitial and alveolar pulmonary edema. bilateral ground glass infiltrates. It is very difficult to tell if this CHF or pneumonia/COVID. I agree with current treatment. Diuresis, antibiotics and steroids while COVID testing is pending. Watch creat with diuresis.      Plan:  Agree with plan  Diuresis, Antibiotics and steroids  Await COVID    Thank you for allowing me to participate in the care of this individual.      Melia Rodriguez M.D., [address] [facility]

## 2020-08-02 NOTE — PROGRESS NOTES
Nursing admission. Placed her on heart monitor. Assisted immediately with voiding needs. Oriented to room, call light system and white board. Nursing assessment: Afebrile. BP 96/63 (72). O2 sats drop on RA 88%. Placed 2LO2NC. LS CTA diminished throughout. Denies CP. Denies pain. Neuro sleepy falls asleep. She does answers questions appropriately and A&Ox4. States her vision is blurry in both eyes and that she needs cataract surgery. Upper dentures in place and no lower dentures. Speech clear. NSR 78. Skin warm, natural, dry, slightly flushed face/cheeks. Patient's skin is tanned. States she gets sun. S1S2. ABD soft +BSx4. Last BM yesterday 8/1/20. Voided 200 cc yellow clear urine, no odor. UA with infection and Urine culture pending. Patient denies any burning with urination. Movement of Extremities No deficits. She does have Right Above Knee Amputation 2011. Able to pivot. Uses wheelchair at home. Wheelchair at bedside. Patient states she has been diabetic since 2006 Type 1 DM. Coccyx no redness or breakdown. Pulses right/left pulse 1+/1+ and left foot pedal 1+ dorsalis pedis. Right femerol pulse 1+. Right antecubital fossa peripheral I.V. #20 ga. Patent, flushes well, +blood return. Removed coban wrap off PIV right arm site. SR up x3. Bed alarm activated. Call light within reach. Bed in lowest position and wheels locked. 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

## 2020-08-02 NOTE — PROGRESS NOTES
Patient's Zoloft vial delivered to Pharmacy. Green slip placed on patient's chart. Pharmacy green bag Ticket # [id number].

## 2020-08-02 NOTE — PROGRESS NOTES
Nursing reassessment. VSS. BP systolic 90 range. Removed her Nitro past from [facility]. Denies pain. States she did not have her zoloft. Nurse did find her Zoloft in her purse. Will turn into [employer]. Explained to patient that she can retrieve her medication at discharge.

## 2020-08-02 NOTE — PLAN OF CARE
Problem: Falls - Risk of:  Goal: Will remain free from falls  Description: Will remain free from falls  Outcome: Ongoing     Problem: Skin Integrity:  Goal: Will show no infection signs and symptoms  Description: Will show no infection signs and symptoms  Outcome: Ongoing

## 2020-08-03 LAB
REPORT: NORMAL
SARS-COV-2, PCR: NOT DETECTED

## 2020-08-04 ENCOUNTER — TELEPHONE (OUTPATIENT)
Dept: ADMINISTRATIVE | Age: 55
End: 2020-08-04

## 2020-08-04 ENCOUNTER — TELEPHONE (OUTPATIENT)
Dept: CARDIOLOGY CLINIC | Age: 55
End: 2020-08-04

## 2020-08-04 RX ORDER — NITROGLYCERIN 0.4 MG/1
TABLET SUBLINGUAL
Qty: 25 TABLET | Refills: 3 | Status: SHIPPED | OUTPATIENT
Start: 2020-08-04

## 2020-08-04 NOTE — LETTER
[address]  Preoperative Risk Assessment    Melchor Davey  1965    Procedure Details  Procedure: Cataract Sx  Anesthesia: Local  Date:  8/11/20, 8/24/20    Patient Antiplatelet/Anticoagulants  Antiplatelet(s): ASA and Plavix  Indication: PCI of LAD 2/20    Medication Recommendations  ? ASA    Continue perioperatively unless contraindicated  ? Plavix, Effient or Brilinta MAY NOT be held for 5 days prior to procedure    CV Risk Assessment  ? Low - may proceed with procedure  ?  Restart antiplatelets/anticoagulants as soon as possible and when safe from surgical standpoint        ____________________ ___/___/___  Jessica George MD  Date  [facility]  [phone]

## 2020-08-04 NOTE — PROGRESS NOTES
[facility] time___1200_________        Surgery time___1330_________    Take the following medications with a sip of water: Carvedilol     Do not eat or drink anything after 12:00 midnight prior to your surgery. Clear liquids until 0800  This includes water chewing gum, mints and ice chips. You may brush your teeth and gargle the morning of your surgery, but do not swallow the water     Please see your family doctor/pediatrician for a history and physical and/or concerning medications. Bring any test results/reports from your physicians office. If you are under the care of a heart doctor or specialist doctor, please be aware that you may be asked to them for clearance    You may be asked to stop blood thinners such as Coumadin, Plavix, Fragmin, Lovenox, etc., or any anti-inflammatories such as:  Aspirin, Ibuprofen, Advil, Naproxen prior to your surgery. We also ask that you stop any OTC medications such as fish oil, vitamin E, glucosamine, garlic, Multivitamins, COQ 10, etc.    We ask that you do not smoke 24 hours prior to surgery  We ask that you do not  drink any alcoholic beverages 24 hours prior to surgery     You must make arrangements for a responsible adult to take you home after your surgery. For your safety you will not be allowed to leave alone or drive yourself home. Your surgery will be cancelled if you do not have a ride home. Also for your safety, it is strongly suggested that someone stay with you the first 24 hours after your surgery. A parent or legal guardian must accompany a child scheduled for surgery and plan to stay at the hospital until the child is discharged. Please do not bring other children with you. For your comfort, please wear simple loose fitting clothing to the hospital.  Please do not bring valuables. Wear short sleeve button down shirt or loose fitting shirt. Bring eye drops or ointment day of surgery. Do not wear any make-up or nail polish on your fingers or toes      For your safety, please do not wear any jewelry or body piercing's on the day of surgery. All jewelry must be removed. If you have dentures, they will be removed before going to operating room. For your convenience, we will provide you with a container. If you wear contact lenses or glasses, they will be removed, please bring a case for them. If you have a living will and a durable power of  for healthcare, please bring in a copy. As part of our patient safety program to minimize surgical site infections, we ask you to do the following:    · Please notify your surgeon if you develop any illness between         now and the  day of your surgery. · This includes a cough, cold, fever, sore throat, nausea,         or vomiting, and diarrhea, etc.  ·  Please notify your surgeon if you experience dizziness, shortness         of breath or blurred vision between now and the time of your surgery. Do not shave your operative site 96 hours prior to surgery. For face and neck surgery, men may use an electric razor 48 hours   prior to surgery. You may shower the night before surgery or the morning of   your surgery with an antibacterial soap. You will need to bring a photo ID and insurance card    [facility] has an onsite pharmacy, would you like to utilize our pharmacy     If you will be staying overnight and use a C-pap machine, please bring   your C-pap to hospital     Our goal is to provide you with excellent care, therefore, visitors will be limited to two(2) in the room at a time so that we may focus on providing this care for you. Please contact pre-admission testing if you have any further questions. [facility] phone number:  [phone]  Please note these are generalized instructions for all surgical cases, you may be provided with more specific instructions according to your surgery.

## 2020-08-04 NOTE — PROGRESS NOTES
C-Difficile admission screening and protocol:     * Admitted with diarrhea? no     *Prior history of C-Diff. In last 3 months? no     *Antibiotic use in the past 6-8 weeks? no     *Prior hospitalization or nursing home in the last month?     In hospital until 8/1

## 2020-08-04 NOTE — DISCHARGE SUMMARY
[facility]    Patient Demographics    Boston Blake Daily  Date of Birth. 1965  MRN. [id number]     Primary care provider. Tiesha Toscano MD  (Tel: [phone])    Admit date: 8/1/2020    Discharge date (blank if same as Note Date): 8/2/2020  Note Date: 8/4/2020     Reason for Hospitalization. Chief Complaint   Patient presents with    Chest Pain     chest pain since 0400 with sob. denies fever or cough          Significant Findings. Principal Problem:    Acute on chronic respiratory failure with hypoxia (HCC)  Active Problems:    Uncontrolled type 2 diabetes mellitus with hyperglycemia, with long-term current use of insulin (HCC)    Acute decompensated heart failure ([address]    Carboxyhemoglobinemia  Resolved Problems:    * No resolved hospital problems. *       Problems and results from this hospitalization that need follow up. 1. None     Significant test results and incidental findings. 1.     Invasive procedures and treatments. 1. None     Problem-based Hospital Course. 1.  Acute on chronic respiratory failure with hypoxia and elevated carboxyhemoglobin  - Patient placed on high flow O2 at 15 L; serial carboxyhemoglobin measurement  - Combivent MDI 4 times daily  - Decadron 6 mg daily pending COVID-19 testing  -Incentive spirometry scheduled  -Continuous pulse oximetry     2. Acute decompensated CHF  - Patient admitted under heart failure order set  -Continuous telemetry monitoring with strict I's and O's  - Continue Coreg  - Nitropaste 1/2 inch every 6 hours  -Lasix 40 mg IV every 12  - Echo scheduled for a.m.  -Cardiology consult placed     The pt left AMA    Consults. IP CONSULT TO HOSPITALIST  IP CONSULT TO DIETITIAN  IP CONSULT TO CARDIOLOGY    Physical examination on discharge day.    BP (!) 102/59   Pulse 78   Temp 97 °F (36.1 °C) (Temporal)   Resp 14   Ht 5' 2\" (1.575 m)   Wt 120 lb 4.8 oz (54.6 kg)   LMP 11/15/2017   SpO2 92%   BMI 22.00 kg/m²   General appearance. Alert. Looks comfortable. HEENT. Sclera clear. Moist mucus membranes. Cardiovascular. Regular rate and rhythm, normal S1, S2. No murmur. Respiratory. Not using accessory muscles. Clear to auscultation bilaterally, no wheeze. Gastrointestinal. Abdomen soft, non-tender, not distended, normal bowel sounds  Neurology. Facial symmetry. No speech deficits. Moving all extremities equally. Extremities. No edema in lower extremities. Skin. Warm, dry, normal turgor    Condition at time of discharge stable    Medication instructions provided to patient at discharge. Medication List      ASK your doctor about these medications    aspirin 81 MG tablet  Take 1 tablet by mouth daily     atorvastatin 40 MG tablet  Commonly known as:  LIPITOR  Take 1 tablet by mouth daily     blood glucose test strips strip  Commonly known as:  FREESTYLE LITE  1 each by Other route 4 times daily (before meals and nightly) Patient to check blood sugar 4 times a day and PRN, he is treated with multiple daily injections of insulin that require correction dosing and has had hypoglycemia. A1C  14.5  ICD code- E11.65, Z79.4     buprenorphine-naloxone 8-2 MG Subl SL tablet  Commonly known as:  SUBOXONE     carvedilol 3.125 MG tablet  Commonly known as:  COREG  Take 1 tablet by mouth 2 times daily (with meals)     FreeStyle Lancets Misc  1 each by Other route 4 times daily (before meals and nightly) Patient to check blood sugar 4 times a day and PRN, he is treated with multiple daily injections of insulin that require correction dosing and has had hypoglycemia.              A1C  14.5  ICD code- E11.65, Z79.4     HumaLOG KwikPen 100 UNIT/ML Sopn  Generic drug:  insulin lispro (1 Unit Dial)     hydrOXYzine 25 MG tablet  Commonly known as:  ATARAX     LaMICtal 100 MG tablet  Generic drug:  lamoTRIgine     Lantus SoloStar 100 UNIT/ML injection pen  Generic drug:  insulin glargine     mirtazapine 15 MG tablet  Commonly known as:  REMERON     nitroGLYCERIN 0.4 MG SL tablet  Commonly known as:  NITROSTAT  up to max of 3 total doses. If no relief after 1 dose, call 911. pregabalin 100 MG capsule  Commonly known as:  LYRICA     sertraline 50 MG tablet  Commonly known as:  ZOLOFT     ticagrelor 90 MG Tabs tablet  Commonly known as:  BRILINTA  Take 1 tablet by mouth 2 times daily            Discharge recommendations given to patient. Follow Up. PCP in 1 week   Disposition. home  Activity. activity as tolerated  Diet: No diet orders on file      Spent >30 minutes in discharge process.     Signed:  Elvis Mathews MD     8/4/2020 1:54 PM

## 2020-08-04 NOTE — TELEPHONE ENCOUNTER
Medication Refill    Medication needing refilled: nitroGLYCERIN (NITROSTAT) 0.4 MG      Doseage of the medication:    How are you taking this medication (QD, BID, TID, QID, PRN):    30 or 90 day supply called in:     Which Pharmacy are we sending the medication to?: [facility] [phone]

## 2020-08-04 NOTE — TELEPHONE ENCOUNTER
CARDIAC CLEARANCE     What type of procedure are you having? Cataract sx   Which physician is performing your procedure? [facility]    When is your procedure scheduled for? Left 8/11/20  Right 8/24/20  Where are you having this procedure? [place]  Are you taking Blood Thinners? If so what? aspirin (Name/dose/frequesncy)     Does the surgeon want you to stop your blood thinner? If so for how long?     Phone Number and Contact Name for Physicians office:     Fax number to send information: [phone]

## 2020-08-04 NOTE — PROGRESS NOTES
Preoperative Screening for Elective Surgery/Invasive Procedures While COVID-19 present in the community     Have you tested positive or have been told to self-isolate for COVID-19 like symptoms within the past 28 days? no  o Do you currently have any of the following symptoms? No  o Fever >100.0 F or 99.9 F in immunocompromised patients? no  o New onset cough, shortness of breath or difficulty breathing? no  o New onset sore throat, myalgia (muscle aches and pains), headache, loss of taste/smell or diarrhea? no   Have you had a potential exposure to COVID-19 within the past 14 days by:  o Close contact with a confirmed case? no  o Close contact with a healthcare worker,  or essential infrastructure worker (grocery store, restaurant, gas station, public utilities or transportation)? no  o Do you reside in a congregate setting such as; skilled nursing facility, adult home, correctional facility, homeless shelter or other institutional setting? no  o Have you had recent travel to a known COVID-19 hotspot? no    Indicate if the patient has a positive screen by answering yes to one or more of the above questions. Patients who test positive or screen positive prior to surgery or on the day of surgery should be evaluated in conjunction with the surgeon/proceduralist/anesthesiologist to determine the urgency of the procedure.

## 2020-08-05 LAB
BLOOD CULTURE, ROUTINE: ABNORMAL
BLOOD CULTURE, ROUTINE: ABNORMAL
ORGANISM: ABNORMAL

## 2020-08-06 LAB — CULTURE, BLOOD 2: NORMAL

## 2020-08-06 NOTE — PROGRESS NOTES
Received cardiac clearance on the patient from Dr. Alberto Lucas, Dr. Lawyer Middleton reviewed patient's medical history and stats with evaluate patient the day of the surgery.

## 2020-08-10 ENCOUNTER — PREP FOR PROCEDURE (OUTPATIENT)
Dept: OPHTHALMOLOGY | Age: 55
End: 2020-08-10

## 2020-08-10 ENCOUNTER — ANESTHESIA EVENT (OUTPATIENT)
Dept: SURGERY | Age: 55
End: 2020-08-10
Payer: COMMERCIAL

## 2020-08-10 RX ORDER — CYCLOPENTOLATE HYDROCHLORIDE 10 MG/ML
1 SOLUTION/ DROPS OPHTHALMIC SEE ADMIN INSTRUCTIONS
Status: CANCELLED | OUTPATIENT
Start: 2020-08-10

## 2020-08-10 RX ORDER — TETRACAINE HYDROCHLORIDE 5 MG/ML
1 SOLUTION OPHTHALMIC SEE ADMIN INSTRUCTIONS
Status: CANCELLED | OUTPATIENT
Start: 2020-08-10

## 2020-08-10 RX ORDER — SODIUM CHLORIDE 0.9 % (FLUSH) 0.9 %
10 SYRINGE (ML) INJECTION PRN
Status: CANCELLED | OUTPATIENT
Start: 2020-08-10

## 2020-08-10 RX ORDER — SODIUM CHLORIDE 0.9 % (FLUSH) 0.9 %
10 SYRINGE (ML) INJECTION EVERY 12 HOURS SCHEDULED
Status: CANCELLED | OUTPATIENT
Start: 2020-08-10

## 2020-08-10 RX ORDER — PHENYLEPHRINE HYDROCHLORIDE 25 MG/ML
1 SOLUTION/ DROPS OPHTHALMIC SEE ADMIN INSTRUCTIONS
Status: CANCELLED | OUTPATIENT
Start: 2020-08-10

## 2020-08-10 RX ORDER — CIPROFLOXACIN HYDROCHLORIDE 3.5 MG/ML
1 SOLUTION/ DROPS TOPICAL SEE ADMIN INSTRUCTIONS
Status: CANCELLED | OUTPATIENT
Start: 2020-08-10

## 2020-08-10 NOTE — PROGRESS NOTES
[address]        Pre-Op Phone Call:     Patient Name: Christy Butterfield     Telephone Information:   Mobile [phone]     Home phone:  [phone]    Surgery Time:   1250 Time change spoke to [facility] Time:  9903      Left extended Message:  NA     Message left with:     Recent change in health status:  No     Advised of transportation/ policy:  Yes     NPO policy reviewed:  Yes clear liquids until 0800 pt     Advised to take morning heart/blood pressure medications with sips of water morning of surgery? Yes     Instructed to bring eye drops, photo identification, and insurance card day of surgery? Yes     Advised to wear short sleeved button down shirt (no T-shirt underneath):  Yes     Advised not to wear jewelry, hairpins, or pantyhose day of surgery? Yes     Advised not to wear make-up and to wash face day of surgery?   Yes    Remarks:        Electronically signed by:  Kg Magallanes RN at 8/10/2020 11:04 AM

## 2020-08-11 ENCOUNTER — ANESTHESIA (OUTPATIENT)
Dept: SURGERY | Age: 55
End: 2020-08-11
Payer: COMMERCIAL

## 2020-08-11 ENCOUNTER — HOSPITAL ENCOUNTER (OUTPATIENT)
Age: 55
Setting detail: OUTPATIENT SURGERY
Discharge: HOME | End: 2020-08-11
Attending: OPHTHALMOLOGY | Admitting: OPHTHALMOLOGY
Payer: COMMERCIAL

## 2020-08-11 VITALS
HEIGHT: 62 IN | RESPIRATION RATE: 12 BRPM | WEIGHT: 145 LBS | BODY MASS INDEX: 26.68 KG/M2 | DIASTOLIC BLOOD PRESSURE: 75 MMHG | TEMPERATURE: 97.8 F | SYSTOLIC BLOOD PRESSURE: 119 MMHG | HEART RATE: 85 BPM | OXYGEN SATURATION: 90 %

## 2020-08-11 LAB
GLUCOSE BLD-MCNC: >600 MG/DL (ref 70–99)
PERFORMED ON: ABNORMAL

## 2020-08-11 PROCEDURE — 6370000000 HC RX 637 (ALT 250 FOR IP): Performed by: OPHTHALMOLOGY

## 2020-08-11 RX ORDER — SODIUM CHLORIDE 0.9 % (FLUSH) 0.9 %
10 SYRINGE (ML) INJECTION PRN
Status: DISCONTINUED | OUTPATIENT
Start: 2020-08-11 | End: 2020-08-11 | Stop reason: HOSPADM

## 2020-08-11 RX ORDER — CYCLOPENTOLATE HYDROCHLORIDE 10 MG/ML
1 SOLUTION/ DROPS OPHTHALMIC SEE ADMIN INSTRUCTIONS
Status: COMPLETED | OUTPATIENT
Start: 2020-08-11 | End: 2020-08-11

## 2020-08-11 RX ORDER — SODIUM CHLORIDE 0.9 % (FLUSH) 0.9 %
10 SYRINGE (ML) INJECTION EVERY 12 HOURS SCHEDULED
Status: DISCONTINUED | OUTPATIENT
Start: 2020-08-11 | End: 2020-08-11 | Stop reason: HOSPADM

## 2020-08-11 RX ORDER — CIPROFLOXACIN HYDROCHLORIDE 3.5 MG/ML
1 SOLUTION/ DROPS TOPICAL
Status: COMPLETED | OUTPATIENT
Start: 2020-08-11 | End: 2020-08-11

## 2020-08-11 RX ORDER — SODIUM CHLORIDE 0.9 % (FLUSH) 0.9 %
10 SYRINGE (ML) INJECTION PRN
Status: DISCONTINUED | OUTPATIENT
Start: 2020-08-11 | End: 2020-08-11 | Stop reason: SDUPTHER

## 2020-08-11 RX ORDER — SODIUM CHLORIDE 0.9 % (FLUSH) 0.9 %
10 SYRINGE (ML) INJECTION EVERY 12 HOURS SCHEDULED
Status: DISCONTINUED | OUTPATIENT
Start: 2020-08-11 | End: 2020-08-11 | Stop reason: SDUPTHER

## 2020-08-11 RX ORDER — TETRACAINE HYDROCHLORIDE 5 MG/ML
1 SOLUTION OPHTHALMIC SEE ADMIN INSTRUCTIONS
Status: DISCONTINUED | OUTPATIENT
Start: 2020-08-11 | End: 2020-08-11 | Stop reason: HOSPADM

## 2020-08-11 RX ORDER — PHENYLEPHRINE HCL 2.5 %
1 DROPS OPHTHALMIC (EYE) SEE ADMIN INSTRUCTIONS
Status: COMPLETED | OUTPATIENT
Start: 2020-08-11 | End: 2020-08-11

## 2020-08-11 RX ORDER — SODIUM CHLORIDE 9 MG/ML
INJECTION, SOLUTION INTRAVENOUS CONTINUOUS
Status: DISCONTINUED | OUTPATIENT
Start: 2020-08-11 | End: 2020-08-11 | Stop reason: HOSPADM

## 2020-08-11 RX ADMIN — TETRACAINE HYDROCHLORIDE 1 DROP: 5 SOLUTION OPHTHALMIC at 11:55

## 2020-08-11 RX ADMIN — CIPROFLOXACIN 1 DROP: 3 SOLUTION OPHTHALMIC at 12:04

## 2020-08-11 RX ADMIN — CYCLOPENTOLATE HYDROCHLORIDE 1 DROP: 10 SOLUTION/ DROPS OPHTHALMIC at 11:55

## 2020-08-11 RX ADMIN — PHENYLEPHRINE HYDROCHLORIDE 1 DROP: 25 SOLUTION/ DROPS OPHTHALMIC at 12:10

## 2020-08-11 RX ADMIN — CIPROFLOXACIN 1 DROP: 3 SOLUTION OPHTHALMIC at 11:55

## 2020-08-11 RX ADMIN — CYCLOPENTOLATE HYDROCHLORIDE 1 DROP: 10 SOLUTION/ DROPS OPHTHALMIC at 12:04

## 2020-08-11 RX ADMIN — PHENYLEPHRINE HYDROCHLORIDE 1 DROP: 25 SOLUTION/ DROPS OPHTHALMIC at 11:55

## 2020-08-11 RX ADMIN — PHENYLEPHRINE HYDROCHLORIDE 1 DROP: 25 SOLUTION/ DROPS OPHTHALMIC at 12:04

## 2020-08-11 RX ADMIN — CYCLOPENTOLATE HYDROCHLORIDE 1 DROP: 10 SOLUTION/ DROPS OPHTHALMIC at 12:10

## 2020-08-11 ASSESSMENT — LIFESTYLE VARIABLES: SMOKING_STATUS: 1

## 2020-08-11 ASSESSMENT — PAIN - FUNCTIONAL ASSESSMENT: PAIN_FUNCTIONAL_ASSESSMENT: 0-10

## 2020-08-11 ASSESSMENT — ENCOUNTER SYMPTOMS: SHORTNESS OF BREATH: 1

## 2020-08-11 NOTE — ANESTHESIA PRE PROCEDURE
[facility] Department of Anesthesiology  Pre-Anesthesia Evaluation/Consultation       Name:  Sebastian León  : 1965  Age:  60 y.o.                                            MRN:  [id number]  Date: 2020           Surgeon: Surgeon(s):  Paula Marcelino MD    Procedure: Procedure(s):  LEFT EYE Phacoemulsification with intraocular lens implant     Allergies   Allergen Reactions    Darvocet [Propoxyphene N-Acetaminophen] Nausea Only    Naprosyn [Naproxen] Rash    Ultram [Tramadol Hcl] Rash     Patient Active Problem List   Diagnosis    Diabetes mellitus [facility]    Knee pain    Degenerative arthritis of knee    HTN (hypertension)    Other specified anemias    DKA, type 2, not at goal [facility])    Hyperglycemia    Acute pancreatitis    Coronary artery disease involving native coronary artery of native heart without angina pectoris    Diabetes, coma, hyperosmolar (HCC)    Hypernatremia    Acute kidney injury ([facility] [facility]    Abnormal LFTs    Diabetic ketoacidosis without coma associated with type 1 diabetes mellitus (HCC)    Esophagitis    Hypotension    Controlled type 1 diabetes mellitus with hyperglycemia (HCC)    Sialadenitis    Abdominal pain    Adrenal insufficiency (HCC)    Unstable angina (HCC)    Chronic pain syndrome    Constipation    Uncontrolled type 2 diabetes mellitus with hyperglycemia, with long-term current use of insulin (HCC)    Acute on chronic respiratory failure with hypoxia (HCC)    Mucus plugging of bronchi    Atelectasis    Former smoker    DM (diabetes mellitus), secondary uncontrolled ([address]    Thrombocytopenia ([address]    Essential hypertension    Hx of AKA (above knee amputation), right ([address]    Chest pain    Coronary artery disease due to lipid rich plaque    Hypercholesteremia    DKA, type 1, not at goal [facility])    Acute decompensated heart failure ([facility]    Carboxyhemoglobinemia     Past Medical History:   Diagnosis Date    Arthritis     CH[facility] ([facility]estive heart failure) [facility] [facility]     Depression     Diabetes mellitus ([facility] [facility]     Heart attack ([facility] [facility]     MI 2019, and 6 months ago    Hepatitis C     Dr Joaquín Ryan follows; contracted from ex- (drug user)    Hypercholesteremia 3/11/2020    Hypertension     MRSA (methicillin resistant Staphylococcus aureus) infection in     was in urine and nares    Pneumonia     S/P colonoscopy with polypectomy     one polyp, Dr Joaquín Ryan. Recall 3 years. Past Surgical History:   Procedure Laterality Date    ABOVE KNEE AMPUTATION      AMPUTATION      right bka    CORONARY ANGIOPLASTY WITH STENT PLACEMENT  2019    JOINT REPLACEMENT  ,     right knee; Dr Kim Mercado.  KNEE ARTHROSCOPY      ridht knee X6    LIVER BIOPSY N/A 2018    LIVER BIOPSY LAPAROSCOPIC performed by Arsh Vasquez MD at [address] [place] [address] N/A 2018    LAPAROSCOPIC CHOLECYSTECTOMY WITH INTRAOPERATIVE CHOLANGIOGRAM performed by Arsh Vasquez MD at [address] [place] [address]  11    right    UPPER GASTROINTESTINAL ENDOSCOPY  2018    UPPER GASTROINTESTINAL ENDOSCOPY N/A 2018    EGD BIOPSY performed by Cedrick Velazquez MD at [address] [place] [address] History     Tobacco Use    Smoking status: Former Smoker     Packs/day: 1.00     Years: 43.00     Pack years: 43.00     Last attempt to quit: 2020     Years since quittin.1    Smokeless tobacco: Never Used   Substance Use Topics    Alcohol use: No    Drug use: Not Currently     Types: Opiates      Comment: opiate/THC     Medications  No current facility-administered medications on file prior to encounter. Current Outpatient Medications on File Prior to Encounter   Medication Sig Dispense Refill    nitroGLYCERIN (NITROSTAT) 0.4 MG SL tablet up to max of 3 total doses.  If no relief after 1 dose, call 911. 25 tablet 3    sertraline (ZOLOFT) 50 MG tablet Take 50 mg by mouth daily Indications: Depression      02/14/2012    AGRATIO 0.9 08/02/2020    LABGLOM >60 08/02/2020    LABGLOM 131.7 05/20/2011    GLUCOSE 235 08/02/2020    GLUCOSE 329 05/20/2011    PROT 7.6 08/02/2020    PROT 7.6 02/14/2012    CALCIUM 9.2 08/02/2020    BILITOT <0.2 08/02/2020    ALKPHOS 118 08/02/2020    AST 22 08/02/2020    ALT 15 08/02/2020     BMP    Lab Results   Component Value Date     08/02/2020    K 3.6 08/02/2020    CL 97 08/02/2020    CO2 31 08/02/2020    BUN 11 08/02/2020    CREATININE <0.5 08/02/2020    CALCIUM 9.2 08/02/2020    GFRAA >60 08/02/2020    GFRAA >60 02/14/2012    LABGLOM >60 08/02/2020    LABGLOM 131.7 05/20/2011    GLUCOSE 235 08/02/2020    GLUCOSE 329 05/20/2011     POCGlucose  No results for input(s): GLUCOSE in the last 72 hours. Coags    Lab Results   Component Value Date    PROTIME 11.4 08/02/2020    INR 0.98 08/02/2020    APTT 22.6 06/42/8853     HCG (If Applicable)   Lab Results   Component Value Date    PREGTESTUR Neg 02/14/2012      ABGs   Lab Results   Component Value Date    PHART 7.371 08/02/2020    PO2ART 99.3 08/02/2020    MOK9ZOJ 55.2 08/02/2020    UQD1UHW 31.9 08/02/2020    BEART 5.1 08/02/2020    I4KLGLCS 98.6 08/02/2020      Type & Screen (If Applicable)  Lab Results   Component Value Date    LABABO B 01/27/2011    LABRH Positive 01/27/2011                            BMI: Wt Readings from Last 3 Encounters:       NPO Status:  >8h                          Anesthesia Evaluation  Patient summary reviewed no history of anesthetic complications:   Airway: Mallampati: II  TM distance: >3 FB   Neck ROM: full  Mouth opening: > = 3 FB Dental:    (+) edentulous, upper dentures and lower dentures      Pulmonary:   (+) COPD:  shortness of breath:  decreased breath sounds,  wheezes,  current smoker          Patient smoked on day of surgery.                  Cardiovascular:  Exercise tolerance: poor (<4 METS),   (+) hypertension:, angina:, past MI:, CAD: obstructive, CABG/stent:, CHF:, hyperlipidemia      ECG reviewed    Rate: normal  Echocardiogram reviewed         Beta Blocker:  Dose within 24 Hrs      ROS comment: Height: 70 inches Weight: 134 pounds BSA: 1.76 m2 BMI: 19.23 kg/m2     HR: 84 bpm BP: 143/98 mmHg      Conclusions      Summary     Left ventricle - normal in size and thickness, reduced function with EF of   40%, anteroapical hypokinesis     Mitral valve - trivial regurgitation     Tricuspid valve - trivial regurgitation      Signature      ------------------------------------------------------------------   Electronically signed by Nguyễn Koenig MD (Interpreting   physician) on 07/02/2019 at 02:00 PM   ------------------------------------------------------------------     Neuro/Psych:   (+) psychiatric history:depression/anxiety    (-) seizures, TIA and CVA           GI/Hepatic/Renal:   (+) hepatitis: C, liver disease:,           Endo/Other:    (+) DiabetesType II DM, poorly controlled, , .                 Abdominal:           Vascular:     - DVT and PE. Anesthesia Plan      MAC     ASA 4     (Case cancelled for glucose>600 and ASA 4. I strongly recommended patient go to ED for treatment but patient states she will be going home and will administer herself insulin.)  Induction: intravenous. Anesthetic plan and risks discussed with patient. Plan discussed with CRNA. This pre-anesthesia assessment may be used as a history and physical.    DOS STAFF ADDENDUM:    Pt seen and examined, chart reviewed (including anesthesia, drug and allergy history). No interval changes to history and physical examination. Anesthetic plan, risks, benefits, alternatives, and personnel involved discussed with patient. Patient verbalized an understanding and agrees to proceed.       Manisha Nunez MD  August 11, 2020  11:50 AM

## 2020-08-11 NOTE — H&P
Date of Surgery Update:  Lisbeth Lewis was seen, history and physical examination reviewed, and patient examined by me today. There have been no significant clinical changes since the completion of the previous history and physical.    The risk, benefits, and alternatives of the proposed procedure have been explained to the patient (or appropriate guardian) and understanding verbalized. All questions answered. Patient wishes to proceed.     Electronically signed by: Estuardo Cross MD,8/11/2020,12:03 PM

## 2020-08-21 ENCOUNTER — HOSPITAL ENCOUNTER (INPATIENT)
Age: 55
LOS: 8 days | Discharge: HOME | DRG: 720 | End: 2020-08-29
Attending: EMERGENCY MEDICINE | Admitting: FAMILY MEDICINE
Payer: COMMERCIAL

## 2020-08-21 ENCOUNTER — APPOINTMENT (OUTPATIENT)
Dept: CT IMAGING | Age: 55
DRG: 720 | End: 2020-08-21
Payer: COMMERCIAL

## 2020-08-21 ENCOUNTER — APPOINTMENT (OUTPATIENT)
Dept: GENERAL RADIOLOGY | Age: 55
DRG: 720 | End: 2020-08-21
Payer: COMMERCIAL

## 2020-08-21 PROBLEM — J96.20 ACUTE ON CHRONIC RESPIRATORY FAILURE (HCC): Status: ACTIVE | Noted: 2020-08-21

## 2020-08-21 LAB
A/G RATIO: 0.5 (ref 1.1–2.2)
ALBUMIN SERPL-MCNC: 2.5 G/DL (ref 3.4–5)
ALP BLD-CCNC: 149 U/L (ref 40–129)
ALT SERPL-CCNC: 14 U/L (ref 10–40)
ANION GAP SERPL CALCULATED.3IONS-SCNC: 15 MMOL/L (ref 3–16)
AST SERPL-CCNC: 39 U/L (ref 15–37)
BACTERIA: ABNORMAL /HPF
BASE EXCESS ARTERIAL: -0.1 MMOL/L (ref -3–3)
BASOPHILS ABSOLUTE: 0.1 K/UL (ref 0–0.2)
BASOPHILS RELATIVE PERCENT: 0.4 %
BILIRUB SERPL-MCNC: 0.7 MG/DL (ref 0–1)
BILIRUBIN URINE: ABNORMAL
BLOOD, URINE: ABNORMAL
BUN BLDV-MCNC: 26 MG/DL (ref 7–20)
CALCIUM SERPL-MCNC: 8.4 MG/DL (ref 8.3–10.6)
CARBOXYHEMOGLOBIN ARTERIAL: 3.4 % (ref 0–1.5)
CHLORIDE BLD-SCNC: 96 MMOL/L (ref 99–110)
CLARITY: CLEAR
CO2: 19 MMOL/L (ref 21–32)
COLOR: ABNORMAL
CREAT SERPL-MCNC: 0.7 MG/DL (ref 0.6–1.1)
D DIMER: 1567 NG/ML DDU (ref 0–229)
EKG ATRIAL RATE: 106 BPM
EKG DIAGNOSIS: NORMAL
EKG P AXIS: 50 DEGREES
EKG P-R INTERVAL: 150 MS
EKG Q-T INTERVAL: 364 MS
EKG QRS DURATION: 82 MS
EKG QTC CALCULATION (BAZETT): 483 MS
EKG R AXIS: -85 DEGREES
EKG T AXIS: 71 DEGREES
EKG VENTRICULAR RATE: 106 BPM
EOSINOPHILS ABSOLUTE: 0 K/UL (ref 0–0.6)
EOSINOPHILS RELATIVE PERCENT: 0 %
EPITHELIAL CELLS, UA: 12 /HPF (ref 0–5)
FERRITIN: 193.4 NG/ML (ref 15–150)
FIBRINOGEN: 869 MG/DL (ref 200–397)
GFR AFRICAN AMERICAN: >60
GFR NON-AFRICAN AMERICAN: >60
GLOBULIN: 5.1 G/DL
GLUCOSE BLD-MCNC: 275 MG/DL (ref 70–99)
GLUCOSE BLD-MCNC: 375 MG/DL (ref 70–99)
GLUCOSE BLD-MCNC: 484 MG/DL (ref 70–99)
GLUCOSE BLD-MCNC: 511 MG/DL (ref 70–99)
GLUCOSE BLD-MCNC: 568 MG/DL (ref 70–99)
GLUCOSE BLD-MCNC: 572 MG/DL (ref 70–99)
GLUCOSE URINE: >=1000 MG/DL
HCO3 ARTERIAL: 23.6 MMOL/L (ref 21–29)
HCT VFR BLD CALC: 39.6 % (ref 36–48)
HEMOGLOBIN, ART, EXTENDED: 14 G/DL (ref 12–16)
HEMOGLOBIN: 12.9 G/DL (ref 12–16)
HYALINE CASTS: 5 /LPF (ref 0–8)
KETONES, URINE: 15 MG/DL
LACTATE DEHYDROGENASE: 528 U/L (ref 100–190)
LACTIC ACID, SEPSIS: 4.6 MMOL/L (ref 0.4–1.9)
LACTIC ACID: 4.5 MMOL/L (ref 0.4–2)
LEUKOCYTE ESTERASE, URINE: NEGATIVE
LIPASE: 6 U/L (ref 13–60)
LYMPHOCYTES ABSOLUTE: 1.8 K/UL (ref 1–5.1)
LYMPHOCYTES RELATIVE PERCENT: 10.4 %
MCH RBC QN AUTO: 29.5 PG (ref 26–34)
MCHC RBC AUTO-ENTMCNC: 32.5 G/DL (ref 31–36)
MCV RBC AUTO: 90.7 FL (ref 80–100)
METHEMOGLOBIN ARTERIAL: 0 %
MICROSCOPIC EXAMINATION: YES
MONOCYTES ABSOLUTE: 0.7 K/UL (ref 0–1.3)
MONOCYTES RELATIVE PERCENT: 4.4 %
NEUTROPHILS ABSOLUTE: 14.4 K/UL (ref 1.7–7.7)
NEUTROPHILS RELATIVE PERCENT: 84.8 %
NITRITE, URINE: NEGATIVE
O2 CONTENT ARTERIAL: 19 ML/DL
O2 SAT, ARTERIAL: 97.3 %
O2 THERAPY: ABNORMAL
PCO2 ARTERIAL: 34.8 MMHG (ref 35–45)
PDW BLD-RTO: 13.2 % (ref 12.4–15.4)
PERFORMED ON: ABNORMAL
PH ARTERIAL: 7.44 (ref 7.35–7.45)
PH UA: 5.5 (ref 5–8)
PLATELET # BLD: 226 K/UL (ref 135–450)
PMV BLD AUTO: 10.9 FL (ref 5–10.5)
PO2 ARTERIAL: 85.5 MMHG (ref 75–108)
POTASSIUM REFLEX MAGNESIUM: 4.1 MMOL/L (ref 3.5–5.1)
PRO-BNP: 9495 PG/ML (ref 0–124)
PROCALCITONIN: 0.37 NG/ML (ref 0–0.15)
PROTEIN UA: 100 MG/DL
RBC # BLD: 4.37 M/UL (ref 4–5.2)
RBC UA: 9 /HPF (ref 0–4)
REASON FOR REJECTION: NORMAL
REJECTED TEST: NORMAL
SARS-COV-2, NAAT: NOT DETECTED
SARS-COV-2, PCR: NOT DETECTED
SODIUM BLD-SCNC: 130 MMOL/L (ref 136–145)
SPECIFIC GRAVITY UA: >1.03 (ref 1–1.03)
TCO2 ARTERIAL: 55.2 MMOL/L
TOTAL CK: 25 U/L (ref 26–192)
TOTAL PROTEIN: 7.6 G/DL (ref 6.4–8.2)
TROPONIN: <0.01 NG/ML
URINE REFLEX TO CULTURE: ABNORMAL
URINE TYPE: ABNORMAL
UROBILINOGEN, URINE: 4 E.U./DL
WBC # BLD: 17 K/UL (ref 4–11)
WBC UA: 6 /HPF (ref 0–5)

## 2020-08-21 PROCEDURE — 82550 ASSAY OF CK (CPK): CPT

## 2020-08-21 PROCEDURE — 06HY33Z INSERTION OF INFUSION DEVICE INTO LOWER VEIN, PERCUTANEOUS APPROACH: ICD-10-PCS | Performed by: INTERNAL MEDICINE

## 2020-08-21 PROCEDURE — 94761 N-INVAS EAR/PLS OXIMETRY MLT: CPT

## 2020-08-21 PROCEDURE — 6360000002 HC RX W HCPCS: Performed by: FAMILY MEDICINE

## 2020-08-21 PROCEDURE — 94640 AIRWAY INHALATION TREATMENT: CPT

## 2020-08-21 PROCEDURE — 71260 CT THORAX DX C+: CPT

## 2020-08-21 PROCEDURE — 6370000000 HC RX 637 (ALT 250 FOR IP): Performed by: PHYSICIAN ASSISTANT

## 2020-08-21 PROCEDURE — 1200000000 HC SEMI PRIVATE

## 2020-08-21 PROCEDURE — 6370000000 HC RX 637 (ALT 250 FOR IP): Performed by: EMERGENCY MEDICINE

## 2020-08-21 PROCEDURE — U0002 COVID-19 LAB TEST NON-CDC: HCPCS

## 2020-08-21 PROCEDURE — 83605 ASSAY OF LACTIC ACID: CPT

## 2020-08-21 PROCEDURE — 96365 THER/PROPH/DIAG IV INF INIT: CPT

## 2020-08-21 PROCEDURE — 2580000003 HC RX 258: Performed by: INTERNAL MEDICINE

## 2020-08-21 PROCEDURE — 96375 TX/PRO/DX INJ NEW DRUG ADDON: CPT

## 2020-08-21 PROCEDURE — 2580000003 HC RX 258: Performed by: PHYSICIAN ASSISTANT

## 2020-08-21 PROCEDURE — 87040 BLOOD CULTURE FOR BACTERIA: CPT

## 2020-08-21 PROCEDURE — 96367 TX/PROPH/DG ADDL SEQ IV INF: CPT

## 2020-08-21 PROCEDURE — 84484 ASSAY OF TROPONIN QUANT: CPT

## 2020-08-21 PROCEDURE — 99285 EMERGENCY DEPT VISIT HI MDM: CPT

## 2020-08-21 PROCEDURE — 83036 HEMOGLOBIN GLYCOSYLATED A1C: CPT

## 2020-08-21 PROCEDURE — 85379 FIBRIN DEGRADATION QUANT: CPT

## 2020-08-21 PROCEDURE — 6360000002 HC RX W HCPCS: Performed by: PHYSICIAN ASSISTANT

## 2020-08-21 PROCEDURE — 93005 ELECTROCARDIOGRAM TRACING: CPT | Performed by: EMERGENCY MEDICINE

## 2020-08-21 PROCEDURE — 6360000004 HC RX CONTRAST MEDICATION: Performed by: EMERGENCY MEDICINE

## 2020-08-21 PROCEDURE — 82803 BLOOD GASES ANY COMBINATION: CPT

## 2020-08-21 PROCEDURE — 71045 X-RAY EXAM CHEST 1 VIEW: CPT

## 2020-08-21 PROCEDURE — 6370000000 HC RX 637 (ALT 250 FOR IP): Performed by: FAMILY MEDICINE

## 2020-08-21 PROCEDURE — 36556 INSERT NON-TUNNEL CV CATH: CPT | Performed by: INTERNAL MEDICINE

## 2020-08-21 PROCEDURE — 36600 WITHDRAWAL OF ARTERIAL BLOOD: CPT

## 2020-08-21 PROCEDURE — 36556 INSERT NON-TUNNEL CV CATH: CPT

## 2020-08-21 PROCEDURE — 6360000002 HC RX W HCPCS: Performed by: INTERNAL MEDICINE

## 2020-08-21 PROCEDURE — 36415 COLL VENOUS BLD VENIPUNCTURE: CPT

## 2020-08-21 PROCEDURE — 6360000002 HC RX W HCPCS

## 2020-08-21 PROCEDURE — 6370000000 HC RX 637 (ALT 250 FOR IP): Performed by: INTERNAL MEDICINE

## 2020-08-21 PROCEDURE — 87449 NOS EACH ORGANISM AG IA: CPT

## 2020-08-21 PROCEDURE — 83880 ASSAY OF NATRIURETIC PEPTIDE: CPT

## 2020-08-21 PROCEDURE — 85384 FIBRINOGEN ACTIVITY: CPT

## 2020-08-21 PROCEDURE — 83615 LACTATE (LD) (LDH) ENZYME: CPT

## 2020-08-21 PROCEDURE — 80053 COMPREHEN METABOLIC PANEL: CPT

## 2020-08-21 PROCEDURE — 81001 URINALYSIS AUTO W/SCOPE: CPT

## 2020-08-21 PROCEDURE — 85025 COMPLETE CBC W/AUTO DIFF WBC: CPT

## 2020-08-21 PROCEDURE — 82728 ASSAY OF FERRITIN: CPT

## 2020-08-21 PROCEDURE — 74177 CT ABD & PELVIS W/CONTRAST: CPT

## 2020-08-21 PROCEDURE — 93010 ELECTROCARDIOGRAM REPORT: CPT | Performed by: INTERNAL MEDICINE

## 2020-08-21 PROCEDURE — U0003 INFECTIOUS AGENT DETECTION BY NUCLEIC ACID (DNA OR RNA); SEVERE ACUTE RESPIRATORY SYNDROME CORONAVIRUS 2 (SARS-COV-2) (CORONAVIRUS DISEASE [COVID-19]), AMPLIFIED PROBE TECHNIQUE, MAKING USE OF HIGH THROUGHPUT TECHNOLOGIES AS DESCRIBED BY CMS-2020-01-R: HCPCS

## 2020-08-21 PROCEDURE — 84145 PROCALCITONIN (PCT): CPT

## 2020-08-21 PROCEDURE — 99255 IP/OBS CONSLTJ NEW/EST HI 80: CPT | Performed by: INTERNAL MEDICINE

## 2020-08-21 PROCEDURE — 83690 ASSAY OF LIPASE: CPT

## 2020-08-21 PROCEDURE — 2700000000 HC OXYGEN THERAPY PER DAY

## 2020-08-21 RX ORDER — LORAZEPAM 2 MG/ML
2 INJECTION INTRAMUSCULAR
Status: DISCONTINUED | OUTPATIENT
Start: 2020-08-21 | End: 2020-08-29 | Stop reason: HOSPADM

## 2020-08-21 RX ORDER — INSULIN LISPRO 100 [IU]/ML
10 INJECTION, SOLUTION INTRAVENOUS; SUBCUTANEOUS
Status: CANCELLED | OUTPATIENT
Start: 2020-08-21

## 2020-08-21 RX ORDER — ACETAMINOPHEN 650 MG/1
650 SUPPOSITORY RECTAL EVERY 6 HOURS PRN
Status: DISCONTINUED | OUTPATIENT
Start: 2020-08-21 | End: 2020-08-29 | Stop reason: HOSPADM

## 2020-08-21 RX ORDER — BUPRENORPHINE HYDROCHLORIDE AND NALOXONE HYDROCHLORIDE DIHYDRATE 8; 2 MG/1; MG/1
1 TABLET SUBLINGUAL 2 TIMES DAILY
Status: DISCONTINUED | OUTPATIENT
Start: 2020-08-21 | End: 2020-08-21 | Stop reason: RX

## 2020-08-21 RX ORDER — DEXTROSE MONOHYDRATE 25 G/50ML
12.5 INJECTION, SOLUTION INTRAVENOUS PRN
Status: DISCONTINUED | OUTPATIENT
Start: 2020-08-21 | End: 2020-08-21 | Stop reason: SDUPTHER

## 2020-08-21 RX ORDER — FUROSEMIDE 10 MG/ML
80 INJECTION INTRAMUSCULAR; INTRAVENOUS ONCE
Status: COMPLETED | OUTPATIENT
Start: 2020-08-21 | End: 2020-08-21

## 2020-08-21 RX ORDER — SODIUM CHLORIDE 0.9 % (FLUSH) 0.9 %
10 SYRINGE (ML) INJECTION PRN
Status: DISCONTINUED | OUTPATIENT
Start: 2020-08-21 | End: 2020-08-29 | Stop reason: HOSPADM

## 2020-08-21 RX ORDER — POLYETHYLENE GLYCOL 3350 17 G/17G
17 POWDER, FOR SOLUTION ORAL DAILY PRN
Status: DISCONTINUED | OUTPATIENT
Start: 2020-08-21 | End: 2020-08-29 | Stop reason: HOSPADM

## 2020-08-21 RX ORDER — PREGABALIN 25 MG/1
CAPSULE ORAL
Status: DISPENSED
Start: 2020-08-21 | End: 2020-08-22

## 2020-08-21 RX ORDER — NICOTINE POLACRILEX 4 MG
15 LOZENGE BUCCAL PRN
Status: DISCONTINUED | OUTPATIENT
Start: 2020-08-21 | End: 2020-08-29 | Stop reason: HOSPADM

## 2020-08-21 RX ORDER — LORAZEPAM 2 MG/ML
INJECTION INTRAMUSCULAR
Status: COMPLETED
Start: 2020-08-21 | End: 2020-08-21

## 2020-08-21 RX ORDER — ASPIRIN 81 MG/1
81 TABLET, CHEWABLE ORAL DAILY
Status: DISCONTINUED | OUTPATIENT
Start: 2020-08-22 | End: 2020-08-29 | Stop reason: HOSPADM

## 2020-08-21 RX ORDER — FUROSEMIDE 10 MG/ML
INJECTION INTRAMUSCULAR; INTRAVENOUS
Status: DISCONTINUED
Start: 2020-08-21 | End: 2020-08-21 | Stop reason: WASHOUT

## 2020-08-21 RX ORDER — INSULIN LISPRO 100 [IU]/ML
0-12 INJECTION, SOLUTION INTRAVENOUS; SUBCUTANEOUS
Status: DISCONTINUED | OUTPATIENT
Start: 2020-08-21 | End: 2020-08-21

## 2020-08-21 RX ORDER — ONDANSETRON 2 MG/ML
4 INJECTION INTRAMUSCULAR; INTRAVENOUS EVERY 6 HOURS PRN
Status: DISCONTINUED | OUTPATIENT
Start: 2020-08-21 | End: 2020-08-29 | Stop reason: HOSPADM

## 2020-08-21 RX ORDER — DEXTROSE MONOHYDRATE 50 MG/ML
100 INJECTION, SOLUTION INTRAVENOUS PRN
Status: DISCONTINUED | OUTPATIENT
Start: 2020-08-21 | End: 2020-08-21 | Stop reason: SDUPTHER

## 2020-08-21 RX ORDER — ACETAMINOPHEN 500 MG
1000 TABLET ORAL ONCE
Status: COMPLETED | OUTPATIENT
Start: 2020-08-21 | End: 2020-08-21

## 2020-08-21 RX ORDER — NITROGLYCERIN 0.4 MG/1
0.4 TABLET SUBLINGUAL EVERY 5 MIN PRN
Status: DISCONTINUED | OUTPATIENT
Start: 2020-08-21 | End: 2020-08-29 | Stop reason: HOSPADM

## 2020-08-21 RX ORDER — DEXAMETHASONE SODIUM PHOSPHATE 10 MG/ML
10 INJECTION, SOLUTION INTRAMUSCULAR; INTRAVENOUS ONCE
Status: COMPLETED | OUTPATIENT
Start: 2020-08-21 | End: 2020-08-21

## 2020-08-21 RX ORDER — ASPIRIN 81 MG/1
324 TABLET, CHEWABLE ORAL ONCE
Status: COMPLETED | OUTPATIENT
Start: 2020-08-21 | End: 2020-08-21

## 2020-08-21 RX ORDER — INSULIN LISPRO 100 [IU]/ML
15 INJECTION, SOLUTION INTRAVENOUS; SUBCUTANEOUS ONCE
Status: DISCONTINUED | OUTPATIENT
Start: 2020-08-21 | End: 2020-08-21

## 2020-08-21 RX ORDER — IPRATROPIUM BROMIDE AND ALBUTEROL SULFATE 2.5; .5 MG/3ML; MG/3ML
1 SOLUTION RESPIRATORY (INHALATION) ONCE
Status: COMPLETED | OUTPATIENT
Start: 2020-08-21 | End: 2020-08-21

## 2020-08-21 RX ORDER — DEXTROSE AND SODIUM CHLORIDE 5; .9 G/100ML; G/100ML
INJECTION, SOLUTION INTRAVENOUS CONTINUOUS
Status: DISCONTINUED | OUTPATIENT
Start: 2020-08-21 | End: 2020-08-22

## 2020-08-21 RX ORDER — BUPRENORPHINE AND NALOXONE 8; 2 MG/1; MG/1
1 FILM, SOLUBLE BUCCAL; SUBLINGUAL 2 TIMES DAILY
Status: DISCONTINUED | OUTPATIENT
Start: 2020-08-21 | End: 2020-08-22

## 2020-08-21 RX ORDER — NICOTINE POLACRILEX 4 MG
15 LOZENGE BUCCAL PRN
Status: DISCONTINUED | OUTPATIENT
Start: 2020-08-21 | End: 2020-08-21 | Stop reason: SDUPTHER

## 2020-08-21 RX ORDER — DEXTROSE MONOHYDRATE 50 MG/ML
100 INJECTION, SOLUTION INTRAVENOUS PRN
Status: DISCONTINUED | OUTPATIENT
Start: 2020-08-21 | End: 2020-08-29 | Stop reason: HOSPADM

## 2020-08-21 RX ORDER — ACETAMINOPHEN 325 MG/1
650 TABLET ORAL EVERY 6 HOURS PRN
Status: DISCONTINUED | OUTPATIENT
Start: 2020-08-21 | End: 2020-08-29 | Stop reason: HOSPADM

## 2020-08-21 RX ORDER — INSULIN LISPRO 100 [IU]/ML
0-6 INJECTION, SOLUTION INTRAVENOUS; SUBCUTANEOUS NIGHTLY
Status: DISCONTINUED | OUTPATIENT
Start: 2020-08-21 | End: 2020-08-21

## 2020-08-21 RX ORDER — CARVEDILOL 3.12 MG/1
3.12 TABLET ORAL 2 TIMES DAILY WITH MEALS
Status: DISCONTINUED | OUTPATIENT
Start: 2020-08-21 | End: 2020-08-29 | Stop reason: HOSPADM

## 2020-08-21 RX ORDER — HYDROXYZINE HYDROCHLORIDE 25 MG/1
25 TABLET, FILM COATED ORAL 3 TIMES DAILY PRN
Status: DISCONTINUED | OUTPATIENT
Start: 2020-08-21 | End: 2020-08-29 | Stop reason: HOSPADM

## 2020-08-21 RX ORDER — PREGABALIN 25 MG/1
100 CAPSULE ORAL 2 TIMES DAILY
Status: DISCONTINUED | OUTPATIENT
Start: 2020-08-21 | End: 2020-08-29 | Stop reason: HOSPADM

## 2020-08-21 RX ORDER — FUROSEMIDE 10 MG/ML
40 INJECTION INTRAMUSCULAR; INTRAVENOUS 2 TIMES DAILY
Status: DISCONTINUED | OUTPATIENT
Start: 2020-08-22 | End: 2020-08-26

## 2020-08-21 RX ORDER — INSULIN LISPRO 100 [IU]/ML
10 INJECTION, SOLUTION INTRAVENOUS; SUBCUTANEOUS
Status: DISCONTINUED | OUTPATIENT
Start: 2020-08-22 | End: 2020-08-21

## 2020-08-21 RX ORDER — FUROSEMIDE 10 MG/ML
80 INJECTION INTRAMUSCULAR; INTRAVENOUS ONCE
Status: DISCONTINUED | OUTPATIENT
Start: 2020-08-22 | End: 2020-08-21

## 2020-08-21 RX ORDER — ASPIRIN 81 MG/1
81 TABLET, CHEWABLE ORAL DAILY
Status: DISCONTINUED | OUTPATIENT
Start: 2020-08-21 | End: 2020-08-21

## 2020-08-21 RX ORDER — DEXTROSE MONOHYDRATE 25 G/50ML
12.5 INJECTION, SOLUTION INTRAVENOUS PRN
Status: DISCONTINUED | OUTPATIENT
Start: 2020-08-21 | End: 2020-08-23

## 2020-08-21 RX ORDER — SODIUM CHLORIDE 0.9 % (FLUSH) 0.9 %
10 SYRINGE (ML) INJECTION EVERY 12 HOURS SCHEDULED
Status: DISCONTINUED | OUTPATIENT
Start: 2020-08-21 | End: 2020-08-29 | Stop reason: HOSPADM

## 2020-08-21 RX ORDER — ATORVASTATIN CALCIUM 80 MG/1
40 TABLET, FILM COATED ORAL DAILY
Status: DISCONTINUED | OUTPATIENT
Start: 2020-08-21 | End: 2020-08-29 | Stop reason: HOSPADM

## 2020-08-21 RX ORDER — LAMOTRIGINE 100 MG/1
100 TABLET ORAL DAILY
Status: DISCONTINUED | OUTPATIENT
Start: 2020-08-21 | End: 2020-08-29 | Stop reason: HOSPADM

## 2020-08-21 RX ORDER — PROMETHAZINE HYDROCHLORIDE 25 MG/1
12.5 TABLET ORAL EVERY 6 HOURS PRN
Status: DISCONTINUED | OUTPATIENT
Start: 2020-08-21 | End: 2020-08-29 | Stop reason: HOSPADM

## 2020-08-21 RX ADMIN — DEXAMETHASONE SODIUM PHOSPHATE 10 MG: 10 INJECTION, SOLUTION INTRAMUSCULAR; INTRAVENOUS at 13:25

## 2020-08-21 RX ADMIN — SODIUM CHLORIDE 200 MG: 9 INJECTION, SOLUTION INTRAVENOUS at 20:44

## 2020-08-21 RX ADMIN — IPRATROPIUM BROMIDE AND ALBUTEROL SULFATE 1 AMPULE: .5; 3 SOLUTION RESPIRATORY (INHALATION) at 12:36

## 2020-08-21 RX ADMIN — VANCOMYCIN HYDROCHLORIDE 1000 MG: 1 INJECTION, POWDER, LYOPHILIZED, FOR SOLUTION INTRAVENOUS at 13:27

## 2020-08-21 RX ADMIN — INSULIN GLARGINE 15 UNITS: 100 INJECTION, SOLUTION SUBCUTANEOUS at 18:46

## 2020-08-21 RX ADMIN — INSULIN LISPRO 15 UNITS: 100 INJECTION, SOLUTION INTRAVENOUS; SUBCUTANEOUS at 18:43

## 2020-08-21 RX ADMIN — CARVEDILOL 3.12 MG: 3.12 TABLET, FILM COATED ORAL at 18:32

## 2020-08-21 RX ADMIN — BUPRENORPHINE AND NALOXONE 1 FILM: 8; 2 FILM BUCCAL; SUBLINGUAL at 21:48

## 2020-08-21 RX ADMIN — INSULIN LISPRO 12 UNITS: 100 INJECTION, SOLUTION INTRAVENOUS; SUBCUTANEOUS at 18:43

## 2020-08-21 RX ADMIN — ENOXAPARIN SODIUM 40 MG: 40 INJECTION SUBCUTANEOUS at 18:33

## 2020-08-21 RX ADMIN — PREGABALIN 100 MG: 25 CAPSULE ORAL at 21:26

## 2020-08-21 RX ADMIN — ALBUTEROL SULFATE 5 MG: 2.5 SOLUTION RESPIRATORY (INHALATION) at 12:36

## 2020-08-21 RX ADMIN — FUROSEMIDE 80 MG: 10 INJECTION, SOLUTION INTRAMUSCULAR; INTRAVENOUS at 16:42

## 2020-08-21 RX ADMIN — CEFEPIME HYDROCHLORIDE 2 G: 2 INJECTION, POWDER, FOR SOLUTION INTRAVENOUS at 13:22

## 2020-08-21 RX ADMIN — DEXTROSE AND SODIUM CHLORIDE: 5; 900 INJECTION, SOLUTION INTRAVENOUS at 19:00

## 2020-08-21 RX ADMIN — DEXAMETHASONE SODIUM PHOSPHATE 10 MG: 10 INJECTION, SOLUTION INTRAMUSCULAR; INTRAVENOUS at 18:35

## 2020-08-21 RX ADMIN — LORAZEPAM 2 MG: 2 INJECTION INTRAMUSCULAR; INTRAVENOUS at 23:43

## 2020-08-21 RX ADMIN — SODIUM CHLORIDE 15.24 UNITS/HR: 9 INJECTION, SOLUTION INTRAVENOUS at 20:44

## 2020-08-21 RX ADMIN — TICAGRELOR 90 MG: 90 TABLET ORAL at 21:26

## 2020-08-21 RX ADMIN — ASPIRIN 324 MG: 81 TABLET, CHEWABLE ORAL at 16:43

## 2020-08-21 RX ADMIN — IOPAMIDOL 75 ML: 755 INJECTION, SOLUTION INTRAVENOUS at 14:54

## 2020-08-21 RX ADMIN — ACETAMINOPHEN 1000 MG: 500 TABLET, FILM COATED ORAL at 13:26

## 2020-08-21 ASSESSMENT — ENCOUNTER SYMPTOMS
ABDOMINAL DISTENTION: 0
PHOTOPHOBIA: 0
COUGH: 1
COLOR CHANGE: 0
CHEST TIGHTNESS: 0
SHORTNESS OF BREATH: 1
STRIDOR: 0
BACK PAIN: 0
DIARRHEA: 1
WHEEZING: 0
NAUSEA: 1
ABDOMINAL PAIN: 1
VOMITING: 1
CONSTIPATION: 0

## 2020-08-21 ASSESSMENT — PAIN SCALES - GENERAL
PAINLEVEL_OUTOF10: 0
PAINLEVEL_OUTOF10: 10

## 2020-08-21 NOTE — ED PROVIDER NOTES
I independently performed a history and physical on Joanne Cardenas. All diagnostic, treatment, and disposition decisions were made by myself in conjunction with the advanced practice provider. I have participated in the medical decision making and directed the treatment plan and disposition of the patient. For further details of [facility] emergency department encounter, please see the advanced practice provider's documentation. CHIEF COMPLAINT  Chief Complaint   Patient presents with    Emesis     pt with c/o emesis x 2-3 days, abd pain, diarrhea. states emesis x 3 since midnight today. no fever at home       Briefly, Joanne Cardenas is a 60 y.o. female  who presents to the ED complaining of coughing wheezing n/v/d and fevers at home (subjectively). Feels significantly SOB and also has chest pain. Notably hypoxic in triage with no baseline oxygen requirement. No COVID exposure - negative test a month ago, sx now for 3 days. Cough is nonproductive. No leg swelling. FOCUSED PHYSICAL EXAMINATION  /64   Pulse 115   Temp 98.9 °F (37.2 °C) (Oral)   Resp 28   Ht 5' 2\" (1.575 m)   Wt 145 lb (65.8 kg)   LMP 11/15/2017   SpO2 93%   BMI 26.52 kg/m²    Focused physical examination notable for moderate acute distress, well-appearing, well-nourished, normal speech and mentation without obvious facial droop, no obvious rash. No obvious cranial nerve deficits on my initial exam. Tachycardic, reg rhythm, rhonchi throughout, cough noted, not wheezing on my exam but tachypnea is noted. S/p remote AKA on RLE, no swelling on LLE.     The 12 lead EKG was interpreted by me as follows:  Rate: tachycardia with a rate of 106  Rhythm: sinus  Axis: left deviation  Intervals: normal NC, narrow QRS, normal QTc  ST segments: no ST elevations or depressions  T waves: no abnormal inversions  Non-specific T wave changes: present  Prior EKG comparison: EKG dated 8/1/20 is not significantly different    MDM:  Diagnostic considerations included acute coronary syndrome, pulmonary embolism, COPD/asthma, pneumonia, sepsis, pericardial tamponade, pneumothorax, CHF, thoracic aortic dissection, anxiety    ED course was notable for concern for multifocal pneumonia with possible pulmonary edema as well. Patient has a leukocytosis with lactate elevation as well as hypoxemic respiratory failure. Patient was covered with broad-spectrum antibiotics and given nebulizers and Decadron. CT of the chest as well as CT of the abdomen pelvis showed pulmonary edema with reactive lymphadenopathy as well. High clinical suspicion for COVID-19. Patient will be covered with antibiotics in the meantime though. Patient will be given Lasix and is stable on supplemental oxygen for now and will be admitted. During every aspect of this patient encounter, full droplet plus PPE precautions were used by myself. During the patient's ED course, the patient was given:  Medications   furosemide (LASIX) injection 80 mg (has no administration in time range)   aspirin chewable tablet 324 mg (has no administration in time range)   dexamethasone (PF) (DECADRON) injection 10 mg (10 mg Intravenous Given 8/21/20 1325)   ipratropium-albuterol (DUONEB) nebulizer solution 1 ampule (1 ampule Inhalation Given 8/21/20 1236)   albuterol (PROVENTIL) nebulizer solution 5 mg (5 mg Nebulization Given 8/21/20 1236)   acetaminophen (TYLENOL) tablet 1,000 mg (1,000 mg Oral Given 8/21/20 1326)   cefepime (MAXIPIME) 2 g IVPB minibag (0 g Intravenous Stopped 8/21/20 1350)   vancomycin 1000 mg IVPB in 250 mL D5W addavial (0 mg Intravenous Stopped 8/21/20 1502)   iopamidol (ISOVUE-370) 76 % injection 75 mL (75 mLs Intravenous Given 8/21/20 1454)        CLINICAL IMPRESSION  1. Acute respiratory failure with hypoxia ([address]    2. Suspected COVID-19 virus infection    3. Acute pulmonary edema (HCC)    4.  Lactic acidosis        DISPOSITION  Rayne Fitzgerald was admitted in fair condition. The plan is to admit to the hospital at this time under the hospitalist service. Hospitalist accepted the patient and will take over the patient's care. The total critical care time spent while evaluating and treating this patient was at least 45 minutes. This excludes time spent doing separately billable procedures. This includes time at the bedside, data interpretation, medication management, obtaining critical history from collateral sources if the patient is unable to provide it directly, and physician consultation. Specifics of interventions taken and potentially life-threatening diagnostic considerations are listed above in the medical decision making. This chart was created using Dragon dictation software. Efforts were made by me to ensure accuracy, however some errors may be present due to limitations of this technology.             Carson Elias MD  08/21/20 5413

## 2020-08-21 NOTE — ED NOTES
RT admin nebs, obtained and sent ABG. Pt now on vapotherm, appears to be tolerating well. COVID swab collected and sent. Pt tolerated WNL. Pt placed in droplet isolation. N95 mask, eye shied, gloves, and gown worn when in room by this RN. Mask remains on pt. Pt updated on POC. Pt positioned for comfort. Call light in reach. Bed locked and in low position. Pt denies any needs or concerns at this time.      Sonya Santos RN  08/21/20 2466

## 2020-08-21 NOTE — CARE COORDINATION
SW attempted to complete initial assessment. Pt was on vapotherm canula, breathing heavily and sleeping. Reviewing previous notes, pt is wheelchair bound with AKA. Pt is pending COVID results. SW will attempt assessment at a later time.      Electronically signed by BRITTNEE Asher, MARIKA on 8/21/2020 at 4:37 PM

## 2020-08-21 NOTE — ED NOTES
Abx started per order.     -Patient medicated orally as ordered by provider at this time.  -Pt able to take medication and drink liquids without aspiration, nausea, or vomiting.   -Will continue to monitor and re-evaluate.       Tesfaye Whiteside RN  08/21/20 2025

## 2020-08-21 NOTE — PROGRESS NOTES
Spoke with Dr. Raul Aguilar, with blood sugars being elevated and patient receiving IV decadron, orders received for insulin gtt, NPO and IVF. Patient aware and understanding.

## 2020-08-21 NOTE — ED NOTES
Pt returned from CT - pt replaced on vapotherm canula. VS as charted. Pt updated on POC. Pt positioned for comfort. Call light in reach. Bed locked and in low position. Pt denies any needs or concerns at this time.      Tammie Culp RN  08/21/20 6745

## 2020-08-21 NOTE — ED NOTES
Pt being check in at triage- initial oxygen saturation 58% with poor waveform, secondary reading were anywhere from 60-86%, intermittently, most consistently at 77%. Pt states she does not wear oxygen at home, does feel sob at this time. Pt fingers were warm to touch. Triage completed and pt moved back to ER bed 20 to establish better oxygen saturation reading. Once in bed, pt oxygen saturation reading 60%. Pt placed on non re breather, pt oxygen rebounded to 89-90% on 15L. Care giving RN bedside with pt and updated on pt. Dr. Naz Kim notified of pt status.       Acna Cm RN  08/21/20 [id number]

## 2020-08-21 NOTE — PROGRESS NOTES
Pharmacy Note  Vancomycin Consult    Christy Butterfield is a 60 y.o. female started on Vancomycin for acute on chronic respiratory failure; consult received from Dr. Irina Gonzalez to manage therapy. Also receiving the following antibiotics: cefepime. Patient Active Problem List   Diagnosis    Diabetes mellitus (HCC)    Knee pain    Degenerative arthritis of knee    HTN (hypertension)    Other specified anemias    DKA, type 2, not at goal [facility])    Hyperglycemia    Acute pancreatitis    Coronary artery disease involving native coronary artery of native heart without angina pectoris    Diabetes, coma, hyperosmolar (HCC)    Hypernatremia    Acute kidney injury ([facility]    Abnormal LFTs    Diabetic ketoacidosis without coma associated with type 1 diabetes mellitus (HCC)    Esophagitis    Hypotension    Controlled type 1 diabetes mellitus with hyperglycemia (HCC)    Sialadenitis    Abdominal pain    Adrenal insufficiency (HCC)    Unstable angina (HCC)    Chronic pain syndrome    Constipation    Uncontrolled type 2 diabetes mellitus with hyperglycemia, with long-term current use of insulin (HCC)    Acute on chronic respiratory failure with hypoxia (HCC)    Mucus plugging of bronchi    Atelectasis    Former smoker    DM (diabetes mellitus), secondary uncontrolled (HCC)    Thrombocytopenia ([facility] [address]    Essential hypertension    Hx of AKA (above knee amputation), right (HCC)    Chest pain    Coronary artery disease due to lipid rich plaque    Hypercholesteremia    DKA, type 1, not at goal [facility])    Acute decompensated heart failure ([facility] [address]    Carboxyhemoglobinemia    Acute on chronic respiratory failure ([facility]       Allergies:  Darvocet [propoxyphene n-acetaminophen];  Naprosyn [naproxen]; and Ultram [tramadol hcl]     Temp max: 98.1    Recent Labs     08/21/20  1139   BUN 26*       Recent Labs     08/21/20  1139   CREATININE 0.7       Recent Labs     08/21/20  1139   WBC 17.0*       No intake or output data in the 24 hours ending 08/21/20 1738    Culture Date      Source                       Results      Ht Readings from Last 1 Encounters:   08/21/20 5' 2\" (1.575 m)        Wt Readings from Last 1 Encounters:   08/21/20 140 lb 6.4 oz (63.7 kg)         Body mass index is 25.68 kg/m². Estimated Creatinine Clearance: 78 mL/min (based on SCr of 0.7 mg/dL). Goal Trough Level: 10-15 mcg/mL    Assessment/Plan:  Will initiate Vancomycin with a one time loading dose of 1000 mg x1, followed by 1000 mg IV every 12 hours. Timing of trough level will be determined based on culture results, renal function, and clinical response. Thank you for the consult. Will continue to follow.

## 2020-08-21 NOTE — CONSULTS
[facility] Pulmonary and Critical Care   Consult Note      Reason for Consult: Acute hypoxic respiratory failure  Requesting Physician: Severiano Peters    Subjective:   CHIEF COMPLAINT: Flulike illness and shortness of breath     HPI: Patient has had GI symptoms including nausea, diarrhea, generalized body ache, headache for at least 3 days. Has had increased shortness of breath and a nonproductive cough, for which patient came to the ER for further evaluation. No fevers or chills. She was noted to be profoundly hypoxic with O2 sats in the 50s. Currently requiring Vapotherm. Recently hospitalized between 8/1 and 8/4 for ? CHF decompensation-went home AMA. Pt states that her symptoms are much more worse on this occasion. Goes to [facility] three times per week, no social distancing practiced there. Former smoker, 43-pack-year history. No prior history of COPD. History of CAD/CHF/hypertension. EF of 40%. Status post stent in February 2020. Also has prior history of DKA. History of opioid addiction, on Suboxone. The patient is a 60 y.o. female with significant past medical history of:      Diagnosis Date    Arthritis     [facility]     Depression     Diabetes mellitus ([facility]     Heart attack ([facility]     MI 7/2019, and 6 months ago    Hepatitis C     Dr Armando Peres follows; contracted from ex- (drug user)    Hypercholesteremia 3/11/2020    Hypertension     MRSA (methicillin resistant Staphylococcus aureus) infection in 2000    was in urine and nares    Pneumonia     S/P colonoscopy with polypectomy 2/11    one polyp, Dr Armando Peres. Recall 3 years. Past Surgical History:        Procedure Laterality Date    ABOVE KNEE AMPUTATION      AMPUTATION      right bka    CORONARY ANGIOPLASTY WITH STENT PLACEMENT  07/2019    JOINT REPLACEMENT  2006, 2010    right knee; Dr Jocelyne Stevens.     KNEE ARTHROSCOPY      ridht knee X6    LIVER BIOPSY N/A 11/14/2018    LIVER BIOPSY Donald Gurpreet is OON w/the [facility] that will start for pt on 01/01/20. Pt stated she likes the [facility] and would like to have precert started w/[facility] and transition to [facility]. Spoke w/Anjel in admissions who will initiate precert w/[facility] this day. Informed pt.   Electronically signed by HU Garcia on 12/27/2019 at 2:59 PM Intravenous, PRN  glucagon (rDNA) injection 1 mg, 1 mg, Intramuscular, PRN  dextrose 5 % solution, 100 mL/hr, Intravenous, PRN  insulin lispro (1 Unit Dial) 0-12 Units, 0-12 Units, Subcutaneous, TID WC  insulin lispro (1 Unit Dial) 0-6 Units, 0-6 Units, Subcutaneous, Nightly  [START ON 8/22/2020] vancomycin 1000 mg IVPB in 250 mL D5W addavial, 1,000 mg, Intravenous, Q12H  insulin lispro (1 Unit Dial) 15 Units, 15 Units, Subcutaneous, Once  dexamethasone (PF) (DECADRON) injection 10 mg, 10 mg, Intravenous, Once  [START ON 8/22/2020] dexamethasone (DECADRON) 20 mg in sodium chloride 0.9 % IVPB, 20 mg, Intravenous, Daily  [START ON 8/22/2020] insulin lispro (1 Unit Dial) 10 Units, 10 Units, Subcutaneous, TID WC    Allergies   Allergen Reactions    Darvocet [Propoxyphene N-Acetaminophen] Nausea Only    Naprosyn [Naproxen] Rash    Ultram [Tramadol Hcl] Rash       Social History:    TOBACCO:   reports that she quit smoking about 7 weeks ago. She has a 43.00 pack-year smoking history. She has never used smokeless tobacco.  ETOH:   reports no history of alcohol use.   Patient currently lives independently    Family History:       Problem Relation Age of Onset    Cancer Father     Seizures Brother    [de-identified] Cancer Sister         colon       REVIEW OF SYSTEMS:    Constitutional: Flulike symptoms  Ears, nose, mouth, throat: negative for ear drainage, epistaxis, hoarseness, nasal congestion, sore throat and voice change  Respiratory: negative except for cough and shortness of breath  Cardiovascular: negative for chest pain, chest pressure/discomfort, irregular heart beat, lower extremity edema and palpitations  Gastrointestinal: Nausea and diarrhea  Hematologic/lymphatic: negative for bleeding, easy bruising, lymphadenopathy and petechiae  Musculoskeletal:negative for arthralgias, bone pain, muscle weakness, neck pain and stiff joints  Neurological: negative for dizziness, gait problems, headaches, seizures, speech problems, tremors and weakness  Behavioral/Psych: negative for anxiety, behavior problems, depression, fatigue and sleep disturbance  Endocrine: negative for diabetic symptoms including none, neuropathy, polyphagia, polyuria, polydipsia, vomiting and diarrhea and temperature intolerance  Allergic/Immunologic: negative for anaphylaxis, angioedema, hay fever and urticaria      Objective:     Patient Vitals for the past 8 hrs:   BP Temp Temp src Pulse Resp SpO2 Height Weight   08/21/20 1745 -- -- -- -- -- 94 % -- --   08/21/20 1735 120/74 98.1 °F (36.7 °C) Temporal 97 25 96 % 5' 2\" (1.575 m) 140 lb 6.4 oz (63.7 kg)   08/21/20 1645 102/60 -- -- 94 22 93 % -- --   08/21/20 1630 (!) 99/59 -- -- 95 20 93 % -- --   08/21/20 1615 (!) 98/57 -- -- 95 22 93 % -- --   08/21/20 1600 (!) 100/58 -- -- 96 22 94 % -- --   08/21/20 1545 (!) 133/95 -- -- 98 22 93 % -- --   08/21/20 1544 -- -- -- -- 20 91 % -- --   08/21/20 1530 (!) 130/46 -- -- 99 21 95 % -- --   08/21/20 1523 119/64 -- -- -- -- -- -- --   08/21/20 1515 119/64 -- -- 102 21 99 % -- --   08/21/20 1330 (!) 100/52 -- -- 115 28 93 % -- --   08/21/20 1315 93/66 -- -- 116 22 93 % -- --   08/21/20 1300 97/74 -- -- 118 27 93 % -- --   08/21/20 1254 -- -- -- -- 22 92 % -- --   08/21/20 1247 -- -- -- -- 25 (!) 86 % -- --   08/21/20 1245 107/64 -- -- 115 17 (!) 87 % -- --   08/21/20 1226 -- -- -- -- 26 96 % -- --   08/21/20 1215 113/71 -- -- 105 (!) 31 92 % -- --   08/21/20 1145 112/81 -- -- 101 27 96 % -- --   08/21/20 1130 135/82 -- -- 108 15 (!) 89 % -- --   08/21/20 1119 102/72 98.9 °F (37.2 °C) Oral 105 20 (!) 86 % 5' 2\" (1.575 m) 145 lb (65.8 kg)     No intake/output data recorded. No intake/output data recorded. Physical Exam:  Due to the current efforts to prevent transmission of COVID-19 and also the need to preserve PPE for other caregivers, a face-to-face encounter with the patient was not performed.  That being said, all relevant records and diagnostic tests were reviewed, including laboratory results and imaging. Please reference any relevant documentation elsewhere. Care will be coordinated with the primary service. Data Review:  CBC:   Lab Results   Component Value Date    WBC 17.0 08/21/2020    RBC 4.37 08/21/2020     BMP:   Lab Results   Component Value Date    GLUCOSE 511 08/21/2020    GLUCOSE 329 05/20/2011    CO2 19 08/21/2020    BUN 26 08/21/2020    CREATININE 0.7 08/21/2020    CALCIUM 8.4 08/21/2020     ABG:   Lab Results   Component Value Date    KPZ8OFH 23.6 08/21/2020    BEART -0.1 08/21/2020    C6WCOFFF 97.3 08/21/2020    PHART 7.439 08/21/2020    GOZ1IRQ 34.8 08/21/2020    PO2ART 85.5 08/21/2020    ACX9YXN 55.2 08/21/2020       Radiology: All pertinent images / reports were reviewed as a part of this visit. Narrative    EXAMINATION:    CT OF THE ABDOMEN AND PELVIS WITH CONTRAST; CTA OF THE CHEST 8/21/2020 2:54 pm         TECHNIQUE:    CT of the abdomen and pelvis was performed with the administration of    intravenous contrast. Multiplanar reformatted images are provided for review. Dose modulation, iterative reconstruction, and/or weight based adjustment of    the mA/kV was utilized to reduce the radiation dose to as low as reasonably    achievable.; CTA of the chest was performed after the administration of    intravenous contrast.  Multiplanar reformatted images are provided for    review.  MIP images are provided for review.  Dose modulation, iterative    reconstruction, and/or weight based adjustment of the mA/kV was utilized to    reduce the radiation dose to as low as reasonably achievable.         COMPARISON:    08/21/2020, 08/01/2020 and 05/11/2020         HISTORY:    ORDERING SYSTEM PROVIDED HISTORY: abd pain - n/v/d - septic    TECHNOLOGIST PROVIDED HISTORY:    Additional Contrast?->None    Reason for exam:->abd pain - n/v/d - septic    Reason for Exam: abd pain    Acuity: Unknown    Type of Exam: Ongoing         FINDINGS:         Chest:      Pulmonary Arteries: Motion artifact degrades image quality.  There is no    acute central pulmonary thromboembolus.  The subsegmental pulmonary arteries    cannot be evaluated.         Mediastinum: Coronary artery calcifications are a marker of atherosclerosis. The heart is enlarged.         There are enlarged mediastinal lymph nodes, likely reactive.  An index    precarinal lymph node measures 1.2 x 1.7 cm.         Lungs/pleura: The airway is patent. Gearline Emily is no pneumothorax.  There are    trace right and small to moderate left pleural effusions with atelectasis.         Throughout the bilateral lungs, there is diffuse ground-glass opacities with    associated interstitial thickening consistent with a crazy paving pattern.         Soft Tissues/Bones: Degenerative changes involve the thoracic spine.              Abdomen/Pelvis:         Organs: The liver, spleen, pancreas, adrenal glands and kidneys are    unchanged.  The liver is enlarged measuring 21 cm in length and slightly    nodular in contour which can be seen in the setting of cirrhosis.  Status    post cholecystectomy.         GI/Bowel: A small hiatal hernia is noted. Gearline Emily is no bowel obstruction. The appendix is not visualized.         Pelvis: The pelvic viscera are within normal limits.         Peritoneum/Retroperitoneum: There is no adenopathy.  A trace amount of free    fluid is present in the pelvis.  Mild atherosclerosis involves the abdominal    aorta.         Bones/Soft Tissues: Degenerative changes involve the lumbar spine, bilateral    hips and sacroiliac joints. Gearline Emily is an old healed left iliac wing fracture.              Impression    1. Congestive heart failure. 2. Mediastinal adenopathy, likely reactive. 3. Trace pelvic ascites.           Problem List:   Acute hypoxic respiratory failure  Uncontrolled hyperglycemia  COVID-19 infection vs CHF decompensation  Lactic acidosis    Assessment/Plan:     I reviewed patient CT imaging personally-worsening bilateral infiltrates with small left pleural effusion. This is far significantly worse in comparison to CT imaging from 8/1. COVID-19 test at that time was noted to be negative-patient was treated for acute CHF decompensation, however left AMA during that admission. Findings could suggest COVID-19 infection versus CHF decompensation. proBNP is elevated at 9000. Would send for rapid COVID-19 test to clarify this, COVID-19 PCR has been sent but has a approximate 48-hour turnaround time. Would commence patient on Remdesevir based on strong clinical suspicion, spoke with pharmacy. Increase dose of Decadron to 20 mg daily. Severe hypoxia-now requiring Vapotherm 70% / 30 L. Some increased work of breathing but otherwise alert and oriented. No significant tachypnea. No indication for intubation at this time. Check 2D echocardiogram.  Would be helpful to evaluate despite pending COVID-19 status. Uncontrolled hyperglycemia with lactic acidosis. Commence patient on IV insulin drip-ICU protocol. Monitor electrolytes closely. Patient also on Decadron which could worsen hyperglycemia. Critical care team will follow.     Denise Abbott MD

## 2020-08-21 NOTE — ED NOTES
Lasix given per order, also PO ASA. Pt tired and sleeping but arouses to voice and is able to follow directions. Pt updated on POC. Pt positioned for comfort. Call light in reach. Bed locked and in low position. Pt denies any needs or concerns at this time.      Josh Irwin RN  08/21/20 938 [facility], RN  08/21/20 1147

## 2020-08-21 NOTE — ED PROVIDER NOTES
[facility]        Pt Name: Olamide Boles  MRN: [id number]  [place] 1965  Date of evaluation: 8/21/2020  Provider: ROMANA Oropeza  PCP: No primary care provider on file. I have seen and evaluated this patient with my supervising physician Hung Webster MD.    [facility]       Chief Complaint   Patient presents with    Emesis     pt with c/o emesis x 2-3 days, abd pain, diarrhea. states emesis x 3 since midnight today. no fever at home       HISTORY OF PRESENT ILLNESS   (Location, Timing/Onset, Context/Setting, Quality, Duration, Modifying Factors, Severity, Associated Signs and Symptoms)  Note limiting factors. Olamide Boles is a 60 y.o. female with past medical history of CHF, diabetes, depression, previous MI, hepatitis C, hyperlipidemia, hypertension who presents to the ED with complaint of not feeling well. Patient states she feels generally unwell. Patient states she hurts \"everywhere. \"  Patient unable to localize her pain. Patient states her abdomen does hurt but states she hurts to multiple muscle groups and joints. States she has had some diarrhea with nausea and vomiting for the past 2 to 3 days. Patient states now she has nonproductive cough and has shortness of breath. Denies any chest pain. Denies pleuritic pain, orthopnea, pedal edema or calf tenderness. Denies fever chills. Denies sick contacts or recent travel. Denies any known exposure to COVID-19/coronavirus. States she was tested for COVID-19 about a month ago and was negative. Patient states she does have a headache. Denies lightheadedness or dizziness. Denies visual changes, speech disturbances, numbness/tingling or rashes/lesions. States aching discomfort rated 8/10 to multiple muscle groups and joints. Patient states she is not a smoker. Denies history of COPD or asthma.     Nursing Notes were all reviewed and agreed with or any disagreements were addressed in the HPI. REVIEW OF SYSTEMS    (2-9 systems for level 4, 10 or more for level 5)     Review of Systems   Constitutional: Positive for activity change, appetite change and fatigue. Negative for chills, diaphoresis and fever. HENT: Negative. Eyes: Negative for photophobia and visual disturbance. Respiratory: Positive for cough and shortness of breath. Negative for chest tightness, wheezing and stridor. Cardiovascular: Negative. Negative for chest pain, palpitations and leg swelling. Gastrointestinal: Positive for abdominal pain, diarrhea, nausea and vomiting. Negative for abdominal distention and constipation. Genitourinary: Negative for decreased urine volume, difficulty urinating, dysuria, flank pain, frequency, hematuria and urgency. Musculoskeletal: Positive for arthralgias and myalgias. Negative for back pain, gait problem, joint swelling, neck pain and neck stiffness. Skin: Negative for color change, pallor, rash and wound. Neurological: Positive for headaches. Negative for dizziness, tremors, seizures, syncope, facial asymmetry, speech difficulty, weakness, light-headedness and numbness. Positives and Pertinent negatives as per HPI. Except as noted above in the ROS, all other systems were reviewed and negative. PAST MEDICAL HISTORY     Past Medical History:   Diagnosis Date    Arthritis     [facility]     Depression     Diabetes mellitus ([facility]     Heart attack ([facility]     MI 7/2019, and 6 months ago    Hepatitis C     Dr Jorje Lawrence follows; contracted from ex- (drug user)    Hypercholesteremia 3/11/2020    Hypertension     MRSA (methicillin resistant Staphylococcus aureus) infection in 2000    was in urine and nares    Pneumonia     S/P colonoscopy with polypectomy 2/11    one polyp, Dr Jorje Lawrence. Recall 3 years.          SURGICAL HISTORY     Past Surgical History:   Procedure Laterality Date    ABOVE KNEE AMPUTATION      AMPUTATION      right bka    CORONARY ANGIOPLASTY WITH STENT PLACEMENT  07/2019    JOINT REPLACEMENT  2006, 2010    right knee; Dr Jaime Acuña.  KNEE ARTHROSCOPY      ridht knee X6    LIVER BIOPSY N/A 11/14/2018    LIVER BIOPSY LAPAROSCOPIC performed by Mary Kay Whyte MD at [facility] N/A 11/14/2018    LAPAROSCOPIC CHOLECYSTECTOMY WITH INTRAOPERATIVE CHOLANGIOGRAM performed by Mary Kay Whyte MD at [address]  2/9/11    right    UPPER GASTROINTESTINAL ENDOSCOPY  11/13/2018    UPPER GASTROINTESTINAL ENDOSCOPY N/A 11/13/2018    EGD BIOPSY performed by Joanne Stokes MD at [address]       Previous Medications    ASPIRIN 81 MG TABLET    Take 1 tablet by mouth daily    ATORVASTATIN (LIPITOR) 40 MG TABLET    Take 1 tablet by mouth daily    BLOOD GLUCOSE TEST STRIPS (FREESTYLE LITE) STRIP    1 each by Other route 4 times daily (before meals and nightly) Patient to check blood sugar 4 times a day and PRN, he is treated with multiple daily injections of insulin that require correction dosing and has had hypoglycemia. A1C  14.5  ICD code- E11.65, Z79.4    BUPRENORPHINE-NALOXONE (SUBOXONE) 8-2 MG SUBL SL TABLET    Place 1 tablet under the tongue 2 times daily. CARVEDILOL (COREG) 3.125 MG TABLET    Take 1 tablet by mouth 2 times daily (with meals)    FREESTYLE LANCETS MISC    1 each by Other route 4 times daily (before meals and nightly) Patient to check blood sugar 4 times a day and PRN, he is treated with multiple daily injections of insulin that require correction dosing and has had hypoglycemia.              A1C  14.5  ICD code- E11.65, Z79.4    HYDROXYZINE (ATARAX) 25 MG TABLET    Take 25 mg by mouth 3 times daily as needed for Itching    INSULIN GLARGINE (LANTUS SOLOSTAR) 100 UNIT/ML INJECTION PEN    Inject 15 Units into the skin 2 times daily Indications: Insulin Pump INSULIN LISPRO, 1 UNIT DIAL, (HUMALOG KWIKPEN) 100 UNIT/ML SOPN    Inject 10 Units into the skin 3 times daily (before meals) Inject 10 units three times daily with each meal in addition to sliding scale. LAMOTRIGINE (LAMICTAL) 100 MG TABLET    Take 100 mg by mouth daily. MIRTAZAPINE (REMERON) 15 MG TABLET    Take 7.5 mg by mouth nightly    NITROGLYCERIN (NITROSTAT) 0.4 MG SL TABLET    up to max of 3 total doses. If no relief after 1 dose, call 911. PREGABALIN (LYRICA) 100 MG CAPSULE    Take 100 mg by mouth 2 times daily. SERTRALINE (ZOLOFT) 50 MG TABLET    Take 50 mg by mouth daily Indications: Depression    TICAGRELOR (BRILINTA) 90 MG TABS TABLET    Take 1 tablet by mouth 2 times daily         ALLERGIES     Darvocet [propoxyphene n-acetaminophen]; Naprosyn [naproxen]; and Ultram [tramadol hcl]    FAMILYHISTORY       Family History   Problem Relation Age of Onset    Cancer Father     Seizures Brother     Cancer Sister         colon          SOCIAL HISTORY       Social History     Tobacco Use    Smoking status: Former Smoker     Packs/day: 1.00     Years: 43.00     Pack years: 43.00     Last attempt to quit: 2020     Years since quittin.1    Smokeless tobacco: Never Used   Substance Use Topics    Alcohol use: No    Drug use: Not Currently     Types: Opiates      Comment: opiate/THC       SCREENINGS             PHYSICAL EXAM    (up to 7 for level 4, 8 or more for level 5)     ED Triage Vitals [20 1119]   BP Temp Temp Source Pulse Resp SpO2 Height Weight   102/72 98.9 °F (37.2 °C) Oral 105 20 (!) 86 % 5' 2\" (1.575 m) 145 lb (65.8 kg)       Physical Exam  Constitutional:       General: She is in acute distress. Appearance: She is well-developed. She is ill-appearing. She is not toxic-appearing or diaphoretic. HENT:      Head: Normocephalic and atraumatic.       Right Ear: External ear normal.      Left Ear: External ear normal.      Mouth/Throat:      Mouth: Mucous membranes are moist.      Pharynx: No oropharyngeal exudate or posterior oropharyngeal erythema. Eyes:      General:         Right eye: No discharge. Left eye: No discharge. Extraocular Movements: Extraocular movements intact. Conjunctiva/sclera: Conjunctivae normal.      Pupils: Pupils are equal, round, and reactive to light. Neck:      Musculoskeletal: Normal range of motion and neck supple. No neck rigidity or muscular tenderness. Cardiovascular:      Rate and Rhythm: Normal rate and regular rhythm. Pulses: Normal pulses. Heart sounds: Normal heart sounds. No murmur. No friction rub. No gallop. Comments: 2+ radial pulses bilaterally. 1+ pitting edema to the left lower extremity. Status post amputation to the right lower extremity. No calf tenderness. No JVD. Pulmonary:      Effort: Respiratory distress present. Breath sounds: No stridor. Rhonchi present. No wheezing or rales. Comments: Patient was noted to be hypoxic on room air. Does not wear nasal cannula oxygen at home. Oxygen saturation anywhere from 50% to 80% per nursing staff. Patient placed on nonrebreather and doing much better upon my initial evaluation. Patient has no conversational dyspnea. Rhonchorous lung sounds without wheezing or rales. Chest:      Chest wall: No tenderness. Abdominal:      General: Abdomen is flat. Bowel sounds are normal. There is no distension. Palpations: Abdomen is soft. There is no mass. Tenderness: There is no abdominal tenderness. There is no right CVA tenderness, left CVA tenderness, guarding or rebound. Hernia: No hernia is present. Musculoskeletal: Normal range of motion. Comments: Status post right lower leg amputation. Lymphadenopathy:      Cervical: No cervical adenopathy. Skin:     General: Skin is warm and dry. Coloration: Skin is not pale. Findings: No erythema or rash. Neurological:      General: No focal deficit present. Mental Status: She is alert and oriented to person, place, and time. GCS: GCS eye subscore is 4. GCS verbal subscore is 5. GCS motor subscore is 6. Cranial Nerves: Cranial nerves are intact. No cranial nerve deficit. Sensory: No sensory deficit. Motor: Motor function is intact. Comments: Gait deferred.    Psychiatric:         Behavior: Behavior normal.         DIAGNOSTIC RESULTS   LABS:    Labs Reviewed   CBC WITH AUTO DIFFERENTIAL - Abnormal; Notable for the following components:       Result Value    WBC 17.0 (*)     MPV 10.9 (*)     Neutrophils Absolute 14.4 (*)     All other components within normal limits    Narrative:     Performed at:  [facility] [address] [place]   Phone [phone]   COMPREHENSIVE METABOLIC PANEL W/ REFLEX TO MG FOR LOW K - Abnormal; Notable for the following components:    Sodium 130 (*)     Chloride 96 (*)     CO2 19 (*)     Glucose 511 (*)     BUN 26 (*)     Alb 2.5 (*)     Albumin/Globulin Ratio 0.5 (*)     Alkaline Phosphatase 149 (*)     AST 39 (*)     All other components within normal limits    Narrative:     Performed at:  [facility] [address], [place], [place]   Phone 21  - Abnormal; Notable for the following components:    Pro-BNP 9,495 (*)     All other components within normal limits    Narrative:     Performed at:  [facility] [address], [place], [address]   Phone [phone]   LACTATE, SEPSIS - Abnormal; Notable for the following components:    Lactic Acid, Sepsis 4.6 (*)     All other components within normal limits    Narrative:     Kristen Gallardo  [facility] tel. [phone],  Chemistry results called to and read back by wesly rudd, 08/21/2020 12:22,  by [facility]   Performed at:  [facility] [address],  [place], [place]   Phone [phone]   LIPASE - Abnormal; Notable for the following components:    Lipase 6.0 (*)     All other components within normal limits    Narrative:     Performed at:  [facility] [address]   Phone [phone]   URINE RT REFLEX TO CULTURE - Abnormal; Notable for the following components:    Glucose, Ur >=1000 (*)     Bilirubin Urine SMALL (*)     Ketones, Urine 15 (*)     Blood, Urine SMALL (*)     Protein,  (*)     Urobilinogen, Urine 4.0 (*)     All other components within normal limits    Narrative:     Performed at:  [facility] [address], [place]   Phone [phone]   CK - Abnormal; Notable for the following components:     Total CK 25 (*)     All other components within normal limits    Narrative:     Performed at:  [facility] [address]   Phone [phone]   LACTATE DEHYDROGENASE - Abnormal; Notable for the following components:     (*)     All other components within normal limits    Narrative:     Performed at:  [facility] [address]   Phone [phone]   PROCALCITONIN - Abnormal; Notable for the following components:    Procalcitonin 0.37 (*)     All other components within normal limits    Narrative:     Performed at:  [facility] [address]   Phone [phone]   BLOOD GAS, ARTERIAL - Abnormal; Notable for the following components:    pCO2, Arterial 34.8 (*)     Carboxyhgb, Arterial 3.4 (*)     All other components within normal limits    Narrative:     Performed at:  [facility] [address]   Phone [phone]   MICROSCOPIC URINALYSIS - Abnormal; Notable for the following components:    Bacteria, UA 1+ (*)     WBC, UA 6 (*) RBC, UA 9 (*)     Epithelial Cells, UA 12 (*)     All other components within normal limits    Narrative:     Performed at:  [facility] [address],  [facility], [address]   Phone [phone]   CULTURE, BLOOD 1   CULTURE, BLOOD 2   LEGIONELLA ANTIGEN, URINE   STREP PNEUMONIAE ANTIGEN   TROPONIN    Narrative:     Performed at:  [facility] [address],  [facility], [address]   Phone [phone]   LACTATE, SEPSIS   FERRITIN   D-DIMER, QUANTITATIVE   COVID-19       All other labs were within normal range or not returned as of this dictation. EKG: All EKG's are interpreted by the Emergency Department Physician in the absence of a cardiologist.  Please see their note for interpretation of EKG. RADIOLOGY:   Non-plain film images such as CT, Ultrasound and MRI are read by the radiologist. Plain radiographic images are visualized and preliminarily interpreted by the ED Provider with the below findings:        Interpretation per the Radiologist below, if available at the time of this note:    CT ABDOMEN PELVIS W IV CONTRAST Additional Contrast? None   Final Result   1. Congestive heart failure. 2. Mediastinal adenopathy, likely reactive. 3. Trace pelvic ascites. CT CHEST PULMONARY EMBOLISM W CONTRAST   Final Result   1. Congestive heart failure. 2. Mediastinal adenopathy, likely reactive. 3. Trace pelvic ascites. XR CHEST PORTABLE   Final Result   Bilateral pneumonia versus pulmonary edema           No results found. PROCEDURES   Unless otherwise noted below, none     Procedures    CRITICAL CARE TIME   The total critical care time spent while evaluating and treating this patient was at least 34 minutes. This excludes time spent doing separately billable procedures.   This includes time at the bedside, data interpretation, medication management, obtaining critical history from collateral sources if the patient is unable to provide it directly, and physician consultation. Specifics of interventions taken and potentially life-threatening diagnostic considerations are listed above in the medical decision making. CONSULTS:  IP CONSULT TO HOSPITALIST      EMERGENCY DEPARTMENT COURSE and DIFFERENTIAL DIAGNOSIS/MDM:   Vitals:    Vitals:    08/21/20 1300 08/21/20 1315 08/21/20 1330 08/21/20 1523   BP: 97/74 93/66 (!) 100/52 119/64   Pulse: 118 116 115    Resp: 27 22 28    Temp:       TempSrc:       SpO2: 93% 93% 93%    Weight:       Height:           Patient was given the following medications:  Medications   furosemide (LASIX) injection 80 mg (has no administration in time range)   aspirin chewable tablet 324 mg (has no administration in time range)   dexamethasone (PF) (DECADRON) injection 10 mg (10 mg Intravenous Given 8/21/20 1325)   ipratropium-albuterol (DUONEB) nebulizer solution 1 ampule (1 ampule Inhalation Given 8/21/20 1236)   albuterol (PROVENTIL) nebulizer solution 5 mg (5 mg Nebulization Given 8/21/20 1236)   acetaminophen (TYLENOL) tablet 1,000 mg (1,000 mg Oral Given 8/21/20 1326)   cefepime (MAXIPIME) 2 g IVPB minibag (0 g Intravenous Stopped 8/21/20 1350)   vancomycin 1000 mg IVPB in 250 mL D5W addavial (0 mg Intravenous Stopped 8/21/20 1502)   iopamidol (ISOVUE-370) 76 % injection 75 mL (75 mLs Intravenous Given 8/21/20 1454)           SEP-1 CORE MEASURE DATA    Classification: exclude from core measure    Amount of fluids ordered: Less than 30 mL/kg fluid bolus secondary to pulmonary edema and suspect viral etiology at this time.         Broad-spectrum antibiotics chosen: Vancomycin/cefepime based on suspected source of: Pulm    Repeat lactate level: pending    On reassessment after fluid resuscitation:   pending, to be completed by inpatient team    Exclusion criteria: the patient is NOT to be included for sepsis due to:  Viral etiology found or highly suspected (including possible COVID-19) without concomitant bacterial infection     Patient is a 49-year-old female who presents to the ED with complaint of shortness of breath. Patient complaining of multiple symptoms but noted to be hypoxic here. Patient has what appears to be acute respiratory failure with associated hypoxia. ABG showed normal pH with CO2 of 34.8. Normal bicarb. CBC showed white count of 17 with normal hemoglobin and platelets. CMP showed sodium 130 with a glucose of 511. Normal anion gap. CO2 19. No evidence of DKA. Troponin normal.  BNP 9400. Lactic 4.6. Blood cultures x2 obtained and pending at this time. Lipase normal.  Urinalysis relatively unremarkable. CK normal.  . Procalcitonin 0.37. Ferritin pending. CT of the abdomen pelvis with IV contrast showed trace pelvic ascites. CT of the chest showed no evidence of PE but did show congestive heart failure with mediastinal adenopathy likely reactive. Chest x-ray showed bilateral pneumonia versus pulmonary edema. EKG interpreted by attending. Patient on Vapotherm here in the emergency department. Patient was given breathing treatments with associated Decadron here in the ED. Will give Lasix given what appears to be fluid overload. Will withhold fluids at this time given the fact that suspect viral ideology specifically COVID-19 at this time. Patient was given vancomycin and cefepime for potential bacterial etiology. Will give full dose aspirin. Patient will require admission. Case was discussed with hospital service for admission at this time. FINAL IMPRESSION      1. Acute respiratory failure with hypoxia ([address]    2. Suspected COVID-19 virus infection    3. Acute pulmonary edema (HCC)    4. Lactic acidosis          DISPOSITION/PLAN   DISPOSITION Decision To Admit 08/21/2020 03:32:55 PM      PATIENT REFERREDTO:  No follow-up provider specified.     DISCHARGE MEDICATIONS:  New Prescriptions    No medications on file       DISCONTINUED MEDICATIONS:  Discontinued Medications    No medications on file              (Please note that portions of this note were completed with a voice recognition program.  Efforts were made to edit the dictations but occasionally words are mis-transcribed.)    ROMANA Cramer (electronically signed)          ROMANA Gore  08/21/20 [id number]

## 2020-08-21 NOTE — PROGRESS NOTES
Patient reports that the only place she has gone is group therapy for suboxone (IOP), she goes every Monday, Wednesday and Friday and reports that the people there are not compliant with social distancing or wearing masks.

## 2020-08-21 NOTE — PROGRESS NOTES
Patient admitted to 5903 from the ED. Currently stable on vapotherm, 30L 70%. Patient states she has been feeling bad for about 3 days, denies any sick contacts. States she was due to have cataract surgery next week as she is blind in her left eye and had been avoiding coming to the hospital. She is alert, oriented. 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 Personal wheelchair at bedside. Admission completed. Oriented to unit and routine. Fall precautions in place, call light in reach. Will continue to monitor.

## 2020-08-22 ENCOUNTER — APPOINTMENT (OUTPATIENT)
Dept: GENERAL RADIOLOGY | Age: 55
DRG: 720 | End: 2020-08-22
Payer: COMMERCIAL

## 2020-08-22 LAB
AMPHETAMINE SCREEN, URINE: ABNORMAL
ANION GAP SERPL CALCULATED.3IONS-SCNC: 9 MMOL/L (ref 3–16)
BARBITURATE SCREEN URINE: ABNORMAL
BASE EXCESS ARTERIAL: -1 (ref -3–3)
BASE EXCESS ARTERIAL: 3 (ref -3–3)
BASOPHILS ABSOLUTE: 0 K/UL (ref 0–0.2)
BASOPHILS RELATIVE PERCENT: 0.2 %
BENZODIAZEPINE SCREEN, URINE: ABNORMAL
BUN BLDV-MCNC: 38 MG/DL (ref 7–20)
CALCIUM SERPL-MCNC: 8.4 MG/DL (ref 8.3–10.6)
CANNABINOID SCREEN URINE: POSITIVE
CHLORIDE BLD-SCNC: 100 MMOL/L (ref 99–110)
CO2: 27 MMOL/L (ref 21–32)
COCAINE METABOLITE SCREEN URINE: ABNORMAL
CREAT SERPL-MCNC: 0.6 MG/DL (ref 0.6–1.1)
EOSINOPHILS ABSOLUTE: 0 K/UL (ref 0–0.6)
EOSINOPHILS RELATIVE PERCENT: 0 %
ESTIMATED AVERAGE GLUCOSE: 312.1 MG/DL
GFR AFRICAN AMERICAN: >60
GFR NON-AFRICAN AMERICAN: >60
GLUCOSE BLD-MCNC: 101 MG/DL (ref 70–99)
GLUCOSE BLD-MCNC: 108 MG/DL (ref 70–99)
GLUCOSE BLD-MCNC: 116 MG/DL (ref 70–99)
GLUCOSE BLD-MCNC: 118 MG/DL (ref 70–99)
GLUCOSE BLD-MCNC: 134 MG/DL (ref 70–99)
GLUCOSE BLD-MCNC: 142 MG/DL (ref 70–99)
GLUCOSE BLD-MCNC: 148 MG/DL (ref 70–99)
GLUCOSE BLD-MCNC: 156 MG/DL (ref 70–99)
GLUCOSE BLD-MCNC: 79 MG/DL (ref 70–99)
GLUCOSE BLD-MCNC: 88 MG/DL (ref 70–99)
GLUCOSE BLD-MCNC: 90 MG/DL (ref 70–99)
HBA1C MFR BLD: 12.5 %
HCO3 ARTERIAL: 24.1 MMOL/L (ref 21–29)
HCO3 ARTERIAL: 27.3 MMOL/L (ref 21–29)
HCT VFR BLD CALC: 38.6 % (ref 36–48)
HEMOGLOBIN: 12.7 G/DL (ref 12–16)
LACTIC ACID: 1.6 MMOL/L (ref 0.4–2)
LYMPHOCYTES ABSOLUTE: 1.1 K/UL (ref 1–5.1)
LYMPHOCYTES RELATIVE PERCENT: 4.9 %
Lab: ABNORMAL
MCH RBC QN AUTO: 29.3 PG (ref 26–34)
MCHC RBC AUTO-ENTMCNC: 33 G/DL (ref 31–36)
MCV RBC AUTO: 88.8 FL (ref 80–100)
METHADONE SCREEN, URINE: ABNORMAL
MONOCYTES ABSOLUTE: 0.4 K/UL (ref 0–1.3)
MONOCYTES RELATIVE PERCENT: 2 %
NEUTROPHILS ABSOLUTE: 20.3 K/UL (ref 1.7–7.7)
NEUTROPHILS RELATIVE PERCENT: 92.9 %
O2 SAT, ARTERIAL: 85 % (ref 93–100)
O2 SAT, ARTERIAL: 90 % (ref 93–100)
OPIATE SCREEN URINE: ABNORMAL
OXYCODONE URINE: ABNORMAL
PCO2 ARTERIAL: 39.3 MM HG (ref 35–45)
PCO2 ARTERIAL: 42.3 MM HG (ref 35–45)
PDW BLD-RTO: 13 % (ref 12.4–15.4)
PERFORMED ON: ABNORMAL
PERFORMED ON: NORMAL
PH ARTERIAL: 7.39 (ref 7.35–7.45)
PH ARTERIAL: 7.42 (ref 7.35–7.45)
PH UA: 5
PHENCYCLIDINE SCREEN URINE: ABNORMAL
PLATELET # BLD: 232 K/UL (ref 135–450)
PMV BLD AUTO: 10.8 FL (ref 5–10.5)
PO2 ARTERIAL: 49.4 MM HG (ref 75–108)
PO2 ARTERIAL: 59.3 MM HG (ref 75–108)
POC SAMPLE TYPE: ABNORMAL
POC SAMPLE TYPE: ABNORMAL
POTASSIUM REFLEX MAGNESIUM: 4.1 MMOL/L (ref 3.5–5.1)
PRO-BNP: 6099 PG/ML (ref 0–124)
PROPOXYPHENE SCREEN: ABNORMAL
RBC # BLD: 4.35 M/UL (ref 4–5.2)
SODIUM BLD-SCNC: 136 MMOL/L (ref 136–145)
TCO2 ARTERIAL: 25 MMOL/L
TCO2 ARTERIAL: 29 MMOL/L
WBC # BLD: 21.9 K/UL (ref 4–11)

## 2020-08-22 PROCEDURE — 83605 ASSAY OF LACTIC ACID: CPT

## 2020-08-22 PROCEDURE — 94761 N-INVAS EAR/PLS OXIMETRY MLT: CPT

## 2020-08-22 PROCEDURE — 2580000003 HC RX 258: Performed by: FAMILY MEDICINE

## 2020-08-22 PROCEDURE — 83880 ASSAY OF NATRIURETIC PEPTIDE: CPT

## 2020-08-22 PROCEDURE — 2500000003 HC RX 250 WO HCPCS: Performed by: INTERNAL MEDICINE

## 2020-08-22 PROCEDURE — 82803 BLOOD GASES ANY COMBINATION: CPT

## 2020-08-22 PROCEDURE — 36600 WITHDRAWAL OF ARTERIAL BLOOD: CPT

## 2020-08-22 PROCEDURE — 94660 CPAP INITIATION&MGMT: CPT

## 2020-08-22 PROCEDURE — 6360000002 HC RX W HCPCS: Performed by: INTERNAL MEDICINE

## 2020-08-22 PROCEDURE — 6370000000 HC RX 637 (ALT 250 FOR IP): Performed by: INTERNAL MEDICINE

## 2020-08-22 PROCEDURE — 2700000000 HC OXYGEN THERAPY PER DAY

## 2020-08-22 PROCEDURE — 85025 COMPLETE CBC W/AUTO DIFF WBC: CPT

## 2020-08-22 PROCEDURE — 6360000002 HC RX W HCPCS

## 2020-08-22 PROCEDURE — 80048 BASIC METABOLIC PNL TOTAL CA: CPT

## 2020-08-22 PROCEDURE — 99291 CRITICAL CARE FIRST HOUR: CPT | Performed by: INTERNAL MEDICINE

## 2020-08-22 PROCEDURE — 80307 DRUG TEST PRSMV CHEM ANLYZR: CPT

## 2020-08-22 PROCEDURE — 71045 X-RAY EXAM CHEST 1 VIEW: CPT

## 2020-08-22 PROCEDURE — 2000000000 HC ICU R&B

## 2020-08-22 PROCEDURE — 36415 COLL VENOUS BLD VENIPUNCTURE: CPT

## 2020-08-22 PROCEDURE — 6360000002 HC RX W HCPCS: Performed by: FAMILY MEDICINE

## 2020-08-22 RX ORDER — CLONIDINE HYDROCHLORIDE 0.1 MG/1
0.1 TABLET ORAL PRN
Status: DISCONTINUED | OUTPATIENT
Start: 2020-08-22 | End: 2020-08-29 | Stop reason: HOSPADM

## 2020-08-22 RX ORDER — BUPRENORPHINE 2 MG/1
2 TABLET SUBLINGUAL PRN
Status: DISPENSED | OUTPATIENT
Start: 2020-08-22 | End: 2020-08-25

## 2020-08-22 RX ORDER — DEXMEDETOMIDINE HYDROCHLORIDE 4 UG/ML
0.2 INJECTION, SOLUTION INTRAVENOUS CONTINUOUS
Status: DISCONTINUED | OUTPATIENT
Start: 2020-08-22 | End: 2020-08-26

## 2020-08-22 RX ORDER — INSULIN LISPRO 100 [IU]/ML
0-6 INJECTION, SOLUTION INTRAVENOUS; SUBCUTANEOUS NIGHTLY
Status: DISCONTINUED | OUTPATIENT
Start: 2020-08-22 | End: 2020-08-23 | Stop reason: SDUPTHER

## 2020-08-22 RX ORDER — DEXMEDETOMIDINE HYDROCHLORIDE 4 UG/ML
INJECTION, SOLUTION INTRAVENOUS
Status: DISPENSED
Start: 2020-08-22 | End: 2020-08-22

## 2020-08-22 RX ORDER — INSULIN LISPRO 100 [IU]/ML
6 INJECTION, SOLUTION INTRAVENOUS; SUBCUTANEOUS
Status: DISCONTINUED | OUTPATIENT
Start: 2020-08-22 | End: 2020-08-23

## 2020-08-22 RX ORDER — INSULIN LISPRO 100 [IU]/ML
0-12 INJECTION, SOLUTION INTRAVENOUS; SUBCUTANEOUS
Status: DISCONTINUED | OUTPATIENT
Start: 2020-08-22 | End: 2020-08-23

## 2020-08-22 RX ORDER — HALOPERIDOL 5 MG/ML
5 INJECTION INTRAMUSCULAR EVERY 4 HOURS PRN
Status: DISCONTINUED | OUTPATIENT
Start: 2020-08-22 | End: 2020-08-29 | Stop reason: HOSPADM

## 2020-08-22 RX ADMIN — ENOXAPARIN SODIUM 40 MG: 40 INJECTION SUBCUTANEOUS at 08:43

## 2020-08-22 RX ADMIN — Medication 10 ML: at 20:14

## 2020-08-22 RX ADMIN — LORAZEPAM 2 MG: 2 INJECTION INTRAMUSCULAR; INTRAVENOUS at 10:09

## 2020-08-22 RX ADMIN — DEXMEDETOMIDINE HYDROCHLORIDE 0.2 MCG/KG/HR: 4 INJECTION, SOLUTION INTRAVENOUS at 00:29

## 2020-08-22 RX ADMIN — CEFEPIME HYDROCHLORIDE 1 G: 1 INJECTION, POWDER, FOR SOLUTION INTRAMUSCULAR; INTRAVENOUS at 01:15

## 2020-08-22 RX ADMIN — VANCOMYCIN HYDROCHLORIDE 1000 MG: 1 INJECTION, POWDER, LYOPHILIZED, FOR SOLUTION INTRAVENOUS at 02:00

## 2020-08-22 RX ADMIN — INSULIN GLARGINE 16 UNITS: 100 INJECTION, SOLUTION SUBCUTANEOUS at 12:39

## 2020-08-22 RX ADMIN — FUROSEMIDE 40 MG: 10 INJECTION, SOLUTION INTRAMUSCULAR; INTRAVENOUS at 17:33

## 2020-08-22 RX ADMIN — Medication 10 ML: at 08:49

## 2020-08-22 RX ADMIN — FUROSEMIDE 40 MG: 10 INJECTION, SOLUTION INTRAMUSCULAR; INTRAVENOUS at 08:41

## 2020-08-22 RX ADMIN — CEFEPIME HYDROCHLORIDE 1 G: 1 INJECTION, POWDER, FOR SOLUTION INTRAMUSCULAR; INTRAVENOUS at 14:23

## 2020-08-22 RX ADMIN — INSULIN LISPRO 1 UNITS: 100 INJECTION, SOLUTION INTRAVENOUS; SUBCUTANEOUS at 20:12

## 2020-08-22 RX ADMIN — DEXMEDETOMIDINE HYDROCHLORIDE 0.5 MCG/KG/HR: 4 INJECTION, SOLUTION INTRAVENOUS at 18:58

## 2020-08-22 RX ADMIN — VANCOMYCIN HYDROCHLORIDE 1000 MG: 1 INJECTION, POWDER, LYOPHILIZED, FOR SOLUTION INTRAVENOUS at 15:26

## 2020-08-22 RX ADMIN — ENOXAPARIN SODIUM 40 MG: 40 INJECTION SUBCUTANEOUS at 20:12

## 2020-08-22 ASSESSMENT — PAIN SCALES - GENERAL
PAINLEVEL_OUTOF10: 0
PAINLEVEL_OUTOF10: 10
PAINLEVEL_OUTOF10: 0

## 2020-08-22 ASSESSMENT — PAIN DESCRIPTION - LOCATION: LOCATION: ABDOMEN

## 2020-08-22 ASSESSMENT — PAIN DESCRIPTION - PAIN TYPE: TYPE: ACUTE PAIN

## 2020-08-22 NOTE — PROGRESS NOTES
08/22/20 0445   Oxygen Therapy/Pulse Ox   O2 Therapy Oxygen humidified   O2 Device Heated high flow cannula  (NRB mask 15L 100%FiO2 on top of Vapotherm NC)   O2 Flow Rate (L/min) 40 L/min   FiO2  100 %   Resp 26   SpO2 99 %   Pulse Oximeter Device Mode Continuous   Pulse Oximeter Device Location Finger

## 2020-08-22 NOTE — PROGRESS NOTES
Patient was anxious at beginning half of shift. Stating she \"couldn't breathe\" and was \"so sick\". Tachypneic. Resp in 40's. Requiring much 1:1 care. Vapotherm was increased to 40 L 100%. Patient then become to get agitated. Trying to get OOB. Thrashing in the bed. Pulled femoral dressing off. Removed canula and desated into the 50's. Ativan was given with no effect. Patient then started hallucinating seeing her brother and friends in the room. Upon repeated questioning patient finally admitted heroin use, after claiming to be clean for 2 years. Precedex gtt was started. Patient was placed in restraints. Security was called. In patients purse lighters, cigarettes, loose pills, a grey powdered mixture in small amounts inside snall folded pieces of paper and a gold pipe. Security took all items. Dr Fracisco Johnson aware. Patient has been diaphoretic and skin is cool.  Patient finally became subdued around 3-3:30 am.

## 2020-08-22 NOTE — H&P
HOSPITALISTS HISTORY AND PHYSICAL    08/21/2020    Patient Information:  Patricia Alvarez is a 60 y.o. female [id number]  PCP:  No primary care provider on file. (Tel: None )    Chief complaint:    Chief Complaint   Patient presents with    Emesis     pt with c/o emesis x 2-3 days, abd pain, diarrhea. states emesis x 3 since midnight today. no fever at home        History of Present Illness:  Stan Sr is a 60 y.o. female presents with c/o abdominal pain , non bloody vomiting for couple of days. The pt past medical h/o CHF, former smoker, DM, MI, hepC, chronic pain , h/o opiate abuse. The pt is found to be hypoxic and is placed on high flow O2  Pro bnp is elevated. CT chest abdomen and pelvis  Showed CHF. Has uncontrolled DM with hyperglycemia. Blood sugar is elevated to > 600 . She is not in DKA. The pt was admitted at [facility] on 08/02/20 for CHF exacerbation. The pt left AMA at the time  COVID was tested neg during that admission    REVIEW OF SYSTEMS:   Constitutional: Negative for fever,chills or night sweats  ENT: Negative for rhinorrhea, epistaxis, hoarseness, sore throat. Respiratory: +Vefor shortness of breath,wheezing  Cardiovascular: Negative for chest pain, palpitations   Gastrointestinal: +Ve for nausea, vomiting, diarrhea  Genitourinary: Negative for polyuria, dysuria   Hematologic/Lymphatic: Negative for bleeding tendency, easy bruising  Musculoskeletal: Negative for myalgias and arthralgias  Neurologic: Negative for confusion,dysarthria. Skin: Negative for itching,rash  Psychiatric: Negative for depression,anxiety, agitation. Endocrine: Negative for polydipsia,polyuria,heat /cold intolerance.     Past Medical History:   has a past medical history of Arthritis, CHF (congestive heart failure) ([address] Depression, Diabetes mellitus ([facility] [address] Heart attack ([facility] [address] Hepatitis C, Hypercholesteremia, Hypertension, MRSA (methicillin resistant Staphylococcus aureus) infection, Pneumonia, and S/P colonoscopy with polypectomy. Past Surgical History:   has a past surgical history that includes Knee arthroscopy; Revision total knee arthroplasty (2/9/11); joint replacement (2006, 2010); amputation; above knee amputation; Upper gastrointestinal endoscopy (11/13/2018); Upper gastrointestinal endoscopy (N/A, 11/13/2018); pr lap,cholecystectomy (N/A, 11/14/2018); liver biopsy (N/A, 11/14/2018); and Coronary angioplasty with stent (07/2019). Medications:  No current facility-administered medications on file prior to encounter. Current Outpatient Medications on File Prior to Encounter   Medication Sig Dispense Refill    nitroGLYCERIN (NITROSTAT) 0.4 MG SL tablet up to max of 3 total doses. If no relief after 1 dose, call 911. 25 tablet 3    sertraline (ZOLOFT) 50 MG tablet Take 50 mg by mouth daily Indications: Depression      mirtazapine (REMERON) 15 MG tablet Take 7.5 mg by mouth nightly      pregabalin (LYRICA) 100 MG capsule Take 100 mg by mouth 2 times daily.  insulin glargine (LANTUS SOLOSTAR) 100 UNIT/ML injection pen Inject 15 Units into the skin 2 times daily Indications: Insulin Pump       insulin lispro, 1 Unit Dial, (HUMALOG KWIKPEN) 100 UNIT/ML SOPN Inject 10 Units into the skin 3 times daily (before meals) Inject 10 units three times daily with each meal in addition to sliding scale.  ticagrelor (BRILINTA) 90 MG TABS tablet Take 1 tablet by mouth 2 times daily 60 tablet 5    aspirin 81 MG tablet Take 1 tablet by mouth daily 90 tablet 1    carvedilol (COREG) 3.125 MG tablet Take 1 tablet by mouth 2 times daily (with meals) 180 tablet 3    atorvastatin (LIPITOR) 40 MG tablet Take 1 tablet by mouth daily 90 tablet 3    buprenorphine-naloxone (SUBOXONE) 8-2 MG SUBL SL tablet Place 1 tablet under the tongue 2 times daily.        FREESTYLE LANCETS MISC 1 each by Other route 4 times daily (before meals and nightly) Patient to check blood sugar 4 times a day and PRN, he is treated with multiple daily injections of insulin that require correction dosing and has had hypoglycemia. A1C  14.5  ICD code- E11.65, Z79.4 100 each 5    blood glucose test strips (FREESTYLE LITE) strip 1 each by Other route 4 times daily (before meals and nightly) Patient to check blood sugar 4 times a day and PRN, he is treated with multiple daily injections of insulin that require correction dosing and has had hypoglycemia.              A1C  14.5  ICD code- E11.65, Z79.4 100 each 5     Current Facility-Administered Medications   Medication Dose Route Frequency Provider Last Rate Last Dose    dexmedetomidine (PRECEDEX) 400 mcg in sodium chloride 0.9 % 100 mL infusion  0.2 mcg/kg/hr Intravenous Continuous Santosh Millan MD 3.2 mL/hr at 08/22/20 0029 0.2 mcg/kg/hr at 08/22/20 0029    dexmedetomidine HCl in NaCl (PRECEDEX) 400 MCG/100ML infusion             buprenorphine (SUBUTEX) SL tablet 2 mg  2 mg Sublingual PRN Santosh Millan MD        And    cloNIDine (CATAPRES) tablet 0.1 mg  0.1 mg Oral PRN Santosh Millan MD        atorvastatin (LIPITOR) tablet 40 mg  40 mg Oral Daily Lolita Turner MD        carvedilol (COREG) tablet 3.125 mg  3.125 mg Oral BID  Kateryna Hoyt MD   3.125 mg at 08/21/20 1832    hydrOXYzine (ATARAX) tablet 25 mg  25 mg Oral TID PRN Lolita Turner MD        lamoTRIgine (LAMICTAL) tablet 100 mg  100 mg Oral Daily Lolita Turner MD        nitroGLYCERIN (NITROSTAT) SL tablet 0.4 mg  0.4 mg Sublingual Q5 Min PRN Lolita Turner MD        sertraline (ZOLOFT) tablet 50 mg  50 mg Oral Daily Lolita Turner MD        pregabalin (LYRICA) capsule 100 mg  100 mg Oral BID Lolita Turner MD   100 mg at 08/21/20 2126    ticagrelor (BRILINTA) tablet 90 mg  90 mg Oral BID Lolita Turner MD   90 mg at 08/21/20 2126    cefepime (MAXIPIME) 1 g IVPB minibag  1 g Intravenous Q12H Lolita Turner MD   Stopped at 08/22/20 2673    sodium chloride flush 0.9 % injection 10 mL  10 mL Intravenous 2 times per day Arlene Mckenna MD        sodium chloride flush 0.9 % injection 10 mL  10 mL Intravenous PRN Arlene Mckenna MD        acetaminophen (TYLENOL) tablet 650 mg  650 mg Oral Q6H PRN Arlene Mckenna MD        Or   [employer] acetaminophen (TYLENOL) suppository 650 mg  650 mg Rectal Q6H PRN Arlene Mckenna MD        polyethylene glycol (GLYCOLAX) packet 17 g  17 g Oral Daily PRN Arlene Mckenna MD        promethazine (PHENERGAN) tablet 12.5 mg  12.5 mg Oral Q6H PRN Arlene Mckenna MD        Or    ondansetron (ZOFRAN) injection 4 mg  4 mg Intravenous Q6H PRN Arlene Mckenna MD        dextrose 50 % IV solution  12.5 g Intravenous PRN Arlene Mckenna MD        dextrose 5 % solution  100 mL/hr Intravenous PRN Arlene Mckenna MD        vancomycin 1000 mg IVPB in 250 mL D5W addavial  1,000 mg Intravenous Q12H Arlene Mckenna MD   Stopped at 08/22/20 0421    dexamethasone (DECADRON) 20 mg in sodium chloride 0.9 % IVPB  20 mg Intravenous Daily Chloé Traylor MD        insulin regular (HUMULIN R;NOVOLIN R) 100 Units in sodium chloride 0.9 % 100 mL infusion  0.5 Units/hr Intravenous Continuous Chloé Traylor MD   Stopped at 08/22/20 0518    glucose (GLUTOSE) 40 % oral gel 15 g  15 g Oral PRN Chloé Traylor MD        glucagon (rDNA) injection 1 mg  1 mg Intramuscular PRN Chloé Traylor MD        aspirin chewable tablet 81 mg  81 mg Oral Daily Chloé Traylor MD        perflutren lipid microspheres (DEFINITY) injection 1.65 mg  1.5 mL Intravenous ONCE PRN Chloé Traylor MD        enoxaparin (LOVENOX) injection 40 mg  40 mg Subcutaneous BID Santosh Millan MD        pregabalin (LYRICA) 25 MG capsule             pregabalin (LYRICA) 25 MG capsule             LORazepam (ATIVAN) injection 2 mg  2 mg Intravenous Q3H PRN Kyle Arthur MD   2 mg at 08/21/20 2140    furosemide (LASIX) injection 40 mg  40 mg Intravenous BID Mireya King MD           Allergies: Allergies   Allergen Reactions    Darvocet [Propoxyphene N-Acetaminophen] Nausea Only    Naprosyn [Naproxen] Rash    Ultram [Tramadol Hcl] Rash        Social History:   reports that she quit smoking about 7 weeks ago. She has a 43.00 pack-year smoking history. She has never used smokeless tobacco. She reports previous drug use. Drug: Opiates . She reports that she does not drink alcohol. Family History:  family history includes Cancer in her father and sister; Seizures in her brother. ,     Physical Exam:  BP 97/65   Pulse 76   Temp 96.7 °F (35.9 °C) (Temporal)   Resp 26   Ht 5' 2\" (1.575 m)   Wt 136 lb 11.2 oz (62 kg)   LMP 11/15/2017   SpO2 99%   BMI 25.00 kg/m²     General appearance:  Appears comfortable. Well nourished  Eyes: Sclera clear, pupils equal  ENT: Moist mucus membranes, no thrush. Trachea midline. Cardiovascular: Regular rhythm, normal S1, S2. No murmur, gallop, rub. No edema in lower extremities  Respiratory: Clear to auscultation bilaterally, no wheeze, good inspiratory effort  Gastrointestinal: Abdomen soft, non-tender, not distended, normal bowel sounds  Musculoskeletal: No cyanosis in digits, neck supple  Neurology: Cranial nerves grossly intact. Alert and oriented in time, place and person. No speech or motor deficits  Psychiatry: Appropriate affect.  Not agitated  Skin: Warm, dry, normal turgor, no rash    Labs:  CBC:   Lab Results   Component Value Date    WBC 17.0 08/21/2020    RBC 4.37 08/21/2020    HGB 12.9 08/21/2020    HCT 39.6 08/21/2020    MCV 90.7 08/21/2020    MCH 29.5 08/21/2020    MCHC 32.5 08/21/2020    RDW 13.2 08/21/2020     08/21/2020    MPV 10.9 08/21/2020     BMP:    Lab Results   Component Value Date     08/21/2020    K 4.1 08/21/2020    CL 96 08/21/2020    CO2 19 08/21/2020    BUN 26 08/21/2020    CREATININE 0.7 08/21/2020    CALCIUM 8.4 08/21/2020    GFRAA >60 08/21/2020    GFRAA >60 02/14/2012    LABGLOM >60 08/21/2020    LABGLOM 131.7 05/20/2011    GLUCOSE 511 08/21/2020    GLUCOSE 329 05/20/2011       Chest Xray:   EKG:        Problem List  Active Problems:    Acute on chronic respiratory failure ([facility] [address]    Suspected COVID-19 virus infection    Abnormal CT scan  Resolved Problems:    * No resolved hospital problems. *        Assessment/Plan:         1. Sepsis POA  With leucocytosis , lactic acidosis, procalcitonin, tachycardia  Chest CT is neg for PNA  Cultures including COVID  Are pending  On IV vanc and cefepime  Droplet precaution per protocol      2. Acute on chronic resp failure  On high flow NRB  Admitted to ICU  ABGs are normal    Acute on chronic CHF  Pro bnp is elevated. Chest CT showed cardiomegaly  Lasix   ECHO pending    DM with hyperglycemia  On dexamethasone  On insulin gtt now    Chronic pain andh/o opiate abuse  Cont home dose of suboxone           Admit as inpatient. I anticipate hospitalization spanning more than two midnights for investigation and treatment of the above medically necessary diagnoses.       Elvis Mathews MD    08/2120

## 2020-08-22 NOTE — OP NOTE
Central Venous Catheter Insertion Procedure Note    Procedure: Insertion of Central Venous Catheter    Indications:  Acute hypoxic respiratory failure    Procedure Details   Informed consent was obtained for the procedure, including sedation. Risks of lung perforation, hemorrhage, arrhythmia, and adverse drug reaction were discussed. Under sterile conditions the skin above the on the right femoral vein was prepped with betadine and covered with a sterile drape. Local anesthesia was applied to the skin and subcutaneous tissues. A 22-gauge needle was used to identify the vein. An 18-gauge needle was then inserted into the vein. A guide wire was then passed easily through the catheter. There were no arrhythmias. The catheter was then withdrawn. A 7.0 Yemeni triple-lumen was then inserted into the vessel over the guide wire. The catheter was sutured into place. EBL: 5CC    Findings: There were no changes to vital signs. Catheter was flushed with 10 cc NS. Patient did tolerate procedure well.     Recommendations:  Can use the line immediately      Total time of procedure 15 minutes

## 2020-08-22 NOTE — PROGRESS NOTES
PRN  enoxaparin (LOVENOX) injection 40 mg, BID  LORazepam (ATIVAN) injection 2 mg, Q3H PRN  furosemide (LASIX) injection 40 mg, BID        Objective:  /67   Pulse 81   Temp 97.6 °F (36.4 °C) (Temporal)   Resp (!) 33   Ht 5' 2\" (1.575 m)   Wt 136 lb 11.2 oz (62 kg)   LMP 11/15/2017   SpO2 98%   BMI 25.00 kg/m²     Intake/Output Summary (Last 24 hours) at 8/22/2020 1522  Last data filed at 8/22/2020 1400  Gross per 24 hour   Intake 1331 ml   Output 2800 ml   Net -1469 ml      Wt Readings from Last 3 Encounters:   08/22/20 136 lb 11.2 oz (62 kg)   08/11/20 145 lb (65.8 kg)   08/02/20 120 lb 4.8 oz (54.6 kg)       General appearance: lying in bed on bipap  HEENT: atraumatic, Pupils equal, muscous membranes moist, no masses appreciated  Cardiovascular: Regular rate and rhythm no murmurs appreciated  Respiratory:tachpnic bibasial crackles  Gastrointestinal: Abdomen soft, non-tender, BS+  EXT: no edema  Neurology: no gross focal deficts  Psychiatry:calm on sedation  Skin: Warm, dry, no rashes appreciated    Labs and Tests:  CBC:   Recent Labs     08/21/20  1139 08/22/20  1039   WBC 17.0* 21.9*   HGB 12.9 12.7    232     BMP:    Recent Labs     08/21/20  1139 08/22/20  1039   * 136   K 4.1 4.1   CL 96* 100   CO2 19* 27   BUN 26* 38*   CREATININE 0.7 0.6   GLUCOSE 511* 148*     Hepatic:   Recent Labs     08/21/20  1139   AST 39*   ALT 14   BILITOT 0.7   ALKPHOS 149*     XR CHEST PORTABLE   Final Result   Increasing bilateral airspace disease. Vascular indistinctness suggests a   component of fluid overload-pulmonary edema         CT ABDOMEN PELVIS W IV CONTRAST Additional Contrast? None   Final Result   1. Congestive heart failure. 2. Mediastinal adenopathy, likely reactive. 3. Trace pelvic ascites. CT CHEST PULMONARY EMBOLISM W CONTRAST   Final Result   1. Congestive heart failure. 2. Mediastinal adenopathy, likely reactive. 3. Trace pelvic ascites.          XR CHEST PORTABLE Final Result   Bilateral pneumonia versus pulmonary edema             Recent imaging reviewed    Problem List  Active Problems:    Acute on chronic respiratory failure ([address]    Suspected COVID-19 virus infection    Abnormal CT scan  Resolved Problems:    * No resolved hospital problems.  *       Assessment & Plan:   Acute on chronic hypoxic respiratory failure secondary to acute on chronic systolic heart failure exacerbation and sepsis secondary to possible bacterial pneumonia COVID testing negative thus far  -  Trend leukocytosis  May be steroid compenent received dexamethasone, continue atbx  - iv lasix 40mg BID  - wean of bipap as toelrated    Acute metabolic encephalopathy and delirium likely secondary to polysubstance abuse with withdrawal  - precedex gtt  - drug screed    dm 2 with hyperglycemia transition of insulin drip start Lantus and lispro    Diet: Diet NPO Effective Now  Code:Full Code  DVT PPXlovenox  Disposition pending stabilization       Delon Coyle MD   8/22/2020 3:22 PM

## 2020-08-22 NOTE — PROGRESS NOTES
Critical Care Visit    Name:  Katherine Garcia Date/Time of Admission: 2020 11:26 AM    CSN: [id number] Attending Provider: Ginny Swanson MD   Room/Bed: [id number]-1117/1662-82 : 1965 Age: 60 y.o. SUBJECTIVE     Patient is seen for reevaluation of   [x]  Respiratory failure   []  Severe sepsis   []  Shock/hypotension   []  GI bleeding   []  Uncontrolled diabetes/diabetic emergency    []  Hypertensive emergency/uncontrolled hypertension  []COPD/COPD exacerbation   []Asthma/asthma exacerbation   []Pulmonary nodule and/or other abnormal chest imaging   []  Pneumonia   []SOB    []ILD and/or sarcoidosis  []  Substance abuse/drug overdose   []  Alcohol withdrawal/delirium  []  Seizure/status epilepticus   [] other:    Worsening hypoxia overnight. This morning she was on 40 L of Vapotherm with FiO2 of 100%. Afebrile. COVID-19 testing came back negative on several occasions. Rmdezevir discontinued. Insulin drip has not been started as patient has well-controlled on her blood sugars. I ordered and reviewed ABGs, as below, which showed no evidence of hypercapnia. OBJECTIVE     INTAKE/OUTPUT:  I/O last 3 completed shifts: In: 9262 [I.V.:956; IV Piggyback:250]  Out: 2350 [Urine:2350]  No intake/output data recorded.     FiO2 or VENTILATOR SETTINGS:  FiO2 : 100 %    Rate:     MODE:    FiO2:  100 %  PEEP:     PAP:         CURRENT MEDS:   dexmedetomidine HCl in NaCl        insulin glargine  0.25 Units/kg Subcutaneous Daily    insulin lispro  6 Units Subcutaneous TID WC    insulin lispro  0-12 Units Subcutaneous TID WC    insulin lispro  0-6 Units Subcutaneous Nightly    atorvastatin  40 mg Oral Daily    carvedilol  3.125 mg Oral BID WC    lamoTRIgine  100 mg Oral Daily    sertraline  50 mg Oral Daily    pregabalin  100 mg Oral BID    ticagrelor  90 mg Oral BID    cefepime  1 g Intravenous Q12H    sodium chloride flush  10 mL Intravenous 2 times per day    vancomycin  1,000 mg Intravenous Q12H  dexamethasone  20 mg Intravenous Daily    aspirin  81 mg Oral Daily    enoxaparin  40 mg Subcutaneous BID    furosemide  40 mg Intravenous BID        DRIPS:   dexmedetomidine 0.3 mcg/kg/hr (08/22/20 1012)    dextrose      insulin Stopped (08/22/20 0518)       VITAL SIGNS:  Current Vital Signs  /78   Pulse 86   Temp 97.1 °F (36.2 °C) (Temporal)   Resp 29   Ht 5' 2\" (1.575 m)   Wt 136 lb 11.2 oz (62 kg)   LMP 11/15/2017   SpO2 90%   BMI 25.00 kg/m²   FiO2 : 100 %      PHYSICAL EXAM:   GENERAL: Awake, alert. Appears to be acutely ill, appears older than stated age, confused and agitated  NECK: No JVD, trachea midline. CARDIOVASCULAR: S1 and S2 normal.  No murmurs, rubs, or gallops. CHEST: Symmetrical chest expansion, no accessory muscle use. LUNGS: Clear to auscultation bilaterally. No wheezes. No crackles. No rhonchi. ABDOMEN: Soft, nontender, nondistended, normal bowel sounds. EXTREMITIES: No clubbing. No cyanosis. Trace edema of lower extremities. Status post right leg amputation  SKIN: No rashes or ecchymoses, warm to touch. NEUROLOGIC: Nonfocal exam grossly. Confused and agitated. LABS:  Hematology  Recent Labs     08/21/20  1139   WBC 17.0*   HCT 39.6        No results for input(s): PROTIME, INR, PTT in the last 72 hours. Chemistries  Recent Labs     08/21/20  1139   *   K 4.1   CL 96*   CO2 19*   BUN 26*   CREATININE 0.7     Recent Labs     08/21/20  1139   CALCIUM 8.4       LFTs  Recent Labs     08/21/20  1139   AST 39*   ALT 14   ALKPHOS 149*     Recent Labs     08/21/20  1139   LIPASE 6.0*       Arterial Blood Gasses  No results for input(s): PH, PCO2, PO2 in the last 72 hours.     Invalid input(s): Bari Mercado    Cardiac Enzymes  Recent Labs     08/21/20  1139   TROPONINI <0.01       Microbiology   [unfilled]  Last Wound Culture [email or web address]     Imaging (chest imaging was personally reviewed)  [unfilled]      @[facility]@ ASSESSMENT   1. Acute hypoxic respiratory failure  2. Acute CHF exacerbation pulmonary edema  3. Metabolic encephalopathy and delirium concern about drug withdrawal  4. Substance abuse  5. COVID-19 ruled out      PLAN   1. Start CPAP  2. Stat ABGs stat chest x-ray stat proBNP  3. Discontinue steroids   4. Discontinue remdezevir. 5. High risk of requiring intubation  6. Precedex, add as needed Haldol  7. Restraints as needed      [x]  Need to keep Pulido catheter for accurate I/O    []  Need to keep Pulido catheter for urinary retention    I personally reviewed chart, labs, and imaging, interviewed and examined the patient, formulated assessment and plan. Case was reviewed with patient's nurse, critical care team, [] patient's family,  and  [] Dr.       Total critical care time caring for this critically ill patient, including direct patient contact, management of life support systems, review of data including imaging and labs, discussion with other team members and physicians and excluding procedures, 35 minutes today. Please note that this chart was generated using dragon dictation software. Although every effort was made to ensure the accuracy of this automated transcription, some errors in transcription may have occurred.      Electronically Signed:  Calvin Dill MD [facility] 8/22/2020  10:17 AM

## 2020-08-22 NOTE — CARE COORDINATION
CM reviewing chart for d/c planning. Per nursing notes pt in restraints, on precedex drip, on bipap. Pt had hallucinations and admitted to nursing heroin use and security was called to remove items from pt's purse (see 6 am nursing note). CM will continue to follow for d/c planning.      Michael Purdy RN, BSN  [phone]

## 2020-08-22 NOTE — PROGRESS NOTES
Shift assessment complete, pt was on vapotherm and nonrebreather with saturations mid 80's, pulmonology notified. See new orders. Pt now on bipap 100%, saturations in the 90's. Pt increasing in agitation, precedex increased, see MAR for details.

## 2020-08-23 LAB
ANION GAP SERPL CALCULATED.3IONS-SCNC: 11 MMOL/L (ref 3–16)
BASE EXCESS ARTERIAL: 5.7 MMOL/L (ref -3–3)
BASOPHILS ABSOLUTE: 0 K/UL (ref 0–0.2)
BASOPHILS RELATIVE PERCENT: 0.1 %
BUN BLDV-MCNC: 47 MG/DL (ref 7–20)
CALCIUM SERPL-MCNC: 8.1 MG/DL (ref 8.3–10.6)
CARBOXYHEMOGLOBIN ARTERIAL: 1.2 % (ref 0–1.5)
CHLORIDE BLD-SCNC: 102 MMOL/L (ref 99–110)
CO2: 26 MMOL/L (ref 21–32)
CREAT SERPL-MCNC: 0.6 MG/DL (ref 0.6–1.1)
EOSINOPHILS ABSOLUTE: 0 K/UL (ref 0–0.6)
EOSINOPHILS RELATIVE PERCENT: 0 %
GFR AFRICAN AMERICAN: >60
GFR NON-AFRICAN AMERICAN: >60
GLUCOSE BLD-MCNC: 123 MG/DL (ref 70–99)
GLUCOSE BLD-MCNC: 132 MG/DL (ref 70–99)
GLUCOSE BLD-MCNC: 46 MG/DL (ref 70–99)
GLUCOSE BLD-MCNC: 70 MG/DL (ref 70–99)
GLUCOSE BLD-MCNC: 90 MG/DL (ref 70–99)
GLUCOSE BLD-MCNC: 93 MG/DL (ref 70–99)
HCO3 ARTERIAL: 28.1 MMOL/L (ref 21–29)
HCT VFR BLD CALC: 39.5 % (ref 36–48)
HEMOGLOBIN, ART, EXTENDED: 14 G/DL (ref 12–16)
HEMOGLOBIN: 13 G/DL (ref 12–16)
LACTIC ACID: 1.7 MMOL/L (ref 0.4–2)
LYMPHOCYTES ABSOLUTE: 1.9 K/UL (ref 1–5.1)
LYMPHOCYTES RELATIVE PERCENT: 10.5 %
MCH RBC QN AUTO: 28.5 PG (ref 26–34)
MCHC RBC AUTO-ENTMCNC: 32.9 G/DL (ref 31–36)
MCV RBC AUTO: 86.7 FL (ref 80–100)
METHEMOGLOBIN ARTERIAL: 0 %
MONOCYTES ABSOLUTE: 0.4 K/UL (ref 0–1.3)
MONOCYTES RELATIVE PERCENT: 2.1 %
NEUTROPHILS ABSOLUTE: 16.1 K/UL (ref 1.7–7.7)
NEUTROPHILS RELATIVE PERCENT: 87.3 %
O2 CONTENT ARTERIAL: 17 ML/DL
O2 SAT, ARTERIAL: 88.2 %
O2 THERAPY: ABNORMAL
PCO2 ARTERIAL: 33.2 MMHG (ref 35–45)
PDW BLD-RTO: 13.3 % (ref 12.4–15.4)
PERFORMED ON: ABNORMAL
PERFORMED ON: ABNORMAL
PERFORMED ON: NORMAL
PH ARTERIAL: 7.54 (ref 7.35–7.45)
PLATELET # BLD: 245 K/UL (ref 135–450)
PMV BLD AUTO: 10.5 FL (ref 5–10.5)
PO2 ARTERIAL: 50 MMHG (ref 75–108)
POTASSIUM REFLEX MAGNESIUM: 3.6 MMOL/L (ref 3.5–5.1)
RBC # BLD: 4.55 M/UL (ref 4–5.2)
SODIUM BLD-SCNC: 139 MMOL/L (ref 136–145)
STREP PNEUMONIAE ANTIGEN, URINE: NORMAL
TCO2 ARTERIAL: 65.3 MMOL/L
VANCOMYCIN TROUGH: 20.1 UG/ML (ref 10–20)
WBC # BLD: 18.4 K/UL (ref 4–11)

## 2020-08-23 PROCEDURE — 94660 CPAP INITIATION&MGMT: CPT

## 2020-08-23 PROCEDURE — 2700000000 HC OXYGEN THERAPY PER DAY

## 2020-08-23 PROCEDURE — 2580000003 HC RX 258: Performed by: FAMILY MEDICINE

## 2020-08-23 PROCEDURE — 36600 WITHDRAWAL OF ARTERIAL BLOOD: CPT

## 2020-08-23 PROCEDURE — 99291 CRITICAL CARE FIRST HOUR: CPT | Performed by: INTERNAL MEDICINE

## 2020-08-23 PROCEDURE — 6360000002 HC RX W HCPCS: Performed by: INTERNAL MEDICINE

## 2020-08-23 PROCEDURE — 2000000000 HC ICU R&B

## 2020-08-23 PROCEDURE — 87081 CULTURE SCREEN ONLY: CPT

## 2020-08-23 PROCEDURE — 6360000002 HC RX W HCPCS: Performed by: FAMILY MEDICINE

## 2020-08-23 PROCEDURE — 83605 ASSAY OF LACTIC ACID: CPT

## 2020-08-23 PROCEDURE — 82803 BLOOD GASES ANY COMBINATION: CPT

## 2020-08-23 PROCEDURE — 80048 BASIC METABOLIC PNL TOTAL CA: CPT

## 2020-08-23 PROCEDURE — 80202 ASSAY OF VANCOMYCIN: CPT

## 2020-08-23 PROCEDURE — 2500000003 HC RX 250 WO HCPCS: Performed by: INTERNAL MEDICINE

## 2020-08-23 PROCEDURE — 85025 COMPLETE CBC W/AUTO DIFF WBC: CPT

## 2020-08-23 PROCEDURE — 6370000000 HC RX 637 (ALT 250 FOR IP): Performed by: INTERNAL MEDICINE

## 2020-08-23 PROCEDURE — 94761 N-INVAS EAR/PLS OXIMETRY MLT: CPT

## 2020-08-23 PROCEDURE — 6370000000 HC RX 637 (ALT 250 FOR IP): Performed by: FAMILY MEDICINE

## 2020-08-23 RX ORDER — SODIUM CHLORIDE 0.9 % (FLUSH) 0.9 %
10 SYRINGE (ML) INJECTION EVERY 12 HOURS SCHEDULED
Status: CANCELLED | OUTPATIENT
Start: 2020-08-23

## 2020-08-23 RX ORDER — TETRACAINE HYDROCHLORIDE 5 MG/ML
1 SOLUTION OPHTHALMIC SEE ADMIN INSTRUCTIONS
Status: CANCELLED | OUTPATIENT
Start: 2020-08-23

## 2020-08-23 RX ORDER — INSULIN LISPRO 100 [IU]/ML
0-12 INJECTION, SOLUTION INTRAVENOUS; SUBCUTANEOUS EVERY 4 HOURS
Status: DISCONTINUED | OUTPATIENT
Start: 2020-08-23 | End: 2020-08-25 | Stop reason: ALTCHOICE

## 2020-08-23 RX ORDER — PHENYLEPHRINE HYDROCHLORIDE 25 MG/ML
1 SOLUTION/ DROPS OPHTHALMIC SEE ADMIN INSTRUCTIONS
Status: CANCELLED | OUTPATIENT
Start: 2020-08-23

## 2020-08-23 RX ORDER — SODIUM CHLORIDE 0.9 % (FLUSH) 0.9 %
10 SYRINGE (ML) INJECTION PRN
Status: CANCELLED | OUTPATIENT
Start: 2020-08-23

## 2020-08-23 RX ORDER — DEXTROSE MONOHYDRATE 25 G/50ML
25 INJECTION, SOLUTION INTRAVENOUS PRN
Status: DISCONTINUED | OUTPATIENT
Start: 2020-08-23 | End: 2020-08-29 | Stop reason: HOSPADM

## 2020-08-23 RX ORDER — MILRINONE LACTATE 0.2 MG/ML
0.2 INJECTION, SOLUTION INTRAVENOUS CONTINUOUS
Status: DISCONTINUED | OUTPATIENT
Start: 2020-08-23 | End: 2020-08-24

## 2020-08-23 RX ORDER — MIRTAZAPINE 15 MG/1
7.5 TABLET, FILM COATED ORAL NIGHTLY
Status: DISCONTINUED | OUTPATIENT
Start: 2020-08-23 | End: 2020-08-29 | Stop reason: HOSPADM

## 2020-08-23 RX ORDER — CIPROFLOXACIN HYDROCHLORIDE 3.5 MG/ML
1 SOLUTION/ DROPS TOPICAL SEE ADMIN INSTRUCTIONS
Status: CANCELLED | OUTPATIENT
Start: 2020-08-23

## 2020-08-23 RX ORDER — CYCLOPENTOLATE HYDROCHLORIDE 10 MG/ML
1 SOLUTION/ DROPS OPHTHALMIC SEE ADMIN INSTRUCTIONS
Status: CANCELLED | OUTPATIENT
Start: 2020-08-23

## 2020-08-23 RX ADMIN — DEXMEDETOMIDINE HYDROCHLORIDE 1.1 MCG/KG/HR: 4 INJECTION, SOLUTION INTRAVENOUS at 16:58

## 2020-08-23 RX ADMIN — SERTRALINE HYDROCHLORIDE 50 MG: 50 TABLET, FILM COATED ORAL at 09:04

## 2020-08-23 RX ADMIN — FUROSEMIDE 40 MG: 10 INJECTION, SOLUTION INTRAMUSCULAR; INTRAVENOUS at 09:03

## 2020-08-23 RX ADMIN — ENOXAPARIN SODIUM 40 MG: 40 INJECTION SUBCUTANEOUS at 20:35

## 2020-08-23 RX ADMIN — BUPRENORPHINE HCL 2 MG: 2 TABLET SUBLINGUAL at 12:50

## 2020-08-23 RX ADMIN — MILRINONE LACTATE IN DEXTROSE 0.2 MCG/KG/MIN: 200 INJECTION, SOLUTION INTRAVENOUS at 10:44

## 2020-08-23 RX ADMIN — Medication 10 ML: at 20:36

## 2020-08-23 RX ADMIN — DEXMEDETOMIDINE HYDROCHLORIDE 1.4 MCG/KG/HR: 4 INJECTION, SOLUTION INTRAVENOUS at 10:44

## 2020-08-23 RX ADMIN — ATORVASTATIN CALCIUM 40 MG: 80 TABLET, FILM COATED ORAL at 09:03

## 2020-08-23 RX ADMIN — DEXTROSE MONOHYDRATE 12.5 G: 500 INJECTION PARENTERAL at 11:16

## 2020-08-23 RX ADMIN — CEFEPIME HYDROCHLORIDE 1 G: 1 INJECTION, POWDER, FOR SOLUTION INTRAMUSCULAR; INTRAVENOUS at 14:32

## 2020-08-23 RX ADMIN — LAMOTRIGINE 100 MG: 100 TABLET ORAL at 09:04

## 2020-08-23 RX ADMIN — ENOXAPARIN SODIUM 40 MG: 40 INJECTION SUBCUTANEOUS at 09:03

## 2020-08-23 RX ADMIN — VANCOMYCIN HYDROCHLORIDE 750 MG: 750 INJECTION, POWDER, LYOPHILIZED, FOR SOLUTION INTRAVENOUS at 05:21

## 2020-08-23 RX ADMIN — DEXMEDETOMIDINE HYDROCHLORIDE 0.6 MCG/KG/HR: 4 INJECTION, SOLUTION INTRAVENOUS at 05:20

## 2020-08-23 RX ADMIN — DEXMEDETOMIDINE HYDROCHLORIDE 0.7 MCG/KG/HR: 4 INJECTION, SOLUTION INTRAVENOUS at 23:18

## 2020-08-23 RX ADMIN — FUROSEMIDE 40 MG: 10 INJECTION, SOLUTION INTRAMUSCULAR; INTRAVENOUS at 18:10

## 2020-08-23 RX ADMIN — TICAGRELOR 90 MG: 90 TABLET ORAL at 09:13

## 2020-08-23 RX ADMIN — CLONIDINE HYDROCHLORIDE 0.1 MG: 0.1 TABLET ORAL at 12:50

## 2020-08-23 RX ADMIN — HALOPERIDOL LACTATE 5 MG: 5 INJECTION, SOLUTION INTRAMUSCULAR at 08:40

## 2020-08-23 RX ADMIN — ASPIRIN 81 MG 81 MG: 81 TABLET ORAL at 09:03

## 2020-08-23 RX ADMIN — PREGABALIN 100 MG: 25 CAPSULE ORAL at 09:04

## 2020-08-23 RX ADMIN — CEFEPIME HYDROCHLORIDE 1 G: 1 INJECTION, POWDER, FOR SOLUTION INTRAMUSCULAR; INTRAVENOUS at 03:28

## 2020-08-23 ASSESSMENT — PAIN SCALES - GENERAL
PAINLEVEL_OUTOF10: 10
PAINLEVEL_OUTOF10: 0
PAINLEVEL_OUTOF10: 6
PAINLEVEL_OUTOF10: 9

## 2020-08-23 ASSESSMENT — PAIN DESCRIPTION - PAIN TYPE
TYPE: CHRONIC PAIN
TYPE: CHRONIC PAIN

## 2020-08-23 ASSESSMENT — PAIN DESCRIPTION - LOCATION: LOCATION: BACK

## 2020-08-23 NOTE — PROGRESS NOTES
[facility] PROGRESS NOTE    8/23/2020 11:13 AM        Name: Adelaida Gallardo . Admitted: 8/21/2020  Primary Care Provider: No primary care provider on file. (Tel: None)        Subjective:       In bed on CPAP is quite tachypneic does open eyes and follow commands  Reviewed interval ancillary notes    Current Medications  mirtazapine (REMERON) tablet 7.5 mg, Nightly  vancomycin (VANCOCIN) intermittent dosing (placeholder), RX Placeholder  milrinone (PRIMACOR) 20 mg in dextrose 5 % 100 mL infusion, Continuous  dexmedetomidine (PRECEDEX) 400 mcg in sodium chloride 0.9 % 100 mL infusion, Continuous  buprenorphine (SUBUTEX) SL tablet 2 mg, PRN    And  cloNIDine (CATAPRES) tablet 0.1 mg, PRN  insulin glargine (LANTUS;BASAGLAR) injection pen 16 Units, Daily  insulin lispro (1 Unit Dial) 6 Units, TID WC  insulin lispro (1 Unit Dial) 0-12 Units, TID WC  insulin lispro (1 Unit Dial) 0-6 Units, Nightly  haloperidol lactate (HALDOL) injection 5 mg, Q4H PRN  atorvastatin (LIPITOR) tablet 40 mg, Daily  carvedilol (COREG) tablet 3.125 mg, BID WC  hydrOXYzine (ATARAX) tablet 25 mg, TID PRN  lamoTRIgine (LAMICTAL) tablet 100 mg, Daily  nitroGLYCERIN (NITROSTAT) SL tablet 0.4 mg, Q5 Min PRN  sertraline (ZOLOFT) tablet 50 mg, Daily  pregabalin (LYRICA) capsule 100 mg, BID  ticagrelor (BRILINTA) tablet 90 mg, BID  cefepime (MAXIPIME) 1 g IVPB minibag, Q12H  sodium chloride flush 0.9 % injection 10 mL, 2 times per day  sodium chloride flush 0.9 % injection 10 mL, PRN  acetaminophen (TYLENOL) tablet 650 mg, Q6H PRN    Or  acetaminophen (TYLENOL) suppository 650 mg, Q6H PRN  polyethylene glycol (GLYCOLAX) packet 17 g, Daily PRN  promethazine (PHENERGAN) tablet 12.5 mg, Q6H PRN    Or  ondansetron (ZOFRAN) injection 4 mg, Q6H PRN  dextrose 50 % IV solution, PRN  dextrose 5 % solution, PRN  glucose (GLUTOSE) 40 % oral gel 15 g, PRN  glucagon (rDNA) injection 1 mg, PRN  aspirin chewable tablet 81 mg, Daily  perflutren lipid microspheres (DEFINITY) injection 1.65 mg, ONCE PRN  enoxaparin (LOVENOX) injection 40 mg, BID  LORazepam (ATIVAN) injection 2 mg, Q3H PRN  furosemide (LASIX) injection 40 mg, BID        Objective:  BP (!) 111/90   Pulse 71   Temp 98 °F (36.7 °C) (Temporal)   Resp (!) 41   Ht 5' 2\" (1.575 m)   Wt 137 lb 3.2 oz (62.2 kg)   LMP 11/15/2017   SpO2 90%   BMI 25.09 kg/m²     Intake/Output Summary (Last 24 hours) at 8/23/2020 1113  Last data filed at 8/23/2020 0523  Gross per 24 hour   Intake 705 ml   Output 825 ml   Net -120 ml      Wt Readings from Last 3 Encounters:   08/23/20 137 lb 3.2 oz (62.2 kg)   08/11/20 145 lb (65.8 kg)   08/02/20 120 lb 4.8 oz (54.6 kg)       General appearance: lying in bed on CPAP tachypneic  HEENT: atraumatic, Pupils equal, muscous membranes moist, no masses appreciated  Cardiovascular: Regular rate and rhythm no murmurs appreciated  Respiratory:tachpnic bibasial crackles  Gastrointestinal: Abdomen soft, non-tender, BS+  EXT: right aka  Neurology:  does open eyes and follow commands  Psychiatry:calm   Skin: Warm, dry, no rashes appreciated    Labs and Tests:  CBC:   Recent Labs     08/21/20  1139 08/22/20  1039 08/23/20  0240   WBC 17.0* 21.9* 18.4*   HGB 12.9 12.7 13.0    232 245     BMP:    Recent Labs     08/21/20  1139 08/22/20  1039 08/23/20  0240   * 136 139   K 4.1 4.1 3.6   CL 96* 100 102   CO2 19* 27 26   BUN 26* 38* 47*   CREATININE 0.7 0.6 0.6   GLUCOSE 511* 148* 123*     Hepatic:   Recent Labs     08/21/20  1139   AST 39*   ALT 14   BILITOT 0.7   ALKPHOS 149*     XR CHEST PORTABLE   Final Result   Increasing bilateral airspace disease. Vascular indistinctness suggests a   component of fluid overload-pulmonary edema         CT ABDOMEN PELVIS W IV CONTRAST Additional Contrast? None   Final Result   1. Congestive heart failure. 2. Mediastinal adenopathy, likely reactive.    3. Trace pelvic ascites. CT CHEST PULMONARY EMBOLISM W CONTRAST   Final Result   1. Congestive heart failure. 2. Mediastinal adenopathy, likely reactive. 3. Trace pelvic ascites. XR CHEST PORTABLE   Final Result   Bilateral pneumonia versus pulmonary edema             Recent imaging reviewed    Problem List  Active Problems:    Acute on chronic respiratory failure ([address]    Suspected COVID-19 virus infection    Abnormal CT scan  Resolved Problems:    * No resolved hospital problems.  *       Assessment & Plan:   Acute on chronic hypoxic respiratory failure secondary to acute on chronic systolic heart failure exacerbation and sepsis secondary to possible bacterial pneumonia COVID testing negative thus far  -  Trend leukocytosis  May be steroid compenent received dexamethasone, continue atbx  - iv lasix 40mg BID, adding on milrinone per cardiology  -Check MRSA swab      Acute metabolic encephalopathy and delirium likely secondary to polysubstance abuse with withdrawal  - precedex gtt      dm 2 with hyperglycemia lantus + lispro    Diet: Diet NPO Effective Now  Code:Full Code  DVT PPXlovenox  Disposition pending stabilization       Wilma Roberson MD   8/23/2020 11:13 AM

## 2020-08-23 NOTE — PROGRESS NOTES
Patient was hypoglycemic with noon BG check. 12.5 grams of Dextrose given IVP. Recheck glucose was 132. Patient now every 4 hour checks while NPO on CPAP.

## 2020-08-23 NOTE — PROGRESS NOTES
Patient remains on CPAP and Precedex infusion per Pulmonology order. ABG completed this morning per Hospitalist and tried patient on Vapotherm. At that time, COWS assessment completed and I was able to give daily PO medications with applesauce. Now patient is back on CPAP, Precedex titration for patient comfort.

## 2020-08-23 NOTE — PROGRESS NOTES
Clinical Pharmacy Note: Pharmacy to Dose Vancomcyin    Vancomycin Day: 3  Current Dosin mg Q12      Recent Labs     20  1039 20  0240   BUN 38* 47*       Recent Labs     20  1039 20  0240   CREATININE 0.6 0.6       Recent Labs     20  1039 20  0240   WBC 21.9* 18.4*         Intake/Output Summary (Last 24 hours) at 2020 0856  Last data filed at 2020 0523  Gross per 24 hour   Intake 705 ml   Output 825 ml   Net -120 ml         Ht Readings from Last 1 Encounters:   20 5' 2\" (1.575 m)        Wt Readings from Last 1 Encounters:   20 137 lb 3.2 oz (62.2 kg)         Body mass index is 25.09 kg/m². Estimated Creatinine Clearance: 90 mL/min (based on SCr of 0.6 mg/dL). Trough/Random: 20.1    Assessment/Plan:  Vancomycin level is supratherapeutic. Level was drawn inappropriately in respect to last dose given- trough was drawn approximately 2 hours late, true trough is likely much higher. Plan to hold remainder of vancomycin doses today. A vancomycin random level has been ordered for 20 with AM labs. Changes in regimen will be determined based on culture results, renal function, and clinical response. Pharmacy will continue to monitor and adjust regimen as necessary.     Thank you for the consult,    Bernadette Powers, PharmD  Clinical Pharmacist [id number]  2020

## 2020-08-23 NOTE — PROGRESS NOTES
Patient continues to maintain adequate oxygenation and tolerating bipap. spO2 of high 90s. Repirations are still elevated in the mid to high 30s, however. No s/s of distress noted at this time.

## 2020-08-23 NOTE — CONSULTS
[facility]   Cardiac Evaluation      Patient: Luis Blount  YOB: 1965         Chief Complaint   Patient presents with    Emesis     pt with c/o emesis x 2-3 days, abd pain, diarrhea. states emesis x 3 since midnight today. no fever at home        Referring provider: No primary care provider on file. History of Present Illness:   59 yo WF admitted with abdominal pain and vomiting and found to have chf with hypoxia. She was here 8/2/20 with CHF exacerbation and left AMA. COVID then and now is normal.  She now has sepsis and is on bipap continuously; if not her sats drop to the 60s   Although not overtly fluid overloaded by exam her CXR shows worsening pulmonary edema. She has multiple medical issues including ischemic cardiomyopathy with last EF=40%, polysubstance abuse with hep C, HTN and AODM. She has been on suboxone in the past. Nurses have found many unlabeled pills in her purse. Drug screen positive for cannibis. Last coronary intervention was 2/20 of the LAD for instent thrombosis of the LAD/DX. Past Medical History:   has a past medical history of Arthritis, CHF (congestive heart failure) ([address] Depression, Diabetes mellitus ([facility] [address] Heart attack ([facility] [address] Hepatitis C, Hypercholesteremia, Hypertension, MRSA (methicillin resistant Staphylococcus aureus) infection, Pneumonia, and S/P colonoscopy with polypectomy. Surgical History:   has a past surgical history that includes Knee arthroscopy; Revision total knee arthroplasty (2/9/11); joint replacement (2006, 2010); amputation; above knee amputation; Upper gastrointestinal endoscopy (11/13/2018); Upper gastrointestinal endoscopy (N/A, 11/13/2018); pr lap,cholecystectomy (N/A, 11/14/2018); liver biopsy (N/A, 11/14/2018); and Coronary angioplasty with stent (07/2019).      Current Facility-Administered Medications   Medication Dose Route Frequency Provider Last Rate Last Dose    mirtazapine (REMERON) tablet 7.5 mg  7.5 mg Oral Nightly Chasity Shell MD        vancomycin [facility]) intermittent dosing (placeholder)   Other RX Placeholder Chasity Shell MD        dexmedetomidine [facility]) 400 mcg in sodium chloride 0.9 % 100 mL infusion  0.2 mcg/kg/hr Intravenous Continuous Santosh Millan MD 11.1 mL/hr at 08/23/20 0550 0.7 mcg/kg/hr at 08/23/20 0550    buprenorphine (SUBUTEX) SL tablet 2 mg  2 mg Sublingual PRN Santosh Wharton MD        And    cloNIDine (CATAPRES) tablet 0.1 mg  0.1 mg Oral PRN Santosh Wharton MD        insulin glargine (LANTUS;BASAGLAR) injection pen 16 Units  0.25 Units/kg Subcutaneous Daily Sangeetha Rhodes MD   16 Units at 08/22/20 1239    insulin lispro (1 Unit Dial) 6 Units  6 Units Subcutaneous TID  Sangeetha Rhodes MD        insulin lispro (1 Unit Dial) 0-12 Units  0-12 Units Subcutaneous TID  Sangeetha Rhodes MD        insulin lispro (1 Unit Dial) 0-6 Units  0-6 Units Subcutaneous Nightly Sangeetha Rhodes MD   1 Units at 08/22/20 2012    haloperidol lactate (HALDOL) injection 5 mg  5 mg Intravenous Q4H PRN Chasity Shell MD   5 mg at 08/23/20 0840    atorvastatin (LIPITOR) tablet 40 mg  40 mg Oral Daily Zaheer Jimenez MD   40 mg at 08/23/20 0903    carvedilol (COREG) tablet 3.125 mg  3.125 mg Oral BID  Zaheer Jimenez MD   Stopped at 08/22/20 0848    hydrOXYzine (ATARAX) tablet 25 mg  25 mg Oral TID PRN Zaheer Jimenez MD        lamoTRIgine (LAMICTAL) tablet 100 mg  100 mg Oral Daily Kateryna Hoyt MD   100 mg at 08/23/20 0904    nitroGLYCERIN (NITROSTAT) SL tablet 0.4 mg  0.4 mg Sublingual Q5 Min PRN Zaheer Jimenez MD        sertraline (ZOLOFT) tablet 50 mg  50 mg Oral Daily Zaheer Jimenez MD   50 mg at 08/23/20 0904    pregabalin (LYRICA) capsule 100 mg  100 mg Oral BID Zaheer Jimenez MD   100 mg at 08/23/20 0904    ticagrelor (BRILINTA) tablet 90 mg  90 mg Oral BID Zaheer Jimenez MD   90 mg at 08/23/20 0913    cefepime (MAXIPIME) 1 g IVPB minibag  1 g Intravenous Q12H Rossana Baca MD   Stopped at 08/23/20 0400    sodium chloride flush 0.9 % injection 10 mL  10 mL Intravenous 2 times per day Rossana Baca MD   10 mL at 08/22/20 2014    sodium chloride flush 0.9 % injection 10 mL  10 mL Intravenous PRN Rossana Baca MD        acetaminophen (TYLENOL) tablet 650 mg  650 mg Oral Q6H PRN Rossana Baca MD        Or   [address] [place] [address] Seferino acetaminophen (TYLENOL) suppository 650 mg  650 mg Rectal Q6H PRN Rossana Baca MD        polyethylene glycol (GLYCOLAX) packet 17 g  17 g Oral Daily PRN Rossana Baca MD        promethazine (PHENERGAN) tablet 12.5 mg  12.5 mg Oral Q6H PRN Rossana Baca MD        Or    ondansetron (ZOFRAN) injection 4 mg  4 mg Intravenous Q6H PRN Rossana Baca MD        dextrose 50 % IV solution  12.5 g Intravenous PRN Rossana Baca MD        dextrose 5 % solution  100 mL/hr Intravenous PRN Rossana Baca MD        glucose (GLUTOSE) 40 % oral gel 15 g  15 g Oral PRN Debo Lira MD        glucagon (rDNA) injection 1 mg  1 mg Intramuscular PRN Debo Lira MD        aspirin chewable tablet 81 mg  81 mg Oral Daily Debo Lira MD   81 mg at 08/23/20 0903    perflutren lipid microspheres (DEFINITY) injection 1.65 mg  1.5 mL Intravenous ONCE PRN Debo Lira MD        enoxaparin (LOVENOX) injection 40 mg  40 mg Subcutaneous BID Santosh Millan MD   40 mg at 08/23/20 0903    LORazepam (ATIVAN) injection 2 mg  2 mg Intravenous Q3H PRN Santosh Millan MD   2 mg at 08/22/20 1009    furosemide (LASIX) injection 40 mg  40 mg Intravenous BID Abby Stoner MD   40 mg at 08/23/20 5571       Allergies:  Darvocet [propoxyphene n-acetaminophen];  Naprosyn [naproxen]; and Ultram [tramadol hcl]     Social History:  Social History     Socioeconomic History    Marital status: Single     Spouse name: Not on file    Number of children: Not on file    Years of education: Not on file    Highest education level: Not on file   Occupational History    Occupation: disabled   Social Needs    Financial resource strain: Not on file    Food insecurity     Worry: Not on file     Inability: Not on file   [employer] needs     Medical: Not on file     Non-medical: Not on file   Tobacco Use    Smoking status: Former Smoker     Packs/day: 1.00     Years: 43.00     Pack years: 43.00     Last attempt to quit: 2020     Years since quittin.1    Smokeless tobacco: Never Used   Substance and Sexual Activity    Alcohol use: No    Drug use: Not Currently     Types: Opiates      Comment: opiate/THC    Sexual activity: Not Currently   Lifestyle    Physical activity     Days per week: Not on file     Minutes per session: Not on file    Stress: Not on file   Relationships    Social connections     Talks on phone: Not on file     Gets together: Not on file     Attends [employer] service: Not on file     Active member of club or organization: Not on file     Attends meetings of clubs or organizations: Not on file     Relationship status: Not on file    Intimate partner violence     Fear of current or ex partner: Not on file     Emotionally abused: Not on file     Physically abused: Not on file     Forced sexual activity: Not on file   Other Topics Concern    Not on file   Social History Narrative    Not on file       Family History:   Family History   Problem Relation Age of Onset    Cancer Father     Seizures Brother     Cancer Sister         colon     Family history has been reviewed and not pertinent except as noted above. Review of Systems:   · Cannot be performed due to bipap and precedex. Physical Examination:    Vitals:    20 0600 20 0615 20 0630 20 0811   BP: 104/61 (!) 111/90     Pulse: 69 70 71    Resp: (!) 39 (!) 40 (!) 38 (!) 45   Temp:       TempSrc:       SpO2: 95% 94% 94%    Weight:       Height:         Body mass index is 25.09 kg/m².      Wt Readings from Last 3 Encounters:   08/23/20 137 lb 3.2 oz (62.2 kg)   08/11/20 145 lb (65.8 kg)   08/02/20 120 lb 4.8 oz (54.6 kg)      BP Readings from Last 3 Encounters:   08/23/20 (!) 111/90   08/11/20 119/75   08/02/20 (!) 102/59        Physical Examination:    · CONSTITUTIONAL: Well developed, well nourished, on bipap and sedated  · EYES: PERRLA. No xanthelasma, sclera non icteric  · EARS,NOSE,MOUTH,THROAT:  Mucous membranes moist,  · NECK: Supple, JVP normal, thyroid not enlarged. Carotids 2+ without bruits  · RESPIRATORY: diminished   · CARDIOVASCULAR: Normal PMI, regular rate and rhythm, no murmurs, rub or gallop. No edema. Radial pulses present and equal  · CHEST: No scar or masses  · ABDOMEN: Normal bowel sounds. No masses or tenderness. No bruit  · MUSCULOSKELETAL: No clubbing or cyanosis. Right AKA  · SKIN:  Warm and dry. No rashes  · NEUROLOGIC: sedated on precedex  · PSYCHIATRIC: Cannot assess. Assessment/Plan  1. Acute respiratory failure with hypoxia (HCC) worsening by xray despite marked prerenal azotemia. Continue IV lasix. BNP 9K down to 6K BUN/Cr 47/0.6. I will start some low dose milrinone. 2. Sepsis with hypoxia on IV vanc and cefepime. BC neg on 8/21    3. Acute pulmonary edema (HCC) - CT chest with chf and mediastinal adenopathy; no pneumonia   4. Ischemic cmp with EF 40% and PCI within the past 6 months of restenosis of the LAD. Plan; Continue diuresis due to radiographic pulmonary edema. Continue Brilinta. Treat sepsis although source is unknown. Due to the high probability of clinically significant life threating deterioration of the patient's condition that required my urgent intervention, a total critical care time 35 minutes was used. This time excludes any time that may have been spent performing procedures.  This includes but not limited to vital sign monitoring, telemetry monitoring, continuous pulse oximety, IV medication, clinical response to the IV medications, documentation

## 2020-08-23 NOTE — PROGRESS NOTES
Critical Care Visit    Name:  Carin Edouard Date/Time of Admission: 2020 11:26 AM    CSN: [id number] Attending Provider: Yuko Haq MD   Room/Bed: [id number]/2583-35 : 1965 Age: 60 y.o. SUBJECTIVE     Patient is seen for reevaluation of   [x]  Respiratory failure   []  Severe sepsis   []  Shock/hypotension   []  GI bleeding   []  Uncontrolled diabetes/diabetic emergency    []  Hypertensive emergency/uncontrolled hypertension  []COPD/COPD exacerbation   []Asthma/asthma exacerbation   []Pulmonary nodule and/or other abnormal chest imaging   []  Pneumonia   []SOB    []ILD and/or sarcoidosis  []  Substance abuse/drug overdose   []  Alcohol withdrawal/delirium  []  Seizure/status epilepticus   [] other: On Precedex at 0.7. On Bipap; desaturates off bipap. More agitated. proBNP was 6000. OBJECTIVE     INTAKE/OUTPUT:  I/O last 3 completed shifts: In: 705 [I.V.:705]  Out: 825 [Urine:825]  No intake/output data recorded.     FiO2 or VENTILATOR SETTINGS:  FiO2 : 60 %    Rate:     MODE:    FiO2:  60 %  PEEP:     PAP:         CURRENT MEDS:   vancomycin  750 mg Intravenous Q12H    insulin glargine  0.25 Units/kg Subcutaneous Daily    insulin lispro  6 Units Subcutaneous TID WC    insulin lispro  0-12 Units Subcutaneous TID WC    insulin lispro  0-6 Units Subcutaneous Nightly    atorvastatin  40 mg Oral Daily    carvedilol  3.125 mg Oral BID WC    lamoTRIgine  100 mg Oral Daily    sertraline  50 mg Oral Daily    pregabalin  100 mg Oral BID    ticagrelor  90 mg Oral BID    cefepime  1 g Intravenous Q12H    sodium chloride flush  10 mL Intravenous 2 times per day    aspirin  81 mg Oral Daily    enoxaparin  40 mg Subcutaneous BID    furosemide  40 mg Intravenous BID        DRIPS:   dexmedetomidine 0.7 mcg/kg/hr (20 0550)    dextrose         VITAL SIGNS:  Current Vital Signs  BP (!) 111/90   Pulse 71   Temp 98 °F (36.7 °C) (Temporal)   Resp (!) 45   Ht 5' 2\" (1.575 m)   Wt 137 lb 3.2 oz (62.2 kg)   LMP 11/15/2017   SpO2 94%   BMI 25.09 kg/m²   FiO2 : 60 %      PHYSICAL EXAM:   GENERAL: Awake, agitated, confused. Appears to be acutely ill  NECK: No JVD, trachea midline. CARDIOVASCULAR: S1 and S2 normal.  No murmurs, rubs, or gallops. CHEST: Symmetrical chest expansion, no accessory muscle use. LUNGS: Clear to auscultation bilaterally. No wheezes. No crackles. No rhonchi. ABDOMEN: Soft, nontender, nondistended, normal bowel sounds. EXTREMITIES: No clubbing. No cyanosis. Trace edema of lower extremities. Status post right leg amputation  SKIN: No rashes or ecchymoses, warm to touch. NEUROLOGIC: Nonfocal exam grossly. Confused and agitated. LABS:  Hematology  Recent Labs     08/23/20  0240   WBC 18.4*   HCT 39.5        No results for input(s): PROTIME, INR, PTT in the last 72 hours. Chemistries  Recent Labs     08/23/20  0240      K 3.6      CO2 26   BUN 47*   CREATININE 0.6     Recent Labs     08/23/20  0240   CALCIUM 8.1*       LFTs  Recent Labs     08/21/20  1139   AST 39*   ALT 14   ALKPHOS 149*     Recent Labs     08/21/20  1139   LIPASE 6.0*       Arterial Blood Gasses  No results for input(s): PH, PCO2, PO2 in the last 72 hours. Invalid input(s): Brent Soto    Cardiac Enzymes  Recent Labs     08/21/20  1139   TROPONINI <0.01       Microbiology   [unfilled]  [facility] Wound Culture [email or web address]     Imaging (chest imaging was personally reviewed)  [unfilled]      [unfilled]      ASSESSMENT   1. Acute hypoxic respiratory failure  2. Acute CHF exacerbation  3. pulmonary edema  4. Metabolic encephalopathy and delirium. 5. Substance abuse  6. COVID-19 ruled out      PLAN   1. CPAP nonstop. 2. Follow imaging and blood gases  3. High risk of requiring intubation  4. Precedex, as needed Haldol  5. Restraints as needed  6.  Keep n.p.o.  7. Diuresis as tolerated      [x]  Need to keep Pulido catheter for accurate I/O    []  Need to keep Pulido catheter for urinary retention    I personally reviewed chart, labs, and imaging, interviewed and examined the patient, formulated assessment and plan. Case was reviewed with patient's nurse, critical care team, [] patient's family,  and  [] Dr.       Total critical care time caring for this critically ill patient, including direct patient contact, management of life support systems, review of data including imaging and labs, discussion with other team members and physicians and excluding procedures, 32 minutes today. Please note that this chart was generated using dragon dictation software. Although every effort was made to ensure the accuracy of this automated transcription, some errors in transcription may have occurred.      Electronically Signed:  Emmett Garnica MD [facility] 8/23/2020  8:28 AM

## 2020-08-24 ENCOUNTER — APPOINTMENT (OUTPATIENT)
Dept: GENERAL RADIOLOGY | Age: 55
DRG: 720 | End: 2020-08-24
Payer: COMMERCIAL

## 2020-08-24 PROBLEM — J96.01 ACUTE RESPIRATORY FAILURE WITH HYPOXIA (HCC): Status: ACTIVE | Noted: 2020-08-21

## 2020-08-24 LAB
ANION GAP SERPL CALCULATED.3IONS-SCNC: 12 MMOL/L (ref 3–16)
BASE EXCESS ARTERIAL: 8 (ref -3–3)
BASOPHILS ABSOLUTE: 0 K/UL (ref 0–0.2)
BASOPHILS RELATIVE PERCENT: 0.1 %
BUN BLDV-MCNC: 36 MG/DL (ref 7–20)
CALCIUM SERPL-MCNC: 8.2 MG/DL (ref 8.3–10.6)
CHLORIDE BLD-SCNC: 102 MMOL/L (ref 99–110)
CO2: 27 MMOL/L (ref 21–32)
CREAT SERPL-MCNC: 0.5 MG/DL (ref 0.6–1.1)
EKG ATRIAL RATE: 76 BPM
EKG DIAGNOSIS: NORMAL
EKG P AXIS: 52 DEGREES
EKG P-R INTERVAL: 146 MS
EKG Q-T INTERVAL: 452 MS
EKG QRS DURATION: 92 MS
EKG QTC CALCULATION (BAZETT): 508 MS
EKG R AXIS: -53 DEGREES
EKG T AXIS: 58 DEGREES
EKG VENTRICULAR RATE: 76 BPM
EOSINOPHILS ABSOLUTE: 0.2 K/UL (ref 0–0.6)
EOSINOPHILS RELATIVE PERCENT: 1.4 %
GFR AFRICAN AMERICAN: >60
GFR NON-AFRICAN AMERICAN: >60
GLUCOSE BLD-MCNC: 126 MG/DL (ref 70–99)
GLUCOSE BLD-MCNC: 127 MG/DL (ref 70–99)
GLUCOSE BLD-MCNC: 138 MG/DL (ref 70–99)
GLUCOSE BLD-MCNC: 145 MG/DL (ref 70–99)
GLUCOSE BLD-MCNC: 151 MG/DL (ref 70–99)
GLUCOSE BLD-MCNC: 159 MG/DL (ref 70–99)
HCO3 ARTERIAL: 30.7 MMOL/L (ref 21–29)
HCT VFR BLD CALC: 39.1 % (ref 36–48)
HEMOGLOBIN: 12.9 G/DL (ref 12–16)
L. PNEUMOPHILA SEROGP 1 UR AG: NORMAL
LACTIC ACID: 1.4 MMOL/L (ref 0.4–2)
LV EF: 40 %
LVEF MODALITY: NORMAL
LYMPHOCYTES ABSOLUTE: 1.3 K/UL (ref 1–5.1)
LYMPHOCYTES RELATIVE PERCENT: 12.6 %
MAGNESIUM: 2.1 MG/DL (ref 1.8–2.4)
MCH RBC QN AUTO: 28.4 PG (ref 26–34)
MCHC RBC AUTO-ENTMCNC: 33.1 G/DL (ref 31–36)
MCV RBC AUTO: 85.9 FL (ref 80–100)
MONOCYTES ABSOLUTE: 0.2 K/UL (ref 0–1.3)
MONOCYTES RELATIVE PERCENT: 2.2 %
NEUTROPHILS ABSOLUTE: 8.8 K/UL (ref 1.7–7.7)
NEUTROPHILS RELATIVE PERCENT: 83.7 %
O2 SAT, ARTERIAL: 97 % (ref 93–100)
PCO2 ARTERIAL: 36 MM HG (ref 35–45)
PDW BLD-RTO: 13.5 % (ref 12.4–15.4)
PERFORMED ON: ABNORMAL
PH ARTERIAL: 7.54 (ref 7.35–7.45)
PLATELET # BLD: 222 K/UL (ref 135–450)
PMV BLD AUTO: 10.3 FL (ref 5–10.5)
PO2 ARTERIAL: 79.9 MM HG (ref 75–108)
POC SAMPLE TYPE: ABNORMAL
POTASSIUM REFLEX MAGNESIUM: 3.2 MMOL/L (ref 3.5–5.1)
POTASSIUM SERPL-SCNC: 3.2 MMOL/L (ref 3.5–5.1)
POTASSIUM SERPL-SCNC: 4.1 MMOL/L (ref 3.5–5.1)
RBC # BLD: 4.55 M/UL (ref 4–5.2)
SODIUM BLD-SCNC: 141 MMOL/L (ref 136–145)
TCO2 ARTERIAL: 32 MMOL/L
VANCOMYCIN RANDOM: 7.6 UG/ML
WBC # BLD: 10.6 K/UL (ref 4–11)

## 2020-08-24 PROCEDURE — 6360000002 HC RX W HCPCS: Performed by: INTERNAL MEDICINE

## 2020-08-24 PROCEDURE — 84132 ASSAY OF SERUM POTASSIUM: CPT

## 2020-08-24 PROCEDURE — 6370000000 HC RX 637 (ALT 250 FOR IP): Performed by: INTERNAL MEDICINE

## 2020-08-24 PROCEDURE — 85025 COMPLETE CBC W/AUTO DIFF WBC: CPT

## 2020-08-24 PROCEDURE — 2700000000 HC OXYGEN THERAPY PER DAY

## 2020-08-24 PROCEDURE — 6360000002 HC RX W HCPCS: Performed by: FAMILY MEDICINE

## 2020-08-24 PROCEDURE — 36592 COLLECT BLOOD FROM PICC: CPT

## 2020-08-24 PROCEDURE — 2000000000 HC ICU R&B

## 2020-08-24 PROCEDURE — 2580000003 HC RX 258: Performed by: INTERNAL MEDICINE

## 2020-08-24 PROCEDURE — 94660 CPAP INITIATION&MGMT: CPT

## 2020-08-24 PROCEDURE — 36600 WITHDRAWAL OF ARTERIAL BLOOD: CPT

## 2020-08-24 PROCEDURE — 83605 ASSAY OF LACTIC ACID: CPT

## 2020-08-24 PROCEDURE — 83735 ASSAY OF MAGNESIUM: CPT

## 2020-08-24 PROCEDURE — 93005 ELECTROCARDIOGRAM TRACING: CPT | Performed by: INTERNAL MEDICINE

## 2020-08-24 PROCEDURE — 2500000003 HC RX 250 WO HCPCS: Performed by: INTERNAL MEDICINE

## 2020-08-24 PROCEDURE — 82803 BLOOD GASES ANY COMBINATION: CPT

## 2020-08-24 PROCEDURE — 99291 CRITICAL CARE FIRST HOUR: CPT | Performed by: INTERNAL MEDICINE

## 2020-08-24 PROCEDURE — 6370000000 HC RX 637 (ALT 250 FOR IP)

## 2020-08-24 PROCEDURE — 71045 X-RAY EXAM CHEST 1 VIEW: CPT

## 2020-08-24 PROCEDURE — 2580000003 HC RX 258: Performed by: FAMILY MEDICINE

## 2020-08-24 PROCEDURE — 80202 ASSAY OF VANCOMYCIN: CPT

## 2020-08-24 PROCEDURE — 80048 BASIC METABOLIC PNL TOTAL CA: CPT

## 2020-08-24 PROCEDURE — 93306 TTE W/DOPPLER COMPLETE: CPT

## 2020-08-24 PROCEDURE — 6370000000 HC RX 637 (ALT 250 FOR IP): Performed by: FAMILY MEDICINE

## 2020-08-24 PROCEDURE — 99233 SBSQ HOSP IP/OBS HIGH 50: CPT | Performed by: INTERNAL MEDICINE

## 2020-08-24 PROCEDURE — 94761 N-INVAS EAR/PLS OXIMETRY MLT: CPT

## 2020-08-24 PROCEDURE — 93010 ELECTROCARDIOGRAM REPORT: CPT | Performed by: INTERNAL MEDICINE

## 2020-08-24 RX ORDER — POTASSIUM CHLORIDE 29.8 MG/ML
20 INJECTION INTRAVENOUS PRN
Status: DISCONTINUED | OUTPATIENT
Start: 2020-08-24 | End: 2020-08-29 | Stop reason: HOSPADM

## 2020-08-24 RX ORDER — CHLORHEXIDINE GLUCONATE 0.12 MG/ML
RINSE ORAL
Status: COMPLETED
Start: 2020-08-24 | End: 2020-08-24

## 2020-08-24 RX ADMIN — Medication 10 ML: at 09:19

## 2020-08-24 RX ADMIN — INSULIN LISPRO 2 UNITS: 100 INJECTION, SOLUTION INTRAVENOUS; SUBCUTANEOUS at 08:14

## 2020-08-24 RX ADMIN — DEXMEDETOMIDINE HYDROCHLORIDE 1.2 MCG/KG/HR: 4 INJECTION, SOLUTION INTRAVENOUS at 09:40

## 2020-08-24 RX ADMIN — BUPRENORPHINE HCL 2 MG: 2 TABLET SUBLINGUAL at 20:40

## 2020-08-24 RX ADMIN — CLONIDINE HYDROCHLORIDE 0.1 MG: 0.1 TABLET ORAL at 23:48

## 2020-08-24 RX ADMIN — LAMOTRIGINE 100 MG: 100 TABLET ORAL at 09:18

## 2020-08-24 RX ADMIN — BUPRENORPHINE HCL 2 MG: 2 TABLET SUBLINGUAL at 23:48

## 2020-08-24 RX ADMIN — CEFEPIME 2 G: 2 INJECTION, POWDER, FOR SOLUTION INTRAVENOUS at 13:01

## 2020-08-24 RX ADMIN — SERTRALINE HYDROCHLORIDE 50 MG: 50 TABLET, FILM COATED ORAL at 08:40

## 2020-08-24 RX ADMIN — FUROSEMIDE 40 MG: 10 INJECTION, SOLUTION INTRAMUSCULAR; INTRAVENOUS at 17:56

## 2020-08-24 RX ADMIN — TICAGRELOR 90 MG: 90 TABLET ORAL at 08:39

## 2020-08-24 RX ADMIN — POTASSIUM CHLORIDE 20 MEQ: 29.8 INJECTION, SOLUTION INTRAVENOUS at 14:17

## 2020-08-24 RX ADMIN — VANCOMYCIN HYDROCHLORIDE 750 MG: 750 INJECTION, POWDER, LYOPHILIZED, FOR SOLUTION INTRAVENOUS at 10:35

## 2020-08-24 RX ADMIN — INSULIN GLARGINE 16 UNITS: 100 INJECTION, SOLUTION SUBCUTANEOUS at 09:18

## 2020-08-24 RX ADMIN — POTASSIUM CHLORIDE 20 MEQ: 29.8 INJECTION, SOLUTION INTRAVENOUS at 13:17

## 2020-08-24 RX ADMIN — ASPIRIN 81 MG 81 MG: 81 TABLET ORAL at 08:40

## 2020-08-24 RX ADMIN — ACETAMINOPHEN 650 MG: 325 TABLET, FILM COATED ORAL at 11:15

## 2020-08-24 RX ADMIN — DEXMEDETOMIDINE HYDROCHLORIDE 1.2 MCG/KG/HR: 4 INJECTION, SOLUTION INTRAVENOUS at 15:13

## 2020-08-24 RX ADMIN — BUPRENORPHINE HCL 2 MG: 2 TABLET SUBLINGUAL at 01:11

## 2020-08-24 RX ADMIN — DEXMEDETOMIDINE HYDROCHLORIDE 1.4 MCG/KG/HR: 4 INJECTION, SOLUTION INTRAVENOUS at 20:11

## 2020-08-24 RX ADMIN — VANCOMYCIN HYDROCHLORIDE 750 MG: 750 INJECTION, POWDER, LYOPHILIZED, FOR SOLUTION INTRAVENOUS at 23:48

## 2020-08-24 RX ADMIN — POTASSIUM CHLORIDE 20 MEQ: 29.8 INJECTION, SOLUTION INTRAVENOUS at 10:36

## 2020-08-24 RX ADMIN — CLONIDINE HYDROCHLORIDE 0.1 MG: 0.1 TABLET ORAL at 20:40

## 2020-08-24 RX ADMIN — CEFEPIME HYDROCHLORIDE 1 G: 1 INJECTION, POWDER, FOR SOLUTION INTRAMUSCULAR; INTRAVENOUS at 01:11

## 2020-08-24 RX ADMIN — CARVEDILOL 3.12 MG: 3.12 TABLET, FILM COATED ORAL at 17:05

## 2020-08-24 RX ADMIN — DEXMEDETOMIDINE HYDROCHLORIDE 1.1 MCG/KG/HR: 4 INJECTION, SOLUTION INTRAVENOUS at 04:53

## 2020-08-24 RX ADMIN — DEXMEDETOMIDINE HYDROCHLORIDE 1.4 MCG/KG/HR: 4 INJECTION, SOLUTION INTRAVENOUS at 23:48

## 2020-08-24 RX ADMIN — FUROSEMIDE 40 MG: 10 INJECTION, SOLUTION INTRAMUSCULAR; INTRAVENOUS at 08:40

## 2020-08-24 RX ADMIN — CHLORHEXIDINE GLUCONATE: 1.2 RINSE ORAL at 09:18

## 2020-08-24 RX ADMIN — CARVEDILOL 3.12 MG: 3.12 TABLET, FILM COATED ORAL at 08:39

## 2020-08-24 RX ADMIN — ATORVASTATIN CALCIUM 40 MG: 80 TABLET, FILM COATED ORAL at 08:40

## 2020-08-24 RX ADMIN — PREGABALIN 100 MG: 25 CAPSULE ORAL at 08:39

## 2020-08-24 RX ADMIN — ENOXAPARIN SODIUM 40 MG: 40 INJECTION SUBCUTANEOUS at 08:40

## 2020-08-24 ASSESSMENT — PAIN SCALES - GENERAL
PAINLEVEL_OUTOF10: 8
PAINLEVEL_OUTOF10: 0
PAINLEVEL_OUTOF10: 9
PAINLEVEL_OUTOF10: 10
PAINLEVEL_OUTOF10: 0
PAINLEVEL_OUTOF10: 8
PAINLEVEL_OUTOF10: 10
PAINLEVEL_OUTOF10: 6
PAINLEVEL_OUTOF10: 9
PAINLEVEL_OUTOF10: 8
PAINLEVEL_OUTOF10: 8

## 2020-08-24 ASSESSMENT — PAIN DESCRIPTION - PAIN TYPE
TYPE: ACUTE PAIN

## 2020-08-24 ASSESSMENT — PAIN DESCRIPTION - LOCATION
LOCATION: BACK
LOCATION: ABDOMEN
LOCATION: BACK

## 2020-08-24 NOTE — PROGRESS NOTES
Patient verbalizes 10/10 back pain. Patient also continues to be tachypneic, although with adequate oxygenation. Patient has been alert and oriented at most times. COWS assessment reveals a score of 7. Medicated per PRN.

## 2020-08-24 NOTE — PROGRESS NOTES
Hospitalist Progress Note      PCP: No primary care provider on file. Date of Admission: 8/21/2020    Chief Complaint:emesis    Hospital Course:   Petrona Gordon is a 60 y.o. female presents with c/o abdominal pain , non bloody vomiting for couple of days. The pt past medical h/o CHF, former smoker, DM, MI, hepC, chronic pain , h/o opiate abuse. The pt is found to be hypoxic and is placed on high flow O2  Pro bnp is elevated. CT chest abdomen and pelvis  Showed CHF. Has uncontrolled DM with hyperglycemia. Blood sugar is elevated to > 600 . She is not in DKA. The pt was admitted at [facility] on 08/02/20 for CHF exacerbation. The pt left AMA at the time  COVID was tested neg during that admission    Subjective:   Sleeping and seems lethargic.        Medications:  Reviewed    Infusion Medications    dexmedetomidine 1.1 mcg/kg/hr (08/24/20 1022)    dextrose       Scheduled Medications    vancomycin  750 mg Intravenous Q12H    cefepime  2 g Intravenous Q12H    [START ON 8/25/2020] enoxaparin  40 mg Subcutaneous Daily    mirtazapine  7.5 mg Oral Nightly    insulin lispro  0-12 Units Subcutaneous Q4H    insulin glargine  0.25 Units/kg Subcutaneous Daily    atorvastatin  40 mg Oral Daily    carvedilol  3.125 mg Oral BID WC    lamoTRIgine  100 mg Oral Daily    sertraline  50 mg Oral Daily    pregabalin  100 mg Oral BID    ticagrelor  90 mg Oral BID    sodium chloride flush  10 mL Intravenous 2 times per day    aspirin  81 mg Oral Daily    furosemide  40 mg Intravenous BID     PRN Meds: potassium chloride, dextrose, buprenorphine **AND** cloNIDine, haloperidol lactate, hydrOXYzine, nitroGLYCERIN, sodium chloride flush, acetaminophen **OR** acetaminophen, polyethylene glycol, promethazine **OR** ondansetron, dextrose, glucose, glucagon (rDNA), perflutren lipid microspheres, LORazepam      Intake/Output Summary (Last 24 hours) at 8/24/2020 1228  Last data filed at 8/24/2020 0503  Gross per 24 hour   Intake SMALL 08/21/2020    SPECGRAV >1.030 08/21/2020    GLUCOSEU >=1000 08/21/2020    GLUCOSEU NEGATIVE 02/14/2012       Radiology:  XR CHEST PORTABLE   Final Result   Persistent diffuse bilateral airspace disease, which can reflect edema or   pneumonia. Small left pleural effusion. XR CHEST PORTABLE   Final Result   Increasing bilateral airspace disease. Vascular indistinctness suggests a   component of fluid overload-pulmonary edema         CT ABDOMEN PELVIS W IV CONTRAST Additional Contrast? None   Final Result   1. Congestive heart failure. 2. Mediastinal adenopathy, likely reactive. 3. Trace pelvic ascites. CT CHEST PULMONARY EMBOLISM W CONTRAST   Final Result   1. Congestive heart failure. 2. Mediastinal adenopathy, likely reactive. 3. Trace pelvic ascites. XR CHEST PORTABLE   Final Result   Bilateral pneumonia versus pulmonary edema                 Assessment/Plan:    Active Hospital Problems    Diagnosis    Acute on chronic respiratory failure (HCC) [J96.20]    Suspected COVID-19 virus infection [Z20.828]    Abnormal CT scan [R93.89]     Acute on chronic hypoxic respiratory failure secondary to acute on chronic systolic heart failure exacerbation and sepsis secondary to possible bacterial pneumonia COVID testing negative thus far  -  Trend leukocytosis  May be steroid compenent received dexamethasone, continue atbx  - iv lasix 40mg BID, adding on milrinone per cardiology  -Check MRSA swab   - check ABG as she seems lethargic.      Acute metabolic encephalopathy and delirium   -likely secondary to polysubstance abuse with withdrawal  - precedex gtt        dm 2   -with hyperglycemia lantus + lispro        DVT Prophylaxis: lovenox  Diet: Diet NPO Effective Now  Code Status: Full Code    PT/OT Eval Status: eval and treat     Dispo - CCC    30 minutes of critical care time spent.      Nahomi Pate MD

## 2020-08-24 NOTE — PROGRESS NOTES
Pt complained of back pain rated 8-10, PRN tylenol administered, reassessed with no improvement of pain. Pt reassessed at 1600, rated back pain 9-10. Dr. Ashley Anderson notified.

## 2020-08-24 NOTE — PROGRESS NOTES
Patient reports L chest pain / pressure. Worsening with palpation. EKG obtained. No interventions at this time.

## 2020-08-24 NOTE — PROGRESS NOTES
P Pulmonary and Critical Care    Follow Up Note    Subjective:   CHIEF COMPLAINT / HPI:   Chief Complaint   Patient presents with    Emesis     pt with c/o emesis x 2-3 days, abd pain, diarrhea. states emesis x 3 since midnight today. no fever at home       Interval history: Patient admitted to the hospital 8/21/20 with respiratory failure requiring BiPAP support. She has decompensated congestive heart failure. Has a history of this and actually recently signed out AMA from a prior hospitalization. She is diuresing. Cardiology is following her. Requires milrinone and norepinephrine infusions. Has required Precedex to be comfortable on her BiPAP. Does have a history of prior drug abuse and is going to a Suboxone clinic for this. She is COVID negative    Past Medical History:    Reviewed; no changes    Social History:    Reviewed; no changes    REVIEW OF SYSTEMS:    CONSTITUTIONAL:  negative for fevers and chills  RESPIRATORY:  See HPI  CARDIOVASCULAR:  negative for chest pain, palpitations, edema  GASTROINTESTINAL:  negative for nausea, vomiting, diarrhea, constipation and abdominal pain    Objective:   PHYSICAL EXAM:        VITALS:  BP (!) 104/59   Pulse 65   Temp 96 °F (35.6 °C) (Temporal)   Resp 25   Ht 5' 2\" (1.575 m)   Wt 135 lb 12.8 oz (61.6 kg)   LMP 11/15/2017   SpO2 97%   BMI 24.84 kg/m²  on BiPAP/40%     24HR INTAKE/OUTPUT:      Intake/Output Summary (Last 24 hours) at 8/24/2020 1321  Last data filed at 8/24/2020 0503  Gross per 24 hour   Intake 1626 ml   Output 1800 ml   Net -174 ml       CONSTITUTIONAL: Sedated on Precedex  LUNGS:  No increased work of breathing and few crackles to auscultation, no crackles or wheezes  CARDIOVASCULAR: S1 and S2 and no JVD  ABDOMEN:  normal bowel sounds, non-distended and non-tender to palpation  EXT: No edema, no calf tenderness. Pulses are present bilaterally.   NEUROLOGIC:  Mental Status Exam:  Level of Alertness:   awake  Orientation:   person, place,

## 2020-08-24 NOTE — CONSULTS
*  [facility]  Daily Progress Note    Admit Date:  8/21/2020      CC:  SOB, CAD, HTN, CHOL  Subj:  Today, still with respiratory compromise. Did not tolerate milrinone due to hypotension. EF 40% today.       Obj:   BP (!) 122/94   Pulse 79   Temp 98.1 °F (36.7 °C) (Temporal)   Resp (!) 36   Ht 5' 2\" (1.575 m)   Wt 135 lb 12.8 oz (61.6 kg)   LMP 11/15/2017   SpO2 95%   BMI 24.84 kg/m²       Intake/Output Summary (Last 24 hours) at 8/24/2020 0746  Last data filed at 8/24/2020 0503  Gross per 24 hour   Intake 1626 ml   Output 1800 ml   Net -174 ml     Gen Alert, coop, no distress Heart  RRR, no MRG, nl apical impulse   Head NC, AT, no abnorm Abd  Soft, NT, +BS, no mass, no OM   Eyes PERRLA, conj/corn clear Ext  Ext nl, AT, no C/C/E   Nose Nares nl, no drain, NT Pulse decr   Throat Lips, mucosa, tongue nl Skin Color/text/turg nl, no rash/lesions   Neck S/S, TM, NT, no bruit/JVD Psych Nl mood and affect   Lung decr bilat, bilateral rales Lymph   No cervical or axillary LA   Ch wall NT, no deform Neuro  Nl gross M/S exam     Medications:    mirtazapine  7.5 mg Oral Nightly    vancomycin (VANCOCIN) intermittent dosing (placeholder)   Other RX Placeholder    insulin lispro  0-12 Units Subcutaneous Q4H    insulin glargine  0.25 Units/kg Subcutaneous Daily    atorvastatin  40 mg Oral Daily    carvedilol  3.125 mg Oral BID WC    lamoTRIgine  100 mg Oral Daily    sertraline  50 mg Oral Daily    pregabalin  100 mg Oral BID    ticagrelor  90 mg Oral BID    cefepime  1 g Intravenous Q12H    sodium chloride flush  10 mL Intravenous 2 times per day    aspirin  81 mg Oral Daily    enoxaparin  40 mg Subcutaneous BID    furosemide  40 mg Intravenous BID     Lab Data: Relevant and available CV data reviewed    ASSESSMENT AND PLAN     *Acute respiratory failure  Status Acute on chronic combined s/d failure in setting of sepsis  Plan Abx per medicine   Diuresis as renal tolerates, continue 40iv bid   CXR today  *CAD  Hx PCI LAD, LAD stent thrombosis  TTE 40%  Plan Continue dapt with hx of stent thrombosis  *HTN  Status stable  Plan Continue current meds  *CHOL  Status On high dose statin  Plan Continue statin  *Hep C  Per GI

## 2020-08-24 NOTE — CARE COORDINATION
Discharge Planning Assessment    RN/SW discharge planner met with patient/ (and family member) to discuss reason for admission, current living situation, and potential needs at the time of discharge    Demographics/Insurance verified Yes    Current type of dwelling:  apartment    Patient from ECF/SW confirmed with:   N/A    Living arrangements: lives with sister    Level of function/Support: independent in w/c     PCP:   Dr Adela Shin    DME: wheelchair    Active with any community resources/agencies/skilled home care:  None    Medication compliance issues:  No    Financial issues that could impact healthcare:  No    Tentative discharge plan: home w/no needs    Discussed with patient and/or family that on the day of discharge home tentative time of discharge will be between 10 AM and noon. Transportation at the time of discharge: [employer] [facility]    Patient is admitted to ICU on precedex gtt, IV Cefepime, IV Lasix & IV Vanco per CVC. Also has restraints ordered and is on CPAP. History of Heroin use. Left AMA 8/14/20    Case management will follow and assist with discharge planning as needed.     Ruddy Gavin RN BSN  Case Management  [phone]

## 2020-08-25 LAB
ANION GAP SERPL CALCULATED.3IONS-SCNC: 13 MMOL/L (ref 3–16)
BASOPHILS ABSOLUTE: 0 K/UL (ref 0–0.2)
BASOPHILS RELATIVE PERCENT: 0.4 %
BLOOD CULTURE, ROUTINE: NORMAL
BUN BLDV-MCNC: 31 MG/DL (ref 7–20)
CALCIUM SERPL-MCNC: 8.2 MG/DL (ref 8.3–10.6)
CHLORIDE BLD-SCNC: 102 MMOL/L (ref 99–110)
CO2: 26 MMOL/L (ref 21–32)
CREAT SERPL-MCNC: <0.5 MG/DL (ref 0.6–1.1)
CULTURE, BLOOD 2: NORMAL
EOSINOPHILS ABSOLUTE: 0.4 K/UL (ref 0–0.6)
EOSINOPHILS RELATIVE PERCENT: 4.3 %
GFR AFRICAN AMERICAN: >60
GFR NON-AFRICAN AMERICAN: >60
GLUCOSE BLD-MCNC: 194 MG/DL (ref 70–99)
GLUCOSE BLD-MCNC: 205 MG/DL (ref 70–99)
GLUCOSE BLD-MCNC: 209 MG/DL (ref 70–99)
GLUCOSE BLD-MCNC: 232 MG/DL (ref 70–99)
GLUCOSE BLD-MCNC: 252 MG/DL (ref 70–99)
HCT VFR BLD CALC: 37.9 % (ref 36–48)
HEMOGLOBIN: 12.5 G/DL (ref 12–16)
LACTIC ACID: 1.5 MMOL/L (ref 0.4–2)
LYMPHOCYTES ABSOLUTE: 1.6 K/UL (ref 1–5.1)
LYMPHOCYTES RELATIVE PERCENT: 17.1 %
MAGNESIUM: 2.1 MG/DL (ref 1.8–2.4)
MCH RBC QN AUTO: 28.7 PG (ref 26–34)
MCHC RBC AUTO-ENTMCNC: 32.9 G/DL (ref 31–36)
MCV RBC AUTO: 87.5 FL (ref 80–100)
MONOCYTES ABSOLUTE: 0.2 K/UL (ref 0–1.3)
MONOCYTES RELATIVE PERCENT: 1.6 %
NEUTROPHILS ABSOLUTE: 7.2 K/UL (ref 1.7–7.7)
NEUTROPHILS RELATIVE PERCENT: 76.6 %
PDW BLD-RTO: 13 % (ref 12.4–15.4)
PERFORMED ON: ABNORMAL
PLATELET # BLD: 198 K/UL (ref 135–450)
PMV BLD AUTO: 10.2 FL (ref 5–10.5)
POTASSIUM REFLEX MAGNESIUM: 3.5 MMOL/L (ref 3.5–5.1)
RBC # BLD: 4.33 M/UL (ref 4–5.2)
SODIUM BLD-SCNC: 141 MMOL/L (ref 136–145)
WBC # BLD: 9.4 K/UL (ref 4–11)

## 2020-08-25 PROCEDURE — 6370000000 HC RX 637 (ALT 250 FOR IP): Performed by: INTERNAL MEDICINE

## 2020-08-25 PROCEDURE — 85025 COMPLETE CBC W/AUTO DIFF WBC: CPT

## 2020-08-25 PROCEDURE — 2700000000 HC OXYGEN THERAPY PER DAY

## 2020-08-25 PROCEDURE — 83605 ASSAY OF LACTIC ACID: CPT

## 2020-08-25 PROCEDURE — 6370000000 HC RX 637 (ALT 250 FOR IP): Performed by: FAMILY MEDICINE

## 2020-08-25 PROCEDURE — 99233 SBSQ HOSP IP/OBS HIGH 50: CPT | Performed by: INTERNAL MEDICINE

## 2020-08-25 PROCEDURE — 2580000003 HC RX 258: Performed by: INTERNAL MEDICINE

## 2020-08-25 PROCEDURE — 36592 COLLECT BLOOD FROM PICC: CPT

## 2020-08-25 PROCEDURE — 6360000002 HC RX W HCPCS: Performed by: INTERNAL MEDICINE

## 2020-08-25 PROCEDURE — 2500000003 HC RX 250 WO HCPCS: Performed by: INTERNAL MEDICINE

## 2020-08-25 PROCEDURE — 80048 BASIC METABOLIC PNL TOTAL CA: CPT

## 2020-08-25 PROCEDURE — 94761 N-INVAS EAR/PLS OXIMETRY MLT: CPT

## 2020-08-25 PROCEDURE — 2580000003 HC RX 258: Performed by: FAMILY MEDICINE

## 2020-08-25 PROCEDURE — 99291 CRITICAL CARE FIRST HOUR: CPT | Performed by: INTERNAL MEDICINE

## 2020-08-25 PROCEDURE — 83735 ASSAY OF MAGNESIUM: CPT

## 2020-08-25 PROCEDURE — 94660 CPAP INITIATION&MGMT: CPT

## 2020-08-25 PROCEDURE — 2000000000 HC ICU R&B

## 2020-08-25 PROCEDURE — 94640 AIRWAY INHALATION TREATMENT: CPT

## 2020-08-25 RX ORDER — BUPRENORPHINE AND NALOXONE 8; 2 MG/1; MG/1
1 FILM, SOLUBLE BUCCAL; SUBLINGUAL 2 TIMES DAILY
Status: DISCONTINUED | OUTPATIENT
Start: 2020-08-25 | End: 2020-08-29 | Stop reason: HOSPADM

## 2020-08-25 RX ORDER — IPRATROPIUM BROMIDE AND ALBUTEROL SULFATE 2.5; .5 MG/3ML; MG/3ML
1 SOLUTION RESPIRATORY (INHALATION) 4 TIMES DAILY
Status: DISCONTINUED | OUTPATIENT
Start: 2020-08-25 | End: 2020-08-28

## 2020-08-25 RX ORDER — INSULIN LISPRO 100 [IU]/ML
0-6 INJECTION, SOLUTION INTRAVENOUS; SUBCUTANEOUS NIGHTLY
Status: DISCONTINUED | OUTPATIENT
Start: 2020-08-25 | End: 2020-08-28

## 2020-08-25 RX ORDER — INSULIN LISPRO 100 [IU]/ML
0-12 INJECTION, SOLUTION INTRAVENOUS; SUBCUTANEOUS
Status: DISCONTINUED | OUTPATIENT
Start: 2020-08-25 | End: 2020-08-28

## 2020-08-25 RX ADMIN — Medication 10 ML: at 20:41

## 2020-08-25 RX ADMIN — Medication 10 ML: at 20:39

## 2020-08-25 RX ADMIN — CEFEPIME 2 G: 2 INJECTION, POWDER, FOR SOLUTION INTRAVENOUS at 05:14

## 2020-08-25 RX ADMIN — PREGABALIN 100 MG: 25 CAPSULE ORAL at 08:12

## 2020-08-25 RX ADMIN — INSULIN LISPRO 4 UNITS: 100 INJECTION, SOLUTION INTRAVENOUS; SUBCUTANEOUS at 14:32

## 2020-08-25 RX ADMIN — ATORVASTATIN CALCIUM 40 MG: 80 TABLET, FILM COATED ORAL at 08:12

## 2020-08-25 RX ADMIN — SERTRALINE HYDROCHLORIDE 50 MG: 50 TABLET, FILM COATED ORAL at 08:12

## 2020-08-25 RX ADMIN — CARVEDILOL 3.12 MG: 3.12 TABLET, FILM COATED ORAL at 08:13

## 2020-08-25 RX ADMIN — BUPRENORPHINE AND NALOXONE 1 FILM: 8; 2 FILM BUCCAL; SUBLINGUAL at 14:27

## 2020-08-25 RX ADMIN — DEXMEDETOMIDINE HYDROCHLORIDE 1.4 MCG/KG/HR: 4 INJECTION, SOLUTION INTRAVENOUS at 09:58

## 2020-08-25 RX ADMIN — Medication 10 ML: at 08:15

## 2020-08-25 RX ADMIN — IPRATROPIUM BROMIDE AND ALBUTEROL SULFATE 1 AMPULE: .5; 3 SOLUTION RESPIRATORY (INHALATION) at 16:53

## 2020-08-25 RX ADMIN — FUROSEMIDE 40 MG: 10 INJECTION, SOLUTION INTRAMUSCULAR; INTRAVENOUS at 08:13

## 2020-08-25 RX ADMIN — TICAGRELOR 90 MG: 90 TABLET ORAL at 08:28

## 2020-08-25 RX ADMIN — CEFEPIME 2 G: 2 INJECTION, POWDER, FOR SOLUTION INTRAVENOUS at 16:28

## 2020-08-25 RX ADMIN — DEXMEDETOMIDINE HYDROCHLORIDE 1.4 MCG/KG/HR: 4 INJECTION, SOLUTION INTRAVENOUS at 05:36

## 2020-08-25 RX ADMIN — INSULIN LISPRO 1 UNITS: 100 INJECTION, SOLUTION INTRAVENOUS; SUBCUTANEOUS at 20:44

## 2020-08-25 RX ADMIN — BUPRENORPHINE AND NALOXONE 1 FILM: 8; 2 FILM BUCCAL; SUBLINGUAL at 20:40

## 2020-08-25 RX ADMIN — IPRATROPIUM BROMIDE AND ALBUTEROL SULFATE 1 AMPULE: .5; 3 SOLUTION RESPIRATORY (INHALATION) at 21:14

## 2020-08-25 RX ADMIN — INSULIN LISPRO 4 UNITS: 100 INJECTION, SOLUTION INTRAVENOUS; SUBCUTANEOUS at 17:44

## 2020-08-25 RX ADMIN — LAMOTRIGINE 100 MG: 100 TABLET ORAL at 08:12

## 2020-08-25 RX ADMIN — ENOXAPARIN SODIUM 40 MG: 40 INJECTION SUBCUTANEOUS at 08:13

## 2020-08-25 RX ADMIN — ASPIRIN 81 MG 81 MG: 81 TABLET ORAL at 08:12

## 2020-08-25 RX ADMIN — DEXMEDETOMIDINE HYDROCHLORIDE 0.4 MCG/KG/HR: 4 INJECTION, SOLUTION INTRAVENOUS at 20:40

## 2020-08-25 RX ADMIN — TICAGRELOR 90 MG: 90 TABLET ORAL at 20:40

## 2020-08-25 RX ADMIN — IPRATROPIUM BROMIDE AND ALBUTEROL SULFATE 1 AMPULE: .5; 3 SOLUTION RESPIRATORY (INHALATION) at 11:35

## 2020-08-25 RX ADMIN — VANCOMYCIN HYDROCHLORIDE 750 MG: 750 INJECTION, POWDER, LYOPHILIZED, FOR SOLUTION INTRAVENOUS at 14:26

## 2020-08-25 RX ADMIN — MIRTAZAPINE 7.5 MG: 15 TABLET, FILM COATED ORAL at 20:40

## 2020-08-25 RX ADMIN — INSULIN LISPRO 4 UNITS: 100 INJECTION, SOLUTION INTRAVENOUS; SUBCUTANEOUS at 08:23

## 2020-08-25 RX ADMIN — PREGABALIN 100 MG: 25 CAPSULE ORAL at 20:40

## 2020-08-25 RX ADMIN — FUROSEMIDE 40 MG: 10 INJECTION, SOLUTION INTRAMUSCULAR; INTRAVENOUS at 17:47

## 2020-08-25 ASSESSMENT — PAIN SCALES - GENERAL
PAINLEVEL_OUTOF10: 8
PAINLEVEL_OUTOF10: 5

## 2020-08-25 NOTE — PROGRESS NOTES
Patient obsessively asking about a piece of paper that has her daughter's new phone number on it. Has this RN checking sheets and pillows etc. Bed pad was changed and didi care provided to patient. Upon changing of bed pad a shortened (cut) straw and a folded piece of paper was found. Inside this paper was a grey / grey - white substance. This patient had previously had this substance confiscated a couple of days prior and is admitted to be the substance \"grey death\". Patient denies any involvement with this substance at this time. Security called to confiscate and go through rest of patient belongings. Loose medications, cigarettes, lighters all found in patient belongings. Patient again denying involvement with this substance at this time.

## 2020-08-25 NOTE — PROGRESS NOTES
*  [facility]  Daily Progress Note    Admit Date:  8/21/2020      CC: ARF, CAD, HTN, CHOL  Subj: Today, -2.6L. Remains very sob. Obj:   /63   Pulse 66   Temp 97 °F (36.1 °C) (Temporal)   Resp 28   Ht 5' 2\" (1.575 m)   Wt 124 lb 8 oz (56.5 kg)   LMP 11/15/2017   SpO2 99%   BMI 22.77 kg/m²       Intake/Output Summary (Last 24 hours) at 8/25/2020 1017  Last data filed at 8/25/2020 0855  Gross per 24 hour   Intake 1431 ml   Output 2650 ml   Net -1219 ml     Gen Alert, notably sob Heart  RRR, no MRG, nl apical impulse   Head NC, AT, no abnorm Abd  Soft, NT, +BS, no mass, no OM   Eyes PERRLA, conj/corn clear Ext  Amp   Nose Nares nl, no drain, NT Pulse Decr/absent amp   Throat Lips, mucosa, tongue nl Skin Color/text/turg nl, no rash/lesions   Neck S/S, TM, NT, no bruit/JVD Psych Nl mood and affect   Lung decr Lymph   No cervical or axillary LA   Ch wall NT, no deform Neuro  Nl gross M/S exam     CVMeds:  lipitor 40, coreg 3.125bid, brilinta 90bid, as 81, lasix 40ivbid    Lab Data: Relevant and available CV data reviewed    ASSESSMENT AND PLAN     *Acute respiratory failure  Status Acute on chronic combined s/d failure in setting of sepsis  Plan Abx per medicine   Diuresis as renal tolerates, continue 40iv bid  *CAD  Hx PCI LAD, LAD stent thrombosis  TTE 40%  Plan Continue dapt with hx of stent thrombosis, bb, statin  *HTN  Status stable  Plan Continue current meds  *CHOL  Status On high dose statin  Plan Continue statin    Time:  More than 35 minutes spent reviewing patient chart (including but not limited to notes, labs, imaging and other testing), interviewing patient and/or family members, performing a physical exam, documentation of my findings above and subsequent follow-up of ordered testing. More than 50% of that time spent face to face with patient discussing clinical condition and counseling regarding treatment plan.

## 2020-08-25 NOTE — PROGRESS NOTES
Patient observed to be yelling and cursing frequently. This RN and many others addressed the patient explaining acuity of care and that critical conditions come first. Patient, again cursing and using vulgar language, was yelling at staff demanding food, drink, and attention. Patient educated on NPO status which prompted more verbal abuse from patient. Boundaries set.

## 2020-08-25 NOTE — PROGRESS NOTES
Patient calm and cooperative at this time. Patient stated \"see I can be calm if I want to\". This suggests manipulative behavior. Patient did admit that her ex boyfriend had brought the illicit drugs at her request. Day shift charge RN notified. No visitor sign posted.

## 2020-08-25 NOTE — PROGRESS NOTES
Bedside report received from off going shift to ensure safe transfer of care. Patient restless and calling out. Currently on CPAP Wants to drink and have ice chips. No other needs at this time. Will return for complete assessment.

## 2020-08-25 NOTE — PROGRESS NOTES
Tolerating vapotherm well with settings of 40 liters at 100% FIO2. O2 sat 99%. Ate lunch without difficulty.

## 2020-08-25 NOTE — PROGRESS NOTES
Awake calling out for ice chips. CPAP removed and replaced with 15 liter HF cannula. Oral care given and ice chips per request.  O2 sat decreased from 100% to 77% in 3 minutes. Replaced CPAP with gradual increase in saturations. Chest diminished with few wheezes noted. Respirations shallow and slightly tachypneic with exertion. Monitor reveals NSR without ectopy. Abdomen rounded and soft with BS present x 4. No edema noted. Peripheral pulses present with exception of RLE with AKA. Pulido patent and draining elise urine with bloody shreds and clots in tubing. Patient states she had been moving tubing as it was bothering her. Alert, oriented and appears appropriate to questions. Falls asleep easily. Complete assessment done and recorded. See flow sheet for details.

## 2020-08-25 NOTE — PROGRESS NOTES
Noon assessment done and recorded. Awake at intervals and mostly cooperative. Does still have intermittent episodes of threatening to sign herself out if she doesn't get what she wants. Will continue to monitor.

## 2020-08-26 ENCOUNTER — APPOINTMENT (OUTPATIENT)
Dept: GENERAL RADIOLOGY | Age: 55
DRG: 720 | End: 2020-08-26
Payer: COMMERCIAL

## 2020-08-26 LAB
ANION GAP SERPL CALCULATED.3IONS-SCNC: 11 MMOL/L (ref 3–16)
ANION GAP SERPL CALCULATED.3IONS-SCNC: 9 MMOL/L (ref 3–16)
BASOPHILS ABSOLUTE: 0.1 K/UL (ref 0–0.2)
BASOPHILS RELATIVE PERCENT: 0.6 %
BUN BLDV-MCNC: 17 MG/DL (ref 7–20)
BUN BLDV-MCNC: 24 MG/DL (ref 7–20)
CALCIUM SERPL-MCNC: 7.5 MG/DL (ref 8.3–10.6)
CALCIUM SERPL-MCNC: 7.8 MG/DL (ref 8.3–10.6)
CHLORIDE BLD-SCNC: 93 MMOL/L (ref 99–110)
CHLORIDE BLD-SCNC: 94 MMOL/L (ref 99–110)
CO2: 28 MMOL/L (ref 21–32)
CO2: 28 MMOL/L (ref 21–32)
CREAT SERPL-MCNC: 0.5 MG/DL (ref 0.6–1.1)
CREAT SERPL-MCNC: 0.5 MG/DL (ref 0.6–1.1)
EOSINOPHILS ABSOLUTE: 0.4 K/UL (ref 0–0.6)
EOSINOPHILS RELATIVE PERCENT: 3.7 %
GFR AFRICAN AMERICAN: >60
GFR AFRICAN AMERICAN: >60
GFR NON-AFRICAN AMERICAN: >60
GFR NON-AFRICAN AMERICAN: >60
GLUCOSE BLD-MCNC: 115 MG/DL (ref 70–99)
GLUCOSE BLD-MCNC: 165 MG/DL (ref 70–99)
GLUCOSE BLD-MCNC: 216 MG/DL (ref 70–99)
GLUCOSE BLD-MCNC: 283 MG/DL (ref 70–99)
GLUCOSE BLD-MCNC: 305 MG/DL (ref 70–99)
GLUCOSE BLD-MCNC: 376 MG/DL (ref 70–99)
HCT VFR BLD CALC: 36.3 % (ref 36–48)
HEMOGLOBIN: 12 G/DL (ref 12–16)
LACTIC ACID: 2 MMOL/L (ref 0.4–2)
LYMPHOCYTES ABSOLUTE: 1.5 K/UL (ref 1–5.1)
LYMPHOCYTES RELATIVE PERCENT: 13.6 %
MAGNESIUM: 1.7 MG/DL (ref 1.8–2.4)
MCH RBC QN AUTO: 28.3 PG (ref 26–34)
MCHC RBC AUTO-ENTMCNC: 33 G/DL (ref 31–36)
MCV RBC AUTO: 85.7 FL (ref 80–100)
MONOCYTES ABSOLUTE: 0.3 K/UL (ref 0–1.3)
MONOCYTES RELATIVE PERCENT: 3.1 %
MRSA CULTURE ONLY: NORMAL
NEUTROPHILS ABSOLUTE: 8.7 K/UL (ref 1.7–7.7)
NEUTROPHILS RELATIVE PERCENT: 79 %
PDW BLD-RTO: 13.5 % (ref 12.4–15.4)
PERFORMED ON: ABNORMAL
PLATELET # BLD: 171 K/UL (ref 135–450)
PMV BLD AUTO: 10.2 FL (ref 5–10.5)
POTASSIUM REFLEX MAGNESIUM: 3.1 MMOL/L (ref 3.5–5.1)
POTASSIUM SERPL-SCNC: 3.2 MMOL/L (ref 3.5–5.1)
RBC # BLD: 4.23 M/UL (ref 4–5.2)
SODIUM BLD-SCNC: 130 MMOL/L (ref 136–145)
SODIUM BLD-SCNC: 133 MMOL/L (ref 136–145)
VANCOMYCIN TROUGH: 8.1 UG/ML (ref 10–20)
WBC # BLD: 11 K/UL (ref 4–11)

## 2020-08-26 PROCEDURE — 6370000000 HC RX 637 (ALT 250 FOR IP): Performed by: FAMILY MEDICINE

## 2020-08-26 PROCEDURE — 94640 AIRWAY INHALATION TREATMENT: CPT

## 2020-08-26 PROCEDURE — 6360000002 HC RX W HCPCS: Performed by: INTERNAL MEDICINE

## 2020-08-26 PROCEDURE — 2580000003 HC RX 258: Performed by: INTERNAL MEDICINE

## 2020-08-26 PROCEDURE — 2700000000 HC OXYGEN THERAPY PER DAY

## 2020-08-26 PROCEDURE — 83735 ASSAY OF MAGNESIUM: CPT

## 2020-08-26 PROCEDURE — 99233 SBSQ HOSP IP/OBS HIGH 50: CPT | Performed by: INTERNAL MEDICINE

## 2020-08-26 PROCEDURE — 6370000000 HC RX 637 (ALT 250 FOR IP): Performed by: INTERNAL MEDICINE

## 2020-08-26 PROCEDURE — 83605 ASSAY OF LACTIC ACID: CPT

## 2020-08-26 PROCEDURE — 36415 COLL VENOUS BLD VENIPUNCTURE: CPT

## 2020-08-26 PROCEDURE — 71045 X-RAY EXAM CHEST 1 VIEW: CPT

## 2020-08-26 PROCEDURE — 2000000000 HC ICU R&B

## 2020-08-26 PROCEDURE — 80048 BASIC METABOLIC PNL TOTAL CA: CPT

## 2020-08-26 PROCEDURE — 94761 N-INVAS EAR/PLS OXIMETRY MLT: CPT

## 2020-08-26 PROCEDURE — 2580000003 HC RX 258: Performed by: FAMILY MEDICINE

## 2020-08-26 PROCEDURE — 80202 ASSAY OF VANCOMYCIN: CPT

## 2020-08-26 PROCEDURE — 85025 COMPLETE CBC W/AUTO DIFF WBC: CPT

## 2020-08-26 RX ORDER — POTASSIUM CHLORIDE 20 MEQ/1
40 TABLET, EXTENDED RELEASE ORAL 2 TIMES DAILY WITH MEALS
Status: DISCONTINUED | OUTPATIENT
Start: 2020-08-26 | End: 2020-08-29 | Stop reason: HOSPADM

## 2020-08-26 RX ORDER — POLYETHYLENE GLYCOL 3350 17 G/17G
17 POWDER, FOR SOLUTION ORAL DAILY
Status: DISCONTINUED | OUTPATIENT
Start: 2020-08-26 | End: 2020-08-29 | Stop reason: HOSPADM

## 2020-08-26 RX ORDER — SENNA AND DOCUSATE SODIUM 50; 8.6 MG/1; MG/1
2 TABLET, FILM COATED ORAL DAILY
Status: DISCONTINUED | OUTPATIENT
Start: 2020-08-26 | End: 2020-08-29 | Stop reason: HOSPADM

## 2020-08-26 RX ORDER — INSULIN LISPRO 100 [IU]/ML
10 INJECTION, SOLUTION INTRAVENOUS; SUBCUTANEOUS
Status: DISCONTINUED | OUTPATIENT
Start: 2020-08-26 | End: 2020-08-28

## 2020-08-26 RX ORDER — MAGNESIUM SULFATE IN WATER 40 MG/ML
2 INJECTION, SOLUTION INTRAVENOUS ONCE
Status: COMPLETED | OUTPATIENT
Start: 2020-08-26 | End: 2020-08-26

## 2020-08-26 RX ADMIN — SERTRALINE HYDROCHLORIDE 50 MG: 50 TABLET, FILM COATED ORAL at 09:35

## 2020-08-26 RX ADMIN — Medication 10 ML: at 20:52

## 2020-08-26 RX ADMIN — STANDARDIZED SENNA CONCENTRATE AND DOCUSATE SODIUM 2 TABLET: 8.6; 5 TABLET ORAL at 12:23

## 2020-08-26 RX ADMIN — FUROSEMIDE 40 MG: 10 INJECTION, SOLUTION INTRAMUSCULAR; INTRAVENOUS at 09:36

## 2020-08-26 RX ADMIN — FUROSEMIDE 10 MG/HR: 10 INJECTION, SOLUTION INTRAMUSCULAR; INTRAVENOUS at 12:12

## 2020-08-26 RX ADMIN — ACETAMINOPHEN 650 MG: 325 TABLET, FILM COATED ORAL at 21:51

## 2020-08-26 RX ADMIN — IPRATROPIUM BROMIDE AND ALBUTEROL SULFATE 1 AMPULE: .5; 3 SOLUTION RESPIRATORY (INHALATION) at 12:17

## 2020-08-26 RX ADMIN — POTASSIUM CHLORIDE 20 MEQ: 29.8 INJECTION, SOLUTION INTRAVENOUS at 16:20

## 2020-08-26 RX ADMIN — VANCOMYCIN HYDROCHLORIDE 750 MG: 750 INJECTION, POWDER, LYOPHILIZED, FOR SOLUTION INTRAVENOUS at 02:35

## 2020-08-26 RX ADMIN — BUPRENORPHINE AND NALOXONE 1 FILM: 8; 2 FILM BUCCAL; SUBLINGUAL at 09:34

## 2020-08-26 RX ADMIN — INSULIN LISPRO 2 UNITS: 100 INJECTION, SOLUTION INTRAVENOUS; SUBCUTANEOUS at 20:56

## 2020-08-26 RX ADMIN — INSULIN LISPRO 8 UNITS: 100 INJECTION, SOLUTION INTRAVENOUS; SUBCUTANEOUS at 09:34

## 2020-08-26 RX ADMIN — TICAGRELOR 90 MG: 90 TABLET ORAL at 09:42

## 2020-08-26 RX ADMIN — INSULIN LISPRO 6 UNITS: 100 INJECTION, SOLUTION INTRAVENOUS; SUBCUTANEOUS at 12:35

## 2020-08-26 RX ADMIN — TICAGRELOR 90 MG: 90 TABLET ORAL at 20:48

## 2020-08-26 RX ADMIN — CARVEDILOL 3.12 MG: 3.12 TABLET, FILM COATED ORAL at 17:29

## 2020-08-26 RX ADMIN — POTASSIUM CHLORIDE 40 MEQ: 1500 TABLET, EXTENDED RELEASE ORAL at 12:23

## 2020-08-26 RX ADMIN — POTASSIUM CHLORIDE 20 MEQ: 29.8 INJECTION, SOLUTION INTRAVENOUS at 05:29

## 2020-08-26 RX ADMIN — ENOXAPARIN SODIUM 40 MG: 40 INJECTION SUBCUTANEOUS at 09:34

## 2020-08-26 RX ADMIN — POLYETHYLENE GLYCOL 3350 17 G: 17 POWDER, FOR SOLUTION ORAL at 12:23

## 2020-08-26 RX ADMIN — BUPRENORPHINE AND NALOXONE 1 FILM: 8; 2 FILM BUCCAL; SUBLINGUAL at 20:47

## 2020-08-26 RX ADMIN — MIRTAZAPINE 7.5 MG: 15 TABLET, FILM COATED ORAL at 20:48

## 2020-08-26 RX ADMIN — IPRATROPIUM BROMIDE AND ALBUTEROL SULFATE 1 AMPULE: .5; 3 SOLUTION RESPIRATORY (INHALATION) at 07:36

## 2020-08-26 RX ADMIN — ATORVASTATIN CALCIUM 40 MG: 80 TABLET, FILM COATED ORAL at 09:35

## 2020-08-26 RX ADMIN — CARVEDILOL 3.12 MG: 3.12 TABLET, FILM COATED ORAL at 09:41

## 2020-08-26 RX ADMIN — INSULIN LISPRO 10 UNITS: 100 INJECTION, SOLUTION INTRAVENOUS; SUBCUTANEOUS at 12:37

## 2020-08-26 RX ADMIN — POTASSIUM CHLORIDE 20 MEQ: 29.8 INJECTION, SOLUTION INTRAVENOUS at 06:41

## 2020-08-26 RX ADMIN — PREGABALIN 100 MG: 25 CAPSULE ORAL at 20:47

## 2020-08-26 RX ADMIN — IPRATROPIUM BROMIDE AND ALBUTEROL SULFATE 1 AMPULE: .5; 3 SOLUTION RESPIRATORY (INHALATION) at 16:30

## 2020-08-26 RX ADMIN — VANCOMYCIN HYDROCHLORIDE 750 MG: 750 INJECTION, POWDER, LYOPHILIZED, FOR SOLUTION INTRAVENOUS at 16:59

## 2020-08-26 RX ADMIN — POTASSIUM CHLORIDE 40 MEQ: 1500 TABLET, EXTENDED RELEASE ORAL at 17:29

## 2020-08-26 RX ADMIN — INSULIN LISPRO 10 UNITS: 100 INJECTION, SOLUTION INTRAVENOUS; SUBCUTANEOUS at 18:29

## 2020-08-26 RX ADMIN — ASPIRIN 81 MG 81 MG: 81 TABLET ORAL at 09:42

## 2020-08-26 RX ADMIN — CEFEPIME 2 G: 2 INJECTION, POWDER, FOR SOLUTION INTRAVENOUS at 18:30

## 2020-08-26 RX ADMIN — PREGABALIN 100 MG: 25 CAPSULE ORAL at 09:35

## 2020-08-26 RX ADMIN — MAGNESIUM SULFATE HEPTAHYDRATE 2 G: 40 INJECTION, SOLUTION INTRAVENOUS at 12:12

## 2020-08-26 RX ADMIN — CEFEPIME 2 G: 2 INJECTION, POWDER, FOR SOLUTION INTRAVENOUS at 04:43

## 2020-08-26 RX ADMIN — IPRATROPIUM BROMIDE AND ALBUTEROL SULFATE 1 AMPULE: .5; 3 SOLUTION RESPIRATORY (INHALATION) at 21:10

## 2020-08-26 RX ADMIN — FUROSEMIDE 10 MG/HR: 10 INJECTION, SOLUTION INTRAMUSCULAR; INTRAVENOUS at 20:47

## 2020-08-26 ASSESSMENT — PAIN SCALES - GENERAL
PAINLEVEL_OUTOF10: 0
PAINLEVEL_OUTOF10: 3
PAINLEVEL_OUTOF10: 0
PAINLEVEL_OUTOF10: 6
PAINLEVEL_OUTOF10: 0

## 2020-08-26 NOTE — ADT AUTH CERT
Care    (X) ICU or floor    Clinical Status    ( ) * Hemodynamic stability    ( ) * Hypoxemia absent    ( ) * Tachypnea absent    Routes    (X) Possible IV fluids    (X) Parenteral or oral medications    Interventions    (X) WBC    Medications    (X) Possible antimicrobial treatment    (X) Possible DVT prophylaxis    * Milestone    Additional Notes    8/24/2020       Care day 4       ICU       VS: 96.4 32 63 94/58 50% fi02 CPAP 95% saturation       Internal medicine progress notes:  Assessment/Plan:         Active Hospital Problems      Diagnosis    · Acute on chronic respiratory failure (HCC) [J96.20]    · Suspected COVID-19 virus infection [Z20.828]    · Abnormal CT scan [R93.89]       Acute on chronic hypoxic respiratory failure secondary to acute on chronic systolic heart failure exacerbation and sepsis secondary to possible bacterial pneumonia COVID testing negative thus far    -  Trend leukocytosis  May be steroid compenent received dexamethasone, continue atbx    - iv lasix 40mg BID, adding on milrinone per cardiology    -Check MRSA swab     - check ABG as she seems lethargic.     Acute metabolic encephalopathy and delirium    -likely secondary to polysubstance abuse with withdrawal    - precedex gtt      dm 2    -with hyperglycemia lantus + lispro      DVT Prophylaxis: lovenox    Diet: Diet NPO Effective Now    Code Status: Full Code     PT/OT Eval Status: eval and treat      Dispo - CCC     30 minutes of critical care time spent.            Pulmonology progress notes:  Assessment:         1. Acute hypoxemic respiratory failure    2. Acute pulmonary edema    3. Cardiomyopathy    4. COPD, severity to be determined         Plan:         1. I have reviewed laboratories, medical records and images for this visit    2. Chest x-ray today continues to reveal diffuse airspace disease in a pattern consistent with congestive heart failure/ARDS. Gloria Bernabe is slight improvement compared to previous    3.  Has diuresed a net of about 1.4 L    4. Cardiology is following. 5. Continues on Lasix 40 mg every 12 hours    6. Presents with procalcitonin of 0.37 and a leukocytosis of 17    7. Leukocytosis has resolved    8. We will repeat her pro calcitonin    9. Remains on vancomycin and cefepime with no guiding culture data    10. Echocardiogram done today reveals LVEF 40% similar to previous    11. Significant smoking history but no prior PFTs. 12. Continued respiratory failure.  Continue BiPAP support    13.  Continue Precedex to assist in her comfort       Abnormal labs:    K 3.2    PHART 7.539    HL03CTC 30.7    BEART 8          Meds:    dexmedetomidine Last Rate: 1.2 mcg/kg/hr (08/24/20 1333)       vancomycin, 750 mg, Intravenous, Q12H    ·  cefepime, 2 g, Intravenous, Q12H    ·  [START ON 8/25/2020] enoxaparin, 40 mg, Subcutaneous, Daily    ·  [START ON 8/25/2020] insulin glargine, 10 Units, Subcutaneous, Daily    ·  mirtazapine, 7.5 mg, Oral, Nightly    ·  insulin lispro, 0-12 Units, Subcutaneous, Q4H    ·  atorvastatin, 40 mg, Oral, Daily    ·  carvedilol, 3.125 mg, Oral, BID WC    ·  lamoTRIgine, 100 mg, Oral, Daily    ·  sertraline, 50 mg, Oral, Daily    ·  pregabalin, 100 mg, Oral, BID    ·  ticagrelor, 90 mg, Oral, BID    ·  sodium chloride flush, 10 mL, Intravenous, 2 times per day    ·  aspirin, 81 mg, Oral, Daily    ·  furosemide, 40 mg, Intravenous, BID       PRN meds:    Potassium 20 meq prn iv x 3    Subutex sl 2 mg prn Per COWS x 1    Tylenol 650 g q 6 hrs prn po x 1

## 2020-08-26 NOTE — ADT AUTH CERT
dexmedetomidine    14.  Start back up her Lantus.  She states she takes 30 twice daily at home we will start her back on 15 twice daily and see how she does         Abnormal labs:      K 3.1    Cl 93    Bun 24    Creatinine 0.5    Calcium 7.5    Glucose 376       Meds:  furosemide (LASIX) 1mg/ml infusion Last Rate: 10 mg/hr (08/26/20 1212)       ·  insulin glargine, 15 Units, Subcutaneous, BID    ·  insulin lispro, 10 Units, Subcutaneous, TID WC    ·  potassium chloride, 40 mEq, Oral, BID WC    ·  sennosides-docusate sodium, 2 tablet, Oral, Daily    ·  polyethylene glycol, 17 g, Oral, Daily    ·  ipratropium-albuterol, 1 ampule, Inhalation, 4x daily    ·  buprenorphine-naloxone, 1 Film, Sublingual, BID    ·  insulin lispro, 0-12 Units, Subcutaneous, TID WC    ·  insulin lispro, 0-6 Units, Subcutaneous, Nightly    ·  vancomycin, 750 mg, Intravenous, Q12H    ·  cefepime, 2 g, Intravenous, Q12H    ·  enoxaparin, 40 mg, Subcutaneous, Daily    ·  mirtazapine, 7.5 mg, Oral, Nightly    ·  atorvastatin, 40 mg, Oral, Daily    ·  carvedilol, 3.125 mg, Oral, BID WC    ·  lamoTRIgine, 100 mg, Oral, Daily    ·  sertraline, 50 mg, Oral, Daily    ·  pregabalin, 100 mg, Oral, BID    ·  ticagrelor, 90 mg, Oral, BID    ·  sodium chloride flush, 10 mL, Intravenous, 2 times per day    ·  aspirin, 81 mg, Oral, Daily       PRN meds:    Potassium 20 meq prn iv x 3                      Sepsis and Other Febrile Illness, without Focal Infection - Care Day 5 (8/25/2020) by Shyla Crum RN         Review Entered  Review Status    8/26/2020 16:24  Completed        Criteria Review       Care Day: 5 Care Date: 8/25/2020 Level of Care:    Guideline Day 2    Level Of Care    (X) ICU or floor    Clinical Status    ( ) * Hemodynamic stability    ( ) * Hypoxemia absent    ( ) * Tachypnea absent    Routes    (X) Possible IV fluids    (X) Parenteral or oral medications    Interventions    (X) WBC    Medications    (X) Possible antimicrobial treatment    (X) Possible DVT prophylaxis    * Milestone    Additional Notes    8/25/2020       Care day 5       ICU       VS: 80/61 68 98.1 24 100% heated high flow nasal cannula 40 lpm 100% saturation       Internal medicine progress notes:  Subjective: Patient seen and examined today.  Patient in bed with Vapotherm on ; opened her eyes on voice command and reported that she is hungry and have been asking the nurse for such a long time to give her something to eat. Assessment & Plan:    Acute on chronic hypoxic respiratory failure : 2/2 CHF/ARDS    -Chest x-ray today revealed diffuse airspace disease consistent with CHF/ARDS    -Continue Lasix 40 mg every 12 hours    - continue Vanc and cefepime    -continue to follow cultures              COPD: -History of smoking but now PFTs available         Acute on chronic systolic heart failure exacerbation :    -Cardiology on board; appreciate their recs    - -History of CAD status post PCI and stent and stent thrombosis    - echo today revealed LVEF of 40%    -Continue DAPT with history of stent thrombosis         Leukocytosis: Resolved         Acute metabolic encephalopathy and delirium     -likely secondary to polysubstance abuse with withdrawal    - precedex gtt     Diabetes mellitus: Continue Lantus and [facility]         Pulmonology progress notes:  Plan:         1. I have reviewed laboratories, medical records and images for this visit    2. Chest x-ray today continues to reveal diffuse airspace disease in a pattern consistent with congestive heart failure/ARDS. Ana Carota is slight improvement compared to previous    3. Has diuresed a net of about 2.7 L    4. May have a component of noncardiogenic pulmonary edema    5. Continues on Lasix 40 mg every 12 hours    6. Presents with procalcitonin of 0.37 and a leukocytosis of 17    7. Leukocytosis has resolved    8. Remains on vancomycin and cefepime with no guiding culture data    9.  Echocardiogram done today reveals LVEF 40% similar to previous    10. Significant smoking history but no prior PFTs. 11. Continued respiratory failure. 12. Try to get her off BiPAP.  May require Vapotherm    13. She normally takes Suboxone.  We will check with the clinic in order her regular dose         Cardiology progress notes:  ASSESSMENT AND PLAN         *Acute respiratory failure    Status     Acute on chronic combined s/d failure in setting of sepsis    Plan        Abx per medicine                   Diuresis as renal tolerates, continue 40iv bid    *CAD    Hx          PCI LAD, LAD stent thrombosis    TTE        40%    Plan        Continue dapt with hx of stent thrombosis, bb, statin    *HTN    Status     stable    Plan        Continue current meds    *CHOL    Status     On high dose statin    Plan        Continue statin          Abnormal labs:   Bun 31    Creatinine <0.5    Calcium 8.2    Glucose 252          Meds:    furosemide (LASIX) 1mg/ml infusion Last Rate: 10 mg/hr (08/25/20 1212)    Precedex 400 mcg 3.2 ml hr iv       ·  insulin glargine, 15 Units, Subcutaneous, BID    ·  insulin lispro, 10 Units, Subcutaneous, TID WC    ·  potassium chloride, 40 mEq, Oral, BID WC    ·  sennosides-docusate sodium, 2 tablet, Oral, Daily    ·  polyethylene glycol, 17 g, Oral, Daily    ·  ipratropium-albuterol, 1 ampule, Inhalation, 4x daily    ·  buprenorphine-naloxone, 1 Film, Sublingual, BID    ·  insulin lispro, 0-12 Units, Subcutaneous, TID WC    ·  insulin lispro, 0-6 Units, Subcutaneous, Nightly    ·  vancomycin, 750 mg, Intravenous, Q12H    ·  cefepime, 2 g, Intravenous, Q12H    ·  enoxaparin, 40 mg, Subcutaneous, Daily    ·  mirtazapine, 7.5 mg, Oral, Nightly    ·  atorvastatin, 40 mg, Oral, Daily    ·  carvedilol, 3.125 mg, Oral, BID WC    ·  lamoTRIgine, 100 mg, Oral, Daily    ·  sertraline, 50 mg, Oral, Daily    ·  pregabalin, 100 mg, Oral, BID    ·  ticagrelor, 90 mg, Oral, BID    ·  sodium chloride flush, 10 mL, Intravenous, 2 times per day    ·  aspirin, 81 mg, Oral, Daily                covid 19 by Pamella Cosby RN         Review Entered  Review Status    8/24/2020 13:52  In Primary        Criteria Review    The illness suspected to be related to the Coronavirus (COVID-19)? Yes  Has the member been tested for the COVID-19? Yes   If Yes, what are the results of the COVID-19? Negative   What is the severity of the members condition (i.e. Isolation, Ventilator use)?  Isolation

## 2020-08-26 NOTE — PROGRESS NOTES
*  [facility]  Daily Progress Note    Admit Date:  8/21/2020      CC: ARF, CAD, HTN, CHOL  Subj: Today, -2.9L. Changed to lasix gtt. Feeling better. Obj:   /61   Pulse 80   Temp 98 °F (36.7 °C) (Temporal)   Resp 14   Ht 5' 2\" (1.575 m)   Wt 125 lb (56.7 kg)   LMP 11/15/2017   SpO2 (!) 87%   BMI 22.86 kg/m²       Intake/Output Summary (Last 24 hours) at 8/26/2020 1520  Last data filed at 8/26/2020 1409  Gross per 24 hour   Intake 2378 ml   Output 2550 ml   Net -172 ml     Gen Alert, sob improved Heart  RRR, no MRG, nl apical impulse   Head NC, AT, no abnorm Abd  Soft, NT, +BS, no mass, no OM   Eyes PERRLA, conj/corn clear Ext  Amp   Nose Nares nl, no drain, NT Pulse Decr/absent amp   Throat Lips, mucosa, tongue nl Skin Color/text/turg nl, no rash/lesions   Neck S/S, TM, NT, no bruit/JVD Psych Nl mood and affect   Lung decr Lymph   No cervical or axillary LA   Ch wall NT, no deform Neuro  Nl gross M/S exam     CVMeds:  lipitor 40, coreg 3.125bid, brilinta 90bid, as 81, lasix 40ivbid    Lab Data: Relevant and available CV data reviewed    ASSESSMENT AND PLAN     *Acute respiratory failure  Status Acute on chronic combined s/d failure in setting of sepsis  Plan Abx per medicine   Changed to lasix gtt, await response  *CAD  Hx PCI LAD, LAD stent thrombosis  TTE 40%  Plan Continue dapt with hx of stent thrombosis, bb, statin  *HTN  Status stable  Plan Continue current meds  *CHOL  Status On high dose statin  Plan Continue statin    Time:  More than 35 minutes spent reviewing patient chart (including but not limited to notes, labs, imaging and other testing), interviewing patient and/or family members, performing a physical exam, documentation of my findings above and subsequent follow-up of ordered testing. More than 50% of that time spent face to face with patient discussing clinical condition and counseling regarding treatment plan.

## 2020-08-26 NOTE — PROGRESS NOTES
[facility]    8/25/2020 9:51 PM        Name: Angie Chavez . Admitted: 8/21/2020  Primary Care Provider: No primary care provider on file. (Tel: None)    Brief Course: This 49-year-old female with past medical history of CHF, diabetes mellitus, MI, hepatitis C, chronic pain, opiate abuse ,former smoking abuse(quit 2 weeks ago) and substance abuse admitted for hypoxic respiratory failure. Per chart review patient was admitted at [facility] for on 8/2/2020 CHF exacerbation but patient left AMA at that time. Cover test was negative during that admission. CC: Emesis    Subjective: Patient seen and examined today. Patient in bed with Vapotherm on ; opened her eyes on voice command and reported that she is hungry and have been asking the nurse for such a long time to give her something to eat.          Reviewed interval ancillary notes    Current Medications  ipratropium-albuterol (DUONEB) nebulizer solution 1 ampule, 4x daily  buprenorphine-naloxone (SUBOXONE) 8-2 MG SL film 1 Film, BID  insulin lispro (1 Unit Dial) 0-12 Units, TID WC  insulin lispro (1 Unit Dial) 0-6 Units, Nightly  potassium chloride 20 mEq/50 mL IVPB (Central Line), PRN  vancomycin (VANCOCIN) 750 mg in dextrose 5 % 250 mL IVPB, Q12H  cefepime (MAXIPIME) 2 g IVPB minibag, Q12H  enoxaparin (LOVENOX) injection 40 mg, Daily  insulin glargine (LANTUS;BASAGLAR) injection pen 10 Units, Daily  mirtazapine (REMERON) tablet 7.5 mg, Nightly  dextrose 50 % IV solution, PRN  dexmedetomidine (PRECEDEX) 400 mcg in sodium chloride 0.9 % 100 mL infusion, Continuous  cloNIDine (CATAPRES) tablet 0.1 mg, PRN  haloperidol lactate (HALDOL) injection 5 mg, Q4H PRN  atorvastatin (LIPITOR) tablet 40 mg, Daily  carvedilol (COREG) tablet 3.125 mg, BID WC  hydrOXYzine (ATARAX) tablet 25 mg, TID PRN  lamoTRIgine (LAMICTAL) tablet 100 mg, Daily  nitroGLYCERIN normal turgor  Extremity exam shows brisk capillary refill. Peripheral pulses are palpable in lower extremities     Labs and Tests:  CBC:   Recent Labs     08/23/20  0240 08/24/20  0458 08/25/20  0505   WBC 18.4* 10.6 9.4   HGB 13.0 12.9 12.5    222 198     BMP:    Recent Labs     08/23/20  0240 08/24/20  0458 08/24/20  1250 08/24/20  1715 08/25/20  0505    141  --   --  141   K 3.6 3.2* 3.2* 4.1 3.5    102  --   --  102   CO2 26 27  --   --  26   BUN 47* 36*  --   --  31*   CREATININE 0.6 0.5*  --   --  <0.5*   GLUCOSE 123* 151*  --   --  252*     Hepatic: No results for input(s): AST, ALT, ALB, BILITOT, ALKPHOS in the last 72 hours. XR CHEST PORTABLE   Final Result   Persistent diffuse bilateral airspace disease, which can reflect edema or   pneumonia. Small left pleural effusion. XR CHEST PORTABLE   Final Result   Increasing bilateral airspace disease. Vascular indistinctness suggests a   component of fluid overload-pulmonary edema         CT ABDOMEN PELVIS W IV CONTRAST Additional Contrast? None   Final Result   1. Congestive heart failure. 2. Mediastinal adenopathy, likely reactive. 3. Trace pelvic ascites. CT CHEST PULMONARY EMBOLISM W CONTRAST   Final Result   1. Congestive heart failure. 2. Mediastinal adenopathy, likely reactive. 3. Trace pelvic ascites.          XR CHEST PORTABLE   Final Result   Bilateral pneumonia versus pulmonary edema         XR CHEST PORTABLE    (Results Pending)       Discussed care with family      I viewed CXR images and EKG tracings   Discussed film/CT with reading physician      Spent 30  minutes with patient and family at bedside and on unit reviewing medical records and labs, spent greater than 50% time counseling patient  and/or coordinating care with RN    Problem List  Active Problems:    Acute respiratory failure with hypoxia ([address]    Suspected COVID-19 virus infection    Abnormal CT scan  Resolved Problems:    * No resolved hospital problems.  *       Assessment & Plan:   Acute on chronic hypoxic respiratory failure : 2/2 CHF/ARDS  -Chest x-ray today revealed diffuse airspace disease consistent with CHF/ARDS  -Continue Lasix 40 mg every 12 hours   - continue Vanc and cefepime  -continue to follow cultures      COPD: -History of smoking but now PFTs available    Acute on chronic systolic heart failure exacerbation :   -Cardiology on board; appreciate their recs  - -History of CAD status post PCI and stent and stent thrombosis  - echo today revealed LVEF of 40%   -Continue DAPT with history of stent thrombosis    Leukocytosis: Resolved    Acute metabolic encephalopathy and delirium   -likely secondary to polysubstance abuse with withdrawal  - precedex gtt     Diabetes mellitus: Continue Lantus and SSI         Diet: DIET CARDIAC;  Code:Full Code  DVT PPX  Disposition       Deb Telles MD   8/25/2020 9:51 PM

## 2020-08-26 NOTE — PROGRESS NOTES
[facility] PROGRESS NOTE    8/26/2020 7:25 PM        Name: Tarun Lopes . Admitted: 8/21/2020  Primary Care Provider: No primary care provider on file. (Tel: None)    Brief Course: This 78-year-old female with past medical history of CHF, diabetes mellitus, MI, hepatitis C, chronic pain, opiate abuse ,former smoking abuse(quit 2 weeks ago) and substance abuse admitted for hypoxic respiratory failure. Per chart review patient was admitted at [facility] for on 8/2/2020 CHF exacerbation but patient left AMA at that time. Cover test was negative during that admission. CC: Emesis     Subjective: Patient seen and examined today. Patient in bed reported he is feeling much better and asked when she can go home.   Patient had episode of nosebleed and  still on Vapotherm 50% and 25 L/min with oxygen sats between 90-94    Reviewed interval ancillary notes    Current Medications  insulin glargine (LANTUS;BASAGLAR) injection pen 15 Units, BID  insulin lispro (1 Unit Dial) 10 Units, TID WC  furosemide (LASIX) 100 mg in dextrose 5 % 100 mL infusion, Continuous  potassium chloride (KLOR-CON M) extended release tablet 40 mEq, BID WC  sennosides-docusate sodium (SENOKOT-S) 8.6-50 MG tablet 2 tablet, Daily  polyethylene glycol (GLYCOLAX) packet 17 g, Daily  ipratropium-albuterol (DUONEB) nebulizer solution 1 ampule, 4x daily  buprenorphine-naloxone (SUBOXONE) 8-2 MG SL film 1 Film, BID  insulin lispro (1 Unit Dial) 0-12 Units, TID WC  insulin lispro (1 Unit Dial) 0-6 Units, Nightly  potassium chloride 20 mEq/50 mL IVPB (Central Line), PRN  vancomycin (VANCOCIN) 750 mg in dextrose 5 % 250 mL IVPB, Q12H  cefepime (MAXIPIME) 2 g IVPB minibag, Q12H  enoxaparin (LOVENOX) injection 40 mg, Daily  mirtazapine (REMERON) tablet 7.5 mg, Nightly  dextrose 50 % IV solution, PRN  cloNIDine (CATAPRES) tablet 0.1 mg, PRN  haloperidol lactate (HALDOL) injection 5 mg, Q4H PRN  atorvastatin (LIPITOR) tablet 40 mg, Daily  carvedilol (COREG) tablet 3.125 mg, BID WC  hydrOXYzine (ATARAX) tablet 25 mg, TID PRN  lamoTRIgine (LAMICTAL) tablet 100 mg, Daily  nitroGLYCERIN (NITROSTAT) SL tablet 0.4 mg, Q5 Min PRN  sertraline (ZOLOFT) tablet 50 mg, Daily  pregabalin (LYRICA) capsule 100 mg, BID  ticagrelor (BRILINTA) tablet 90 mg, BID  sodium chloride flush 0.9 % injection 10 mL, 2 times per day  sodium chloride flush 0.9 % injection 10 mL, PRN  acetaminophen (TYLENOL) tablet 650 mg, Q6H PRN    Or  acetaminophen (TYLENOL) suppository 650 mg, Q6H PRN  polyethylene glycol (GLYCOLAX) packet 17 g, Daily PRN  promethazine (PHENERGAN) tablet 12.5 mg, Q6H PRN    Or  ondansetron (ZOFRAN) injection 4 mg, Q6H PRN  dextrose 5 % solution, PRN  glucose (GLUTOSE) 40 % oral gel 15 g, PRN  glucagon (rDNA) injection 1 mg, PRN  aspirin chewable tablet 81 mg, Daily  perflutren lipid microspheres (DEFINITY) injection 1.65 mg, ONCE PRN  LORazepam (ATIVAN) injection 2 mg, Q3H PRN        Objective:  BP (!) 79/57   Pulse 90   Temp 97.4 °F (36.3 °C) (Temporal)   Resp 15   Ht 5' 2\" (1.575 m)   Wt 125 lb (56.7 kg)   LMP 11/15/2017   SpO2 (!) 90%   BMI 22.86 kg/m²     Intake/Output Summary (Last 24 hours) at 8/26/2020 1925  Last data filed at 8/26/2020 1409  Gross per 24 hour   Intake 2378 ml   Output 2550 ml   Net -172 ml      Wt Readings from Last 3 Encounters:   08/26/20 125 lb (56.7 kg)   08/11/20 145 lb (65.8 kg)   08/02/20 120 lb 4.8 oz (54.6 kg)       General appearance:  Appears comfortable  Eyes: Sclera clear. Pupils equal.  ENT: Moist oral mucosa. Trachea midline, no adenopathy. Cardiovascular: Regular rhythm, normal S1, S2. No murmur. No edema in lower extremities  Respiratory: Not using accessory muscles.   Clear to auscultation bilaterally scattered rhonchi  GI: Abdomen soft, no tenderness, not distended, normal bowel sounds  Musculoskeletal: Right below the knee amputation  Neurology: CN 2-12 grossly intact. No speech or motor deficits  Psych: Normal affect. Alert and oriented in time, place and person  Skin: Warm, dry, normal turgor  Extremity exam shows brisk capillary refill. Peripheral pulses are palpable in lower extremities     Labs and Tests:  CBC:   Recent Labs     08/24/20  0458 08/25/20  0505 08/26/20  0405   WBC 10.6 9.4 11.0   HGB 12.9 12.5 12.0    198 171     BMP:    Recent Labs     08/25/20  0505 08/26/20  0405 08/26/20  1400    130* 133*   K 3.5 3.1* 3.2*    93* 94*   CO2 26 28 28   BUN 31* 24* 17   CREATININE <0.5* 0.5* 0.5*   GLUCOSE 252* 376* 165*     Hepatic: No results for input(s): AST, ALT, ALB, BILITOT, ALKPHOS in the last 72 hours. XR CHEST PORTABLE   Final Result   Unchanged multifocal bilateral pneumonia. XR CHEST PORTABLE   Final Result   Persistent diffuse bilateral airspace disease, which can reflect edema or   pneumonia. Small left pleural effusion. XR CHEST PORTABLE   Final Result   Increasing bilateral airspace disease. Vascular indistinctness suggests a   component of fluid overload-pulmonary edema         CT ABDOMEN PELVIS W IV CONTRAST Additional Contrast? None   Final Result   1. Congestive heart failure. 2. Mediastinal adenopathy, likely reactive. 3. Trace pelvic ascites. CT CHEST PULMONARY EMBOLISM W CONTRAST   Final Result   1. Congestive heart failure. 2. Mediastinal adenopathy, likely reactive. 3. Trace pelvic ascites.          XR CHEST PORTABLE   Final Result   Bilateral pneumonia versus pulmonary edema             Discussed care with RN      I viewed CXR images and EKG tracings       Spent 30  minutes with patient and family at bedside and on unit reviewing medical records and labs, spent greater than 50% time counseling patient  and/or coordinating care with RN    Problem List  Active Problems:    Acute respiratory failure with hypoxia ([address]    Suspected COVID-19 virus

## 2020-08-26 NOTE — PROGRESS NOTES
Pt assessment completed and documented- see doc flowsheet. Pt alert and oriented on Vapo-therm at 100%/ 40L. VSS, medications given per MAR. Updated on POC. White board updated. Denies further needs at this time. Will continue to monitor.

## 2020-08-26 NOTE — PROGRESS NOTES
Clinical Pharmacy Note: Pharmacy to Dose Vancomcyin    Vancomycin Day: 6  Current Dosin mg Q12H      Recent Labs     20  0405 20  1400   BUN 24* 17       Recent Labs     20  0405 20  1400   CREATININE 0.5* 0.5*       Recent Labs     20  0505 20  0405   WBC 9.4 11.0         Intake/Output Summary (Last 24 hours) at 2020 1624  Last data filed at 2020 1409  Gross per 24 hour   Intake 2378 ml   Output 2550 ml   Net -172 ml         Ht Readings from Last 1 Encounters:   20 5' 2\" (1.575 m)        Wt Readings from Last 1 Encounters:   20 125 lb (56.7 kg)         Body mass index is 22.86 kg/m². Estimated Creatinine Clearance: 98 mL/min (A) (based on SCr of 0.5 mg/dL (L)). Trough/Random:     Assessment/Plan:  Vancomycin level is subtherapeutic. Level was drawn appropriately in respect to last dose given. Level likely low due to held dose yesterday AM.   Will continue vancomycin 750 Q12H. A vancomycin trough has been ordered for 2020 @ 1600. Changes in regimen will be determined based on culture results, renal function, and clinical response. Pharmacy will continue to monitor and adjust regimen as necessary.     Thank you for the consult,    Araceli Champion, PharmD  PGY1 Pharmacy Resident  [id number]    2020

## 2020-08-26 NOTE — PROGRESS NOTES
P Pulmonary and Critical Care    Follow Up Note    Subjective:   CHIEF COMPLAINT / HPI:   Chief Complaint   Patient presents with    Emesis     pt with c/o emesis x 2-3 days, abd pain, diarrhea. states emesis x 3 since midnight today. no fever at home       Interval history: Patient states that she is feeling better. However, continues to require Vapotherm at 50% and 25 L/min. She is having a little bit of a nosebleed as well. Oxygen saturations in the low to mid 90s. .    Past Medical History:    Reviewed; no changes    Social History:    Reviewed; no changes    REVIEW OF SYSTEMS:    CONSTITUTIONAL:  negative for fevers and chills  RESPIRATORY:  See HPI  CARDIOVASCULAR:  negative for chest pain, palpitations, edema  GASTROINTESTINAL:  negative for nausea, vomiting, diarrhea, constipation and abdominal pain    Objective:   PHYSICAL EXAM:        VITALS:  BP (!) 99/56   Pulse 84   Temp 98 °F (36.7 °C) (Temporal)   Resp 16   Ht 5' 2\" (1.575 m)   Wt 125 lb (56.7 kg)   LMP 11/15/2017   SpO2 94%   BMI 22.86 kg/m²  on BiPAP/40%     24HR INTAKE/OUTPUT:      Intake/Output Summary (Last 24 hours) at 8/26/2020 1008  Last data filed at 8/26/2020 0438  Gross per 24 hour   Intake 2238 ml   Output 2100 ml   Net 138 ml       CONSTITUTIONAL: Alert but still on a little bit of Precedex  LUNGS:  No increased work of breathing and few crackles to auscultation, no crackles or wheezes  CARDIOVASCULAR: S1 and S2 and no JVD  ABDOMEN:  normal bowel sounds, non-distended and non-tender to palpation  EXT: No edema, no calf tenderness. Pulses are present bilaterally. NEUROLOGIC:  Mental Status Exam:  Level of Alertness:   awake  Orientation:   person, place, time.  Non focal  SKIN:  normal skin color, texture, turgor, no redness, warmth, or swelling at IV sites    DATA:    CBC:  Recent Labs     08/24/20  0458 08/25/20  0505 08/26/20  0405   WBC 10.6 9.4 11.0   RBC 4.55 4.33 4.23   HGB 12.9 12.5 12.0   HCT 39.1 37.9 36.3    198 171   MCV 85.9 87.5 85.7   MCH 28.4 28.7 28.3   MCHC 33.1 32.9 33.0   RDW 13.5 13.0 13.5      BMP:  Recent Labs     08/24/20  0458  08/24/20  1715 08/25/20  0505 08/26/20  0405     --   --  141 130*   K 3.2*   < > 4.1 3.5 3.1*     --   --  102 93*   CO2 27  --   --  26 28   BUN 36*  --   --  31* 24*   CREATININE 0.5*  --   --  <0.5* 0.5*   CALCIUM 8.2*  --   --  8.2* 7.5*   GLUCOSE 151*  --   --  252* 376*    < > = values in this interval not displayed. ABG:  Recent Labs     08/23/20  1030 08/24/20  1248   PHART 7.536* 7.539*   EXV7YNV 33.2* 36.0   PO2ART 50.0* 79.9   EAM0HDR 28.1 30.7*   [id number] 88.2* 97   BEART 5.7* 8*       Cultures:    Abx:    Radiology Review:  Pertinent images / reports were reviewed as a part of this visit. Assessment:     1. Acute hypoxemic respiratory failure  2. Acute pulmonary edema  3. Cardiomyopathy  4. COPD, severity to be determined    Plan:     1. I have reviewed laboratories, medical records and images for this visit  2. Chest x-ray shows worsening consolidation in the right upper lobe with general improvement in pulmonary edema on the left and perhaps a little bit in the right lower lobe. 3. Continues to receive Lasix 40 mg every 12 hours with no additional diuresis. BUN/creatinine have remained stable  4. Start her on Lasix drip at 10 mg/h  5. No peripheral edema  6. Seems to have a component of noncardiogenic pulmonary edema  7. Presents with procalcitonin of 0.37 and a leukocytosis of 17  8. Leukocytosis has resolved  9. Remains on vancomycin and cefepime with no guiding culture data  10. Echocardiogram done today reveals LVEF 40% similar to previous  11. Significant smoking history but no prior PFTs. 12. She is off BiPAP and on Vapotherm at this point  13. Doing much better back on her normal dose of Suboxone. Stop dexmedetomidine  14. Start back up her Lantus.   She states she takes 30 twice daily at home we will start her back on 15 twice daily

## 2020-08-27 ENCOUNTER — APPOINTMENT (OUTPATIENT)
Dept: GENERAL RADIOLOGY | Age: 55
DRG: 720 | End: 2020-08-27
Payer: COMMERCIAL

## 2020-08-27 LAB
ANION GAP SERPL CALCULATED.3IONS-SCNC: 11 MMOL/L (ref 3–16)
BASOPHILS ABSOLUTE: 0 K/UL (ref 0–0.2)
BASOPHILS RELATIVE PERCENT: 0.4 %
BUN BLDV-MCNC: 17 MG/DL (ref 7–20)
CALCIUM SERPL-MCNC: 7.7 MG/DL (ref 8.3–10.6)
CHLORIDE BLD-SCNC: 86 MMOL/L (ref 99–110)
CO2: 31 MMOL/L (ref 21–32)
CREAT SERPL-MCNC: 0.7 MG/DL (ref 0.6–1.1)
EOSINOPHILS ABSOLUTE: 0.4 K/UL (ref 0–0.6)
EOSINOPHILS RELATIVE PERCENT: 4.5 %
GFR AFRICAN AMERICAN: >60
GFR NON-AFRICAN AMERICAN: >60
GLUCOSE BLD-MCNC: 197 MG/DL (ref 70–99)
GLUCOSE BLD-MCNC: 203 MG/DL (ref 70–99)
GLUCOSE BLD-MCNC: 295 MG/DL (ref 70–99)
GLUCOSE BLD-MCNC: 554 MG/DL (ref 70–99)
GLUCOSE BLD-MCNC: 607 MG/DL (ref 70–99)
GLUCOSE BLD-MCNC: 96 MG/DL (ref 70–99)
HCT VFR BLD CALC: 38.5 % (ref 36–48)
HEMOGLOBIN: 12.7 G/DL (ref 12–16)
LACTIC ACID: 2.2 MMOL/L (ref 0.4–2)
LYMPHOCYTES ABSOLUTE: 1.4 K/UL (ref 1–5.1)
LYMPHOCYTES RELATIVE PERCENT: 15.8 %
MCH RBC QN AUTO: 28.6 PG (ref 26–34)
MCHC RBC AUTO-ENTMCNC: 33 G/DL (ref 31–36)
MCV RBC AUTO: 86.9 FL (ref 80–100)
MONOCYTES ABSOLUTE: 0.4 K/UL (ref 0–1.3)
MONOCYTES RELATIVE PERCENT: 4.3 %
NEUTROPHILS ABSOLUTE: 6.5 K/UL (ref 1.7–7.7)
NEUTROPHILS RELATIVE PERCENT: 75 %
PDW BLD-RTO: 13.3 % (ref 12.4–15.4)
PERFORMED ON: ABNORMAL
PERFORMED ON: NORMAL
PLATELET # BLD: 193 K/UL (ref 135–450)
PMV BLD AUTO: 10.1 FL (ref 5–10.5)
POTASSIUM REFLEX MAGNESIUM: 4.4 MMOL/L (ref 3.5–5.1)
RBC # BLD: 4.43 M/UL (ref 4–5.2)
SODIUM BLD-SCNC: 128 MMOL/L (ref 136–145)
VANCOMYCIN TROUGH: 27.2 UG/ML (ref 10–20)
WBC # BLD: 8.6 K/UL (ref 4–11)

## 2020-08-27 PROCEDURE — 99233 SBSQ HOSP IP/OBS HIGH 50: CPT | Performed by: INTERNAL MEDICINE

## 2020-08-27 PROCEDURE — 6370000000 HC RX 637 (ALT 250 FOR IP): Performed by: INTERNAL MEDICINE

## 2020-08-27 PROCEDURE — 2000000000 HC ICU R&B

## 2020-08-27 PROCEDURE — 6370000000 HC RX 637 (ALT 250 FOR IP): Performed by: HOSPITALIST

## 2020-08-27 PROCEDURE — 94640 AIRWAY INHALATION TREATMENT: CPT

## 2020-08-27 PROCEDURE — 83605 ASSAY OF LACTIC ACID: CPT

## 2020-08-27 PROCEDURE — 6370000000 HC RX 637 (ALT 250 FOR IP): Performed by: FAMILY MEDICINE

## 2020-08-27 PROCEDURE — 80202 ASSAY OF VANCOMYCIN: CPT

## 2020-08-27 PROCEDURE — 6360000002 HC RX W HCPCS: Performed by: INTERNAL MEDICINE

## 2020-08-27 PROCEDURE — 94761 N-INVAS EAR/PLS OXIMETRY MLT: CPT

## 2020-08-27 PROCEDURE — 2580000003 HC RX 258: Performed by: INTERNAL MEDICINE

## 2020-08-27 PROCEDURE — 80048 BASIC METABOLIC PNL TOTAL CA: CPT

## 2020-08-27 PROCEDURE — 2580000003 HC RX 258: Performed by: FAMILY MEDICINE

## 2020-08-27 PROCEDURE — 71045 X-RAY EXAM CHEST 1 VIEW: CPT

## 2020-08-27 PROCEDURE — 85025 COMPLETE CBC W/AUTO DIFF WBC: CPT

## 2020-08-27 PROCEDURE — 2700000000 HC OXYGEN THERAPY PER DAY

## 2020-08-27 RX ORDER — INSULIN LISPRO 100 [IU]/ML
15 INJECTION, SOLUTION INTRAVENOUS; SUBCUTANEOUS ONCE
Status: COMPLETED | OUTPATIENT
Start: 2020-08-27 | End: 2020-08-27

## 2020-08-27 RX ADMIN — VANCOMYCIN HYDROCHLORIDE 750 MG: 750 INJECTION, POWDER, LYOPHILIZED, FOR SOLUTION INTRAVENOUS at 14:48

## 2020-08-27 RX ADMIN — PREGABALIN 100 MG: 25 CAPSULE ORAL at 20:55

## 2020-08-27 RX ADMIN — CEFEPIME 2 G: 2 INJECTION, POWDER, FOR SOLUTION INTRAVENOUS at 04:36

## 2020-08-27 RX ADMIN — SERTRALINE HYDROCHLORIDE 50 MG: 50 TABLET, FILM COATED ORAL at 10:28

## 2020-08-27 RX ADMIN — MIRTAZAPINE 7.5 MG: 15 TABLET, FILM COATED ORAL at 20:55

## 2020-08-27 RX ADMIN — INSULIN LISPRO 2 UNITS: 100 INJECTION, SOLUTION INTRAVENOUS; SUBCUTANEOUS at 12:36

## 2020-08-27 RX ADMIN — IPRATROPIUM BROMIDE AND ALBUTEROL SULFATE 1 AMPULE: .5; 3 SOLUTION RESPIRATORY (INHALATION) at 12:55

## 2020-08-27 RX ADMIN — FUROSEMIDE 10 MG/HR: 10 INJECTION, SOLUTION INTRAMUSCULAR; INTRAVENOUS at 04:54

## 2020-08-27 RX ADMIN — INSULIN LISPRO 10 UNITS: 100 INJECTION, SOLUTION INTRAVENOUS; SUBCUTANEOUS at 18:02

## 2020-08-27 RX ADMIN — CARVEDILOL 3.12 MG: 3.12 TABLET, FILM COATED ORAL at 10:29

## 2020-08-27 RX ADMIN — IPRATROPIUM BROMIDE AND ALBUTEROL SULFATE 1 AMPULE: .5; 3 SOLUTION RESPIRATORY (INHALATION) at 20:16

## 2020-08-27 RX ADMIN — INSULIN LISPRO 10 UNITS: 100 INJECTION, SOLUTION INTRAVENOUS; SUBCUTANEOUS at 12:38

## 2020-08-27 RX ADMIN — INSULIN LISPRO 6 UNITS: 100 INJECTION, SOLUTION INTRAVENOUS; SUBCUTANEOUS at 09:14

## 2020-08-27 RX ADMIN — INSULIN LISPRO 15 UNITS: 100 INJECTION, SOLUTION INTRAVENOUS; SUBCUTANEOUS at 05:53

## 2020-08-27 RX ADMIN — IPRATROPIUM BROMIDE AND ALBUTEROL SULFATE 1 AMPULE: .5; 3 SOLUTION RESPIRATORY (INHALATION) at 08:46

## 2020-08-27 RX ADMIN — BUPRENORPHINE AND NALOXONE 1 FILM: 8; 2 FILM BUCCAL; SUBLINGUAL at 20:55

## 2020-08-27 RX ADMIN — POTASSIUM CHLORIDE 40 MEQ: 1500 TABLET, EXTENDED RELEASE ORAL at 17:59

## 2020-08-27 RX ADMIN — CEFEPIME 2 G: 2 INJECTION, POWDER, FOR SOLUTION INTRAVENOUS at 17:59

## 2020-08-27 RX ADMIN — INSULIN LISPRO 4 UNITS: 100 INJECTION, SOLUTION INTRAVENOUS; SUBCUTANEOUS at 17:59

## 2020-08-27 RX ADMIN — STANDARDIZED SENNA CONCENTRATE AND DOCUSATE SODIUM 2 TABLET: 8.6; 5 TABLET ORAL at 10:29

## 2020-08-27 RX ADMIN — PREGABALIN 100 MG: 25 CAPSULE ORAL at 10:27

## 2020-08-27 RX ADMIN — VANCOMYCIN HYDROCHLORIDE 750 MG: 750 INJECTION, POWDER, LYOPHILIZED, FOR SOLUTION INTRAVENOUS at 01:54

## 2020-08-27 RX ADMIN — INSULIN LISPRO 10 UNITS: 100 INJECTION, SOLUTION INTRAVENOUS; SUBCUTANEOUS at 09:15

## 2020-08-27 RX ADMIN — ASPIRIN 81 MG 81 MG: 81 TABLET ORAL at 10:29

## 2020-08-27 RX ADMIN — POTASSIUM CHLORIDE 40 MEQ: 1500 TABLET, EXTENDED RELEASE ORAL at 10:29

## 2020-08-27 RX ADMIN — TICAGRELOR 90 MG: 90 TABLET ORAL at 10:38

## 2020-08-27 RX ADMIN — BUPRENORPHINE AND NALOXONE 1 FILM: 8; 2 FILM BUCCAL; SUBLINGUAL at 10:29

## 2020-08-27 RX ADMIN — ENOXAPARIN SODIUM 40 MG: 40 INJECTION SUBCUTANEOUS at 10:28

## 2020-08-27 RX ADMIN — ATORVASTATIN CALCIUM 40 MG: 80 TABLET, FILM COATED ORAL at 10:28

## 2020-08-27 RX ADMIN — Medication 10 ML: at 20:56

## 2020-08-27 RX ADMIN — CARVEDILOL 3.12 MG: 3.12 TABLET, FILM COATED ORAL at 17:59

## 2020-08-27 RX ADMIN — FUROSEMIDE 5 MG/HR: 10 INJECTION, SOLUTION INTRAMUSCULAR; INTRAVENOUS at 17:59

## 2020-08-27 RX ADMIN — TICAGRELOR 90 MG: 90 TABLET ORAL at 20:56

## 2020-08-27 RX ADMIN — INSULIN GLARGINE 30 UNITS: 100 INJECTION, SOLUTION SUBCUTANEOUS at 12:35

## 2020-08-27 ASSESSMENT — PAIN SCALES - GENERAL
PAINLEVEL_OUTOF10: 0

## 2020-08-27 NOTE — CARE COORDINATION
Discharge planning note:    Remains in ICU. Treatment includes 7 L HF O2, IV Cefepime, IV Vanco & Lasix gtt. Cardiology & Pulmonology consulting on case    Referral made to [facility]. Patient will need precert.     David Shin RN BSN  Case Management  [phone]

## 2020-08-27 NOTE — PROGRESS NOTES
32.9 33.0 33.0   RDW 13.0 13.5 13.3      BMP:  Recent Labs     08/26/20  0405 08/26/20  1400 08/27/20  0440   * 133* 128*   K 3.1* 3.2* 4.4   CL 93* 94* 86*   CO2 28 28 31   BUN 24* 17 17   CREATININE 0.5* 0.5* 0.7   CALCIUM 7.5* 7.8* 7.7*   GLUCOSE 376* 165* 607*      ABG:  Recent Labs     08/24/20  1248   PHART 7.539*   JPR1PQE 36.0   PO2ART 79.9   FRW4AGU 30.7*   J4AXMALB 97   BEART 8*       Cultures:    Abx:    Radiology Review:  Pertinent images / reports were reviewed as a part of this visit. Assessment:     1. Acute hypoxemic respiratory failure  2. Acute pulmonary edema  3. Cardiomyopathy  4. COPD, severity to be determined    Plan:     1. I have reviewed laboratories, medical records and images for this visit  2. Chest x-ray today shows significant improvement but continued pulmonary edema  3. Excellent diuresis on Lasix 10 mg/h  4. However, sodium is down to 128  5. Decrease Lasix to 5 mg/h  6. Repeat chest x-ray in the a.m.  7. Repeat BMP in the a.m.  8. Echocardiogram  reveals LVEF 40% similar to previous  9. Significant smoking history but no prior PFTs. 10. Doing much better back on her normal dose of Suboxone. Stop dexmedetomidine  11. Increase Lantus to 30 units twice daily which is what she was taking at home.   Remains significantly hyperglycemic

## 2020-08-27 NOTE — PROGRESS NOTES
[facility] PROGRESS NOTE    8/27/2020 6:01 PM        Name: Tha Daley . Admitted: 8/21/2020  Primary Care Provider: No primary care provider on file. (Tel: None)    Brief Course: This 15-year-old female with past medical history of CHF, diabetes mellitus, MI, hepatitis C, chronic pain, opiate abuse ,former smoking abuse(quit 2 weeks ago) and substance abuse admitted for hypoxic respiratory failure. Per chart review patient was admitted at [facility] for on 8/2/2020 CHF exacerbation but patient left AMA at that time. Cover test was negative during that admission. CC: Emesis     Subjective: Patient seen and examined today. Patient up in bed stated she is feeling better. Patient afebrile overnight, WBC within normal limits, addition to 5 L nasal cannula with O2 saturation to 95-98. Diuresed well with Lasix drip which is now decreased to 5 mg/h.  CXR today showed persistent but slightly decreased multifocal airspace opacities    Reviewed interval ancillary notes    Current Medications  insulin glargine (LANTUS;BASAGLAR) injection pen 30 Units, BID  insulin lispro (1 Unit Dial) 10 Units, TID WC  furosemide (LASIX) 100 mg in dextrose 5 % 100 mL infusion, Continuous  potassium chloride (KLOR-CON M) extended release tablet 40 mEq, BID WC  sennosides-docusate sodium (SENOKOT-S) 8.6-50 MG tablet 2 tablet, Daily  polyethylene glycol (GLYCOLAX) packet 17 g, Daily  ipratropium-albuterol (DUONEB) nebulizer solution 1 ampule, 4x daily  buprenorphine-naloxone (SUBOXONE) 8-2 MG SL film 1 Film, BID  insulin lispro (1 Unit Dial) 0-12 Units, TID WC  insulin lispro (1 Unit Dial) 0-6 Units, Nightly  potassium chloride 20 mEq/50 mL IVPB (Central Line), PRN  vancomycin (VANCOCIN) 750 mg in dextrose 5 % 250 mL IVPB, Q12H  cefepime (MAXIPIME) 2 g IVPB minibag, Q12H  enoxaparin (LOVENOX) injection 40 mg, Daily  mirtazapine (REMERON) tablet 7.5 mg, Nightly  dextrose 50 % IV solution, PRN  cloNIDine (CATAPRES) tablet 0.1 mg, PRN  haloperidol lactate (HALDOL) injection 5 mg, Q4H PRN  atorvastatin (LIPITOR) tablet 40 mg, Daily  carvedilol (COREG) tablet 3.125 mg, BID WC  hydrOXYzine (ATARAX) tablet 25 mg, TID PRN  lamoTRIgine (LAMICTAL) tablet 100 mg, Daily  nitroGLYCERIN (NITROSTAT) SL tablet 0.4 mg, Q5 Min PRN  sertraline (ZOLOFT) tablet 50 mg, Daily  pregabalin (LYRICA) capsule 100 mg, BID  ticagrelor (BRILINTA) tablet 90 mg, BID  sodium chloride flush 0.9 % injection 10 mL, 2 times per day  sodium chloride flush 0.9 % injection 10 mL, PRN  acetaminophen (TYLENOL) tablet 650 mg, Q6H PRN    Or  acetaminophen (TYLENOL) suppository 650 mg, Q6H PRN  polyethylene glycol (GLYCOLAX) packet 17 g, Daily PRN  promethazine (PHENERGAN) tablet 12.5 mg, Q6H PRN    Or  ondansetron (ZOFRAN) injection 4 mg, Q6H PRN  dextrose 5 % solution, PRN  glucose (GLUTOSE) 40 % oral gel 15 g, PRN  glucagon (rDNA) injection 1 mg, PRN  aspirin chewable tablet 81 mg, Daily  perflutren lipid microspheres (DEFINITY) injection 1.65 mg, ONCE PRN  LORazepam (ATIVAN) injection 2 mg, Q3H PRN        Objective:  BP 91/67   Pulse 100   Temp 97.8 °F (36.6 °C) (Temporal)   Resp 17   Ht 5' 2\" (1.575 m)   Wt 124 lb 9 oz (56.5 kg)   LMP 11/15/2017   SpO2 95%   BMI 22.78 kg/m²     Intake/Output Summary (Last 24 hours) at 8/27/2020 1801  Last data filed at 8/27/2020 1518  Gross per 24 hour   Intake 2109 ml   Output 5450 ml   Net -3341 ml      Wt Readings from Last 3 Encounters:   08/27/20 124 lb 9 oz (56.5 kg)   08/11/20 145 lb (65.8 kg)   08/02/20 120 lb 4.8 oz (54.6 kg)       General appearance:  Appears comfortable  Eyes: Sclera clear. Pupils equal.  ENT: Moist oral mucosa. Trachea midline, no adenopathy. Cardiovascular: Regular rhythm, normal S1, S2. No murmur. No edema in lower extremities  Respiratory: Not using accessory muscles.  Clear to auscultation bilaterally with few scattered 50% time counseling patient and/or coordinating care with RN    Problem List  Active Problems:    Acute respiratory failure with hypoxia ([address]    Suspected COVID-19 virus infection    Abnormal CT scan  Resolved Problems:    * No resolved hospital problems.  *       Assessment & Plan:   Acute on chronic hypoxic respiratory failure : 2/2 pulmonary edema/ARDS  - Diuresed well with Lasix drip which is now decreased to 5 mg/h.   - CXR today showed persistent but slightly decreased multifocal airspace opacities  - continue Vanc and cefepime  - follow cultures    COPD: -History of smoking but now PFTs available    Hyponatremia: 2/2 to diuretics  -We will monitor closely    Acute on chronic systolic heart failure exacerbation :   -Cardiology on board; appreciate their recs  -History of CAD status post PCI and stent and stent thrombosis  -Recent echo revealed LVEF of 40%   -Continue DAPT with history of stent thrombosis    Leukocytosis: Resolved    Acute metabolic encephalopathy and delirium ;resolved  -likely secondary to polysubstance abuse with withdrawal  -Off Precedex drip     Diabetes mellitus: Persistently elevated blood glucose level  -Adjusted Lantus and SSI       Diet: DIET CARDIAC; Carb Control: 4 carb choices (60 gms)/meal  Code:Full Code  DVT PPX  Disposition       Dionne Payton MD   8/27/2020 6:01 PM

## 2020-08-27 NOTE — PROGRESS NOTES
Reassessment completed. Pt had a critical BS. MD notified see orders.  No other acute issues at this time will continue to monitor

## 2020-08-27 NOTE — PROGRESS NOTES
*  [facility]  Daily Progress Note    Admit Date:  8/21/2020      CC: ARF, CAD, HTN, CHOL  Subj: Today, -6L. Big improvement in sx overnight.       Obj:   /81   Pulse 93   Temp 97.8 °F (36.6 °C) (Temporal)   Resp 26   Ht 5' 2\" (1.575 m)   Wt 124 lb 9 oz (56.5 kg)   LMP 11/15/2017   SpO2 96%   BMI 22.78 kg/m²       Intake/Output Summary (Last 24 hours) at 8/27/2020 0846  Last data filed at 8/27/2020 9097  Gross per 24 hour   Intake 2289 ml   Output 5850 ml   Net -3561 ml     Gen Alert, sob improved Heart  RRR, no MRG, nl apical impulse   Head NC, AT, no abnorm Abd  Soft, NT, +BS, no mass, no OM   Eyes PERRLA, conj/corn clear Ext  Amp   Nose Nares nl, no drain, NT Pulse Decr/absent amp   Throat Lips, mucosa, tongue nl Skin Color/text/turg nl, no rash/lesions   Neck S/S, TM, NT, no bruit/JVD Psych Nl mood and affect   Lung decr Lymph   No cervical or axillary LA   Ch wall NT, no deform Neuro  Nl gross M/S exam     CVMeds:  lipitor 40, coreg 3.125bid, brilinta 90bid, as 81, lasix 40ivbid    Lab Data: Relevant and available CV data reviewed    ASSESSMENT AND PLAN     *Acute respiratory failure  Status Acute on chronic combined s/d failure in setting of sepsis  Plan Abx per medicine   Improvement with diuresis and abx  *CAD  Hx PCI LAD, LAD stent thrombosis  TTE 40%  Plan Continue dapt with hx of stent thrombosis, bb, statin  *HTN  Status stable  Plan Continue current meds  *CHOL  Status On high dose statin  Plan Continue statin  *Hyponatremia  Status Worsening  Plan Per medicine, likely diuretic induced, may have to reduced intensity of diuresis

## 2020-08-28 ENCOUNTER — APPOINTMENT (OUTPATIENT)
Dept: GENERAL RADIOLOGY | Age: 55
DRG: 720 | End: 2020-08-28
Payer: COMMERCIAL

## 2020-08-28 ENCOUNTER — TELEPHONE (OUTPATIENT)
Dept: PULMONOLOGY | Age: 55
End: 2020-08-28

## 2020-08-28 LAB
ANION GAP SERPL CALCULATED.3IONS-SCNC: 8 MMOL/L (ref 3–16)
BANDED NEUTROPHILS RELATIVE PERCENT: 2 % (ref 0–7)
BASOPHILS ABSOLUTE: 0.2 K/UL (ref 0–0.2)
BASOPHILS RELATIVE PERCENT: 2 %
BUN BLDV-MCNC: 27 MG/DL (ref 7–20)
CALCIUM SERPL-MCNC: 8.3 MG/DL (ref 8.3–10.6)
CHLORIDE BLD-SCNC: 92 MMOL/L (ref 99–110)
CO2: 34 MMOL/L (ref 21–32)
CREAT SERPL-MCNC: 0.8 MG/DL (ref 0.6–1.1)
EOSINOPHILS ABSOLUTE: 1 K/UL (ref 0–0.6)
EOSINOPHILS RELATIVE PERCENT: 9 %
GFR AFRICAN AMERICAN: >60
GFR NON-AFRICAN AMERICAN: >60
GLUCOSE BLD-MCNC: 300 MG/DL (ref 70–99)
GLUCOSE BLD-MCNC: 386 MG/DL (ref 70–99)
GLUCOSE BLD-MCNC: 395 MG/DL (ref 70–99)
GLUCOSE BLD-MCNC: 43 MG/DL (ref 70–99)
GLUCOSE BLD-MCNC: 69 MG/DL (ref 70–99)
GLUCOSE BLD-MCNC: 95 MG/DL (ref 70–99)
HCT VFR BLD CALC: 39 % (ref 36–48)
HEMOGLOBIN: 13 G/DL (ref 12–16)
LACTIC ACID: 1.3 MMOL/L (ref 0.4–2)
LYMPHOCYTES ABSOLUTE: 2.1 K/UL (ref 1–5.1)
LYMPHOCYTES RELATIVE PERCENT: 19 %
MCH RBC QN AUTO: 29.2 PG (ref 26–34)
MCHC RBC AUTO-ENTMCNC: 33.3 G/DL (ref 31–36)
MCV RBC AUTO: 87.5 FL (ref 80–100)
MONOCYTES ABSOLUTE: 0.2 K/UL (ref 0–1.3)
MONOCYTES RELATIVE PERCENT: 2 %
NEUTROPHILS ABSOLUTE: 7.5 K/UL (ref 1.7–7.7)
NEUTROPHILS RELATIVE PERCENT: 66 %
PDW BLD-RTO: 13.9 % (ref 12.4–15.4)
PERFORMED ON: ABNORMAL
PERFORMED ON: NORMAL
PLATELET # BLD: 232 K/UL (ref 135–450)
PLATELET SLIDE REVIEW: ADEQUATE
PMV BLD AUTO: 10.1 FL (ref 5–10.5)
POLYCHROMASIA: ABNORMAL
POTASSIUM REFLEX MAGNESIUM: 5.1 MMOL/L (ref 3.5–5.1)
PROCALCITONIN: 0.21 NG/ML (ref 0–0.15)
RBC # BLD: 4.45 M/UL (ref 4–5.2)
SLIDE REVIEW: ABNORMAL
SODIUM BLD-SCNC: 134 MMOL/L (ref 136–145)
STOMATOCYTES: ABNORMAL
WBC # BLD: 11 K/UL (ref 4–11)

## 2020-08-28 PROCEDURE — 99233 SBSQ HOSP IP/OBS HIGH 50: CPT | Performed by: INTERNAL MEDICINE

## 2020-08-28 PROCEDURE — 6370000000 HC RX 637 (ALT 250 FOR IP): Performed by: INTERNAL MEDICINE

## 2020-08-28 PROCEDURE — 94640 AIRWAY INHALATION TREATMENT: CPT

## 2020-08-28 PROCEDURE — 83605 ASSAY OF LACTIC ACID: CPT

## 2020-08-28 PROCEDURE — 2700000000 HC OXYGEN THERAPY PER DAY

## 2020-08-28 PROCEDURE — 2000000000 HC ICU R&B

## 2020-08-28 PROCEDURE — 71045 X-RAY EXAM CHEST 1 VIEW: CPT

## 2020-08-28 PROCEDURE — 84145 PROCALCITONIN (PCT): CPT

## 2020-08-28 PROCEDURE — 85025 COMPLETE CBC W/AUTO DIFF WBC: CPT

## 2020-08-28 PROCEDURE — 80048 BASIC METABOLIC PNL TOTAL CA: CPT

## 2020-08-28 PROCEDURE — 94761 N-INVAS EAR/PLS OXIMETRY MLT: CPT

## 2020-08-28 PROCEDURE — 2580000003 HC RX 258: Performed by: INTERNAL MEDICINE

## 2020-08-28 PROCEDURE — 2580000003 HC RX 258: Performed by: FAMILY MEDICINE

## 2020-08-28 PROCEDURE — 99232 SBSQ HOSP IP/OBS MODERATE 35: CPT | Performed by: INTERNAL MEDICINE

## 2020-08-28 PROCEDURE — 6360000002 HC RX W HCPCS: Performed by: INTERNAL MEDICINE

## 2020-08-28 PROCEDURE — 6370000000 HC RX 637 (ALT 250 FOR IP): Performed by: FAMILY MEDICINE

## 2020-08-28 RX ORDER — FUROSEMIDE 40 MG/1
40 TABLET ORAL DAILY
Status: DISCONTINUED | OUTPATIENT
Start: 2020-08-28 | End: 2020-08-29 | Stop reason: HOSPADM

## 2020-08-28 RX ORDER — IPRATROPIUM BROMIDE AND ALBUTEROL SULFATE 2.5; .5 MG/3ML; MG/3ML
1 SOLUTION RESPIRATORY (INHALATION)
Status: DISCONTINUED | OUTPATIENT
Start: 2020-08-28 | End: 2020-08-29 | Stop reason: HOSPADM

## 2020-08-28 RX ORDER — INSULIN LISPRO 100 [IU]/ML
0-9 INJECTION, SOLUTION INTRAVENOUS; SUBCUTANEOUS NIGHTLY
Status: DISCONTINUED | OUTPATIENT
Start: 2020-08-28 | End: 2020-08-29 | Stop reason: HOSPADM

## 2020-08-28 RX ORDER — INSULIN LISPRO 100 [IU]/ML
0-18 INJECTION, SOLUTION INTRAVENOUS; SUBCUTANEOUS
Status: DISCONTINUED | OUTPATIENT
Start: 2020-08-28 | End: 2020-08-29 | Stop reason: HOSPADM

## 2020-08-28 RX ORDER — INSULIN LISPRO 100 [IU]/ML
4 INJECTION, SOLUTION INTRAVENOUS; SUBCUTANEOUS EVERY 4 HOURS
Status: DISCONTINUED | OUTPATIENT
Start: 2020-08-28 | End: 2020-08-28

## 2020-08-28 RX ORDER — INSULIN LISPRO 100 [IU]/ML
20 INJECTION, SOLUTION INTRAVENOUS; SUBCUTANEOUS
Status: DISCONTINUED | OUTPATIENT
Start: 2020-08-28 | End: 2020-08-28

## 2020-08-28 RX ORDER — INSULIN LISPRO 100 [IU]/ML
10 INJECTION, SOLUTION INTRAVENOUS; SUBCUTANEOUS
Status: DISCONTINUED | OUTPATIENT
Start: 2020-08-28 | End: 2020-08-29 | Stop reason: HOSPADM

## 2020-08-28 RX ADMIN — BUPRENORPHINE AND NALOXONE 1 FILM: 8; 2 FILM BUCCAL; SUBLINGUAL at 09:04

## 2020-08-28 RX ADMIN — POTASSIUM CHLORIDE 40 MEQ: 1500 TABLET, EXTENDED RELEASE ORAL at 18:05

## 2020-08-28 RX ADMIN — CARVEDILOL 3.12 MG: 3.12 TABLET, FILM COATED ORAL at 09:05

## 2020-08-28 RX ADMIN — CEFEPIME 2 G: 2 INJECTION, POWDER, FOR SOLUTION INTRAVENOUS at 04:40

## 2020-08-28 RX ADMIN — TICAGRELOR 90 MG: 90 TABLET ORAL at 09:04

## 2020-08-28 RX ADMIN — SERTRALINE HYDROCHLORIDE 50 MG: 50 TABLET, FILM COATED ORAL at 09:05

## 2020-08-28 RX ADMIN — INSULIN LISPRO 10 UNITS: 100 INJECTION, SOLUTION INTRAVENOUS; SUBCUTANEOUS at 09:07

## 2020-08-28 RX ADMIN — ASPIRIN 81 MG 81 MG: 81 TABLET ORAL at 09:04

## 2020-08-28 RX ADMIN — FUROSEMIDE 40 MG: 40 TABLET ORAL at 13:32

## 2020-08-28 RX ADMIN — MIRTAZAPINE 7.5 MG: 15 TABLET, FILM COATED ORAL at 22:30

## 2020-08-28 RX ADMIN — DEXTROSE MONOHYDRATE 25 G: 500 INJECTION PARENTERAL at 15:43

## 2020-08-28 RX ADMIN — INSULIN LISPRO 20 UNITS: 100 INJECTION, SOLUTION INTRAVENOUS; SUBCUTANEOUS at 13:32

## 2020-08-28 RX ADMIN — INSULIN LISPRO 6 UNITS: 100 INJECTION, SOLUTION INTRAVENOUS; SUBCUTANEOUS at 22:35

## 2020-08-28 RX ADMIN — PREGABALIN 100 MG: 25 CAPSULE ORAL at 22:30

## 2020-08-28 RX ADMIN — ENOXAPARIN SODIUM 40 MG: 40 INJECTION SUBCUTANEOUS at 09:05

## 2020-08-28 RX ADMIN — STANDARDIZED SENNA CONCENTRATE AND DOCUSATE SODIUM 2 TABLET: 8.6; 5 TABLET ORAL at 09:05

## 2020-08-28 RX ADMIN — POTASSIUM CHLORIDE 40 MEQ: 1500 TABLET, EXTENDED RELEASE ORAL at 09:04

## 2020-08-28 RX ADMIN — POLYETHYLENE GLYCOL 3350 17 G: 17 POWDER, FOR SOLUTION ORAL at 09:04

## 2020-08-28 RX ADMIN — GLYCOPYRROLATE AND FORMOTEROL FUMARATE 2 PUFF: 9; 4.8 AEROSOL, METERED RESPIRATORY (INHALATION) at 20:24

## 2020-08-28 RX ADMIN — ATORVASTATIN CALCIUM 40 MG: 80 TABLET, FILM COATED ORAL at 09:05

## 2020-08-28 RX ADMIN — INSULIN LISPRO 10 UNITS: 100 INJECTION, SOLUTION INTRAVENOUS; SUBCUTANEOUS at 09:08

## 2020-08-28 RX ADMIN — IPRATROPIUM BROMIDE AND ALBUTEROL SULFATE 1 AMPULE: .5; 3 SOLUTION RESPIRATORY (INHALATION) at 07:59

## 2020-08-28 RX ADMIN — Medication 10 ML: at 09:06

## 2020-08-28 RX ADMIN — GLYCOPYRROLATE AND FORMOTEROL FUMARATE 2 PUFF: 9; 4.8 AEROSOL, METERED RESPIRATORY (INHALATION) at 11:31

## 2020-08-28 RX ADMIN — LAMOTRIGINE 100 MG: 100 TABLET ORAL at 09:05

## 2020-08-28 RX ADMIN — CARVEDILOL 3.12 MG: 3.12 TABLET, FILM COATED ORAL at 18:05

## 2020-08-28 RX ADMIN — BUPRENORPHINE AND NALOXONE 1 FILM: 8; 2 FILM BUCCAL; SUBLINGUAL at 22:32

## 2020-08-28 RX ADMIN — TICAGRELOR 90 MG: 90 TABLET ORAL at 22:32

## 2020-08-28 RX ADMIN — PREGABALIN 100 MG: 25 CAPSULE ORAL at 09:04

## 2020-08-28 ASSESSMENT — PAIN SCALES - GENERAL
PAINLEVEL_OUTOF10: 0

## 2020-08-28 NOTE — PROGRESS NOTES
[facility]    8/28/2020 7:27 PM        Name: Harika Chopra . Admitted: 8/21/2020  Primary Care Provider: No primary care provider on file. (Tel: None)    Brief Course: This 53-year-old female with past medical history of CHF, diabetes mellitus, MI, hepatitis C, chronic pain, opiate abuse ,former smoking abuse(quit 2 weeks ago) and substance abuse admitted for hypoxic respiratory failure. Per chart review patient was admitted at [facility] for on 8/2/2020 CHF exacerbation but patient left AMA at that time. Covid test was negative during that admission. CC: Emesis     Subjective: Patient seen and examined today. Patient afebrile overnight, WBC within normal limits. Pulmonologist tried to wean patient to room air but patient desatted to high 80s to low 90s. Currently patient on 3 L nasal cannula with O2 saturation b/w 95-98. Patient diuresed well with Lasix drip switched to p.o. Lasix 40 mg daily. Chest x-ray today showed multifocal airspace disease slightly improved from previous studies.      Reviewed interval ancillary notes    Current Medications  furosemide (LASIX) tablet 40 mg, Daily  glycopyrrolate-formoterol (BEVESPI) 9-4.8 MCG/ACT inhaler 2 puff, BID  ipratropium-albuterol (DUONEB) nebulizer solution 1 ampule, Q2H PRN  insulin lispro (1 Unit Dial) 10 Units, TID WC  insulin lispro (1 Unit Dial) 0-18 Units, TID WC  insulin lispro (1 Unit Dial) 0-9 Units, Nightly  insulin glargine (LANTUS;BASAGLAR) injection pen 30 Units, BID  potassium chloride (KLOR-CON M) extended release tablet 40 mEq, BID WC  sennosides-docusate sodium (SENOKOT-S) 8.6-50 MG tablet 2 tablet, Daily  polyethylene glycol (GLYCOLAX) packet 17 g, Daily  buprenorphine-naloxone (SUBOXONE) 8-2 MG SL film 1 Film, BID  potassium chloride 20 mEq/50 mL IVPB (Central Line), PRN  enoxaparin (LOVENOX) injection 40 mg, Daily  mirtazapine (REMERON) tablet 7.5 mg, Nightly  dextrose 50 % IV solution, PRN  cloNIDine (CATAPRES) tablet 0.1 mg, PRN  haloperidol lactate (HALDOL) injection 5 mg, Q4H PRN  atorvastatin (LIPITOR) tablet 40 mg, Daily  carvedilol (COREG) tablet 3.125 mg, BID WC  hydrOXYzine (ATARAX) tablet 25 mg, TID PRN  lamoTRIgine (LAMICTAL) tablet 100 mg, Daily  nitroGLYCERIN (NITROSTAT) SL tablet 0.4 mg, Q5 Min PRN  sertraline (ZOLOFT) tablet 50 mg, Daily  pregabalin (LYRICA) capsule 100 mg, BID  ticagrelor (BRILINTA) tablet 90 mg, BID  sodium chloride flush 0.9 % injection 10 mL, 2 times per day  sodium chloride flush 0.9 % injection 10 mL, PRN  acetaminophen (TYLENOL) tablet 650 mg, Q6H PRN    Or  acetaminophen (TYLENOL) suppository 650 mg, Q6H PRN  polyethylene glycol (GLYCOLAX) packet 17 g, Daily PRN  promethazine (PHENERGAN) tablet 12.5 mg, Q6H PRN    Or  ondansetron (ZOFRAN) injection 4 mg, Q6H PRN  dextrose 5 % solution, PRN  glucose (GLUTOSE) 40 % oral gel 15 g, PRN  glucagon (rDNA) injection 1 mg, PRN  aspirin chewable tablet 81 mg, Daily  perflutren lipid microspheres (DEFINITY) injection 1.65 mg, ONCE PRN  LORazepam (ATIVAN) injection 2 mg, Q3H PRN        Objective:  BP 95/64   Pulse 92   Temp 98.9 °F (37.2 °C) (Temporal)   Resp 12   Ht 5' 2\" (1.575 m)   Wt 125 lb (56.7 kg)   LMP 11/15/2017   SpO2 94%   BMI 22.86 kg/m²     Intake/Output Summary (Last 24 hours) at 8/28/2020 1927  Last data filed at 8/28/2020 0444  Gross per 24 hour   Intake 402 ml   Output 575 ml   Net -173 ml      Wt Readings from Last 3 Encounters:   08/28/20 125 lb (56.7 kg)   08/11/20 145 lb (65.8 kg)   08/02/20 120 lb 4.8 oz (54.6 kg)       General appearance:  Appears comfortable  Eyes: Sclera clear. Pupils equal.  ENT: Moist oral mucosa. Trachea midline, no adenopathy. Cardiovascular: Regular rhythm, normal S1, S2. No murmur. No edema in lower extremities  Respiratory: Not using accessory muscles.  Clear to auscultation bilaterally with few scattered crackles  GI: Abdomen soft, no tenderness, not distended, normal bowel sounds  Musculoskeletal: Right below the knee amputation  Neurology: CN 2-12 grossly intact. No speech or motor deficits  Psych: Normal affect. Alert and oriented in time, place and person  Skin: Warm, dry, normal turgor  Extremity exam shows brisk capillary refill. Peripheral pulses are palpable in lower extremities     Labs and Tests:  CBC:   Recent Labs     08/26/20  0405 08/27/20  0440 08/28/20  0420   WBC 11.0 8.6 11.0   HGB 12.0 12.7 13.0    193 232     BMP:    Recent Labs     08/26/20  1400 08/27/20  0440 08/28/20  0420   * 128* 134*   K 3.2* 4.4 5.1   CL 94* 86* 92*   CO2 28 31 34*   BUN 17 17 27*   CREATININE 0.5* 0.7 0.8   GLUCOSE 165* 607* 300*     Hepatic: No results for input(s): AST, ALT, ALB, BILITOT, ALKPHOS in the last 72 hours. XR CHEST PORTABLE   Final Result   Multifocal airspace disease is not significantly changed compared to prior   but is slightly improved compared to older studies. XR CHEST PORTABLE   Final Result   Persistent but slightly decreased multifocal airspace opacities         XR CHEST PORTABLE   Final Result   Unchanged multifocal bilateral pneumonia. XR CHEST PORTABLE   Final Result   Persistent diffuse bilateral airspace disease, which can reflect edema or   pneumonia. Small left pleural effusion. XR CHEST PORTABLE   Final Result   Increasing bilateral airspace disease. Vascular indistinctness suggests a   component of fluid overload-pulmonary edema         CT ABDOMEN PELVIS W IV CONTRAST Additional Contrast? None   Final Result   1. Congestive heart failure. 2. Mediastinal adenopathy, likely reactive. 3. Trace pelvic ascites. CT CHEST PULMONARY EMBOLISM W CONTRAST   Final Result   1. Congestive heart failure. 2. Mediastinal adenopathy, likely reactive. 3. Trace pelvic ascites.          XR CHEST PORTABLE   Final Result   Bilateral pneumonia versus pulmonary edema             Discussed care with RN    I viewed CXR images       Spent 30 minutes with patient and on unit reviewing medical records and labs, spent greater than 50% time counseling patient and/or coordinating care with RN    Problem List  Active Problems:    Acute respiratory failure with hypoxia ([address]    Suspected COVID-19 virus infection    Abnormal CT scan  Resolved Problems:    * No resolved hospital problems. *       Assessment & Plan:   Acute on chronic hypoxic respiratory failure : 2/2 pulmonary edema/ARDS  - Diuresed well with Lasix drip which is now switched to p.o.  Lasix 40 mg daily  - CXR Chest x-ray today showed multifocal airspace disease slightly improved from previous studies.   -Completed full course of vanc and cefepime    COPD: -History of smoking but now PFTs available  -Outpatient PFT and pulmonary follow-up recommended    Hyponatremia: 2/2 to diuretics  -We will monitor closely    Acute on chronic systolic heart failure exacerbation :   -Cardiology on board; appreciate their recs  -History of CAD status post PCI and stent and stent thrombosis  -Recent echo revealed LVEF of 40%   -Continue DAPT with history of stent thrombosis    Leukocytosis: Resolved    Acute metabolic encephalopathy and delirium ;resolved  -likely secondary to polysubstance abuse with withdrawal  -Off Precedex drip     Diabetes mellitus: Persistently elevated BG levels with an episode of hypoglycemia with increase   -Hemoglobin A1c 12.5 on admission  -Continue Lantus ; adjusted prandial and correctional scale  -Follow the blood glucose closely  -Patient follows endocrinology; instructed patient to follow-up upon discharge.  -Patient verbalized understanding and reported that she had appointment with the endocrinologist this week which she missed due to hospitalization       Diet: DIET CARDIAC; Carb Control: 4 carb choices (60 gms)/meal  Code:Full Code  DVT PPX  Disposition       Xena Ayala MD   8/28/2020 7:27 PM

## 2020-08-28 NOTE — PROGRESS NOTES
P Pulmonary and Critical Care    Follow Up Note    Subjective:   CHIEF COMPLAINT / HPI:   Chief Complaint   Patient presents with    Emesis     pt with c/o emesis x 2-3 days, abd pain, diarrhea. states emesis x 3 since midnight today. no fever at home       Interval history: She is sitting up in the chair. She was wearing 3 L/min nasal cannula oxygen supplement. I put her on room air when we came in the room and she is tolerating that pretty well though she is in the high 80s to low 90s at the moment. .. Past Medical History:    Reviewed; no changes    Social History:    Reviewed; no changes    REVIEW OF SYSTEMS:    CONSTITUTIONAL:  negative for fevers and chills  RESPIRATORY:  See HPI  CARDIOVASCULAR:  negative for chest pain, palpitations, edema  GASTROINTESTINAL:  negative for nausea, vomiting, diarrhea, constipation and abdominal pain    Objective:   PHYSICAL EXAM:        VITALS:  /67   Pulse 102   Temp 97.2 °F (36.2 °C) (Temporal)   Resp 18   Ht 5' 2\" (1.575 m)   Wt 125 lb (56.7 kg)   LMP 11/15/2017   SpO2 96%   BMI 22.86 kg/m²  on BiPAP/40%     24HR INTAKE/OUTPUT:      Intake/Output Summary (Last 24 hours) at 8/28/2020 0948  Last data filed at 8/28/2020 0444  Gross per 24 hour   Intake 1502 ml   Output 1875 ml   Net -373 ml       CONSTITUTIONAL: Alert but still on a little bit of Precedex  LUNGS:  No increased work of breathing and few inspiratory squeaks to auscultation, no crackles or wheezes  CARDIOVASCULAR: S1 and S2 and no JVD  ABDOMEN:  normal bowel sounds, non-distended and non-tender to palpation  EXT: No edema, no calf tenderness. Pulses are present bilaterally. NEUROLOGIC:  Mental Status Exam:  Level of Alertness:   awake  Orientation:   person, place, time.  Non focal  SKIN:  normal skin color, texture, turgor, no redness, warmth, or swelling at IV sites    DATA:    CBC:  Recent Labs     08/26/20  0405 08/27/20  0440 08/28/20  0420   WBC 11.0 8.6 11.0   RBC 4.23 4.43 4.45   HGB 12.0 12.7 13.0   HCT 36.3 38.5 39.0    193 232   MCV 85.7 86.9 87.5   MCH 28.3 28.6 29.2   MCHC 33.0 33.0 33.3   RDW 13.5 13.3 13.9   BANDSPCT  --   --  2      BMP:  Recent Labs     08/26/20  1400 08/27/20  0440 08/28/20  0420   * 128* 134*   K 3.2* 4.4 5.1   CL 94* 86* 92*   CO2 28 31 34*   BUN 17 17 27*   CREATININE 0.5* 0.7 0.8   CALCIUM 7.8* 7.7* 8.3   GLUCOSE 165* 607* 300*      ABG:  No results for input(s): PHART, IMK0MNU, PO2ART, RNF0ORJ, H3OPGWQT, BEART in the last 72 hours. Cultures:    Abx:    Radiology Review:  Pertinent images / reports were reviewed as a part of this visit. Assessment:     1. Acute hypoxemic respiratory failure  2. Acute pulmonary edema  3. Cardiomyopathy  4. COPD, severity to be determined    Plan:     1. I have reviewed laboratories, medical records and images for this visit  2. Has now diuresed a net of about 6 or 7 L on the Lasix drip  3. BUN is up a little bit today  4. Change Lasix drip to p.o. Lasix 40 mg daily  5. Repeat chest x-ray  6. See how she tolerates room air  7. Put her on Bevespi  8. Make DuoNeb as needed  9. She has completed a full course of antibiotics. Stop antibiotics  10. Would benefit from outpatient PFT and pulmonary follow-up  11. Still struggling a little bit with glucose control  12. Increase her prandial regular insulin dose  13. Follow-up with endocrinology as an outpatient  14.  If she continues to improve, may be ready for a.m. discharge

## 2020-08-28 NOTE — TELEPHONE ENCOUNTER
pft order placed pt still in patient.  Will call her once she is discharged to let her know that Dr Marco Antonio Ni would llke for her to have this done

## 2020-08-28 NOTE — PROGRESS NOTES
Occupational Therapy/ Physical Therapy Iman Vasquez    Orders rec'd , chart reviewed, patient well known to providers. Patient reports she just got a new wheelchair and has all DME she has at home and no needs at this time. DC order.        [address], [place] [id number]

## 2020-08-29 VITALS
HEIGHT: 62 IN | HEART RATE: 103 BPM | RESPIRATION RATE: 20 BRPM | DIASTOLIC BLOOD PRESSURE: 67 MMHG | TEMPERATURE: 97.7 F | SYSTOLIC BLOOD PRESSURE: 98 MMHG | OXYGEN SATURATION: 90 % | WEIGHT: 122.3 LBS | BODY MASS INDEX: 22.5 KG/M2

## 2020-08-29 LAB
AMPHETAMINE SCREEN, URINE: NORMAL
ANION GAP SERPL CALCULATED.3IONS-SCNC: 8 MMOL/L (ref 3–16)
BARBITURATE SCREEN URINE: NORMAL
BASOPHILS ABSOLUTE: 0.1 K/UL (ref 0–0.2)
BASOPHILS RELATIVE PERCENT: 0.4 %
BENZODIAZEPINE SCREEN, URINE: NORMAL
BUN BLDV-MCNC: 37 MG/DL (ref 7–20)
CALCIUM SERPL-MCNC: 8.7 MG/DL (ref 8.3–10.6)
CANNABINOID SCREEN URINE: NORMAL
CHLORIDE BLD-SCNC: 90 MMOL/L (ref 99–110)
CO2: 33 MMOL/L (ref 21–32)
COCAINE METABOLITE SCREEN URINE: NORMAL
CREAT SERPL-MCNC: 0.8 MG/DL (ref 0.6–1.1)
EOSINOPHILS ABSOLUTE: 0.5 K/UL (ref 0–0.6)
EOSINOPHILS RELATIVE PERCENT: 3.8 %
GFR AFRICAN AMERICAN: >60
GFR NON-AFRICAN AMERICAN: >60
GLUCOSE BLD-MCNC: 110 MG/DL (ref 70–99)
GLUCOSE BLD-MCNC: 115 MG/DL (ref 70–99)
GLUCOSE BLD-MCNC: 163 MG/DL (ref 70–99)
GLUCOSE BLD-MCNC: 223 MG/DL (ref 70–99)
GLUCOSE BLD-MCNC: 252 MG/DL (ref 70–99)
GLUCOSE BLD-MCNC: 261 MG/DL (ref 70–99)
GLUCOSE BLD-MCNC: 385 MG/DL (ref 70–99)
GLUCOSE BLD-MCNC: 55 MG/DL (ref 70–99)
GLUCOSE BLD-MCNC: 83 MG/DL (ref 70–99)
HCT VFR BLD CALC: 37.7 % (ref 36–48)
HEMOGLOBIN: 12.3 G/DL (ref 12–16)
LACTIC ACID: 1.8 MMOL/L (ref 0.4–2)
LYMPHOCYTES ABSOLUTE: 2.4 K/UL (ref 1–5.1)
LYMPHOCYTES RELATIVE PERCENT: 16.9 %
Lab: NORMAL
MCH RBC QN AUTO: 28.4 PG (ref 26–34)
MCHC RBC AUTO-ENTMCNC: 32.7 G/DL (ref 31–36)
MCV RBC AUTO: 87 FL (ref 80–100)
METHADONE SCREEN, URINE: NORMAL
MONOCYTES ABSOLUTE: 0.8 K/UL (ref 0–1.3)
MONOCYTES RELATIVE PERCENT: 5.8 %
NEUTROPHILS ABSOLUTE: 10.3 K/UL (ref 1.7–7.7)
NEUTROPHILS RELATIVE PERCENT: 73.1 %
OPIATE SCREEN URINE: NORMAL
OXYCODONE URINE: NORMAL
PDW BLD-RTO: 13.7 % (ref 12.4–15.4)
PERFORMED ON: ABNORMAL
PERFORMED ON: NORMAL
PH UA: 5
PHENCYCLIDINE SCREEN URINE: NORMAL
PLATELET # BLD: 213 K/UL (ref 135–450)
PMV BLD AUTO: 10 FL (ref 5–10.5)
POTASSIUM REFLEX MAGNESIUM: 5 MMOL/L (ref 3.5–5.1)
PROPOXYPHENE SCREEN: NORMAL
RBC # BLD: 4.34 M/UL (ref 4–5.2)
SODIUM BLD-SCNC: 131 MMOL/L (ref 136–145)
T4 FREE: 1.3 NG/DL (ref 0.9–1.8)
TSH SERPL DL<=0.05 MIU/L-ACNC: 1.73 UIU/ML (ref 0.27–4.2)
WBC # BLD: 14.1 K/UL (ref 4–11)

## 2020-08-29 PROCEDURE — 6370000000 HC RX 637 (ALT 250 FOR IP): Performed by: INTERNAL MEDICINE

## 2020-08-29 PROCEDURE — 2580000003 HC RX 258: Performed by: FAMILY MEDICINE

## 2020-08-29 PROCEDURE — 2700000000 HC OXYGEN THERAPY PER DAY

## 2020-08-29 PROCEDURE — 6360000002 HC RX W HCPCS: Performed by: INTERNAL MEDICINE

## 2020-08-29 PROCEDURE — 6370000000 HC RX 637 (ALT 250 FOR IP): Performed by: FAMILY MEDICINE

## 2020-08-29 PROCEDURE — 94761 N-INVAS EAR/PLS OXIMETRY MLT: CPT

## 2020-08-29 PROCEDURE — 99232 SBSQ HOSP IP/OBS MODERATE 35: CPT | Performed by: INTERNAL MEDICINE

## 2020-08-29 PROCEDURE — 84439 ASSAY OF FREE THYROXINE: CPT

## 2020-08-29 PROCEDURE — 83605 ASSAY OF LACTIC ACID: CPT

## 2020-08-29 PROCEDURE — 80048 BASIC METABOLIC PNL TOTAL CA: CPT

## 2020-08-29 PROCEDURE — 84443 ASSAY THYROID STIM HORMONE: CPT

## 2020-08-29 PROCEDURE — 94640 AIRWAY INHALATION TREATMENT: CPT

## 2020-08-29 PROCEDURE — 85025 COMPLETE CBC W/AUTO DIFF WBC: CPT

## 2020-08-29 PROCEDURE — 99233 SBSQ HOSP IP/OBS HIGH 50: CPT | Performed by: INTERNAL MEDICINE

## 2020-08-29 PROCEDURE — 80307 DRUG TEST PRSMV CHEM ANLYZR: CPT

## 2020-08-29 RX ORDER — HYDROXYZINE HYDROCHLORIDE 25 MG/1
25 TABLET, FILM COATED ORAL 2 TIMES DAILY
Qty: 30 TABLET | Refills: 0 | Status: SHIPPED | OUTPATIENT
Start: 2020-08-29 | End: 2020-09-28

## 2020-08-29 RX ORDER — FUROSEMIDE 40 MG/1
40 TABLET ORAL DAILY
Qty: 60 TABLET | Refills: 3 | Status: ON HOLD | OUTPATIENT
Start: 2020-08-30 | End: 2020-09-25 | Stop reason: SDUPTHER

## 2020-08-29 RX ORDER — LAMOTRIGINE 100 MG/1
100 TABLET ORAL DAILY
Qty: 30 TABLET | Refills: 3 | Status: ON HOLD | OUTPATIENT
Start: 2020-08-29 | End: 2020-09-24

## 2020-08-29 RX ADMIN — TICAGRELOR 90 MG: 90 TABLET ORAL at 09:54

## 2020-08-29 RX ADMIN — FUROSEMIDE 40 MG: 40 TABLET ORAL at 09:44

## 2020-08-29 RX ADMIN — STANDARDIZED SENNA CONCENTRATE AND DOCUSATE SODIUM 2 TABLET: 8.6; 5 TABLET ORAL at 09:43

## 2020-08-29 RX ADMIN — POTASSIUM CHLORIDE 40 MEQ: 1500 TABLET, EXTENDED RELEASE ORAL at 18:18

## 2020-08-29 RX ADMIN — INSULIN LISPRO 3 UNITS: 100 INJECTION, SOLUTION INTRAVENOUS; SUBCUTANEOUS at 12:21

## 2020-08-29 RX ADMIN — INSULIN LISPRO 10 UNITS: 100 INJECTION, SOLUTION INTRAVENOUS; SUBCUTANEOUS at 09:51

## 2020-08-29 RX ADMIN — Medication 10 ML: at 09:46

## 2020-08-29 RX ADMIN — PREGABALIN 100 MG: 25 CAPSULE ORAL at 10:21

## 2020-08-29 RX ADMIN — CARVEDILOL 3.12 MG: 3.12 TABLET, FILM COATED ORAL at 09:43

## 2020-08-29 RX ADMIN — BUPRENORPHINE AND NALOXONE 1 FILM: 8; 2 FILM BUCCAL; SUBLINGUAL at 09:45

## 2020-08-29 RX ADMIN — INSULIN LISPRO 10 UNITS: 100 INJECTION, SOLUTION INTRAVENOUS; SUBCUTANEOUS at 12:21

## 2020-08-29 RX ADMIN — SERTRALINE HYDROCHLORIDE 50 MG: 50 TABLET, FILM COATED ORAL at 09:43

## 2020-08-29 RX ADMIN — GLYCOPYRROLATE AND FORMOTEROL FUMARATE 2 PUFF: 9; 4.8 AEROSOL, METERED RESPIRATORY (INHALATION) at 08:50

## 2020-08-29 RX ADMIN — ASPIRIN 81 MG 81 MG: 81 TABLET ORAL at 09:44

## 2020-08-29 RX ADMIN — ATORVASTATIN CALCIUM 40 MG: 80 TABLET, FILM COATED ORAL at 09:44

## 2020-08-29 RX ADMIN — INSULIN LISPRO 15 UNITS: 100 INJECTION, SOLUTION INTRAVENOUS; SUBCUTANEOUS at 09:51

## 2020-08-29 RX ADMIN — ENOXAPARIN SODIUM 40 MG: 40 INJECTION SUBCUTANEOUS at 09:42

## 2020-08-29 RX ADMIN — POLYETHYLENE GLYCOL 3350 17 G: 17 POWDER, FOR SOLUTION ORAL at 09:44

## 2020-08-29 RX ADMIN — POTASSIUM CHLORIDE 40 MEQ: 1500 TABLET, EXTENDED RELEASE ORAL at 09:43

## 2020-08-29 ASSESSMENT — PAIN SCALES - WONG BAKER
WONGBAKER_NUMERICALRESPONSE: 0

## 2020-08-29 ASSESSMENT — PAIN SCALES - GENERAL
PAINLEVEL_OUTOF10: 0

## 2020-08-29 NOTE — PROGRESS NOTES
Home Oxygen Evaluation  Patients room air resting oxygen saturation SpO2 87%  Patient's on oxygen at rest SpO2 87% at   1 lpm = SpO2  89%    2 lpm = SpO2  91%

## 2020-08-29 NOTE — PROGRESS NOTES
PMI  · JVP:  normal  · Extremities: No Edema  Abdomen:  · Soft, non-tender  · Normal bowel sounds  Extremities:  ·  No Cyanosis or Clubbing  Neurological/Psychiatric:  · Oriented to time, place, and person  · Non-anxious  Skin Warm and dry    Lab Results   Component Value Date     08/29/2020     08/28/2020     08/27/2020    K 5.0 08/29/2020    K 5.1 08/28/2020    K 4.4 08/27/2020    BUN 37 08/29/2020    BUN 27 08/28/2020    BUN 17 08/27/2020    CREATININE 0.8 08/29/2020    CREATININE 0.8 08/28/2020    CREATININE 0.7 08/27/2020    GLUCOSE 261 08/29/2020    GLUCOSE 300 08/28/2020    GLUCOSE 329 05/20/2011     Lab Results   Component Value Date    PROBNP 6,099 (H) 08/22/2020    PROBNP 9,495 (H) 08/21/2020    PROBNP 944 (H) 08/01/2020     Lab Results   Component Value Date    ALT 14 08/21/2020    ALT 15 08/02/2020    AST 39 (H) 08/21/2020    AST 22 08/02/2020     Lab Results   Component Value Date    HGB 12.3 08/29/2020    HGB 13.0 08/28/2020    HCT 37.7 08/29/2020    HCT 39.0 08/28/2020     08/29/2020     08/28/2020     Lab Results   Component Value Date    TRIG 117 02/20/2020    TRIG 119 07/02/2019    HDL 34 02/20/2020    HDL 39 07/02/2019    LDLCALC 64 02/20/2020    LDLCALC 39 07/02/2019     Labs were reviewed including labs from other hospital systems through [facility]. Cardiac testing was reviewed including echos, nuclear scans, cardiac catheterization, including from other hospital systems through [facility]. Assessment:    Active Problems:    Acute respiratory failure with hypoxia (HCC)  Plan: improved. Off oxygen  CAD:  No issues with ischemia   Continue Brilinta  Mild systolic HF:  No evidence of fluid overload   Continue po lasix    Appears to be stable for discharge from our perspective.          NYHA Class: 3    Rodney Gaming MD, 8/29/2020 10:37 AM

## 2020-08-29 NOTE — PROGRESS NOTES
P Pulmonary and Critical Care    Follow Up Note    Subjective:   CHIEF COMPLAINT / HPI:   Chief Complaint   Patient presents with    Emesis     pt with c/o emesis x 2-3 days, abd pain, diarrhea. states emesis x 3 since midnight today. no fever at home       Interval history: Patient states that she is feeling better. Would like to go home. Continues to require 3 L/min nasal cannula oxygen supplement. There has been concern that visitors have been bringing her drugs. Past Medical History:    Reviewed; no changes    Social History:    Reviewed; no changes    REVIEW OF SYSTEMS:    CONSTITUTIONAL:  negative for fevers and chills  RESPIRATORY:  See HPI  CARDIOVASCULAR:  negative for chest pain, palpitations, edema  GASTROINTESTINAL:  negative for nausea, vomiting, diarrhea, constipation and abdominal pain    Objective:   PHYSICAL EXAM:        VITALS:  /73   Pulse 95   Temp 98.2 °F (36.8 °C) (Temporal)   Resp 23   Ht 5' 2\" (1.575 m)   Wt 122 lb 4.8 oz (55.5 kg)   LMP 11/15/2017   SpO2 93%   BMI 22.37 kg/m²  on BiPAP/40%     24HR INTAKE/OUTPUT:      Intake/Output Summary (Last 24 hours) at 8/29/2020 1154  Last data filed at 8/28/2020 1445  Gross per 24 hour   Intake 616 ml   Output 500 ml   Net 116 ml       CONSTITUTIONAL: Alert but still on a little bit of Precedex  LUNGS:  No increased work of breathing and few inspiratory squeaks to auscultation, no crackles or wheezes  CARDIOVASCULAR: S1 and S2 and no JVD  ABDOMEN:  normal bowel sounds, non-distended and non-tender to palpation  EXT: No edema, no calf tenderness. Pulses are present bilaterally. NEUROLOGIC:  Mental Status Exam:  Level of Alertness:   awake  Orientation:   person, place, time.  Non focal  SKIN:  normal skin color, texture, turgor, no redness, warmth, or swelling at IV sites    DATA:    CBC:  Recent Labs     08/27/20  0440 08/28/20  0420 08/29/20  0607   WBC 8.6 11.0 14.1*   RBC 4.43 4.45 4.34   HGB 12.7 13.0 12.3   HCT 38.5 39.0 37.7    232 213   MCV 86.9 87.5 87.0   MCH 28.6 29.2 28.4   MCHC 33.0 33.3 32.7   RDW 13.3 13.9 13.7   BANDSPCT  --  2  --       BMP:  Recent Labs     08/27/20  0440 08/28/20  0420 08/29/20  0607   * 134* 131*   K 4.4 5.1 5.0   CL 86* 92* 90*   CO2 31 34* 33*   BUN 17 27* 37*   CREATININE 0.7 0.8 0.8   CALCIUM 7.7* 8.3 8.7   GLUCOSE 607* 300* 261*      ABG:  No results for input(s): PHART, ODV0HCD, PO2ART, AYB5XGW, X3FPZNWT, BEART in the last 72 hours. Cultures:    Abx:    Radiology Review:  Pertinent images / reports were reviewed as a part of this visit. Assessment:     1. Acute hypoxemic respiratory failure  2. Acute pulmonary edema  3. Cardiomyopathy  4. COPD, severity to be determined    Plan:     1. I have reviewed laboratories, medical records and images for this visit  2. Chest x-ray is improved but still demonstrates some mild CHF  3. Continues to require 3 L/min oxygen supplement likely the combination of her underlying heart disease and COPD. 4. Does have an elevated serum CO2  5. May have obtained illicit drugs from friends/family  6. Repeat urine drug screen  7. May need supplemental oxygen at the time of discharge  8.  Discussed with Dr Michael Franklin

## 2020-08-29 NOTE — PROGRESS NOTES
BRIEF OPERATIVE NOTE    Date of Procedure: 7/19/2018   Preoperative Diagnosis: INCISIONAL HERNIA with loss of domain, INTERTRIGO  Postoperative Diagnosis: Same  Procedure(s):  OPEN INCISIONAL HERNIA REPAIR WITH MESH  CREATION BILATERAL SUBCUTANEOUS TISSUE FLAPS FOR SKIN CLOSURE   MEDICALLY NECESSARY PANNICULECTOMY   Surgeon(s) and Role:     * Gurwinder Brothers MD - Primary    Surgical Staff:  Circ-1: Sydni Jameson RN  Circ-Relief: [place] Mlot, RN  Scrub Tech-1: Marilia Garcia  Scrub Tech-Relief: King Vazquez  Scrub RN-1: Clint Harrington RN  Surg Asst-1: Sukhwinder Donaldson  Surg Asst-Relief: Patricia Kumar RN  Float Staff: Rossy Vazquez  Event Time In   Incision Start 3831   Incision Close      Anesthesia: General   Estimated Blood Loss: 50cc  Specimens:   ID Type Source Tests Collected by Time Destination   1 : abdominal pannus Preservative Abdomen  Gurwinder Brothers MD 7/19/2018 1114 Pathology      Findings: 15 x 8 cm defect. Medial drain in retrorectus space, lateral drain in subcutaneous flap closure. Complications: none  Implants:   Implant Name Type Inv.  Item Serial No.  Lot No. LRB No. Used Action   Bard Soft Mesh Mesh   N/A BARD DAVOL [id number] N/A 1 Implanted jayshree hospitalist:    \" Patient had a hypoglycemic episode BS 55 after treatment BS 83 Patient BS is not control but patient insist on going home. Patient has been seen by respiratory, patient needs home O2   can you please place a DME order?  Thanks\"

## 2020-08-29 NOTE — CARE COORDINATION
RN aware, A referral was initiated to [facility] for home 02. Please call [facility], [id number], when DME order is obtained and fax order to   [phone]  and call respiratory for the portable 02.

## 2020-08-29 NOTE — DISCHARGE SUMMARY
Hospital Medicine Discharge Summary    Patient ID: Olamide Boles      Patient's PCP: No primary care provider on file. Admit Date: 8/21/2020     Discharge Date:   8/29/2020     Admitting Physician: Elvis Mathews MD     Discharge Physician: Yecenia Ward MD     Discharge Diagnoses: Active Hospital Problems    Diagnosis    Acute respiratory failure with hypoxia ([facility] [address] [J96.01]    Suspected COVID-19 virus infection [Z20.828]    Abnormal CT scan [R93.89]   Acute on chronic systolic heart failure  Leukocytosis  Acute metabolic encephalopathy and delirium  Type 2 diabetes    The patient was seen and examined on day of discharge and this discharge summary is in conjunction with any daily progress note from day of discharge. Hospital Course: The patient is 69-year-old lady with history of CHF, type 2 diabetes, CAD, hepatitis C infection, chronic pain with opiate abuse,  Who was admitted with acute hypoxic respiratory failure due to acute decompensated heart failure. She received good diuresis and diuretics have been switched to oral.  On presentation chest x-ray was noted for multifocal infiltrates concerning for pneumonia for which she received full course of antibiotics with vancomycin and cefepime. She had acute metabolic encephalopathy with delirium which has since resolved    It is noted that her glycemic control is poor with A1c of 12.5 on admission. She will continue on home insulin therapy and medication has been emphasized. She will follow-up with outpatient endocrinology. Physical Exam Performed:     /66   Pulse 103   Temp 97.8 °F (36.6 °C) (Temporal)   Resp 20   Ht 5' 2\" (1.575 m)   Wt 122 lb 4.8 oz (55.5 kg)   LMP 11/15/2017   SpO2 90%   BMI 22.37 kg/m²       General appearance:  No apparent distress, appears stated age and cooperative. HEENT:  Normal cephalic, atraumatic without obvious deformity. Pupils equal, round, and reactive to light.   Extra ocular muscles intact. Conjunctivae/corneas clear. Neck: Supple, with full range of motion. No jugular venous distention. Trachea midline. Respiratory:  Normal respiratory effort. Clear to auscultation, bilaterally without Rales/Wheezes/Rhonchi. Cardiovascular:  Regular rate and rhythm with normal S1/S2 without murmurs, rubs or gallops. Abdomen: Soft, non-tender, non-distended with normal bowel sounds. Musculoskeletal:  No clubbing, cyanosis or edema bilaterally. Full range of motion without deformity. Skin: Skin color, texture, turgor normal.  No rashes or lesions. Neurologic:  Neurovascularly intact without any focal sensory/motor deficits. Cranial nerves: II-XII intact, grossly non-focal.  Psychiatric:  Alert and oriented, thought content appropriate, normal insight  Capillary Refill: Brisk,< 3 seconds   Peripheral Pulses: +2 palpable, equal bilaterally       Labs: For convenience and continuity at follow-up the following most recent labs are provided:      CBC:    Lab Results   Component Value Date    WBC 14.1 08/29/2020    HGB 12.3 08/29/2020    HCT 37.7 08/29/2020     08/29/2020       Renal:    Lab Results   Component Value Date     08/29/2020    K 5.0 08/29/2020    CL 90 08/29/2020    CO2 33 08/29/2020    BUN 37 08/29/2020    CREATININE 0.8 08/29/2020    CALCIUM 8.7 08/29/2020    PHOS 2.4 05/12/2020         Significant Diagnostic Studies    Radiology:   XR CHEST PORTABLE   Final Result   Multifocal airspace disease is not significantly changed compared to prior   but is slightly improved compared to older studies. XR CHEST PORTABLE   Final Result   Persistent but slightly decreased multifocal airspace opacities         XR CHEST PORTABLE   Final Result   Unchanged multifocal bilateral pneumonia. XR CHEST PORTABLE   Final Result   Persistent diffuse bilateral airspace disease, which can reflect edema or   pneumonia. Small left pleural effusion.          XR CHEST PORTABLE   Final Result   Increasing bilateral airspace disease. Vascular indistinctness suggests a   component of fluid overload-pulmonary edema         CT ABDOMEN PELVIS W IV CONTRAST Additional Contrast? None   Final Result   1. Congestive heart failure. 2. Mediastinal adenopathy, likely reactive. 3. Trace pelvic ascites. CT CHEST PULMONARY EMBOLISM W CONTRAST   Final Result   1. Congestive heart failure. 2. Mediastinal adenopathy, likely reactive. 3. Trace pelvic ascites. XR CHEST PORTABLE   Final Result   Bilateral pneumonia versus pulmonary edema                Consults:     IP CONSULT TO HOSPITALIST  IP CONSULT TO PULMONOLOGY  IP CONSULT TO CARDIOLOGY  IP CONSULT TO ENDOCRINOLOGY    Disposition: Home    Condition at Discharge: Stable    Discharge Instructions/Follow-up:    PCP follow-up in 1 to 2 weeks  Cardiology clinic follow-up in 1 to 2 weeks  Pulmonology clinic follow-up in 1 to 2 weeks  Endocrinology clinic follow-up in 1 to 2 weeks    Code Status:  Full Code     Activity: activity as tolerated    Diet: cardiac diet and diabetic diet      Discharge Medications:     Current Discharge Medication List           Details   furosemide (LASIX) 40 MG tablet Take 1 tablet by mouth daily  Qty: 60 tablet, Refills: 3      glycopyrrolate-formoterol (BEVESPI) 9-4.8 MCG/ACT AERO Inhale 2 puffs into the lungs 2 times daily  Qty: 11 g, Refills: 0              Details   hydrOXYzine (ATARAX) 25 MG tablet Take 1 tablet by mouth 2 times daily  Qty: 30 tablet, Refills: 0      lamoTRIgine (LAMICTAL) 100 MG tablet Take 1 tablet by mouth daily  Qty: 30 tablet, Refills: 3              Details   nitroGLYCERIN (NITROSTAT) 0.4 MG SL tablet up to max of 3 total doses. If no relief after 1 dose, call 911.   Qty: 25 tablet, Refills: 3      sertraline (ZOLOFT) 50 MG tablet Take 50 mg by mouth daily Indications: Depression      mirtazapine (REMERON) 15 MG tablet Take 7.5 mg by mouth nightly      pregabalin (LYRICA) 100 MG capsule Take 100 mg by mouth 2 times daily. insulin glargine (LANTUS SOLOSTAR) 100 UNIT/ML injection pen Inject 15 Units into the skin 2 times daily Indications: Insulin Pump       insulin lispro, 1 Unit Dial, (HUMALOG KWIKPEN) 100 UNIT/ML SOPN Inject 10 Units into the skin 3 times daily (before meals) Inject 10 units three times daily with each meal in addition to sliding scale. ticagrelor (BRILINTA) 90 MG TABS tablet Take 1 tablet by mouth 2 times daily  Qty: 60 tablet, Refills: 5      aspirin 81 MG tablet Take 1 tablet by mouth daily  Qty: 90 tablet, Refills: 1      carvedilol (COREG) 3.125 MG tablet Take 1 tablet by mouth 2 times daily (with meals)  Qty: 180 tablet, Refills: 3      atorvastatin (LIPITOR) 40 MG tablet Take 1 tablet by mouth daily  Qty: 90 tablet, Refills: 3      buprenorphine-naloxone (SUBOXONE) 8-2 MG SUBL SL tablet Place 1 tablet under the tongue 2 times daily. FREESTYLE LANCETS MISC 1 each by Other route 4 times daily (before meals and nightly) Patient to check blood sugar 4 times a day and PRN, he is treated with multiple daily injections of insulin that require correction dosing and has had hypoglycemia. A1C  14.5  ICD code- E11.65, Z79.4  Qty: 100 each, Refills: 5      blood glucose test strips (FREESTYLE LITE) strip 1 each by Other route 4 times daily (before meals and nightly) Patient to check blood sugar 4 times a day and PRN, he is treated with multiple daily injections of insulin that require correction dosing and has had hypoglycemia. A1C  14.5  ICD code- E11.65, Z79.4  Qty: 100 each, Refills: 5             Time Spent on discharge is more than 30 minutes in the examination, evaluation, counseling and review of medications and discharge plan. Signed: Latasha Huerta MD   8/29/2020      Thank you No primary care provider on file. for the opportunity to be involved in this patient's care.  If you have any questions or concerns please feel free to contact me at [phone].

## 2020-08-29 NOTE — PROGRESS NOTES
Endocrinology    Asked to see patient for uncontrolled IDDM. She has been in hospital for respiratory failure and probable CHF and has improved pulmonary status with diuresis. Blood sugars in the last two days have been as high as 300 to 600 mg/dl but today hypoglycemic with blood sugars 95, 43, 69. Pulse: 100; BP 99/80; Resp 21; on 3 liters [place] oxygen, Temp 96.9 deg F    HEENT: EOM's grossly intact  Neck: Not stiff, cannot see JVD sitting upright in a chair. Chest: Rales anteriorly and inferiorly  Abdomen: Nontender  Ext: AKA on right I believe    Assessment:  1. Uncontrolled IDDM, multifactorial, secondary to respiratory failure and also probable CHFand COPD history. She may do better with gradual reduction in long acting insulin. 2. EKG shows left axis deviation, probable left atrial enlargement and anterior MI age indeterminate. 3 Bilateral infiltrates on chest x ray; was negative for COVID-19 on last admission, also negative on 8-221-2019, definitely reducing risk of COVID-19; also she has no cough. On broad spectrum antibiotics for possible pneumonia or other source os sepsis. 4. History of opiate dependence on suboxone. And has history of borderline low serum cortisol 3.4 and 6.9 in 2019, possibly from opiate suppression of HPA. Plan:  1. Continue basal and bolus insulin but eecrease from 30 to 28 units Lantus twice daily. 2. Patient hopeful about discharge but will depend on pulmonary opinion about so far not improving lung infiltrates.   3. Serum cortisol in AM.  4. Free T4, TSH in AM.    H.T> JOHN Davila

## 2020-08-29 NOTE — PROGRESS NOTES
Shift assessment done, see flow sheet. Medication administrated per MAR. Patient A/Ox4 denies pain and needs at this time. Fall precautions in place, call light in reach.  Will continue to monitor

## 2020-08-30 NOTE — PROGRESS NOTES
Per physician recommendation, patient should stay until after dinner to monitor BS stable. Patient educated about concerns about BS being unstable but stated she wanted to go home with her family and grandchildren. Patient left home with family, personal belong and O2 tank, orders faxed to [phone]. Femoral access and tele removed, no complications.   Discharge orders explained and given to patient as well as a list of PCP

## 2020-09-22 ENCOUNTER — HOSPITAL ENCOUNTER (INPATIENT)
Age: 55
LOS: 3 days | Discharge: HOME | DRG: 194 | End: 2020-09-25
Attending: STUDENT IN AN ORGANIZED HEALTH CARE EDUCATION/TRAINING PROGRAM | Admitting: INTERNAL MEDICINE
Payer: COMMERCIAL

## 2020-09-22 ENCOUNTER — APPOINTMENT (OUTPATIENT)
Dept: GENERAL RADIOLOGY | Age: 55
DRG: 194 | End: 2020-09-22
Payer: COMMERCIAL

## 2020-09-22 LAB
AMPHETAMINE SCREEN, URINE: ABNORMAL
ANION GAP SERPL CALCULATED.3IONS-SCNC: 11 MMOL/L (ref 3–16)
BACTERIA: ABNORMAL /HPF
BARBITURATE SCREEN URINE: ABNORMAL
BASOPHILS ABSOLUTE: 0.1 K/UL (ref 0–0.2)
BASOPHILS RELATIVE PERCENT: 0.6 %
BENZODIAZEPINE SCREEN, URINE: ABNORMAL
BILIRUBIN URINE: NEGATIVE
BLOOD, URINE: ABNORMAL
BUN BLDV-MCNC: 16 MG/DL (ref 7–20)
CALCIUM SERPL-MCNC: 8.7 MG/DL (ref 8.3–10.6)
CANNABINOID SCREEN URINE: POSITIVE
CHLORIDE BLD-SCNC: 104 MMOL/L (ref 99–110)
CLARITY: ABNORMAL
CO2: 25 MMOL/L (ref 21–32)
COCAINE METABOLITE SCREEN URINE: ABNORMAL
COLOR: YELLOW
CREAT SERPL-MCNC: <0.5 MG/DL (ref 0.6–1.1)
EOSINOPHILS ABSOLUTE: 0.4 K/UL (ref 0–0.6)
EOSINOPHILS RELATIVE PERCENT: 3.7 %
EPITHELIAL CELLS, UA: 0 /HPF (ref 0–5)
GFR AFRICAN AMERICAN: >60
GFR NON-AFRICAN AMERICAN: >60
GLUCOSE BLD-MCNC: 92 MG/DL (ref 70–99)
GLUCOSE URINE: NEGATIVE MG/DL
HCG QUALITATIVE: NEGATIVE
HCT VFR BLD CALC: 36.7 % (ref 36–48)
HEMOGLOBIN: 11.8 G/DL (ref 12–16)
HYALINE CASTS: 9 /LPF (ref 0–8)
KETONES, URINE: NEGATIVE MG/DL
LEUKOCYTE ESTERASE, URINE: ABNORMAL
LYMPHOCYTES ABSOLUTE: 2.3 K/UL (ref 1–5.1)
LYMPHOCYTES RELATIVE PERCENT: 19.5 %
Lab: ABNORMAL
MCH RBC QN AUTO: 27.9 PG (ref 26–34)
MCHC RBC AUTO-ENTMCNC: 32.3 G/DL (ref 31–36)
MCV RBC AUTO: 86.4 FL (ref 80–100)
METHADONE SCREEN, URINE: ABNORMAL
MICROSCOPIC EXAMINATION: YES
MONOCYTES ABSOLUTE: 0.5 K/UL (ref 0–1.3)
MONOCYTES RELATIVE PERCENT: 4 %
NEUTROPHILS ABSOLUTE: 8.5 K/UL (ref 1.7–7.7)
NEUTROPHILS RELATIVE PERCENT: 72.2 %
NITRITE, URINE: NEGATIVE
OPIATE SCREEN URINE: ABNORMAL
OXYCODONE URINE: ABNORMAL
PDW BLD-RTO: 15.3 % (ref 12.4–15.4)
PH UA: 5.5
PH UA: 5.5 (ref 5–8)
PHENCYCLIDINE SCREEN URINE: ABNORMAL
PLATELET # BLD: 177 K/UL (ref 135–450)
PLATELET SLIDE REVIEW: ADEQUATE
PMV BLD AUTO: 9.6 FL (ref 5–10.5)
POTASSIUM SERPL-SCNC: 3.6 MMOL/L (ref 3.5–5.1)
PRO-BNP: 5755 PG/ML (ref 0–124)
PROPOXYPHENE SCREEN: ABNORMAL
PROTEIN UA: ABNORMAL MG/DL
RBC # BLD: 4.25 M/UL (ref 4–5.2)
RBC UA: 2 /HPF (ref 0–4)
SLIDE REVIEW: ABNORMAL
SODIUM BLD-SCNC: 140 MMOL/L (ref 136–145)
SPECIFIC GRAVITY UA: 1.01 (ref 1–1.03)
TROPONIN: <0.01 NG/ML
URINE REFLEX TO CULTURE: YES
URINE TYPE: ABNORMAL
UROBILINOGEN, URINE: 1 E.U./DL
WBC # BLD: 11.7 K/UL (ref 4–11)
WBC UA: 46 /HPF (ref 0–5)

## 2020-09-22 PROCEDURE — 80307 DRUG TEST PRSMV CHEM ANLYZR: CPT

## 2020-09-22 PROCEDURE — 6360000002 HC RX W HCPCS: Performed by: STUDENT IN AN ORGANIZED HEALTH CARE EDUCATION/TRAINING PROGRAM

## 2020-09-22 PROCEDURE — 81001 URINALYSIS AUTO W/SCOPE: CPT

## 2020-09-22 PROCEDURE — 93005 ELECTROCARDIOGRAM TRACING: CPT | Performed by: STUDENT IN AN ORGANIZED HEALTH CARE EDUCATION/TRAINING PROGRAM

## 2020-09-22 PROCEDURE — 80048 BASIC METABOLIC PNL TOTAL CA: CPT

## 2020-09-22 PROCEDURE — 51702 INSERT TEMP BLADDER CATH: CPT

## 2020-09-22 PROCEDURE — 96375 TX/PRO/DX INJ NEW DRUG ADDON: CPT

## 2020-09-22 PROCEDURE — 84703 CHORIONIC GONADOTROPIN ASSAY: CPT

## 2020-09-22 PROCEDURE — 83880 ASSAY OF NATRIURETIC PEPTIDE: CPT

## 2020-09-22 PROCEDURE — 99285 EMERGENCY DEPT VISIT HI MDM: CPT

## 2020-09-22 PROCEDURE — 1200000000 HC SEMI PRIVATE

## 2020-09-22 PROCEDURE — 96366 THER/PROPH/DIAG IV INF ADDON: CPT

## 2020-09-22 PROCEDURE — G0378 HOSPITAL OBSERVATION PER HR: HCPCS

## 2020-09-22 PROCEDURE — 84484 ASSAY OF TROPONIN QUANT: CPT

## 2020-09-22 PROCEDURE — 71046 X-RAY EXAM CHEST 2 VIEWS: CPT

## 2020-09-22 PROCEDURE — 96365 THER/PROPH/DIAG IV INF INIT: CPT

## 2020-09-22 PROCEDURE — 85025 COMPLETE CBC W/AUTO DIFF WBC: CPT

## 2020-09-22 PROCEDURE — 87086 URINE CULTURE/COLONY COUNT: CPT

## 2020-09-22 RX ORDER — FUROSEMIDE 10 MG/ML
40 INJECTION INTRAMUSCULAR; INTRAVENOUS ONCE
Status: COMPLETED | OUTPATIENT
Start: 2020-09-22 | End: 2020-09-22

## 2020-09-22 RX ORDER — LEVOFLOXACIN 5 MG/ML
750 INJECTION, SOLUTION INTRAVENOUS ONCE
Status: COMPLETED | OUTPATIENT
Start: 2020-09-22 | End: 2020-09-23

## 2020-09-22 RX ADMIN — FUROSEMIDE 40 MG: 10 INJECTION, SOLUTION INTRAMUSCULAR; INTRAVENOUS at 18:20

## 2020-09-22 RX ADMIN — LEVOFLOXACIN 750 MG: 5 INJECTION, SOLUTION INTRAVENOUS at 18:20

## 2020-09-22 RX ADMIN — Medication 1 G: at 18:20

## 2020-09-22 NOTE — CARE COORDINATION
Discharge Planning Assessment    SW discharge planner met with patient to discuss reason for admission, current living situation, and potential needs at the time of discharge. Pt in ED d/t SOB w/exertion    Demographics/Insurance verified:  Yes    Current type of dwelling:  Apartment    Living arrangements:  W/sister and dtr    Level of function/Support:  Pt uses a wheelchair at all times d/t AKA on right leg. 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 Dtr reported to RN that pt has not been getting around much at home and family having to change pt's depends. Pt confirmed this was because she did not have adequate oxygen at home. Reported there was a \"mix up\" in her order and pt only has the small oxygen tanks at home. Reported she is supposed to be on 4 Liters at all times. PCP:  None. Pt been given PCP phone number and lists multiple times. Last Visit to PCP:  n/a    DME:  Wheelchair (pt has a new one now), Oxygen ([facility])    Active with any community resources/agencies/skilled home care:    Yes, pt is active with outpatient rehab with [facility] in [place]. Reported getting suboxone 3x/week and going to counseling 1x/week. Reported going well and she is committed to stop her drug use. Medication compliance issues:  No    Financial issues that could impact healthcare:  No    Tentative discharge plan:  Pt reported she is interested in home therapy as she wants to have enough strength to graduate ambulating with a walker. Pt reported working with her insurance for a prosthetic leg, but does not have one at this time. Pt reported oxygen problems. Pt on 4 Liters at home but only has the small tanks. Has been weak and SOB which is why she is back in ED. Message left with rUi Oliveira at [place] to investigate. Pt active w/[facility] for heroin addiction.      Discussed with patient and/or family that on the day of discharge home tentative time of discharge will be between 10 AM and noon. Transportation at the time of discharge:  Family can assist home.     Electronically signed by BRITTNEE Gongora, LSW on 9/22/2020 at 7:57 PM

## 2020-09-22 NOTE — ED NOTES
Pts daughter called to get update, per patient ok to give information. Family states they are concerned because it feels like the patient has been declining and having more difficulty with ADLs, requesting referral to social work, Kostas Cuadra notified.       [address], RN  09/22/20 7966

## 2020-09-22 NOTE — ED PROVIDER NOTES
Primary Care Physician: No primary care provider on file. Attending Physician: Lia Bobo MD     History   Chief Complaint   Patient presents with    Shortness of Breath     pt with c/o sob- began late last night- dx with COPD- wears 4L oxygen all the time. HPI   Katherine Garcia is a 60 y.o. female history of combined heart failure with an EF of 40% echo done August 2020, with grade 2 diastolic heart failure, COPD on 4 L of oxygen at home, diabetes with right-sided BKA, hypertension, hyperlipidemia [place] [facility] [place] respiratory failure secondary to COPD exacerbation presenting this afternoon complaining of shortness of breath with cough. Not coughing up sputum and shortness of breath is worse with exertion. She is worried because her symptoms are similar to symptoms she had before she was intubated. Denies any fevers, or exposures to persons infected with coronavirus. She had 2- test when she was admitted at [address] [place] [address]. Past Medical History:   Diagnosis Date    Arthritis     [facility]     Depression     Diabetes mellitus ([facility]     Heart attack ([facility]     MI 7/2019, and 6 months ago    Hepatitis C     Dr Kranthi Luu follows; contracted from ex- (drug user)    Hypercholesteremia 3/11/2020    Hypertension     MRSA (methicillin resistant Staphylococcus aureus) infection in 2000    was in urine and nares    Pneumonia     S/P colonoscopy with polypectomy 2/11    one polyp, Dr Kranthi Luu. Recall 3 years. Past Surgical History:   Procedure Laterality Date    ABOVE KNEE AMPUTATION      AMPUTATION      right bka    CORONARY ANGIOPLASTY WITH STENT PLACEMENT  07/2019    JOINT REPLACEMENT  2006, 2010    right knee; Dr Alanna Hills.     KNEE ARTHROSCOPY      ridht knee X6    LIVER BIOPSY N/A 11/14/2018    LIVER BIOPSY LAPAROSCOPIC performed by Lilia Choudhury MD at [address] [place] [address] N/A 11/14/2018 /77   Pulse 92   Temp 98.3 °F (36.8 °C) (Oral)   Resp 18   Ht 5' 2\" (1.575 m)   Wt 122 lb 11.2 oz (55.7 kg)   LMP 11/15/2017   SpO2 94%   BMI 22.44 kg/m²      CONSTITUTIONAL: Ill looking, in no acute distress   HEAD: atraumatic, normocephalic   EYES: PERRL, No injection, discharge or scleral icterus. ENT: Moist mucous membranes. NECK: Normal ROM, NO LAD   CARDIOVASCULAR: Regular rate and rhythm. No murmurs or gallop. PULMONARY/CHEST: Airway patent. No retractions. But hypoxic requiring oxygen  ABDOMEN: Soft, Non-distended and non-tender, without guarding or rebound. SKIN: Acyanotic, warm, dry   MUSCULOSKELETAL: No swelling, tenderness but  right BKA  NEUROLOGICAL: Awake and oriented x 3. Pulses intact. Grossly nonfocal   Nursing note and vitals reviewed.      ED Course & Medical Decision Making   Medications   aspirin EC tablet 81 mg (81 mg Oral Given 9/23/20 0821)   atorvastatin (LIPITOR) tablet 40 mg (40 mg Oral Given 9/23/20 2344)   carvedilol (COREG) tablet 3.125 mg (3.125 mg Oral Not Given 9/23/20 1623)   glycopyrrolate-formoterol (BEVESPI) 9-4.8 MCG/ACT inhaler 2 puff (2 puffs Inhalation Given 9/23/20 2105)   hydrOXYzine (ATARAX) tablet 25 mg (25 mg Oral Given 9/23/20 2345)   insulin glargine (LANTUS;BASAGLAR) injection pen 32 Units (32 Units Subcutaneous Given 9/23/20 2359)   insulin lispro (1 Unit Dial) 10 Units (10 Units Subcutaneous Given 9/23/20 1723)   lamoTRIgine (LAMICTAL) tablet 100 mg (100 mg Oral Not Given 9/23/20 0817)   mirtazapine (REMERON) tablet 7.5 mg (7.5 mg Oral Given 9/23/20 2345)   pregabalin (LYRICA) capsule 100 mg (100 mg Oral Given 9/23/20 2345)   sertraline (ZOLOFT) tablet 50 mg (50 mg Oral Given 9/23/20 0820)   ticagrelor (BRILINTA) tablet 90 mg (90 mg Oral Given 9/23/20 2344)   sodium chloride flush 0.9 % injection 10 mL (10 mLs Intravenous Given 9/23/20 2346)   sodium chloride flush 0.9 % injection 10 mL (has no administration in time range)   acetaminophen levoFLOXacin (LEVAQUIN) 750 MG/150ML infusion 750 mg (0 mg Intravenous Stopped 9/23/20 0103)   furosemide (LASIX) injection 40 mg (40 mg Intravenous Given 9/22/20 1820)   furosemide (LASIX) injection 20 mg (20 mg Intravenous Given 9/23/20 1639)      Labs Reviewed   CULTURE, URINE - Abnormal; Notable for the following components:       Result Value    Organism Gram negative kasey (*)     All other components within normal limits    Narrative:     ORDER#: [id number]                          ORDERED BY: TANNER WINTERS  SOURCE: Urine Clean Catch                  COLLECTED:  09/22/20 18:56  ANTIBIOTICS AT ROCÍO.:                      RECEIVED :  09/23/20 03:37  Performed at:  [address]   Phone [phone]   BASIC METABOLIC PANEL - Abnormal; Notable for the following components:    CREATININE <0.5 (*)     All other components within normal limits    Narrative:     Performed at:  [facility] [address], [place], [address] [employer]   Phone [phone]   BRAIN NATRIURETIC PEPTIDE - Abnormal; Notable for the following components:    Pro-BNP 5,755 (*)     All other components within normal limits    Narrative:     Performed at:  [facility] [address], [place], [facility] [employer]   Phone [phone]   CBC WITH AUTO DIFFERENTIAL - Abnormal; Notable for the following components:    WBC 11.7 (*)     Hemoglobin 11.8 (*)     Neutrophils Absolute 8.5 (*)     All other components within normal limits    Narrative:     Performed at:  [facility] [address], [place], [facility] [employer]   Phone [phone]   URINE RT REFLEX TO CULTURE - Abnormal; Notable for the following components:    Clarity, UA CLOUDY (*)     Blood, Urine TRACE (*)     Protein, UA TRACE (*)     Leukocyte Esterase, Urine SMALL (*)     All other components within normal limits Narrative:     Performed at:  [facility] [address], [facility], [facility] [place] [place]   Phone [phone]   MICROSCOPIC URINALYSIS - Abnormal; Notable for the following components:    Bacteria, UA 4+ (*)     Hyaline Casts, UA 9 (*)     WBC, UA 46 (*)     All other components within normal limits    Narrative:     Performed at:  [facility] [address], [facility], [address] [place] [address]   Phone [phone]   [address] - Abnormal; Notable for the following components:    Cannabinoid Scrn, Ur POSITIVE (*)     All other components within normal limits    Narrative:     Performed at:  [facility] [address], [facility], [address] [place] [address]   Phone [phone]   BASIC METABOLIC PANEL - Abnormal; Notable for the following components:    Potassium 3.4 (*)     BUN 22 (*)     CREATININE <0.5 (*)     All other components within normal limits    Narrative:     Performed at:  [facility] [address], [facility], [facility]   Phone [phone]   CBC WITH AUTO DIFFERENTIAL - Abnormal; Notable for the following components:    RBC 3.56 (*)     Hemoglobin 9.6 (*)     Hematocrit 29.9 (*)     All other components within normal limits    Narrative:     Performed at:  [facility] [address], [facility], [address] [place] [address]   Phone [phone]   MAGNESIUM - Abnormal; Notable for the following components:    Magnesium 1.40 (*)     All other components within normal limits    Narrative:     Performed at:  [facility] [address], [facility], [facility] [place] [place]   Phone [phone]   POCT GLUCOSE - Abnormal; Notable for the following components:    POC Glucose 132 (*)     All other components within normal limits    Narrative:     Performed at:  [facility] [address], [facility], [facility] [place] [place]   Phone [phone] POCT GLUCOSE - Abnormal; Notable for the following components:    POC Glucose 146 (*)     All other components within normal limits    Narrative:     Performed at:  [facility] [address], [facility], [address]   Phone [phone]   POCT GLUCOSE - Abnormal; Notable for the following components:    POC Glucose 277 (*)     All other components within normal limits    Narrative:     Performed at:  [facility] [address], [facility], [address]   Phone [phone]   HCG, SERUM, QUALITATIVE    Narrative:     Performed at:  [facility] [address], [facility], [address]   Phone [phone]   TROPONIN    Narrative:     Performed at:  [facility] [address], [facility], [address]   Phone [phone]   TROPONIN    Narrative:     Performed at:  [facility] [address], [facility], [address]   Phone [phone]   TROPONIN    Narrative:     Performed at:  [facility] [address], [facility], [address]   Phone [phone]   PHOSPHORUS    Narrative:     Performed at:  [facility] [address], [facility], [address]   Phone [phone]   PROCALCITONIN    Narrative:     Performed at:  [facility] [address], [facility], [address]   Phone [phone]   BASIC METABOLIC PANEL   CBC WITH AUTO DIFFERENTIAL   MAGNESIUM   PHOSPHORUS   BRAIN NATRIURETIC PEPTIDE   POCT GLUCOSE    Narrative:     Performed at:  [facility] [address], [facility], [address]   Phone [phone]   POCT GLUCOSE   POCT GLUCOSE   POCT GLUCOSE      XR CHEST PORTABLE   Final Result   Course diffuse infiltrates noted bilaterally which have increased from the   prior study         XR CHEST (2 VW)   Final Result Findings suggest congestive heart failure superimposed on chronic bilateral   pulmonary opacities            Xr Chest (2 Vw) Result Date: 9/22/2020  EXAMINATION: TWO XRAY VIEWS OF THE CHEST 9/22/2020 4:42 pm COMPARISON: 08/28/2020 HISTORY: ORDERING SYSTEM PROVIDED HISTORY: sob TECHNOLOGIST PROVIDED HISTORY: Reason for exam:->sob Reason for Exam: Shortness of Breath (pt with c/o sob- began late last night- dx with COPD- wears 4L oxygen all the time. ) Acuity: Chronic Type of Exam: Ongoing FINDINGS: Heart is enlarged. Pulmonary vascular congestion. Evidence for pulmonary edema. Multifocal pulmonary opacities persist.     Findings suggest congestive heart failure superimposed on chronic bilateral pulmonary opacities     Xr Chest Portable Result Date: 8/28/2020  EXAMINATION: ONE XRAY VIEW OF THE CHEST 8/28/2020 10:43 am COMPARISON: Chest radiograph August 27, 2020 and priors. HISTORY: ORDERING SYSTEM PROVIDED HISTORY: dyspnea TECHNOLOGIST PROVIDED HISTORY: Reason for exam:->dyspnea FINDINGS: There are persistent diffuse bilateral airspace opacities. Findings have not significantly changed compared to yesterday's study but are slightly improved compared to older studies. No pneumothorax or pleural effusion. Cardiac and mediastinal contours are without acute process. No acute osseous abnormality. Multifocal airspace disease is not significantly changed compared to prior but is slightly improved compared to older studies. Xr Chest Portable Result Date: 8/27/2020  EXAMINATION: ONE XRAY VIEW OF THE CHEST 8/27/2020 10:46 am COMPARISON: 08/26/2020 HISTORY: ORDERING SYSTEM PROVIDED HISTORY: dyspnea TECHNOLOGIST PROVIDED HISTORY: Reason for exam:->dyspnea Reason for Exam: dyspnea Acuity: Acute Type of Exam: Initial FINDINGS: Cardiomediastinal silhouette is stable. Multifocal airspace opacities persist.  No new airspace disease. No pneumothorax. No pleural effusion.      Persistent but slightly decreased multifocal airspace opacities     Xr Chest Portable Result Date: 8/26/2020  EXAMINATION: ONE XRAY VIEW OF THE CHEST 8/26/2020 6:00 am COMPARISON: 08/24/2020 HISTORY: ORDERING SYSTEM PROVIDED HISTORY: dyspnea TECHNOLOGIST PROVIDED HISTORY: Reason for exam:->dyspnea Reason for Exam: dyspnea Acuity: Unknown Type of Exam: Unknown FINDINGS: Extensive multifocal bilateral airspace opacification persists. Heart size and vascularity are stable. Rotation to the right results in distortion of mediastinal contours. There is no pneumothorax or effusion. Unchanged multifocal bilateral pneumonia. Xr Chest Portable Result Date: 8/24/2020  EXAMINATION: ONE XRAY VIEW OF THE CHEST 8/24/2020 10:51 am COMPARISON: 08/22/2020 HISTORY: ORDERING SYSTEM PROVIDED HISTORY: dyspnea TECHNOLOGIST PROVIDED HISTORY: Reason for exam:->dyspnea Reason for Exam: dyspnea Acuity: Acute Type of Exam: Initial FINDINGS: Cardiac leads project over the chest.  Diffuse bilateral heterogeneous pulmonary opacity is again demonstrated bilaterally. Left costophrenic angle is poorly visualized. No pneumothorax. Cardiac and mediastinal silhouettes are reflective of patient rotation. Persistent diffuse bilateral airspace disease, which can reflect edema or pneumonia. Small left pleural effusion. EKG INTERPRETATION:  EKG by my preliminary interpretation shows sinus rhythm with rate of 100, left axis, normal intervals, with no ST changes indicative of ischemia at this time. PROCEDURES:   Procedures    ASSESSMENT AND PLAN:  Stan Sr is a 60 y.o. female presenting this evening with complaint of shortness of breath after being discharged from the hospital.  Exam patient appears ill, hypoxic requiring oxygen. EKG showed no ST elevations or ischemic changes. Labs showed UA with greater than 46 WBCs CBC with leukocytosis. X-ray of chest concerning for congestive heart failure superimposed on by pneumonia.   Given hypoxia with this findings I was concerned that her symptoms are secondary to pneumonia versus CHF. She was given a dose of 40 of Lasix, and started on broad-spectrum antibiotics ceftriaxone and Levaquin for hospital-acquired pneumonia given the fact that she was just recently admitted. He was also on 4 L of oxygen. Hospitalist was consulted and patient was admitted to their service for further evaluation and treatment of her hypoxia and pneumonia. ClINICAL IMPRESSION:  1. Acute on chronic respiratory failure with hypoxia (HCC)    2. Pneumonia due to organism    3. Congestive heart failure, unspecified HF chronicity, unspecified heart failure type [facility])        DISPOSITION    Hospitalist admit   -Findings and recommendations explained to patient. She expressed understanding and agreed with the plan. Brannon Rivas MD (electronically signed)  9/24/2020  _________________________________________________________________________________________  Amount and/or Complexity of Data Reviewed:  Clinical lab tests: ordered and reviewed   Tests in the radiology section of CPT®: ordered and reviewed   Tests in the medicine section of CPT®: ordered and reviewed   Decide to obtain previous medical records or to obtain history from someone other than the patient: no  Obtain history from someone other than the patient: no  Review and summarize past medical records:yes  I looked up the patient in our electronic medical record:yes  Discuss the patient with other providers:yes  Independent visualization of images, tracings, or specimens:yes  Risk of Complications, Morbidity, and/or Mortality:Moderate  Presenting problems: moderate Diagnostic procedures: moderate yes Management options: moderate     Critical Care Attestation   Total critical care time: 35 minutes minimum   Critical care time does not include separately billable procedures and treating other patients.    Critical care was necessary to treat or prevent imminent or life-threatening deterioration of the following conditions: Pneumonia with hypoxemia. Patient provided with supplemental oxygen and treated urgently in the ED with antibiotics. Case discussed with consultants.   _________________________________________________________________________________________  This record is transcribed utilizing voice recognition technology. There are inherent limitations in this technology. In addition, there may be limitations in editing of this report. If there are any discrepancies, please contact me directly.         Evelyn Reilly MD  09/24/20 0043

## 2020-09-23 ENCOUNTER — APPOINTMENT (OUTPATIENT)
Dept: GENERAL RADIOLOGY | Age: 55
DRG: 194 | End: 2020-09-23
Payer: COMMERCIAL

## 2020-09-23 PROBLEM — N39.0 UTI (URINARY TRACT INFECTION): Status: ACTIVE | Noted: 2020-09-23

## 2020-09-23 PROBLEM — I50.23 ACUTE ON CHRONIC SYSTOLIC HEART FAILURE (HCC): Status: ACTIVE | Noted: 2020-09-23

## 2020-09-23 PROBLEM — E83.42 HYPOMAGNESEMIA: Status: ACTIVE | Noted: 2020-09-23

## 2020-09-23 LAB
ANION GAP SERPL CALCULATED.3IONS-SCNC: 6 MMOL/L (ref 3–16)
BASOPHILS ABSOLUTE: 0 K/UL (ref 0–0.2)
BASOPHILS RELATIVE PERCENT: 0.4 %
BUN BLDV-MCNC: 22 MG/DL (ref 7–20)
CALCIUM SERPL-MCNC: 8.4 MG/DL (ref 8.3–10.6)
CHLORIDE BLD-SCNC: 103 MMOL/L (ref 99–110)
CO2: 31 MMOL/L (ref 21–32)
CREAT SERPL-MCNC: <0.5 MG/DL (ref 0.6–1.1)
EKG ATRIAL RATE: 100 BPM
EKG ATRIAL RATE: 94 BPM
EKG DIAGNOSIS: NORMAL
EKG DIAGNOSIS: NORMAL
EKG P AXIS: 34 DEGREES
EKG P AXIS: 36 DEGREES
EKG P-R INTERVAL: 134 MS
EKG P-R INTERVAL: 140 MS
EKG Q-T INTERVAL: 386 MS
EKG Q-T INTERVAL: 388 MS
EKG QRS DURATION: 88 MS
EKG QRS DURATION: 90 MS
EKG QTC CALCULATION (BAZETT): 485 MS
EKG QTC CALCULATION (BAZETT): 497 MS
EKG R AXIS: -46 DEGREES
EKG R AXIS: -52 DEGREES
EKG T AXIS: 61 DEGREES
EKG T AXIS: 75 DEGREES
EKG VENTRICULAR RATE: 100 BPM
EKG VENTRICULAR RATE: 94 BPM
EOSINOPHILS ABSOLUTE: 0.4 K/UL (ref 0–0.6)
EOSINOPHILS RELATIVE PERCENT: 5.1 %
GFR AFRICAN AMERICAN: >60
GFR NON-AFRICAN AMERICAN: >60
GLUCOSE BLD-MCNC: 132 MG/DL (ref 70–99)
GLUCOSE BLD-MCNC: 146 MG/DL (ref 70–99)
GLUCOSE BLD-MCNC: 277 MG/DL (ref 70–99)
GLUCOSE BLD-MCNC: 83 MG/DL (ref 70–99)
GLUCOSE BLD-MCNC: 85 MG/DL (ref 70–99)
HCT VFR BLD CALC: 29.9 % (ref 36–48)
HEMOGLOBIN: 9.6 G/DL (ref 12–16)
LYMPHOCYTES ABSOLUTE: 1.7 K/UL (ref 1–5.1)
LYMPHOCYTES RELATIVE PERCENT: 20.1 %
MAGNESIUM: 1.4 MG/DL (ref 1.8–2.4)
MCH RBC QN AUTO: 26.8 PG (ref 26–34)
MCHC RBC AUTO-ENTMCNC: 31.9 G/DL (ref 31–36)
MCV RBC AUTO: 84 FL (ref 80–100)
MONOCYTES ABSOLUTE: 0.5 K/UL (ref 0–1.3)
MONOCYTES RELATIVE PERCENT: 5.6 %
NEUTROPHILS ABSOLUTE: 5.9 K/UL (ref 1.7–7.7)
NEUTROPHILS RELATIVE PERCENT: 68.8 %
ORGANISM: ABNORMAL
PDW BLD-RTO: 15.3 % (ref 12.4–15.4)
PERFORMED ON: ABNORMAL
PERFORMED ON: NORMAL
PHOSPHORUS: 4.1 MG/DL (ref 2.5–4.9)
PLATELET # BLD: 172 K/UL (ref 135–450)
PMV BLD AUTO: 9.7 FL (ref 5–10.5)
POTASSIUM SERPL-SCNC: 3.4 MMOL/L (ref 3.5–5.1)
PROCALCITONIN: 0.09 NG/ML (ref 0–0.15)
RBC # BLD: 3.56 M/UL (ref 4–5.2)
SODIUM BLD-SCNC: 140 MMOL/L (ref 136–145)
TROPONIN: <0.01 NG/ML
TROPONIN: <0.01 NG/ML
URINE CULTURE, ROUTINE: ABNORMAL
WBC # BLD: 8.5 K/UL (ref 4–11)

## 2020-09-23 PROCEDURE — 6370000000 HC RX 637 (ALT 250 FOR IP): Performed by: INTERNAL MEDICINE

## 2020-09-23 PROCEDURE — G0378 HOSPITAL OBSERVATION PER HR: HCPCS

## 2020-09-23 PROCEDURE — 94761 N-INVAS EAR/PLS OXIMETRY MLT: CPT

## 2020-09-23 PROCEDURE — 2580000003 HC RX 258: Performed by: INTERNAL MEDICINE

## 2020-09-23 PROCEDURE — 96367 TX/PROPH/DG ADDL SEQ IV INF: CPT

## 2020-09-23 PROCEDURE — 96372 THER/PROPH/DIAG INJ SC/IM: CPT

## 2020-09-23 PROCEDURE — 1200000000 HC SEMI PRIVATE

## 2020-09-23 PROCEDURE — 36415 COLL VENOUS BLD VENIPUNCTURE: CPT

## 2020-09-23 PROCEDURE — 6360000002 HC RX W HCPCS: Performed by: INTERNAL MEDICINE

## 2020-09-23 PROCEDURE — 85025 COMPLETE CBC W/AUTO DIFF WBC: CPT

## 2020-09-23 PROCEDURE — 93010 ELECTROCARDIOGRAM REPORT: CPT | Performed by: INTERNAL MEDICINE

## 2020-09-23 PROCEDURE — 94640 AIRWAY INHALATION TREATMENT: CPT

## 2020-09-23 PROCEDURE — 83735 ASSAY OF MAGNESIUM: CPT

## 2020-09-23 PROCEDURE — 96376 TX/PRO/DX INJ SAME DRUG ADON: CPT

## 2020-09-23 PROCEDURE — 2700000000 HC OXYGEN THERAPY PER DAY

## 2020-09-23 PROCEDURE — 84100 ASSAY OF PHOSPHORUS: CPT

## 2020-09-23 PROCEDURE — 84484 ASSAY OF TROPONIN QUANT: CPT

## 2020-09-23 PROCEDURE — 96366 THER/PROPH/DIAG IV INF ADDON: CPT

## 2020-09-23 PROCEDURE — 80048 BASIC METABOLIC PNL TOTAL CA: CPT

## 2020-09-23 PROCEDURE — 84145 PROCALCITONIN (PCT): CPT

## 2020-09-23 PROCEDURE — 99223 1ST HOSP IP/OBS HIGH 75: CPT | Performed by: INTERNAL MEDICINE

## 2020-09-23 PROCEDURE — 71045 X-RAY EXAM CHEST 1 VIEW: CPT

## 2020-09-23 PROCEDURE — 93005 ELECTROCARDIOGRAM TRACING: CPT | Performed by: INTERNAL MEDICINE

## 2020-09-23 RX ORDER — ASPIRIN 81 MG/1
81 TABLET ORAL DAILY
Status: DISCONTINUED | OUTPATIENT
Start: 2020-09-23 | End: 2020-09-25 | Stop reason: HOSPADM

## 2020-09-23 RX ORDER — PROMETHAZINE HYDROCHLORIDE 25 MG/1
12.5 TABLET ORAL EVERY 6 HOURS PRN
Status: DISCONTINUED | OUTPATIENT
Start: 2020-09-23 | End: 2020-09-25 | Stop reason: HOSPADM

## 2020-09-23 RX ORDER — ONDANSETRON 2 MG/ML
4 INJECTION INTRAMUSCULAR; INTRAVENOUS EVERY 6 HOURS PRN
Status: DISCONTINUED | OUTPATIENT
Start: 2020-09-23 | End: 2020-09-25 | Stop reason: HOSPADM

## 2020-09-23 RX ORDER — FUROSEMIDE 10 MG/ML
40 INJECTION INTRAMUSCULAR; INTRAVENOUS 2 TIMES DAILY
Status: DISCONTINUED | OUTPATIENT
Start: 2020-09-24 | End: 2020-09-24

## 2020-09-23 RX ORDER — HYDROXYZINE HYDROCHLORIDE 25 MG/1
25 TABLET, FILM COATED ORAL 2 TIMES DAILY
Status: DISCONTINUED | OUTPATIENT
Start: 2020-09-23 | End: 2020-09-25 | Stop reason: HOSPADM

## 2020-09-23 RX ORDER — LISINOPRIL 5 MG/1
1.25 TABLET ORAL DAILY
Status: DISCONTINUED | OUTPATIENT
Start: 2020-09-24 | End: 2020-09-25 | Stop reason: HOSPADM

## 2020-09-23 RX ORDER — DEXTROSE MONOHYDRATE 25 G/50ML
12.5 INJECTION, SOLUTION INTRAVENOUS PRN
Status: DISCONTINUED | OUTPATIENT
Start: 2020-09-23 | End: 2020-09-25 | Stop reason: HOSPADM

## 2020-09-23 RX ORDER — INSULIN LISPRO 100 [IU]/ML
0-6 INJECTION, SOLUTION INTRAVENOUS; SUBCUTANEOUS
Status: DISCONTINUED | OUTPATIENT
Start: 2020-09-23 | End: 2020-09-25 | Stop reason: HOSPADM

## 2020-09-23 RX ORDER — INSULIN LISPRO 100 [IU]/ML
10 INJECTION, SOLUTION INTRAVENOUS; SUBCUTANEOUS
Status: DISCONTINUED | OUTPATIENT
Start: 2020-09-23 | End: 2020-09-25 | Stop reason: HOSPADM

## 2020-09-23 RX ORDER — POLYETHYLENE GLYCOL 3350 17 G/17G
17 POWDER, FOR SOLUTION ORAL DAILY PRN
Status: DISCONTINUED | OUTPATIENT
Start: 2020-09-23 | End: 2020-09-25 | Stop reason: HOSPADM

## 2020-09-23 RX ORDER — DEXTROSE MONOHYDRATE 50 MG/ML
100 INJECTION, SOLUTION INTRAVENOUS PRN
Status: DISCONTINUED | OUTPATIENT
Start: 2020-09-23 | End: 2020-09-25 | Stop reason: HOSPADM

## 2020-09-23 RX ORDER — DEXTROSE MONOHYDRATE 25 G/50ML
12.5 INJECTION, SOLUTION INTRAVENOUS PRN
Status: DISCONTINUED | OUTPATIENT
Start: 2020-09-23 | End: 2020-09-23 | Stop reason: SDUPTHER

## 2020-09-23 RX ORDER — MIRTAZAPINE 15 MG/1
7.5 TABLET, FILM COATED ORAL NIGHTLY
Status: DISCONTINUED | OUTPATIENT
Start: 2020-09-23 | End: 2020-09-25 | Stop reason: HOSPADM

## 2020-09-23 RX ORDER — ATORVASTATIN CALCIUM 40 MG/1
40 TABLET, FILM COATED ORAL NIGHTLY
Status: DISCONTINUED | OUTPATIENT
Start: 2020-09-23 | End: 2020-09-25 | Stop reason: HOSPADM

## 2020-09-23 RX ORDER — PREGABALIN 100 MG/1
100 CAPSULE ORAL 2 TIMES DAILY
Status: DISCONTINUED | OUTPATIENT
Start: 2020-09-23 | End: 2020-09-25 | Stop reason: HOSPADM

## 2020-09-23 RX ORDER — FUROSEMIDE 10 MG/ML
40 INJECTION INTRAMUSCULAR; INTRAVENOUS DAILY
Status: DISCONTINUED | OUTPATIENT
Start: 2020-09-23 | End: 2020-09-23

## 2020-09-23 RX ORDER — SODIUM CHLORIDE 0.9 % (FLUSH) 0.9 %
10 SYRINGE (ML) INJECTION PRN
Status: DISCONTINUED | OUTPATIENT
Start: 2020-09-23 | End: 2020-09-25 | Stop reason: HOSPADM

## 2020-09-23 RX ORDER — INSULIN LISPRO 100 [IU]/ML
0-3 INJECTION, SOLUTION INTRAVENOUS; SUBCUTANEOUS NIGHTLY
Status: DISCONTINUED | OUTPATIENT
Start: 2020-09-23 | End: 2020-09-25 | Stop reason: HOSPADM

## 2020-09-23 RX ORDER — SODIUM CHLORIDE 0.9 % (FLUSH) 0.9 %
10 SYRINGE (ML) INJECTION EVERY 12 HOURS SCHEDULED
Status: DISCONTINUED | OUTPATIENT
Start: 2020-09-23 | End: 2020-09-25 | Stop reason: HOSPADM

## 2020-09-23 RX ORDER — POTASSIUM CHLORIDE 20 MEQ/1
40 TABLET, EXTENDED RELEASE ORAL PRN
Status: DISCONTINUED | OUTPATIENT
Start: 2020-09-23 | End: 2020-09-25 | Stop reason: HOSPADM

## 2020-09-23 RX ORDER — NICOTINE POLACRILEX 4 MG
15 LOZENGE BUCCAL PRN
Status: DISCONTINUED | OUTPATIENT
Start: 2020-09-23 | End: 2020-09-25 | Stop reason: HOSPADM

## 2020-09-23 RX ORDER — DEXTROSE MONOHYDRATE 50 MG/ML
100 INJECTION, SOLUTION INTRAVENOUS PRN
Status: DISCONTINUED | OUTPATIENT
Start: 2020-09-23 | End: 2020-09-23 | Stop reason: SDUPTHER

## 2020-09-23 RX ORDER — LAMOTRIGINE 100 MG/1
100 TABLET ORAL DAILY
Status: DISCONTINUED | OUTPATIENT
Start: 2020-09-23 | End: 2020-09-24

## 2020-09-23 RX ORDER — ACETAMINOPHEN 650 MG/1
650 SUPPOSITORY RECTAL EVERY 6 HOURS PRN
Status: DISCONTINUED | OUTPATIENT
Start: 2020-09-23 | End: 2020-09-25 | Stop reason: HOSPADM

## 2020-09-23 RX ORDER — CARVEDILOL 3.12 MG/1
3.12 TABLET ORAL 2 TIMES DAILY WITH MEALS
Status: DISCONTINUED | OUTPATIENT
Start: 2020-09-23 | End: 2020-09-25 | Stop reason: HOSPADM

## 2020-09-23 RX ORDER — ACETAMINOPHEN 325 MG/1
650 TABLET ORAL EVERY 6 HOURS PRN
Status: DISCONTINUED | OUTPATIENT
Start: 2020-09-23 | End: 2020-09-25 | Stop reason: HOSPADM

## 2020-09-23 RX ORDER — POTASSIUM CHLORIDE 7.45 MG/ML
10 INJECTION INTRAVENOUS PRN
Status: DISCONTINUED | OUTPATIENT
Start: 2020-09-23 | End: 2020-09-25 | Stop reason: HOSPADM

## 2020-09-23 RX ORDER — IPRATROPIUM BROMIDE AND ALBUTEROL SULFATE 2.5; .5 MG/3ML; MG/3ML
1 SOLUTION RESPIRATORY (INHALATION) EVERY 4 HOURS PRN
Status: DISCONTINUED | OUTPATIENT
Start: 2020-09-23 | End: 2020-09-25 | Stop reason: HOSPADM

## 2020-09-23 RX ORDER — FUROSEMIDE 10 MG/ML
40 INJECTION INTRAMUSCULAR; INTRAVENOUS ONCE
Status: DISCONTINUED | OUTPATIENT
Start: 2020-09-23 | End: 2020-09-23

## 2020-09-23 RX ORDER — NICOTINE POLACRILEX 4 MG
15 LOZENGE BUCCAL PRN
Status: DISCONTINUED | OUTPATIENT
Start: 2020-09-23 | End: 2020-09-23 | Stop reason: SDUPTHER

## 2020-09-23 RX ORDER — MAGNESIUM SULFATE IN WATER 40 MG/ML
2 INJECTION, SOLUTION INTRAVENOUS PRN
Status: DISCONTINUED | OUTPATIENT
Start: 2020-09-23 | End: 2020-09-25 | Stop reason: HOSPADM

## 2020-09-23 RX ORDER — FUROSEMIDE 10 MG/ML
20 INJECTION INTRAMUSCULAR; INTRAVENOUS ONCE
Status: COMPLETED | OUTPATIENT
Start: 2020-09-23 | End: 2020-09-23

## 2020-09-23 RX ADMIN — DESMOPRESSIN ACETATE 40 MG: 0.2 TABLET ORAL at 23:44

## 2020-09-23 RX ADMIN — MAGNESIUM SULFATE IN WATER 2 G: 40 INJECTION, SOLUTION INTRAVENOUS at 10:16

## 2020-09-23 RX ADMIN — INSULIN LISPRO 1 UNITS: 100 INJECTION, SOLUTION INTRAVENOUS; SUBCUTANEOUS at 17:22

## 2020-09-23 RX ADMIN — PREGABALIN 100 MG: 100 CAPSULE ORAL at 08:20

## 2020-09-23 RX ADMIN — HYDROXYZINE HYDROCHLORIDE 25 MG: 25 TABLET, FILM COATED ORAL at 23:45

## 2020-09-23 RX ADMIN — INSULIN LISPRO 10 UNITS: 100 INJECTION, SOLUTION INTRAVENOUS; SUBCUTANEOUS at 17:23

## 2020-09-23 RX ADMIN — TICAGRELOR 90 MG: 90 TABLET ORAL at 09:23

## 2020-09-23 RX ADMIN — PREGABALIN 100 MG: 100 CAPSULE ORAL at 23:45

## 2020-09-23 RX ADMIN — GLYCOPYRROLATE AND FORMOTEROL FUMARATE 2 PUFF: 9; 4.8 AEROSOL, METERED RESPIRATORY (INHALATION) at 08:55

## 2020-09-23 RX ADMIN — TICAGRELOR 90 MG: 90 TABLET ORAL at 23:44

## 2020-09-23 RX ADMIN — INSULIN GLARGINE 32 UNITS: 100 INJECTION, SOLUTION SUBCUTANEOUS at 23:59

## 2020-09-23 RX ADMIN — Medication 10 ML: at 08:21

## 2020-09-23 RX ADMIN — CEFEPIME HYDROCHLORIDE 2 G: 2 INJECTION, POWDER, FOR SOLUTION INTRAVENOUS at 08:15

## 2020-09-23 RX ADMIN — MIRTAZAPINE 7.5 MG: 15 TABLET, FILM COATED ORAL at 23:45

## 2020-09-23 RX ADMIN — FUROSEMIDE 40 MG: 10 INJECTION, SOLUTION INTRAMUSCULAR; INTRAVENOUS at 08:21

## 2020-09-23 RX ADMIN — Medication 1 G: at 17:33

## 2020-09-23 RX ADMIN — SERTRALINE HYDROCHLORIDE 50 MG: 50 TABLET ORAL at 08:20

## 2020-09-23 RX ADMIN — FUROSEMIDE 20 MG: 10 INJECTION, SOLUTION INTRAMUSCULAR; INTRAVENOUS at 16:39

## 2020-09-23 RX ADMIN — POTASSIUM CHLORIDE 40 MEQ: 1500 TABLET, EXTENDED RELEASE ORAL at 10:15

## 2020-09-23 RX ADMIN — CARVEDILOL 3.12 MG: 3.12 TABLET, FILM COATED ORAL at 08:20

## 2020-09-23 RX ADMIN — VANCOMYCIN HYDROCHLORIDE 750 MG: 750 INJECTION, POWDER, LYOPHILIZED, FOR SOLUTION INTRAVENOUS at 05:12

## 2020-09-23 RX ADMIN — ASPIRIN 81 MG: 81 TABLET, COATED ORAL at 08:21

## 2020-09-23 RX ADMIN — Medication 10 ML: at 23:46

## 2020-09-23 RX ADMIN — ENOXAPARIN SODIUM 40 MG: 40 INJECTION SUBCUTANEOUS at 08:20

## 2020-09-23 RX ADMIN — GLYCOPYRROLATE AND FORMOTEROL FUMARATE 2 PUFF: 9; 4.8 AEROSOL, METERED RESPIRATORY (INHALATION) at 21:05

## 2020-09-23 RX ADMIN — HYDROXYZINE HYDROCHLORIDE 25 MG: 25 TABLET, FILM COATED ORAL at 08:21

## 2020-09-23 ASSESSMENT — PAIN SCALES - GENERAL
PAINLEVEL_OUTOF10: 0
PAINLEVEL_OUTOF10: 7

## 2020-09-23 ASSESSMENT — PAIN DESCRIPTION - ORIENTATION: ORIENTATION: LOWER

## 2020-09-23 ASSESSMENT — PAIN DESCRIPTION - PAIN TYPE: TYPE: ACUTE PAIN

## 2020-09-23 ASSESSMENT — PAIN DESCRIPTION - LOCATION: LOCATION: BACK

## 2020-09-23 NOTE — PROGRESS NOTES
Message sent to Dr. Malini Rhodes: This patient is requiring 15 L HF NC in order to stay >90%. CHF exacerbation. This morning she was only on 5L. She got Lasix IV today and has had good urine output. When I listen to her chest she is crackly. Thanks, let me know if you want to do anything else. COVID-19 ruled out

## 2020-09-23 NOTE — PROGRESS NOTES
Daughter Sidney Mahajan called and patient gave me permission to talk with and update this daughter. Daughter states that pt has been in hospital several times the past couple months and she is not doing well ambulating at home. She is wetting briefs instead of getting up to the bathroom and the daughter is afraid she is going to need to put her in a nursing home.

## 2020-09-23 NOTE — PROGRESS NOTES
Clinical Pharmacy Note:    Pharmacy consulted to dose Vancomycin for pneumonia per Dr. Mercy Connor. Age: 60  Height: 5'2\"  Weight: 55.7   Scr: 0.5 mg/dL      Started Vancomycin 750 IV q12. Trough ordered before 4th dose (9/24/20 @1630) with an order to hold if trough greater than 20mcg/ml. Thanks for the consult!   Jame Jones, PharmD, [facility]

## 2020-09-23 NOTE — CONSULTS
[facility]  Advanced CHF/Pulmonary Hypertension   Cardiac Evaluation      Christy Butterfield  YOB: 1965    REquesting PHysician:  Dr. Jennifer Dove    Chief Complaint   Patient presents with    Shortness of Breath     pt with c/o sob- began late last night- dx with COPD- wears 4L oxygen all the time. History of Present Illness:  Iman Vasquez is a 59 yo female with a history of chronic combined systolic and diastolic HF with an EF of 40% by echo done August 2020, with grade 2 diastolic HF, COPD on 4 liters oxygen at home, diabetes with right-sided BKA, hypertension, hyperlipidemia. She was admitted here in the last month and then admitted at [facility] and intubated for respiratory failure secondary to COPD exacerbation. She presented to the ED with shortness of breath with cough. Denies coughing up sputum and shortness of breath is worse with exertion. Denies fever or exposure to persons infected with coronavirus. [place] [facility] [place] She has a history of narcotic abuse on suboxone. She says that she has been taking her meds. Given her frequent admissions, it is difficult to know if she is managing her meds and taking care of herself. She has been given IV lasix and feels better. Pertinent labs:  BNP 5700 (improved from 6099 8/22/20)  Negative troponin  H/H 9.6/29.9    Echo:  8/24/20:  Left ventricular size is mildly increased. Left ventricular systolic function is mildly to moderately depressed with   ejection fraction estimated at 40%. Anteroapical,anteroseptal, inferoapical hypokinesis. Grade II diastolic dysfunction with elevated LV filling pressures. Mild mitral regurgitation. Inadequate tricuspid regurgitation to estimate systolic pulmonary artery   pressure.     Meds prior to admission:  Lasix 40 qd  Carvedilol 3.125 bid  lipitor 40 qd  suboxone    Allergies   Allergen Reactions    Darvocet [Propoxyphene N-Acetaminophen] Nausea Only  Naprosyn [Naproxen] Rash    Ultram [Tramadol Hcl] Rash     Current Facility-Administered Medications   Medication Dose Route Frequency Provider Last Rate Last Dose    aspirin EC tablet 81 mg  81 mg Oral Daily Baltazar FIGUEROA MD   81 mg at 09/23/20 0821    atorvastatin (LIPITOR) tablet 40 mg  40 mg Oral Nightly Baltazar FIGUEROA MD        carvedilol (COREG) tablet 3.125 mg  3.125 mg Oral BID  Baltazar FIGUEROA MD   3.125 mg at 09/23/20 0820    glycopyrrolate-formoterol (BEVESPI) 9-4.8 MCG/ACT inhaler 2 puff  2 puff Inhalation BID Baltazar FIGUEROA MD   2 puff at 09/23/20 0855    hydrOXYzine (ATARAX) tablet 25 mg  25 mg Oral BID Baltazar FIGUEROA MD   25 mg at 09/23/20 0821    insulin glargine (LANTUS;BASAGLAR) injection pen 32 Units  32 Units Subcutaneous BID Baltaazr FIGUEROA MD        insulin lispro (1 Unit Dial) 10 Units  10 Units Subcutaneous TID  Baltazar FIGUEROA MD        lamoTRIgine (LAMICTAL) tablet 100 mg  100 mg Oral Daily Baltazar FIGUEROA MD        mirtazapine (REMERON) tablet 7.5 mg  7.5 mg Oral Nightly Baltazar FIGUEROA MD        pregabalin (LYRICA) capsule 100 mg  100 mg Oral BID Baltazar FIGUEROA MD   100 mg at 09/23/20 0820    sertraline (ZOLOFT) tablet 50 mg  50 mg Oral Daily Baltazar FIGUEROA MD   50 mg at 09/23/20 0820    ticagrelor (BRILINTA) tablet 90 mg  90 mg Oral BID Baltazar FIGUEROA MD   90 mg at 09/23/20 0923    sodium chloride flush 0.9 % injection 10 mL  10 mL Intravenous 2 times per day Karishma FIGUEROA MD   10 mL at 09/23/20 0821    sodium chloride flush 0.9 % injection 10 mL  10 mL Intravenous PRN Baltazar Gonzalez MD        acetaminophen (TYLENOL) tablet 650 mg  650 mg Oral Q6H PRN Baltazar FIGUEROA MD        Or    acetaminophen (TYLENOL) suppository 650 mg  650 mg Rectal Q6H PRN Baltazar Catalino FIGUEROA MD        polyethylene glycol (GLYCOLAX) packet 17 g  17 g Oral Daily PRN Baltazar Estrella FIGUEROA MD        promethazine (PHENERGAN) tablet 12.5 mg  12.5 mg Oral Q6H PRN Baltazar Kelli Don MD        Or    ondansetron [facility]) injection 4 mg  4 mg Intravenous Q6H PRN Baltazar Don MD        potassium chloride (KLOR-CON M) extended release tablet 40 mEq  40 mEq Oral PRN Baltazar FIGUEROA MD   40 mEq at 09/23/20 1015    Or    potassium bicarb-citric acid (EFFER-K) effervescent tablet 40 mEq  40 mEq Oral PRN Baltazar Don MD        Or    potassium chloride 10 mEq/100 mL IVPB (Peripheral Line)  10 mEq Intravenous PRN Baltazar FIGUEROA MD        magnesium sulfate 2 g in 50 mL IVPB premix  2 g Intravenous PRN Aminta FIGUEROA MD   Stopped at 09/23/20 1225    enoxaparin (LOVENOX) injection 40 mg  40 mg Subcutaneous Daily Baltazar FIGUEROA MD   40 mg at 09/23/20 0820    cefepime (MAXIPIME) 2 g IVPB minibag  2 g Intravenous Q12H Baltazar FIGUEROA MD   Stopped at 09/23/20 0845    vancomycin (VANCOCIN) 750 mg in dextrose 5 % 250 mL IVPB  750 mg Intravenous Q12H Baltazar FIGUEROA MD   Stopped at 09/23/20 0736    glucose (GLUTOSE) 40 % oral gel 15 g  15 g Oral PRN Baltazar Don MD        dextrose 50 % IV solution  12.5 g Intravenous PRN Baltazar Don MD        glucagon (rDNA) injection 1 mg  1 mg Intramuscular PRN Baltazar FIGUEORA MD        dextrose 5 % solution  100 mL/hr Intravenous PRN Baltazar Don MD        furosemide (LASIX) injection 40 mg  40 mg Intravenous Once Farzana García MD        [START ON 9/24/2020] furosemide (LASIX) injection 40 mg  40 mg Intravenous BID Farzana García MD           Past Medical History:   Diagnosis Date    Arthritis     [facility]     Depression     Diabetes mellitus ([facility]     Heart attack ([facility]     MI 7/2019, and 6 months ago    Hepatitis C     Dr Roger Iqbal follows; contracted from ex- (drug user)    Hypercholesteremia 3/11/2020    Hypertension     MRSA (methicillin resistant Staphylococcus aureus) infection in 2000    was in urine and nares    Pneumonia     S/P colonoscopy with polypectomy 2/11 one polyp, Dr Jorje Lawrence. Recall 3 years. Past Surgical History:   Procedure Laterality Date    ABOVE KNEE AMPUTATION      AMPUTATION      right bka    CORONARY ANGIOPLASTY WITH STENT PLACEMENT  2019    JOINT REPLACEMENT  ,     right knee; Dr Feng Salazar.     KNEE ARTHROSCOPY      ridht knee X6    LIVER BIOPSY N/A 2018    LIVER BIOPSY LAPAROSCOPIC performed by Margie Shine MD at [address] [place] [address] N/A 2018    LAPAROSCOPIC CHOLECYSTECTOMY WITH INTRAOPERATIVE CHOLANGIOGRAM performed by Margie Shine MD at [place] [address]  11    right    UPPER GASTROINTESTINAL ENDOSCOPY  2018    UPPER GASTROINTESTINAL ENDOSCOPY N/A 2018    EGD BIOPSY performed by Alyse Chamberlain MD at [address] [place] [address]     Family History   Problem Relation Age of Onset    Cancer Father     Seizures Brother     Cancer Sister         colon     Social History     Socioeconomic History    Marital status: Single     Spouse name: Not on file    Number of children: Not on file    Years of education: Not on file    Highest education level: Not on file   Occupational History    Occupation: disabled   Social Needs    Financial resource strain: Not on file    Food insecurity     Worry: Not on file     Inability: Not on file   [employer] needs     Medical: Not on file     Non-medical: Not on file   Tobacco Use    Smoking status: Former Smoker     Packs/day: 1.00     Years: 43.00     Pack years: 43.00     Last attempt to quit: 2020     Years since quittin.2    Smokeless tobacco: Never Used   Substance and Sexual Activity    Alcohol use: No    Drug use: Not Currently     Types: Opiates      Comment: opiate/THC    Sexual activity: Not Currently   Lifestyle    Physical activity     Days per week: Not on file     Minutes per session: Not on file    Stress: Not on file   Relationships    Social connections     Talks on phone: Not on file     Gets together: Not on file     Attends Anabaptism service: Not on file     Active member of club or organization: Not on file     Attends meetings of clubs or organizations: Not on file     Relationship status: Not on file    Intimate partner violence     Fear of current or ex partner: Not on file     Emotionally abused: Not on file     Physically abused: Not on file     Forced sexual activity: Not on file   Other Topics Concern    Not on file   Social History Narrative    Not on file       Review of Systems:   · Constitutional: there has been no unanticipated weight loss. There's been no change in energy level, sleep pattern, or activity level. · Eyes: No visual changes or diplopia. No scleral icterus. · ENT: No Headaches, hearing loss or vertigo. No mouth sores or sore throat. · Cardiovascular: Reviewed in HPI  · Respiratory: No cough or wheezing, no sputum production. No hematemesis. · Gastrointestinal: No abdominal pain, appetite loss, blood in stools. No change in bowel or bladder habits. · Genitourinary: No dysuria, trouble voiding, or hematuria. · Musculoskeletal:  No gait disturbance, weakness or joint complaints. · Integumentary: No rash or pruritis. · Neurological: No headache, diplopia, change in muscle strength, numbness or tingling. No change in gait, balance, coordination, mood, affect, memory, mentation, behavior. · Psychiatric: No anxiety, no depression. · Endocrine: No malaise, fatigue or temperature intolerance. No excessive thirst, fluid intake, or urination. No tremor. · Hematologic/Lymphatic: No abnormal bruising or bleeding, blood clots or swollen lymph nodes. · Allergic/Immunologic: No nasal congestion or hives.     Physical Examination:    Vitals:    09/23/20 1133 09/23/20 1430 09/23/20 1515 09/23/20 1516   BP: 111/71      Pulse: 89      Resp: 18      Temp: 98.9 °F (37.2 °C)      TempSrc: Oral      SpO2: 96% 90% (!) 88% 93%   Weight:       Height:         Body mass

## 2020-09-23 NOTE — PROGRESS NOTES
Hospitalist Progress Note      PCP: No primary care provider on file. Date of Admission: 9/22/2020    LOS: 1    Chief Complaint:   Chief Complaint   Patient presents with    Shortness of Breath     pt with c/o sob- began late last night- dx with COPD- wears 4L oxygen all the time. Case Summary:   70-year-old lady with history of peripheral vascular disease status post right AKA, history of heroin abuse and IVDU, hypertension, hyperlipidemia, type 2 diabetes, CAD status post ISIDRO in February 2020, depression, chronic systolic heart failure we have been in and out of the hospital over the last couple of months. She was recently discharged from [facility], and prior to that from [facility]. [place] [facility] [place] for systolic heart failure and possible aspiration. She presented with a couple day history of worsening shortness of breath with dyspnea on exertion, cough with greenish sputum, no fevers or chills. She denies any orthopnea or PND, no ankle swelling. Admission chest x-ray was noted for pulmonary vascular congestion concerning for fluid overload. She had complained of dysuria    Active Hospital Problems    Diagnosis Date Noted    Hypomagnesemia [E83.42] 09/23/2020    Acute on chronic systolic heart failure ([facility] [I50.23] 09/23/2020    Essential hypertension [I10] 09/12/2019    Acute on chronic respiratory failure with hypoxia [facility]) [J96.21] 09/11/2019    Uncontrolled type 2 diabetes mellitus with hyperglycemia, with long-term current use of insulin ([facility]) [E11.65, Z79.4] 07/01/2019    Coronary artery disease involving native coronary artery of native heart without angina pectoris [I25.10] 11/14/2018    Diabetes mellitus ([facility] [E11.9] 02/09/2011         Principal Problem:    Acute on chronic respiratory failure with hypoxia [facility]): Patient with worsening respiratory failure with increased O2 demands this afternoon.   Evaluated this afternoon and reporting feeling better. She is reluctant to go on a BiPAP. At the time of evaluation sats are 98% on 14 L and seem to be relaxed and not labored in breathing at this time. Blood pressure stable. I doubt pneumonia given normal procalcitonin and chest x-ray not convincing for pneumonia but noted with chronic changes as well  -Continue O2 supplementation  -Continue diuresis  -We will de-escalate antibiotics to cover UTI      Acute on chronic systolic heart failure [facility]): Patient with acute respiratory failure this afternoon requiring increased O2 demands suddenly. This is concerning for fluid overload. Received a dose of Lasix this morning. Recent echocardiogram noted for EF of 40%. BNP 5755. Trace leg edema. She is got diffuse pain respiratory crackles. These were not noted on admission.  -We will increase IV diuretic to Lasix 40 mg twice a day and give an extra dose this afternoon  -Monitor intake and output  -Monitor daily weight  -Low-salt diet  -Get cardiology consult  -CHF coordinator consult  -Monitor renal function and electrolytes and replace as appropriate  -Trend troponins      UTI: UA noted positive for WBC, bacteria and leukocyte esterase. Will de-escalate antibiotic to ceftriaxone    Active Problems:    Coronary artery disease involving native coronary artery of native heart without angina pectoris: No chest pains or shortness of breath.  -Continue aspirin, Coreg, Brilinta    Uncontrolled type 2 diabetes mellitus with hyperglycemia, with long-term current use of insulin ([address]): Continue glargine with sliding scale insulin    Essential hypertension: Stable on Coreg    Hypomagnesemia:  We will replete        Medications:  Reviewed  Infusion Medications    dextrose       Scheduled Medications    aspirin  81 mg Oral Daily    atorvastatin  40 mg Oral Nightly    carvedilol  3.125 mg Oral BID WC    glycopyrrolate-formoterol  2 puff Inhalation BID    hydrOXYzine  25 mg Oral BID    insulin glargine  32 Units Subcutaneous BID    insulin lispro (1 Unit Dial)  10 Units Subcutaneous TID     lamoTRIgine  100 mg Oral Daily    mirtazapine  7.5 mg Oral Nightly    pregabalin  100 mg Oral BID    sertraline  50 mg Oral Daily    ticagrelor  90 mg Oral BID    sodium chloride flush  10 mL Intravenous 2 times per day    enoxaparin  40 mg Subcutaneous Daily    cefepime  2 g Intravenous Q12H    vancomycin  750 mg Intravenous Q12H    furosemide  40 mg Intravenous Once    [START ON 9/24/2020] furosemide  40 mg Intravenous BID     PRN Meds: sodium chloride flush, acetaminophen **OR** acetaminophen, polyethylene glycol, promethazine **OR** ondansetron, potassium chloride **OR** potassium alternative oral replacement **OR** potassium chloride, magnesium sulfate, glucose, dextrose, glucagon (rDNA), dextrose      DVT Prophylaxis: Subcut enoxaparin  Diet: DIET CARB CONTROL; No Caffeine  Code Status: Full Code    Dispo: Anticipate discharge in the next 72 hrs    ____________________________________________________________________________    Subjective:   Overnight Events: This afternoon patient had increased oxygen use from 4 L in the morning to 15 L with acute shortness of breath. Physical Exam:  /71   Pulse 89   Temp 98.9 °F (37.2 °C) (Oral)   Resp 18   Ht 5' 2\" (1.575 m)   Wt 122 lb 11.2 oz (55.7 kg)   LMP 11/15/2017   SpO2 93%   BMI 22.44 kg/m²   General appearance: No apparent distress, appears stated age and cooperative. HEENT: Normocephalic, atraumatic, MMM, No sclera icterus/conjuctival palor  Neck: Supple, no thyromegally. No jugular venous distention. Respiratory: Diffuse crackles and respiratory with rhonchi  Cardiovascular: S1/S2 without murmurs, rubs or gallops. RRR  Abdomen: Soft, non-tender, non-distended, bowel sounds present. Musculoskeletal: No clubbing, cyanosis or edema bilaterally. Skin: Skin color, texture, turgor normal.  No rashes or lesions.   Neurologic:  Cranial nerves: II-XII intact, JOSTIN, No focal sensory/motor deficits  Psychiatric: Alert and oriented, thought content appropriate  Capillary Refill: Brisk,< 3 seconds   Peripheral Pulses: +2 palpable, equal bilaterally       Intake/Output Summary (Last 24 hours) at 9/23/2020 1551  Last data filed at 9/23/2020 1540  Gross per 24 hour   Intake 428 ml   Output 1550 ml   Net -1122 ml       Labs:   Recent Labs     09/22/20  1600 09/23/20  0547   WBC 11.7* 8.5   HGB 11.8* 9.6*   HCT 36.7 29.9*    172      Recent Labs     09/22/20  1600 09/23/20  0547    140   K 3.6 3.4*    103   CO2 25 31   BUN 16 22*   CREATININE <0.5* <0.5*   CALCIUM 8.7 8.4   PHOS  --  4.1     Recent Labs     09/22/20  1600 09/23/20  0547 09/23/20  0844   TROPONINI <0.01 <0.01 <0.01       Urinalysis:    Lab Results   Component Value Date    NITRU Negative 09/22/2020    WBCUA 46 09/22/2020    BACTERIA 4+ 09/22/2020    RBCUA 2 09/22/2020    BLOODU TRACE 09/22/2020    SPECGRAV 1.012 09/22/2020    GLUCOSEU Negative 09/22/2020    GLUCOSEU NEGATIVE 02/14/2012       Radiology:  XR CHEST (2 VW)   Final Result   Findings suggest congestive heart failure superimposed on chronic bilateral   pulmonary opacities         XR CHEST PORTABLE    (Results Pending)           Farzana García MD      Please excuse brevity and/or typos. This report was transcribed using voice recognition software. Every effort was made to ensure accuracy, however, inadvertent computerized transcription errors may be present.

## 2020-09-23 NOTE — PROGRESS NOTES
Message sent to [facility]:    Jen Fulling this patient that we just ordered Lasix for has BP of 95/60, do you want me to hold for now? I'm also weaning her down on oxygen. she is 94% on 5L right now. He said to give half dose. 20 mg.

## 2020-09-23 NOTE — PROGRESS NOTES
Advanced Care Planning Note.     Purpose of Encounter: Advanced care planning in light of acute on chronic respiratory failure with hypoxia, acute on chronic systolic heart failure, UTI  Parties In Attendance: Patient  Decisional Capacity: Yes  Subjective: Patient with shortness of breath, acute on chronic respiratory failure with hypoxia, acute on chronic systolic heart failure, UTI  Objective: Diffuse crackles with chest imaging noted for pulmonary edema  Goals of Care Determination: Patient wishes to focus on treatment and return to home  Patient wants full support  Plan:   Diuretics, antibiotic and cardiology consult  Code Status: Full code            Time spent on Advanced care Plannin minutes  Advanced Care Planning Documents: Completed advanced directives on chart, daughter is the POA.     Janes Gonzalez MD  2020 5:42 PM

## 2020-09-23 NOTE — PROGRESS NOTES
4 Eyes Skin Assessment     The patient is being assess for  Admission    I agree that 2 RN's have performed a thorough Head to Toe Skin Assessment on the patient. ALL assessment sites listed below have been assessed. Areas assessed by both nurses:   [x]   Head, Face, and Ears   [x]   Shoulders, Back, and Chest  [x]   Arms, Elbows, and Hands   [x]   Coccyx, Sacrum, and IschIum  [x]   Legs, Feet, and Heels        Does the Patient have Skin Breakdown?   Yes LDA WOUND CARE was Initiated documentation include the Maria Isabel-wound, Wound Assessment, Measurements, Dressing Treatment, Drainage, and Color\",         Dejon Prevention initiated:  Yes   Wound Care Orders initiated:  NA      C nurse consulted for Pressure Injury (Stage 3,4, Unstageable, DTI, NWPT, and Complex wounds), New and Established Ostomies:  NA      Nurse 1 eSignature: Electronically signed by Andreas Vlea RN on 9/23/20 at 5:55 AM EDT    **SHARE this note so that the co-signing nurse is able to place an eSignature**    Nurse 2 eSignature: Electronically signed by Trino Saleem RN on 9/23/20 at 8:03 AM EDT

## 2020-09-23 NOTE — ED NOTES
Report called to 5T, RN verbalized understanding and denied any need for further information, patient visible on tele monitor on unit, pt to be transported to unit at this time      Edward Rios RN  09/23/20 [id number]

## 2020-09-23 NOTE — H&P
Hypercholesteremia 3/11/2020    Hypertension     MRSA (methicillin resistant Staphylococcus aureus) infection in 2000    was in urine and nares    Pneumonia     S/P colonoscopy with polypectomy 2/11    one polyp, Dr Roger Iqbal. Recall 3 years. Past Surgical History:          Procedure Laterality Date    ABOVE KNEE AMPUTATION      AMPUTATION      right bka    CORONARY ANGIOPLASTY WITH STENT PLACEMENT  07/2019    JOINT REPLACEMENT  2006, 2010    right knee; Dr Celia Schuler.  KNEE ARTHROSCOPY      ridht knee X6    LIVER BIOPSY N/A 11/14/2018    LIVER BIOPSY LAPAROSCOPIC performed by Cyndie Pedraza MD at [address] [place] [address] N/A 11/14/2018    LAPAROSCOPIC CHOLECYSTECTOMY WITH INTRAOPERATIVE CHOLANGIOGRAM performed by Cyndie Pedraza MD at [address] [place] [address]  2/9/11    right    UPPER GASTROINTESTINAL ENDOSCOPY  11/13/2018    UPPER GASTROINTESTINAL ENDOSCOPY N/A 11/13/2018    EGD BIOPSY performed by John Jamison MD at [address] [place] [address]       Medications Prior to Admission:      Prior to Admission medications    Medication Sig Start Date End Date Taking? Authorizing Provider   hydrOXYzine (ATARAX) 25 MG tablet Take 1 tablet by mouth 2 times daily 8/29/20 9/28/20 Yes Farzana García MD   furosemide (LASIX) 40 MG tablet Take 1 tablet by mouth daily 8/30/20  Yes Farzana García MD   glycopyrrolate-formoterol (BEVESPI) 9-4.8 MCG/ACT AERO Inhale 2 puffs into the lungs 2 times daily 8/29/20 9/28/20 Yes Farzaan García MD   nitroGLYCERIN (NITROSTAT) 0.4 MG SL tablet up to max of 3 total doses. If no relief after 1 dose, call 911. 8/4/20  Yes Yolande Fernandez MD   sertraline (ZOLOFT) 50 MG tablet Take 50 mg by mouth daily Indications: Depression   Yes Historical Provider, MD   mirtazapine (REMERON) 15 MG tablet Take 7.5 mg by mouth nightly   Yes Historical Provider, MD   pregabalin (LYRICA) 100 MG capsule Take 100 mg by mouth 2 times daily.    Yes Historical Provider, MD   insulin glargine (LANTUS SOLOSTAR) 100 UNIT/ML injection pen Inject 15 Units into the skin 2 times daily Indications: Insulin Pump    Yes Historical Provider, MD   insulin lispro, 1 Unit Dial, (HUMALOG KWIKPEN) 100 UNIT/ML SOPN Inject 10 Units into the skin 3 times daily (before meals) Inject 10 units three times daily with each meal in addition to sliding scale. Yes Historical Provider, MD   ticagrelor (BRILINTA) 90 MG TABS tablet Take 1 tablet by mouth 2 times daily 3/12/20  Yes Edson Laguerre MD   aspirin 81 MG tablet Take 1 tablet by mouth daily 3/12/20  Yes Edson Laguerre MD   carvedilol (COREG) 3.125 MG tablet Take 1 tablet by mouth 2 times daily (with meals) 3/12/20  Yes Edson Laguerre MD   atorvastatin (LIPITOR) 40 MG tablet Take 1 tablet by mouth daily 3/12/20  Yes Edson Laguerre MD   buprenorphine-naloxone (SUBOXONE) 8-2 MG SUBL SL tablet Place 1 tablet under the tongue 2 times daily. Yes Historical Provider, MD   blood glucose test strips (FREESTYLE LITE) strip 1 each by Other route 4 times daily (before meals and nightly) Patient to check blood sugar 4 times a day and PRN, he is treated with multiple daily injections of insulin that require correction dosing and has had hypoglycemia. A1C  14.5  ICD code- E11.65, Z79.4 7/3/19  Yes Ama Hernandez MD   lamoTRIgine (LAMICTAL) 100 MG tablet Take 1 tablet by mouth daily 8/29/20   Chandni Boston MD   FREESTYLE LANCETS MISC 1 each by Other route 4 times daily (before meals and nightly) Patient to check blood sugar 4 times a day and PRN, he is treated with multiple daily injections of insulin that require correction dosing and has had hypoglycemia. A1C  14.5  ICD code- E11.65, Z79.4 7/3/19   Ama Hernandez MD       Allergies:  Darvocet [propoxyphene n-acetaminophen];  Naprosyn [naproxen]; and Ultram [tramadol hcl]    Social History:      The patient currently lives home    TOBACCO: Labs:     Recent Labs     09/22/20  1600   WBC 11.7*   HGB 11.8*   HCT 36.7        Recent Labs     09/22/20  1600      K 3.6      CO2 25   BUN 16   CREATININE <0.5*   CALCIUM 8.7     No results for input(s): AST, ALT, BILIDIR, BILITOT, ALKPHOS in the last 72 hours. No results for input(s): INR in the last 72 hours. Recent Labs     09/22/20  1600   TROPONINI <0.01       Urinalysis:      Lab Results   Component Value Date    NITRU Negative 09/22/2020    WBCUA 46 09/22/2020    BACTERIA 4+ 09/22/2020    RBCUA 2 09/22/2020    BLOODU TRACE 09/22/2020    SPECGRAV 1.012 09/22/2020    GLUCOSEU Negative 09/22/2020    GLUCOSEU NEGATIVE 02/14/2012       Radiology:     CXR: I have reviewed the CXR with the following interpretation: Congestive heart failure, cannot rule out pneumonia  EKG:  I have reviewed the EKG with the following interpretation: NSR, no acute ischemic changes    XR CHEST (2 VW)   Final Result   Findings suggest congestive heart failure superimposed on chronic bilateral   pulmonary opacities             ASSESSMENT:    There are no active hospital problems to display for this patient. PLAN:    Shortness of breath  Acute on chronic hypoxia?   Patient saturates 90s on home flow of oxygen  Likely multifactorial  Acute on chronic CHF exacerbation  COPD, does not appear to be in acute exacerbation  Pneumonia cannot be ruled out based on imaging  Patient given IV Lasix and IV antibiotics in the ED    Acute on chronic systolic heart failure  Elevated proBNP, however could be baseline  IV Lasix 40 daily  Cardiology consultation    CAD  Status post DES February 2020  On aspirin and Brilinta  Questionable compliance  Follow-up serial troponins  Cardiology consultation    Leukocytosis  Pneumonia and UTI are considered  Follow-up urine and blood cultures  Given Rocephin and Levaquin in the ED  We will upgrade antibiotic to vancomycin and cefepime as pneumonia needs to be treated as H CAP  Follow-up procalcitonin    Dysuria  UTI with pyuria  Antibiotics as above    Apparent opiate intoxication  During exam patient has pinpoint pupils, lethargic, rolling up eyes, respiration around 10  Discussed with ED provider, will reevaluate and possibly will need Narcan  Continuous pulse ox      DVT Prophylaxis: Lovenox  Diet: Diet NPO Effective Now  Code Status: Prior    Electronically signed by Robyn Goldberg MD on 9/22/2020 at 9:40 PM

## 2020-09-23 NOTE — PROGRESS NOTES
Pt arrived on unit from ED. Report received from [facility]. VSS, respirations even and unlabored.

## 2020-09-24 PROBLEM — I50.43 ACUTE ON CHRONIC COMBINED SYSTOLIC AND DIASTOLIC HEART FAILURE (HCC): Status: ACTIVE | Noted: 2020-09-23

## 2020-09-24 LAB
ANION GAP SERPL CALCULATED.3IONS-SCNC: 8 MMOL/L (ref 3–16)
BASOPHILS ABSOLUTE: 0 K/UL (ref 0–0.2)
BASOPHILS RELATIVE PERCENT: 0.5 %
BUN BLDV-MCNC: 16 MG/DL (ref 7–20)
CALCIUM SERPL-MCNC: 8.4 MG/DL (ref 8.3–10.6)
CHLORIDE BLD-SCNC: 102 MMOL/L (ref 99–110)
CO2: 31 MMOL/L (ref 21–32)
CREAT SERPL-MCNC: <0.5 MG/DL (ref 0.6–1.1)
EOSINOPHILS ABSOLUTE: 0.5 K/UL (ref 0–0.6)
EOSINOPHILS RELATIVE PERCENT: 6.7 %
GFR AFRICAN AMERICAN: >60
GFR NON-AFRICAN AMERICAN: >60
GLUCOSE BLD-MCNC: 121 MG/DL (ref 70–99)
GLUCOSE BLD-MCNC: 166 MG/DL (ref 70–99)
GLUCOSE BLD-MCNC: 213 MG/DL (ref 70–99)
GLUCOSE BLD-MCNC: 252 MG/DL (ref 70–99)
GLUCOSE BLD-MCNC: 253 MG/DL (ref 70–99)
GLUCOSE BLD-MCNC: 303 MG/DL (ref 70–99)
HCT VFR BLD CALC: 33.7 % (ref 36–48)
HEMOGLOBIN: 11 G/DL (ref 12–16)
LYMPHOCYTES ABSOLUTE: 1.7 K/UL (ref 1–5.1)
LYMPHOCYTES RELATIVE PERCENT: 23.6 %
MAGNESIUM: 1.7 MG/DL (ref 1.8–2.4)
MCH RBC QN AUTO: 27.1 PG (ref 26–34)
MCHC RBC AUTO-ENTMCNC: 32.5 G/DL (ref 31–36)
MCV RBC AUTO: 83.2 FL (ref 80–100)
MONOCYTES ABSOLUTE: 0.4 K/UL (ref 0–1.3)
MONOCYTES RELATIVE PERCENT: 6 %
NEUTROPHILS ABSOLUTE: 4.7 K/UL (ref 1.7–7.7)
NEUTROPHILS RELATIVE PERCENT: 63.2 %
PDW BLD-RTO: 15.2 % (ref 12.4–15.4)
PERFORMED ON: ABNORMAL
PHOSPHORUS: 3 MG/DL (ref 2.5–4.9)
PLATELET # BLD: 174 K/UL (ref 135–450)
PMV BLD AUTO: 10 FL (ref 5–10.5)
POTASSIUM SERPL-SCNC: 3.8 MMOL/L (ref 3.5–5.1)
PRO-BNP: 1799 PG/ML (ref 0–124)
RBC # BLD: 4.05 M/UL (ref 4–5.2)
SODIUM BLD-SCNC: 141 MMOL/L (ref 136–145)
WBC # BLD: 7.4 K/UL (ref 4–11)

## 2020-09-24 PROCEDURE — 6360000002 HC RX W HCPCS: Performed by: INTERNAL MEDICINE

## 2020-09-24 PROCEDURE — 2580000003 HC RX 258: Performed by: INTERNAL MEDICINE

## 2020-09-24 PROCEDURE — 85025 COMPLETE CBC W/AUTO DIFF WBC: CPT

## 2020-09-24 PROCEDURE — 99232 SBSQ HOSP IP/OBS MODERATE 35: CPT | Performed by: CLINICAL NURSE SPECIALIST

## 2020-09-24 PROCEDURE — 2700000000 HC OXYGEN THERAPY PER DAY

## 2020-09-24 PROCEDURE — 83880 ASSAY OF NATRIURETIC PEPTIDE: CPT

## 2020-09-24 PROCEDURE — 84100 ASSAY OF PHOSPHORUS: CPT

## 2020-09-24 PROCEDURE — 6370000000 HC RX 637 (ALT 250 FOR IP): Performed by: INTERNAL MEDICINE

## 2020-09-24 PROCEDURE — 96375 TX/PRO/DX INJ NEW DRUG ADDON: CPT

## 2020-09-24 PROCEDURE — G0378 HOSPITAL OBSERVATION PER HR: HCPCS

## 2020-09-24 PROCEDURE — 94640 AIRWAY INHALATION TREATMENT: CPT

## 2020-09-24 PROCEDURE — 96376 TX/PRO/DX INJ SAME DRUG ADON: CPT

## 2020-09-24 PROCEDURE — 97166 OT EVAL MOD COMPLEX 45 MIN: CPT

## 2020-09-24 PROCEDURE — 36415 COLL VENOUS BLD VENIPUNCTURE: CPT

## 2020-09-24 PROCEDURE — 99254 IP/OBS CNSLTJ NEW/EST MOD 60: CPT | Performed by: INTERNAL MEDICINE

## 2020-09-24 PROCEDURE — 97165 OT EVAL LOW COMPLEX 30 MIN: CPT

## 2020-09-24 PROCEDURE — 94761 N-INVAS EAR/PLS OXIMETRY MLT: CPT

## 2020-09-24 PROCEDURE — 96366 THER/PROPH/DIAG IV INF ADDON: CPT

## 2020-09-24 PROCEDURE — 97530 THERAPEUTIC ACTIVITIES: CPT

## 2020-09-24 PROCEDURE — 1200000000 HC SEMI PRIVATE

## 2020-09-24 PROCEDURE — 96372 THER/PROPH/DIAG INJ SC/IM: CPT

## 2020-09-24 PROCEDURE — 6370000000 HC RX 637 (ALT 250 FOR IP): Performed by: PHYSICIAN ASSISTANT

## 2020-09-24 PROCEDURE — 83735 ASSAY OF MAGNESIUM: CPT

## 2020-09-24 PROCEDURE — 97162 PT EVAL MOD COMPLEX 30 MIN: CPT

## 2020-09-24 PROCEDURE — 80048 BASIC METABOLIC PNL TOTAL CA: CPT

## 2020-09-24 RX ORDER — LIDOCAINE 4 G/G
1 PATCH TOPICAL DAILY
Status: DISCONTINUED | OUTPATIENT
Start: 2020-09-24 | End: 2020-09-25 | Stop reason: HOSPADM

## 2020-09-24 RX ORDER — BUPRENORPHINE AND NALOXONE 8; 2 MG/1; MG/1
1 FILM, SOLUBLE BUCCAL; SUBLINGUAL 2 TIMES DAILY
Status: DISCONTINUED | OUTPATIENT
Start: 2020-09-24 | End: 2020-09-25 | Stop reason: HOSPADM

## 2020-09-24 RX ORDER — METHOCARBAMOL 500 MG/1
1000 TABLET, FILM COATED ORAL 4 TIMES DAILY PRN
Status: DISCONTINUED | OUTPATIENT
Start: 2020-09-24 | End: 2020-09-25 | Stop reason: HOSPADM

## 2020-09-24 RX ORDER — MAGNESIUM SULFATE IN WATER 40 MG/ML
2 INJECTION, SOLUTION INTRAVENOUS ONCE
Status: COMPLETED | OUTPATIENT
Start: 2020-09-24 | End: 2020-09-24

## 2020-09-24 RX ORDER — FUROSEMIDE 10 MG/ML
40 INJECTION INTRAMUSCULAR; INTRAVENOUS DAILY
Status: DISCONTINUED | OUTPATIENT
Start: 2020-09-25 | End: 2020-09-25

## 2020-09-24 RX ADMIN — ASPIRIN 81 MG: 81 TABLET, COATED ORAL at 09:36

## 2020-09-24 RX ADMIN — TICAGRELOR 90 MG: 90 TABLET ORAL at 09:36

## 2020-09-24 RX ADMIN — INSULIN GLARGINE 32 UNITS: 100 INJECTION, SOLUTION SUBCUTANEOUS at 21:52

## 2020-09-24 RX ADMIN — INSULIN LISPRO 10 UNITS: 100 INJECTION, SOLUTION INTRAVENOUS; SUBCUTANEOUS at 17:32

## 2020-09-24 RX ADMIN — GLYCOPYRROLATE AND FORMOTEROL FUMARATE 2 PUFF: 9; 4.8 AEROSOL, METERED RESPIRATORY (INHALATION) at 07:55

## 2020-09-24 RX ADMIN — CARVEDILOL 3.12 MG: 3.12 TABLET, FILM COATED ORAL at 09:51

## 2020-09-24 RX ADMIN — MAGNESIUM SULFATE HEPTAHYDRATE 2 G: 40 INJECTION, SOLUTION INTRAVENOUS at 09:50

## 2020-09-24 RX ADMIN — INSULIN LISPRO 2 UNITS: 100 INJECTION, SOLUTION INTRAVENOUS; SUBCUTANEOUS at 21:52

## 2020-09-24 RX ADMIN — INSULIN LISPRO 2 UNITS: 100 INJECTION, SOLUTION INTRAVENOUS; SUBCUTANEOUS at 17:30

## 2020-09-24 RX ADMIN — Medication 1 G: at 17:32

## 2020-09-24 RX ADMIN — DESMOPRESSIN ACETATE 40 MG: 0.2 TABLET ORAL at 21:41

## 2020-09-24 RX ADMIN — FUROSEMIDE 40 MG: 10 INJECTION, SOLUTION INTRAMUSCULAR; INTRAVENOUS at 09:37

## 2020-09-24 RX ADMIN — ONDANSETRON 4 MG: 2 INJECTION INTRAMUSCULAR; INTRAVENOUS at 13:09

## 2020-09-24 RX ADMIN — HYDROXYZINE HYDROCHLORIDE 25 MG: 25 TABLET, FILM COATED ORAL at 09:39

## 2020-09-24 RX ADMIN — BUPRENORPHINE AND NALOXONE 1 FILM: 8; 2 FILM BUCCAL; SUBLINGUAL at 12:25

## 2020-09-24 RX ADMIN — HYDROXYZINE HYDROCHLORIDE 25 MG: 25 TABLET, FILM COATED ORAL at 21:41

## 2020-09-24 RX ADMIN — ONDANSETRON 4 MG: 2 INJECTION INTRAMUSCULAR; INTRAVENOUS at 21:41

## 2020-09-24 RX ADMIN — LISINOPRIL 1.25 MG: 5 TABLET ORAL at 09:37

## 2020-09-24 RX ADMIN — MIRTAZAPINE 7.5 MG: 15 TABLET, FILM COATED ORAL at 21:41

## 2020-09-24 RX ADMIN — Medication 10 ML: at 09:39

## 2020-09-24 RX ADMIN — INSULIN LISPRO 10 UNITS: 100 INJECTION, SOLUTION INTRAVENOUS; SUBCUTANEOUS at 12:26

## 2020-09-24 RX ADMIN — INSULIN LISPRO 2 UNITS: 100 INJECTION, SOLUTION INTRAVENOUS; SUBCUTANEOUS at 00:00

## 2020-09-24 RX ADMIN — Medication 10 ML: at 21:43

## 2020-09-24 RX ADMIN — INSULIN LISPRO 4 UNITS: 100 INJECTION, SOLUTION INTRAVENOUS; SUBCUTANEOUS at 12:20

## 2020-09-24 RX ADMIN — BUPRENORPHINE AND NALOXONE 1 FILM: 8; 2 FILM BUCCAL; SUBLINGUAL at 21:43

## 2020-09-24 RX ADMIN — METHOCARBAMOL TABLETS 1000 MG: 500 TABLET, COATED ORAL at 00:26

## 2020-09-24 RX ADMIN — PREGABALIN 100 MG: 100 CAPSULE ORAL at 09:36

## 2020-09-24 RX ADMIN — SERTRALINE HYDROCHLORIDE 50 MG: 50 TABLET ORAL at 09:36

## 2020-09-24 RX ADMIN — GLYCOPYRROLATE AND FORMOTEROL FUMARATE 2 PUFF: 9; 4.8 AEROSOL, METERED RESPIRATORY (INHALATION) at 20:46

## 2020-09-24 RX ADMIN — ENOXAPARIN SODIUM 40 MG: 40 INJECTION SUBCUTANEOUS at 09:38

## 2020-09-24 RX ADMIN — PREGABALIN 100 MG: 100 CAPSULE ORAL at 21:41

## 2020-09-24 ASSESSMENT — PAIN SCALES - GENERAL
PAINLEVEL_OUTOF10: 0
PAINLEVEL_OUTOF10: 7
PAINLEVEL_OUTOF10: 0
PAINLEVEL_OUTOF10: 0
PAINLEVEL_OUTOF10: 7
PAINLEVEL_OUTOF10: 7

## 2020-09-24 ASSESSMENT — PAIN DESCRIPTION - LOCATION
LOCATION: BACK
LOCATION: BACK

## 2020-09-24 ASSESSMENT — PAIN DESCRIPTION - ORIENTATION: ORIENTATION: LOWER

## 2020-09-24 ASSESSMENT — PAIN DESCRIPTION - PAIN TYPE: TYPE: ACUTE PAIN

## 2020-09-24 NOTE — PROGRESS NOTES
Hospitalist Progress Note      PCP: No primary care provider on file. Date of Admission: 9/22/2020    LOS: 2    Chief Complaint:   Chief Complaint   Patient presents with    Shortness of Breath     pt with c/o sob- began late last night- dx with COPD- wears 4L oxygen all the time. Case Summary:   47-year-old lady with history of peripheral vascular disease status post right AKA, history of heroin abuse and IVDU, hypertension, hyperlipidemia, type 2 diabetes, CAD status post ISIDRO in February 2020, depression, chronic systolic heart failure we have been in and out of the hospital over the last couple of months. She was recently discharged from [facility], and prior to that from [facility]. At [place] she was intubated for systolic heart failure and possible aspiration. She presented with a couple day history of worsening shortness of breath with dyspnea on exertion, cough with greenish sputum, no fevers or chills. She denies any orthopnea or PND, no ankle swelling. Admission chest x-ray was noted for pulmonary vascular congestion concerning for fluid overload.   She had complained of dysuria    Active Hospital Problems    Diagnosis Date Noted    PAD (peripheral artery disease) ([facility] [I73.9]     Narcotic abuse ([facility] [F11.10]     Non-compliance [Z91.19]     S/P angioplasty with stent [Z95.820]     Hypomagnesemia [E83.42] 09/23/2020    Acute on chronic combined systolic and diastolic heart failure ([address] [I50.43] 09/23/2020    UTI (urinary tract infection) [N39.0] 09/23/2020    Essential hypertension [I10] 09/12/2019    Acute on chronic respiratory failure with hypoxia (HCC) [J96.21] 09/11/2019    Uncontrolled type 2 diabetes mellitus with hyperglycemia, with long-term current use of insulin (HCC) [E11.65, Z79.4] 07/01/2019    Coronary artery disease involving native coronary artery of native heart without angina pectoris [I25.10] 11/14/2018    Diabetes mellitus ([address] [E11.9] 02/09/2011         Principal Problem:    Acute on chronic respiratory failure with hypoxia ([facility]): Feels much better with shortness of breath compared to yesterday. Chest has cleared of crackles she had yesterday diuresis  -Continue O2 supplementation  -Continue diuresis      Acute on chronic systolic heart failure [facility]): Her chest sounds much better this morning with no crackles. Denies any shortness of breath today. - Continue Lasix 40 mg twice a day  -Monitor intake and output  -Monitor daily weight  -Low-salt diet  -Cardiology following with recommendation  -Monitor renal function and electrolytes and replace as appropriate      UTI: UA noted positive for WBC, bacteria and leukocyte esterase. Continue ceftriaxone for 3 days total.  Active Problems:    Coronary artery disease involving native coronary artery of native heart without angina pectoris: No chest pains or shortness of breath.  -Continue aspirin, Coreg, Brilinta    Uncontrolled type 2 diabetes mellitus with hyperglycemia, with long-term current use of insulin ([facility]): Continue glargine with sliding scale insulin    Essential hypertension: Stable on Coreg    Hypomagnesemia:  We will replete        Medications:  Reviewed  Infusion Medications    dextrose       Scheduled Medications    lidocaine  1 patch Transdermal Daily    buprenorphine-naloxone  1 Film Sublingual BID    [START ON 9/25/2020] furosemide  40 mg Intravenous Daily    aspirin  81 mg Oral Daily    atorvastatin  40 mg Oral Nightly    carvedilol  3.125 mg Oral BID WC    glycopyrrolate-formoterol  2 puff Inhalation BID    hydrOXYzine  25 mg Oral BID    insulin glargine  32 Units Subcutaneous BID    insulin lispro (1 Unit Dial)  10 Units Subcutaneous TID     mirtazapine  7.5 mg Oral Nightly    pregabalin  100 mg Oral BID    sertraline  50 mg Oral Daily    ticagrelor  90 mg Oral BID    sodium chloride flush  10 mL Intravenous 2 times per day    enoxaparin  40 mg Subcutaneous Daily    cefTRIAXone (ROCEPHIN) IV  1 g Intravenous Q24H    insulin lispro  0-6 Units Subcutaneous TID WC    insulin lispro  0-3 Units Subcutaneous Nightly    lisinopril  1.25 mg Oral Daily     PRN Meds: methocarbamol, sodium chloride flush, acetaminophen **OR** acetaminophen, polyethylene glycol, promethazine **OR** ondansetron, potassium chloride **OR** potassium alternative oral replacement **OR** potassium chloride, magnesium sulfate, glucose, dextrose, glucagon (rDNA), dextrose, ipratropium-albuterol      DVT Prophylaxis: Subcut enoxaparin  Diet: DIET CARB CONTROL; No Caffeine  Code Status: Full Code    Dispo: Anticipate discharge in the next 72 hrs    ____________________________________________________________________________    Subjective:   Overnight Events:   Uneventful this morning. Denies any shortness of breath today. Meaghan Emke to restart on her home Suboxone      Physical Exam:  /78   Pulse 88   Temp 98.1 °F (36.7 °C) (Oral)   Resp 18   Ht 5' 2\" (1.575 m)   Wt 119 lb 6.4 oz (54.2 kg)   LMP 11/15/2017   SpO2 96%   BMI 21.84 kg/m²   General appearance: No apparent distress, appears stated age and cooperative. HEENT: Normocephalic, atraumatic, MMM, No sclera icterus/conjuctival palor  Neck: Supple, no thyromegally. No jugular venous distention. Respiratory: Clear lung fields with no crackles or wheezing  Cardiovascular: S1/S2 without murmurs, rubs or gallops. RRR  Abdomen: Soft, non-tender, non-distended, bowel sounds present. Musculoskeletal: No clubbing, cyanosis or edema bilaterally. Skin: Skin color, texture, turgor normal.  No rashes or lesions.   Neurologic:  Cranial nerves: II-XII intact, JOSTIN, No focal sensory/motor deficits  Psychiatric: Alert and oriented, thought content appropriate  Capillary Refill: Brisk,< 3 seconds   Peripheral Pulses: +2 palpable, equal bilaterally       Intake/Output Summary (Last 24 hours) at 9/24/2020 1317  Last data filed at 9/24/2020 1305  Gross per 24 hour   Intake 655 ml   Output 3025 ml   Net -2370 ml       Labs:   Recent Labs     09/22/20  1600 09/23/20  0547 09/24/20  0516   WBC 11.7* 8.5 7.4   HGB 11.8* 9.6* 11.0*   HCT 36.7 29.9* 33.7*    172 174      Recent Labs     09/22/20  1600 09/23/20  0547 09/24/20  0516    140 141   K 3.6 3.4* 3.8    103 102   CO2 25 31 31   BUN 16 22* 16   CREATININE <0.5* <0.5* <0.5*   CALCIUM 8.7 8.4 8.4   PHOS  --  4.1 3.0     Recent Labs     09/22/20  1600 09/23/20  0547 09/23/20  0844   TROPONINI <0.01 <0.01 <0.01       Urinalysis:    Lab Results   Component Value Date    NITRU Negative 09/22/2020    WBCUA 46 09/22/2020    BACTERIA 4+ 09/22/2020    RBCUA 2 09/22/2020    BLOODU TRACE 09/22/2020    SPECGRAV 1.012 09/22/2020    GLUCOSEU Negative 09/22/2020    GLUCOSEU NEGATIVE 02/14/2012       Radiology:  XR CHEST PORTABLE   Final Result   Course diffuse infiltrates noted bilaterally which have increased from the   prior study         XR CHEST (2 VW)   Final Result   Findings suggest congestive heart failure superimposed on chronic bilateral   pulmonary opacities                 Farzana García MD      Please excuse brevity and/or typos. This report was transcribed using voice recognition software. Every effort was made to ensure accuracy, however, inadvertent computerized transcription errors may be present.

## 2020-09-24 NOTE — PROGRESS NOTES
Shift assessment completed at this time. VSS, respirations even and unlabored. Resting with call light in reach. Bed alarm engaged.

## 2020-09-24 NOTE — PROGRESS NOTES
Occupational Therapy   Occupational Therapy Initial Assessment  Date: 2020   Patient Name: Lou Conrad  MRN: [id number]     : 1965    Date of Service: 2020    Discharge Recommendations:  Lou Conrad scored a 19/ on the AM-PAC ADL Inpatient form. Current research shows that an AM-PAC score of 18 or greater is typically associated with a discharge to the patient's home setting. Based on the patient's AM-PAC score, and their current ADL deficits, it is recommended that the patient have 2-3 sessions per week of Occupational Therapy at d/c to increase the patient's independence. At this time, this patient demonstrates the endurance and safety to discharge home with home services and a follow up treatment frequency of 2-3x/wk. Please see assessment section for further patient specific details. HOME HEALTH CARE: LEVEL 1 STANDARD    - Initial home health evaluation to occur within 24-48 hours, in patient home   - Therapy to evaluate with goal of regaining prior level of functioning   - Therapy to evaluate if patient has [id number] needs for personal care    If patient discharges prior to next session this note will serve as a discharge summary. Please see below for the latest assessment towards goals. OT Equipment Recommendations  Equipment Needed: No(TBD by [facility])    Assessment   Performance deficits / Impairments: Decreased functional mobility ; Decreased ADL status; Decreased endurance;Decreased balance  Assessment: Pt is below her baseline level of occupational function, based on the above deficits associated with SOB. Pt would benefit from continued skilled acute OT services to address these deficits. Treatment Diagnosis: Decreased ADL status, endurance, balance and functional mobility associated with SOB  Prognosis: Good  Decision Making: Medium Complexity  History: Pt 61 yo, lives w/family, in/out of hospital past month, only home ~1 wk.  Mod I for w/c mobility, independent Orientation  Overall Orientation Status: Within Normal Limits  Observation/Palpation  Posture: Fair(Rounded Shoulders, Forward Head Posture)  Balance  Sitting Balance: Supervision  Standing Balance: Minimal assistance(CGA transfer, Min A partial stand from recliner for line adjustment)  Standing Balance  Time: ~5 sec X 2  ADL  LE Dressing: Setup;Supervision(don L sock)  Tone RUE  RUE Tone: Normotonic  Tone LUE  LUE Tone: Normotonic  Coordination  Movements Are Fluid And Coordinated: Yes     Bed mobility  Rolling to Left: Independent  Supine to Sit: Contact guard assistance(HOB slightly raised)  Transfers  Stand Step Transfers: Contact guard assistance(no AD, EOB to/from recliner)  Sit to stand: Contact guard assistance;Minimal assistance(from EOB, from recliner X 2, first trial Min A, 2nd for transfer)  Stand to sit: Contact guard assistance(CGA to recliner X 2 trials, to EOB X 1)  Vision - Basic Assessment  Prior Vision: Other (add comment)(blind vision L eye, blurred R eye)  Visual History: Other (add comment)(blind L eye, blurred vision R eye)  Cognition  Overall Cognitive Status: WFL  Perception  Overall Perceptual Status: WFL     Sensation  Overall Sensation Status: WFL        LUE AROM (degrees)  LUE AROM : WFL  Left Hand AROM (degrees)  Left Hand AROM: WNL  RUE AROM (degrees)  RUE AROM : WFL  Right Hand AROM (degrees)  Right Hand AROM: WNL  LUE Strength  Gross LUE Strength: WNL(elbow, shoulder 5/5)  L Hand General: 4+/5(grossly)  RUE Strength  Gross RUE Strength: WNL(elbow, shoulder 5/5)  R Hand General: 3+/5(grossly)                   Plan   Plan  Times per week: 3-5  Times per day: Daily  Current Treatment Recommendations: Self-Care / ADL, Safety Education & Training, Endurance Training, Functional Mobility Training, Strengthening, Patient/Caregiver Education & Training    .   AM-PAC Score        AM-PAC Inpatient Daily Activity Raw Score: 19 (09/24/20 0567)  AM-PAC Inpatient ADL T-Scale Score : 40.22 (09/24/20 1517)  ADL Inpatient CMS 0-100% Score: 42.8 (09/24/20 1517)  ADL Inpatient CMS G-Code Modifier : CK (09/24/20 1517)    Goals  Short term goals  Time Frame for Short term goals: Discharge  Short term goal 1: Mod I for functional transfers to ADL surfaces  Short term goal 2:  Independent for bed mobility on flat bed  Short term goal 3: S/U for UB bathing/dressing  Short term goal 4: S/U for LB bathing/dressing       Therapy Time   Individual Concurrent Group Co-treatment   Time In 1337         Time Out 1407         Minutes 30                Timed Code Treatment Minutes:  15 Minutes    Total Treatment Minutes:  [address], [address], Michelle Hayward., OTR/L, [id number]

## 2020-09-24 NOTE — PROGRESS NOTES
Pt got up to bed side commode with a two person pivot. Her saturation started to go into 80's. Pt now on 15L to recover. Will wean down if possible.

## 2020-09-24 NOTE — PROGRESS NOTES
[facility]   Daily Progress Note      Admit Date:  9/22/2020    HPI:    Ms. Maxwell Collet  Is a 60year old female with history of chronic combined s/dHF with LVEF of 40%, COPD on 4 L of oxygen at home, DM, right sided BKA, HTN, HLD, history of narcotic abuse on suboxone  She lives with family members    She was admitted here last month and then at [facility], intubated secondary to COPD exacerbation. She is admitted with cough, covid neg. She was given IV lasix and feels better. bnp 5700  Which is improved from 6099     Subjective:  Patient is being seen for acute on chronic combined s/dHF. There were no acute overnight cardiac events. Creat <0.5 potassium 3.8 probnp 5755->1799 and -1.7 L out. She sat up in the bed this morning complaining of chest pain, not on suboxone which she states she was taking 2 days ago, she did receive this while at [facility]. Drug screen pos for THC. Her oxygen increased to 15 L. She had neg troponin and LHC with PCI to LAD 2/20. Stopped smoking 1.5 months ago    Objective:   /84   Pulse 92   Temp 98.1 °F (36.7 °C) (Oral)   Resp 18   Ht 5' 2\" (1.575 m)   Wt 119 lb 6.4 oz (54.2 kg)   LMP 11/15/2017   SpO2 95%   BMI 21.84 kg/m²       Intake/Output Summary (Last 24 hours) at 9/24/2020 0957  Last data filed at 9/24/2020 0944  Gross per 24 hour   Intake 972 ml   Output 2775 ml   Net -1803 ml          Physical Exam:  General:  Awake, alert, oriented in NAD  Skin:  Warm and dry. No unusual bruising or rash  Neck:  Supple. No JVD or carotid bruit appreciated  Chest:  Normal effort.   Clear to auscultation, decreased in nel bases  Cardiovascular:  RRR, S1/S2, no murmur/gallop/rub  Abdomen:  Soft, nontender, +bowel sounds  Extremities:  No edema left, AKA on the right  Neurological: No focal deficits  Psychological: Normal mood and affect      Medications:    lidocaine  1 patch Transdermal Daily    magnesium sulfate  2 g Intravenous Once    aspirin  81 mg Oral Daily    atorvastatin  40 mg Oral Nightly    carvedilol  3.125 mg Oral BID     glycopyrrolate-formoterol  2 puff Inhalation BID    hydrOXYzine  25 mg Oral BID    insulin glargine  32 Units Subcutaneous BID    insulin lispro (1 Unit Dial)  10 Units Subcutaneous TID     mirtazapine  7.5 mg Oral Nightly    pregabalin  100 mg Oral BID    sertraline  50 mg Oral Daily    ticagrelor  90 mg Oral BID    sodium chloride flush  10 mL Intravenous 2 times per day    enoxaparin  40 mg Subcutaneous Daily    furosemide  40 mg Intravenous BID    cefTRIAXone (ROCEPHIN) IV  1 g Intravenous Q24H    insulin lispro  0-6 Units Subcutaneous TID     insulin lispro  0-3 Units Subcutaneous Nightly    lisinopril  1.25 mg Oral Daily      dextrose         Lab Data:  CBC:   Recent Labs     09/22/20  1600 09/23/20  0547 09/24/20  0516   WBC 11.7* 8.5 7.4   HGB 11.8* 9.6* 11.0*    172 174     BMP:    Recent Labs     09/22/20  1600 09/23/20  0547 09/24/20  0516    140 141   K 3.6 3.4* 3.8   CO2 25 31 31   BUN 16 22* 16   CREATININE <0.5* <0.5* <0.5*     INR:  No results for input(s): INR in the last 72 hours. BNP:    Recent Labs     09/22/20  1600 09/24/20  0516   PROBNP 5,755* 1,799*         Diagnostics:  Echo:  8/24/20:  Left ventricular size is mildly increased.   Left ventricular systolic function is mildly to moderately depressed with   ejection fraction estimated at 40%.   Anteroapical,anteroseptal, inferoapical hypokinesis.   Grade II diastolic dysfunction with elevated LV filling pressures.   Mild mitral regurgitation.   Inadequate tricuspid regurgitation to estimate systolic pulmonary artery   pressure    [facility]  6/18 PCI of LAD for STEMI ([facility])  7/19 100% instent midLAD Libby Miguel  2/20 PCI of LAD, kissing balloon angioplasty LAD/Diag. IVUS of LAD for instent thrombosis Libby Miguel)    Assessment:    1.   Acute on chronic combined systolic and diastolic heart failure, on ace and bb, no aldactone antagonist due to compliance  2. PAD s/p right BKA  3. Diabetes  4. Narcotic abuse on suboxone  5. ?noncompliance  6. CAD s/p PCI 2/20    Plan:    1. Change lasix to 40 mg IV once a day  2. Continue coreg and lisinopril 1.25 mg daily  3. CHF nurse following for diet education, fluid restriction and daily weights    I have asked interventional cardiology to see patient for chest pain and recent PCI of LAD 2/20. She needs to be restarted on her suboxone    Need to consider ECF for rehab and to guarantee that patient is taking meds. Consider palliative care consult. Per consult note    Discussed with patient who is agreeable with plan of care. Thank you for allowing me to participate in the care of your patient.     Rosales Rodriguez, CNS

## 2020-09-24 NOTE — CONSULTS
Cardiovascular Consultation     Attending Physician: Yecenia Ward MD    PATIENT: Olamide Boles  : 1965  MRN: [id number]    Reason for Consultation:   Chief Complaint   Patient presents with    Shortness of Breath     pt with c/o sob- began late last night- dx with COPD- wears 4L oxygen all the time. History of present illness:   Ms. Olamide Boles is a 60 y.o. female patient, well known to Dr. Shree Hicks and [address] [place] [address], was recently discharged from here on 20 following admission for ADHF and overlapping concern for pneumonia. Matt Allen received course of antibiotics and states she felt like she was [place] struggling with the pneumonia and not the heart\" at home. She returned 20 and was admitted for acute on chronic hypoxic respiratory failure again. She states historically home oxygen requirements at baseline are 2 L NC continuously and she was discharged on 4L and readmitted on 6L. Found to have CHF exacerbation again this admission. Asked by CHF team to see Matt Allen for her history of complex coronary stenting, with most recent PCI in 2020 to LAD stent thrombosis. Matt Allen states she has had two episodes of chest pain since February, both she describes to be isolated to when her chronic back pain is out of control. She is on suboxone. She states she has not had the \"kind of chest pain, nausea, or vomiting\" she had preceding previous AMI presentations.      Medical History:      Diagnosis Date    Arthritis     CHF (congestive heart failure) ([address]     Depression     Diabetes mellitus ([address]     Heart attack ([facility]     MI 2019, and 6 months ago    Hepatitis C     Dr Rudy Goodman follows; contracted from ex- (drug user)    Hypercholesteremia 3/11/2020    Hypertension     MRSA (methicillin resistant Staphylococcus aureus) infection in     was in urine and nares    Pneumonia     S/P colonoscopy with polypectomy     one polyp,  Not on file     Attends meetings of clubs or organizations: Not on file     Relationship status: Not on file    Intimate partner violence     Fear of current or ex partner: Not on file     Emotionally abused: Not on file     Physically abused: Not on file     Forced sexual activity: Not on file   Other Topics Concern    Not on file   Social History Narrative    Not on file        Family History:  No evidence for sudden cardiac death or premature CAD.       Problem Relation Age of Onset    Cancer Father     Seizures Brother    Glayinstdave Mower Cancer Sister         colon       Medications:  lidocaine 4 % external patch 1 patch, Daily  methocarbamol (ROBAXIN) tablet 1,000 mg, 4x Daily PRN  buprenorphine-naloxone (SUBOXONE) 8-2 MG SL film 1 Film, BID  [START ON 9/25/2020] furosemide (LASIX) injection 40 mg, Daily  aspirin EC tablet 81 mg, Daily  atorvastatin (LIPITOR) tablet 40 mg, Nightly  carvedilol (COREG) tablet 3.125 mg, BID WC  glycopyrrolate-formoterol (BEVESPI) 9-4.8 MCG/ACT inhaler 2 puff, BID  hydrOXYzine (ATARAX) tablet 25 mg, BID  insulin glargine (LANTUS;BASAGLAR) injection pen 32 Units, BID  insulin lispro (1 Unit Dial) 10 Units, TID WC  mirtazapine (REMERON) tablet 7.5 mg, Nightly  pregabalin (LYRICA) capsule 100 mg, BID  sertraline (ZOLOFT) tablet 50 mg, Daily  ticagrelor (BRILINTA) tablet 90 mg, BID  sodium chloride flush 0.9 % injection 10 mL, 2 times per day  sodium chloride flush 0.9 % injection 10 mL, PRN  acetaminophen (TYLENOL) tablet 650 mg, Q6H PRN    Or  acetaminophen (TYLENOL) suppository 650 mg, Q6H PRN  polyethylene glycol (GLYCOLAX) packet 17 g, Daily PRN  promethazine (PHENERGAN) tablet 12.5 mg, Q6H PRN    Or  ondansetron (ZOFRAN) injection 4 mg, Q6H PRN  potassium chloride (KLOR-CON M) extended release tablet 40 mEq, PRN    Or  potassium bicarb-citric acid (EFFER-K) effervescent tablet 40 mEq, PRN    Or  potassium chloride 10 mEq/100 mL IVPB (Peripheral Line), PRN  magnesium sulfate 2 g in 50 mL IVPB premix, PRN  enoxaparin (LOVENOX) injection 40 mg, Daily  glucose (GLUTOSE) 40 % oral gel 15 g, PRN  dextrose 50 % IV solution, PRN  glucagon (rDNA) injection 1 mg, PRN  dextrose 5 % solution, PRN  cefTRIAXone (ROCEPHIN) 1 g in sterile water 10 mL IV syringe, Q24H  insulin lispro (1 Unit Dial) 0-6 Units, TID WC  insulin lispro (1 Unit Dial) 0-3 Units, Nightly  ipratropium-albuterol (DUONEB) nebulizer solution 1 ampule, Q4H PRN  lisinopril (PRINIVIL;ZESTRIL) tablet 1.25 mg, Daily        Allergies:  Darvocet [propoxyphene n-acetaminophen];  Naprosyn [naproxen]; and Ultram [tramadol hcl]     Review of Systems:   [x]Full ROS obtained and negative except as mentioned in HPI    Physical Examination:    BP 99/68   Pulse 79   Temp 98.1 °F (36.7 °C) (Oral)   Resp 14   Ht 5' 2\" (1.575 m)   Wt 119 lb 6.4 oz (54.2 kg)   LMP 11/15/2017   SpO2 97%   BMI 21.84 kg/m²   Wt Readings from Last 3 Encounters:   09/24/20 119 lb 6.4 oz (54.2 kg)   08/29/20 122 lb 4.8 oz (55.5 kg)   08/11/20 145 lb (65.8 kg)       GENERAL: Well developed, well nourished, no acute distress  NEUROLOGICAL: Alert and oriented x3  PSYCH: Normal mood and affect   SKIN: Warm and dry, without lesions  HEENT: Normocephalic, atraumatic, Sclera non-icteric, mucous membranes moist  NECK: supple, JVP normal, thyroid not enlarged   CAROTID: Normal upstroke, no bruits  CARDIAC: Normal PMI, regular rate and rhythm, normal S1S2, no murmur, rub  RESPIRATORY: Normal respiratory effort, coarse to auscultation bilaterally  EXTREMITIES: No cyanosis, clubbing or edema, right BKA   MUSCULOSKELETAL: No joint swelling or tenderness, no chest wall tenderness  GASTROINTESTINAL:  soft, non-tender, no bruit    Labs:  Lab Review   Lab Results   Component Value Date     09/24/2020    K 3.8 09/24/2020    K 5.0 08/29/2020     09/24/2020    CO2 31 09/24/2020    BUN 16 09/24/2020    CREATININE <0.5 09/24/2020    GLUCOSE 166 09/24/2020    GLUCOSE 329 05/20/2011 CALCIUM 8.4 2020     Lab Results   Component Value Date    CKTOTAL 25 2020    TROPONINI <0.01 2020     Lab Results   Component Value Date    WBC 7.4 2020    HGB 11.0 2020    HCT 33.7 2020    MCV 83.2 2020     2020     Lab Results   Component Value Date    CHOL 121 2020    TRIG 117 2020    HDL 34 2020       Imaging:  I have reviewed the below testing personally:    EK/22/20 & 20  Normal sinus rhythm  Left axis deviation  Anterolateral infarct , age undetermined    Echo:  20:  Left ventricular size is mildly increased.   Left ventricular systolic function is mildly to moderately depressed with   ejection fraction estimated at 40%.   Anteroapical,anteroseptal, inferoapical hypokinesis.   Grade II diastolic dysfunction with elevated LV filling pressures.   Mild mitral regurgitation.   Inadequate tricuspid regurgitation to estimate systolic pulmonary artery   pressure. 2019 Viability  Conclusions          Summary     -There is a moderate-large defect that involves the anterior wall and apex.     -There is slight improvement in the mid anterior wall at 24 hours suggesting     some viability.     -The remainder of the anterior wall and apex appears to be scar.             CATH:   2020  Procedure:          LHC, PCI of LAD/Diag, IVUS LAD  Indication:          Unstable angina  Consent:              Verbal and/or written consent obtained prior to procedure. Sedation:             Minimal conscious sedation utilized for comfort. Complic:              None  EBL:                     <10cc  Specimens:          None  Fluoro:                 18.2 min  Contrast:             128 cc  Access:                 RRA  Ultrasound:         Ultrasound guidance used to determine aforementioned artery patency, size (>2mm), anatomic variations and ideal puncture location.   Real-time ultrasound utilized concurrent with vascular needle entry into the artery. Image(s) permanently recorded and reported in the patient chart.       Findings:               LM         Normal     LAD       100% mid instent, 99% D1 with WILMA 1 flow                CX         normal      RCA       20% mid                                LVG       40%, anteroapical hypokinesis           LVEDP  10mmHg     Intervention:         PCI of LAD with 2.5X38 Xience ISIDRO overlapping prior stent  Kissing balloon angioplasty LAD/Diag with 3.0 and 2.0 balloons  IVUS of LAD performed for instent thrombosis revealing 2.25stent in 4.0 vessel. IVUS reveals vessel 4.0 just proximal to stent and tapering to 2.25 just distal to stent     Post Cath Dx:       Severe LAD/D disease      ProBNP 5755 --> 1799  Troponin <0.01 x 3     Impression/Recommendations    Ms. Carin Edouard is a 60 y.o. female patient of Dr. Lorie Koenig:     Acute on chronic hypoxic respiratory failure  Acute on chronic combined systolic and diastolic CHF  COPD  CAD: ISIDRO-LAD 2/2020  Hypertension  Hyperlipidemia  Diabetes mellitus   PVD, Hx. BKA   Hx. Narcotic dependence   Hx. Polysubstance abuse       Marie Hollis is seven months out from last drug eluting stent, placed for in-stent thrombosis. She has remained on Aspirin (81 mg) and Brilinta (90mg bid) uninterrupted. We discussed importance of this and likelihood for long term DAPT recommendation. She shows good understanding on these specific medications. She has had two CHF admissions in two months, each without suggestion of ischemic culprit. She offers no complaints of angina, describing previous coronary presentations as distinct. She is without ECG change or positive troponins over two days. No further testing or change from an Interventional perspective. Remainder of cardiac regimen is managed by CHF team. Please call if can be of assistance. Thank you for allowing me to participate in the care of your patient. Please do not hesitate to call.      Lucio Jenkins DO,

## 2020-09-24 NOTE — PROGRESS NOTES
Safety; Functional Mobility Training;Equipment;Transfer Training  Patient Education: Pt verbalized undestanding of PT education  Barriers to Learning: Vision  REQUIRES PT FOLLOW UP: Yes  Activity Tolerance  Activity Tolerance: Patient limited by fatigue;Patient limited by endurance  Activity Tolerance: Pt reported fatigue after bed to chair transfer, SPO2 dropped to 91 with bed to chair and stand pivot transfer, returned to baseline ~30 seconds       Patient Diagnosis(es): The primary encounter diagnosis was Acute on chronic respiratory failure with hypoxia ([facility] Diagnoses of Pneumonia due to organism and Congestive heart failure, unspecified HF chronicity, unspecified heart failure type [facility]) were also pertinent to this visit. has a past medical history of Arthritis, CHF (congestive heart failure) ([facility] Depression, Diabetes mellitus ([facility] Heart attack ([facility] Hepatitis C, Hypercholesteremia, Hypertension, MRSA (methicillin resistant Staphylococcus aureus) infection, Pneumonia, and S/P colonoscopy with polypectomy. has a past surgical history that includes Knee arthroscopy; Revision total knee arthroplasty (2/9/11); joint replacement (2006, 2010); amputation; above knee amputation; Upper gastrointestinal endoscopy (11/13/2018); Upper gastrointestinal endoscopy (N/A, 11/13/2018); pr lap,cholecystectomy (N/A, 11/14/2018); liver biopsy (N/A, 11/14/2018); and Coronary angioplasty with stent (07/2019). Restrictions  Restrictions/Precautions  Restrictions/Precautions: Fall Risk, General Precautions(high fall risk; R AKA)  Required Braces or Orthoses?: No  Position Activity Restriction  Other position/activity restrictions: Pt has no prosthetic leg, states was denied by insurance.   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, awaiting cataract surgery)  Hearing: Within functional limits     Subjective  General  Chart Reviewed: Yes  Patient assessed for rehabilitation services?: Yes  Additional Pertinent Hx: Respiratory Failure,DM,HTN, PAD, Angioplasty with stent, Heart Failure  Family / Caregiver Present: No  Diagnosis: 75-year-old lady with history of peripheral vascular disease status post right AKA, history of heroin abuse and IVDU, hypertension, hyperlipidemia, type 2 diabetes, CAD status post ISIDRO in February 2020, depression, chronic systolic heart failure we have been in and out of the hospital over the last couple of months. She was recently discharged from [facility], and prior to that from [facility]. At [facility] she was intubated for systolic heart failure and possible aspiration. She presented with a couple day history of worsening shortness of breath with dyspnea on exertion, cough with greenish sputum, no fevers or chills. She denies any orthopnea or PND, no ankle swelling. Admission chest x-ray was noted for pulmonary vascular congestion concerning for fluid overload. She had complained of dysuria  Subjective  Subjective: Pt supine in bed upon arrival, agreeable to therapy  Pain Screening  Patient Currently in Pain: Denies(at rest)  Vital Signs  Pulse: 79  BP: 99/68  BP Location: Right upper arm  BP Upper/Lower: Upper  MAP (mmHg): 92  Oxygen Therapy  SpO2: 97 %       Orientation  Orientation  Overall Orientation Status: Within Normal Limits     Social/Functional History  Social/Functional History  Lives With: Family(brother & sister)  Type of Home: Apartment  Home Layout: One level  Home Access: Level entry  Bathroom Shower/Tub: Tub/Shower unit  Bathroom Toilet: Standard  Bathroom Equipment: Grab bars in shower  Bathroom Accessibility: Wheelchair accessible  Home Equipment: Wheelchair-manual, Oxygen(4 L)  Receives Help From: Family  ADL Assistance: Independent(Normally, per pt report.  Per chart, pt needing more assistance lately from sister.)  Homemaking Responsibilities: No(Sister does all)  Ambulation Assistance: Needs assistance(does not ambulate; uses w/c for mobility)  Transfer Assistance: Independent(slides in and out of bathub for tub baths. Reports her sister is available to help if needed. Pt sometimes slides onto surface, sometime stands.)  Leisure & Hobbies: movies, reading  IADL Comments: Pt mostly independent with IADL, daughter can provide assistance as needed  Additional Comments: Pt states daughter is present for assistance in home  Cognition        Objective     Observation/Palpation  Posture: Fair(Rounded Shoulders, Forward Head Posture)    AROM RLE (degrees)  RLE General AROM: AKA  AROM LLE (degrees)  LLE AROM : WFL  Strength RLE  Comment: AKA  Strength LLE  Strength LLE: WFL        Bed mobility  Rolling to Left: Independent  Supine to Sit: Contact guard assistance   Sit to Supine: Independent  Transfers  Sit to Stand: Contact guard assistance  Stand to sit: Contact guard assistance  Bed to Chair: Minimal assistance  Stand Pivot Transfers: Minimal Assistance  Comment: Pt demonstrated contact guard assistance with sit to stand and min assist with stand pivot transfer EOB<>recliner. SpO2 dropped to 92 upon sit to stand transfer and returned to baseline ~30 seconds.  Pt stated fatigue was felt after stand pivot transfer  Ambulation  Ambulation?: No  Stairs/Curb  Stairs?: No     Balance  Sitting - Static: Good  Sitting - Dynamic: Fair(Pt demonstrated minor corrections in dynamic balance with UE use)        Plan   Plan  Times per week: 3-5  Times per day: Daily  Current Treatment Recommendations: Strengthening, ADL/Self-care Training, Gait Training, Patient/Caregiver Education & Training, ROM, IADL Training, Stair training, Equipment Evaluation, Education, & procurement, Neuromuscular Re-education, Cognitive/Perceptual Training, Balance Training, Modalities, Home Exercise Program, Endurance Training, Functional Mobility Training, Transfer Training, Wheelchair Mobility Training, Safety Education & Training  Plan Comment: Pt recommended home health therapy upon discharge, agreeable to home PT  Safety Devices  Type of devices: All fall risk precautions in place, Nurse notified, Bed alarm in place, Call light within reach, Left in bed, Patient at risk for falls  Restraints  Initially in place: No      AM-PAC Score  AM-PAC Inpatient Mobility Raw Score : 14 (09/24/20 1507)  AM-PAC Inpatient T-Scale Score : 38.1 (09/24/20 1507)  Mobility Inpatient CMS 0-100% Score: 61.29 (09/24/20 1507)  Mobility Inpatient CMS G-Code Modifier : CL (09/24/20 1507)          Goals  Short term goals  Time Frame for Short term goals: Discharge  Short term goal 1: Pt will demonstrate stand pivot transfer independently  Short term goal 2: Pt will perform bed to chair transfer CGA   Short term goal 3: Pt will perform bed mobility independently       Therapy Time   Individual Concurrent Group Co-treatment   Time In 1337         Time Out 1407         Minutes 30         Timed Code Treatment Minutes: 7900 Fm 1826, SPT    PT providing direct supervision during session and assisting in making skilled judgements throughout session.   [id number] Anabel Elliott PT, DPT [id number]    [id number] Anabel Elliott, PT

## 2020-09-25 VITALS
BODY MASS INDEX: 21.92 KG/M2 | WEIGHT: 119.1 LBS | OXYGEN SATURATION: 100 % | HEIGHT: 62 IN | TEMPERATURE: 98.6 F | SYSTOLIC BLOOD PRESSURE: 92 MMHG | RESPIRATION RATE: 16 BRPM | DIASTOLIC BLOOD PRESSURE: 66 MMHG | HEART RATE: 87 BPM

## 2020-09-25 LAB
ANION GAP SERPL CALCULATED.3IONS-SCNC: 9 MMOL/L (ref 3–16)
BASOPHILS ABSOLUTE: 0.1 K/UL (ref 0–0.2)
BASOPHILS RELATIVE PERCENT: 0.7 %
BUN BLDV-MCNC: 18 MG/DL (ref 7–20)
CALCIUM SERPL-MCNC: 8.9 MG/DL (ref 8.3–10.6)
CHLORIDE BLD-SCNC: 99 MMOL/L (ref 99–110)
CO2: 30 MMOL/L (ref 21–32)
CREAT SERPL-MCNC: <0.5 MG/DL (ref 0.6–1.1)
EOSINOPHILS ABSOLUTE: 0.3 K/UL (ref 0–0.6)
EOSINOPHILS RELATIVE PERCENT: 4.4 %
GFR AFRICAN AMERICAN: >60
GFR NON-AFRICAN AMERICAN: >60
GLUCOSE BLD-MCNC: 134 MG/DL (ref 70–99)
GLUCOSE BLD-MCNC: 147 MG/DL (ref 70–99)
GLUCOSE BLD-MCNC: 198 MG/DL (ref 70–99)
GLUCOSE BLD-MCNC: 272 MG/DL (ref 70–99)
GLUCOSE BLD-MCNC: 294 MG/DL (ref 70–99)
GLUCOSE BLD-MCNC: 37 MG/DL (ref 70–99)
HCT VFR BLD CALC: 34.3 % (ref 36–48)
HEMOGLOBIN: 11.1 G/DL (ref 12–16)
LYMPHOCYTES ABSOLUTE: 1.7 K/UL (ref 1–5.1)
LYMPHOCYTES RELATIVE PERCENT: 23.8 %
MAGNESIUM: 1.9 MG/DL (ref 1.8–2.4)
MCH RBC QN AUTO: 26.8 PG (ref 26–34)
MCHC RBC AUTO-ENTMCNC: 32.2 G/DL (ref 31–36)
MCV RBC AUTO: 83 FL (ref 80–100)
MONOCYTES ABSOLUTE: 0.4 K/UL (ref 0–1.3)
MONOCYTES RELATIVE PERCENT: 5.8 %
NEUTROPHILS ABSOLUTE: 4.6 K/UL (ref 1.7–7.7)
NEUTROPHILS RELATIVE PERCENT: 65.3 %
PDW BLD-RTO: 15.5 % (ref 12.4–15.4)
PERFORMED ON: ABNORMAL
PHOSPHORUS: 3.2 MG/DL (ref 2.5–4.9)
PLATELET # BLD: 175 K/UL (ref 135–450)
PMV BLD AUTO: 9.7 FL (ref 5–10.5)
POTASSIUM SERPL-SCNC: 3.8 MMOL/L (ref 3.5–5.1)
RBC # BLD: 4.14 M/UL (ref 4–5.2)
SODIUM BLD-SCNC: 138 MMOL/L (ref 136–145)
WBC # BLD: 7.1 K/UL (ref 4–11)

## 2020-09-25 PROCEDURE — 84100 ASSAY OF PHOSPHORUS: CPT

## 2020-09-25 PROCEDURE — 80048 BASIC METABOLIC PNL TOTAL CA: CPT

## 2020-09-25 PROCEDURE — 2580000003 HC RX 258: Performed by: INTERNAL MEDICINE

## 2020-09-25 PROCEDURE — 85025 COMPLETE CBC W/AUTO DIFF WBC: CPT

## 2020-09-25 PROCEDURE — 6360000002 HC RX W HCPCS: Performed by: INTERNAL MEDICINE

## 2020-09-25 PROCEDURE — 36415 COLL VENOUS BLD VENIPUNCTURE: CPT

## 2020-09-25 PROCEDURE — 6370000000 HC RX 637 (ALT 250 FOR IP): Performed by: INTERNAL MEDICINE

## 2020-09-25 PROCEDURE — 83735 ASSAY OF MAGNESIUM: CPT

## 2020-09-25 PROCEDURE — 99232 SBSQ HOSP IP/OBS MODERATE 35: CPT | Performed by: CLINICAL NURSE SPECIALIST

## 2020-09-25 PROCEDURE — 2700000000 HC OXYGEN THERAPY PER DAY

## 2020-09-25 PROCEDURE — 94761 N-INVAS EAR/PLS OXIMETRY MLT: CPT

## 2020-09-25 PROCEDURE — 6360000002 HC RX W HCPCS: Performed by: CLINICAL NURSE SPECIALIST

## 2020-09-25 PROCEDURE — G0378 HOSPITAL OBSERVATION PER HR: HCPCS

## 2020-09-25 PROCEDURE — 96372 THER/PROPH/DIAG INJ SC/IM: CPT

## 2020-09-25 PROCEDURE — 96376 TX/PRO/DX INJ SAME DRUG ADON: CPT

## 2020-09-25 PROCEDURE — 94640 AIRWAY INHALATION TREATMENT: CPT

## 2020-09-25 RX ORDER — FUROSEMIDE 40 MG/1
40 TABLET ORAL DAILY
Qty: 30 TABLET | Refills: 0 | Status: ON HOLD | OUTPATIENT
Start: 2020-09-25 | End: 2021-04-30 | Stop reason: SDUPTHER

## 2020-09-25 RX ORDER — FUROSEMIDE 40 MG/1
40 TABLET ORAL DAILY
Status: DISCONTINUED | OUTPATIENT
Start: 2020-09-26 | End: 2020-09-25 | Stop reason: HOSPADM

## 2020-09-25 RX ORDER — CARVEDILOL 3.12 MG/1
3.12 TABLET ORAL 2 TIMES DAILY WITH MEALS
Qty: 60 TABLET | Refills: 0 | Status: SHIPPED | OUTPATIENT
Start: 2020-09-25

## 2020-09-25 RX ORDER — LISINOPRIL 2.5 MG/1
1.25 TABLET ORAL DAILY
Qty: 30 TABLET | Refills: 0 | Status: ON HOLD
Start: 2020-09-26 | End: 2021-04-30 | Stop reason: HOSPADM

## 2020-09-25 RX ADMIN — PREGABALIN 100 MG: 100 CAPSULE ORAL at 08:36

## 2020-09-25 RX ADMIN — INSULIN LISPRO 10 UNITS: 100 INJECTION, SOLUTION INTRAVENOUS; SUBCUTANEOUS at 12:56

## 2020-09-25 RX ADMIN — FUROSEMIDE 40 MG: 10 INJECTION, SOLUTION INTRAMUSCULAR; INTRAVENOUS at 08:36

## 2020-09-25 RX ADMIN — TICAGRELOR 90 MG: 90 TABLET ORAL at 08:36

## 2020-09-25 RX ADMIN — HYDROXYZINE HYDROCHLORIDE 25 MG: 25 TABLET, FILM COATED ORAL at 08:36

## 2020-09-25 RX ADMIN — BUPRENORPHINE AND NALOXONE 1 FILM: 8; 2 FILM BUCCAL; SUBLINGUAL at 08:36

## 2020-09-25 RX ADMIN — Medication 10 ML: at 08:38

## 2020-09-25 RX ADMIN — LISINOPRIL 1.25 MG: 5 TABLET ORAL at 08:36

## 2020-09-25 RX ADMIN — ENOXAPARIN SODIUM 40 MG: 40 INJECTION SUBCUTANEOUS at 08:35

## 2020-09-25 RX ADMIN — CARVEDILOL 3.12 MG: 3.12 TABLET, FILM COATED ORAL at 08:36

## 2020-09-25 RX ADMIN — INSULIN LISPRO 1 UNITS: 100 INJECTION, SOLUTION INTRAVENOUS; SUBCUTANEOUS at 08:44

## 2020-09-25 RX ADMIN — INSULIN LISPRO 10 UNITS: 100 INJECTION, SOLUTION INTRAVENOUS; SUBCUTANEOUS at 08:44

## 2020-09-25 RX ADMIN — ASPIRIN 81 MG: 81 TABLET, COATED ORAL at 08:36

## 2020-09-25 RX ADMIN — SERTRALINE HYDROCHLORIDE 50 MG: 50 TABLET ORAL at 08:36

## 2020-09-25 RX ADMIN — GLYCOPYRROLATE AND FORMOTEROL FUMARATE 2 PUFF: 9; 4.8 AEROSOL, METERED RESPIRATORY (INHALATION) at 08:55

## 2020-09-25 RX ADMIN — INSULIN GLARGINE 32 UNITS: 100 INJECTION, SOLUTION SUBCUTANEOUS at 08:43

## 2020-09-25 ASSESSMENT — PAIN SCALES - GENERAL
PAINLEVEL_OUTOF10: 0

## 2020-09-25 NOTE — PROGRESS NOTES
CLINICAL PHARMACY NOTE: MEDS TO [address] Select Patient?: No  Total # of Prescriptions Filled: 3   The following medications were delivered to the patient:  · Furosemide  · Lisinopril  · Carvedilol  Total # of Interventions Completed: 0  Time Spent (min): 60    Additional Documentation:  Delivered to [id number] [facility]

## 2020-09-25 NOTE — PROGRESS NOTES
Pt denies any pain o2 sat 97 at baseline 4L nc pt up to chair urine yellow clear per luke pt pleasant affect call light in reach.  Pt lives with siblings sister is caregiver

## 2020-09-25 NOTE — PROGRESS NOTES
[address] 1965    History:  Past Medical History:   Diagnosis Date    Arthritis     [facility]) [facility]     Depression     Diabetes mellitus ([facility]     Heart attack ([facility]     MI 7/2019, and 6 months ago    Hepatitis C     Dr Zaid Charlton follows; contracted from ex- (drug user)    Hypercholesteremia 3/11/2020    Hypertension     MRSA (methicillin resistant Staphylococcus aureus) infection in 2000    was in urine and nares    Pneumonia     S/P colonoscopy with polypectomy 2/11    one polyp, Dr Zaid Charlton. Recall 3 years. ECHO:     Conclusions      Summary   Left ventricular size is mildly increased. Left ventricular systolic function is mildly to moderately depressed with   ejection fraction estimated at 40%. Anteroapical,anteroseptal, inferoapical hypokinesis. Grade II diastolic dysfunction with elevated LV filling pressures. Mild mitral regurgitation. Inadequate tricuspid regurgitation to estimate systolic pulmonary artery   pressure. Signature      ------------------------------------------------------------------   Electronically signed by Klever Pitts [facility]   (Interpreting physician) on 08/24/2020 at 12:05 PM   ------------------------------------------------------------------        ACE/ARB:lisinopril 1.25 mg daily  BB: Carvedilol 3.125 mg bid  Aldosterone Antagonist: No aldosterone antagonist due to noncompliance    History of sleep apnea:No  [place] Screen ordered: Yes    Last Hospital Admission: 9/5/20 for respiratory failure  Code Status: full   Discharge plans: Patient to return home. Lives with sister and son. Family Present: No    Patient seen for heart failure which is not new for her. She is unable to weigh due to her amputation. Stressed to monitor symptoms of heart failure closely. She states she monitors her sodium and but was unaware to follow a fluid restriction.  . Is compliant with medications and follow up. Patient provided with both written and verbal education on CHF signs/ symptoms, causes, discharge medications, smoking cessation, daily weights, low sodium diet, activity, and follow-up. Pt to call if gains 3 pounds in one day or 5 pounds in one week. Mutually agreed upon goals were discussed such as calling the MD as soon as they recognize symptoms and weight gain, maintaining his proper diet, taking medications as prescribed, joining rehab when able. Patient provided with CHF Zone Management tool and CHF symptoms magnet. Discussed importance of lifestyle changes: fluid restriction     PATIENT/CAREGIVER TEACHING:    Level of patient/caregiver understanding able to:   [x ] Verbalize understanding [ ] Demonstrate understanding [ ] Teach back   [ ] Needs reinforcement [ ] Other:       Time spent teachin minutes    1. WEIGHT: Admit Weight: 122 lb (55.3 kg)      Today  Weight: 119 lb 1.6 oz (54 kg)   2. I/O     Intake/Output Summary (Last 24 hours) at 2020 1242  Last data filed at 2020 1011  Gross per 24 hour   Intake 240 ml   Output 2150 ml   Net -1910 ml       Recommendations:   1. Patient has a follow appointment  2. Educate further on fluid restriction 48 oz- 64 oz during inpatient stay so he can understand how to measure intake at home. 3. Continue to educate on S/S.   4. Emphasize daily weights, diet, and knowing when and who to call  5. Provided patient with CHF Resource Line for questions and concerns.        Natalya Leonard 2020 12:42 PM

## 2020-09-25 NOTE — PROGRESS NOTES
[address]   Daily Progress Note      Admit Date:  9/22/2020    HPI:    Ms. Roddy Kelley  Is a 60year old female with history of chronic combined s/dHF with LVEF of 40%, COPD on 4 L of oxygen at home, DM, right sided BKA, HTN, HLD, history of narcotic abuse on suboxone  She lives with family members    She was admitted here last month and then at [facility], intubated secondary to COPD exacerbation. She is admitted with cough, covid neg. She was given IV lasix and feels better. bnp 5700  Which is improved from 6099     Subjective:  Patient is being seen for acute on chronic combined s/dHF. There were no acute overnight cardiac events. She is sitting up in the chair this morning back on 4 L of oxygen. Seen by interventional cardiology and no further workup. She feels much better after resuming her suboxone. Weight and labs are stable    Objective:   BP (!) 143/95   Pulse 94   Temp 97.6 °F (36.4 °C) (Oral)   Resp 18   Ht 5' 2\" (1.575 m)   Wt 119 lb 1.6 oz (54 kg)   LMP 11/15/2017   SpO2 97%   BMI 21.78 kg/m²       Intake/Output Summary (Last 24 hours) at 9/25/2020 1007  Last data filed at 9/25/2020 0834  Gross per 24 hour   Intake 240 ml   Output 1500 ml   Net -1260 ml          Physical Exam:  General:  Awake, alert, oriented in NAD  Skin:  Warm and dry. No unusual bruising or rash  Neck:  Supple. No JVD or carotid bruit appreciated  Chest:  Normal effort.   Clear to auscultation, decreased in nel bases  Cardiovascular:  RRR, S1/S2, no murmur/gallop/rub  Abdomen:  Soft, nontender, +bowel sounds  Extremities:  No edema left, AKA on the right  Neurological: No focal deficits  Psychological: Normal mood and affect      Medications:    lidocaine  1 patch Transdermal Daily    buprenorphine-naloxone  1 Film Sublingual BID    furosemide  40 mg Intravenous Daily    aspirin  81 mg Oral Daily    atorvastatin  40 mg Oral Nightly    carvedilol  3.125 mg Oral BID     glycopyrrolate-formoterol  2 puff Inhalation BID    hydrOXYzine  25 mg Oral BID    insulin glargine  32 Units Subcutaneous BID    insulin lispro (1 Unit Dial)  10 Units Subcutaneous TID WC    mirtazapine  7.5 mg Oral Nightly    pregabalin  100 mg Oral BID    sertraline  50 mg Oral Daily    ticagrelor  90 mg Oral BID    sodium chloride flush  10 mL Intravenous 2 times per day    enoxaparin  40 mg Subcutaneous Daily    cefTRIAXone (ROCEPHIN) IV  1 g Intravenous Q24H    insulin lispro  0-6 Units Subcutaneous TID WC    insulin lispro  0-3 Units Subcutaneous Nightly    lisinopril  1.25 mg Oral Daily      dextrose         Lab Data:  CBC:   Recent Labs     09/23/20  0547 09/24/20  0516 09/25/20  0541   WBC 8.5 7.4 7.1   HGB 9.6* 11.0* 11.1*    174 175     BMP:    Recent Labs     09/23/20  0547 09/24/20  0516 09/25/20  0541    141 138   K 3.4* 3.8 3.8   CO2 31 31 30   BUN 22* 16 18   CREATININE <0.5* <0.5* <0.5*     INR:  No results for input(s): INR in the last 72 hours. BNP:    Recent Labs     09/22/20  1600 09/24/20  0516   PROBNP 5,755* 1,799*         Diagnostics:  Echo:  8/24/20:  Left ventricular size is mildly increased.   Left ventricular systolic function is mildly to moderately depressed with   ejection fraction estimated at 40%.   Anteroapical,anteroseptal, inferoapical hypokinesis.   Grade II diastolic dysfunction with elevated LV filling pressures.   Mild mitral regurgitation.   Inadequate tricuspid regurgitation to estimate systolic pulmonary artery   pressure    [facility]  6/18 PCI of LAD for STEMI ([facility])  7/19 100% instent midLAD Malcolm Carrillo  2/20 PCI of LAD, kissing balloon angioplasty LAD/Diag. IVUS of LAD for instent thrombosis Malcolm Carrillo)    Assessment:    1. Acute on chronic combined systolic and diastolic heart failure, on ace and bb, no aldactone antagonist due to compliance  2. PAD s/p right BKA  3. Diabetes  4. Narcotic abuse on suboxone  5. ?noncompliance  6. CAD s/p PCI 2/20    Plan:    1.   Change lasix to 40 mg po daily  2. Continue coreg 3.125 mg bid and lisinopril 1.25 mg daily  3. CHF nurse following for diet education, fluid restriction and daily weights    She will need to get to the suboxone clinic today in order to have this medication over the weekend. Discussed with hospitalist    I have sent lisinopril, lasix and coreg to pharmacy for meds to be    [facility] from cardiology standpoint for discharge     Discussed with patient who is agreeable with plan of care. Thank you for allowing me to participate in the care of your patient.     Bren Raymond, CNS

## 2020-09-25 NOTE — PROGRESS NOTES
Pt stated she felt like her sugar was dropping. Pt glucose 37 alert orientated. Given oj and peanu tbutter and crackers. Will continue to monitor. Pt was given insulin for lunch coverage. Pt had eaten over 50%.  Will continue to monitor

## 2020-09-26 ENCOUNTER — CARE COORDINATION (OUTPATIENT)
Dept: CASE MANAGEMENT | Age: 55
End: 2020-09-26

## 2020-09-26 NOTE — DISCHARGE SUMMARY
Hospital Medicine Discharge Summary    Patient ID: Paulino Rosenberg      Patient's PCP: No primary care provider on file. Admit Date: 9/22/2020     Discharge Date: 9/25/2020      Admitting Physician: Nyasia Del Cid MD     Discharge Physician: Carlos Vasquez MD     Discharge Diagnoses: Active Hospital Problems    Diagnosis    PAD (peripheral artery disease) ([facility] [address] [I73.9]    Narcotic abuse ([facility] [address] [F11.10]    Non-compliance [Z91.19]    S/P angioplasty with stent [Z95.820]    Hypomagnesemia [E83.42]    Acute on chronic combined systolic and diastolic heart failure (HCC) [I50.43]    UTI (urinary tract infection) [N39.0]    Essential hypertension [I10]    Acute on chronic respiratory failure with hypoxia (HCC) [J96.21]    Uncontrolled type 2 diabetes mellitus with hyperglycemia, with long-term current use of insulin (HCC) [E11.65, Z79.4]    Coronary artery disease involving native coronary artery of native heart without angina pectoris [I25.10]    Diabetes mellitus ([facility] [address] [E11.9]       The patient was seen and examined on day of discharge and this discharge summary is in conjunction with any daily progress note from day of discharge. Hospital Course:   64-year-old lady with history of peripheral vascular disease status post right AKA, history of heroin abuse and IVDU, hypertension, hyperlipidemia, type 2 diabetes, CAD status post ISIDRO in February 2020, depression, chronic systolic heart failure we have been in and out of the hospital over the last couple of months. She was recently discharged from [facility], and prior to that from [facility]. [place] [facility] she was intubated for systolic heart failure and possible aspiration. She presented with a couple day history of worsening shortness of breath with dyspnea on exertion, cough with greenish sputum, no fevers or chills. She denies any orthopnea or PND, no ankle swelling.   Admission chest x-ray was noted for pulmonary vascular congestion concerning for fluid overload. She had complained of dysuria  She had acute on chronic respiratory failure with hypoxia due to acute on chronic systolic heart failure. Respiratory status improved with diuretics which she will continue at discharge    Acute on chronic respiratory failure with hypoxia [facility]): Feels much better with shortness of breath compared to yesterday. Chest has cleared of crackles with diuresis. She will continue home O2 supplementation. Acute on chronic systolic heart failure ([facility] [address]): Crackles and shortness of breath resolved. She will continue home Lasix with advised on daily weight monitoring and any gain of more than 3 pounds to seek medical attention. She was advised on low-salt diet and fluid limitations. To follow-up with cardiology in 1 to 2 weeks       UTI: UA noted positive for WBC, bacteria and leukocyte esterase. She completed 3-day antibiotic course  Active Problems:    Coronary artery disease involving native coronary artery of native heart without angina pectoris: No chest pains or shortness of breath. Continue aspirin, Coreg, Brilinta    Uncontrolled type 2 diabetes mellitus with hyperglycemia, with long-term current use of insulin ([facility] [address]): Continue home glycemic control regimen    Essential hypertension: Stable on Coreg    Hypomagnesemia: We will replete      Physical Exam Performed:     BP 92/66   Pulse 87   Temp 98.6 °F (37 °C) (Oral)   Resp 16   Ht 5' 2\" (1.575 m)   Wt 119 lb 1.6 oz (54 kg)   LMP 11/15/2017   SpO2 100%   BMI 21.78 kg/m²       General appearance:  No apparent distress, appears stated age and cooperative. HEENT:  Normal cephalic, atraumatic without obvious deformity. Pupils equal, round, and reactive to light. Extra ocular muscles intact. Conjunctivae/corneas clear. Neck: Supple, with full range of motion. No jugular venous distention. Trachea midline. Respiratory:  Normal respiratory effort.  Clear to auscultation, bilaterally without Rales/Wheezes/Rhonchi. Cardiovascular:  Regular rate and rhythm with normal S1/S2 without murmurs, rubs or gallops. Abdomen: Soft, non-tender, non-distended with normal bowel sounds. Musculoskeletal:  No clubbing, cyanosis or edema bilaterally. Full range of motion without deformity. Skin: Skin color, texture, turgor normal.  No rashes or lesions. Neurologic:  Neurovascularly intact without any focal sensory/motor deficits. Cranial nerves: II-XII intact, grossly non-focal.  Psychiatric:  Alert and oriented, thought content appropriate, normal insight  Capillary Refill: Brisk,< 3 seconds   Peripheral Pulses: +2 palpable, equal bilaterally       Labs:  For convenience and continuity at follow-up the following most recent labs are provided:      CBC:    Lab Results   Component Value Date    WBC 7.1 09/25/2020    HGB 11.1 09/25/2020    HCT 34.3 09/25/2020     09/25/2020       Renal:    Lab Results   Component Value Date     09/25/2020    K 3.8 09/25/2020    K 5.0 08/29/2020    CL 99 09/25/2020    CO2 30 09/25/2020    BUN 18 09/25/2020    CREATININE <0.5 09/25/2020    CALCIUM 8.9 09/25/2020    PHOS 3.2 09/25/2020         Significant Diagnostic Studies    Radiology:   XR CHEST PORTABLE   Final Result   Course diffuse infiltrates noted bilaterally which have increased from the   prior study         XR CHEST (2 VW)   Final Result   Findings suggest congestive heart failure superimposed on chronic bilateral   pulmonary opacities                Consults:     IP CONSULT TO HOSPITALIST  IP CONSULT TO SOCIAL WORK  IP CONSULT TO CARDIOLOGY  PHARMACY TO DOSE VANCOMYCIN  IP CONSULT TO CARDIOLOGY    Disposition: Home    Condition at Discharge: Stable    Discharge Instructions/Follow-up:    PCP follow-up in 1 to 2 weeks  Cardiology clinic follow-up in 1 to 2 weeks    Code Status: Full code    Activity: activity as tolerated    Diet: cardiac diet      Discharge Medications:     Discharge Medication List as of 9/25/2020  3:46 PM           Details   lisinopril (PRINIVIL;ZESTRIL) 2.5 MG tablet Take 0.5 tablets by mouth daily, Disp-30 tablet,R-0Normal              Details   furosemide (LASIX) 40 MG tablet Take 1 tablet by mouth daily, Disp-30 tablet,R-0Normal      carvedilol (COREG) 3.125 MG tablet Take 1 tablet by mouth 2 times daily (with meals), Disp-60 tablet,R-0Normal              Details   hydrOXYzine (ATARAX) 25 MG tablet Take 1 tablet by mouth 2 times daily, Disp-30 tablet,R-0Normal      glycopyrrolate-formoterol (BEVESPI) 9-4.8 MCG/ACT AERO Inhale 2 puffs into the lungs 2 times daily, Disp-11 g,R-0Normal      nitroGLYCERIN (NITROSTAT) 0.4 MG SL tablet up to max of 3 total doses. If no relief after 1 dose, call 911., Disp-25 tablet,R-3Normal      sertraline (ZOLOFT) 50 MG tablet Take 50 mg by mouth daily Indications: DepressionHistorical Med      mirtazapine (REMERON) 15 MG tablet Take 7.5 mg by mouth nightlyHistorical Med      pregabalin (LYRICA) 100 MG capsule Take 100 mg by mouth 2 times daily. Historical Med      insulin glargine (LANTUS SOLOSTAR) 100 UNIT/ML injection pen Inject 15 Units into the skin 2 times daily Indications: Insulin Pump Historical Med      insulin lispro, 1 Unit Dial, (HUMALOG KWIKPEN) 100 UNIT/ML SOPN Inject 10 Units into the skin 3 times daily (before meals) Inject 10 units three times daily with each meal in addition to sliding scale. Historical Med      ticagrelor (BRILINTA) 90 MG TABS tablet Take 1 tablet by mouth 2 times daily, Disp-60 tablet, R-5Normal      aspirin 81 MG tablet Take 1 tablet by mouth daily, Disp-90 tablet, R-1Normal      atorvastatin (LIPITOR) 40 MG tablet Take 1 tablet by mouth daily, Disp-90 tablet, R-3Normal      buprenorphine-naloxone (SUBOXONE) 8-2 MG SUBL SL tablet Place 1 tablet under the tongue 2 times daily.  Historical Med      FREESTYLE LANCETS MISC 4 TIMES DAILY BEFORE MEALS & NIGHTLY Starting Wed 7/3/2019, Disp-100 each, R-5, PrintPatient to check blood sugar 4 times a day and PRN, he is treated with multiple daily injections of insulin that require correction dosing and has had hypoglycemia. A1C  14.5  ICD code- E11.65, Z79.4      blood glucose test strips (FREESTYLE LITE) strip 4 TIMES DAILY BEFORE MEALS & NIGHTLY Starting Wed 7/3/2019, Disp-100 each, R-5, PrintPatient to check blood sugar 4 times a day and PRN, he is treated with multiple daily injections of insulin that require correction dosing and has had hypoglycemia. A1C  14.5  ICD code- E11.65, Z79.4             Time Spent on discharge is more than 45 minutes in the examination, evaluation, counseling and review of medications and discharge plan. Signed: Nicole Kim MD   9/26/2020      Thank you No primary care provider on file. for the opportunity to be involved in this patient's care. If you have any questions or concerns please feel free to contact me at [phone].

## 2020-09-28 ENCOUNTER — CARE COORDINATION (OUTPATIENT)
Dept: CASE MANAGEMENT | Age: 55
End: 2020-09-28

## 2020-09-28 NOTE — CARE COORDINATION
Patient resolved from the Care Transitions episode on 9/28/20, left contact info on vm. No further outreach scheduled with this CTN. Episode of Care resolved.

## 2020-10-23 PROBLEM — N39.0 UTI (URINARY TRACT INFECTION): Status: RESOLVED | Noted: 2020-09-23 | Resolved: 2020-10-23

## 2020-11-16 ENCOUNTER — HOSPITAL ENCOUNTER (OUTPATIENT)
Age: 55
Discharge: HOME | End: 2020-11-16
Payer: COMMERCIAL

## 2020-11-16 LAB
ALBUMIN SERPL-MCNC: 3.7 G/DL (ref 3.4–5)
ALP BLD-CCNC: 123 U/L (ref 40–129)
ALT SERPL-CCNC: 16 U/L (ref 10–40)
AST SERPL-CCNC: 22 U/L (ref 15–37)
BASOPHILS ABSOLUTE: 0 K/UL (ref 0–0.2)
BASOPHILS RELATIVE PERCENT: 0.7 %
BILIRUB SERPL-MCNC: 0.4 MG/DL (ref 0–1)
BILIRUBIN DIRECT: <0.2 MG/DL (ref 0–0.3)
BILIRUBIN, INDIRECT: ABNORMAL MG/DL (ref 0–1)
EOSINOPHILS ABSOLUTE: 0.1 K/UL (ref 0–0.6)
EOSINOPHILS RELATIVE PERCENT: 1.1 %
HCT VFR BLD CALC: 40.6 % (ref 36–48)
HEMOGLOBIN: 12.8 G/DL (ref 12–16)
LYMPHOCYTES ABSOLUTE: 2.4 K/UL (ref 1–5.1)
LYMPHOCYTES RELATIVE PERCENT: 34 %
MCH RBC QN AUTO: 25.9 PG (ref 26–34)
MCHC RBC AUTO-ENTMCNC: 31.6 G/DL (ref 31–36)
MCV RBC AUTO: 82.1 FL (ref 80–100)
MONOCYTES ABSOLUTE: 0.3 K/UL (ref 0–1.3)
MONOCYTES RELATIVE PERCENT: 4.8 %
NEUTROPHILS ABSOLUTE: 4.2 K/UL (ref 1.7–7.7)
NEUTROPHILS RELATIVE PERCENT: 59.4 %
PDW BLD-RTO: 15.7 % (ref 12.4–15.4)
PLATELET # BLD: 206 K/UL (ref 135–450)
PMV BLD AUTO: 11.1 FL (ref 5–10.5)
RBC # BLD: 4.95 M/UL (ref 4–5.2)
TOTAL PROTEIN: 8.8 G/DL (ref 6.4–8.2)
WBC # BLD: 7.1 K/UL (ref 4–11)

## 2020-11-16 PROCEDURE — 86702 HIV-2 ANTIBODY: CPT

## 2020-11-16 PROCEDURE — 85025 COMPLETE CBC W/AUTO DIFF WBC: CPT

## 2020-11-16 PROCEDURE — 86701 HIV-1ANTIBODY: CPT

## 2020-11-16 PROCEDURE — 87390 HIV-1 AG IA: CPT

## 2020-11-16 PROCEDURE — 86780 TREPONEMA PALLIDUM: CPT

## 2020-11-16 PROCEDURE — 86803 HEPATITIS C AB TEST: CPT

## 2020-11-16 PROCEDURE — 36415 COLL VENOUS BLD VENIPUNCTURE: CPT

## 2020-11-16 PROCEDURE — 86706 HEP B SURFACE ANTIBODY: CPT

## 2020-11-16 PROCEDURE — 80076 HEPATIC FUNCTION PANEL: CPT

## 2020-11-16 PROCEDURE — 87340 HEPATITIS B SURFACE AG IA: CPT

## 2020-11-17 LAB
HBV SURFACE AB TITR SER: >1000 MIU/ML
HEPATITIS B SURFACE ANTIGEN INTERPRETATION: NORMAL
HEPATITIS C ANTIBODY INTERPRETATION: REACTIVE
HIV AG/AB: NORMAL
HIV ANTIGEN: NORMAL
HIV-1 ANTIBODY: NORMAL
HIV-2 AB: NORMAL
TOTAL SYPHILLIS IGG/IGM: NORMAL

## 2021-04-27 ENCOUNTER — APPOINTMENT (OUTPATIENT)
Dept: CT IMAGING | Age: 56
DRG: 194 | End: 2021-04-27
Payer: COMMERCIAL

## 2021-04-27 ENCOUNTER — HOSPITAL ENCOUNTER (INPATIENT)
Age: 56
LOS: 3 days | Discharge: HOME | DRG: 194 | End: 2021-04-30
Attending: INTERNAL MEDICINE | Admitting: INTERNAL MEDICINE
Payer: COMMERCIAL

## 2021-04-27 ENCOUNTER — APPOINTMENT (OUTPATIENT)
Dept: GENERAL RADIOLOGY | Age: 56
DRG: 194 | End: 2021-04-27
Payer: COMMERCIAL

## 2021-04-27 DIAGNOSIS — R06.89 DYSPNEA AND RESPIRATORY ABNORMALITIES: ICD-10-CM

## 2021-04-27 DIAGNOSIS — R60.1 ANASARCA: Primary | ICD-10-CM

## 2021-04-27 DIAGNOSIS — R06.00 DYSPNEA AND RESPIRATORY ABNORMALITIES: ICD-10-CM

## 2021-04-27 DIAGNOSIS — S81.802A WOUND OF LEFT LOWER EXTREMITY, INITIAL ENCOUNTER: ICD-10-CM

## 2021-04-27 DIAGNOSIS — I50.43 ACUTE ON CHRONIC COMBINED SYSTOLIC (CONGESTIVE) AND DIASTOLIC (CONGESTIVE) HEART FAILURE (HCC): ICD-10-CM

## 2021-04-27 LAB
A/G RATIO: 0.7 (ref 1.1–2.2)
ALBUMIN SERPL-MCNC: 3 G/DL (ref 3.4–5)
ALP BLD-CCNC: 113 U/L (ref 40–129)
ALT SERPL-CCNC: <5 U/L (ref 10–40)
ANION GAP SERPL CALCULATED.3IONS-SCNC: 7 MMOL/L (ref 3–16)
AST SERPL-CCNC: 19 U/L (ref 15–37)
BACTERIA: ABNORMAL /HPF
BASOPHILS ABSOLUTE: 0 K/UL (ref 0–0.2)
BASOPHILS RELATIVE PERCENT: 0.6 %
BILIRUB SERPL-MCNC: 0.6 MG/DL (ref 0–1)
BILIRUBIN URINE: ABNORMAL
BLOOD, URINE: ABNORMAL
BUN BLDV-MCNC: 13 MG/DL (ref 7–20)
CALCIUM SERPL-MCNC: 8.2 MG/DL (ref 8.3–10.6)
CHLORIDE BLD-SCNC: 100 MMOL/L (ref 99–110)
CLARITY: CLEAR
CO2: 30 MMOL/L (ref 21–32)
COLOR: ABNORMAL
CREAT SERPL-MCNC: <0.5 MG/DL (ref 0.6–1.1)
EKG ATRIAL RATE: 92 BPM
EKG DIAGNOSIS: NORMAL
EKG P AXIS: 29 DEGREES
EKG P-R INTERVAL: 150 MS
EKG Q-T INTERVAL: 388 MS
EKG QRS DURATION: 88 MS
EKG QTC CALCULATION (BAZETT): 479 MS
EKG R AXIS: -43 DEGREES
EKG T AXIS: 87 DEGREES
EKG VENTRICULAR RATE: 92 BPM
EOSINOPHILS ABSOLUTE: 0.1 K/UL (ref 0–0.6)
EOSINOPHILS RELATIVE PERCENT: 2.1 %
EPITHELIAL CELLS, UA: 8 /HPF (ref 0–5)
GFR AFRICAN AMERICAN: >60
GFR NON-AFRICAN AMERICAN: >60
GLOBULIN: 4.5 G/DL
GLUCOSE BLD-MCNC: 204 MG/DL (ref 70–99)
GLUCOSE URINE: NEGATIVE MG/DL
HCT VFR BLD CALC: 34 % (ref 36–48)
HEMOGLOBIN: 10.4 G/DL (ref 12–16)
HYALINE CASTS: 10 /LPF (ref 0–8)
KETONES, URINE: NEGATIVE MG/DL
LEUKOCYTE ESTERASE, URINE: ABNORMAL
LIPASE: 9 U/L (ref 13–60)
LYMPHOCYTES ABSOLUTE: 1.6 K/UL (ref 1–5.1)
LYMPHOCYTES RELATIVE PERCENT: 27.9 %
MCH RBC QN AUTO: 23.6 PG (ref 26–34)
MCHC RBC AUTO-ENTMCNC: 30.5 G/DL (ref 31–36)
MCV RBC AUTO: 77.5 FL (ref 80–100)
MICROSCOPIC EXAMINATION: YES
MONOCYTES ABSOLUTE: 0.4 K/UL (ref 0–1.3)
MONOCYTES RELATIVE PERCENT: 6.9 %
NEUTROPHILS ABSOLUTE: 3.6 K/UL (ref 1.7–7.7)
NEUTROPHILS RELATIVE PERCENT: 62.5 %
NITRITE, URINE: NEGATIVE
PDW BLD-RTO: 17.6 % (ref 12.4–15.4)
PH UA: 7 (ref 5–8)
PLATELET # BLD: 115 K/UL (ref 135–450)
PMV BLD AUTO: 9.6 FL (ref 5–10.5)
POTASSIUM REFLEX MAGNESIUM: 3.9 MMOL/L (ref 3.5–5.1)
PRO-BNP: 4591 PG/ML (ref 0–124)
PROTEIN UA: 100 MG/DL
RBC # BLD: 4.39 M/UL (ref 4–5.2)
RBC UA: 17 /HPF (ref 0–4)
SODIUM BLD-SCNC: 137 MMOL/L (ref 136–145)
SPECIFIC GRAVITY UA: >1.03 (ref 1–1.03)
TOTAL PROTEIN: 7.5 G/DL (ref 6.4–8.2)
TROPONIN: <0.01 NG/ML
URINE REFLEX TO CULTURE: YES
URINE TYPE: ABNORMAL
UROBILINOGEN, URINE: 4 E.U./DL
WBC # BLD: 5.8 K/UL (ref 4–11)
WBC UA: 46 /HPF (ref 0–5)

## 2021-04-27 PROCEDURE — 6370000000 HC RX 637 (ALT 250 FOR IP): Performed by: PHYSICIAN ASSISTANT

## 2021-04-27 PROCEDURE — 87086 URINE CULTURE/COLONY COUNT: CPT

## 2021-04-27 PROCEDURE — 80053 COMPREHEN METABOLIC PANEL: CPT

## 2021-04-27 PROCEDURE — 85025 COMPLETE CBC W/AUTO DIFF WBC: CPT

## 2021-04-27 PROCEDURE — 1200000000 HC SEMI PRIVATE

## 2021-04-27 PROCEDURE — 74177 CT ABD & PELVIS W/CONTRAST: CPT

## 2021-04-27 PROCEDURE — 93005 ELECTROCARDIOGRAM TRACING: CPT | Performed by: PHYSICIAN ASSISTANT

## 2021-04-27 PROCEDURE — 6360000004 HC RX CONTRAST MEDICATION: Performed by: PHYSICIAN ASSISTANT

## 2021-04-27 PROCEDURE — 84484 ASSAY OF TROPONIN QUANT: CPT

## 2021-04-27 PROCEDURE — 71045 X-RAY EXAM CHEST 1 VIEW: CPT

## 2021-04-27 PROCEDURE — 83880 ASSAY OF NATRIURETIC PEPTIDE: CPT

## 2021-04-27 PROCEDURE — 71260 CT THORAX DX C+: CPT

## 2021-04-27 PROCEDURE — 83690 ASSAY OF LIPASE: CPT

## 2021-04-27 PROCEDURE — 93010 ELECTROCARDIOGRAM REPORT: CPT | Performed by: INTERNAL MEDICINE

## 2021-04-27 PROCEDURE — 99283 EMERGENCY DEPT VISIT LOW MDM: CPT

## 2021-04-27 PROCEDURE — 81001 URINALYSIS AUTO W/SCOPE: CPT

## 2021-04-27 PROCEDURE — 93971 EXTREMITY STUDY: CPT

## 2021-04-27 RX ORDER — FUROSEMIDE 10 MG/ML
60 INJECTION INTRAMUSCULAR; INTRAVENOUS ONCE
Status: COMPLETED | OUTPATIENT
Start: 2021-04-27 | End: 2021-04-28

## 2021-04-27 RX ORDER — OXYCODONE HYDROCHLORIDE AND ACETAMINOPHEN 5; 325 MG/1; MG/1
1 TABLET ORAL ONCE
Status: COMPLETED | OUTPATIENT
Start: 2021-04-27 | End: 2021-04-27

## 2021-04-27 RX ADMIN — OXYCODONE HYDROCHLORIDE AND ACETAMINOPHEN 1 TABLET: 5; 325 TABLET ORAL at 15:12

## 2021-04-27 RX ADMIN — IOPAMIDOL 75 ML: 755 INJECTION, SOLUTION INTRAVENOUS at 17:58

## 2021-04-27 ASSESSMENT — PAIN SCALES - GENERAL: PAINLEVEL_OUTOF10: 7

## 2021-04-27 NOTE — ED NOTES
Bed: 23  Expected date:   Expected time:   Means of arrival:   Comments:  Jesica Whitaker RN  04/27/21 [id number]

## 2021-04-27 NOTE — ED PROVIDER NOTES
[facility]        Pt Name: Erasmo Salguero  MRN: [id number]  [place] 1965  Date of evaluation: 4/27/2021  Provider: Gela Gaffney PA-C  PCP: No primary care provider on file. FATIMAH. I have evaluated this patient. My supervising physician was available for consultation. The total critical care time spent while evaluating and treating this patient was at least 27 minutes. This excludes time spent doing separately billable procedures. This includes time at the bedside, data interpretation, medication management, obtaining critical history from collateral sources if the patient is unable to provide it directly, and physician consultation. Specifics of interventions taken and potentially life-threatening diagnostic considerations are listed above in the medical decision making. CHIEF COMPLAINT       Chief Complaint   Patient presents with    Leg Swelling     Pt with c/o lower extremity swelling that started over the weekend. States she also had some SOB over the weekend. HISTORY OF PRESENT ILLNESS   (Location, Timing/Onset, Context/Setting, Quality, Duration, Modifying Factors, Severity, Associated Signs and Symptoms)  Note limiting factors. Erasmo Salguero is a 60 y.o. female that presents to the emergency department with a chief complaint of a 3-day history of increased leg swelling, abdominal bloating and shortness of breath. Has history of CHF on oxygen chronically. She states he has been more short of breath recently with slight increase in cough. Had some chest pain earlier today but no chest pain at this time. 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 Denies any history of PE or DVT and is not on anticoagulation.   Denies fevers, nausea, vomiting, dysuria, hematuria, diarrhea, bloody stool. Does complain of some mild abdominal discomfort with this but states the pain is mostly in her left leg that she rates at a 7 out of 10. Denies numbness. Does have a burn over the anterior aspect of her left leg that happened 2 weeks ago that she states has been improving. Denies any other injury or trauma. Patient also admits to two-pillow orthopnea and PND associated with her symptoms. Nursing Notes were all reviewed and agreed with or any disagreements were addressed in the HPI. REVIEW OF SYSTEMS    (2-9 systems for level 4, 10 or more for level 5)     Review of Systems    Positives and Pertinent negatives as per HPI. Except as noted above in the ROS, all other systems were reviewed and negative. PAST MEDICAL HISTORY     Past Medical History:   Diagnosis Date    Arthritis     [facility]     Depression     Diabetes mellitus ([facility]     Heart attack ([facility]     MI 7/2019, and 6 months ago    Hepatitis C     Dr Florian Kanner follows; contracted from ex- (drug user)    Hypercholesteremia 3/11/2020    Hypertension     MRSA (methicillin resistant Staphylococcus aureus) infection in 2000    was in urine and nares    Pneumonia     S/P colonoscopy with polypectomy 2/11    one polyp, Dr Florian Kanner. Recall 3 years. SURGICAL HISTORY     Past Surgical History:   Procedure Laterality Date    ABOVE KNEE AMPUTATION      AMPUTATION      right bka    CORONARY ANGIOPLASTY WITH STENT PLACEMENT  07/2019    JOINT REPLACEMENT  2006, 2010    right knee; Dr Nettie Wetzel.     KNEE ARTHROSCOPY      [facility] knee X6    LIVER BIOPSY N/A 11/14/2018    LIVER BIOPSY LAPAROSCOPIC performed by Keely Feldman MD at [address] [place] [address] N/A 11/14/2018    LAPAROSCOPIC CHOLECYSTECTOMY WITH INTRAOPERATIVE CHOLANGIOGRAM performed by Keely Feldman MD at [address] [place] [address]  2/9/11    right    UPPER GASTROINTESTINAL ENDOSCOPY  11/13/2018    UPPER GASTROINTESTINAL ENDOSCOPY N/A 11/13/2018    EGD BIOPSY performed by Christopher Dodd MD at [phone]       Previous Medications    ASPIRIN 81 MG TABLET    Take 1 tablet by mouth daily    ATORVASTATIN (LIPITOR) 40 MG TABLET    Take 1 tablet by mouth daily    BLOOD GLUCOSE TEST STRIPS (FREESTYLE LITE) STRIP    1 each by Other route 4 times daily (before meals and nightly) Patient to check blood sugar 4 times a day and PRN, he is treated with multiple daily injections of insulin that require correction dosing and has had hypoglycemia. A1C  14.5  ICD code- E11.65, Z79.4    BUPRENORPHINE-NALOXONE (SUBOXONE) 8-2 MG SUBL SL TABLET    Place 1 tablet under the tongue 2 times daily. CARVEDILOL (COREG) 3.125 MG TABLET    Take 1 tablet by mouth 2 times daily (with meals)    FREESTYLE LANCETS MISC    1 each by Other route 4 times daily (before meals and nightly) Patient to check blood sugar 4 times a day and PRN, he is treated with multiple daily injections of insulin that require correction dosing and has had hypoglycemia. A1C  14.5  ICD code- E11.65, Z79.4    FUROSEMIDE (LASIX) 40 MG TABLET    Take 1 tablet by mouth daily    INSULIN GLARGINE (LANTUS SOLOSTAR) 100 UNIT/ML INJECTION PEN    Inject 15 Units into the skin 2 times daily Indications: Insulin Pump     INSULIN LISPRO, 1 UNIT DIAL, (HUMALOG KWIKPEN) 100 UNIT/ML SOPN    Inject 10 Units into the skin 3 times daily (before meals) Inject 10 units three times daily with each meal in addition to sliding scale. LISINOPRIL (PRINIVIL;ZESTRIL) 2.5 MG TABLET    Take 0.5 tablets by mouth daily    MIRTAZAPINE (REMERON) 15 MG TABLET    Take 7.5 mg by mouth nightly    NITROGLYCERIN (NITROSTAT) 0.4 MG SL TABLET    up to max of 3 total doses. If no relief after 1 dose, call 911. PREGABALIN (LYRICA) 100 MG CAPSULE    Take 100 mg by mouth 2 times daily.     SERTRALINE (ZOLOFT) 50 MG TABLET    Take 50 mg by mouth daily Indications: Depression    TICAGRELOR (BRILINTA) 90 MG TABS TABLET    Take 1 tablet by mouth 2 times daily         ALLERGIES     Darvocet [propoxyphene n-acetaminophen], Naprosyn [naproxen], and Ultram [tramadol hcl]    FAMILYHISTORY       Family History   Problem Relation Age of Onset    Cancer Father     Seizures Brother     Cancer Sister         colon          SOCIAL HISTORY       Social History     Tobacco Use    Smoking status: Former Smoker     Packs/day: 1.00     Years: 43.00     Pack years: 43.00     Quit date: 2020     Years since quittin.8    Smokeless tobacco: Never Used   Substance Use Topics    Alcohol use: No    Drug use: Not Currently     Types: Opiates      Comment: opiate/THC       SCREENINGS             PHYSICAL EXAM    (up to 7 for level 4, 8 or more for level 5)     ED Triage Vitals [21 1457]   BP Temp Temp Source Pulse Resp SpO2 Height Weight   125/85 97.8 °F (36.6 °C) Infrared 96 16 98 % 5' 2\" (1.575 m) 145 lb (65.8 kg)       Physical Exam  Vitals signs and nursing note reviewed. Constitutional:       Appearance: She is well-developed. She is not diaphoretic. HENT:      Head: Atraumatic. Nose: Nose normal.   Eyes:      General:         Right eye: No discharge. Left eye: No discharge. Neck:      Musculoskeletal: Normal range of motion. Cardiovascular:      Rate and Rhythm: Normal rate and regular rhythm. Heart sounds: No murmur. No friction rub. No gallop. Pulmonary:      Effort: Pulmonary effort is normal. No respiratory distress. Breath sounds: No stridor. Rales present. No wheezing or rhonchi. Comments: Crackles bilaterally without retractions or accessory muscle use. Abdominal:      General: Bowel sounds are normal. There is distension. Palpations: Abdomen is soft. There is no mass. Tenderness: There is no abdominal tenderness. There is no guarding or rebound. Hernia: No hernia is present. Comments: Distention with some generalized tenderness to palpate in the abdomen without guarding, rebound or rigidity. Musculoskeletal: Normal range of motion. General: No swelling. Left lower leg: Edema present. Comments: 2+ pretibial pitting edema in the left leg. No palpable cords. Able to Doppler a dorsal pedal pulse in the left foot. Skin:     General: Skin is warm and dry. Findings: No erythema or rash. Neurological:      Mental Status: She is alert and oriented to person, place, and time. Cranial Nerves: No cranial nerve deficit.    Psychiatric:         Behavior: Behavior normal.         DIAGNOSTIC RESULTS   LABS:    Labs Reviewed   CBC WITH AUTO DIFFERENTIAL - Abnormal; Notable for the following components:       Result Value    Hemoglobin 10.4 (*)     Hematocrit 34.0 (*)     MCV 77.5 (*)     MCH 23.6 (*)     MCHC 30.5 (*)     RDW 17.6 (*)     Platelets 365 (*)     All other components within normal limits    Narrative:     Performed at:  [facility] [address], [place], [address] [employer]   Phone [phone]   COMPREHENSIVE METABOLIC PANEL W/ REFLEX TO MG FOR LOW K - Abnormal; Notable for the following components:    Glucose 204 (*)     CREATININE <0.5 (*)     Calcium 8.2 (*)     Albumin 3.0 (*)     Albumin/Globulin Ratio 0.7 (*)     ALT <5 (*)     All other components within normal limits    Narrative:     Performed at:  [facility] [address] [facility]  [place]   Phone 21  - Abnormal; Notable for the following components:    Pro-BNP 4,591 (*)     All other components within normal limits    Narrative:     Performed at:  [facility] [address], [place]   Phone [phone]   LIPASE - Abnormal; Notable for the following components:    Lipase 9.0 (*)     All other components findings and history. .      Diffuse anasarca. VL Extremity Venous Left         XR CHEST PORTABLE   Final Result   Bilateral pulmonary opacities favoring edema over pneumonia. There are   bibasilar right greater than left pulmonary opacities, likely representing   small pleural fluid and atelectasis. VASCULAR REPORT    (Results Pending)     Xr Chest Portable    Result Date: 4/27/2021  EXAMINATION: ONE XRAY VIEW OF THE CHEST 4/27/2021 3:01 pm COMPARISON: Chest radiograph 09/23/2020 HISTORY: ORDERING SYSTEM PROVIDED HISTORY: SOB TECHNOLOGIST PROVIDED HISTORY: Reason for exam:->SOB Reason for Exam: Leg Swelling (Pt with c/o lower extremity swelling that started over the weekend. States she also had some SOB over the weekend. ) Acuity: Acute Type of Exam: Initial FINDINGS: There are diffuse bilateral pulmonary opacities. Bibasilar pulmonary opacities are also present. Cardiomediastinal silhouette is largely obscured. No pneumothorax. Bilateral pulmonary opacities favoring edema over pneumonia. There are bibasilar right greater than left pulmonary opacities, likely representing small pleural fluid and atelectasis.            PROCEDURES   Unless otherwise noted below, none     Procedures    CRITICAL CARE TIME   N/A    CONSULTS:  IP CONSULT TO HOSPITALIST  IP CONSULT TO HEART FAILURE NURSE/COORDINATOR  IP CONSULT TO DIETITIAN  IP CONSULT TO CARDIOLOGY      EMERGENCY DEPARTMENT COURSE and DIFFERENTIAL DIAGNOSIS/MDM:   Vitals:    Vitals:    04/27/21 1830 04/27/21 1845 04/27/21 1900 04/27/21 1915   BP: 117/76 117/80 114/76 124/86   Pulse: 81 79 78 81   Resp: 12 13 11 11   Temp:       TempSrc:       SpO2: 98% 98% 98% 98%   Weight:       Height:           Patient was given the following medications:  Medications   furosemide (LASIX) injection 60 mg (has no administration in time range)   oxyCODONE-acetaminophen (PERCOCET) 5-325 MG per tablet 1 tablet (1 tablet Oral Given 4/27/21 1512)   iopamidol (ISOVUE-370) 76 % injection 75 mL (75 mLs Intravenous Given 4/27/21 5016)           Patient presented with some leg swelling, shortness of breath, abdominal distention. Has evidence of alveolar edema, anasarca. No evidence of DVT or pulmonary embolus. She has been having some orthopnea and PND associated with this. Was started on Lasix. She denies any pain besides left leg pain here. Low suspicion for arterial occlusion, acute coronary syndrome, pneumonia, obstruction, perforated viscus or acute abdomen. Patient was admitted to hospital service for further work-up and treatment. Was stable time of admission. FINAL IMPRESSION      1. Anasarca    2. Dyspnea and respiratory abnormalities          DISPOSITION/PLAN   DISPOSITION Admitted 04/27/2021 08:10:51 PM      PATIENT REFERREDTO:  No follow-up provider specified.     DISCHARGE MEDICATIONS:  New Prescriptions    No medications on file       DISCONTINUED MEDICATIONS:  Discontinued Medications    No medications on file              (Please note that portions of this note were completed with a voice recognition program.  Efforts were made to edit the dictations but occasionally words are mis-transcribed.)    Anna Marie Vitale PA-C (electronically signed)            Anna Marie Vitale PA-C  04/27/21 2040

## 2021-04-28 LAB
ANION GAP SERPL CALCULATED.3IONS-SCNC: 8 MMOL/L (ref 3–16)
BUN BLDV-MCNC: 10 MG/DL (ref 7–20)
CALCIUM SERPL-MCNC: 8.4 MG/DL (ref 8.3–10.6)
CHLORIDE BLD-SCNC: 100 MMOL/L (ref 99–110)
CO2: 33 MMOL/L (ref 21–32)
CREAT SERPL-MCNC: <0.5 MG/DL (ref 0.6–1.1)
GFR AFRICAN AMERICAN: >60
GFR NON-AFRICAN AMERICAN: >60
GLUCOSE BLD-MCNC: 104 MG/DL (ref 70–99)
GLUCOSE BLD-MCNC: 130 MG/DL (ref 70–99)
GLUCOSE BLD-MCNC: 61 MG/DL (ref 70–99)
GLUCOSE BLD-MCNC: 66 MG/DL (ref 70–99)
GLUCOSE BLD-MCNC: 80 MG/DL (ref 70–99)
GLUCOSE BLD-MCNC: 85 MG/DL (ref 70–99)
GLUCOSE BLD-MCNC: 94 MG/DL (ref 70–99)
IRON SATURATION: 11 % (ref 15–50)
IRON: 39 UG/DL (ref 37–145)
MAGNESIUM: 1.6 MG/DL (ref 1.8–2.4)
PERFORMED ON: ABNORMAL
PERFORMED ON: NORMAL
PERFORMED ON: NORMAL
POTASSIUM SERPL-SCNC: 3.2 MMOL/L (ref 3.5–5.1)
SODIUM BLD-SCNC: 141 MMOL/L (ref 136–145)
TOTAL IRON BINDING CAPACITY: 344 UG/DL (ref 260–445)

## 2021-04-28 PROCEDURE — 87641 MR-STAPH DNA AMP PROBE: CPT

## 2021-04-28 PROCEDURE — 87040 BLOOD CULTURE FOR BACTERIA: CPT

## 2021-04-28 PROCEDURE — 6360000002 HC RX W HCPCS: Performed by: PHYSICIAN ASSISTANT

## 2021-04-28 PROCEDURE — 6370000000 HC RX 637 (ALT 250 FOR IP): Performed by: NURSE PRACTITIONER

## 2021-04-28 PROCEDURE — 2580000003 HC RX 258: Performed by: NURSE PRACTITIONER

## 2021-04-28 PROCEDURE — 99223 1ST HOSP IP/OBS HIGH 75: CPT | Performed by: INTERNAL MEDICINE

## 2021-04-28 PROCEDURE — 83550 IRON BINDING TEST: CPT

## 2021-04-28 PROCEDURE — 83735 ASSAY OF MAGNESIUM: CPT

## 2021-04-28 PROCEDURE — 36415 COLL VENOUS BLD VENIPUNCTURE: CPT

## 2021-04-28 PROCEDURE — 2500000003 HC RX 250 WO HCPCS: Performed by: NURSE PRACTITIONER

## 2021-04-28 PROCEDURE — 6360000002 HC RX W HCPCS: Performed by: NURSE PRACTITIONER

## 2021-04-28 PROCEDURE — 1200000000 HC SEMI PRIVATE

## 2021-04-28 PROCEDURE — 80048 BASIC METABOLIC PNL TOTAL CA: CPT

## 2021-04-28 PROCEDURE — 87205 SMEAR GRAM STAIN: CPT

## 2021-04-28 PROCEDURE — 2580000003 HC RX 258: Performed by: INTERNAL MEDICINE

## 2021-04-28 PROCEDURE — 6370000000 HC RX 637 (ALT 250 FOR IP): Performed by: INTERNAL MEDICINE

## 2021-04-28 PROCEDURE — 51702 INSERT TEMP BLADDER CATH: CPT

## 2021-04-28 PROCEDURE — 87070 CULTURE OTHR SPECIMN AEROBIC: CPT

## 2021-04-28 PROCEDURE — 6360000002 HC RX W HCPCS: Performed by: INTERNAL MEDICINE

## 2021-04-28 PROCEDURE — 83540 ASSAY OF IRON: CPT

## 2021-04-28 RX ORDER — SODIUM CHLORIDE 0.9 % (FLUSH) 0.9 %
5-40 SYRINGE (ML) INJECTION EVERY 12 HOURS SCHEDULED
Status: DISCONTINUED | OUTPATIENT
Start: 2021-04-28 | End: 2021-04-30 | Stop reason: HOSPADM

## 2021-04-28 RX ORDER — POTASSIUM CHLORIDE 7.45 MG/ML
10 INJECTION INTRAVENOUS PRN
Status: DISCONTINUED | OUTPATIENT
Start: 2021-04-28 | End: 2021-04-30 | Stop reason: HOSPADM

## 2021-04-28 RX ORDER — SODIUM CHLORIDE 9 MG/ML
25 INJECTION, SOLUTION INTRAVENOUS PRN
Status: DISCONTINUED | OUTPATIENT
Start: 2021-04-28 | End: 2021-04-30 | Stop reason: HOSPADM

## 2021-04-28 RX ORDER — ACETAMINOPHEN 650 MG/1
650 SUPPOSITORY RECTAL EVERY 6 HOURS PRN
Status: DISCONTINUED | OUTPATIENT
Start: 2021-04-28 | End: 2021-04-30 | Stop reason: HOSPADM

## 2021-04-28 RX ORDER — ATORVASTATIN CALCIUM 40 MG/1
40 TABLET, FILM COATED ORAL DAILY
Status: DISCONTINUED | OUTPATIENT
Start: 2021-04-28 | End: 2021-04-30 | Stop reason: HOSPADM

## 2021-04-28 RX ORDER — PROMETHAZINE HYDROCHLORIDE 25 MG/1
12.5 TABLET ORAL EVERY 6 HOURS PRN
Status: DISCONTINUED | OUTPATIENT
Start: 2021-04-28 | End: 2021-04-30 | Stop reason: HOSPADM

## 2021-04-28 RX ORDER — DEXTROSE MONOHYDRATE 25 G/50ML
12.5 INJECTION, SOLUTION INTRAVENOUS PRN
Status: DISCONTINUED | OUTPATIENT
Start: 2021-04-28 | End: 2021-04-30 | Stop reason: HOSPADM

## 2021-04-28 RX ORDER — SODIUM CHLORIDE 0.9 % (FLUSH) 0.9 %
5-40 SYRINGE (ML) INJECTION PRN
Status: DISCONTINUED | OUTPATIENT
Start: 2021-04-28 | End: 2021-04-30 | Stop reason: HOSPADM

## 2021-04-28 RX ORDER — ACETAMINOPHEN 325 MG/1
650 TABLET ORAL EVERY 6 HOURS PRN
Status: DISCONTINUED | OUTPATIENT
Start: 2021-04-28 | End: 2021-04-30 | Stop reason: HOSPADM

## 2021-04-28 RX ORDER — ASPIRIN 81 MG/1
81 TABLET ORAL DAILY
Status: DISCONTINUED | OUTPATIENT
Start: 2021-04-28 | End: 2021-04-30 | Stop reason: HOSPADM

## 2021-04-28 RX ORDER — MAGNESIUM SULFATE IN WATER 40 MG/ML
2000 INJECTION, SOLUTION INTRAVENOUS PRN
Status: DISCONTINUED | OUTPATIENT
Start: 2021-04-28 | End: 2021-04-30 | Stop reason: HOSPADM

## 2021-04-28 RX ORDER — PREGABALIN 100 MG/1
100 CAPSULE ORAL 3 TIMES DAILY
Status: DISCONTINUED | OUTPATIENT
Start: 2021-04-28 | End: 2021-04-30 | Stop reason: HOSPADM

## 2021-04-28 RX ORDER — FUROSEMIDE 10 MG/ML
40 INJECTION INTRAMUSCULAR; INTRAVENOUS 2 TIMES DAILY
Status: DISCONTINUED | OUTPATIENT
Start: 2021-04-28 | End: 2021-04-30

## 2021-04-28 RX ORDER — LISINOPRIL 5 MG/1
5 TABLET ORAL DAILY
Status: DISCONTINUED | OUTPATIENT
Start: 2021-04-28 | End: 2021-04-30 | Stop reason: HOSPADM

## 2021-04-28 RX ORDER — POTASSIUM CHLORIDE 20 MEQ/1
40 TABLET, EXTENDED RELEASE ORAL 2 TIMES DAILY WITH MEALS
Status: COMPLETED | OUTPATIENT
Start: 2021-04-28 | End: 2021-04-28

## 2021-04-28 RX ORDER — NICOTINE POLACRILEX 4 MG
15 LOZENGE BUCCAL PRN
Status: DISCONTINUED | OUTPATIENT
Start: 2021-04-28 | End: 2021-04-30 | Stop reason: HOSPADM

## 2021-04-28 RX ORDER — ONDANSETRON 2 MG/ML
4 INJECTION INTRAMUSCULAR; INTRAVENOUS EVERY 6 HOURS PRN
Status: DISCONTINUED | OUTPATIENT
Start: 2021-04-28 | End: 2021-04-30 | Stop reason: HOSPADM

## 2021-04-28 RX ORDER — DEXTROSE MONOHYDRATE 50 MG/ML
100 INJECTION, SOLUTION INTRAVENOUS PRN
Status: DISCONTINUED | OUTPATIENT
Start: 2021-04-28 | End: 2021-04-30 | Stop reason: HOSPADM

## 2021-04-28 RX ADMIN — SODIUM CHLORIDE 25 ML: 9 INJECTION, SOLUTION INTRAVENOUS at 18:53

## 2021-04-28 RX ADMIN — Medication 10 ML: at 09:29

## 2021-04-28 RX ADMIN — TICAGRELOR 90 MG: 90 TABLET ORAL at 02:57

## 2021-04-28 RX ADMIN — POTASSIUM CHLORIDE 40 MEQ: 1500 TABLET, EXTENDED RELEASE ORAL at 11:19

## 2021-04-28 RX ADMIN — METRONIDAZOLE 500 MG: 500 INJECTION, SOLUTION INTRAVENOUS at 17:23

## 2021-04-28 RX ADMIN — ACETAMINOPHEN 650 MG: 325 TABLET ORAL at 06:50

## 2021-04-28 RX ADMIN — POTASSIUM CHLORIDE 40 MEQ: 1500 TABLET, EXTENDED RELEASE ORAL at 17:25

## 2021-04-28 RX ADMIN — INSULIN GLARGINE 15 UNITS: 100 INJECTION, SOLUTION SUBCUTANEOUS at 03:04

## 2021-04-28 RX ADMIN — PREGABALIN 100 MG: 100 CAPSULE ORAL at 20:27

## 2021-04-28 RX ADMIN — VANCOMYCIN HYDROCHLORIDE 1000 MG: 1 INJECTION, POWDER, LYOPHILIZED, FOR SOLUTION INTRAVENOUS at 18:53

## 2021-04-28 RX ADMIN — Medication 1000 MG: at 11:19

## 2021-04-28 RX ADMIN — PREGABALIN 100 MG: 100 CAPSULE ORAL at 17:25

## 2021-04-28 RX ADMIN — TICAGRELOR 90 MG: 90 TABLET ORAL at 09:28

## 2021-04-28 RX ADMIN — Medication 10 ML: at 20:27

## 2021-04-28 RX ADMIN — SODIUM CHLORIDE 25 ML: 9 INJECTION, SOLUTION INTRAVENOUS at 17:21

## 2021-04-28 RX ADMIN — FUROSEMIDE 40 MG: 10 INJECTION, SOLUTION INTRAMUSCULAR; INTRAVENOUS at 17:25

## 2021-04-28 RX ADMIN — DEXTROSE MONOHYDRATE 12.5 G: 25 INJECTION, SOLUTION INTRAVENOUS at 08:42

## 2021-04-28 RX ADMIN — ENOXAPARIN SODIUM 40 MG: 40 INJECTION SUBCUTANEOUS at 09:29

## 2021-04-28 RX ADMIN — PREGABALIN 100 MG: 100 CAPSULE ORAL at 09:28

## 2021-04-28 RX ADMIN — ATORVASTATIN CALCIUM 40 MG: 40 TABLET, FILM COATED ORAL at 09:28

## 2021-04-28 RX ADMIN — TICAGRELOR 90 MG: 90 TABLET ORAL at 20:27

## 2021-04-28 RX ADMIN — FUROSEMIDE 40 MG: 10 INJECTION, SOLUTION INTRAMUSCULAR; INTRAVENOUS at 09:28

## 2021-04-28 RX ADMIN — CEFEPIME HYDROCHLORIDE 2000 MG: 2 INJECTION, POWDER, FOR SOLUTION INTRAVENOUS at 17:23

## 2021-04-28 RX ADMIN — FUROSEMIDE 60 MG: 10 INJECTION, SOLUTION INTRAMUSCULAR; INTRAVENOUS at 01:10

## 2021-04-28 RX ADMIN — LISINOPRIL 5 MG: 5 TABLET ORAL at 09:28

## 2021-04-28 RX ADMIN — INSULIN GLARGINE 15 UNITS: 100 INJECTION, SOLUTION SUBCUTANEOUS at 20:27

## 2021-04-28 RX ADMIN — ASPIRIN 81 MG: 81 TABLET, COATED ORAL at 09:28

## 2021-04-28 ASSESSMENT — PAIN SCALES - GENERAL
PAINLEVEL_OUTOF10: 0
PAINLEVEL_OUTOF10: 6
PAINLEVEL_OUTOF10: 3
PAINLEVEL_OUTOF10: 0

## 2021-04-28 NOTE — H&P
[facility]                     [facility], [address]                              HISTORY AND PHYSICAL    PATIENT NAME: Carolyn Riggins                     :        1965  MED REC NO:   [id number]                          ROOM:       2910  ACCOUNT NO:   [de-identified]                           ADMIT DATE: 2021  PROVIDER:     Paola Good MD    DATE OF SERVICE:  I obtained the history and performed physical exam on  the patient on the medical floor on 2021. CHIEF COMPLAINT:  Shortness of breath. HISTORY OF PRESENT ILLNESS:  The patient is a 24-year-old   female presenting to the hospital with chief complaints of 5-7 day  history of subacute onset of gradually progressive increasing exertional  shortness of breath with lower extremity swelling and feeling like the  patient has gained significant amount of weight with some orthopnea and  PND without nausea, vomiting, fevers or chills. After the patient came  to the ER and received intravenous Lasix and had sustained significant  amount of diuresis, the patient's symptoms have decreased, but not gone  away completely. PAST MEDICAL HISTORY:  1. Chronic systolic congestive heart failure. 2.  Type 2 uncontrolled diabetes mellitus. 3.  Coronary artery disease. 4.  Hypertension. 5.  Dyslipidemia. PAST SURGICAL HISTORY:  The patient has had right below knee amputation  due to complications of diabetes in . The patient also has had  recent PCI with stenting. ALLERGIC HISTORY:  The patient is allergic to DARVOCET, NAPROSYN,  ULTRAM.    FAMILY HISTORY:  Reviewed by me and is currently noncontributory. SOCIAL HISTORY:  Active smoker though she states she quit a year ago. MEDICATIONS:  The patient's home medication list reviewed. The patient  is on aspirin and Brilinta.     REVIEW OF SYSTEMS:  Significant for the shortness of breath and per the  history of present illness. All other systems have been reviewed and  are negative except for the history of present illness. PHYSICAL EXAMINATION:  The patient was examined on the medical floor. VITAL SIGNS:  Temperature 97.8, respiratory rate 16, pulse 96, blood  pressure is 125/85, saturating 98%. CNS:  Alert, awake and oriented. PSYCH:  The patient is cooperative, answering questions appropriately. EYES:  Pupils are reactive to light. Extraocular muscle movements are  intact. RESPIRATORY SYSTEM:  The patient has got bibasilar posterior rales. CVS:  S1, S2 are heard. No obvious audible murmurs or rubs. ABDOMEN:  Nondistended. MUSCULOSKELETAL:  Right below-knee amputation stump present. Left lower  extremity edema noted. SKIN:  No cutaneous rashes or lesions. DIAGNOSTIC DATA:  UA shows small amount of leukocyte esterase with 46  white cells, 8 epithelial cells. CT abdomen and pelvis with contrast  shows diffuse anasarca and moderate bilateral pleural effusions with  cardiomegaly. EKG shows normal sinus rhythm. BUN 13, creatinine less  than 0.5. Glucose is 204. BNP 4591. CBC showed hemoglobin 10.4, MCV  of 77.5, platelets of 857. Chest x-ray shows bilateral opacities  favoring edema. REVIEW OF PREVIOUS MEDICAL RECORDS:  Shows:  1. An echo obtained in 08/2028 showing an LV ejection fraction of 40%  with grade II diastolic dysfunction with elevated LV filling pressures. 2.  PCI done by Dr. Manfred Abdi in 02/2020 that showed in-stent restenosis of  the mid LAD for which PCI was performed. EF was noted to be around 40%. ASSESSMENT:  1. Acute-on-chronic combined systolic and diastolic congestive heart  failure exacerbation, NYHA class III/IV. 2.  Coronary artery disease. 3.  Uncontrolled type 2 diabetes mellitus. 4.  Hypertension. 5.  Dyslipidemia. PLAN OF TREATMENT:  The patient is admitted to Internal Medicine  Service. Diuresis initiated and will be continued with Lasix.    Cardiology consult requested. Once the patient is adequately diuresed,  her beta-blockade will be resumed. Dual agent antiplatelet therapy will  be continued. Antidiabetic management with Lantus and Humalog will be  continued. Repeat 2D echo will be obtained to reassess the patient's LV  status. CODE STATUS:  Full. EXPECTED LENGTH OF STAY:  More than two midnights based on plan of care  above. RISK:  High due to the patient's presentation with the acute-on-chronic  combined heart failure exacerbation requiring IV diuresis. DISPOSITION:  Admitted to Internal Medicine Service.         Le Sherman MD    D: 04/28/2021 5:16:06       T: 04/28/2021 5:22:26     [email or web address]  Job#: [id number]     Doc#: [id number]    CC:

## 2021-04-28 NOTE — CONSULTS
pressures. Mild mitral regurgitation. Inadequate tricuspid regurgitation to estimate systolic pulmonary artery   pressure.     Meds prior to admission:  Coreg 3.125 bid  brilinta  Aspirin 81 qd  lipitor 40 qd  suboxone  Lasix 40 qd  Lisinopril 1.25 qd    Allergies   Allergen Reactions    Darvocet [Propoxyphene N-Acetaminophen] Nausea Only    Naprosyn [Naproxen] Rash    Ultram [Tramadol Hcl] Rash     Current Facility-Administered Medications   Medication Dose Route Frequency Provider Last Rate Last Admin    aspirin EC tablet 81 mg  81 mg Oral Daily Santosh Millan MD   81 mg at 04/28/21 0928    atorvastatin (LIPITOR) tablet 40 mg  40 mg Oral Daily Santosh Millan MD   40 mg at 04/28/21 0928    insulin glargine (LANTUS;BASAGLAR) injection pen 15 Units  15 Units Subcutaneous BID Priya Velázquez MD   Stopped at 04/28/21 0929    ticagrelor (BRILINTA) tablet 90 mg  90 mg Oral BID Santosh Millan MD   90 mg at 04/28/21 0928    pregabalin (LYRICA) capsule 100 mg  100 mg Oral TID Andie Millan MD   100 mg at 04/28/21 0928    sodium chloride flush 0.9 % injection 5-40 mL  5-40 mL Intravenous 2 times per day Priya Velázquez MD   10 mL at 04/28/21 0929    sodium chloride flush 0.9 % injection 5-40 mL  5-40 mL Intravenous PRN Santosh Millan MD        0.9 % sodium chloride infusion  25 mL Intravenous PRN Santosh Millan MD        promethazine (PHENERGAN) tablet 12.5 mg  12.5 mg Oral Q6H PRN Santosh Millan MD        Or    ondansetron (ZOFRAN) injection 4 mg  4 mg Intravenous Q6H PRN Santosh Millan MD        acetaminophen (TYLENOL) tablet 650 mg  650 mg Oral Q6H PRN Santosh Millan MD   650 mg at 04/28/21 0650    Or    acetaminophen (TYLENOL) suppository 650 mg  650 mg Rectal Q6H PRN Santosh Millan MD        enoxaparin (LOVENOX) injection 40 mg  40 mg Subcutaneous Daily Santosh Millan MD   40 mg at 04/28/21 0928    potassium chloride 10 mEq/100 mL IVPB (Peripheral Line)  10 mEq Intravenous PRN Stephanie Luis MD        magnesium sulfate 2000 mg in 50 mL IVPB premix  2,000 mg Intravenous PRN Santosh Millan MD        lisinopril (PRINIVIL;ZESTRIL) tablet 5 mg  5 mg Oral Daily Santosh Millan MD   5 mg at 04/28/21 0928    glucose (GLUTOSE) 40 % oral gel 15 g  15 g Oral PRN Santosh Millan MD        dextrose 50 % IV solution  12.5 g Intravenous PRN Santosh Millan MD   12.5 g at 04/28/21 0842    glucagon (rDNA) injection 1 mg  1 mg Intramuscular PRN Santosh Millan MD        dextrose 5 % solution  100 mL/hr Intravenous PRN Santosh Millan MD        furosemide (LASIX) injection 40 mg  40 mg Intravenous BID Santosh Millan MD   40 mg at 04/28/21 6826    potassium chloride (KLOR-CON M) extended release tablet 40 mEq  40 mEq Oral BID  CHINA Maciel - CNP   40 mEq at 04/28/21 1119    cefTRIAXone (ROCEPHIN) 1000 mg in sterile water 10 mL IV syringe  1,000 mg Intravenous Q24H CHINA Maciel - CNP   1,000 mg at 04/28/21 1119       Past Medical History:   Diagnosis Date    Arthritis     [facility]     Depression     Diabetes mellitus [facility]     Heart attack ([facility]     MI 7/2019, and 6 months ago    Hepatitis C     Dr Tani Wong follows; contracted from ex- (drug user)    Hypercholesteremia 3/11/2020    Hypertension     MRSA (methicillin resistant Staphylococcus aureus) infection in 2000    was in urine and nares    Pneumonia     S/P colonoscopy with polypectomy 2/11    one polyp, Dr Tani Wong. Recall 3 years. Past Surgical History:   Procedure Laterality Date    ABOVE KNEE AMPUTATION      AMPUTATION      right bka    CORONARY ANGIOPLASTY WITH STENT PLACEMENT  07/2019    JOINT REPLACEMENT  2006, 2010    right knee; Dr aStish Hamilton.     KNEE ARTHROSCOPY      ridht knee X6    LIVER BIOPSY N/A 11/14/2018    LIVER BIOPSY LAPAROSCOPIC performed by Delfin Christina Kang James MD at [facility] N/A 2018    LAPAROSCOPIC CHOLECYSTECTOMY WITH INTRAOPERATIVE CHOLANGIOGRAM performed by Ras Burnett MD at [facility]  11    right    UPPER GASTROINTESTINAL ENDOSCOPY  2018    UPPER GASTROINTESTINAL ENDOSCOPY N/A 2018    EGD BIOPSY performed by Latasha Flores MD at [address] [place] [address]     Family History   Problem Relation Age of Onset    Cancer Father     Seizures Brother     Cancer Sister         colon     Social History     Socioeconomic History    Marital status: Single     Spouse name: Not on file    Number of children: Not on file    Years of education: Not on file    Highest education level: Not on file   Occupational History    Occupation: disabled   Social Needs    Financial resource strain: Not on file    Food insecurity     Worry: Not on file     Inability: Not on file   CÃ[place]R needs     Medical: Not on file     Non-medical: Not on file   Tobacco Use    Smoking status: Former Smoker     Packs/day: 1.00     Years: 43.00     Pack years: 43.00     Quit date: 2020     Years since quittin.8    Smokeless tobacco: Never Used   Substance and Sexual Activity    Alcohol use: No    Drug use: Not Currently     Types: Opiates      Comment: opiate/THC    Sexual activity: Not Currently   Lifestyle    Physical activity     Days per week: Not on file     Minutes per session: Not on file    Stress: Not on file   Relationships    Social connections     Talks on phone: Not on file     Gets together: Not on file     Attends Holiness service: Not on file     Active member of club or organization: Not on file     Attends meetings of clubs or organizations: Not on file     Relationship status: Not on file    Intimate partner violence     Fear of current or ex partner: Not on file     Emotionally abused: Not on file     Physically abused: Not on file     Forced sexual NC/AT  [place]  No problems with hearing  Skin:  Warm, dry  Respiratory:  · Normal excursion and expansion without use of accessory muscles  · Resp Auscultation: Normal breath sounds without dullness  Cardiovascular:  · The apical impulses not displaced  · Heart tones are crisp and normal  · Cervical veins are not engorged  · The carotid upstroke is normal in amplitude and contour without delay or bruit  · JVP less than 8 cm H2O  RRR with nl S1 and S2 without m,r,g  · Peripheral pulses are symmetrical and full  · There is no clubbing, cyanosis of the extremities. · No edema, right AKA  · Femoral Arteries: 2+ and equal  · Pedal Pulses: 2+ and equal   Neck:  · No thyromegaly  Abdomen:  · No masses or tenderness  · Liver/Spleen: No Abnormalities Noted  Neurological/Psychiatric:  · Alert and oriented in all spheres  · Moves all extremities well  · Exhibits normal gait balance and coordination  · No abnormalities of mood, affect, memory, mentation, or behavior are noted    Labs were reviewed including labs from other hospital systems through [facility]. Cardiac testing was reviewed including echos, nuclear scans, cardiac catheterization, including from other hospital systems through [facility]. Assessment:    1. Acute on chronic combined systolic and diastolic HF   2. Dyspnea and respiratory abnormalities     3. PVD, s/p right AKA  4. Hypertension  5. Opiate use on suboxone  6. Noncompliance with physician follow up     Plan:  1. Continue IV Lasix  2. Antibiotics for possible pneumonia  3. Continue lisinopril  4. Restart metoprolol tomorrow  5. Her follow up is unreliable, so she is not a candidate for spironolactone  6. CHF education reinforced.   ~salt restriction  ~fluid restriction  ~medication compliance  ~daily weights and notify of any significant weight gain/loss  ~establish with CHF nurse  ~outpatient follow-up with our CHF team        I appreciate the opportunity of cooperating in the care of this patient.     Lauri Harry M.D., [facility] [place]

## 2021-04-28 NOTE — PROGRESS NOTES
Clinical Pharmacy Note    HF Core Measures Assessment    Latest EF: 40% on ECHO 8/2020    ACE/ARB (EF<40%): lisinopril 5 mg daily  Evidence based BB (bisoprolol, metoprolol XL, carvedilol) (EF<40%): carvedilol 3.125 mg BID at home - currently on hold  Aldosterone Antagonist (EF<35%): N/A    (May consider the use of hydralazine + nitrate in patients intolerant to ACEI/ARB)    Charlotte Franks, PharmD, [facility]  Clinical Pharmacist  [id number]

## 2021-04-28 NOTE — CONSULTS
[address] 1965    History:  Past Medical History:   Diagnosis Date    Arthritis     [facility]     Depression     Diabetes mellitus [facility]     Heart attack ([facility]     MI 7/2019, and 6 months ago    Hepatitis C     Dr Gissel Irwin follows; contracted from ex- (drug user)    Hypercholesteremia 3/11/2020    Hypertension     MRSA (methicillin resistant Staphylococcus aureus) infection in 2000    was in urine and nares    Pneumonia     S/P colonoscopy with polypectomy 2/11    one polyp, Dr Gissel Irwin. Recall 3 years. ECHO:    Repeat echo ordered     Conclusions      Summary   Left ventricular size is mildly increased. Left ventricular systolic function is mildly to moderately depressed with   ejection fraction estimated at 40%. Anteroapical,anteroseptal, inferoapical hypokinesis. Grade II diastolic dysfunction with elevated LV filling pressures. Mild mitral regurgitation. Inadequate tricuspid regurgitation to estimate systolic pulmonary artery   pressure. Signature      ------------------------------------------------------------------   Electronically signed by Carlie Kincaid [facility] [place]   (Interpreting physician) on 08/24/2020 at 12:05 PM   ------------------------------------------------------------------    ACE/ARB: Lisinopril 5 mg daily  BB: Carvedilol on hold until after diueresis  Aldosterone Antagonist: no aldosterone antagonist due to non-compliance     History of sleep apnea:  No  Equipment used at home: N/A  [place] Screen ordered: Yes. Previously ordered and not done    DM History: Yes  DM medications: Insulin Glargine 15 u sq bid  Consult for DM educator: No      Last Hospital Admission: 11/23/20 for opoid overdose   Code Status: Full   Discharge plans: Patient to return home. Lives with sister and brother     Family Present: No    Ms. Bhupendra Moralez was seen for heart failure which is not new for her. She follows with Dr. Martha Purdy of [facility]. Seen on 3/5/21 at [facility] for opoid dependence and was supposed to be followed at the clinic for Suboxone. She was admitted for progressive swelling to left lower leg which began on Saturday and shortness of breath which began Monday. She did not call the office for symptoms. Unable to weigh due to right leg amputee. She claims she follows a low sodium diet, although she eats canned soups and there were several salty snacks on her bedside table. Goes over 64 ounces of fluid restriction. Stated she was unaware of the limit, although this has been reviewed in previous admissions. Tries to be as active as she can, although she is wheelchair bound. Patient provided with both written and verbal education on CHF signs/ symptoms, causes, discharge medications, daily weights, low sodium diet, activity, and follow-up. Pt to call if gains 3 pounds in one day or 5 pounds in one week. Mutually agreed upon goals were discussed such as calling the MD as soon as they recognize symptoms and weight gain, maintaining his proper diet, taking medications as prescribed, joining rehab when able. Patient provided with CHF Zone Management tool and CHF symptoms magnet. Discussed importance of lifestyle changes: call early for sympotms    PATIENT/CAREGIVER TEACHING:    Level of patient/caregiver understanding able to:   [x ] Verbalize understanding [ ] Demonstrate understanding [ ] Teach back   [ ] Needs reinforcement [ ] Other:       Time spent teachin minutes    1. WEIGHT: Admit Weight: 145 lb (65.8 kg)      Today  Weight: 155 lb 1.6 oz (70.4 kg)   2. I/O     Intake/Output Summary (Last 24 hours) at 2021 1355  Last data filed at 2021 1215  Gross per 24 hour   Intake 10 ml   Output 2000 ml   Net -1990 ml       Recommendations:   1.  Patient will need a follow up appointment  2. Educate further on fluid restriction 48 oz- 64 oz during inpatient stay so he can understand how to measure intake at home. 3. Continue to educate on S/S.   4. Emphasize daily weights, diet, and knowing when and who to call  5. Provided patient with CHF Resource Line for questions and concerns.        Delmy Mendoza 4/28/2021 1:55 PM

## 2021-04-28 NOTE — PROGRESS NOTES
Clinical Pharmacy Note: Pharmacy to Dose Vancomycin    Yolande Long is a 60 y.o. female started on Vancomycin for skin and soft tissue infection; consult received from CHINA Mata CNP to manage therapy. Also receiving the following antibiotics: Cefepime and Flagyl. Goal Trough Level: 10-15 mcg/mL    Assessment/Plan:  Initiate vancomycin 1000 mg IV every 12 hours. A vancomycin trough has been ordered for 4/30 @ 0530, prior to the 4th dose. Changes in regimen will be determined based on culture results, renal function, and clinical response. Pharmacy will continue to monitor and adjust regimen as necessary. Allergies:  Darvocet [propoxyphene n-acetaminophen], Naprosyn [naproxen], and Ultram [tramadol hcl]         Recent Labs     04/27/21  1518 04/28/21  0511   CREATININE <0.5* <0.5*       Recent Labs     04/27/21  1518   WBC 5.8       Ht Readings from Last 1 Encounters:   04/27/21 5' 2\" (1.575 m)        Wt Readings from Last 1 Encounters:   04/28/21 155 lb 1.6 oz (70.4 kg)         CrCl cannot be calculated (This lab value cannot be used to calculate CrCl because it is not a number: <0.5).       Thank you for the consult,    Tabitha Rodriguez, PharmD  PGY-1 Pharmacy Resident  [id number]
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
## 2021-04-28 NOTE — PROGRESS NOTES
Physician Progress Note      PATIENT:               Rohan Proctor  CSN #:                  [id number]  :                       1965  ADMIT DATE:       2021 2:40 PM  [address] [place] DATE:  RESPONDING  PROVIDER #:        Shanthi Marshall          QUERY TEXT:    Pt admitted with acute on chronic systolic/diastolic HF . Pt noted to have   home oxygen therapy. If possible, please document in the progress notes and   discharge summary if you are evaluating and/or treating any of the following: The medical record reflects the following:  Risk Factors: acute on chronic systolic/diastolic HF  Clinical Indicators: per ED Provider \"Has history of CHF on oxygen   chronically. \",  RR 20 or < pulse oxygen level % on 4L NC  Treatment: Home oxygen therapy and IV Lasix 40mg IV BID  Options provided:  -- Chronic respiratory failure with hypoxia  -- Chronic respiratory failure with hypercapnia  -- Other - I will add my own diagnosis  -- Disagree - Not applicable / Not valid  -- Disagree - Clinically unable to determine / Unknown  -- Refer to Clinical Documentation Reviewer    PROVIDER RESPONSE TEXT:    This patient has chronic respiratory failure with hypoxia.     Query created by: Cassidy Elizalde on 2021 2:20 PM      Electronically signed by:  Shanthi Marshall 2021 2:33 PM

## 2021-04-29 LAB
ANION GAP SERPL CALCULATED.3IONS-SCNC: 8 MMOL/L (ref 3–16)
BASOPHILS ABSOLUTE: 0 K/UL (ref 0–0.2)
BASOPHILS RELATIVE PERCENT: 0.5 %
BUN BLDV-MCNC: 12 MG/DL (ref 7–20)
CALCIUM SERPL-MCNC: 8.2 MG/DL (ref 8.3–10.6)
CHLORIDE BLD-SCNC: 96 MMOL/L (ref 99–110)
CO2: 32 MMOL/L (ref 21–32)
CREAT SERPL-MCNC: <0.5 MG/DL (ref 0.6–1.1)
EOSINOPHILS ABSOLUTE: 0.2 K/UL (ref 0–0.6)
EOSINOPHILS RELATIVE PERCENT: 2.5 %
GFR AFRICAN AMERICAN: >60
GFR NON-AFRICAN AMERICAN: >60
GLUCOSE BLD-MCNC: 100 MG/DL (ref 70–99)
GLUCOSE BLD-MCNC: 138 MG/DL (ref 70–99)
GLUCOSE BLD-MCNC: 146 MG/DL (ref 70–99)
GLUCOSE BLD-MCNC: 159 MG/DL (ref 70–99)
GLUCOSE BLD-MCNC: 85 MG/DL (ref 70–99)
HCT VFR BLD CALC: 34.2 % (ref 36–48)
HEMOGLOBIN: 10.5 G/DL (ref 12–16)
LV EF: 28 %
LVEF MODALITY: NORMAL
LYMPHOCYTES ABSOLUTE: 1.6 K/UL (ref 1–5.1)
LYMPHOCYTES RELATIVE PERCENT: 23.2 %
MAGNESIUM: 1.5 MG/DL (ref 1.8–2.4)
MCH RBC QN AUTO: 23.7 PG (ref 26–34)
MCHC RBC AUTO-ENTMCNC: 30.6 G/DL (ref 31–36)
MCV RBC AUTO: 77.5 FL (ref 80–100)
MONOCYTES ABSOLUTE: 0.6 K/UL (ref 0–1.3)
MONOCYTES RELATIVE PERCENT: 9.1 %
MRSA SCREEN RT-PCR: NORMAL
NEUTROPHILS ABSOLUTE: 4.5 K/UL (ref 1.7–7.7)
NEUTROPHILS RELATIVE PERCENT: 64.7 %
PDW BLD-RTO: 18 % (ref 12.4–15.4)
PERFORMED ON: ABNORMAL
PLATELET # BLD: 139 K/UL (ref 135–450)
PMV BLD AUTO: 10 FL (ref 5–10.5)
POTASSIUM SERPL-SCNC: 3.9 MMOL/L (ref 3.5–5.1)
PRO-BNP: 2955 PG/ML (ref 0–124)
RBC # BLD: 4.41 M/UL (ref 4–5.2)
SODIUM BLD-SCNC: 136 MMOL/L (ref 136–145)
URINE CULTURE, ROUTINE: NORMAL
WBC # BLD: 6.9 K/UL (ref 4–11)

## 2021-04-29 PROCEDURE — 6370000000 HC RX 637 (ALT 250 FOR IP): Performed by: INTERNAL MEDICINE

## 2021-04-29 PROCEDURE — 6360000002 HC RX W HCPCS: Performed by: NURSE PRACTITIONER

## 2021-04-29 PROCEDURE — 2500000003 HC RX 250 WO HCPCS: Performed by: NURSE PRACTITIONER

## 2021-04-29 PROCEDURE — 83880 ASSAY OF NATRIURETIC PEPTIDE: CPT

## 2021-04-29 PROCEDURE — 36415 COLL VENOUS BLD VENIPUNCTURE: CPT

## 2021-04-29 PROCEDURE — 97535 SELF CARE MNGMENT TRAINING: CPT

## 2021-04-29 PROCEDURE — 80048 BASIC METABOLIC PNL TOTAL CA: CPT

## 2021-04-29 PROCEDURE — 93306 TTE W/DOPPLER COMPLETE: CPT

## 2021-04-29 PROCEDURE — 97165 OT EVAL LOW COMPLEX 30 MIN: CPT

## 2021-04-29 PROCEDURE — 6360000002 HC RX W HCPCS: Performed by: INTERNAL MEDICINE

## 2021-04-29 PROCEDURE — 6370000000 HC RX 637 (ALT 250 FOR IP): Performed by: NURSE PRACTITIONER

## 2021-04-29 PROCEDURE — 97530 THERAPEUTIC ACTIVITIES: CPT

## 2021-04-29 PROCEDURE — 1200000000 HC SEMI PRIVATE

## 2021-04-29 PROCEDURE — 85025 COMPLETE CBC W/AUTO DIFF WBC: CPT

## 2021-04-29 PROCEDURE — 2580000003 HC RX 258: Performed by: NURSE PRACTITIONER

## 2021-04-29 PROCEDURE — 2580000003 HC RX 258: Performed by: INTERNAL MEDICINE

## 2021-04-29 PROCEDURE — 99233 SBSQ HOSP IP/OBS HIGH 50: CPT | Performed by: NURSE PRACTITIONER

## 2021-04-29 PROCEDURE — 83735 ASSAY OF MAGNESIUM: CPT

## 2021-04-29 PROCEDURE — 97161 PT EVAL LOW COMPLEX 20 MIN: CPT

## 2021-04-29 RX ORDER — CARVEDILOL 3.12 MG/1
3.12 TABLET ORAL 2 TIMES DAILY WITH MEALS
Status: DISCONTINUED | OUTPATIENT
Start: 2021-04-29 | End: 2021-04-30 | Stop reason: HOSPADM

## 2021-04-29 RX ORDER — MAGNESIUM SULFATE IN WATER 40 MG/ML
2000 INJECTION, SOLUTION INTRAVENOUS ONCE
Status: COMPLETED | OUTPATIENT
Start: 2021-04-29 | End: 2021-04-29

## 2021-04-29 RX ADMIN — INSULIN GLARGINE 15 UNITS: 100 INJECTION, SOLUTION SUBCUTANEOUS at 20:34

## 2021-04-29 RX ADMIN — METRONIDAZOLE 500 MG: 500 INJECTION, SOLUTION INTRAVENOUS at 20:26

## 2021-04-29 RX ADMIN — SODIUM CHLORIDE 25 ML: 9 INJECTION, SOLUTION INTRAVENOUS at 22:05

## 2021-04-29 RX ADMIN — SODIUM CHLORIDE 25 ML: 9 INJECTION, SOLUTION INTRAVENOUS at 18:53

## 2021-04-29 RX ADMIN — INSULIN GLARGINE 15 UNITS: 100 INJECTION, SOLUTION SUBCUTANEOUS at 10:13

## 2021-04-29 RX ADMIN — ENOXAPARIN SODIUM 40 MG: 40 INJECTION SUBCUTANEOUS at 10:17

## 2021-04-29 RX ADMIN — VANCOMYCIN HYDROCHLORIDE 1000 MG: 1 INJECTION, POWDER, LYOPHILIZED, FOR SOLUTION INTRAVENOUS at 05:50

## 2021-04-29 RX ADMIN — LISINOPRIL 5 MG: 5 TABLET ORAL at 10:17

## 2021-04-29 RX ADMIN — VANCOMYCIN HYDROCHLORIDE 1000 MG: 1 INJECTION, POWDER, LYOPHILIZED, FOR SOLUTION INTRAVENOUS at 22:06

## 2021-04-29 RX ADMIN — SODIUM CHLORIDE 25 ML: 9 INJECTION, SOLUTION INTRAVENOUS at 14:07

## 2021-04-29 RX ADMIN — MAGNESIUM SULFATE HEPTAHYDRATE 2000 MG: 40 INJECTION, SOLUTION INTRAVENOUS at 15:32

## 2021-04-29 RX ADMIN — CEFEPIME HYDROCHLORIDE 2000 MG: 2 INJECTION, POWDER, FOR SOLUTION INTRAVENOUS at 18:54

## 2021-04-29 RX ADMIN — SODIUM CHLORIDE 25 ML: 9 INJECTION, SOLUTION INTRAVENOUS at 10:10

## 2021-04-29 RX ADMIN — FUROSEMIDE 40 MG: 10 INJECTION, SOLUTION INTRAMUSCULAR; INTRAVENOUS at 10:16

## 2021-04-29 RX ADMIN — Medication 10 ML: at 10:19

## 2021-04-29 RX ADMIN — PREGABALIN 100 MG: 100 CAPSULE ORAL at 20:22

## 2021-04-29 RX ADMIN — Medication 10 ML: at 20:22

## 2021-04-29 RX ADMIN — METRONIDAZOLE 500 MG: 500 INJECTION, SOLUTION INTRAVENOUS at 11:20

## 2021-04-29 RX ADMIN — PREGABALIN 100 MG: 100 CAPSULE ORAL at 10:16

## 2021-04-29 RX ADMIN — TICAGRELOR 90 MG: 90 TABLET ORAL at 10:15

## 2021-04-29 RX ADMIN — METRONIDAZOLE 500 MG: 500 INJECTION, SOLUTION INTRAVENOUS at 01:18

## 2021-04-29 RX ADMIN — PREGABALIN 100 MG: 100 CAPSULE ORAL at 14:05

## 2021-04-29 RX ADMIN — ASPIRIN 81 MG: 81 TABLET, COATED ORAL at 10:15

## 2021-04-29 RX ADMIN — CARVEDILOL 3.12 MG: 3.12 TABLET, FILM COATED ORAL at 17:37

## 2021-04-29 RX ADMIN — TICAGRELOR 90 MG: 90 TABLET ORAL at 20:22

## 2021-04-29 RX ADMIN — SODIUM CHLORIDE 25 ML: 9 INJECTION, SOLUTION INTRAVENOUS at 20:25

## 2021-04-29 RX ADMIN — ATORVASTATIN CALCIUM 40 MG: 40 TABLET, FILM COATED ORAL at 10:16

## 2021-04-29 RX ADMIN — CEFEPIME HYDROCHLORIDE 2000 MG: 2 INJECTION, POWDER, FOR SOLUTION INTRAVENOUS at 10:11

## 2021-04-29 RX ADMIN — CEFEPIME HYDROCHLORIDE 2000 MG: 2 INJECTION, POWDER, FOR SOLUTION INTRAVENOUS at 00:39

## 2021-04-29 ASSESSMENT — PAIN SCALES - GENERAL
PAINLEVEL_OUTOF10: 0
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
## 2021-04-29 NOTE — PROGRESS NOTES
The [place] Sleepiness Scale       The [place] Sleepiness Scale is widely used in the field of sleep medicine as a subjective measure of a patient's sleepiness. The test is a list of eight situations in which you rate your tendency to become sleepy on a scale of 0, no chance to 3, high chance of dozing. Your score is based on a scale of 0 to 24. The scale estimates whether you are experiencing excessive sleepiness that possibly requires medical attention. How Sleepy Are You? How sleepy are you to doze off or fall asleep in the following situations? You should rate your chances of dozing off, not just feeling tired. Even if you have not done some of these things recently try to determine how they would have affected you.  For each situation, decide whether or not you would have:     0 = No chance of dozing 1 = Slight chance of dozing   2 = Moderate chance of  dozing 3 = High change of dozing       Situation                                                                                     Chance of Dozing    Sitting and reading  0 =  []  1 =    [x] 2 =    [] 3 =    []    Watching TV  0 =  []  1 =    [x] 2 =    [] 3 =    []      Sitting inactive in public place (e.g., a theater or a meeting)  0 =  []  1 =    [] 2 =    [] 3 =    [x]    As a passenger in a car for an hour without a break          0  =  []  1 =    [] 2 =    [x] 3 =    []    Lying down to rest in the afternoon when circumstances permit    0 =  []  1 =    [] 2 =    [x] 3 =    []    Sitting and talking to someone  0 =  []  1 =    [x] 2 =    [] 3 =    []      Sitting quietly after a lunch without alcohol  0 =  []  1 =    [] 2 =    [] 3 =    [x]    In a car, while stopped for a few minutes in traffic                                                                      0 =  [x]  1 =    [] 2 =    [] 3 =    []    Total Score = 13  If your total score is 10 or greater, you are experiencing excessive sleepiness and should consider seeking a medical follow-up. Take a copy of this screening test to your primary care physician on your next office visit. Interpretation:      0 -   7: It is unlikely that you are abnormally sleepy. 8 -   9: You have an average amount of daytime sleepiness. 10 - 15: You may be excessively sleepy depending on the situation. You may want to consider seeking medical attention. 16 - 24: You are excessively sleepy and should consider seeking medical attention.       Electronically signed by Pelon Ballard on 4/29/2021 at 9:27 AM

## 2021-04-29 NOTE — PROGRESS NOTES
[facility]  HEART FAILURE  Progress Note      Admit Date 4/27/2021     Reason for Consult:      Reason for Consultation/Chief Complaint: edema    HPI:    Lobito Tan is a 60 y.o. female with PMH opiate abuse and IVDU on suboxone, PVD s/p r AKA, HTN, HLD, DM, CAD, PCI feb 2020, HFpEF admitted with edema and SOB. She has diuresed 3.5L on IV lasix and is feeling much better      Subjective:  Patient is being seen for CHF. There were no acute overnight cardiac events. Today Ms. Griffin Thibodeaux denies chest pain or palpitations, states SOB and edema improving but not back to baseline.        Baseline Weight: pt does not know   Wt Readings from Last 3 Encounters:   04/29/21 145 lb 4.8 oz (65.9 kg)   09/25/20 119 lb 1.6 oz (54 kg)   08/29/20 122 lb 4.8 oz (55.5 kg)          Objective:   /82   Pulse 92   Temp 97.3 °F (36.3 °C) (Oral)   Resp 20   Ht 5' 2\" (1.575 m)   Wt 145 lb 4.8 oz (65.9 kg)   LMP 11/15/2017   SpO2 97%   BMI 26.58 kg/m²       Intake/Output Summary (Last 24 hours) at 4/29/2021 1008  Last data filed at 4/29/2021 0935  Gross per 24 hour   Intake 1298.75 ml   Output 5425 ml   Net -4126.25 ml      Wt Readings from Last 3 Encounters:   04/29/21 145 lb 4.8 oz (65.9 kg)   09/25/20 119 lb 1.6 oz (54 kg)   08/29/20 122 lb 4.8 oz (55.5 kg)      In: 698.8 [P.O.:480; I.V.:2.6]  Out: 4125       Physical Exam:  General Appearance:  Non-obese/Well Nourished  Respiratory:  · Resp Auscultation: bilateral wheezes  Cardiovascular:  · Auscultation: Regular rate and rhythm, normal S1S2, no m/g/r/c  · Palpation: Normal    · Pedal Pulses: 2+ and equal   Abdomen:  · Soft, NT, ND, + bs  Extremities:  · No Cyanosis or Clubbing  · Extremities: Trace edema left, R AKA  Neurological/Psychiatric:  · Oriented to time, place, and person  · Non-anxious    MEDICATIONS:   Scheduled Meds:   Scheduled Meds:   aspirin  81 mg Oral Daily    atorvastatin  40 mg Oral Daily    insulin glargine  15 Units Subcutaneous BID    ticagrelor  90 mg Oral BID    pregabalin  100 mg Oral TID    sodium chloride flush  5-40 mL Intravenous 2 times per day    enoxaparin  40 mg Subcutaneous Daily    lisinopril  5 mg Oral Daily    furosemide  40 mg Intravenous BID    cefepime  2,000 mg Intravenous Q8H    vancomycin  1,000 mg Intravenous Q12H    metroNIDAZOLE  500 mg Intravenous Q8H     Continuous Infusions:   sodium chloride 25 mL (04/28/21 1853)    dextrose       PRN Meds:.sodium chloride flush, sodium chloride, promethazine **OR** ondansetron, acetaminophen **OR** acetaminophen, potassium chloride, magnesium sulfate, glucose, dextrose, glucagon (rDNA), dextrose  Continuous Infusions:   sodium chloride 25 mL (04/28/21 1853)    dextrose         Intake/Output Summary (Last 24 hours) at 4/29/2021 1008  Last data filed at 4/29/2021 0935  Gross per 24 hour   Intake 1298.75 ml   Output 5425 ml   Net -4126.25 ml       Lab Data:  CBC:   Lab Results   Component Value Date    WBC 6.9 04/29/2021    HGB 10.5 04/29/2021     04/29/2021     BMP:  Lab Results   Component Value Date     04/29/2021    K 3.9 04/29/2021    K 3.9 04/27/2021    CL 96 04/29/2021    CO2 32 04/29/2021    BUN 12 04/29/2021    CREATININE <0.5 04/29/2021    GLUCOSE 85 04/29/2021    GLUCOSE 329 05/20/2011     INR:   Lab Results   Component Value Date    INR 0.98 08/02/2020    INR 0.94 08/01/2020    INR 1.05 01/27/2011        CARDIAC LABS  ENZYMES:  Recent Labs     04/27/21  1518   TROPONINI <0.01     FASTING LIPID PANEL:  Lab Results   Component Value Date    HDL 34 02/20/2020    LDLCALC 64 02/20/2020    TRIG 117 02/20/2020    TSH 1.73 08/29/2020     LIVER PROFILE:  Lab Results   Component Value Date    AST 19 04/27/2021    AST 22 11/16/2020    ALT <5 04/27/2021    ALT 16 11/16/2020     BNP:   Lab Results   Component Value Date    PROBNP 4,591 04/27/2021    PROBNP 1,799 09/24/2020    PROBNP 5,755 09/22/2020     Iron Studies:    Lab Results   Component Value Date

## 2021-04-29 NOTE — PLAN OF CARE
Problem: Falls - Risk of:  Goal: Will remain free from falls  Outcome: Ongoing  Goal: Absence of physical injury  Outcome: Ongoing     Problem: Pain:  Goal: Pain level will decrease  Outcome: Ongoing  Goal: Control of acute pain  Outcome: Ongoing  Goal: Control of chronic pain  Outcome: Ongoing     Problem: Skin Integrity:  Goal: Will show no infection signs and symptoms  Outcome: Ongoing  Goal: Absence of new skin breakdown  Outcome: Ongoing     Problem: Discharge Planning:  Goal: Discharged to appropriate level of care  Outcome: Ongoing     Problem:  Activity Intolerance:  Goal: Ability to tolerate increased activity will improve  Outcome: Ongoing     Problem: Cardiac Output - Decreased:  Goal: Hemodynamic stability will improve  Outcome: Ongoing     Problem: Fluid Volume - Excess:  Goal: Control of fluid volume excess will improve  Outcome: Ongoing     Problem: Gas Exchange - Impaired:  Goal: Levels of oxygenation will improve  Outcome: Ongoing     Problem: Mood - Altered:  Goal: Mood stable  Outcome: Ongoing     Problem: Tissue Perfusion - Cardiopulmonary, Altered:  Goal: Absence of angina  Outcome: Ongoing  Goal: Circulation will improve to fullest extent possible  Outcome: Ongoing  Goal: Hemodynamic stability will improve  Outcome: Ongoing     Problem: Tobacco Use:  Goal: Will participate in inpatient tobacco-use cessation counseling  Outcome: Ongoing     Problem: Venous Thromboembolism:  Goal: Will show no signs or symptoms of venous thromboembolism  Outcome: Ongoing  Goal: Absence of signs or symptoms of impaired coagulation  Outcome: Ongoing     Problem: Musculor/Skeletal Functional Status  Goal: Highest potential functional level  Outcome: Ongoing  Goal: Absence of falls  Outcome: Ongoing
Description: Hemodynamic stability will improve  Outcome: Ongoing     Problem: Tobacco Use:  Goal: Will participate in inpatient tobacco-use cessation counseling  Description: Will participate in inpatient tobacco-use cessation counseling  Outcome: Ongoing     Problem: Venous Thromboembolism:  Goal: Will show no signs or symptoms of venous thromboembolism  Description: Will show no signs or symptoms of venous thromboembolism  Outcome: Ongoing  Goal: Absence of signs or symptoms of impaired coagulation  Description: Absence of signs or symptoms of impaired coagulation  Outcome: Ongoing

## 2021-04-30 ENCOUNTER — TELEPHONE (OUTPATIENT)
Dept: OTHER | Age: 56
End: 2021-04-30

## 2021-04-30 VITALS
DIASTOLIC BLOOD PRESSURE: 90 MMHG | HEIGHT: 62 IN | RESPIRATION RATE: 18 BRPM | SYSTOLIC BLOOD PRESSURE: 134 MMHG | BODY MASS INDEX: 26.44 KG/M2 | WEIGHT: 143.7 LBS | HEART RATE: 81 BPM | TEMPERATURE: 97.8 F | OXYGEN SATURATION: 95 %

## 2021-04-30 LAB
ANION GAP SERPL CALCULATED.3IONS-SCNC: 7 MMOL/L (ref 3–16)
BASOPHILS ABSOLUTE: 0 K/UL (ref 0–0.2)
BASOPHILS RELATIVE PERCENT: 0.3 %
BUN BLDV-MCNC: 12 MG/DL (ref 7–20)
CALCIUM SERPL-MCNC: 8.3 MG/DL (ref 8.3–10.6)
CHLORIDE BLD-SCNC: 99 MMOL/L (ref 99–110)
CO2: 31 MMOL/L (ref 21–32)
CREAT SERPL-MCNC: <0.5 MG/DL (ref 0.6–1.1)
EOSINOPHILS ABSOLUTE: 0.2 K/UL (ref 0–0.6)
EOSINOPHILS RELATIVE PERCENT: 2.6 %
GFR AFRICAN AMERICAN: >60
GFR NON-AFRICAN AMERICAN: >60
GLUCOSE BLD-MCNC: 124 MG/DL (ref 70–99)
GLUCOSE BLD-MCNC: 171 MG/DL (ref 70–99)
GLUCOSE BLD-MCNC: 178 MG/DL (ref 70–99)
GRAM STAIN RESULT: NORMAL
HCT VFR BLD CALC: 35 % (ref 36–48)
HEMOGLOBIN: 10.7 G/DL (ref 12–16)
LYMPHOCYTES ABSOLUTE: 1.4 K/UL (ref 1–5.1)
LYMPHOCYTES RELATIVE PERCENT: 19.4 %
MAGNESIUM: 1.9 MG/DL (ref 1.8–2.4)
MCH RBC QN AUTO: 23.2 PG (ref 26–34)
MCHC RBC AUTO-ENTMCNC: 30.5 G/DL (ref 31–36)
MCV RBC AUTO: 76.1 FL (ref 80–100)
MONOCYTES ABSOLUTE: 0.6 K/UL (ref 0–1.3)
MONOCYTES RELATIVE PERCENT: 7.8 %
NEUTROPHILS ABSOLUTE: 4.9 K/UL (ref 1.7–7.7)
NEUTROPHILS RELATIVE PERCENT: 69.9 %
PDW BLD-RTO: 17.8 % (ref 12.4–15.4)
PERFORMED ON: ABNORMAL
PERFORMED ON: ABNORMAL
PLATELET # BLD: 140 K/UL (ref 135–450)
PMV BLD AUTO: 9.7 FL (ref 5–10.5)
POTASSIUM SERPL-SCNC: 4.4 MMOL/L (ref 3.5–5.1)
RBC # BLD: 4.61 M/UL (ref 4–5.2)
SODIUM BLD-SCNC: 137 MMOL/L (ref 136–145)
VANCOMYCIN TROUGH: 13.4 UG/ML (ref 10–20)
WBC # BLD: 7.1 K/UL (ref 4–11)
WOUND/ABSCESS: NORMAL

## 2021-04-30 PROCEDURE — 2500000003 HC RX 250 WO HCPCS: Performed by: NURSE PRACTITIONER

## 2021-04-30 PROCEDURE — 83735 ASSAY OF MAGNESIUM: CPT

## 2021-04-30 PROCEDURE — 6360000002 HC RX W HCPCS: Performed by: INTERNAL MEDICINE

## 2021-04-30 PROCEDURE — 99232 SBSQ HOSP IP/OBS MODERATE 35: CPT | Performed by: NURSE PRACTITIONER

## 2021-04-30 PROCEDURE — 6370000000 HC RX 637 (ALT 250 FOR IP): Performed by: INTERNAL MEDICINE

## 2021-04-30 PROCEDURE — 2580000003 HC RX 258: Performed by: NURSE PRACTITIONER

## 2021-04-30 PROCEDURE — 36415 COLL VENOUS BLD VENIPUNCTURE: CPT

## 2021-04-30 PROCEDURE — 94680 O2 UPTK RST&XERS DIR SIMPLE: CPT

## 2021-04-30 PROCEDURE — 80202 ASSAY OF VANCOMYCIN: CPT

## 2021-04-30 PROCEDURE — 6360000002 HC RX W HCPCS: Performed by: NURSE PRACTITIONER

## 2021-04-30 PROCEDURE — 85025 COMPLETE CBC W/AUTO DIFF WBC: CPT

## 2021-04-30 PROCEDURE — 6370000000 HC RX 637 (ALT 250 FOR IP): Performed by: NURSE PRACTITIONER

## 2021-04-30 PROCEDURE — 2580000003 HC RX 258: Performed by: INTERNAL MEDICINE

## 2021-04-30 PROCEDURE — 80048 BASIC METABOLIC PNL TOTAL CA: CPT

## 2021-04-30 RX ORDER — AMOXICILLIN AND CLAVULANATE POTASSIUM 875; 125 MG/1; MG/1
1 TABLET, FILM COATED ORAL EVERY 12 HOURS SCHEDULED
Qty: 14 TABLET | Refills: 0 | Status: SHIPPED | OUTPATIENT
Start: 2021-04-30 | End: 2021-05-07

## 2021-04-30 RX ORDER — AMOXICILLIN AND CLAVULANATE POTASSIUM 875; 125 MG/1; MG/1
1 TABLET, FILM COATED ORAL EVERY 12 HOURS SCHEDULED
Status: DISCONTINUED | OUTPATIENT
Start: 2021-04-30 | End: 2021-04-30 | Stop reason: HOSPADM

## 2021-04-30 RX ORDER — FUROSEMIDE 40 MG/1
40 TABLET ORAL DAILY
Qty: 30 TABLET | Refills: 0 | Status: SHIPPED | OUTPATIENT
Start: 2021-04-30

## 2021-04-30 RX ORDER — FUROSEMIDE 40 MG/1
40 TABLET ORAL DAILY
Status: DISCONTINUED | OUTPATIENT
Start: 2021-04-30 | End: 2021-04-30 | Stop reason: HOSPADM

## 2021-04-30 RX ORDER — LISINOPRIL 5 MG/1
5 TABLET ORAL DAILY
Qty: 30 TABLET | Refills: 0 | Status: SHIPPED | OUTPATIENT
Start: 2021-04-30

## 2021-04-30 RX ADMIN — LISINOPRIL 5 MG: 5 TABLET ORAL at 08:41

## 2021-04-30 RX ADMIN — CARVEDILOL 3.12 MG: 3.12 TABLET, FILM COATED ORAL at 08:42

## 2021-04-30 RX ADMIN — AMOXICILLIN AND CLAVULANATE POTASSIUM 1 TABLET: 875; 125 TABLET, FILM COATED ORAL at 08:41

## 2021-04-30 RX ADMIN — CEFEPIME HYDROCHLORIDE 2000 MG: 2 INJECTION, POWDER, FOR SOLUTION INTRAVENOUS at 00:28

## 2021-04-30 RX ADMIN — ACETAMINOPHEN 650 MG: 325 TABLET ORAL at 02:47

## 2021-04-30 RX ADMIN — METRONIDAZOLE 500 MG: 500 INJECTION, SOLUTION INTRAVENOUS at 01:27

## 2021-04-30 RX ADMIN — FUROSEMIDE 40 MG: 40 TABLET ORAL at 08:53

## 2021-04-30 RX ADMIN — ATORVASTATIN CALCIUM 40 MG: 40 TABLET, FILM COATED ORAL at 08:41

## 2021-04-30 RX ADMIN — VANCOMYCIN HYDROCHLORIDE 1000 MG: 1 INJECTION, POWDER, LYOPHILIZED, FOR SOLUTION INTRAVENOUS at 06:15

## 2021-04-30 RX ADMIN — SODIUM CHLORIDE 25 ML: 9 INJECTION, SOLUTION INTRAVENOUS at 00:27

## 2021-04-30 RX ADMIN — INSULIN GLARGINE 15 UNITS: 100 INJECTION, SOLUTION SUBCUTANEOUS at 08:42

## 2021-04-30 RX ADMIN — SODIUM CHLORIDE 25 ML: 9 INJECTION, SOLUTION INTRAVENOUS at 06:14

## 2021-04-30 RX ADMIN — PREGABALIN 100 MG: 100 CAPSULE ORAL at 08:41

## 2021-04-30 RX ADMIN — PREGABALIN 100 MG: 100 CAPSULE ORAL at 13:03

## 2021-04-30 RX ADMIN — TICAGRELOR 90 MG: 90 TABLET ORAL at 08:41

## 2021-04-30 RX ADMIN — ACETAMINOPHEN 650 MG: 325 TABLET ORAL at 08:52

## 2021-04-30 RX ADMIN — ASPIRIN 81 MG: 81 TABLET, COATED ORAL at 08:41

## 2021-04-30 ASSESSMENT — PAIN SCALES - GENERAL: PAINLEVEL_OUTOF10: 7

## 2021-04-30 ASSESSMENT — PAIN DESCRIPTION - LOCATION: LOCATION: ABDOMEN

## 2021-04-30 ASSESSMENT — PAIN DESCRIPTION - PAIN TYPE: TYPE: ACUTE PAIN
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
## 2021-04-30 NOTE — PROGRESS NOTES
Home Oxygen Evaluation            O2 sat on 2LPM at rest =100%              O2 sat on 2LPM oxygen with exertion = 98%
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
## 2021-04-30 NOTE — PROGRESS NOTES
Patient discharged to home at this time. Transported downstairs by [employer]. IV and tele removed. Educated on discharge instructions and verbalized understanding. Patient discharged with medications delivered from outpatient pharmacy.

## 2021-04-30 NOTE — PROGRESS NOTES
CLINICAL PHARMACY NOTE: MEDS TO [address] Select Patient?: No  Total # of Prescriptions Filled: 3   The following medications were delivered to the patient:  · Lasix 40 mg  · Lisinopril 5 mg  · Augemtin 475125  Total # of Interventions Completed: 0  Time Spent (min): 30    Additional Documentation:    Delivered to Patient  Ngoc Nieves CPhT

## 2021-04-30 NOTE — PROGRESS NOTES
[facility]  HEART FAILURE  Progress Note      Admit Date 4/27/2021     Reason for Consult:      Reason for Consultation/Chief Complaint: edema    HPI:    Yolande Long is a 60 y.o. female with PMH opiate abuse and IVDU on suboxone, PVD s/p r AKA, HTN, HLD, DM, CAD, PCI feb 2020, HFpEF admitted with edema and SOB. She has diuresed 6L on IV lasix and is feeling back to baseline today. Subjective:  Patient is being seen for CHF. There were no acute overnight cardiac events. Today Ms. Serna denies chest pain or palpitations, or SOB      Baseline Weight: pt does not know   Wt Readings from Last 3 Encounters:   04/30/21 143 lb 11.2 oz (65.2 kg)   09/25/20 119 lb 1.6 oz (54 kg)   08/29/20 122 lb 4.8 oz (55.5 kg)          Objective:   BP (!) 135/97   Pulse 93   Temp 97.4 °F (36.3 °C) (Oral)   Resp 18   Ht 5' 2\" (1.575 m)   Wt 143 lb 11.2 oz (65.2 kg)   LMP 11/15/2017   SpO2 100%   BMI 26.28 kg/m²       Intake/Output Summary (Last 24 hours) at 4/30/2021 0804  Last data filed at 4/30/2021 0021  Gross per 24 hour   Intake 2136.37 ml   Output 4670 ml   Net -2533.63 ml      Wt Readings from Last 3 Encounters:   04/30/21 143 lb 11.2 oz (65.2 kg)   09/25/20 119 lb 1.6 oz (54 kg)   08/29/20 122 lb 4.8 oz (55.5 kg)      In: 1280 [P.O.:480]  Out: 5185       Physical Exam:  General Appearance:  Non-obese/Well Nourished  Respiratory:  · Resp Auscultation: CTA  Cardiovascular:  · Auscultation: Regular rate and rhythm, normal S1S2, no m/g/r/c  · Palpation: Normal    · Pedal Pulses: 2+ and equal   Abdomen:  · Soft, NT, ND, + bs  Extremities:  · No Cyanosis or Clubbing  · Extremities: No edema,  R AKA  Neurological/Psychiatric:  · Oriented to time, place, and person  · Non-anxious    MEDICATIONS:   Scheduled Meds:   Scheduled Meds:   carvedilol  3.125 mg Oral BID WC    aspirin  81 mg Oral Daily    atorvastatin  40 mg Oral Daily    insulin glargine  15 Units Subcutaneous BID    ticagrelor  90 mg Oral BID    FERRITIN 40.8 08/02/2020     Lab Results   Component Value Date    IRON 39 04/28/2021    TIBC 344 04/28/2021    FERRITIN 193.4 (H) 08/21/2020          1. WEIGHT: Admit Weight: 145 lb (65.8 kg)      Today  Weight: 143 lb 11.2 oz (65.2 kg)   2. I/O     Intake/Output Summary (Last 24 hours) at 4/30/2021 0804  Last data filed at 4/30/2021 2265  Gross per 24 hour   Intake 2136.37 ml   Output 4670 ml   Net -2533.63 ml       Cardiac Testing:   Echo:  8/24/20:  Left ventricular size is mildly increased.   Left ventricular systolic function is mildly to moderately depressed with   ejection fraction estimated at 40%.   Anteroapical,anteroseptal, inferoapical hypokinesis.   Grade II diastolic dysfunction with elevated LV filling pressures.   Mild mitral regurgitation.   Inadequate tricuspid regurgitation to estimate systolic pulmonary artery   pressure. Assessment/Plan:     1. AHF- compensated, ok for discharge on lasix 40mg/day with extra 20mg prn wt gain 3lb or more - discussed with pt, will f/u in CHF clinic next week. 2. HTN- improved with diuresis, continue ACE  3.  CAD-  BB, brilinta      I appreciate the opportunity of cooperating in the care of this individual.    Aminta Bolden, GILLES, [address] 4/30/2021, 8:04 AM  Heart Failure  [facility] [address] [place] [address], [address]  Ph: [phone]      Core Measures:   · Discharge instructions:   · LVEF documented:   · ACEI for LV dysfunction:   · Smoking Cessation:

## 2021-05-02 LAB
BLOOD CULTURE, ROUTINE: NORMAL
CULTURE, BLOOD 2: NORMAL

## 2021-05-03 ENCOUNTER — TELEPHONE (OUTPATIENT)
Dept: OTHER | Age: 56
End: 2021-05-03

## 2021-05-03 NOTE — TELEPHONE ENCOUNTER
[facility] FAILURE PROGRAM  TELEPHONE ENCOUNTER FORM    Yolande Long 1965    ASSESSMENT:   1. Baseline weight:145  lbs  2. Current weight: lbs  3. Patient weighing daily: No  4. What are your symptoms of heart failure: dyspnea, edema  5. Are you having any symptoms:  No  6. Patient knows who to call with symptoms: Yes  7. Diet history:      Patient states sodium limitation is : 3,000 mg      Patient states fluid limitation is 64 ounces  Patient following diet as instructed: Yes  8. Medication history: taking medications as instructed Yes; medication side effects noted No  9. Patient is being active at home: Yes  10. Patient knows date and time of follow up appointment: Yes  11. Patient has transportation to appointments: Yes    RECOMMENDATIONS:   1. Medication: no problems. Picked up medications. Taking without complications   2. Diet: 3,000 mg a day  3. MD/ Clinic appointment: 5/10 with [facility]  4. Other: Patient stated she is doing well. She took an extra lasix yesterday as instructed because she felt like se was swelling. Swelling back down, feels good today. Her sodium and fluid recommendations are written down on the refrigerator. She is unable to stand due to her right leg amputee. Moving around in wheelchair, trying to stay active, playing with granddaughter. Also received a family list of MD's to try to follow a PCP. Knows to call resource line for concerns.

## 2021-05-05 ENCOUNTER — TELEPHONE (OUTPATIENT)
Dept: CARDIOLOGY CLINIC | Age: 56
End: 2021-05-05

## 2021-05-05 NOTE — TELEPHONE ENCOUNTER
Her left leg is started to swell again 2 nights ago. She called doctor on call last night   . She was in the hospital last week . She is a little SOB  But she only has swelling in lower leg ankle and foot , not her abdomen . What should she do ? She doesn't want to end up in the hospital again .

## 2021-05-06 NOTE — TELEPHONE ENCOUNTER
She had instructions at [facility] d/c to take an extra 20mg lasix as needed. Has she done that? If so then increase to 40 twice a day.  Yumiko Evangelista

## 2021-05-12 NOTE — TELEPHONE ENCOUNTER
I spoke with pt and she stated that she did not follow those instructions. She stated that she was as before-doing 40 mg daily then 20 mg PRN. She stated that she did that for a couple of days and got the compression stockings and they have helped. She stated that her phone was broke - that's why her calls were unanswered. She stated that she will make an appt.

## 2021-07-16 ENCOUNTER — APPOINTMENT (OUTPATIENT)
Dept: GENERAL RADIOLOGY | Age: 56
DRG: 720 | End: 2021-07-16
Payer: COMMERCIAL

## 2021-07-16 ENCOUNTER — HOSPITAL ENCOUNTER (INPATIENT)
Age: 56
LOS: 6 days | Discharge: LEFT BEFORE DISCHARGE | DRG: 720 | End: 2021-07-22
Attending: EMERGENCY MEDICINE | Admitting: INTERNAL MEDICINE
Payer: COMMERCIAL

## 2021-07-16 ENCOUNTER — APPOINTMENT (OUTPATIENT)
Dept: CT IMAGING | Age: 56
DRG: 720 | End: 2021-07-16
Payer: COMMERCIAL

## 2021-07-16 DIAGNOSIS — R73.9 HYPERGLYCEMIA: Primary | ICD-10-CM

## 2021-07-16 DIAGNOSIS — R11.2 NON-INTRACTABLE VOMITING WITH NAUSEA, UNSPECIFIED VOMITING TYPE: ICD-10-CM

## 2021-07-16 DIAGNOSIS — J18.9 PNEUMONIA DUE TO INFECTIOUS ORGANISM, UNSPECIFIED LATERALITY, UNSPECIFIED PART OF LUNG: ICD-10-CM

## 2021-07-16 PROBLEM — E86.0 SEVERE DEHYDRATION: Status: ACTIVE | Noted: 2021-07-16

## 2021-07-16 LAB
A/G RATIO: 0.7 (ref 1.1–2.2)
ALBUMIN SERPL-MCNC: 4.2 G/DL (ref 3.4–5)
ALP BLD-CCNC: 178 U/L (ref 40–129)
ALT SERPL-CCNC: 20 U/L (ref 10–40)
ANION GAP SERPL CALCULATED.3IONS-SCNC: 10 MMOL/L (ref 3–16)
ANION GAP SERPL CALCULATED.3IONS-SCNC: 15 MMOL/L (ref 3–16)
AST SERPL-CCNC: 32 U/L (ref 15–37)
BACTERIA: ABNORMAL /HPF
BASE EXCESS VENOUS: 1.5 MMOL/L (ref -3–3)
BASOPHILS ABSOLUTE: 0 K/UL (ref 0–0.2)
BASOPHILS RELATIVE PERCENT: 0.3 %
BETA-HYDROXYBUTYRATE: 1.9 MMOL/L (ref 0–0.27)
BILIRUB SERPL-MCNC: 0.3 MG/DL (ref 0–1)
BILIRUBIN URINE: NEGATIVE
BLOOD, URINE: ABNORMAL
BUN BLDV-MCNC: 45 MG/DL (ref 7–20)
BUN BLDV-MCNC: 52 MG/DL (ref 7–20)
CALCIUM SERPL-MCNC: 8.3 MG/DL (ref 8.3–10.6)
CALCIUM SERPL-MCNC: 9.9 MG/DL (ref 8.3–10.6)
CARBOXYHEMOGLOBIN: 2.7 % (ref 0–1.5)
CHLORIDE BLD-SCNC: 103 MMOL/L (ref 99–110)
CHLORIDE BLD-SCNC: 93 MMOL/L (ref 99–110)
CLARITY: ABNORMAL
CO2: 27 MMOL/L (ref 21–32)
CO2: 29 MMOL/L (ref 21–32)
COLOR: YELLOW
COMMENT UA: ABNORMAL
CREAT SERPL-MCNC: 0.8 MG/DL (ref 0.6–1.1)
CREAT SERPL-MCNC: 1.2 MG/DL (ref 0.6–1.1)
EOSINOPHILS ABSOLUTE: 0 K/UL (ref 0–0.6)
EOSINOPHILS RELATIVE PERCENT: 0 %
GFR AFRICAN AMERICAN: 56
GFR AFRICAN AMERICAN: >60
GFR NON-AFRICAN AMERICAN: 46
GFR NON-AFRICAN AMERICAN: >60
GLOBULIN: 5.7 G/DL
GLUCOSE BLD-MCNC: 336 MG/DL (ref 70–99)
GLUCOSE BLD-MCNC: 498 MG/DL (ref 70–99)
GLUCOSE BLD-MCNC: 581 MG/DL (ref 70–99)
GLUCOSE BLD-MCNC: 749 MG/DL (ref 70–99)
GLUCOSE BLD-MCNC: >600 MG/DL (ref 70–99)
GLUCOSE URINE: >=1000 MG/DL
HCO3 VENOUS: 28.7 MMOL/L (ref 23–29)
HCT VFR BLD CALC: 50.3 % (ref 36–48)
HEMOGLOBIN: 15.8 G/DL (ref 12–16)
KETONES, URINE: ABNORMAL MG/DL
LACTIC ACID, SEPSIS: 1.4 MMOL/L (ref 0.4–1.9)
LEUKOCYTE ESTERASE, URINE: ABNORMAL
LIPASE: 14 U/L (ref 13–60)
LYMPHOCYTES ABSOLUTE: 1.8 K/UL (ref 1–5.1)
LYMPHOCYTES RELATIVE PERCENT: 14.9 %
MCH RBC QN AUTO: 25.8 PG (ref 26–34)
MCHC RBC AUTO-ENTMCNC: 31.5 G/DL (ref 31–36)
MCV RBC AUTO: 81.8 FL (ref 80–100)
METHEMOGLOBIN VENOUS: 0.2 %
MICROSCOPIC EXAMINATION: YES
MONOCYTES ABSOLUTE: 0.5 K/UL (ref 0–1.3)
MONOCYTES RELATIVE PERCENT: 4.2 %
NEUTROPHILS ABSOLUTE: 9.7 K/UL (ref 1.7–7.7)
NEUTROPHILS RELATIVE PERCENT: 80.6 %
NITRITE, URINE: NEGATIVE
O2 CONTENT, VEN: 19 VOL %
O2 SAT, VEN: 99 %
O2 THERAPY: ABNORMAL
PCO2, VEN: 55.4 MMHG (ref 40–50)
PDW BLD-RTO: 19.5 % (ref 12.4–15.4)
PERFORMED ON: ABNORMAL
PH UA: 5.5 (ref 5–8)
PH VENOUS: 7.32 (ref 7.35–7.45)
PLATELET # BLD: 175 K/UL (ref 135–450)
PMV BLD AUTO: 11.1 FL (ref 5–10.5)
PO2, VEN: 139 MMHG (ref 25–40)
POTASSIUM SERPL-SCNC: 4 MMOL/L (ref 3.5–5.1)
POTASSIUM SERPL-SCNC: 5.1 MMOL/L (ref 3.5–5.1)
PRO-BNP: 4027 PG/ML (ref 0–124)
PROCALCITONIN: 0.05 NG/ML (ref 0–0.15)
PROTEIN UA: 30 MG/DL
RBC # BLD: 6.15 M/UL (ref 4–5.2)
RBC UA: ABNORMAL /HPF (ref 0–4)
SODIUM BLD-SCNC: 137 MMOL/L (ref 136–145)
SODIUM BLD-SCNC: 140 MMOL/L (ref 136–145)
SPECIFIC GRAVITY UA: >1.03 (ref 1–1.03)
TCO2 CALC VENOUS: 68 MMOL/L
TOTAL PROTEIN: 9.9 G/DL (ref 6.4–8.2)
TROPONIN: <0.01 NG/ML
URINE REFLEX TO CULTURE: YES
URINE TYPE: ABNORMAL
UROBILINOGEN, URINE: 0.2 E.U./DL
WBC # BLD: 12 K/UL (ref 4–11)
WBC UA: >100 /HPF (ref 0–5)

## 2021-07-16 PROCEDURE — 6360000002 HC RX W HCPCS: Performed by: PHYSICIAN ASSISTANT

## 2021-07-16 PROCEDURE — 96374 THER/PROPH/DIAG INJ IV PUSH: CPT

## 2021-07-16 PROCEDURE — 96361 HYDRATE IV INFUSION ADD-ON: CPT

## 2021-07-16 PROCEDURE — 6370000000 HC RX 637 (ALT 250 FOR IP): Performed by: PHYSICIAN ASSISTANT

## 2021-07-16 PROCEDURE — 83690 ASSAY OF LIPASE: CPT

## 2021-07-16 PROCEDURE — 83880 ASSAY OF NATRIURETIC PEPTIDE: CPT

## 2021-07-16 PROCEDURE — 80053 COMPREHEN METABOLIC PANEL: CPT

## 2021-07-16 PROCEDURE — 83605 ASSAY OF LACTIC ACID: CPT

## 2021-07-16 PROCEDURE — 6370000000 HC RX 637 (ALT 250 FOR IP): Performed by: INTERNAL MEDICINE

## 2021-07-16 PROCEDURE — 2580000003 HC RX 258: Performed by: PHYSICIAN ASSISTANT

## 2021-07-16 PROCEDURE — 82010 KETONE BODYS QUAN: CPT

## 2021-07-16 PROCEDURE — 36415 COLL VENOUS BLD VENIPUNCTURE: CPT

## 2021-07-16 PROCEDURE — 74176 CT ABD & PELVIS W/O CONTRAST: CPT

## 2021-07-16 PROCEDURE — 84145 PROCALCITONIN (PCT): CPT

## 2021-07-16 PROCEDURE — 94761 N-INVAS EAR/PLS OXIMETRY MLT: CPT

## 2021-07-16 PROCEDURE — 2060000000 HC ICU INTERMEDIATE R&B

## 2021-07-16 PROCEDURE — 87086 URINE CULTURE/COLONY COUNT: CPT

## 2021-07-16 PROCEDURE — 83036 HEMOGLOBIN GLYCOSYLATED A1C: CPT

## 2021-07-16 PROCEDURE — 87186 SC STD MICRODIL/AGAR DIL: CPT

## 2021-07-16 PROCEDURE — 71045 X-RAY EXAM CHEST 1 VIEW: CPT

## 2021-07-16 PROCEDURE — 81001 URINALYSIS AUTO W/SCOPE: CPT

## 2021-07-16 PROCEDURE — 87040 BLOOD CULTURE FOR BACTERIA: CPT

## 2021-07-16 PROCEDURE — 2580000003 HC RX 258: Performed by: INTERNAL MEDICINE

## 2021-07-16 PROCEDURE — 84484 ASSAY OF TROPONIN QUANT: CPT

## 2021-07-16 PROCEDURE — 82803 BLOOD GASES ANY COMBINATION: CPT

## 2021-07-16 PROCEDURE — 2700000000 HC OXYGEN THERAPY PER DAY

## 2021-07-16 PROCEDURE — 85025 COMPLETE CBC W/AUTO DIFF WBC: CPT

## 2021-07-16 PROCEDURE — 87077 CULTURE AEROBIC IDENTIFY: CPT

## 2021-07-16 PROCEDURE — 96375 TX/PRO/DX INJ NEW DRUG ADDON: CPT

## 2021-07-16 PROCEDURE — 94640 AIRWAY INHALATION TREATMENT: CPT

## 2021-07-16 PROCEDURE — 99283 EMERGENCY DEPT VISIT LOW MDM: CPT

## 2021-07-16 PROCEDURE — 96360 HYDRATION IV INFUSION INIT: CPT

## 2021-07-16 PROCEDURE — 6360000002 HC RX W HCPCS: Performed by: INTERNAL MEDICINE

## 2021-07-16 PROCEDURE — 93005 ELECTROCARDIOGRAM TRACING: CPT | Performed by: PHYSICIAN ASSISTANT

## 2021-07-16 RX ORDER — SODIUM CHLORIDE 0.9 % (FLUSH) 0.9 %
10 SYRINGE (ML) INJECTION PRN
Status: DISCONTINUED | OUTPATIENT
Start: 2021-07-16 | End: 2021-07-22 | Stop reason: HOSPADM

## 2021-07-16 RX ORDER — DEXTROSE MONOHYDRATE 25 G/50ML
12.5 INJECTION, SOLUTION INTRAVENOUS PRN
Status: DISCONTINUED | OUTPATIENT
Start: 2021-07-16 | End: 2021-07-22 | Stop reason: HOSPADM

## 2021-07-16 RX ORDER — MORPHINE SULFATE 2 MG/ML
2 INJECTION, SOLUTION INTRAMUSCULAR; INTRAVENOUS EVERY 4 HOURS PRN
Status: COMPLETED | OUTPATIENT
Start: 2021-07-16 | End: 2021-07-17

## 2021-07-16 RX ORDER — DEXTROSE MONOHYDRATE 50 MG/ML
100 INJECTION, SOLUTION INTRAVENOUS PRN
Status: DISCONTINUED | OUTPATIENT
Start: 2021-07-16 | End: 2021-07-22 | Stop reason: HOSPADM

## 2021-07-16 RX ORDER — INSULIN LISPRO 100 [IU]/ML
0-6 INJECTION, SOLUTION INTRAVENOUS; SUBCUTANEOUS EVERY 4 HOURS
Status: DISCONTINUED | OUTPATIENT
Start: 2021-07-16 | End: 2021-07-17

## 2021-07-16 RX ORDER — MECLIZINE HCL 12.5 MG/1
25 TABLET ORAL 3 TIMES DAILY PRN
Status: DISCONTINUED | OUTPATIENT
Start: 2021-07-16 | End: 2021-07-22 | Stop reason: HOSPADM

## 2021-07-16 RX ORDER — ACETAMINOPHEN 650 MG/1
650 SUPPOSITORY RECTAL EVERY 6 HOURS PRN
Status: DISCONTINUED | OUTPATIENT
Start: 2021-07-16 | End: 2021-07-18

## 2021-07-16 RX ORDER — PROMETHAZINE HYDROCHLORIDE 25 MG/ML
6.25 INJECTION, SOLUTION INTRAMUSCULAR; INTRAVENOUS EVERY 6 HOURS PRN
Status: COMPLETED | OUTPATIENT
Start: 2021-07-16 | End: 2021-07-17

## 2021-07-16 RX ORDER — ONDANSETRON 2 MG/ML
4 INJECTION INTRAMUSCULAR; INTRAVENOUS ONCE
Status: COMPLETED | OUTPATIENT
Start: 2021-07-16 | End: 2021-07-16

## 2021-07-16 RX ORDER — HYDROCODONE BITARTRATE AND ACETAMINOPHEN 5; 325 MG/1; MG/1
1 TABLET ORAL ONCE
Status: COMPLETED | OUTPATIENT
Start: 2021-07-16 | End: 2021-07-16

## 2021-07-16 RX ORDER — INSULIN LISPRO 100 [IU]/ML
0.08 INJECTION, SOLUTION INTRAVENOUS; SUBCUTANEOUS
Status: DISCONTINUED | OUTPATIENT
Start: 2021-07-17 | End: 2021-07-17

## 2021-07-16 RX ORDER — PREGABALIN 100 MG/1
100 CAPSULE ORAL 2 TIMES DAILY
Status: DISCONTINUED | OUTPATIENT
Start: 2021-07-16 | End: 2021-07-22 | Stop reason: HOSPADM

## 2021-07-16 RX ORDER — INSULIN LISPRO 100 [IU]/ML
0-9 INJECTION, SOLUTION INTRAVENOUS; SUBCUTANEOUS NIGHTLY
Status: DISCONTINUED | OUTPATIENT
Start: 2021-07-16 | End: 2021-07-17

## 2021-07-16 RX ORDER — CARVEDILOL 3.12 MG/1
3.12 TABLET ORAL 2 TIMES DAILY WITH MEALS
Status: DISCONTINUED | OUTPATIENT
Start: 2021-07-17 | End: 2021-07-22 | Stop reason: HOSPADM

## 2021-07-16 RX ORDER — IPRATROPIUM BROMIDE AND ALBUTEROL SULFATE 2.5; .5 MG/3ML; MG/3ML
1 SOLUTION RESPIRATORY (INHALATION) ONCE
Status: COMPLETED | OUTPATIENT
Start: 2021-07-16 | End: 2021-07-16

## 2021-07-16 RX ORDER — SODIUM CHLORIDE 0.9 % (FLUSH) 0.9 %
5-40 SYRINGE (ML) INJECTION EVERY 12 HOURS SCHEDULED
Status: DISCONTINUED | OUTPATIENT
Start: 2021-07-16 | End: 2021-07-22 | Stop reason: HOSPADM

## 2021-07-16 RX ORDER — SODIUM CHLORIDE, SODIUM LACTATE, POTASSIUM CHLORIDE, CALCIUM CHLORIDE 600; 310; 30; 20 MG/100ML; MG/100ML; MG/100ML; MG/100ML
INJECTION, SOLUTION INTRAVENOUS CONTINUOUS
Status: DISCONTINUED | OUTPATIENT
Start: 2021-07-16 | End: 2021-07-17

## 2021-07-16 RX ORDER — 0.9 % SODIUM CHLORIDE 0.9 %
1000 INTRAVENOUS SOLUTION INTRAVENOUS ONCE
Status: COMPLETED | OUTPATIENT
Start: 2021-07-16 | End: 2021-07-16

## 2021-07-16 RX ORDER — ATORVASTATIN CALCIUM 10 MG/1
10 TABLET, FILM COATED ORAL DAILY
Status: DISCONTINUED | OUTPATIENT
Start: 2021-07-17 | End: 2021-07-22 | Stop reason: HOSPADM

## 2021-07-16 RX ORDER — MIRTAZAPINE 15 MG/1
7.5 TABLET, FILM COATED ORAL NIGHTLY
Status: DISCONTINUED | OUTPATIENT
Start: 2021-07-16 | End: 2021-07-22 | Stop reason: HOSPADM

## 2021-07-16 RX ORDER — SODIUM CHLORIDE 9 MG/ML
25 INJECTION, SOLUTION INTRAVENOUS PRN
Status: DISCONTINUED | OUTPATIENT
Start: 2021-07-16 | End: 2021-07-22 | Stop reason: HOSPADM

## 2021-07-16 RX ORDER — NICOTINE POLACRILEX 4 MG
15 LOZENGE BUCCAL PRN
Status: DISCONTINUED | OUTPATIENT
Start: 2021-07-16 | End: 2021-07-22 | Stop reason: HOSPADM

## 2021-07-16 RX ORDER — INSULIN LISPRO 100 [IU]/ML
0-18 INJECTION, SOLUTION INTRAVENOUS; SUBCUTANEOUS
Status: DISCONTINUED | OUTPATIENT
Start: 2021-07-17 | End: 2021-07-17

## 2021-07-16 RX ORDER — ASPIRIN 81 MG/1
81 TABLET, CHEWABLE ORAL DAILY
Status: DISCONTINUED | OUTPATIENT
Start: 2021-07-17 | End: 2021-07-22 | Stop reason: HOSPADM

## 2021-07-16 RX ORDER — POLYETHYLENE GLYCOL 3350 17 G/17G
17 POWDER, FOR SOLUTION ORAL DAILY PRN
Status: DISCONTINUED | OUTPATIENT
Start: 2021-07-16 | End: 2021-07-22 | Stop reason: HOSPADM

## 2021-07-16 RX ORDER — ACETAMINOPHEN 325 MG/1
650 TABLET ORAL EVERY 6 HOURS PRN
Status: DISCONTINUED | OUTPATIENT
Start: 2021-07-16 | End: 2021-07-18

## 2021-07-16 RX ADMIN — SODIUM CHLORIDE, POTASSIUM CHLORIDE, SODIUM LACTATE AND CALCIUM CHLORIDE: 600; 310; 30; 20 INJECTION, SOLUTION INTRAVENOUS at 23:48

## 2021-07-16 RX ADMIN — SODIUM CHLORIDE 1000 ML: 9 INJECTION, SOLUTION INTRAVENOUS at 19:03

## 2021-07-16 RX ADMIN — HYDROCODONE BITARTRATE AND ACETAMINOPHEN 1 TABLET: 5; 325 TABLET ORAL at 22:00

## 2021-07-16 RX ADMIN — MORPHINE SULFATE 2 MG: 2 INJECTION, SOLUTION INTRAMUSCULAR; INTRAVENOUS at 23:58

## 2021-07-16 RX ADMIN — IPRATROPIUM BROMIDE AND ALBUTEROL SULFATE 1 AMPULE: .5; 3 SOLUTION RESPIRATORY (INHALATION) at 18:32

## 2021-07-16 RX ADMIN — ONDANSETRON 4 MG: 2 INJECTION INTRAMUSCULAR; INTRAVENOUS at 19:03

## 2021-07-16 RX ADMIN — INSULIN HUMAN 15 UNITS: 100 INJECTION, SOLUTION PARENTERAL at 21:21

## 2021-07-16 RX ADMIN — SODIUM CHLORIDE 1000 ML: 9 INJECTION, SOLUTION INTRAVENOUS at 18:55

## 2021-07-16 RX ADMIN — CEFAZOLIN 2000 MG: 10 INJECTION, POWDER, FOR SOLUTION INTRAVENOUS at 23:49

## 2021-07-16 ASSESSMENT — ENCOUNTER SYMPTOMS
COUGH: 0
RHINORRHEA: 0
NAUSEA: 1
DIARRHEA: 0
SHORTNESS OF BREATH: 0
VOMITING: 1
ABDOMINAL PAIN: 0

## 2021-07-16 ASSESSMENT — PAIN SCALES - GENERAL
PAINLEVEL_OUTOF10: 8
PAINLEVEL_OUTOF10: 8
PAINLEVEL_OUTOF10: 0

## 2021-07-16 ASSESSMENT — PAIN DESCRIPTION - ONSET: ONSET: ON-GOING

## 2021-07-16 ASSESSMENT — PAIN DESCRIPTION - LOCATION: LOCATION: ABDOMEN

## 2021-07-16 ASSESSMENT — PAIN DESCRIPTION - PROGRESSION: CLINICAL_PROGRESSION: GRADUALLY WORSENING

## 2021-07-16 ASSESSMENT — PAIN DESCRIPTION - DESCRIPTORS: DESCRIPTORS: ACHING

## 2021-07-16 ASSESSMENT — PAIN DESCRIPTION - FREQUENCY: FREQUENCY: INTERMITTENT

## 2021-07-16 ASSESSMENT — PAIN DESCRIPTION - PAIN TYPE: TYPE: ACUTE PAIN

## 2021-07-16 NOTE — ED NOTES
Bed: 10  Expected date:   Expected time:   Means of arrival:   Comments:  Dilshad/malinda Haq RN  07/16/21 9453

## 2021-07-16 NOTE — ED PROVIDER NOTES
[facility]        Pt Name: Gilbert Rollins  MRN: [id number]  [place] 1965  Date of evaluation: 7/16/2021  Provider: Rose Marcelo PA-C  PCP: No primary care provider on file. Note Started: 6:38 PM EDT        I have seen and evaluated this patient with my supervising physician Carmela Ivy, [facility]       Chief Complaint   Patient presents with    Fatigue     c/o weakness, fatigue, NV for 3 days. Is unable to see due to needing cataract surgery. HISTORY OF PRESENT ILLNESS   (Location, Timing/Onset, Context/Setting, Quality, Duration, Modifying Factors, Severity, Associated Signs and Symptoms)  Note limiting factors. Chief Complaint: Fatigue    Gilbert Rollins is a 60 y.o. female who presents to the emergency department today for evaluation for general fatigue, nausea and vomiting. The patient states that her symptoms have been ongoing for the past 3 days. The patient states that she has had multiple episodes of emesis, she denies any bilious emesis, hematemesis or coffee-ground emesis. The patient states that she is a insulin-dependent diabetic, and she states that her sugars have been running high for the past 2 days as well. The patient states that overall she is just not feeling well and she is having generalized body aches. The patient denies any specific chest pain, shortness of breath or abdominal pain. She denies any diarrhea. She denies any fever chills per no cough or congestion. Patient denies any urinary symptoms. She has been vaccinated for COVID-19. The patient otherwise has no complaints at this time. Nursing Notes were all reviewed and agreed with or any disagreements were addressed in the HPI. REVIEW OF SYSTEMS    (2-9 systems for level 4, 10 or more for level 5)     Review of Systems   Constitutional: Negative for activity change, appetite change, chills and fever.    HENT: Negative for congestion and rhinorrhea. Respiratory: Negative for cough and shortness of breath. Cardiovascular: Negative for chest pain. Gastrointestinal: Positive for nausea and vomiting. Negative for abdominal pain and diarrhea. Genitourinary: Negative for difficulty urinating, dysuria and hematuria. Musculoskeletal: Positive for myalgias. Positives and Pertinent negatives as per HPI. Except as noted above in the ROS, all other systems were reviewed and negative. PAST MEDICAL HISTORY     Past Medical History:   Diagnosis Date    Arthritis     [facility]     Depression     Diabetes mellitus [facility]     Heart attack [facility]     MI 7/2019, and 6 months ago    Hepatitis C     Dr Anca Combs follows; contracted from ex- (drug user)    Hypercholesteremia 3/11/2020    Hypertension     MRSA (methicillin resistant Staphylococcus aureus) infection in 2000    was in urine and nares    Pneumonia     S/P colonoscopy with polypectomy 2/11    one polyp, Dr Anca Combs. Recall 3 years. SURGICAL HISTORY     Past Surgical History:   Procedure Laterality Date    ABOVE KNEE AMPUTATION      AMPUTATION      right bka    CORONARY ANGIOPLASTY WITH STENT PLACEMENT  07/2019    JOINT REPLACEMENT  2006, 2010    right knee; Dr Monica Pierre.     KNEE ARTHROSCOPY      rid knee X6    LIVER BIOPSY N/A 11/14/2018    LIVER BIOPSY LAPAROSCOPIC performed by Ita Good MD at [address] [place] [address] N/A 11/14/2018    LAPAROSCOPIC CHOLECYSTECTOMY WITH INTRAOPERATIVE CHOLANGIOGRAM performed by Ita Good MD at [address] [place] [address]  2/9/11    right    UPPER GASTROINTESTINAL ENDOSCOPY  11/13/2018    UPPER GASTROINTESTINAL ENDOSCOPY N/A 11/13/2018    EGD BIOPSY performed by Elyse Serna MD at [address]       Previous Medications    ASPIRIN 81 MG TABLET    Take 1 tablet by mouth daily    ATORVASTATIN (LIPITOR) 40 MG TABLET    Take 1 tablet by mouth daily    BLOOD GLUCOSE TEST STRIPS (FREESTYLE LITE) STRIP    1 each by Other route 4 times daily (before meals and nightly) Patient to check blood sugar 4 times a day and PRN, he is treated with multiple daily injections of insulin that require correction dosing and has had hypoglycemia. A1C  14.5  ICD code- E11.65, Z79.4    BUPRENORPHINE-NALOXONE (SUBOXONE) 8-2 MG SUBL SL TABLET    Place 1 tablet under the tongue 2 times daily. CARVEDILOL (COREG) 3.125 MG TABLET    Take 1 tablet by mouth 2 times daily (with meals)    FREESTYLE LANCETS MISC    1 each by Other route 4 times daily (before meals and nightly) Patient to check blood sugar 4 times a day and PRN, he is treated with multiple daily injections of insulin that require correction dosing and has had hypoglycemia. A1C  14.5  ICD code- E11.65, Z79.4    FUROSEMIDE (LASIX) 40 MG TABLET    Take 1 tablet by mouth daily    INSULIN GLARGINE (LANTUS SOLOSTAR) 100 UNIT/ML INJECTION PEN    Inject 15 Units into the skin 2 times daily Indications: Insulin Pump     INSULIN LISPRO, 1 UNIT DIAL, (HUMALOG KWIKPEN) 100 UNIT/ML SOPN    Inject 10 Units into the skin 3 times daily (before meals) Inject 10 units three times daily with each meal in addition to sliding scale. LISINOPRIL (PRINIVIL;ZESTRIL) 5 MG TABLET    Take 1 tablet by mouth daily    MIRTAZAPINE (REMERON) 15 MG TABLET    Take 7.5 mg by mouth nightly    NITROGLYCERIN (NITROSTAT) 0.4 MG SL TABLET    up to max of 3 total doses. If no relief after 1 dose, call 911. PREGABALIN (LYRICA) 100 MG CAPSULE    Take 100 mg by mouth 2 times daily.     SERTRALINE (ZOLOFT) 50 MG TABLET    Take 50 mg by mouth daily Indications: Depression    TICAGRELOR (BRILINTA) 90 MG TABS TABLET    Take 1 tablet by mouth 2 times daily         ALLERGIES     Darvocet [propoxyphene n-acetaminophen], Naprosyn [naproxen], and Ultram [tramadol hcl]    FAMILYHISTORY Family History   Problem Relation Age of Onset    Cancer Father     Seizures Brother    Violeta Me Cancer Sister         colon          SOCIAL HISTORY       Social History     Tobacco Use    Smoking status: Former Smoker     Packs/day: 1.00     Years: 43.00     Pack years: 43.00     Quit date: 2020     Years since quittin.0    Smokeless tobacco: Never Used   Vaping Use    Vaping Use: Never used   Substance Use Topics    Alcohol use: No    Drug use: Not Currently     Types: Opiates      Comment: opiate/THC       SCREENINGS             PHYSICAL EXAM    (up to 7 for level 4, 8 or more for level 5)     ED Triage Vitals [21 1729]   BP Temp Temp Source Pulse Resp SpO2 Height Weight   109/78 97.5 °F (36.4 °C) Oral 94 18 91 % -- --       Physical Exam  Vitals and nursing note reviewed. Constitutional:       Appearance: She is well-developed. She is ill-appearing. She is not diaphoretic. Comments: Ill-appearing although nontoxic   HENT:      Head: Normocephalic and atraumatic. Right Ear: External ear normal.      Left Ear: External ear normal.      Nose: Nose normal.      Mouth/Throat:      Mouth: Mucous membranes are dry. Eyes:      General:         Right eye: No discharge. Left eye: No discharge. Neck:      Trachea: No tracheal deviation. Cardiovascular:      Rate and Rhythm: Normal rate and regular rhythm. Heart sounds: No murmur heard. Pulmonary:      Effort: Pulmonary effort is normal. No respiratory distress. Breath sounds: No wheezing or rales. Abdominal:      General: Bowel sounds are normal. There is no distension. Tenderness: There is no abdominal tenderness. There is no guarding. Musculoskeletal:         General: Normal range of motion. Cervical back: Normal range of motion and neck supple. Comments: Above-the-knee amputation on the right   Skin:     General: Skin is warm and dry.    Neurological:      Mental Status: She is alert and oriented to person, place, and time.    Psychiatric:         Behavior: Behavior normal.         DIAGNOSTIC RESULTS   LABS:    Labs Reviewed   CBC WITH AUTO DIFFERENTIAL - Abnormal; Notable for the following components:       Result Value    WBC 12.0 (*)     RBC 6.15 (*)     Hematocrit 50.3 (*)     MCH 25.8 (*)     RDW 19.5 (*)     MPV 11.1 (*)     Neutrophils Absolute 9.7 (*)     All other components within normal limits    Narrative:     Performed at:  [facility] [address] [address] [employer]   Phone [phone]   COMPREHENSIVE METABOLIC PANEL - Abnormal; Notable for the following components:    Chloride 93 (*)     Glucose 749 (*)     BUN 52 (*)     CREATININE 1.2 (*)     GFR Non- 46 (*)     GFR  56 (*)     Total Protein 9.9 (*)     Albumin/Globulin Ratio 0.7 (*)     Alkaline Phosphatase 178 (*)     All other components within normal limits    Narrative:     NATHAN Nath  [facility] tel. [phone],  Chemistry results called to and read back by MERRY Bender, 07/16/2021  18:29, by Barimadelin Colmenares  Performed at:  [facility] [address] [facility] [employer]   Phone 21  - Abnormal; Notable for the following components:    Pro-BNP 4,027 (*)     All other components within normal limits    Narrative:     Santa Singletary  [facility] tel. [phone],  Chemistry results called to and read back by MERRY Bender, 07/16/2021  18:29, by [employer]  Performed at:  [facility] [address] [address] [employer]   Phone [phone]   BLOOD GAS, VENOUS - Abnormal; Notable for the following components:    pH, Rowdy 7.323 (*)     pCO2, Rowdy 55.4 (*)     pO2, Rowdy 139.0 (*)     Carboxyhemoglobin 2.7 (*)     All other components within normal limits    Narrative:     Performed at:  [facility] [address] [facility] [employer]   Phone [phone]   BETA-HYDROXYBUTYRATE - Abnormal; Notable for the following components:    Beta-Hydroxybutyrate 1.90 (*)     All other components within normal limits    Narrative:     [facility] tel. [phone],  Chemistry results called to and read back by MERRY Collado, 07/16/2021  18:29, by Yvonne Worrell  Performed at:  [facility]  [address] [facility],  [place], [address] [employer]   Phone [phone]   POCT GLUCOSE - Abnormal; Notable for the following components:    POC Glucose >600 (*)     All other components within normal limits    Narrative:     Performed at:  [facility]  [address] [facility],  [place], [address] [employer]   Phone [phone]   POCT GLUCOSE - Abnormal; Notable for the following components:    POC Glucose 498 (*)     All other components within normal limits    Narrative:     Performed at:  [facility] [address] [facility],  [place], [address]   Phone [phone]   CULTURE, BLOOD 2   CULTURE, BLOOD 1   TROPONIN    Narrative:     Jennifer Jaimes  [facility] tel. [phone],  Chemistry results called to and read back by MERRY Collado, 07/16/2021  18:29, by Yvonne Worrell  Performed at:  [facility]  [address] [facility],  [place], [address] [employer]   Phone [phone]   LIPASE    Narrative:     Enriqueta Barrios tel. [phone],  Chemistry results called to and read back by MERRY Collado, 07/16/2021  18:29, by Yvonne Worrell  Performed at:  [facility] [address] [facility],  [place], [address] [employer]   Phone [phone]   URINE RT REFLEX TO CULTURE   LACTATE, SEPSIS   LACTATE, SEPSIS   BASIC METABOLIC PANEL   HEMOGLOBIN A1C   PROCALCITONIN   POCT GLUCOSE   POCT GLUCOSE   POCT GLUCOSE   POCT GLUCOSE   POCT GLUCOSE   POCT GLUCOSE       When ordered only abnormal lab results are displayed. All other labs were within normal range or not returned as of this dictation. EKG:  When ordered, EKG's are interpreted by the Emergency Department Physician in the absence of a cardiologist.  Please see their note for interpretation of EKG. RADIOLOGY:   Non-plain film images such as CT, Ultrasound and MRI are read by the radiologist. Plain radiographic images are visualized and preliminarily interpreted by the ED Provider with the below findings:        Interpretation per the Radiologist below, if available at the time of this note:    CT ABDOMEN PELVIS WO CONTRAST Additional Contrast? None   Final Result   1. Air in the bladder wall and bladder lumen. Correlate with presentation   for emphysematous cystitis. 2. Nonspecific subsegmental lingular opacity could represent atelectasis and   scarring. Correlate with presentation to exclude superimposed pneumonia. 3. Slight nodular contour of the liver. MRI would better evaluate. 4. No small bowel obstruction. 5. Other findings as described. XR CHEST PORTABLE   Final Result   No acute cardiopulmonary disease. Borderline cardiomegaly without overt   failure. XR CHEST PORTABLE    Result Date: 7/16/2021  EXAMINATION: ONE XRAY VIEW OF THE CHEST 7/16/2021 5:57 pm COMPARISON: 04/27/2021. HISTORY: ORDERING SYSTEM PROVIDED HISTORY: SOB TECHNOLOGIST PROVIDED HISTORY: Reason for exam:->SOB Reason for Exam: c/o weakness, fatigue, NV for 3 days. Is unable to see due to needing cataract surgery Acuity: Acute Type of Exam: Initial FINDINGS: The cardiac silhouette is borderline in size. Linear scarring or atelectasis in the left perihilar region. The lungs otherwise are clear. No infiltrate, pleural fluid or evidence of overt failure. No acute cardiopulmonary disease. Borderline cardiomegaly without overt failure.            PROCEDURES   Unless otherwise noted below, none     Procedures    CRITICAL CARE TIME   N/A    CONSULTS:  None      EMERGENCY DEPARTMENT COURSE and DIFFERENTIAL DIAGNOSIS/MDM:   Vitals:    Vitals:    07/16/21 1729 07/16/21 1832 BP: 109/78    Pulse: 94    Resp: 18 16   Temp: 97.5 °F (36.4 °C)    TempSrc: Oral    SpO2: 91% 97%       Patient was given the following medications:  Medications   insulin lispro (1 Unit Dial) 0-6 Units (has no administration in time range)   glucose (GLUTOSE) 40 % oral gel 15 g (has no administration in time range)   dextrose 50 % IV solution (has no administration in time range)   glucagon (rDNA) injection 1 mg (has no administration in time range)   dextrose 5 % solution (has no administration in time range)   insulin glargine (LANTUS;BASAGLAR) injection pen 15 Units (has no administration in time range)   insulin lispro (1 Unit Dial) 0-18 Units (has no administration in time range)   insulin lispro (1 Unit Dial) 0-9 Units (has no administration in time range)   insulin lispro (1 Unit Dial) 0.08 Units/kg (has no administration in time range)   lactated ringers infusion (has no administration in time range)   trimethobenzamide (TIGAN) injection 200 mg (has no administration in time range)   ipratropium-albuterol (DUONEB) nebulizer solution 1 ampule (1 ampule Inhalation Given 7/16/21 1832)   0.9 % sodium chloride bolus (1,000 mLs Intravenous New Bag 7/16/21 1903)   0.9 % sodium chloride bolus (1,000 mLs Intravenous New Bag 7/16/21 1855)   ondansetron (ZOFRAN) injection 4 mg (4 mg Intravenous Given 7/16/21 1903)   insulin regular (HUMULIN R;NOVOLIN R) injection 15 Units (15 Units Intravenous Given 7/16/21 2121)           Briefly, this is a 47-year-old female who presents emergency department today for evaluation for general fatigue. Over the past 3 days the patient has been reporting general fatigue, nausea, and vomiting. She is an insulin-dependent diabetic. Patient states her glucose has been ranging high. Patient denies to the ED, glucose was greater than 600. On examination, she is overall mildly ill-appearing although nontoxic. Mucous membranes are dry.     EKG documented by my attending, please see his note for further details    CBC shows a nonspecific leukocytosis of 12, no evidence of anemia. Could certainly be due to her recent nausea or vomiting. Her CMP shows a glucose of 749 however there is no anion gap and CO2 is normal.  She does have an RAQUEL with a BUN of 53 creatinine of 1.2.  2 L of fluid given. BNP is elevated, however within the normal range and x-ray shows no evidence of CHF. Beta hydroxybutyrate is mildly elevated. Lipase is normal.  VBG does show a mild acidosis. CT does show possible UTI, urinalysis is pending. Patient will be given fluids as well as insulin in ED. She'll need to be admitted for further care and evaluation, hospitalist was agreed for admission, patient is updated and agreeable with plan. Stable for admission    FINAL IMPRESSION      1. Hyperglycemia    2. Non-intractable vomiting with nausea, unspecified vomiting type    3. Pneumonia due to infectious organism, unspecified laterality, unspecified part of lung          DISPOSITION/PLAN   DISPOSITION Decision To Admit 07/16/2021 08:58:43 PM      PATIENT REFERRED TO:  No follow-up provider specified.     DISCHARGE MEDICATIONS:  New Prescriptions    No medications on file       DISCONTINUED MEDICATIONS:  Discontinued Medications    No medications on file              (Please note that portions of this note were completed with a voice recognition program.  Efforts were made to edit the dictations but occasionally words are mis-transcribed.)    Heber Kohler PA-C (electronically signed)            Heber Kohler PA-C  07/16/21 6002

## 2021-07-17 LAB
A/G RATIO: 0.8 (ref 1.1–2.2)
ALBUMIN SERPL-MCNC: 3.4 G/DL (ref 3.4–5)
ALP BLD-CCNC: 129 U/L (ref 40–129)
ALT SERPL-CCNC: 18 U/L (ref 10–40)
ANION GAP SERPL CALCULATED.3IONS-SCNC: 10 MMOL/L (ref 3–16)
AST SERPL-CCNC: 27 U/L (ref 15–37)
BASOPHILS ABSOLUTE: 0 K/UL (ref 0–0.2)
BASOPHILS RELATIVE PERCENT: 0.3 %
BILIRUB SERPL-MCNC: <0.2 MG/DL (ref 0–1)
BUN BLDV-MCNC: 36 MG/DL (ref 7–20)
CALCIUM SERPL-MCNC: 8.7 MG/DL (ref 8.3–10.6)
CHLORIDE BLD-SCNC: 105 MMOL/L (ref 99–110)
CO2: 28 MMOL/L (ref 21–32)
CREAT SERPL-MCNC: 0.7 MG/DL (ref 0.6–1.1)
EKG ATRIAL RATE: 96 BPM
EKG DIAGNOSIS: NORMAL
EKG P AXIS: 62 DEGREES
EKG P-R INTERVAL: 150 MS
EKG Q-T INTERVAL: 410 MS
EKG QRS DURATION: 94 MS
EKG QTC CALCULATION (BAZETT): 517 MS
EKG R AXIS: -67 DEGREES
EKG T AXIS: 40 DEGREES
EKG VENTRICULAR RATE: 96 BPM
EOSINOPHILS ABSOLUTE: 0 K/UL (ref 0–0.6)
EOSINOPHILS RELATIVE PERCENT: 0.1 %
ESTIMATED AVERAGE GLUCOSE: 294.8 MG/DL
GFR AFRICAN AMERICAN: >60
GFR NON-AFRICAN AMERICAN: >60
GLOBULIN: 4.5 G/DL
GLUCOSE BLD-MCNC: 110 MG/DL (ref 70–99)
GLUCOSE BLD-MCNC: 115 MG/DL (ref 70–99)
GLUCOSE BLD-MCNC: 117 MG/DL (ref 70–99)
GLUCOSE BLD-MCNC: 232 MG/DL (ref 70–99)
GLUCOSE BLD-MCNC: 372 MG/DL (ref 70–99)
GLUCOSE BLD-MCNC: 53 MG/DL (ref 70–99)
GLUCOSE BLD-MCNC: 56 MG/DL (ref 70–99)
GLUCOSE BLD-MCNC: 77 MG/DL (ref 70–99)
GLUCOSE BLD-MCNC: 78 MG/DL (ref 70–99)
GLUCOSE BLD-MCNC: 80 MG/DL (ref 70–99)
HBA1C MFR BLD: 11.9 %
HCT VFR BLD CALC: 42.9 % (ref 36–48)
HEMOGLOBIN: 13.8 G/DL (ref 12–16)
LYMPHOCYTES ABSOLUTE: 2.6 K/UL (ref 1–5.1)
LYMPHOCYTES RELATIVE PERCENT: 22.3 %
MAGNESIUM: 2 MG/DL (ref 1.8–2.4)
MCH RBC QN AUTO: 25.4 PG (ref 26–34)
MCHC RBC AUTO-ENTMCNC: 32.1 G/DL (ref 31–36)
MCV RBC AUTO: 79.2 FL (ref 80–100)
MONOCYTES ABSOLUTE: 0.5 K/UL (ref 0–1.3)
MONOCYTES RELATIVE PERCENT: 4 %
NEUTROPHILS ABSOLUTE: 8.6 K/UL (ref 1.7–7.7)
NEUTROPHILS RELATIVE PERCENT: 73.3 %
PDW BLD-RTO: 19.6 % (ref 12.4–15.4)
PERFORMED ON: ABNORMAL
PERFORMED ON: NORMAL
PHOSPHORUS: 1.8 MG/DL (ref 2.5–4.9)
PLATELET # BLD: 131 K/UL (ref 135–450)
PMV BLD AUTO: 11.1 FL (ref 5–10.5)
POTASSIUM SERPL-SCNC: 3.6 MMOL/L (ref 3.5–5.1)
RBC # BLD: 5.42 M/UL (ref 4–5.2)
SODIUM BLD-SCNC: 143 MMOL/L (ref 136–145)
TOTAL PROTEIN: 7.9 G/DL (ref 6.4–8.2)
WBC # BLD: 11.7 K/UL (ref 4–11)

## 2021-07-17 PROCEDURE — 83735 ASSAY OF MAGNESIUM: CPT

## 2021-07-17 PROCEDURE — 6370000000 HC RX 637 (ALT 250 FOR IP): Performed by: INTERNAL MEDICINE

## 2021-07-17 PROCEDURE — 6370000000 HC RX 637 (ALT 250 FOR IP): Performed by: PHYSICIAN ASSISTANT

## 2021-07-17 PROCEDURE — 93010 ELECTROCARDIOGRAM REPORT: CPT | Performed by: INTERNAL MEDICINE

## 2021-07-17 PROCEDURE — 36415 COLL VENOUS BLD VENIPUNCTURE: CPT

## 2021-07-17 PROCEDURE — 6360000002 HC RX W HCPCS: Performed by: INTERNAL MEDICINE

## 2021-07-17 PROCEDURE — 85025 COMPLETE CBC W/AUTO DIFF WBC: CPT

## 2021-07-17 PROCEDURE — 2580000003 HC RX 258: Performed by: INTERNAL MEDICINE

## 2021-07-17 PROCEDURE — 93005 ELECTROCARDIOGRAM TRACING: CPT | Performed by: INTERNAL MEDICINE

## 2021-07-17 PROCEDURE — 80053 COMPREHEN METABOLIC PANEL: CPT

## 2021-07-17 PROCEDURE — 84100 ASSAY OF PHOSPHORUS: CPT

## 2021-07-17 PROCEDURE — 2500000003 HC RX 250 WO HCPCS: Performed by: INTERNAL MEDICINE

## 2021-07-17 PROCEDURE — 2060000000 HC ICU INTERMEDIATE R&B

## 2021-07-17 PROCEDURE — 6360000002 HC RX W HCPCS: Performed by: PHYSICIAN ASSISTANT

## 2021-07-17 RX ORDER — INSULIN LISPRO 100 [IU]/ML
8 INJECTION, SOLUTION INTRAVENOUS; SUBCUTANEOUS
Status: DISCONTINUED | OUTPATIENT
Start: 2021-07-18 | End: 2021-07-17

## 2021-07-17 RX ORDER — PROMETHAZINE HYDROCHLORIDE 25 MG/ML
6.25 INJECTION, SOLUTION INTRAMUSCULAR; INTRAVENOUS EVERY 6 HOURS PRN
Status: DISCONTINUED | OUTPATIENT
Start: 2021-07-17 | End: 2021-07-22 | Stop reason: HOSPADM

## 2021-07-17 RX ORDER — INSULIN LISPRO 100 [IU]/ML
0-6 INJECTION, SOLUTION INTRAVENOUS; SUBCUTANEOUS NIGHTLY
Status: DISCONTINUED | OUTPATIENT
Start: 2021-07-17 | End: 2021-07-22 | Stop reason: HOSPADM

## 2021-07-17 RX ORDER — INSULIN LISPRO 100 [IU]/ML
10 INJECTION, SOLUTION INTRAVENOUS; SUBCUTANEOUS
Status: DISCONTINUED | OUTPATIENT
Start: 2021-07-17 | End: 2021-07-17

## 2021-07-17 RX ORDER — INSULIN LISPRO 100 [IU]/ML
0-12 INJECTION, SOLUTION INTRAVENOUS; SUBCUTANEOUS
Status: DISCONTINUED | OUTPATIENT
Start: 2021-07-17 | End: 2021-07-22 | Stop reason: HOSPADM

## 2021-07-17 RX ORDER — MORPHINE SULFATE 2 MG/ML
2 INJECTION, SOLUTION INTRAMUSCULAR; INTRAVENOUS EVERY 4 HOURS PRN
Status: DISCONTINUED | OUTPATIENT
Start: 2021-07-17 | End: 2021-07-18

## 2021-07-17 RX ORDER — DEXTROSE AND SODIUM CHLORIDE 5; .9 G/100ML; G/100ML
INJECTION, SOLUTION INTRAVENOUS CONTINUOUS
Status: DISCONTINUED | OUTPATIENT
Start: 2021-07-17 | End: 2021-07-18 | Stop reason: ALTCHOICE

## 2021-07-17 RX ADMIN — PROMETHAZINE HYDROCHLORIDE 6.25 MG: 25 INJECTION INTRAMUSCULAR; INTRAVENOUS at 21:28

## 2021-07-17 RX ADMIN — CEFAZOLIN 2000 MG: 10 INJECTION, POWDER, FOR SOLUTION INTRAVENOUS at 09:36

## 2021-07-17 RX ADMIN — ATORVASTATIN CALCIUM 10 MG: 10 TABLET, FILM COATED ORAL at 09:34

## 2021-07-17 RX ADMIN — ENOXAPARIN SODIUM 40 MG: 40 INJECTION SUBCUTANEOUS at 09:34

## 2021-07-17 RX ADMIN — PREGABALIN 100 MG: 100 CAPSULE ORAL at 22:04

## 2021-07-17 RX ADMIN — PROMETHAZINE HYDROCHLORIDE 6.25 MG: 25 INJECTION INTRAMUSCULAR; INTRAVENOUS at 15:09

## 2021-07-17 RX ADMIN — MORPHINE SULFATE 2 MG: 2 INJECTION, SOLUTION INTRAMUSCULAR; INTRAVENOUS at 09:46

## 2021-07-17 RX ADMIN — MIRTAZAPINE 7.5 MG: 15 TABLET, FILM COATED ORAL at 22:04

## 2021-07-17 RX ADMIN — MORPHINE SULFATE 2 MG: 2 INJECTION, SOLUTION INTRAMUSCULAR; INTRAVENOUS at 15:23

## 2021-07-17 RX ADMIN — DEXTROSE MONOHYDRATE 12.5 G: 25 INJECTION, SOLUTION INTRAVENOUS at 12:01

## 2021-07-17 RX ADMIN — ASPIRIN 81 MG: 81 TABLET, CHEWABLE ORAL at 09:34

## 2021-07-17 RX ADMIN — SERTRALINE 50 MG: 50 TABLET, FILM COATED ORAL at 09:34

## 2021-07-17 RX ADMIN — INSULIN GLARGINE 32 UNITS: 100 INJECTION, SOLUTION SUBCUTANEOUS at 09:49

## 2021-07-17 RX ADMIN — PROMETHAZINE HYDROCHLORIDE 6.25 MG: 25 INJECTION INTRAMUSCULAR; INTRAVENOUS at 09:43

## 2021-07-17 RX ADMIN — INSULIN LISPRO 7 UNITS: 100 INJECTION, SOLUTION INTRAVENOUS; SUBCUTANEOUS at 01:15

## 2021-07-17 RX ADMIN — PIPERACILLIN AND TAZOBACTAM 3375 MG: 3; .375 INJECTION, POWDER, LYOPHILIZED, FOR SOLUTION INTRAVENOUS at 15:17

## 2021-07-17 RX ADMIN — CARVEDILOL 3.12 MG: 3.12 TABLET, FILM COATED ORAL at 09:34

## 2021-07-17 RX ADMIN — PROMETHAZINE HYDROCHLORIDE 6.25 MG: 25 INJECTION INTRAMUSCULAR; INTRAVENOUS at 00:06

## 2021-07-17 RX ADMIN — PREGABALIN 100 MG: 100 CAPSULE ORAL at 01:26

## 2021-07-17 RX ADMIN — PIPERACILLIN AND TAZOBACTAM 3375 MG: 3; .375 INJECTION, POWDER, LYOPHILIZED, FOR SOLUTION INTRAVENOUS at 22:10

## 2021-07-17 RX ADMIN — MORPHINE SULFATE 2 MG: 2 INJECTION, SOLUTION INTRAMUSCULAR; INTRAVENOUS at 22:05

## 2021-07-17 RX ADMIN — TICAGRELOR 90 MG: 90 TABLET ORAL at 22:04

## 2021-07-17 RX ADMIN — INSULIN GLARGINE 32 UNITS: 100 INJECTION, SOLUTION SUBCUTANEOUS at 01:15

## 2021-07-17 RX ADMIN — DEXTROSE MONOHYDRATE 12.5 G: 25 INJECTION, SOLUTION INTRAVENOUS at 16:10

## 2021-07-17 RX ADMIN — TICAGRELOR 90 MG: 90 TABLET ORAL at 01:25

## 2021-07-17 RX ADMIN — TICAGRELOR 90 MG: 90 TABLET ORAL at 09:34

## 2021-07-17 RX ADMIN — DEXTROSE AND SODIUM CHLORIDE: 5; 900 INJECTION, SOLUTION INTRAVENOUS at 15:09

## 2021-07-17 RX ADMIN — MIRTAZAPINE 7.5 MG: 15 TABLET, FILM COATED ORAL at 01:26

## 2021-07-17 RX ADMIN — PREGABALIN 100 MG: 100 CAPSULE ORAL at 09:34

## 2021-07-17 ASSESSMENT — PAIN SCALES - GENERAL
PAINLEVEL_OUTOF10: 0
PAINLEVEL_OUTOF10: 7
PAINLEVEL_OUTOF10: 7
PAINLEVEL_OUTOF10: 0
PAINLEVEL_OUTOF10: 5
PAINLEVEL_OUTOF10: 7

## 2021-07-17 ASSESSMENT — PAIN DESCRIPTION - ORIENTATION
ORIENTATION: RIGHT;LEFT;LOWER
ORIENTATION: LOWER

## 2021-07-17 ASSESSMENT — PAIN DESCRIPTION - FREQUENCY
FREQUENCY: CONTINUOUS

## 2021-07-17 ASSESSMENT — PAIN DESCRIPTION - ONSET
ONSET: ON-GOING

## 2021-07-17 ASSESSMENT — PAIN DESCRIPTION - PAIN TYPE
TYPE: ACUTE PAIN

## 2021-07-17 ASSESSMENT — PAIN DESCRIPTION - PROGRESSION
CLINICAL_PROGRESSION: NOT CHANGED

## 2021-07-17 ASSESSMENT — PAIN DESCRIPTION - DESCRIPTORS
DESCRIPTORS: STABBING
DESCRIPTORS: SHARP
DESCRIPTORS: SHOOTING
DESCRIPTORS: SHOOTING

## 2021-07-17 ASSESSMENT — PAIN - FUNCTIONAL ASSESSMENT
PAIN_FUNCTIONAL_ASSESSMENT: ACTIVITIES ARE NOT PREVENTED

## 2021-07-17 ASSESSMENT — PAIN DESCRIPTION - LOCATION
LOCATION: ABDOMEN

## 2021-07-17 NOTE — ED NOTES
Report called to inpatient nurse on 3T. No further questions at this time. Pt A&O x4 at transfer. Pt to be transported on tele to inpatient room.       Kathia Dowling RN  07/16/21 4839

## 2021-07-17 NOTE — H&P
Hospital Medicine History and Physical    7/16/2021    Date of Admission: 7/16/2021    Date of Service: Pt seen/examined on 7/16/2021 and admitted to inpatient. Assessment/plan:  1. Severe dehydration. Secondary to GI losses. Patient received 2 L normal saline bolus in the emergency room. Will start gentle lactated ringer at 50 cc an hour, given history of CHF. Monitor volume status closely. Currently holding diuretics. 2. Uncontrolled diabetes type 2 with hyperglycemia. Patient is status post 2 L of normal saline bolus in the emergency room. Start gentle lactated Ringer's at 50 cc an hour. Give 15 units of intravenous insulin now. Resume home dose of Lantus 15 units twice daily. Add high-dose sliding scale insulin. Monitor Accu-Chek closely and adjust insulin dose as needed. 3. Acute kidney injury. Prerenal azotemia due to dehydration, hyperglycemia. Hold nephrotoxic medications. Continue intravenous fluid. Monitor renal function closely. 4. Left knee cellulitis. Cultures have been sent. Start ancef and follow results. Monitor erythema and warmth. 5. Intractable nausea and vomiting in the setting of history of uncontrolled diabetes type 2. Cannot exclude gastroparesis. Unfortunately, patient has prolonged QTC; hence, will not place an Reglan for now. Start meclizine as needed. We will also order intramuscular Tigan, provided pharmacy is able to obtain this. As a backup, will order as needed Phenergan x2 doses (pending the time pharmacy can obtain Tigan). Continue gentle intravenous fluid as noted above. 6. Abnormal finding on CT-abdomen/pelvis with air in the bladder. Urinalysis is currently pending. So far, no urinary symptoms. Currently on ancef for other indication. 7. Abnormal finding on CT-abdomen/pelvis with opacities concerning for atelectasis or early infiltrate. Patient does not have a cough or fever or chills.   She has mild leukocytosis, which is slightly reactive from dehydration. Presentation is not consistent with pneumonia. 8. Abnormal finding on CT-abdomen/pelvis with nodular contour of the liver. Continue liver MRI per radiology recommendation. 9. Chronic systolic and diastolic CHF. Most recent echocardiogram with ejection fraction of 25 to 66%, grade 2 diastolic dysfunction. Patient is currently severely dehydrated; hence, will hold diuretics for now. Continue current volume status closely. 10. Prolonged QTC noted on EKG. Avoid QT-prolonging medications as noted above. 11. Other comorbidities:  history of uncontrolled diabetes type 2 with hyperglycemia, chronic combined systolic and diastolic CHF (most recent echocardiogram from April 2021 with ejection fraction of 25 to 30%, grade 2 diastolic dysfunction, moderate to severe mitral regurgitation and large bilateral pleural effusions), history of CAD on dual antiplatelet therapies, hyperlipidemia, essential hypertension. Activities: Up with assist  Prophylaxis: Subcutaneous Lovenox  Code status: Full code    ==========================================================  Chief complaint:  Chief Complaint   Patient presents with    Fatigue     c/o weakness, fatigue, NV for 3 days. Is unable to see due to needing cataract surgery. History of Presenting Illness: This is a pleasant 60 y.o. female with history of uncontrolled diabetes type 2 with hyperglycemia, chronic combined systolic and diastolic CHF (most recent echocardiogram from April 2021 with ejection fraction of 25 to 61%, grade 2 diastolic dysfunction, moderate to severe mitral regurgitation and large bilateral pleural effusions), history of CAD on dual antiplatelet therapies, hyperlipidemia, essential hypertension, who presents to the emergency room with complaints of generalized weakness, myalgia, nausea, vomiting, ongoing for the past 3 days. She had diarrhea for the first day or two, since resolved. She reports some abdominal pain.  She denies hematemesis or cough or chest pain or shortness of breath or fever or chills or congestion or urinary symptoms. On evaluation, she was found to have left knee erythema; however, she did not know about this until I pointed it out - she does know know how long she's had it. She reports that her blood glucose has been running quite high at home. Past Medical History:      Diagnosis Date    Arthritis     [facility]     Depression     Diabetes mellitus [facility]     Heart attack ([facility]     MI 7/2019, and 6 months ago    Hepatitis C     Dr Luis Alberto Driscoll follows; contracted from ex- (drug user)    Hypercholesteremia 3/11/2020    Hypertension     MRSA (methicillin resistant Staphylococcus aureus) infection in 2000    was in urine and nares    Pneumonia     S/P colonoscopy with polypectomy 2/11    one polyp, Dr Luis Alberto Driscoll. Recall 3 years. Past Surgical History:      Procedure Laterality Date    ABOVE KNEE AMPUTATION      AMPUTATION      right bka    CORONARY ANGIOPLASTY WITH STENT PLACEMENT  07/2019    JOINT REPLACEMENT  2006, 2010    right knee; Dr Kari Means.  KNEE ARTHROSCOPY      ridht knee X6    LIVER BIOPSY N/A 11/14/2018    LIVER BIOPSY LAPAROSCOPIC performed by Jhonny Avila MD at [address] [place] [address] N/A 11/14/2018    LAPAROSCOPIC CHOLECYSTECTOMY WITH INTRAOPERATIVE CHOLANGIOGRAM performed by Jhonny Avila MD at [address] [place] [address]  2/9/11    right    UPPER GASTROINTESTINAL ENDOSCOPY  11/13/2018    UPPER GASTROINTESTINAL ENDOSCOPY N/A 11/13/2018    EGD BIOPSY performed by Matty Arzate MD at [address]       Medications (prior to admission):  Prior to Admission medications    Medication Sig Start Date End Date Taking?  Authorizing Provider   lisinopril (PRINIVIL;ZESTRIL) 5 MG tablet Take 1 tablet by mouth daily 4/30/21   CHINA Reynolds - CNP   furosemide (LASIX) 40 MG tablet Take 1 tablet by mouth daily 4/30/21   Amber Márquez, APRN - CNP   carvedilol (COREG) 3.125 MG tablet Take 1 tablet by mouth 2 times daily (with meals) 9/25/20   CHINA Dumont - CNS   nitroGLYCERIN (NITROSTAT) 0.4 MG SL tablet up to max of 3 total doses. If no relief after 1 dose, call 911. 8/4/20   Tiesha Finney MD   sertraline (ZOLOFT) 50 MG tablet Take 50 mg by mouth daily Indications: Depression    Historical Provider, MD   mirtazapine (REMERON) 15 MG tablet Take 7.5 mg by mouth nightly    Historical Provider, MD   pregabalin (LYRICA) 100 MG capsule Take 100 mg by mouth 2 times daily. Historical Provider, MD   insulin glargine (LANTUS SOLOSTAR) 100 UNIT/ML injection pen Inject 15 Units into the skin 2 times daily Indications: Insulin Pump     Historical Provider, MD   insulin lispro, 1 Unit Dial, (HUMALOG KWIKPEN) 100 UNIT/ML SOPN Inject 10 Units into the skin 3 times daily (before meals) Inject 10 units three times daily with each meal in addition to sliding scale. Historical Provider, MD   ticagrelor (BRILINTA) 90 MG TABS tablet Take 1 tablet by mouth 2 times daily 3/12/20   Tiesha Finney MD   aspirin 81 MG tablet Take 1 tablet by mouth daily 3/12/20   Tiesha Finney MD   atorvastatin (LIPITOR) 40 MG tablet Take 1 tablet by mouth daily 3/12/20   Tiesha Finney MD   buprenorphine-naloxone (SUBOXONE) 8-2 MG SUBL SL tablet Place 1 tablet under the tongue 2 times daily. Historical Provider, MD   FREESTYLE LANCETS MISC 1 each by Other route 4 times daily (before meals and nightly) Patient to check blood sugar 4 times a day and PRN, he is treated with multiple daily injections of insulin that require correction dosing and has had hypoglycemia.              A1C  14.5  ICD code- E11.65, Z79.4 7/3/19   Amadeo Craig MD   blood glucose test strips (FREESTYLE LITE) strip 1 each by Other route 4 times daily (before meals and nightly) Patient to check blood sugar 4 times a day and PRN, he is treated with multiple daily injections of insulin that require correction dosing and has had hypoglycemia. A1C  14.5  ICD code- E11.65, Z79.4 7/3/19   Mini Hilton MD       Allergy(ies):  Darvocet [propoxyphene n-acetaminophen], Naprosyn [naproxen], and Ultram [tramadol hcl]    Social History:  TOBACCO:  reports that she quit smoking about 12 months ago. She has a 43.00 pack-year smoking history. She has never used smokeless tobacco.  ETOH:  reports no history of alcohol use. Family History:      Problem Relation Age of Onset    Cancer Father     Seizures Brother    [employer] Cancer Sister         colon       Review of Systems:  Pertinent positives are listed in HPI. At least 10-point ROS reviewed and were negative. Vitals and physical examination:  /78   Pulse 94   Temp 97.5 °F (36.4 °C) (Oral)   Resp 16   LMP 11/15/2017   SpO2 97%   Gen/overall appearance: Not in acute distress. Alert. Oriented X3  Head: Normocephalic, atraumatic  Eyes: EOMI, good acuity  ENT: Oral mucosa moist  Neck: No JVD, thyromegaly  CVS: Nml S1S2, no MRG, RRR  Pulm: Clear bilaterally. No crackles/wheezes  Gastrointestinal: Soft, NT/ND, +BS  Musculoskeletal: Erythema and warmth over left knee. S/p right AKA. No edema. Warm  Neuro: No focal deficit. Moves extremity spontaneously. Psychiatry: Appropriate affect. Not agitated. Skin: Warm, dry with normal turgor.    Capillary refill: Brisk,< 3 seconds   Peripheral Pulses: +2 palpable, equal bilaterally       Labs/imaging/EKG:  CBC:   Recent Labs     07/16/21  1740   WBC 12.0*   HGB 15.8        BMP:    Recent Labs     07/16/21  1740      K 5.1   CL 93*   CO2 29   BUN 52*   CREATININE 1.2*   GLUCOSE 749*     Hepatic:   Recent Labs     07/16/21  1740   AST 32   ALT 20   BILITOT 0.3   ALKPHOS 178*       CT ABDOMEN PELVIS WO CONTRAST Additional Contrast? None    Result Date: 7/16/2021  EXAMINATION: CT OF THE ABDOMEN AND PELVIS WITHOUT CONTRAST 7/16/2021 7:50 pm TECHNIQUE: CT of the abdomen and pelvis was performed without the administration of intravenous contrast. Multiplanar reformatted images are provided for review. Dose modulation, iterative reconstruction, and/or weight based adjustment of the mA/kV was utilized to reduce the radiation dose to as low as reasonably achievable. COMPARISON: CT abdomen pelvis 04/27/2021. HISTORY: ORDERING SYSTEM PROVIDED HISTORY: n/v, diabetic TECHNOLOGIST PROVIDED HISTORY: Reason for exam:->n/v, diabetic Additional Contrast?->None Decision Support Exception - unselect if not a suspected or confirmed emergency medical condition->Emergency Medical Condition (MA) Reason for Exam: Fatigue (c/o weakness, fatigue, NV for 3 days. Is unable to see due to needing cataract surgery.) Acuity: Acute Type of Exam: Initial FINDINGS: Lower Chest: Subsegmental lingular opacity. .  Coronary artery calcifications noted. Organs: Evaluation of the parenchymal organs is limited due to lack of intravenous contrast. Liver: Slight nodular contour of the liver. Spleen: Unremarkable on this unenhanced exam. Granulomas. Pancreas: No inflammatory changes appreciated on this unenhanced exam. Adrenal glands: Unremarkable on this unenhanced exam. Kidneys: Unremarkable on this unenhanced exam.  No renal, ureteral, or bladder calculi. Gallbladder: Surgically absent. GI/Bowel: Evaluation of the bowel and mesentery is limited due to lack of enteric contrast. Visualized esophagus/stomach: Small hiatal hernia. Small bowel: No dilated small bowel. Large bowel: Scattered colonic diverticula without adjacent stranding. Appendix: Not visualized. Pelvis: Bladder is largely distended. Air noted in the bladder lumen. Air in the bladder wall. Uterus and ovaries would be better evaluated by ultrasound. Peritoneum/Retroperitoneum: Adenopathy: Suboptimal evaluation for adenopathy due to lack of intravenous and enteric contrast. Abdominal aorta: No abdominal aortic aneurysm.  Free fluid/air: No free fluid. No free air. Bones/Soft Tissues: Chronic defect of left hemipelvis. Atrophy of the right lower extremity musculature and demineralization of the right femur. 1. Air in the bladder wall and bladder lumen. Correlate with presentation for emphysematous cystitis. 2. Nonspecific subsegmental lingular opacity could represent atelectasis and scarring. Correlate with presentation to exclude superimposed pneumonia. 3. Slight nodular contour of the liver. MRI would better evaluate. 4. No small bowel obstruction. 5. Other findings as described. XR CHEST PORTABLE    Result Date: 7/16/2021  EXAMINATION: ONE XRAY VIEW OF THE CHEST 7/16/2021 5:57 pm COMPARISON: 04/27/2021. HISTORY: ORDERING SYSTEM PROVIDED HISTORY: SOB TECHNOLOGIST PROVIDED HISTORY: Reason for exam:->SOB Reason for Exam: c/o weakness, fatigue, NV for 3 days. Is unable to see due to needing cataract surgery Acuity: Acute Type of Exam: Initial FINDINGS: The cardiac silhouette is borderline in size. Linear scarring or atelectasis in the left perihilar region. The lungs otherwise are clear. No infiltrate, pleural fluid or evidence of overt failure. No acute cardiopulmonary disease. Borderline cardiomegaly without overt failure. EKG: SR with PACs, 96 BPM. No ST/T changes. QTc 517  I reviewed EKG. Discussed with ER provider. Thank you No primary care provider on file.  for the opportunity to be involved in this patient's care.    -----------------------------  Tamara Stallings MD  [facility] hospitalist

## 2021-07-17 NOTE — PROGRESS NOTES
Assumed care of patient, FSBS currently 372, Lantus and correction insulin administered. Nausea improved after Phenergan, oral meds administered. IV fluids infusing, denies other needs @present.

## 2021-07-17 NOTE — PROGRESS NOTES
Hospitalist Progress Note      PCP: No primary care provider on file. Date of Admission: 7/16/2021    Chief Complaint: Nausea vomiting abdominal pain    Hospital Course: 78-year-old female with past medical history of type 2 diabetes, chronic combined CHF with EF 25 to 30%, CAD, hyperlipidemia, hypertension presented with complaints of generalized weakness, myalgia, nausea, vomiting for the duration of 3 days. She is admitted for management of dehydration, hyperglycemia, RAQUEL, left knee cellulitis, intractable nausea and vomiting. Subjective: Patient seen and examined at bedside. She states she still feels miserable. She is may be slightly better. Continues to be nauseous.       Medications:  Reviewed    Infusion Medications    dextrose 5 % and 0.9 % NaCl 50 mL/hr at 07/17/21 1509    dextrose      sodium chloride       Scheduled Medications    insulin lispro  10 Units Subcutaneous TID WC    insulin glargine  32 Units Subcutaneous BID    piperacillin-tazobactam  3,375 mg Intravenous Q8H    insulin lispro  0-18 Units Subcutaneous TID WC    insulin lispro  0-9 Units Subcutaneous Nightly    aspirin  81 mg Oral Daily    atorvastatin  10 mg Oral Daily    carvedilol  3.125 mg Oral BID WC    mirtazapine  7.5 mg Oral Nightly    pregabalin  100 mg Oral BID    sertraline  50 mg Oral Daily    ticagrelor  90 mg Oral BID    sodium chloride flush  5-40 mL Intravenous 2 times per day    enoxaparin  40 mg Subcutaneous Daily     PRN Meds: promethazine, glucose, dextrose, glucagon (rDNA), dextrose, sodium chloride flush, sodium chloride, polyethylene glycol, acetaminophen **OR** acetaminophen, meclizine, morphine      Intake/Output Summary (Last 24 hours) at 7/17/2021 1522  Last data filed at 7/17/2021 1006  Gross per 24 hour   Intake 2475.91 ml   Output --   Net 2475.91 ml       Physical Exam Performed:    /86   Pulse 102   Temp 97.5 °F (36.4 °C) (Axillary)   Resp 17   Ht 5' 2\" (1.575 m)   Wt 113 lb 12.8 oz (51.6 kg)   LMP 11/15/2017   SpO2 97%   BMI 20.81 kg/m²     General appearance: No apparent distress, appears stated age and cooperative. HEENT: Pupils equal, round, and reactive to light. Conjunctivae/corneas clear. Neck: Supple, with full range of motion. No jugular venous distention. Trachea midline. Respiratory:  Normal respiratory effort. Clear to auscultation, bilaterally without Rales/Wheezes/Rhonchi. Cardiovascular: Regular rate and rhythm with normal S1/S2 without murmurs, rubs or gallops. Abdomen: Soft, non-tender, non-distended with normal bowel sounds. Musculoskeletal: No clubbing, cyanosis or edema bilaterally. Full range of motion without deformity. Skin: Skin color, texture, turgor normal.  No rashes or lesions. Neurologic:  Neurovascularly intact without any focal sensory/motor deficits. Cranial nerves: II-XII intact, grossly non-focal.  Psychiatric: Alert and oriented, thought content appropriate, normal insight  Capillary Refill: Brisk,< 3 seconds   Peripheral Pulses: +2 palpable, equal bilaterally       Labs:   Recent Labs     07/16/21 1740 07/17/21 0423   WBC 12.0* 11.7*   HGB 15.8 13.8   HCT 50.3* 42.9    131*     Recent Labs     07/16/21 1740 07/16/21 2120 07/17/21 0423    140 143   K 5.1 4.0 3.6   CL 93* 103 105   CO2 29 27 28   BUN 52* 45* 36*   CREATININE 1.2* 0.8 0.7   CALCIUM 9.9 8.3 8.7   PHOS  --   --  1.8*     Recent Labs     07/16/21 1740 07/17/21 0423   AST 32 27   ALT 20 18   BILITOT 0.3 <0.2   ALKPHOS 178* 129     No results for input(s): INR in the last 72 hours.   Recent Labs     07/16/21 1740   TROPONINI <0.01       Urinalysis:      Lab Results   Component Value Date    NITRU Negative 07/16/2021    WBCUA >100 07/16/2021    BACTERIA 3+ 07/16/2021    RBCUA 5-10 07/16/2021    BLOODU SMALL 07/16/2021    SPECGRAV >1.030 07/16/2021    GLUCOSEU >=1000 07/16/2021    GLUCOSEU NEGATIVE 02/14/2012       Radiology:  CT ABDOMEN PELVIS WO CONTRAST Additional Contrast? None   Final Result   1. Air in the bladder wall and bladder lumen. Correlate with presentation   for emphysematous cystitis. 2. Nonspecific subsegmental lingular opacity could represent atelectasis and   scarring. Correlate with presentation to exclude superimposed pneumonia. 3. Slight nodular contour of the liver. MRI would better evaluate. 4. No small bowel obstruction. 5. Other findings as described. XR CHEST PORTABLE   Final Result   No acute cardiopulmonary disease. Borderline cardiomegaly without overt   failure.                  Assessment/Plan:    Active Hospital Problems    Diagnosis     Severe dehydration [E86.0]      Severe dehydration  Improved  Patient received 2 L of NS in the ED and has been receiving LR at 50 cc/h since admission  Diuretics are on hold  Does not examine overloaded  BUN still elevated    Uncontrolled diabetes  Received 15 units IV on admission and is on Lantus 15 twice daily with sliding scale  Had an episode of hypoglycemia at 53 earlier this morning  We will switch fluids to dextrose containing  Continue current insulin regimen  Check blood glucose 4 times daily while not eating    RAQUEL  Resolved  Creatinine normalized    Left knee cellulitis  Not convincing  Given Ancef on admission  We will switch to Zosyn to cover for intra-abdominal pathogens    Abdominal pain with intractable nausea and vomiting  She has leukocytosis and tachycardia  CT abdomen does not show any acute infectious process  Follow-up work-up    UTI/cystitis  Possible etiology of the above symptoms  Air in the bladder on CT  Pyuria on urinalysis  Urine culture pending  Zosyn    Abnormal finding on CT abdomen  Nodularity in the liver  MRI follow-up recommended    DVT Prophylaxis: Lovenox  Diet: Diet NPO  Code Status: Full Code    Electronically signed by Monse Tamez MD on 7/17/2021 at 3:22 PM

## 2021-07-17 NOTE — PROGRESS NOTES
BS 56 at 16:06. Pt A&O X4, asymptomatic. Pt is NPO. Dextrose 50% IV solution given per MAR at 16:10. Recheck  at 16:26. MD aware.

## 2021-07-17 NOTE — PLAN OF CARE
Problem: Falls - Risk of:  Goal: Will remain free from falls  Description: Will remain free from falls  Outcome: Ongoing  Note: Remains free from falls this shift. Problem: Fluid Volume:  Goal: Ability to achieve a balanced intake and output will improve  Description: Ability to achieve a balanced intake and output will improve  Outcome: Ongoing  Note: Patient has had no urine output since I assumed care, was incontinent prior to that. Problem: Serum Glucose Level - Abnormal:  Goal: Ability to maintain appropriate glucose levels has stabilized  Description: Ability to maintain appropriate glucose levels has stabilized  Outcome: Ongoing  Note: FSBS @0114 was 372, down to 232 on am labs after Lantus, correction insulin.

## 2021-07-17 NOTE — PLAN OF CARE
Problem: Skin Integrity:  Goal: Will show no infection signs and symptoms  Description: Will show no infection signs and symptoms  Outcome: Ongoing     Problem: Skin Integrity:  Goal: Absence of new skin breakdown  Description: Absence of new skin breakdown  Outcome: Ongoing     Problem: Falls - Risk of:  Goal: Will remain free from falls  Description: Will remain free from falls  7/17/2021 1027 by Gordon Knight RN  Outcome: Ongoing     Problem: Fluid Volume:  Goal: Ability to achieve a balanced intake and output will improve  Description: Ability to achieve a balanced intake and output will improve  7/17/2021 1027 by Gordon Knight RN  Outcome: Ongoing     Problem: Serum Glucose Level - Abnormal:  Goal: Ability to maintain appropriate glucose levels has stabilized  Description: Ability to maintain appropriate glucose levels has stabilized  7/17/2021 1027 by Gordon Knight RN  Outcome: Ongoing     Problem: Pain:  Goal: Pain level will decrease  Description: Pain level will decrease  Outcome: Ongoing

## 2021-07-17 NOTE — PROGRESS NOTES
BS 53 at 11:54 AM. Pt alert and oriented, c/o dizziness. No further complains. Dextrose 50% IV given per MAR at 12:01 PM. Pt also drank 8oz orange juice. Recheck BS at 12:20 PM is 110. Denies dizziness. Hospitalist notified of the hypoglycemic event.

## 2021-07-18 LAB
A/G RATIO: 0.7 (ref 1.1–2.2)
ALBUMIN SERPL-MCNC: 3.1 G/DL (ref 3.4–5)
ALP BLD-CCNC: 116 U/L (ref 40–129)
ALT SERPL-CCNC: 10 U/L (ref 10–40)
ANION GAP SERPL CALCULATED.3IONS-SCNC: 9 MMOL/L (ref 3–16)
AST SERPL-CCNC: 32 U/L (ref 15–37)
BASOPHILS ABSOLUTE: 0 K/UL (ref 0–0.2)
BASOPHILS RELATIVE PERCENT: 0.3 %
BILIRUB SERPL-MCNC: 0.3 MG/DL (ref 0–1)
BUN BLDV-MCNC: 16 MG/DL (ref 7–20)
C DIFF TOXIN/ANTIGEN: NORMAL
CALCIUM SERPL-MCNC: 8.9 MG/DL (ref 8.3–10.6)
CHLORIDE BLD-SCNC: 103 MMOL/L (ref 99–110)
CO2: 30 MMOL/L (ref 21–32)
CREAT SERPL-MCNC: <0.5 MG/DL (ref 0.6–1.1)
EKG ATRIAL RATE: 102 BPM
EKG DIAGNOSIS: NORMAL
EKG P AXIS: 18 DEGREES
EKG P-R INTERVAL: 134 MS
EKG Q-T INTERVAL: 378 MS
EKG QRS DURATION: 102 MS
EKG QTC CALCULATION (BAZETT): 492 MS
EKG R AXIS: -61 DEGREES
EKG T AXIS: 39 DEGREES
EKG VENTRICULAR RATE: 102 BPM
EOSINOPHILS ABSOLUTE: 0.1 K/UL (ref 0–0.6)
EOSINOPHILS RELATIVE PERCENT: 0.8 %
GFR AFRICAN AMERICAN: >60
GFR NON-AFRICAN AMERICAN: >60
GLOBULIN: 4.5 G/DL
GLUCOSE BLD-MCNC: 109 MG/DL (ref 70–99)
GLUCOSE BLD-MCNC: 193 MG/DL (ref 70–99)
GLUCOSE BLD-MCNC: 199 MG/DL (ref 70–99)
GLUCOSE BLD-MCNC: 202 MG/DL (ref 70–99)
GLUCOSE BLD-MCNC: 70 MG/DL (ref 70–99)
GLUCOSE BLD-MCNC: 73 MG/DL (ref 70–99)
GLUCOSE BLD-MCNC: 73 MG/DL (ref 70–99)
GLUCOSE BLD-MCNC: 75 MG/DL (ref 70–99)
GLUCOSE BLD-MCNC: 82 MG/DL (ref 70–99)
HCT VFR BLD CALC: 45.9 % (ref 36–48)
HEMOGLOBIN: 14.8 G/DL (ref 12–16)
LYMPHOCYTES ABSOLUTE: 2.6 K/UL (ref 1–5.1)
LYMPHOCYTES RELATIVE PERCENT: 25 %
MAGNESIUM: 1.9 MG/DL (ref 1.8–2.4)
MCH RBC QN AUTO: 25.7 PG (ref 26–34)
MCHC RBC AUTO-ENTMCNC: 32.3 G/DL (ref 31–36)
MCV RBC AUTO: 79.4 FL (ref 80–100)
MONOCYTES ABSOLUTE: 0.5 K/UL (ref 0–1.3)
MONOCYTES RELATIVE PERCENT: 4.7 %
NEUTROPHILS ABSOLUTE: 7.1 K/UL (ref 1.7–7.7)
NEUTROPHILS RELATIVE PERCENT: 69.2 %
PDW BLD-RTO: 18.7 % (ref 12.4–15.4)
PERFORMED ON: ABNORMAL
PERFORMED ON: NORMAL
PHOSPHORUS: 2 MG/DL (ref 2.5–4.9)
PLATELET # BLD: 115 K/UL (ref 135–450)
PMV BLD AUTO: 10.1 FL (ref 5–10.5)
POTASSIUM SERPL-SCNC: 3.6 MMOL/L (ref 3.5–5.1)
RBC # BLD: 5.78 M/UL (ref 4–5.2)
SODIUM BLD-SCNC: 142 MMOL/L (ref 136–145)
TOTAL PROTEIN: 7.6 G/DL (ref 6.4–8.2)
WBC # BLD: 10.2 K/UL (ref 4–11)

## 2021-07-18 PROCEDURE — 87324 CLOSTRIDIUM AG IA: CPT

## 2021-07-18 PROCEDURE — 97110 THERAPEUTIC EXERCISES: CPT

## 2021-07-18 PROCEDURE — 97530 THERAPEUTIC ACTIVITIES: CPT

## 2021-07-18 PROCEDURE — 6370000000 HC RX 637 (ALT 250 FOR IP): Performed by: INTERNAL MEDICINE

## 2021-07-18 PROCEDURE — 2060000000 HC ICU INTERMEDIATE R&B

## 2021-07-18 PROCEDURE — 6360000002 HC RX W HCPCS: Performed by: INTERNAL MEDICINE

## 2021-07-18 PROCEDURE — 36415 COLL VENOUS BLD VENIPUNCTURE: CPT

## 2021-07-18 PROCEDURE — 83735 ASSAY OF MAGNESIUM: CPT

## 2021-07-18 PROCEDURE — 97165 OT EVAL LOW COMPLEX 30 MIN: CPT

## 2021-07-18 PROCEDURE — 93010 ELECTROCARDIOGRAM REPORT: CPT | Performed by: INTERNAL MEDICINE

## 2021-07-18 PROCEDURE — 97162 PT EVAL MOD COMPLEX 30 MIN: CPT

## 2021-07-18 PROCEDURE — 97535 SELF CARE MNGMENT TRAINING: CPT

## 2021-07-18 PROCEDURE — 2580000003 HC RX 258: Performed by: INTERNAL MEDICINE

## 2021-07-18 PROCEDURE — 6370000000 HC RX 637 (ALT 250 FOR IP): Performed by: PHYSICIAN ASSISTANT

## 2021-07-18 PROCEDURE — 85025 COMPLETE CBC W/AUTO DIFF WBC: CPT

## 2021-07-18 PROCEDURE — 80053 COMPREHEN METABOLIC PANEL: CPT

## 2021-07-18 PROCEDURE — 84100 ASSAY OF PHOSPHORUS: CPT

## 2021-07-18 PROCEDURE — 6360000002 HC RX W HCPCS: Performed by: PHYSICIAN ASSISTANT

## 2021-07-18 PROCEDURE — 87449 NOS EACH ORGANISM AG IA: CPT

## 2021-07-18 RX ORDER — LISINOPRIL 5 MG/1
5 TABLET ORAL DAILY
Status: DISCONTINUED | OUTPATIENT
Start: 2021-07-18 | End: 2021-07-20

## 2021-07-18 RX ORDER — KETOROLAC TROMETHAMINE 30 MG/ML
30 INJECTION, SOLUTION INTRAMUSCULAR; INTRAVENOUS EVERY 6 HOURS PRN
Status: DISCONTINUED | OUTPATIENT
Start: 2021-07-18 | End: 2021-07-22 | Stop reason: HOSPADM

## 2021-07-18 RX ORDER — LACTOBACILLUS RHAMNOSUS GG 10B CELL
1 CAPSULE ORAL 2 TIMES DAILY WITH MEALS
Status: DISCONTINUED | OUTPATIENT
Start: 2021-07-18 | End: 2021-07-22 | Stop reason: HOSPADM

## 2021-07-18 RX ORDER — FUROSEMIDE 40 MG/1
40 TABLET ORAL DAILY
Status: DISCONTINUED | OUTPATIENT
Start: 2021-07-18 | End: 2021-07-22 | Stop reason: HOSPADM

## 2021-07-18 RX ORDER — DIPHENHYDRAMINE HCL 25 MG
25 TABLET ORAL EVERY 6 HOURS PRN
Status: DISCONTINUED | OUTPATIENT
Start: 2021-07-18 | End: 2021-07-22 | Stop reason: HOSPADM

## 2021-07-18 RX ORDER — BUPRENORPHINE AND NALOXONE 8; 2 MG/1; MG/1
1 FILM, SOLUBLE BUCCAL; SUBLINGUAL 2 TIMES DAILY
Status: DISCONTINUED | OUTPATIENT
Start: 2021-07-18 | End: 2021-07-19

## 2021-07-18 RX ORDER — ACETAMINOPHEN 500 MG
1000 TABLET ORAL EVERY 6 HOURS PRN
Status: DISCONTINUED | OUTPATIENT
Start: 2021-07-18 | End: 2021-07-22 | Stop reason: HOSPADM

## 2021-07-18 RX ADMIN — PIPERACILLIN AND TAZOBACTAM 3375 MG: 3; .375 INJECTION, POWDER, LYOPHILIZED, FOR SOLUTION INTRAVENOUS at 13:54

## 2021-07-18 RX ADMIN — PIPERACILLIN AND TAZOBACTAM 3375 MG: 3; .375 INJECTION, POWDER, LYOPHILIZED, FOR SOLUTION INTRAVENOUS at 06:04

## 2021-07-18 RX ADMIN — Medication 10 ML: at 09:07

## 2021-07-18 RX ADMIN — Medication 10 ML: at 21:04

## 2021-07-18 RX ADMIN — SERTRALINE 50 MG: 50 TABLET, FILM COATED ORAL at 09:26

## 2021-07-18 RX ADMIN — DIPHENHYDRAMINE HYDROCHLORIDE 25 MG: 25 TABLET ORAL at 21:03

## 2021-07-18 RX ADMIN — SODIUM CHLORIDE 950 ML: 9 INJECTION, SOLUTION INTRAVENOUS at 13:51

## 2021-07-18 RX ADMIN — INSULIN LISPRO 2 UNITS: 100 INJECTION, SOLUTION INTRAVENOUS; SUBCUTANEOUS at 21:21

## 2021-07-18 RX ADMIN — BUPRENORPHINE AND NALOXONE 1 FILM: 8; 2 FILM BUCCAL; SUBLINGUAL at 17:51

## 2021-07-18 RX ADMIN — TICAGRELOR 90 MG: 90 TABLET ORAL at 21:03

## 2021-07-18 RX ADMIN — PREGABALIN 100 MG: 100 CAPSULE ORAL at 09:26

## 2021-07-18 RX ADMIN — Medication 1 CAPSULE: at 13:47

## 2021-07-18 RX ADMIN — CARVEDILOL 3.12 MG: 3.12 TABLET, FILM COATED ORAL at 17:51

## 2021-07-18 RX ADMIN — Medication 1 CAPSULE: at 17:51

## 2021-07-18 RX ADMIN — MORPHINE SULFATE 2 MG: 2 INJECTION, SOLUTION INTRAMUSCULAR; INTRAVENOUS at 02:54

## 2021-07-18 RX ADMIN — CARVEDILOL 3.12 MG: 3.12 TABLET, FILM COATED ORAL at 10:06

## 2021-07-18 RX ADMIN — ATORVASTATIN CALCIUM 10 MG: 10 TABLET, FILM COATED ORAL at 09:26

## 2021-07-18 RX ADMIN — PREGABALIN 100 MG: 100 CAPSULE ORAL at 21:03

## 2021-07-18 RX ADMIN — ENOXAPARIN SODIUM 40 MG: 40 INJECTION SUBCUTANEOUS at 09:26

## 2021-07-18 RX ADMIN — INSULIN GLARGINE 15 UNITS: 100 INJECTION, SOLUTION SUBCUTANEOUS at 21:21

## 2021-07-18 RX ADMIN — ASPIRIN 81 MG: 81 TABLET, CHEWABLE ORAL at 09:26

## 2021-07-18 RX ADMIN — PIPERACILLIN AND TAZOBACTAM 3375 MG: 3; .375 INJECTION, POWDER, LYOPHILIZED, FOR SOLUTION INTRAVENOUS at 21:27

## 2021-07-18 RX ADMIN — ACETAMINOPHEN 650 MG: 325 TABLET ORAL at 09:27

## 2021-07-18 RX ADMIN — KETOROLAC TROMETHAMINE 30 MG: 30 INJECTION, SOLUTION INTRAMUSCULAR; INTRAVENOUS at 21:03

## 2021-07-18 RX ADMIN — INSULIN LISPRO 2 UNITS: 100 INJECTION, SOLUTION INTRAVENOUS; SUBCUTANEOUS at 17:53

## 2021-07-18 RX ADMIN — MIRTAZAPINE 7.5 MG: 15 TABLET, FILM COATED ORAL at 21:03

## 2021-07-18 RX ADMIN — PROMETHAZINE HYDROCHLORIDE 6.25 MG: 25 INJECTION INTRAMUSCULAR; INTRAVENOUS at 03:57

## 2021-07-18 RX ADMIN — TICAGRELOR 90 MG: 90 TABLET ORAL at 10:06

## 2021-07-18 ASSESSMENT — PAIN DESCRIPTION - PROGRESSION
CLINICAL_PROGRESSION: GRADUALLY WORSENING

## 2021-07-18 ASSESSMENT — PAIN SCALES - GENERAL
PAINLEVEL_OUTOF10: 6
PAINLEVEL_OUTOF10: 8
PAINLEVEL_OUTOF10: 0
PAINLEVEL_OUTOF10: 0
PAINLEVEL_OUTOF10: 8
PAINLEVEL_OUTOF10: 0

## 2021-07-18 ASSESSMENT — PAIN DESCRIPTION - PAIN TYPE
TYPE: CHRONIC PAIN
TYPE: ACUTE PAIN
TYPE: ACUTE PAIN

## 2021-07-18 ASSESSMENT — PAIN DESCRIPTION - DESCRIPTORS
DESCRIPTORS: BURNING
DESCRIPTORS: SHARP
DESCRIPTORS: SHARP

## 2021-07-18 ASSESSMENT — PAIN DESCRIPTION - ORIENTATION
ORIENTATION: LEFT
ORIENTATION: LOWER;MID
ORIENTATION: RIGHT;LEFT;LOWER

## 2021-07-18 ASSESSMENT — PAIN DESCRIPTION - LOCATION
LOCATION: ABDOMEN
LOCATION: LEG
LOCATION: ABDOMEN

## 2021-07-18 ASSESSMENT — PAIN DESCRIPTION - FREQUENCY: FREQUENCY: INTERMITTENT

## 2021-07-18 ASSESSMENT — PAIN SCALES - WONG BAKER: WONGBAKER_NUMERICALRESPONSE: 0

## 2021-07-18 ASSESSMENT — PAIN DESCRIPTION - ONSET: ONSET: GRADUAL

## 2021-07-18 NOTE — PROGRESS NOTES
Physical Therapy    Facility/Department: [address]  Initial Assessment/Discharge Summary    NAME: Cosme Guerrero  : 1965  MRN: [id number]    Date of Service: 2021    Discharge Recommendations:  Cosme Guerrero scored a 18/24 on the AM-PAC short mobility form. At this time, no further PT is recommended upon discharge due to being at functional baseline. Recommend patient returns to prior setting with prior services. Home with assist PRN   PT Equipment Recommendations  Equipment Needed: Yes  Other: pelletier cushion for w/c, tub transfer bench    Assessment   Assessment: Patient demonstrates functional independence. She does not ambulate at baseline, transfers to w/c on her own w/o a device and self-propels w/c w/ BUEs and LLE. She notes difficulty with tub transfers and bottom soreness from w/c despite shifting weight and using pressure relief strategies. Patient would benefit from a tub shower bench for transfers and a Pelletier cushion for w/c. Prognosis: Good  Decision Making: Low Complexity  History: As above. Exam: functional mobility deficits  Clinical Presentation: Stable. PT Education: Goals;PT Role;Plan of Care;Low Vision Education;General Safety;Pressure Relief  Patient Education: Patient verbalized understanding. Barriers to Learning: vision  REQUIRES PT FOLLOW UP: No  Activity Tolerance  Activity Tolerance: Patient Tolerated treatment well       Patient Diagnosis(es): The primary encounter diagnosis was Hyperglycemia. Diagnoses of Non-intractable vomiting with nausea, unspecified vomiting type and Pneumonia due to infectious organism, unspecified laterality, unspecified part of lung were also pertinent to this visit.      has a past medical history of Arthritis, CHF (congestive heart failure) ([facility] [address] Depression, Diabetes mellitus ([facility] [address] Heart attack ([facility] [address] Hepatitis C, Hypercholesteremia, Hypertension, MRSA (methicillin resistant Staphylococcus aureus) infection, Pneumonia, and S/P colonoscopy with polypectomy. has a past surgical history that includes Knee arthroscopy; Revision total knee arthroplasty (2/9/11); joint replacement (2006, 2010); amputation; above knee amputation; Upper gastrointestinal endoscopy (11/13/2018); Upper gastrointestinal endoscopy (N/A, 11/13/2018); pr lap,cholecystectomy (N/A, 11/14/2018); liver biopsy (N/A, 11/14/2018); and Coronary angioplasty with stent (07/2019). Restrictions  Restrictions/Precautions  Restrictions/Precautions: Fall Risk, Isolation (high fall risk, CDIFF r/o isolation)  Position Activity Restriction  Other position/activity restrictions: Neva Young is a 60 y.o. female who presents to the emergency department today for evaluation for general fatigue, nausea and vomiting. The patient states that her symptoms have been ongoing for the past 3 days. The patient states that she has had multiple episodes of emesis, she denies any bilious emesis, hematemesis or coffee-ground emesis. The patient states that she is a insulin-dependent diabetic, and she states that her sugars have been running high for the past 2 days as well. The patient states that overall she is just not feeling well and she is having generalized body aches. The patient denies any specific chest pain, shortness of breath or abdominal pain. She denies any diarrhea. She denies any fever chills per no cough or congestion. Patient denies any urinary symptoms. She has been vaccinated for COVID-19. The patient otherwise has no complaints at this time.      Vision/Hearing  Vision: Impaired  Vision Exceptions: Cataracts (Patient is to have surgery to remove cataracts.)  Hearing: Within functional limits       Subjective  General  Chart Reviewed: Yes  Patient assessed for rehabilitation services?: Yes  Additional Pertinent Hx: R BKA  Response To Previous Treatment: Not applicable  Family / Caregiver Present: No  Diagnosis: dehydration  Follows Commands: Within Functional Limits  General Comment  Comments: Patient supine in bed. Subjective  Subjective: Patient reports feeling much better. Pain Screening  Patient Currently in Pain: No  Vital Signs  Patient Currently in Pain: No       Orientation  Orientation  Overall Orientation Status: Within Normal Limits     Social/Functional History  Social/Functional History  Lives With: Family  Type of Home: Apartment  Home Layout: One level  Home Access: Level entry  Bathroom Shower/Tub: Tub/Shower unit (sit in bottom of tub)  Bathroom Toilet: Standard  Bathroom Equipment: Grab bars in shower  Bathroom Accessibility: Wheelchair accessible  Home Equipment: [employer], [address] Help From: Family  ADL Assistance: Independent  Homemaking Assistance: Independent  Homemaking Responsibilities: No  Ambulation Assistance: Independent  Transfer Assistance: Independent  Active : No  Occupation: Retired  Type of occupation: Retired  at [employer]: ankita Ortiz with grandchildren-- has 21 grandchildren  IADL Comments: Sister is primary homemaker  Additional Comments: Denies falls.  Patient stated does NOT have a w/c cushion in chair and just sits directly on the fabric, also requesting a shower chair        Objective  Observation/Palpation  Observation: R BKA    PROM RLE (degrees)  RLE General PROM: R BKA  AROM RLE (degrees)  RLE General AROM: R BKA  PROM LLE (degrees)  LLE PROM: WNL  AROM LLE (degrees)  LLE AROM : WNL  Strength RLE  Strength RLE: WFL  Comment: R BKA  Strength LLE  Strength LLE: WFL        Bed mobility  Rolling to Right: Modified independent  Sit to Supine: Independent  Scooting: Independent  Transfers  Sit to Stand: Modified independent  Stand to sit: Modified independent  Bed to Chair: Modified independent  Stand Pivot Transfers: Modified independent  Ambulation  Ambulation?: No  Stairs/Curb  Stairs?: No     Balance  Posture: Good  Sitting - Static: Good  Sitting - Dynamic: Good  Exercises  Comments: Patient verbally and manually educated on supine ankle pumps, seated heel/toe raises, LAQs and seated hip flexion. Unable to print HEP big enough for patient to be able to see so handout was not provided. Plan   Plan  Plan Comment: D/C acute PT  Safety Devices  Type of devices:  All fall risk precautions in place, Call light within reach, Chair alarm in place, Left in chair, Nurse notified  Restraints  Initially in place: No           AM-PAC Score  AM-PAC Inpatient Mobility Raw Score : 18 (07/18/21 1322)  AM-PAC Inpatient T-Scale Score : 43.63 (07/18/21 1322)  Mobility Inpatient CMS 0-100% Score: 46.58 (07/18/21 1322)  Mobility Inpatient CMS G-Code Modifier : CK (07/18/21 1322)        Therapy Time   Individual Concurrent Group Co-treatment   Time In 1239         Time Out 1317         Minutes 38         Timed Code Treatment Minutes: 1430 [place], [place], DPT, ATC-R [id number]

## 2021-07-18 NOTE — PROGRESS NOTES
Patient requesting something for pain, noted order for morphine has been discontinued. Message sent to hospitalist PA, morphine reordered.

## 2021-07-18 NOTE — PLAN OF CARE
Assessment complete, see flow sheet. /85   Pulse 96   Temp 98 °F (36.7 °C) (Oral)   Resp 18   Ht 5' 2\" (1.575 m)   Wt 114 lb 3.2 oz (51.8 kg)   LMP 11/15/2017   SpO2 97%   BMI 20.89 kg/m²  normal sinus rhythm with prolonged QT and rare to occasional premature ventricular contractions oxygen 2 lpm, oxygen turned down to 1 lpm. Blood sugar 76, correctional parameters not met. Blood sugar of 54 yesterday after lantus, will hold am lantus for now. complains of abdominal pain #6, given tylenol. Will continue to monitor. Ranjit Simeon RN, BSN, PCCN.

## 2021-07-18 NOTE — PLAN OF CARE
1830 called out had an accident. Incontinent urine & loose bowel movement. Stool specimen obtained & sent to lab after didi care & linen change. Will continue to monitor. Sunshine Perdomo RN, BSN, PCCN.

## 2021-07-18 NOTE — PROGRESS NOTES
98 °F (36.7 °C) (Oral)   Resp 18   Ht 5' 2\" (1.575 m)   Wt 114 lb 3.2 oz (51.8 kg)   LMP 11/15/2017   SpO2 96%   BMI 20.89 kg/m²     General appearance: No apparent distress, appears stated age and cooperative. HEENT: Pupils equal, round, and reactive to light. Conjunctivae/corneas clear. Neck: Supple, with full range of motion. No jugular venous distention. Trachea midline. Respiratory:  Normal respiratory effort. Clear to auscultation, bilaterally without Rales/Wheezes/Rhonchi. Cardiovascular: Regular rate and rhythm with normal S1/S2 without murmurs, rubs or gallops. Abdomen: Soft, non-tender, non-distended with normal bowel sounds. Musculoskeletal: No clubbing, cyanosis or edema bilaterally. Full range of motion without deformity. Skin: Skin color, texture, turgor normal.  No rashes or lesions. Neurologic:  Neurovascularly intact without any focal sensory/motor deficits. Cranial nerves: II-XII intact, grossly non-focal.  Psychiatric: Alert and oriented, thought content appropriate, normal insight  Capillary Refill: Brisk,< 3 seconds   Peripheral Pulses: +2 palpable, equal bilaterally       Labs:   Recent Labs     07/16/21 1740 07/17/21 0423 07/18/21 0419   WBC 12.0* 11.7* 10.2   HGB 15.8 13.8 14.8   HCT 50.3* 42.9 45.9    131* 115*     Recent Labs     07/16/21 2120 07/17/21 0423 07/18/21 0419    143 142   K 4.0 3.6 3.6    105 103   CO2 27 28 30   BUN 45* 36* 16   CREATININE 0.8 0.7 <0.5*   CALCIUM 8.3 8.7 8.9   PHOS  --  1.8* 2.0*     Recent Labs     07/16/21 1740 07/17/21 0423 07/18/21 0419   AST 32 27 32   ALT 20 18 10   BILITOT 0.3 <0.2 0.3   ALKPHOS 178* 129 116     No results for input(s): INR in the last 72 hours.   Recent Labs     07/16/21 1740   TROPONINI <0.01       Urinalysis:      Lab Results   Component Value Date    NITRU Negative 07/16/2021    WBCUA >100 07/16/2021    BACTERIA 3+ 07/16/2021    RBCUA 5-10 07/16/2021    BLOODU SMALL 07/16/2021    SPECGRAV >1.030 07/16/2021    GLUCOSEU >=1000 07/16/2021    GLUCOSEU NEGATIVE 02/14/2012       Radiology:  CT ABDOMEN PELVIS WO CONTRAST Additional Contrast? None   Final Result   1. Air in the bladder wall and bladder lumen. Correlate with presentation   for emphysematous cystitis. 2. Nonspecific subsegmental lingular opacity could represent atelectasis and   scarring. Correlate with presentation to exclude superimposed pneumonia. 3. Slight nodular contour of the liver. MRI would better evaluate. 4. No small bowel obstruction. 5. Other findings as described. XR CHEST PORTABLE   Final Result   No acute cardiopulmonary disease. Borderline cardiomegaly without overt   failure. MRI ABDOMEN WO CONTRAST    (Results Pending)           Assessment/Plan:    Active Hospital Problems    Diagnosis     Severe dehydration [E86.0]      Severe dehydration  Improved  Patient received 2 L of NS in the ED and has been receiving LR at 50 cc/h since admission  We will stop IV fluids  Does not examine overloaded  We will resume diuretics    Uncontrolled diabetes  Received 15 units IV on admission and is on Lantus 15 twice daily with sliding scale  Had several episodes of hypoglycemia at 50s yesterday  Received dextrose containing fluids, will discontinue  We will give Lantus 15 units twice daily  This is patient's documented home dose since last discharge  Patient does tell me that her dose has recently been increased to 32 units twice daily by her endocrinologist  This dose is very high for her at least through the. While she was n.p.o.   We will see how she does on 15 units  She can take p.o. now    RAQUEL  Resolved  Creatinine normalized    Left knee cellulitis  Not convincing  Given Ancef on admission  We will switch to Zosyn to cover for intra-abdominal pathogens    Abdominal pain with intractable nausea and vomiting  She has leukocytosis and tachycardia  Cultures NTD  It appears that the patient has

## 2021-07-18 NOTE — PROGRESS NOTES
Patient had received orange juice for low blood sugar twice today after being made NPO, requesting it now. Message sent via [employer] to hospitalist PA asking if anything >70 is ok on every 2 hour FSBS, affirms that is correct.   Patient advised they changed her IV fluids to a dextrose containing solution for blood sugar support, she remains NPO with ice chips for comfort, goal being bowel rest d/t abdominal pain/N/V.  VU.

## 2021-07-18 NOTE — PROGRESS NOTES
Called requesting popsicle. Reviewed NPO order again, only exception is ice chips for comfort, sip of water with meds, VU.

## 2021-07-18 NOTE — PROGRESS NOTES
Occupational Therapy   Occupational Therapy Initial Assessment and Discharge Summary  Date: 2021   Patient Name: Shani Jay  MRN: [id number]     : 1965    Date of Service: 2021    Discharge Recommendations: Shani Jay scored a 22/24 on the AM-[facility] ADL Inpatient form. At this time, no further OT is recommended upon discharge due to ADLs and mobility. Recommend patient returns to prior setting with prior services. OT Equipment Recommendations  Equipment Needed: Yes  Mobility Devices: ADL Assistive Devices  ADL Assistive Devices: Shower Chair with back  Other: Wheelchair cushion (noticed mild redness on buttocks when performing pericare)    Assessment   Assessment: Patient appears to be baseline for ADLs. Patient reports feeling much better today. Hopes to go home tomorrow. Lives with brother and sister  Treatment Diagnosis: Severe dehydration  Prognosis: Good  Decision Making: Low Complexity  History: old right BKA, does not wear prosthetic, lives with brother and sister  Exam: modified indep bed mobility, indep stand pivot bed > chair, setup ADls  Assistance / Modification: Stable presentation  OT Education: OT Role;Plan of Care  Patient Education: Patient verbalized understanding  No Skilled OT: Independent with functional mobility; Independent with ADL's;At baseline function; Safe to return home; No OT goals identified  REQUIRES OT FOLLOW UP: No  Activity Tolerance  Activity Tolerance: Patient Tolerated treatment well  Safety Devices  Safety Devices in place: Yes  Type of devices: All fall risk precautions in place;Nurse notified; Chair alarm in place; Left in chair;Call light within reach  Restraints  Initially in place: No           Patient Diagnosis(es): The primary encounter diagnosis was Hyperglycemia.  Diagnoses of Non-intractable vomiting with nausea, unspecified vomiting type and Pneumonia due to infectious organism, unspecified laterality, unspecified part of lung were also pertinent to this visit. has a past medical history of Arthritis, CHF (congestive heart failure) ([facility] [address] Depression, Diabetes mellitus ([facility] [address] Heart attack ([facility] [address] Hepatitis C, Hypercholesteremia, Hypertension, MRSA (methicillin resistant Staphylococcus aureus) infection, Pneumonia, and S/P colonoscopy with polypectomy. has a past surgical history that includes Knee arthroscopy; Revision total knee arthroplasty (2/9/11); joint replacement (2006, 2010); amputation; above knee amputation; Upper gastrointestinal endoscopy (11/13/2018); Upper gastrointestinal endoscopy (N/A, 11/13/2018); pr lap,cholecystectomy (N/A, 11/14/2018); liver biopsy (N/A, 11/14/2018); and Coronary angioplasty with stent (07/2019). Treatment Diagnosis: Severe dehydration      Restrictions  Restrictions/Precautions  Restrictions/Precautions: Fall Risk, Isolation (high fall risk, CDIFF r/o isolation)  Position Activity Restriction  Other position/activity restrictions: Tian Suarez is a 60 y.o. female who presents to the emergency department today for evaluation for general fatigue, nausea and vomiting. The patient states that her symptoms have been ongoing for the past 3 days. The patient states that she has had multiple episodes of emesis, she denies any bilious emesis, hematemesis or coffee-ground emesis. The patient states that she is a insulin-dependent diabetic, and she states that her sugars have been running high for the past 2 days as well. The patient states that overall she is just not feeling well and she is having generalized body aches. The patient denies any specific chest pain, shortness of breath or abdominal pain. She denies any diarrhea. She denies any fever chills per no cough or congestion. Patient denies any urinary symptoms. She has been vaccinated for COVID-19. The patient otherwise has no complaints at this time.     Subjective   General  Chart Reviewed: Yes, Progress Notes  Patient assessed for rehabilitation services?: Yes  Additional Pertinent Hx: patient reports is on 2L of O2 24/7  Diagnosis: Severe dyhydration  Subjective  Subjective: Patient supine in bed, pleasant and cooperative. Patient stated feeling much better now that is no longer vomiting  Patient Currently in Pain: No  Pain Assessment  Pain Assessment: 0-10  Pain Level: 0  Clinical Progression: Gradually worsening  Patient Currently in Pain: No  Oxygen Therapy  SpO2: 100 %  Pulse Oximeter Device Mode: Intermittent  Pulse Oximeter Device Location: Finger  O2 Device: Nasal cannula  O2 Flow Rate (L/min): 2 L/min       Social/Functional History  Social/Functional History  Lives With: Family  Type of Home: Apartment  Home Layout: One level  Home Access: Level entry  Bathroom Shower/Tub: Tub/Shower unit (sit in bottom of tub)  Bathroom Toilet: Standard  Bathroom Equipment: Grab bars in shower  Bathroom Accessibility: Wheelchair accessible  Home Equipment: [employer]  Help From: Family  ADL Assistance: Independent  Homemaking Assistance: Independent  Homemaking Responsibilities: No  Ambulation Assistance: Independent  Transfer Assistance: Independent  Active : No  Occupation: Retired  Type of occupation: Retired  at [employer]: Journal writing, hang with grandchildren-- has 21 grandchildren  IADL Comments: Sister is primary homemaker  Additional Comments: Denies falls.  Patient stated does NOT have a w/c cushion in chair and just sits directly on the fabric, also requesting a shower chair       Objective   Vision Exceptions:  (Very poor vision due to needing cataract surgery)    Orientation  Overall Orientation Status: Within Normal Limits  Observation/Palpation  Observation: R BKA  Balance  Sitting Balance: Independent  Standing Balance: Independent  Standing Balance  Time: @ 3-5 seconds x 2  Activity: Stand pivot t/f bed > chair, stance from chair to place cushion under chair  ADL  Feeding: Independent  Grooming: Setup  UE Bathing: Setup  LE Bathing: Minimal assistance  UE Dressing: Setup  LE Dressing: Setup  Toileting: Setup  Additional Comments: Setup sponge bathing and dressing from sitting EOB. Patient able to don/doff socks, setup hospital gown  Tone RUE  RUE Tone: Normotonic  Tone LUE  LUE Tone: Normotonic  Coordination  Movements Are Fluid And Coordinated: Yes     Bed mobility  Rolling to Left: Modified independent  Rolling to Right: Modified independent  Sit to Supine: Independent  Scooting: Independent  Transfers  Stand Pivot Transfers: Independent  Vision - Basic Assessment  Vision Comments: Patient with very poor vision, to have cataract surgery soon.  Reports glasses would not help vision at this time  Cognition  Overall Cognitive Status: [facility]  Times per week: no acute OT indicated    G-Code     OutComes Score                                                  AM-PAC Score        AM-[facility] Inpatient Daily Activity Raw Score: 22 (07/18/21 1319)  AM-PAC Inpatient ADL T-Scale Score : 47.1 (07/18/21 1319)  ADL Inpatient CMS 0-100% Score: 25.8 (07/18/21 1319)  ADL Inpatient CMS G-Code Modifier : CJ (07/18/21 1319)    Goals  Short term goals  Time Frame for Short term goals: no further OT indicated, patient baseline  Patient Goals   Patient goals : Go home tomorrow       Therapy Time   Individual Concurrent Group Co-treatment   Time In 1239         Time Out 1317         Minutes 38              Timed Code Treatment Minutes:  23      Total Treatment Minutes:  SIMBA YoonR/L [address]

## 2021-07-18 NOTE — ED PROVIDER NOTES
made alert and hemodynamically stable over her emergency department course. Patient Referrals:  No follow-up provider specified. Discharge Medications:  Current Discharge Medication List          FINAL IMPRESSION  1. Hyperglycemia    2. Non-intractable vomiting with nausea, unspecified vomiting type    3. Pneumonia due to infectious organism, unspecified laterality, unspecified part of lung        Blood pressure 138/86, pulse 102, temperature 97.5 °F (36.4 °C), temperature source Axillary, resp. rate 17, height 5' 2\" (1.575 m), weight 113 lb 12.8 oz (51.6 kg), last menstrual period 11/15/2017, SpO2 97 %. For further details of [facility] emergency department encounter, please see documentation by advanced practice provider, ROMANA Wolf.     Regina Wakefield DO (electronically signed)  Attending Emergency Physician       Regina Wakefield DO  07/17/21 0077

## 2021-07-18 NOTE — PROGRESS NOTES
Called requesting Phenergan for nausea, same administered. VSS, given a break from fingerstick, as am labs will be drawn soon. No other needs voiced.

## 2021-07-19 ENCOUNTER — APPOINTMENT (OUTPATIENT)
Dept: MRI IMAGING | Age: 56
DRG: 720 | End: 2021-07-19
Payer: COMMERCIAL

## 2021-07-19 LAB
A/G RATIO: 0.7 (ref 1.1–2.2)
A/G RATIO: 0.8 (ref 1.1–2.2)
ALBUMIN SERPL-MCNC: 2.5 G/DL (ref 3.4–5)
ALBUMIN SERPL-MCNC: 3.1 G/DL (ref 3.4–5)
ALP BLD-CCNC: 109 U/L (ref 40–129)
ALP BLD-CCNC: 113 U/L (ref 40–129)
ALT SERPL-CCNC: 10 U/L (ref 10–40)
ALT SERPL-CCNC: 13 U/L (ref 10–40)
ANION GAP SERPL CALCULATED.3IONS-SCNC: 12 MMOL/L (ref 3–16)
ANION GAP SERPL CALCULATED.3IONS-SCNC: 9 MMOL/L (ref 3–16)
ANISOCYTOSIS: ABNORMAL
AST SERPL-CCNC: 25 U/L (ref 15–37)
AST SERPL-CCNC: 40 U/L (ref 15–37)
BASOPHILS ABSOLUTE: 0 K/UL (ref 0–0.2)
BASOPHILS ABSOLUTE: 0 K/UL (ref 0–0.2)
BASOPHILS RELATIVE PERCENT: 0.2 %
BASOPHILS RELATIVE PERCENT: 0.4 %
BILIRUB SERPL-MCNC: 0.3 MG/DL (ref 0–1)
BILIRUB SERPL-MCNC: 0.5 MG/DL (ref 0–1)
BUN BLDV-MCNC: 20 MG/DL (ref 7–20)
BUN BLDV-MCNC: 21 MG/DL (ref 7–20)
CALCIUM SERPL-MCNC: 8.3 MG/DL (ref 8.3–10.6)
CALCIUM SERPL-MCNC: 8.8 MG/DL (ref 8.3–10.6)
CHLORIDE BLD-SCNC: 102 MMOL/L (ref 99–110)
CHLORIDE BLD-SCNC: 97 MMOL/L (ref 99–110)
CO2: 27 MMOL/L (ref 21–32)
CO2: 28 MMOL/L (ref 21–32)
CREAT SERPL-MCNC: 0.7 MG/DL (ref 0.6–1.1)
CREAT SERPL-MCNC: 0.9 MG/DL (ref 0.6–1.1)
EOSINOPHILS ABSOLUTE: 0.2 K/UL (ref 0–0.6)
EOSINOPHILS ABSOLUTE: 0.3 K/UL (ref 0–0.6)
EOSINOPHILS RELATIVE PERCENT: 2.4 %
EOSINOPHILS RELATIVE PERCENT: 2.4 %
GFR AFRICAN AMERICAN: >60
GFR AFRICAN AMERICAN: >60
GFR NON-AFRICAN AMERICAN: >60
GFR NON-AFRICAN AMERICAN: >60
GLOBULIN: 3.8 G/DL
GLOBULIN: 4 G/DL
GLUCOSE BLD-MCNC: 105 MG/DL (ref 70–99)
GLUCOSE BLD-MCNC: 114 MG/DL (ref 70–99)
GLUCOSE BLD-MCNC: 143 MG/DL (ref 70–99)
GLUCOSE BLD-MCNC: 160 MG/DL (ref 70–99)
GLUCOSE BLD-MCNC: 186 MG/DL (ref 70–99)
GLUCOSE BLD-MCNC: 228 MG/DL (ref 70–99)
GLUCOSE BLD-MCNC: 33 MG/DL (ref 70–99)
GLUCOSE BLD-MCNC: 375 MG/DL (ref 70–99)
GLUCOSE BLD-MCNC: 46 MG/DL (ref 70–99)
GLUCOSE BLD-MCNC: 99 MG/DL (ref 70–99)
HCT VFR BLD CALC: 40.9 % (ref 36–48)
HCT VFR BLD CALC: 48 % (ref 36–48)
HEMOGLOBIN: 13 G/DL (ref 12–16)
HEMOGLOBIN: 15.5 G/DL (ref 12–16)
LYMPHOCYTES ABSOLUTE: 1.9 K/UL (ref 1–5.1)
LYMPHOCYTES ABSOLUTE: 2.2 K/UL (ref 1–5.1)
LYMPHOCYTES RELATIVE PERCENT: 18.6 %
LYMPHOCYTES RELATIVE PERCENT: 18.6 %
MAGNESIUM: 2 MG/DL (ref 1.8–2.4)
MCH RBC QN AUTO: 25.6 PG (ref 26–34)
MCH RBC QN AUTO: 25.8 PG (ref 26–34)
MCHC RBC AUTO-ENTMCNC: 31.8 G/DL (ref 31–36)
MCHC RBC AUTO-ENTMCNC: 32.2 G/DL (ref 31–36)
MCV RBC AUTO: 79.9 FL (ref 80–100)
MCV RBC AUTO: 80.7 FL (ref 80–100)
MONOCYTES ABSOLUTE: 0.5 K/UL (ref 0–1.3)
MONOCYTES ABSOLUTE: 0.7 K/UL (ref 0–1.3)
MONOCYTES RELATIVE PERCENT: 5.1 %
MONOCYTES RELATIVE PERCENT: 6.1 %
NEUTROPHILS ABSOLUTE: 7.6 K/UL (ref 1.7–7.7)
NEUTROPHILS ABSOLUTE: 8.8 K/UL (ref 1.7–7.7)
NEUTROPHILS RELATIVE PERCENT: 72.5 %
NEUTROPHILS RELATIVE PERCENT: 73.7 %
ORGANISM: ABNORMAL
PDW BLD-RTO: 18.8 % (ref 12.4–15.4)
PDW BLD-RTO: 19 % (ref 12.4–15.4)
PERFORMED ON: ABNORMAL
PERFORMED ON: NORMAL
PHOSPHORUS: 2.8 MG/DL (ref 2.5–4.9)
PLATELET # BLD: 111 K/UL (ref 135–450)
PLATELET # BLD: 94 K/UL (ref 135–450)
PLATELET SLIDE REVIEW: ABNORMAL
PLATELET SLIDE REVIEW: ABNORMAL
PMV BLD AUTO: 10.1 FL (ref 5–10.5)
PMV BLD AUTO: 11 FL (ref 5–10.5)
POLYCHROMASIA: ABNORMAL
POTASSIUM REFLEX MAGNESIUM: 3.7 MMOL/L (ref 3.5–5.1)
POTASSIUM SERPL-SCNC: 3 MMOL/L (ref 3.5–5.1)
RBC # BLD: 5.06 M/UL (ref 4–5.2)
RBC # BLD: 6 M/UL (ref 4–5.2)
SLIDE REVIEW: ABNORMAL
SLIDE REVIEW: ABNORMAL
SODIUM BLD-SCNC: 133 MMOL/L (ref 136–145)
SODIUM BLD-SCNC: 142 MMOL/L (ref 136–145)
TOTAL PROTEIN: 6.3 G/DL (ref 6.4–8.2)
TOTAL PROTEIN: 7.1 G/DL (ref 6.4–8.2)
URINE CULTURE, ROUTINE: ABNORMAL
WBC # BLD: 10.3 K/UL (ref 4–11)
WBC # BLD: 12.1 K/UL (ref 4–11)

## 2021-07-19 PROCEDURE — 36415 COLL VENOUS BLD VENIPUNCTURE: CPT

## 2021-07-19 PROCEDURE — 2060000000 HC ICU INTERMEDIATE R&B

## 2021-07-19 PROCEDURE — 6360000002 HC RX W HCPCS: Performed by: PHYSICIAN ASSISTANT

## 2021-07-19 PROCEDURE — 6370000000 HC RX 637 (ALT 250 FOR IP): Performed by: INTERNAL MEDICINE

## 2021-07-19 PROCEDURE — 6360000002 HC RX W HCPCS: Performed by: INTERNAL MEDICINE

## 2021-07-19 PROCEDURE — 2580000003 HC RX 258: Performed by: NURSE PRACTITIONER

## 2021-07-19 PROCEDURE — 2580000003 HC RX 258: Performed by: INTERNAL MEDICINE

## 2021-07-19 PROCEDURE — 80053 COMPREHEN METABOLIC PANEL: CPT

## 2021-07-19 PROCEDURE — 83735 ASSAY OF MAGNESIUM: CPT

## 2021-07-19 PROCEDURE — 85025 COMPLETE CBC W/AUTO DIFF WBC: CPT

## 2021-07-19 PROCEDURE — 2580000003 HC RX 258: Performed by: PHYSICIAN ASSISTANT

## 2021-07-19 PROCEDURE — 84100 ASSAY OF PHOSPHORUS: CPT

## 2021-07-19 PROCEDURE — 6370000000 HC RX 637 (ALT 250 FOR IP): Performed by: PHYSICIAN ASSISTANT

## 2021-07-19 PROCEDURE — 74181 MRI ABDOMEN W/O CONTRAST: CPT

## 2021-07-19 RX ORDER — POTASSIUM BICARBONATE 25 MEQ/1
50 TABLET, EFFERVESCENT ORAL ONCE
Status: COMPLETED | OUTPATIENT
Start: 2021-07-19 | End: 2021-07-19

## 2021-07-19 RX ORDER — NALOXONE HYDROCHLORIDE 0.4 MG/ML
0.4 INJECTION, SOLUTION INTRAMUSCULAR; INTRAVENOUS; SUBCUTANEOUS PRN
Status: DISCONTINUED | OUTPATIENT
Start: 2021-07-19 | End: 2021-07-22 | Stop reason: HOSPADM

## 2021-07-19 RX ORDER — 0.9 % SODIUM CHLORIDE 0.9 %
500 INTRAVENOUS SOLUTION INTRAVENOUS ONCE
Status: COMPLETED | OUTPATIENT
Start: 2021-07-19 | End: 2021-07-20

## 2021-07-19 RX ORDER — SODIUM CHLORIDE 9 MG/ML
INJECTION, SOLUTION INTRAVENOUS CONTINUOUS
Status: ACTIVE | OUTPATIENT
Start: 2021-07-19 | End: 2021-07-20

## 2021-07-19 RX ORDER — 0.9 % SODIUM CHLORIDE 0.9 %
500 INTRAVENOUS SOLUTION INTRAVENOUS ONCE
Status: COMPLETED | OUTPATIENT
Start: 2021-07-19 | End: 2021-07-19

## 2021-07-19 RX ADMIN — SODIUM CHLORIDE: 9 INJECTION, SOLUTION INTRAVENOUS at 20:53

## 2021-07-19 RX ADMIN — FUROSEMIDE 40 MG: 40 TABLET ORAL at 09:08

## 2021-07-19 RX ADMIN — Medication 1 CAPSULE: at 09:08

## 2021-07-19 RX ADMIN — SERTRALINE 50 MG: 50 TABLET, FILM COATED ORAL at 09:07

## 2021-07-19 RX ADMIN — PIPERACILLIN AND TAZOBACTAM 3375 MG: 3; .375 INJECTION, POWDER, LYOPHILIZED, FOR SOLUTION INTRAVENOUS at 05:47

## 2021-07-19 RX ADMIN — ACETAMINOPHEN 1000 MG: 500 TABLET ORAL at 14:32

## 2021-07-19 RX ADMIN — SODIUM CHLORIDE 500 ML: 9 INJECTION, SOLUTION INTRAVENOUS at 20:50

## 2021-07-19 RX ADMIN — KETOROLAC TROMETHAMINE 30 MG: 30 INJECTION, SOLUTION INTRAMUSCULAR; INTRAVENOUS at 11:05

## 2021-07-19 RX ADMIN — PREGABALIN 100 MG: 100 CAPSULE ORAL at 09:07

## 2021-07-19 RX ADMIN — TICAGRELOR 90 MG: 90 TABLET ORAL at 20:46

## 2021-07-19 RX ADMIN — INSULIN LISPRO 1 UNITS: 100 INJECTION, SOLUTION INTRAVENOUS; SUBCUTANEOUS at 20:51

## 2021-07-19 RX ADMIN — KETOROLAC TROMETHAMINE 30 MG: 30 INJECTION, SOLUTION INTRAMUSCULAR; INTRAVENOUS at 18:07

## 2021-07-19 RX ADMIN — Medication 10 ML: at 09:09

## 2021-07-19 RX ADMIN — CARVEDILOL 3.12 MG: 3.12 TABLET, FILM COATED ORAL at 09:08

## 2021-07-19 RX ADMIN — LISINOPRIL 5 MG: 5 TABLET ORAL at 09:08

## 2021-07-19 RX ADMIN — ASPIRIN 81 MG: 81 TABLET, CHEWABLE ORAL at 09:08

## 2021-07-19 RX ADMIN — Medication 1 CAPSULE: at 17:02

## 2021-07-19 RX ADMIN — TICAGRELOR 90 MG: 90 TABLET ORAL at 09:08

## 2021-07-19 RX ADMIN — ATORVASTATIN CALCIUM 10 MG: 10 TABLET, FILM COATED ORAL at 09:07

## 2021-07-19 RX ADMIN — CARVEDILOL 3.12 MG: 3.12 TABLET, FILM COATED ORAL at 17:02

## 2021-07-19 RX ADMIN — POTASSIUM BICARBONATE 50 MEQ: 978 TABLET, EFFERVESCENT ORAL at 09:08

## 2021-07-19 RX ADMIN — PIPERACILLIN AND TAZOBACTAM 3375 MG: 3; .375 INJECTION, POWDER, LYOPHILIZED, FOR SOLUTION INTRAVENOUS at 14:29

## 2021-07-19 RX ADMIN — PREGABALIN 100 MG: 100 CAPSULE ORAL at 20:46

## 2021-07-19 RX ADMIN — SODIUM CHLORIDE 500 ML: 9 INJECTION, SOLUTION INTRAVENOUS at 01:11

## 2021-07-19 RX ADMIN — ENOXAPARIN SODIUM 40 MG: 40 INJECTION SUBCUTANEOUS at 09:08

## 2021-07-19 RX ADMIN — INSULIN LISPRO 10 UNITS: 100 INJECTION, SOLUTION INTRAVENOUS; SUBCUTANEOUS at 17:05

## 2021-07-19 ASSESSMENT — PAIN DESCRIPTION - ONSET
ONSET: ON-GOING
ONSET: ON-GOING

## 2021-07-19 ASSESSMENT — PAIN SCALES - GENERAL
PAINLEVEL_OUTOF10: 0
PAINLEVEL_OUTOF10: 7
PAINLEVEL_OUTOF10: 5
PAINLEVEL_OUTOF10: 0
PAINLEVEL_OUTOF10: 7
PAINLEVEL_OUTOF10: 0
PAINLEVEL_OUTOF10: 0

## 2021-07-19 ASSESSMENT — PAIN DESCRIPTION - ORIENTATION
ORIENTATION: LEFT
ORIENTATION: LEFT

## 2021-07-19 ASSESSMENT — PAIN DESCRIPTION - FREQUENCY
FREQUENCY: INTERMITTENT
FREQUENCY: INTERMITTENT

## 2021-07-19 ASSESSMENT — PAIN DESCRIPTION - PAIN TYPE
TYPE: ACUTE PAIN
TYPE: ACUTE PAIN

## 2021-07-19 ASSESSMENT — PAIN - FUNCTIONAL ASSESSMENT
PAIN_FUNCTIONAL_ASSESSMENT: ACTIVITIES ARE NOT PREVENTED
PAIN_FUNCTIONAL_ASSESSMENT: ACTIVITIES ARE NOT PREVENTED

## 2021-07-19 ASSESSMENT — PAIN DESCRIPTION - LOCATION
LOCATION: ABDOMEN
LOCATION: ABDOMEN;LEG

## 2021-07-19 ASSESSMENT — PAIN DESCRIPTION - PROGRESSION
CLINICAL_PROGRESSION: NOT CHANGED
CLINICAL_PROGRESSION: NOT CHANGED

## 2021-07-19 ASSESSMENT — PAIN DESCRIPTION - DESCRIPTORS
DESCRIPTORS: ACHING
DESCRIPTORS: ACHING

## 2021-07-19 NOTE — PROGRESS NOTES
Called to have brief changed, PureWick was displaced. Maria Isabel care provided, PureWick repositioned and brief, bed pad changed.

## 2021-07-19 NOTE — PLAN OF CARE
Problem: Skin Integrity:  Goal: Will show no infection signs and symptoms  Description: Will show no infection signs and symptoms  Outcome: Ongoing     Problem: Skin Integrity:  Goal: Absence of new skin breakdown  Description: Absence of new skin breakdown  Outcome: Ongoing     Problem: Falls - Risk of:  Goal: Will remain free from falls  Description: Will remain free from falls  7/19/2021 1203 by Anthony Gilliam RN  Outcome: Ongoing     Problem: Falls - Risk of:  Goal: Absence of physical injury  Description: Absence of physical injury  Outcome: Ongoing     Problem: Fluid Volume:  Goal: Ability to achieve a balanced intake and output will improve  Description: Ability to achieve a balanced intake and output will improve  7/19/2021 1203 by Anthony Gilliam RN  Outcome: Ongoing     Problem: Serum Glucose Level - Abnormal:  Goal: Ability to maintain appropriate glucose levels has stabilized  Description: Ability to maintain appropriate glucose levels has stabilized  7/19/2021 1203 by Anthony Gilliam RN  Outcome: Ongoing     Problem: Pain:  Goal: Pain level will decrease  Description: Pain level will decrease  7/19/2021 1203 by Anthony Gilliam RN  Outcome: Ongoing     Problem: Physical Regulation:  Goal: Prevent transmision of infection  Description: Prevent transmision of infection  7/19/2021 1203 by Anthony Gilliam RN  Outcome: Ongoing

## 2021-07-19 NOTE — PROGRESS NOTES
Hospitalist Progress Note      PCP: No primary care provider on file. Date of Admission: 7/16/2021    Chief Complaint: Nausea vomiting abdominal pain    Hospital Course: 61-year-old female with past medical history of type 2 diabetes, chronic combined CHF with EF 25 to 30%, CAD, hyperlipidemia, hypertension presented with complaints of generalized weakness, myalgia, nausea, vomiting for the duration of 3 days. She is admitted for management of dehydration, hyperglycemia, RAQUEL, left knee cellulitis, intractable nausea and vomiting. Subjective: Patient seen and examined at bedside. Patient drowsy this morning. Blood sugar checked and is 140. BP stable. A dose of Narcan ordered, however, patient is wide awake before administering.       Medications:  Reviewed    Infusion Medications    dextrose      sodium chloride Stopped (07/19/21 0547)     Scheduled Medications    insulin glargine  10 Units Subcutaneous BID    lactobacillus  1 capsule Oral BID WC    furosemide  40 mg Oral Daily    lisinopril  5 mg Oral Daily    piperacillin-tazobactam  3,375 mg Intravenous Q8H    insulin lispro  0-12 Units Subcutaneous TID WC    insulin lispro  0-6 Units Subcutaneous Nightly    aspirin  81 mg Oral Daily    atorvastatin  10 mg Oral Daily    carvedilol  3.125 mg Oral BID WC    mirtazapine  7.5 mg Oral Nightly    pregabalin  100 mg Oral BID    sertraline  50 mg Oral Daily    ticagrelor  90 mg Oral BID    sodium chloride flush  5-40 mL Intravenous 2 times per day    enoxaparin  40 mg Subcutaneous Daily     PRN Meds: naloxone, acetaminophen **OR** [DISCONTINUED] acetaminophen, ketorolac, diphenhydrAMINE, promethazine, glucose, dextrose, glucagon (rDNA), dextrose, sodium chloride flush, sodium chloride, polyethylene glycol, meclizine      Intake/Output Summary (Last 24 hours) at 7/19/2021 1103  Last data filed at 7/19/2021 0547  Gross per 24 hour   Intake 1298.26 ml   Output 450 ml   Net 848.26 ml 07/16/2021    RBCUA 5-10 07/16/2021    BLOODU SMALL 07/16/2021    SPECGRAV >1.030 07/16/2021    GLUCOSEU >=1000 07/16/2021    GLUCOSEU NEGATIVE 02/14/2012       Radiology:  CT ABDOMEN PELVIS WO CONTRAST Additional Contrast? None   Final Result   1. Air in the bladder wall and bladder lumen. Correlate with presentation   for emphysematous cystitis. 2. Nonspecific subsegmental lingular opacity could represent atelectasis and   scarring. Correlate with presentation to exclude superimposed pneumonia. 3. Slight nodular contour of the liver. MRI would better evaluate. 4. No small bowel obstruction. 5. Other findings as described. XR CHEST PORTABLE   Final Result   No acute cardiopulmonary disease. Borderline cardiomegaly without overt   failure. MRI ABDOMEN WO CONTRAST    (Results Pending)           Assessment/Plan:    Active Hospital Problems    Diagnosis     Severe dehydration [E86.0]      Severe dehydration  Improved  Patient received 2 L of NS in the ED and has been receiving LR at 50 cc/h since admission  We will stop IV fluids  Does not examine overloaded  We will resume diuretics    Uncontrolled diabetes  Received 15 units IV on admission and is on Lantus 15 twice daily with sliding scale  Had several episodes of hypoglycemia at 50s yesterday  Received dextrose containing fluids, will discontinue  We will give Lantus 15 units twice daily  This is patient's documented home dose since last discharge  Patient does tell me that her dose has recently been increased to 32 units twice daily by her endocrinologist  This dose is very high for her at least through the. While she was n.p.o.   Patient has had several more episodes of hypoglycemia afternoon of 7/18/2021  We will hold Lantus this morning, continue sliding scale  Lantus 10 units tonight if appropriate  We will request endocrinology consultation    RAQUEL  Resolved  Creatinine normalized    Left knee cellulitis  Not convincing  Given Ancef on admission  We will switch to Zosyn to cover for intra-abdominal pathogens    Abdominal pain with intractable nausea and vomiting  She has leukocytosis and tachycardia  Cultures NTD  It appears that the patient has cystitis    UTI/cystitis  Possible etiology of the above symptoms  Air in the bladder on CT  Pyuria on urinalysis  Urine culture pending  Zosyn    Abnormal finding on CT abdomen  Nodularity in the liver  MRI follow-up recommended    DVT Prophylaxis: Lovenox  Diet: ADULT DIET; Regular; 5 carb choices (75 gm/meal);  Low Sodium (2 gm)  Code Status: Full Code    Hopefully home tomorrow p.o. antibiotics    Electronically signed by Daryl Marlow MD on 7/19/2021 at 11:03 AM

## 2021-07-19 NOTE — PROGRESS NOTES
VSS, assessment as charted. Toradol administered IV with oral Benadryl in case of any allergic reaction, along with scheduled meds, which includes Lyrica for restless leg syndrome. FSBS 202, Lantus and correction insulin administered, Zosyn hung.

## 2021-07-19 NOTE — PLAN OF CARE
Problem: Falls - Risk of:  Goal: Will remain free from falls  Description: Will remain free from falls  7/19/2021 0605 by Damian Sanchez RN  Outcome: Ongoing  Note: Remains free from falls this shift. Problem: Fluid Volume:  Goal: Ability to achieve a balanced intake and output will improve  Description: Ability to achieve a balanced intake and output will improve  7/19/2021 0605 by Damian Sanchez RN  Outcome: Ongoing  Note: BP dropped to 86/52, received 500 ml fluid bolus with improvement in BP to 151/84. Problem: Serum Glucose Level - Abnormal:  Goal: Ability to maintain appropriate glucose levels has stabilized  Description: Ability to maintain appropriate glucose levels has stabilized  7/19/2021 0605 by Damian Sanchez RN  Outcome: Ongoing  Note: HS FSBS 202, Lantus and correction insulin administered, had 2 separate snacks of peanut butter and axel crackers, then a turkey sandwich. Noted to be diaphoretic @0318, FSBS 33. Required treatment with 8 oz orange juice x2 to come up to 99, then given a snack of peanut butter and axel crackers. Problem: Pain:  Goal: Pain level will decrease  Description: Pain level will decrease  7/19/2021 0605 by Damian Sanchez RN  Outcome: Ongoing  Note: Medicated x1 this shift for report of level 8 pain in left leg with relief sufficient for sleep. Problem: Physical Regulation:  Goal: Prevent transmision of infection  Description: Prevent transmision of infection  7/19/2021 0605 by Damian Sanchez RN  Outcome: Ongoing  Note: C. Diff specimen was sent and was negative, isolation discontinued.

## 2021-07-19 NOTE — PROGRESS NOTES
Notified tonight by patient's nurse regarding left knee pain. Patient reports severe pain not controlled on current regimen. Patient has a listed allergy to naproxen. However, she reports she just had a rash, and it was a long time ago. She would like to try toradol at this time. Benadryl ordered also.      Olga Keenan PA-C

## 2021-07-19 NOTE — PROGRESS NOTES
Repeat FSBS 99, snack of peanut butter and axel crackers provided, no more turkey sandwiches in refrigerator, none available tonight per security.

## 2021-07-19 NOTE — PROGRESS NOTES
BP 86/52 while lying on right side, instructed to turn onto her back, repeated and even lower. Manual BP 80/40, other VSS. Patient is drowsy but responds appropriately, skin is warm and dry, denies symptoms. Asked point blank if she took anything other than what I gave her (patient had a visitor @[id number]), she denies. Message sent to hospitalist PA via [employer] @[phone], responded back wanting to know if patient was symptomatic and to ask patient if she ever runs a low BP. To room, patient says she does run low sometimes, normally takes lisinopril daily. Messaged back with response, advised lisinopril dose was held today. 500 ml NS bolus ordered.

## 2021-07-19 NOTE — PLAN OF CARE
Problem: Falls - Risk of:  Goal: Will remain free from falls  Description: Will remain free from falls  7/18/2021 2358 by Laurie Beauchamp RN  Outcome: Ongoing  Note: Remains free from falls this shift. Problem: Fluid Volume:  Goal: Ability to achieve a balanced intake and output will improve  Description: Ability to achieve a balanced intake and output will improve  7/18/2021 2358 by Laurie Beauchamp RN  Outcome: Ongoing  Note: VSS, continuous IV fluids maintained this shift. Problem: Serum Glucose Level - Abnormal:  Goal: Ability to maintain appropriate glucose levels has stabilized  Description: Ability to maintain appropriate glucose levels has stabilized  7/18/2021 2358 by Laurie Beauchamp RN  Outcome: Ongoing  Note: FSBS 70-82 this shift. Problem: Pain:  Goal: Pain level will decrease  Description: Pain level will decrease  7/18/2021 2358 by Laurie Beauchamp RN  Outcome: Ongoing  Note: Medicated x2 this shift for report of acute lower abdominal pain with relief sufficient for sleep.

## 2021-07-19 NOTE — PROGRESS NOTES
NS bolus initiated, running on separate channel via proximal port of right FA line where Zosyn is infusing.

## 2021-07-19 NOTE — PROGRESS NOTES
Bolus is finished, /84. Patient is diaphoretic, FSBS 33.  8 ounces of orange juice taken, will recheck in 15 minutes.

## 2021-07-19 NOTE — PROGRESS NOTES
Received handoff from Yari Sales RN, advised patient is wanting to leave AMA and is calling out now. To room, patient says she is wanting to leave. Attempted to reason with her, advising blood sugar has just stabilized after lunch, had low BP while sitting up chair @1315, which shows she is still behind on her fluids, and MRI is still to be done tomorrow to assess abnormal liver findings on CT scan. Patient is not listening, just repeatedly says \"I just want to go home\". When pressed for a reason why she wants to leave, she says her sister was supposed to be taking care of her dog, and she found out today she's not, so she's worried about him. Then says her left leg is \"killing\" her, morphine was discontinued first thing this morning, last had Tylenol @0927. Suboxone was reordered, but did not receive a dose until 1751 this evening. Reminded patient I have been caring for her the last 2 nights, agrees I have done a good job so far, asked her to give me a bit to contact the hospitalist PA to see if I can get her something for the pain (as I suspect that is the real issue here), agrees for now, but as I left the room she repeated \"I just want to go home\".

## 2021-07-19 NOTE — CARE COORDINATION
Discharge Planning Assessment    RN/SW discharge planner met with patient/ (and family member) to discuss reason for admission, current living situation, and potential needs at the time of discharge    Demographics/Insurance verified Yes/No    Current type of dwelling: apartment  [facility]  [facility]   Living arrangements: family    Level of function/Support: Melida Real, [phone]    PCP:    MARANDA: [facility] - R/ [facility]    Active with any community resources/agencies/skilled home care:    Medication compliance issues: Suboxone restarted      Transportation at the time of discharge: family     Hopefully home tomorrow p.o. antibiotics per MD

## 2021-07-19 NOTE — PROGRESS NOTES
Message sent via [employer] advising patient threatening to leave AMA, c/o severe left leg pain and requesting something for pain. Joanne AVENDANO on unit @0317, wants to order Toradol, but there is a cross allergy to Naprosyn, wants me to check with patient to see what reaction she had. Patient states it was only a rash, and it was a long time ago.

## 2021-07-20 LAB
BLOOD CULTURE, ROUTINE: NORMAL
CULTURE, BLOOD 2: NORMAL
GLUCOSE BLD-MCNC: 128 MG/DL (ref 70–99)
GLUCOSE BLD-MCNC: 130 MG/DL (ref 70–99)
GLUCOSE BLD-MCNC: 175 MG/DL (ref 70–99)
GLUCOSE BLD-MCNC: 209 MG/DL (ref 70–99)
GLUCOSE BLD-MCNC: 71 MG/DL (ref 70–99)
GLUCOSE BLD-MCNC: 98 MG/DL (ref 70–99)
LV EF: 28 %
LVEF MODALITY: NORMAL
PERFORMED ON: ABNORMAL
PERFORMED ON: NORMAL
PERFORMED ON: NORMAL

## 2021-07-20 PROCEDURE — 2580000003 HC RX 258: Performed by: INTERNAL MEDICINE

## 2021-07-20 PROCEDURE — 2060000000 HC ICU INTERMEDIATE R&B

## 2021-07-20 PROCEDURE — 93306 TTE W/DOPPLER COMPLETE: CPT

## 2021-07-20 PROCEDURE — 6360000002 HC RX W HCPCS: Performed by: INTERNAL MEDICINE

## 2021-07-20 PROCEDURE — 6370000000 HC RX 637 (ALT 250 FOR IP): Performed by: INTERNAL MEDICINE

## 2021-07-20 PROCEDURE — B24BZZZ ULTRASONOGRAPHY OF HEART WITH AORTA: ICD-10-PCS | Performed by: INTERNAL MEDICINE

## 2021-07-20 PROCEDURE — 99222 1ST HOSP IP/OBS MODERATE 55: CPT | Performed by: INTERNAL MEDICINE

## 2021-07-20 PROCEDURE — 6360000002 HC RX W HCPCS: Performed by: PHYSICIAN ASSISTANT

## 2021-07-20 RX ADMIN — PREGABALIN 100 MG: 100 CAPSULE ORAL at 08:35

## 2021-07-20 RX ADMIN — PIPERACILLIN AND TAZOBACTAM 3375 MG: 3; .375 INJECTION, POWDER, LYOPHILIZED, FOR SOLUTION INTRAVENOUS at 05:44

## 2021-07-20 RX ADMIN — PIPERACILLIN AND TAZOBACTAM 3375 MG: 3; .375 INJECTION, POWDER, LYOPHILIZED, FOR SOLUTION INTRAVENOUS at 20:55

## 2021-07-20 RX ADMIN — TICAGRELOR 90 MG: 90 TABLET ORAL at 20:51

## 2021-07-20 RX ADMIN — ENOXAPARIN SODIUM 40 MG: 40 INJECTION SUBCUTANEOUS at 08:35

## 2021-07-20 RX ADMIN — PREGABALIN 100 MG: 100 CAPSULE ORAL at 20:51

## 2021-07-20 RX ADMIN — Medication 1 CAPSULE: at 16:38

## 2021-07-20 RX ADMIN — SERTRALINE 50 MG: 50 TABLET, FILM COATED ORAL at 08:36

## 2021-07-20 RX ADMIN — PIPERACILLIN AND TAZOBACTAM 3375 MG: 3; .375 INJECTION, POWDER, LYOPHILIZED, FOR SOLUTION INTRAVENOUS at 00:50

## 2021-07-20 RX ADMIN — ATORVASTATIN CALCIUM 10 MG: 10 TABLET, FILM COATED ORAL at 08:35

## 2021-07-20 RX ADMIN — Medication 10 ML: at 08:36

## 2021-07-20 RX ADMIN — ASPIRIN 81 MG: 81 TABLET, CHEWABLE ORAL at 08:35

## 2021-07-20 RX ADMIN — KETOROLAC TROMETHAMINE 30 MG: 30 INJECTION, SOLUTION INTRAMUSCULAR; INTRAVENOUS at 20:53

## 2021-07-20 RX ADMIN — Medication 10 ML: at 20:51

## 2021-07-20 RX ADMIN — NALXONE HYDROCHLORIDE 0.4 MG: 0.4 INJECTION INTRAMUSCULAR; INTRAVENOUS; SUBCUTANEOUS at 11:26

## 2021-07-20 RX ADMIN — PIPERACILLIN AND TAZOBACTAM 3375 MG: 3; .375 INJECTION, POWDER, LYOPHILIZED, FOR SOLUTION INTRAVENOUS at 14:02

## 2021-07-20 RX ADMIN — KETOROLAC TROMETHAMINE 30 MG: 30 INJECTION, SOLUTION INTRAMUSCULAR; INTRAVENOUS at 00:42

## 2021-07-20 RX ADMIN — CARVEDILOL 3.12 MG: 3.12 TABLET, FILM COATED ORAL at 09:16

## 2021-07-20 RX ADMIN — TICAGRELOR 90 MG: 90 TABLET ORAL at 08:36

## 2021-07-20 RX ADMIN — Medication 10 ML: at 01:04

## 2021-07-20 RX ADMIN — Medication 1 CAPSULE: at 08:35

## 2021-07-20 RX ADMIN — INSULIN LISPRO 2 UNITS: 100 INJECTION, SOLUTION INTRAVENOUS; SUBCUTANEOUS at 21:00

## 2021-07-20 ASSESSMENT — PAIN SCALES - GENERAL
PAINLEVEL_OUTOF10: 6
PAINLEVEL_OUTOF10: 0
PAINLEVEL_OUTOF10: 7
PAINLEVEL_OUTOF10: 0

## 2021-07-20 NOTE — PROGRESS NOTES
Hospitalist    Notified by nurse BP 52/36 and 50/30. HR 85, afebrile. Pt asymptomatic,  Sitting in bed no dizziness. She tolerated regular diet tonight. Will give 500cc bolus fluids. Hold remeron and coreg tonight. Daytime hospitalist to determine lasix dose tomorrow pending BP.       Moshe Hernadez, NP

## 2021-07-20 NOTE — PLAN OF CARE
Problem: Skin Integrity:  Goal: Will show no infection signs and symptoms  Description: Will show no infection signs and symptoms  Outcome: Ongoing     Problem: Skin Integrity:  Goal: Absence of new skin breakdown  Description: Absence of new skin breakdown  Outcome: Ongoing     Problem: Falls - Risk of:  Goal: Will remain free from falls  Description: Will remain free from falls  Outcome: Ongoing     Problem: Falls - Risk of:  Goal: Absence of physical injury  Description: Absence of physical injury  Outcome: Ongoing     Problem: Fluid Volume:  Goal: Ability to achieve a balanced intake and output will improve  Description: Ability to achieve a balanced intake and output will improve  Outcome: Ongoing     Problem: Serum Glucose Level - Abnormal:  Goal: Ability to maintain appropriate glucose levels has stabilized  Description: Ability to maintain appropriate glucose levels has stabilized  Outcome: Ongoing     Problem: Pain:  Goal: Pain level will decrease  Description: Pain level will decrease  Outcome: Ongoing     Problem: Physical Regulation:  Goal: Ability to avoid or minimize complications of infection will improve  Description: Ability to avoid or minimize complications of infection will improve  Outcome: Ongoing

## 2021-07-20 NOTE — PROGRESS NOTES
Rapid Response Quick Summary    Room: [facility]3239/3357-02    Assessment of concern / patient:  Change in LOC    Physician involved:  Dr. Arben Reno and Dr. Melinda Alexander    Interventions:  Responded to rapid response on 3 tower for change in LOC. Pt alert and oriented, however, drowsy and restless occasionally. BP stable. Pt on 2 L NC. Dr. Arben Reno and Dr. Melinda Alexander at bedside and updated. Reviewing current medication list. Pt given Narcan with immediate resolve of symptoms. Pt remains on 3 tower in stable condition. Disposition:  Responded to rapid response on 3 tower for change in LOC. Pt alert and oriented, however, drowsy and restless occasionally. BP stable. Pt on 2 L NC. Dr. Arben Reno and Dr. Melinda Alexander at bedside and updated. Reviewing current medication list. Pt given Narcan with immediate resolve of symptoms. Pt remains on 3 tower in stable condition. Shyla Walker RN, BSN ,CCRN.

## 2021-07-20 NOTE — PLAN OF CARE
Complete linen change and bed bath given. Furniture wiped down with bleach wipes per request. Pt satisfied. PRN pain med administered. Call light in reach.    Problem: Skin Integrity:  Goal: Will show no infection signs and symptoms  Description: Will show no infection signs and symptoms  Outcome: Ongoing  Goal: Absence of new skin breakdown  Description: Absence of new skin breakdown  Outcome: Ongoing     Problem: Falls - Risk of:  Goal: Will remain free from falls  Description: Will remain free from falls  Outcome: Ongoing  Goal: Absence of physical injury  Description: Absence of physical injury  Outcome: Ongoing     Problem: Fluid Volume:  Goal: Ability to achieve a balanced intake and output will improve  Description: Ability to achieve a balanced intake and output will improve  Outcome: Ongoing     Problem: Serum Glucose Level - Abnormal:  Goal: Ability to maintain appropriate glucose levels has stabilized  Description: Ability to maintain appropriate glucose levels has stabilized  Outcome: Ongoing     Problem: Pain:  Goal: Pain level will decrease  Description: Pain level will decrease  Outcome: Ongoing  Goal: Control of acute pain  Description: Control of acute pain  Outcome: Ongoing  Goal: Control of chronic pain  Description: Control of chronic pain  Outcome: Ongoing     Problem: Physical Regulation:  Goal: Prevent transmision of infection  Description: Prevent transmision of infection  Outcome: Ongoing  Goal: Ability to avoid or minimize complications of infection will improve  Description: Ability to avoid or minimize complications of infection will improve  Outcome: Ongoing

## 2021-07-20 NOTE — CARE COORDINATION
Notified MD via perfect serve,   patient requested a DME order for a shower chair. [facility]  Nichole Daniels, [phone] called for an update.

## 2021-07-20 NOTE — PROGRESS NOTES
Hospitalist Progress Note      PCP: No primary care provider on file. Date of Admission: 7/16/2021    Chief Complaint: Nausea vomiting abdominal pain    Hospital Course: 47-year-old female with past medical history of type 2 diabetes, chronic combined CHF with EF 25 to 30%, CAD, hyperlipidemia, hypertension presented with complaints of generalized weakness, myalgia, nausea, vomiting for the duration of 3 days. She is admitted for management of dehydration, hyperglycemia, RAQUEL, left knee cellulitis, intractable nausea and vomiting. Patient with intermittent episodes of hypoglycemia and hyperglycemia. Multiple adjustments done to basal insulin. Patient seems to only tolerate 1/6 of her reported home dose insulin. Endocrinology consulted. Patient also with intermittent episodes of hypotension and hypertension. Heart failure meds have been held multiple times. Will consult cardiology for input. Subjective: Patient seen and examined at bedside. She states she has no complaints and wants to go home. Its been explained to the patient that she does not seem to be stable at this time for home discharge.     Medications:  Reviewed    Infusion Medications    dextrose      sodium chloride Stopped (07/19/21 0547)     Scheduled Medications    insulin glargine  10 Units Subcutaneous Nightly    lactobacillus  1 capsule Oral BID WC    furosemide  40 mg Oral Daily    lisinopril  5 mg Oral Daily    piperacillin-tazobactam  3,375 mg Intravenous Q8H    insulin lispro  0-12 Units Subcutaneous TID WC    insulin lispro  0-6 Units Subcutaneous Nightly    aspirin  81 mg Oral Daily    atorvastatin  10 mg Oral Daily    carvedilol  3.125 mg Oral BID WC    [Held by provider] mirtazapine  7.5 mg Oral Nightly    pregabalin  100 mg Oral BID    sertraline  50 mg Oral Daily    ticagrelor  90 mg Oral BID    sodium chloride flush  5-40 mL Intravenous 2 times per day    enoxaparin  40 mg Subcutaneous Daily PRN Meds: perflutren lipid microspheres, naloxone, acetaminophen **OR** [DISCONTINUED] acetaminophen, ketorolac, diphenhydrAMINE, promethazine, glucose, dextrose, glucagon (rDNA), dextrose, sodium chloride flush, sodium chloride, polyethylene glycol, meclizine      Intake/Output Summary (Last 24 hours) at 7/20/2021 1100  Last data filed at 7/20/2021 0615  Gross per 24 hour   Intake 360 ml   Output 700 ml   Net -340 ml       Physical Exam Performed:    /87   Pulse 91   Temp 97.1 °F (36.2 °C) (Oral)   Resp 16   Ht 5' 2\" (1.575 m)   Wt 111 lb 12.8 oz (50.7 kg)   LMP 11/15/2017   SpO2 97%   BMI 20.45 kg/m²     General appearance: No apparent distress, appears stated age and cooperative. HEENT: Pupils equal, round, and reactive to light. Conjunctivae/corneas clear. Neck: Supple, with full range of motion. No jugular venous distention. Trachea midline. Respiratory:  Normal respiratory effort. Clear to auscultation, bilaterally without Rales/Wheezes/Rhonchi. Cardiovascular: Regular rate and rhythm with normal S1/S2 without murmurs, rubs or gallops. Abdomen: Soft, non-tender, non-distended with normal bowel sounds. Musculoskeletal: No clubbing, cyanosis or edema bilaterally. Full range of motion without deformity. Skin: Skin color, texture, turgor normal.  No rashes or lesions. Neurologic:  Neurovascularly intact without any focal sensory/motor deficits.  Cranial nerves: II-XII intact, grossly non-focal.  Psychiatric: Alert and oriented, thought content appropriate, normal insight  Capillary Refill: Brisk,< 3 seconds   Peripheral Pulses: +2 palpable, equal bilaterally       Labs:   Recent Labs     07/18/21 0419 07/19/21 0420 07/19/21 2117   WBC 10.2 10.3 12.1*   HGB 14.8 15.5 13.0   HCT 45.9 48.0 40.9   * 111* 94*     Recent Labs     07/18/21 0419 07/19/21  0420 07/19/21 2117    142 133*   K 3.6 3.0* 3.7    102 97*   CO2 30 28 27   BUN 16 20 21*   CREATININE <0.5* 0.7 0.9 CALCIUM 8.9 8.8 8.3   PHOS 2.0* 2.8  --      Recent Labs     07/18/21  0419 07/19/21  0420 07/19/21  2117   AST 32 40* 25   ALT 10 13 10   BILITOT 0.3 0.5 0.3   ALKPHOS 116 113 109     No results for input(s): INR in the last 72 hours. No results for input(s): [place] in the last 72 hours. Urinalysis:      Lab Results   Component Value Date    NITRU Negative 07/16/2021    WBCUA >100 07/16/2021    BACTERIA 3+ 07/16/2021    RBCUA 5-10 07/16/2021    BLOODU SMALL 07/16/2021    SPECGRAV >1.030 07/16/2021    GLUCOSEU >=1000 07/16/2021    GLUCOSEU NEGATIVE 02/14/2012       Radiology:  MRI ABDOMEN WO CONTRAST   Final Result   Limited evaluation of the liver on this noncontrast MRI. No evidence of   diffuse or focal liver disease is appreciated. CT ABDOMEN PELVIS WO CONTRAST Additional Contrast? None   Final Result   1. Air in the bladder wall and bladder lumen. Correlate with presentation   for emphysematous cystitis. 2. Nonspecific subsegmental lingular opacity could represent atelectasis and   scarring. Correlate with presentation to exclude superimposed pneumonia. 3. Slight nodular contour of the liver. MRI would better evaluate. 4. No small bowel obstruction. 5. Other findings as described. XR CHEST PORTABLE   Final Result   No acute cardiopulmonary disease. Borderline cardiomegaly without overt   failure.                  Assessment/Plan:    Active Hospital Problems    Diagnosis     Severe dehydration [E86.0]      Severe dehydration  Improved  Patient received 2 L of NS in the ED and has been receiving LR at 50 cc/h since admission  We will stop IV fluids  Does not examine overloaded  We will resume diuretics    Uncontrolled diabetes  Received 15 units IV on admission and is on Lantus 15 twice daily with sliding scale  Had several episodes of hypoglycemia at 50s yesterday  Received dextrose containing fluids, will discontinue  We will give Lantus 15 units twice daily  This is patient's documented home dose since last discharge  Patient does tell me that her dose has recently been increased to 32 units twice daily by her endocrinologist  This dose is very high for her at least through the. While she was n.p.o. Patient has had several more episodes of hypoglycemia afternoon of 7/18/2021  We will hold Lantus this morning, continue sliding scale  Lantus 10 units tonight if appropriate  We will request endocrinology consultation    Hypotension  Intermittent  Asymptomatic  Unknown etiology at this time    RAQUEL  Resolved  Creatinine normalized    Left knee cellulitis  Not convincing  Given Ancef on admission  We will switch to Zosyn to cover for intra-abdominal pathogens    Abdominal pain with intractable nausea and vomiting  She has leukocytosis and tachycardia  Cultures NTD  It appears that the patient has cystitis    UTI/cystitis  Possible etiology of the above symptoms  Air in the bladder on CT  Pyuria on urinalysis  Urine culture pending  Zosyn    Abnormal finding on CT abdomen  Nodularity in the liver  MRI does not redemonstrate pathology    DVT Prophylaxis: Lovenox  Diet: ADULT DIET; Regular; 5 carb choices (75 gm/meal);  Low Sodium (2 gm)  Code Status: Full Code        Electronically signed by Roberto Mcmillan MD on 7/20/2021 at 11:00 AM

## 2021-07-20 NOTE — PROGRESS NOTES
gastroparesis. Unfortunately, patient has   prolonged QTC; hence, will not place an Reglan for now. Start meclizine as   needed. \"  Treatment: Mecclizine, Tigan, Phenergan  Options provided:  -- Gastroparesis confirmed after study  -- Gastroparesis ruled out after study  -- Other - I will add my own diagnosis  -- Disagree - Not applicable / Not valid  -- Disagree - Clinically unable to determine / Unknown  -- Refer to Clinical Documentation Reviewer    PROVIDER RESPONSE TEXT:    Provider disagreed with this query.   na    Query created by: Lokesh Kang on 7/19/2021 12:28 PM      Electronically signed by:  Ulises Meza MD 7/20/2021 3:04 PM

## 2021-07-20 NOTE — CONSULTS
Medications:  Were reviewed and are listed in nursing record. and/or listed below  Prior to Admission medications    Medication Sig Start Date End Date Taking? Authorizing Provider   lisinopril (PRINIVIL;ZESTRIL) 5 MG tablet Take 1 tablet by mouth daily 4/30/21  Yes Suze Miteshmuna Márquez APRN - CNP   furosemide (LASIX) 40 MG tablet Take 1 tablet by mouth daily 4/30/21  Yes Suze Hemp WALDO MárquezN - CNP   carvedilol (COREG) 3.125 MG tablet Take 1 tablet by mouth 2 times daily (with meals) 9/25/20  Yes Zoe Ottain, APRN - CNS   sertraline (ZOLOFT) 50 MG tablet Take 50 mg by mouth daily Indications: Depression   Yes Historical Provider, MD   mirtazapine (REMERON) 15 MG tablet Take 7.5 mg by mouth nightly   Yes Historical Provider, MD   pregabalin (LYRICA) 100 MG capsule Take 100 mg by mouth 2 times daily. Yes Historical Provider, MD   insulin glargine (LANTUS SOLOSTAR) 100 UNIT/ML injection pen Inject 15 Units into the skin 2 times daily Indications: Insulin Pump    Yes Historical Provider, MD   insulin lispro, 1 Unit Dial, (HUMALOG KWIKPEN) 100 UNIT/ML SOPN Inject 10 Units into the skin 3 times daily (before meals) Inject 10 units three times daily with each meal in addition to sliding scale. Yes Historical Provider, MD   ticagrelor (BRILINTA) 90 MG TABS tablet Take 1 tablet by mouth 2 times daily 3/12/20  Yes Jose Aguilar MD   aspirin 81 MG tablet Take 1 tablet by mouth daily 3/12/20  Yes Jose Aguilar MD   atorvastatin (LIPITOR) 40 MG tablet Take 1 tablet by mouth daily 3/12/20  Yes Jose Aguilar MD   buprenorphine-naloxone (SUBOXONE) 8-2 MG SUBL SL tablet Place 1 tablet under the tongue 2 times daily. Yes Historical Provider, MD   FREESTYLE LANCETS MISC 1 each by Other route 4 times daily (before meals and nightly) Patient to check blood sugar 4 times a day and PRN, he is treated with multiple daily injections of insulin that require correction dosing and has had hypoglycemia. A1C  14.5  ICD code- E11.65, Z79.4 7/3/19  Yes Stan Thomas MD   blood glucose test strips (FREESTYLE LITE) strip 1 each by Other route 4 times daily (before meals and nightly) Patient to check blood sugar 4 times a day and PRN, he is treated with multiple daily injections of insulin that require correction dosing and has had hypoglycemia. A1C  14.5  ICD code- E11.65, Z79.4 7/3/19  Yes Stan Thomas MD   nitroGLYCERIN (NITROSTAT) 0.4 MG SL tablet up to max of 3 total doses.  If no relief after 1 dose, call 911. 8/4/20   Mouna Rosales MD        Current Medications:  Current Facility-Administered Medications   Medication Dose Route Frequency Provider Last Rate Last Admin    insulin glargine (LANTUS;BASAGLAR) injection pen 10 Units  10 Units Subcutaneous BID Annita FIGUEROA MD   10 Units at 07/19/21 2052    naloxone [facility]) injection 0.4 mg  0.4 mg Intravenous PRN Baltazar Garibay MD        lactobacillus (CULTURELLE) capsule 1 capsule  1 capsule Oral BID WC Baltazar FIGUEROA MD   1 capsule at 07/19/21 1702    furosemide (LASIX) tablet 40 mg  40 mg Oral Daily Baltazar FIGUEROA MD   40 mg at 07/19/21 0908    lisinopril (PRINIVIL;ZESTRIL) tablet 5 mg  5 mg Oral Daily Baltazar FIGUEROA MD   5 mg at 07/19/21 0908    acetaminophen (TYLENOL) tablet 1,000 mg  1,000 mg Oral Q6H PRN Pippa Padgett PA-C   1,000 mg at 07/19/21 1432    ketorolac (TORADOL) injection 30 mg  30 mg Intravenous Q6H PRN Pippa Padgett PA-C   30 mg at 07/20/21 0042    diphenhydrAMINE (BENADRYL) tablet 25 mg  25 mg Oral Q6H PRN Pippa Padgett PA-C   25 mg at 07/18/21 2103    promethazine (PHENERGAN) injection 6.25 mg  6.25 mg Intravenous Q6H PRN Baltazar FIGUEROA MD   6.25 mg at 07/18/21 0357    piperacillin-tazobactam (ZOSYN) 3,375 mg in dextrose 5 % 50 mL IVPB extended infusion (mini-bag)  3,375 mg Intravenous Q8H Baltazar FIGUEROA MD 12.5 mL/hr at 07/20/21 0544 3,375 mg at 07/20/21 0544    insulin lispro (1 Unit Dial) 0-12 Units  0-12 Units Subcutaneous TID  eKnya Avery MD   10 Units at 07/19/21 1705    insulin lispro (1 Unit Dial) 0-6 Units  0-6 Units Subcutaneous Nightly Kenya Avery MD   1 Units at 07/19/21 2051    glucose (GLUTOSE) 40 % oral gel 15 g  15 g Oral PRN Kenya Avery MD        dextrose 50 % IV solution  12.5 g Intravenous PRN Kenya Avery MD   12.5 g at 07/17/21 1610    glucagon (rDNA) injection 1 mg  1 mg Intramuscular PRN Kenya Avery MD        dextrose 5 % solution  100 mL/hr Intravenous PRN Kenya Avery MD        aspirin chewable tablet 81 mg  81 mg Oral Daily Kenya Avery MD   81 mg at 07/19/21 0908    atorvastatin (LIPITOR) tablet 10 mg  10 mg Oral Daily Kenya Avery MD   10 mg at 07/19/21 0907    carvedilol (COREG) tablet 3.125 mg  3.125 mg Oral BID  George Martinez MD   3.125 mg at 07/19/21 1702    [Held by provider] mirtazapine (REMERON) tablet 7.5 mg  7.5 mg Oral Nightly Kenya Avery MD   7.5 mg at 07/18/21 2103    pregabalin (LYRICA) capsule 100 mg  100 mg Oral BID Kenya Avery MD   100 mg at 07/19/21 2046    sertraline (ZOLOFT) tablet 50 mg  50 mg Oral Daily Kenya Avery MD   50 mg at 07/19/21 2583    ticagrelor (BRILINTA) tablet 90 mg  90 mg Oral BID Kenya Avery MD   90 mg at 07/19/21 2046    sodium chloride flush 0.9 % injection 5-40 mL  5-40 mL Intravenous 2 times per day Kenya Avery MD   10 mL at 07/20/21 0104    sodium chloride flush 0.9 % injection 10 mL  10 mL Intravenous PRN Kenya Avery MD        0.9 % sodium chloride infusion  25 mL Intravenous PRN Kenya Avery MD   Stopped at 07/19/21 0547    enoxaparin (LOVENOX) injection 40 mg  40 mg Subcutaneous Daily Kenya Avery MD   40 mg at 07/19/21 0908    polyethylene glycol (GLYCOLAX) packet 17 g  17 g Oral Daily PRN Kenya Avery MD        meclizine (ANTIVERT) tablet 25 mg  25 mg Oral TID PRN Kenya Avery MD Allergies:  Darvocet [propoxyphene n-acetaminophen], Naprosyn [naproxen], and Ultram [tramadol hcl]     Review of Systems:     · Constitutional: t +weight loss, + fatigue   · Eyes: No visual changes or diplopia. No scleral icterus. · ENT: No Headaches, hearing loss or vertigo. No mouth sores or sore throat. · Cardiovascular: Reviewed in HPI  · Respiratory: No cough or wheezing, no sputum production. No hematemesis. · Gastrointestinal: +nausea, vomiting, diarrhea  · Genitourinary: No dysuria, trouble voiding, or hematuria. · Musculoskeletal:  No gait disturbance, weakness or joint complaints. · Integumentary: No rash or pruritis. · Neurological: No headache, diplopia, change in muscle strength, numbness or tingling. No change in gait, balance, coordination, mood, affect, memory, mentation, behavior. · Psychiatric: No anxiety, no depression. · Endocrine: No malaise, fatigue or temperature intolerance. No excessive thirst, fluid intake, or urination. No tremor. · Hematologic/Lymphatic: No abnormal bruising or bleeding, blood clots or swollen lymph nodes. · Allergic/Immunologic: No nasal congestion or hives.   ·     Physical Examination:    Vitals:    07/20/21 0542   BP: 123/80   Pulse: 90   Resp: 15   Temp: 98.6 °F (37 °C)   SpO2: 99%    Weight: 111 lb 12.8 oz (50.7 kg)         General Appearance:  Alert, cooperative, no distress, appears stated age   Head:  Normocephalic, without obvious abnormality, atraumatic   Eyes:  PERRL, conjunctiva/corneas clear       Nose: Nares normal, no drainage or sinus tenderness, no teeth   Throat: Lips, mucosa, and tongue normal   Neck: Supple, symmetrical, trachea midline, no adenopathy, thyroid: not enlarged, symmetric, no tenderness/mass/nodules, no carotid bruit or JVD       Lungs:   No crackles   Chest Wall:  No tenderness or deformity   Heart:  Regular rate and rhythm, S1, S2 normal, no murmur, rub or gallop   Abdomen:   Soft, non-tender, bowel sounds active all four quadrants,  no masses, no organomegaly           Extremities: Right aka    Pulses: 1+ and symmetric   Skin: Skin color, texture, turgor normal, no rashes or lesions   Pysch: Normal mood and affect   Neurologic: Normal gross motor and sensory exam.         Labs  CBC:   Lab Results   Component Value Date    WBC 12.1 07/19/2021    RBC 5.06 07/19/2021    HGB 13.0 07/19/2021    HCT 40.9 07/19/2021    MCV 80.7 07/19/2021    RDW 18.8 07/19/2021    PLT 94 07/19/2021     CMP:    Lab Results   Component Value Date     07/19/2021    K 3.7 07/19/2021    CL 97 07/19/2021    CO2 27 07/19/2021    BUN 21 07/19/2021    CREATININE 0.9 07/19/2021    GFRAA >60 07/19/2021    GFRAA >60 02/14/2012    AGRATIO 0.7 07/19/2021    LABGLOM >60 07/19/2021    LABGLOM 131.7 05/20/2011    GLUCOSE 105 07/19/2021    GLUCOSE 329 05/20/2011    PROT 6.3 07/19/2021    PROT 7.6 02/14/2012    CALCIUM 8.3 07/19/2021    BILITOT 0.3 07/19/2021    ALKPHOS 109 07/19/2021    AST 25 07/19/2021    ALT 10 07/19/2021     PT/INR:  No results found for: PTINR  Lab Results   Component Value Date    CKTOTAL 25 (L) 08/21/2020    TROPONINI <0.01 07/16/2021       EKG:  I have reviewed EKG with the following interpretation:  Impression: sinus tachycardia, nonspecific st-t wave changes    Echo: Left ventricular cavity size is mildly dilated with normal left ventricular   wall thickness. Overall left ventricular systolic function appears severely reduced. Ejection fraction is visually estimated to be 25-30%. There is severe hypokinesis of all distal segments and apex. Grade II diastolic dysfunction with elevated LV filling pressures. The right ventricle is mildly enlarged. Right ventricular systolic function is mildly reduced. At least moderate-severe, and possibly severe, mitral regurgitation. The aortic valve is mildly thickened/calcified but opens adequately with   normal gradients.    Mild to moderate tricuspid regurgitation with a PASP of 56 mmHg.   Trivial pulmonic regurgitation. There is a trivial pericardial effusion loculated at the right atrium. There are large bilateral pleural effusions. Viability:    -There is a moderate-large defect that involves the anterior wall and apex.    -There is slight improvement in the mid anterior wall at 24 hours suggesting    some viability.    -The remainder of the anterior wall and apex appears to be scar.        Cath:LM         Normal     LAD       100% mid instent, 99% D1 with WILMA 1 flow                CX         normal      RCA       20% mid                                LVG       40%, anteroapical hypokinesis           LVEDP  10mmHg    Old notes reviewed  Telemetry reviewd  Ekg personally reviewed  Chest xray personally reviewed  Echo and cath reviewed   Medications and labs reviewed  moderate complexity/medical decision making due to extensive data review, extensive history review, independent review of data  moderate risk due to acute illness, evaluation of drug-drug interactions, medication management and diagnostic interventions      Assessment  Patient Active Problem List   Diagnosis    Diabetes mellitus ([facility]    Knee pain    Degenerative arthritis of knee    Other specified anemias    DKA, type 2, not at goal [facility])    Hyperglycemia    Acute pancreatitis    Coronary artery disease involving native coronary artery of native heart without angina pectoris    Diabetes, coma, hyperosmolar (HCC)    Hypernatremia    Acute kidney injury (HCC)    Abnormal LFTs    Diabetic ketoacidosis without coma associated with type 1 diabetes mellitus (HCC)    Esophagitis    Hypotension    Controlled type 1 diabetes mellitus with hyperglycemia (HCC)    Sialadenitis    Abdominal pain    Adrenal insufficiency (HCC)    Unstable angina (HCC)    Chronic pain syndrome    Constipation    Uncontrolled type 2 diabetes mellitus with hyperglycemia, with long-term current use of insulin ([address]    Acute on chronic respiratory failure with hypoxia (HCC)    Mucus plugging of bronchi    Atelectasis    Former smoker    DM (diabetes mellitus), secondary uncontrolled ([address]    Thrombocytopenia ([address]    Essential hypertension    Hx of AKA (above knee amputation), right ([address]    Chest pain    Coronary artery disease due to lipid rich plaque    Hypercholesteremia    DKA, type 1, not at goal [facility])    Acute decompensated heart failure ([address]    Carboxyhemoglobinemia    Acute respiratory failure with hypoxia (HCC)    Suspected COVID-19 virus infection    Abnormal CT scan    Hypomagnesemia    Acute on chronic combined systolic and diastolic heart failure (HCC)    PAD (peripheral artery disease) (HCC)    Narcotic abuse (HCC)    Non-compliance    S/P angioplasty with stent    Acute on chronic combined systolic (congestive) and diastolic (congestive) heart failure (HCC)    Severe dehydration         Plan:    I had the opportunity to review the clinical symptoms and presentation of Julio César Stokes. Assessment/Plan:  1. Ischemic cardiomyopathy  - history of pci to mid-lad and diag  - ACC C, NYHA II  - new problem  Plan:  - echo to evaluate ef  - continue aspirin, statin and ticagrelor  - continue coreg 3.125 mg bid and furosemide 40 mg once daily. Hold lisinopril today  - obtain echocardiogram  - needs to follow up with cardiology, multiple missed appointments. Discussed that she is high risk for future events (mi and stroke) given uncontrolled diabetes and smoking   - no indication for ICD as patient has not been on appropriate doses of GDMT for 90 days    2. Nausea, vomiting and diarrhea  - new problem  - differential: viral infection, gastroparesis, medication effect, dka, bacterial infection  Plan:  - per primary team    3. UTI  - new problem  Plan:  - continue abx    4. Thrombocytopenia   5. Nicotine use  6. Uncontrolled Diabetes  7.  Right AKA         All questions and concerns were addressed to the patient/family. Alternatives to my treatment were discussed. The note was completed using EMR. Every effort was made to ensure accuracy; however, inadvertent computerized transcription errors may be present.   Kervin Catalan DO, MD 7/20/2021 8:16 AM

## 2021-07-20 NOTE — PROGRESS NOTES
Shift assessment complete. Pt resting in bed eating axel crackers and pudding. No signs of distress. Pt alert and oriented x4. Pt BP was 50s/30s. NP notified and arrived to bedside. See new orders. Call light within reach. Bed alarm engaged.

## 2021-07-20 NOTE — PROGRESS NOTES
15 minutes of diabetic education provided. The patient has been asking for snacks, food, and drinks all night. She tends to put sugar packets in everything she drinks. ... hot chocolate, coffee, tea, etc. This RN suggested that the patient reduce her sugar intake. This RN offered pt a few Splenda packets instead. Pt shows understanding.

## 2021-07-21 LAB
ANION GAP SERPL CALCULATED.3IONS-SCNC: 7 MMOL/L (ref 3–16)
BUN BLDV-MCNC: 20 MG/DL (ref 7–20)
CALCIUM SERPL-MCNC: 9 MG/DL (ref 8.3–10.6)
CHLORIDE BLD-SCNC: 100 MMOL/L (ref 99–110)
CO2: 35 MMOL/L (ref 21–32)
CREAT SERPL-MCNC: 0.8 MG/DL (ref 0.6–1.1)
GFR AFRICAN AMERICAN: >60
GFR NON-AFRICAN AMERICAN: >60
GLUCOSE BLD-MCNC: 155 MG/DL (ref 70–99)
GLUCOSE BLD-MCNC: 229 MG/DL (ref 70–99)
GLUCOSE BLD-MCNC: 250 MG/DL (ref 70–99)
GLUCOSE BLD-MCNC: 32 MG/DL (ref 70–99)
GLUCOSE BLD-MCNC: 320 MG/DL (ref 70–99)
GLUCOSE BLD-MCNC: 339 MG/DL (ref 70–99)
GLUCOSE BLD-MCNC: 349 MG/DL (ref 70–99)
GLUCOSE BLD-MCNC: 42 MG/DL (ref 70–99)
HCT VFR BLD CALC: 42.3 % (ref 36–48)
HEMOGLOBIN: 13.7 G/DL (ref 12–16)
MCH RBC QN AUTO: 26.1 PG (ref 26–34)
MCHC RBC AUTO-ENTMCNC: 32.5 G/DL (ref 31–36)
MCV RBC AUTO: 80.4 FL (ref 80–100)
PDW BLD-RTO: 18.5 % (ref 12.4–15.4)
PERFORMED ON: ABNORMAL
PLATELET # BLD: 103 K/UL (ref 135–450)
PMV BLD AUTO: 11 FL (ref 5–10.5)
POTASSIUM SERPL-SCNC: 4 MMOL/L (ref 3.5–5.1)
RBC # BLD: 5.26 M/UL (ref 4–5.2)
SODIUM BLD-SCNC: 142 MMOL/L (ref 136–145)
WBC # BLD: 9 K/UL (ref 4–11)

## 2021-07-21 PROCEDURE — 2580000003 HC RX 258: Performed by: INTERNAL MEDICINE

## 2021-07-21 PROCEDURE — 99233 SBSQ HOSP IP/OBS HIGH 50: CPT | Performed by: NURSE PRACTITIONER

## 2021-07-21 PROCEDURE — 6360000002 HC RX W HCPCS: Performed by: PHYSICIAN ASSISTANT

## 2021-07-21 PROCEDURE — C1751 CATH, INF, PER/CENT/MIDLINE: HCPCS

## 2021-07-21 PROCEDURE — 6360000002 HC RX W HCPCS: Performed by: INTERNAL MEDICINE

## 2021-07-21 PROCEDURE — 6370000000 HC RX 637 (ALT 250 FOR IP): Performed by: INTERNAL MEDICINE

## 2021-07-21 PROCEDURE — 80048 BASIC METABOLIC PNL TOTAL CA: CPT

## 2021-07-21 PROCEDURE — 2500000003 HC RX 250 WO HCPCS: Performed by: INTERNAL MEDICINE

## 2021-07-21 PROCEDURE — 85027 COMPLETE CBC AUTOMATED: CPT

## 2021-07-21 PROCEDURE — 99223 1ST HOSP IP/OBS HIGH 75: CPT | Performed by: INTERNAL MEDICINE

## 2021-07-21 PROCEDURE — 6370000000 HC RX 637 (ALT 250 FOR IP): Performed by: PHYSICIAN ASSISTANT

## 2021-07-21 PROCEDURE — 36569 INSJ PICC 5 YR+ W/O IMAGING: CPT

## 2021-07-21 PROCEDURE — 6370000000 HC RX 637 (ALT 250 FOR IP): Performed by: NURSE PRACTITIONER

## 2021-07-21 PROCEDURE — 2060000000 HC ICU INTERMEDIATE R&B

## 2021-07-21 RX ORDER — SODIUM CHLORIDE 0.9 % (FLUSH) 0.9 %
5-40 SYRINGE (ML) INJECTION PRN
Status: DISCONTINUED | OUTPATIENT
Start: 2021-07-21 | End: 2021-07-22 | Stop reason: HOSPADM

## 2021-07-21 RX ORDER — SODIUM CHLORIDE 0.9 % (FLUSH) 0.9 %
5-40 SYRINGE (ML) INJECTION EVERY 12 HOURS SCHEDULED
Status: DISCONTINUED | OUTPATIENT
Start: 2021-07-21 | End: 2021-07-22 | Stop reason: HOSPADM

## 2021-07-21 RX ORDER — METHYLPREDNISOLONE 4 MG/1
4 TABLET ORAL
Status: DISCONTINUED | OUTPATIENT
Start: 2021-07-21 | End: 2021-07-22 | Stop reason: HOSPADM

## 2021-07-21 RX ORDER — OXYMETAZOLINE HYDROCHLORIDE 0.05 G/100ML
3 SPRAY NASAL ONCE
Status: COMPLETED | OUTPATIENT
Start: 2021-07-21 | End: 2021-07-21

## 2021-07-21 RX ORDER — SODIUM CHLORIDE 9 MG/ML
25 INJECTION, SOLUTION INTRAVENOUS PRN
Status: DISCONTINUED | OUTPATIENT
Start: 2021-07-21 | End: 2021-07-22 | Stop reason: HOSPADM

## 2021-07-21 RX ORDER — METHYLPREDNISOLONE 4 MG/1
2 TABLET ORAL
Status: DISCONTINUED | OUTPATIENT
Start: 2021-07-21 | End: 2021-07-22 | Stop reason: HOSPADM

## 2021-07-21 RX ORDER — LIDOCAINE HYDROCHLORIDE 10 MG/ML
5 INJECTION, SOLUTION EPIDURAL; INFILTRATION; INTRACAUDAL; PERINEURAL ONCE
Status: DISCONTINUED | OUTPATIENT
Start: 2021-07-21 | End: 2021-07-22 | Stop reason: HOSPADM

## 2021-07-21 RX ADMIN — PIPERACILLIN AND TAZOBACTAM 3375 MG: 3; .375 INJECTION, POWDER, LYOPHILIZED, FOR SOLUTION INTRAVENOUS at 05:41

## 2021-07-21 RX ADMIN — Medication 10 ML: at 08:50

## 2021-07-21 RX ADMIN — Medication 1 CAPSULE: at 08:48

## 2021-07-21 RX ADMIN — ATORVASTATIN CALCIUM 10 MG: 10 TABLET, FILM COATED ORAL at 08:48

## 2021-07-21 RX ADMIN — METHYLPREDNISOLONE 4 MG: 4 TABLET ORAL at 08:48

## 2021-07-21 RX ADMIN — Medication 1 CAPSULE: at 16:40

## 2021-07-21 RX ADMIN — SERTRALINE 50 MG: 50 TABLET, FILM COATED ORAL at 08:49

## 2021-07-21 RX ADMIN — INSULIN LISPRO 8 UNITS: 100 INJECTION, SOLUTION INTRAVENOUS; SUBCUTANEOUS at 08:51

## 2021-07-21 RX ADMIN — ACETAMINOPHEN 1000 MG: 500 TABLET ORAL at 20:24

## 2021-07-21 RX ADMIN — ASPIRIN 81 MG: 81 TABLET, CHEWABLE ORAL at 08:49

## 2021-07-21 RX ADMIN — FUROSEMIDE 40 MG: 40 TABLET ORAL at 08:49

## 2021-07-21 RX ADMIN — TICAGRELOR 90 MG: 90 TABLET ORAL at 08:48

## 2021-07-21 RX ADMIN — NASAL DECONGESTANT 3 SPRAY: 0.05 SPRAY NASAL at 00:20

## 2021-07-21 RX ADMIN — KETOROLAC TROMETHAMINE 30 MG: 30 INJECTION, SOLUTION INTRAMUSCULAR; INTRAVENOUS at 20:24

## 2021-07-21 RX ADMIN — INSULIN LISPRO 6 UNITS: 100 INJECTION, SOLUTION INTRAVENOUS; SUBCUTANEOUS at 12:21

## 2021-07-21 RX ADMIN — CARVEDILOL 3.12 MG: 3.12 TABLET, FILM COATED ORAL at 08:48

## 2021-07-21 RX ADMIN — Medication 10 ML: at 20:25

## 2021-07-21 RX ADMIN — PREGABALIN 100 MG: 100 CAPSULE ORAL at 08:49

## 2021-07-21 RX ADMIN — INSULIN LISPRO 4 UNITS: 100 INJECTION, SOLUTION INTRAVENOUS; SUBCUTANEOUS at 20:27

## 2021-07-21 RX ADMIN — PREGABALIN 100 MG: 100 CAPSULE ORAL at 20:24

## 2021-07-21 RX ADMIN — MEROPENEM 1000 MG: 1 INJECTION, POWDER, FOR SOLUTION INTRAVENOUS at 17:05

## 2021-07-21 RX ADMIN — METHYLPREDNISOLONE 2 MG: 4 TABLET ORAL at 00:24

## 2021-07-21 RX ADMIN — Medication 10 ML: at 20:30

## 2021-07-21 RX ADMIN — TICAGRELOR 90 MG: 90 TABLET ORAL at 20:24

## 2021-07-21 RX ADMIN — METHYLPREDNISOLONE 2 MG: 4 TABLET ORAL at 16:40

## 2021-07-21 RX ADMIN — DEXTROSE MONOHYDRATE 12.5 G: 25 INJECTION, SOLUTION INTRAVENOUS at 16:43

## 2021-07-21 ASSESSMENT — PAIN SCALES - GENERAL
PAINLEVEL_OUTOF10: 0
PAINLEVEL_OUTOF10: 0
PAINLEVEL_OUTOF10: 4
PAINLEVEL_OUTOF10: 0
PAINLEVEL_OUTOF10: 0

## 2021-07-21 NOTE — PROGRESS NOTES
Hospitalist Progress Note      PCP: No primary care provider on file. Date of Admission: 7/16/2021    Chief Complaint: Nausea vomiting abdominal pain    Hospital Course: This 58-year-old female with past medical history of type 2 diabetes, chronic combined CHF with EF 25 to 30%, CAD, hyperlipidemia, hypertension presented with complaints of generalized weakness, myalgia, nausea, vomiting for the duration of 3 days. She is admitted for management of dehydration, hyperglycemia, RAQUEL, left knee cellulitis, intractable nausea and vomiting. Patient with intermittent episodes of hypoglycemia and hyperglycemia. Multiple adjustments done to basal insulin. Patient seems to only tolerate 1/6 of her reported home dose insulin. Endocrinology consulted. Patient also with intermittent episodes of hypotension and hypertension. Heart failure meds have been held multiple times.  Cardiolog on board     Subjective:  Patient seen and examined today  Overnight events and interval ancillary notes reviewed  Low-grade fever 99 yesterday  Pt denied any fever, chills, abdominal pain urinary frequency or urgency  Patient does not have any labs since 7/19/2021  CBC any EP1 ordered this morning; lab unable to get any blood draw  PICC line order; instructed RN to draw labs as soon as the PICC in place      Medications:  Reviewed    Infusion Medications    dextrose      sodium chloride Stopped (07/19/21 0547)     Scheduled Medications    methylPREDNISolone  4 mg Oral QAM AC    methylPREDNISolone  2 mg Oral Dinner    insulin glargine  10 Units Subcutaneous Nightly    lactobacillus  1 capsule Oral BID WC    furosemide  40 mg Oral Daily    piperacillin-tazobactam  3,375 mg Intravenous Q8H    insulin lispro  0-12 Units Subcutaneous TID WC    insulin lispro  0-6 Units Subcutaneous Nightly    aspirin  81 mg Oral Daily    atorvastatin  10 mg Oral Daily    carvedilol  3.125 mg Oral BID WC    [Held by provider] mirtazapine  7.5 mg Oral Nightly    pregabalin  100 mg Oral BID    sertraline  50 mg Oral Daily    ticagrelor  90 mg Oral BID    sodium chloride flush  5-40 mL Intravenous 2 times per day     PRN Meds: [START ON 7/22/2021] sodium chloride, perflutren lipid microspheres, naloxone, acetaminophen **OR** [DISCONTINUED] acetaminophen, ketorolac, diphenhydrAMINE, promethazine, glucose, dextrose, glucagon (rDNA), dextrose, sodium chloride flush, sodium chloride, polyethylene glycol, meclizine      Intake/Output Summary (Last 24 hours) at 7/21/2021 0916  Last data filed at 7/21/2021 0530  Gross per 24 hour   Intake 980 ml   Output --   Net 980 ml       Physical Exam Performed:    BP (!) 143/79   Pulse 78   Temp 98.3 °F (36.8 °C) (Oral)   Resp 20   Ht 5' 2\" (1.575 m)   Wt 112 lb 6.4 oz (51 kg)   LMP 11/15/2017   SpO2 98%   BMI 20.56 kg/m²     General appearance: No apparent distress, appears stated age and cooperative. HEENT: Pupils equal, round, and reactive to light. Conjunctivae/corneas clear. Neck: Supple, with full range of motion. No jugular venous distention. Trachea midline. Respiratory:  Normal respiratory effort. Clear to auscultation, bilaterally without Rales/Wheezes/Rhonchi. Cardiovascular: Regular rate and rhythm with normal S1/S2 without murmurs, rubs or gallops. Abdomen: Soft, non-tender, non-distended with normal bowel sounds. Musculoskeletal: No clubbing, cyanosis or edema bilaterally. Full range of motion without deformity. Skin: Skin color, texture, turgor normal.  No rashes or lesions. Neurologic:  Neurovascularly intact without any focal sensory/motor deficits.  Cranial nerves: II-XII intact, grossly non-focal.  Psychiatric: Alert and oriented, thought content appropriate, normal insight  Capillary Refill: Brisk,< 3 seconds   Peripheral Pulses: +2 palpable, equal bilaterally       Labs:   Recent Labs     07/19/21  0420 07/19/21  2117   WBC 10.3 12.1*   HGB 15.5 13.0   HCT 48.0 40.9   * 94* Recent Labs     07/19/21  0420 07/19/21 2117    133*   K 3.0* 3.7    97*   CO2 28 27   BUN 20 21*   CREATININE 0.7 0.9   CALCIUM 8.8 8.3   PHOS 2.8  --      Recent Labs     07/19/21 0420 07/19/21 2117   AST 40* 25   ALT 13 10   BILITOT 0.5 0.3   ALKPHOS 113 109     No results for input(s): INR in the last 72 hours. No results for input(s): Charly Hao in the last 72 hours. Urinalysis:      Lab Results   Component Value Date    NITRU Negative 07/16/2021    WBCUA >100 07/16/2021    BACTERIA 3+ 07/16/2021    RBCUA 5-10 07/16/2021    BLOODU SMALL 07/16/2021    SPECGRAV >1.030 07/16/2021    GLUCOSEU >=1000 07/16/2021    GLUCOSEU NEGATIVE 02/14/2012       Radiology:  MRI ABDOMEN WO CONTRAST   Final Result   Limited evaluation of the liver on this noncontrast MRI. No evidence of   diffuse or focal liver disease is appreciated. CT ABDOMEN PELVIS WO CONTRAST Additional Contrast? None   Final Result   1. Air in the bladder wall and bladder lumen. Correlate with presentation   for emphysematous cystitis. 2. Nonspecific subsegmental lingular opacity could represent atelectasis and   scarring. Correlate with presentation to exclude superimposed pneumonia. 3. Slight nodular contour of the liver. MRI would better evaluate. 4. No small bowel obstruction. 5. Other findings as described. XR CHEST PORTABLE   Final Result   No acute cardiopulmonary disease. Borderline cardiomegaly without overt   failure.                  Assessment/Plan:    Active Hospital Problems    Diagnosis     Severe dehydration [E86.0]      Severe dehydration: improved after fluid resusictation  Iv  Fluids discontinued   We will resume diuretics    Uncontrolled diabetes  on Lantus 15 twice daily with sliding scale at home   HgbA1C 11.9 on 7/16/2021   Had several episodes of hypoglycemia at 50s requiring dextrose   Endocrinology on board;appreciate their recs   lantus dose reduced     Secondary adrenal

## 2021-07-21 NOTE — PROGRESS NOTES
Shift assessment complete. VSS. Pt resting in bed without request. No needs expressed. No signs of distress. Pt repositioned in bed for comfort. Evening medications administered per MAR. Bed alarm engaged. Call light within reach. Video monitoring in place for patient safety.

## 2021-07-21 NOTE — PROGRESS NOTES
Pt called out saying her nose was bleeding. RN responded to room with tissues and wash cloths. Dark red blood leaking out of both nostrils for about 25 minutes now. Provider notified. Complete linen change given.

## 2021-07-21 NOTE — PLAN OF CARE
RN  Outcome: Ongoing  7/20/2021 1843 by Brijesh Bedolla RN  Outcome: Ongoing  Goal: Ability to avoid or minimize complications of infection will improve  Description: Ability to avoid or minimize complications of infection will improve  7/21/2021 0059 by Carter Ryan RN  Outcome: Ongoing  7/20/2021 1843 by Brijesh Bedolla RN  Outcome: Ongoing

## 2021-07-21 NOTE — CONSULTS
Endocrinology    Thanks for calling. 60 y.o. suboxone type 2 DM patient known to me from outpatient care who has been treated with medrol 4 mg AM and 2 mg PM for probable secondary adrenal insufficiency became very dehydrated when she becae ill and could not take her oral steroid. Pres Ill:  Patient developed intractable nausea and vomiting of unclear etiology but was unable to take her usual steroid and so came to hospital where she was found quite dehydrated with BUN up to 45 (current value 20 to 21 after rehydration). She was not hyperkalemic but had been taking furosemide for bipedal edema. Her initial calcium values were up to 8.9 despite low serum albumin down to 2.5, probably consistent with contraction alkalosis (tot CO2 was up to 30 on 7-. Past Hx ROS  Neuro: Neg stroke, positive diabetic europathy on Lyrica  Resp: Smoker, says she quit last year  CV: CHF history, post PTCA 2019  GI GERD post multiple EGD ALT sl. High once, probable NAFLD  : Dehydration on admission, suspect minor ATN  Musculoskel: Post BKA right for ?infectio and PAD  Endocrine: Secondary adrenal insufficiency, possipbly secondary to history of opiate and suboxone use. Type 2 DM has used VGo and Lantus up to about 36 units daily. No known thyroid disease. Exam:BP 96/64 REsp 20; T 99 deg F Pulse 80  HEENT: EOM's intact  Neck: No dominant thyroid nodule, not stiff  Chest: Clear  Heart: S1 and S2  Abd: Nontender now  Ext: R BKA    Assessment:  1. Secondaryt adrenal insufficiency, needs to learn how to take solucortef injections for emergencies when unable to take oral medrol. Adrenal insufficiency may improve when she is off opiates, eg. In hospital, but likely to recur outpatient. 2. IDDM, likely to stabilize on steroid physiologic replacement. which will help to avoid hypoglycemia. 3. Hypoglycemia risk increased with adrenal insufficiency and NAFLD    Plan:  1. Free T4, TSH  2. Serum AM cortisol and aldosterone  3. Medrol 4 mg AM and 2 mg at supper  4. Omega 3 fish oil may help NAFLD  5. Gradually increase insulin as needed after steroid replacement. Home dose higher off hospital diabetic diet. RAHEEL Whipple M.D.

## 2021-07-21 NOTE — PROGRESS NOTES
[address]  HEART FAILURE  Progress Note      Admit Date 7/16/2021     Reason for Consult:      Reason for Consultation/Chief Complaint: fatigue    HPI:    Tian Suarez is a 60 y.o. female with PMH PMH opiate abuse and IVDU on suboxone, PVD s/p r AKA, HTN, HLD, DM, CAD, PCI feb 2020, HFpEF presented to the hospital with complaints of 3 days of nausea, vomiting and diarrhea. Echo yesterday showed drop in EF to 25-30%      Subjective:  Patient is being seen for cardiomyopathy. There were no acute overnight cardiac events. Today Ms. Paradise Pedroza denies chest pain, shortness of breath, palpitations, feels good and wants to go home      Baseline Weight: 135   Wt Readings from Last 3 Encounters:   07/21/21 112 lb 6.4 oz (51 kg)   04/30/21 143 lb 11.2 oz (65.2 kg)   09/25/20 119 lb 1.6 oz (54 kg)          NYHA Class III  Objective:   BP (!) 143/79   Pulse 78   Temp 98.3 °F (36.8 °C) (Oral)   Resp 20   Ht 5' 2\" (1.575 m)   Wt 112 lb 6.4 oz (51 kg)   LMP 11/15/2017   SpO2 98%   BMI 20.56 kg/m²       Intake/Output Summary (Last 24 hours) at 7/21/2021 0853  Last data filed at 7/21/2021 0530  Gross per 24 hour   Intake 980 ml   Output --   Net 980 ml      Wt Readings from Last 3 Encounters:   07/21/21 112 lb 6.4 oz (51 kg)   04/30/21 143 lb 11.2 oz (65.2 kg)   09/25/20 119 lb 1.6 oz (54 kg)      In: 730 [P.O.:720;  I.V.:10]  Out: -         Physical Exam:  General Appearance:  Non-obese/Well Nourished  Respiratory:  · Resp Auscultation: Normal breath sounds without dullness  Cardiovascular:  · Auscultation: Regular rate and rhythm, normal S1S2, no m/g/r/c  · Palpation: Normal    · JVD: none  · Pedal Pulses: 2+ and equal   Abdomen:  · Soft, NT, ND, + bs  Extremities:  · No Cyanosis or Clubbing  · Extremities: negative  Neurological/Psychiatric:  · Oriented to time, place, and person  · Non-anxious    MEDICATIONS:   Scheduled Meds:   Scheduled Meds:   methylPREDNISolone  4 mg Oral QAM AC    methylPREDNISolone  2 mg Oral Dinner    insulin glargine  10 Units Subcutaneous Nightly    lactobacillus  1 capsule Oral BID WC    furosemide  40 mg Oral Daily    piperacillin-tazobactam  3,375 mg Intravenous Q8H    insulin lispro  0-12 Units Subcutaneous TID WC    insulin lispro  0-6 Units Subcutaneous Nightly    aspirin  81 mg Oral Daily    atorvastatin  10 mg Oral Daily    carvedilol  3.125 mg Oral BID WC    [Held by provider] mirtazapine  7.5 mg Oral Nightly    pregabalin  100 mg Oral BID    sertraline  50 mg Oral Daily    ticagrelor  90 mg Oral BID    sodium chloride flush  5-40 mL Intravenous 2 times per day     Continuous Infusions:   dextrose      sodium chloride Stopped (07/19/21 0547)     PRN Meds:.[START ON 7/22/2021] sodium chloride, perflutren lipid microspheres, naloxone, acetaminophen **OR** [DISCONTINUED] acetaminophen, ketorolac, diphenhydrAMINE, promethazine, glucose, dextrose, glucagon (rDNA), dextrose, sodium chloride flush, sodium chloride, polyethylene glycol, meclizine  Continuous Infusions:   dextrose      sodium chloride Stopped (07/19/21 0547)       Intake/Output Summary (Last 24 hours) at 7/21/2021 0853  Last data filed at 7/21/2021 0530  Gross per 24 hour   Intake 980 ml   Output --   Net 980 ml       Lab Data:  CBC:   Lab Results   Component Value Date    WBC 12.1 07/19/2021    HGB 13.0 07/19/2021    PLT 94 07/19/2021     BMP:  Lab Results   Component Value Date     07/19/2021    K 3.7 07/19/2021    CL 97 07/19/2021    CO2 27 07/19/2021    BUN 21 07/19/2021    CREATININE 0.9 07/19/2021    GLUCOSE 105 07/19/2021    GLUCOSE 329 05/20/2011     INR:   Lab Results   Component Value Date    INR 0.98 08/02/2020    INR 0.94 08/01/2020    INR 1.05 01/27/2011        CARDIAC LABS  ENZYMES:No results for input(s): CKMB, CKMBINDEX, TROPONINI in the last 72 hours.     Invalid input(s): CKTOTAL;3  FASTING LIPID PANEL:  Lab Results   Component Value Date    HDL 34 02/20/2020    [address] 64 02/20/2020 TRIG 117 02/20/2020    TSH 1.73 08/29/2020     LIVER PROFILE:  Lab Results   Component Value Date    AST 25 07/19/2021    AST 40 07/19/2021    ALT 10 07/19/2021    ALT 13 07/19/2021     BNP:   Lab Results   Component Value Date    PROBNP 4,027 07/16/2021    PROBNP 2,955 04/29/2021    PROBNP 4,591 04/27/2021     Iron Studies:    Lab Results   Component Value Date    FERRITIN 193.4 08/21/2020    FERRITIN 40.8 08/02/2020     Lab Results   Component Value Date    IRON 39 04/28/2021    TIBC 344 04/28/2021    FERRITIN 193.4 (H) 08/21/2020      Iron Deficiency Anemia:  No IV Iron Therapy:  No  2017 ACC/AHA HF Guidelines:   intravenous iron replacement in patients with New York Heart Association (NYHA) class II and III HF and iron deficiency(ferritin <100 ng/ml or 100-300 ng/ml if transferrin saturation <20%), to improve functional status and QoL. 1. WEIGHT: Admit Weight: 143 lb (64.9 kg)      Today  Weight: 112 lb 6.4 oz (51 kg)   2.  I/O     Intake/Output Summary (Last 24 hours) at 7/21/2021 0853  Last data filed at 7/21/2021 0530  Gross per 24 hour   Intake 980 ml   Output --   Net 980 ml       Cardiac Testing: Echo 7/20/21:   Echo: Left ventricular cavity size is mildly dilated with normal left ventricular   wall thickness.   Overall left ventricular systolic function appears severely reduced.   Ejection fraction is visually estimated to be 25-30%.   There is severe hypokinesis of all distal segments and apex.   Grade II diastolic dysfunction with elevated LV filling pressures.   The right ventricle is mildly enlarged.   Right ventricular systolic function is mildly reduced.   At least moderate-severe, and possibly severe, mitral regurgitation.   The aortic valve is mildly thickened/calcified but opens adequately with   normal gradients.   Mild to moderate tricuspid regurgitation with a PASP of 56 mmHg.   Trivial pulmonic regurgitation.  Cristy Yeung is a trivial pericardial effusion loculated at the right atrium.  Cristy Yeung are large bilateral pleural effusions  Echo:  8/24/20:  Left ventricular size is mildly increased.   Left ventricular systolic function is mildly to moderately depressed with   ejection fraction estimated at 40%.   Anteroapical,anteroseptal, inferoapical hypokinesis.   Grade II diastolic dysfunction with elevated LV filling pressures.   Mild mitral regurgitation.   Inadequate tricuspid regurgitation to estimate systolic pulmonary artery   pressure. Viability:    -There is a moderate-large defect that involves the anterior wall and apex.    -There is slight improvement in the mid anterior wall at 24 hours suggesting    some viability.    -The remainder of the anterior wall and apex appears to be scar. Cath:LM         Normal     LAD       100% mid instent, 99% D1 with WILMA 1 flow                CX         normal      RCA       20% mid                                LVG       40%, anteroapical hypokinesis           LVEDP  10mmHg    Assessment/Plan:     1. ICM- continue coreg, ok to increase to 6.25mg if BP tolerates, restart ACE at discharge, no [place] [facility] due to noncompliance with f/u. Pt ok for discharge from cardiac standpoint, will f/u in chf clinic and address drop in EF  2.  Dehydration- improved, diuretics restarted        I appreciate the opportunity of cooperating in the care of this individual.    Kira Be, APRN - CNP, ACNP, [address] 7/21/2021, 8:53 AM  Heart Failure  The [address], [address]  Ph: [phone]      Core Measures:   · Discharge instructions:   · LVEF documented:   · ACEI for LV dysfunction:   · Smoking Cessation:

## 2021-07-21 NOTE — CONSULTS
Nutrition Education    Consult received for diet education on carb control diet. Pt with A1c of 11.9. Pt states her sister goes to the grocery for her and watches all her carbs. Pt unable to see the written guidelines d/t cataracts and needing surgery. Provided verbal education on carb containing foods, portion control with meal spacing. Pt expressed understanding. Pt also noted a recent wt loss d/t emesis at home. Pt is willing to accept strawberry Glucerna while inpt. · Verbally reviewed information with Patient  · Educated on carb control  · Written educational materials provided. · Contact name and number provided. · Refer to Patient Education activity for more details.     Electronically signed by Greg Cotto RD, LD on 7/21/21 at 12:00 PM EDT  Contact: [phone]

## 2021-07-21 NOTE — PROGRESS NOTES
[facility] Diabetes Education Nurse  Consult Note       NAME:  Tian Suarez  MEDICAL RECORD NUMBER:  [id number]  AGE: 60 y.o. GENDER: female  : 1965  TODAY'S DATE:  2021    Subjective:     Reason for Educator consult ---  Evaluation and Assessment:    Tian Suarez is a 60 y.o. female referred by:   [] Physician  [x] Nursing  [] Other:      Pt seen for DM education. Pt states she sees Dr. Alisa Perdomo as outpatient for endocrinology. Pt states she takes insulin at home (basal bolus regimen with sliding scale correction). Pt states she has testing supplies at home but felt too sick to test prior to admission. Explained A1C values and goals. Informed pt of current A1C 11.9. Discussed pre- and post-meal blood sugar goals, logging FSBS. Gave recommendations and handout information on carb control diet. Discussed plate method of carb control. Pt states she lives with her sister and will give handout to her sister for review (states her vision is too blurry). Discussed sick day management and communicating diabetes problems to provider. Patient given pamphlets titled \"Checking your Blood Sugar\", \"Know your Numbers\", and \"Building a Balanced Meal\". PAST MEDICAL HISTORY      Diagnosis Date    Arthritis     CHF (congestive heart failure) ([address]     Depression     Diabetes mellitus ([address]     Heart attack ([address]     MI 2019, and 6 months ago    Hepatitis C     Dr Lorin Shelton follows; contracted from ex- (drug user)    Hypercholesteremia 3/11/2020    Hypertension     MRSA (methicillin resistant Staphylococcus aureus) infection in     was in urine and nares    Pneumonia     S/P colonoscopy with polypectomy     one polyp, Dr Lorin Shelton. Recall 3 years.        PAST SURGICAL HISTORY  Past Surgical History:   Procedure Laterality Date    ABOVE KNEE AMPUTATION      AMPUTATION      right bka    CORONARY ANGIOPLASTY WITH STENT PLACEMENT  2019    JOINT REPLACEMENT  ,     right (LANTUS SOLOSTAR) 100 UNIT/ML injection pen Inject 15 Units into the skin 2 times daily Indications: Insulin Pump       insulin lispro, 1 Unit Dial, (HUMALOG KWIKPEN) 100 UNIT/ML SOPN Inject 10 Units into the skin 3 times daily (before meals) Inject 10 units three times daily with each meal in addition to sliding scale.  ticagrelor (BRILINTA) 90 MG TABS tablet Take 1 tablet by mouth 2 times daily 60 tablet 5    aspirin 81 MG tablet Take 1 tablet by mouth daily 90 tablet 1    atorvastatin (LIPITOR) 40 MG tablet Take 1 tablet by mouth daily 90 tablet 3    buprenorphine-naloxone (SUBOXONE) 8-2 MG SUBL SL tablet Place 1 tablet under the tongue 2 times daily.  FREESTYLE LANCETS MISC 1 each by Other route 4 times daily (before meals and nightly) Patient to check blood sugar 4 times a day and PRN, he is treated with multiple daily injections of insulin that require correction dosing and has had hypoglycemia. A1C  14.5  ICD code- E11.65, Z79.4 100 each 5    blood glucose test strips (FREESTYLE LITE) strip 1 each by Other route 4 times daily (before meals and nightly) Patient to check blood sugar 4 times a day and PRN, he is treated with multiple daily injections of insulin that require correction dosing and has had hypoglycemia. A1C  14.5  ICD code- E11.65, Z79.4 100 each 5    nitroGLYCERIN (NITROSTAT) 0.4 MG SL tablet up to max of 3 total doses.  If no relief after 1 dose, call 911. 25 tablet 3       Objective:     LABS    CBC:   Lab Results   Component Value Date    WBC 12.1 07/19/2021    RBC 5.06 07/19/2021    HGB 13.0 07/19/2021    HCT 40.9 07/19/2021    MCV 80.7 07/19/2021    MCH 25.6 07/19/2021    MCHC 31.8 07/19/2021    RDW 18.8 07/19/2021    PLT 94 07/19/2021    MPV 11.0 07/19/2021     CMP:    Lab Results   Component Value Date     07/19/2021    K 3.7 07/19/2021    CL 97 07/19/2021    CO2 27 07/19/2021    BUN 21 07/19/2021    CREATININE 0.9 07/19/2021    GFRAA >60 07/19/2021    GFRAA >60 02/14/2012    AGRATIO 0.7 07/19/2021    LABGLOM >60 07/19/2021    LABGLOM 131.7 05/20/2011    GLUCOSE 105 07/19/2021    GLUCOSE 329 05/20/2011    PROT 6.3 07/19/2021    PROT 7.6 02/14/2012    LABALBU 2.5 07/19/2021    CALCIUM 8.3 07/19/2021    BILITOT 0.3 07/19/2021    ALKPHOS 109 07/19/2021    AST 25 07/19/2021    ALT 10 07/19/2021       HgBA1c:    Lab Results   Component Value Date    LABA1C 11.9 07/16/2021       This patient's last creatinine was   Recent Labs     07/19/21 2117   CREATININE 0.9        Recent Blood sugars have been   Lab Results   Component Value Date    POCGLU 349 07/21/2021    POCGLU 339 07/21/2021    POCGLU 209 07/20/2021    POCGLU 175 07/20/2021    POCGLU 130 07/20/2021    POCGLU 98 07/20/2021    POCGLU 71 07/20/2021    POCGLU 128 07/20/2021     Lab Results   Component Value Date    GLUCOSE 105 07/19/2021    GLUCOSE 114 07/19/2021    GLUCOSE 73 07/18/2021    GLUCOSE 232 07/17/2021    GLUCOSE 581 07/16/2021    GLUCOSE 749 07/16/2021    GLUCOSE 329 05/20/2011            Assessment:     Patient Active Problem List   Diagnosis    Diabetes mellitus [facility]    Knee pain    Degenerative arthritis of knee    Other specified anemias    DKA, type 2, not at goal [facility])    Hyperglycemia    Acute pancreatitis    Coronary artery disease involving native coronary artery of native heart without angina pectoris    Diabetes, coma, hyperosmolar (HCC)    Hypernatremia    Acute kidney injury ([facility]    Abnormal LFTs    Diabetic ketoacidosis without coma associated with type 1 diabetes mellitus (HCC)    Esophagitis    Hypotension    Controlled type 1 diabetes mellitus with hyperglycemia (HCC)    Sialadenitis    Abdominal pain    Adrenal insufficiency (HCC)    Unstable angina (HCC)    Chronic pain syndrome    Constipation    Uncontrolled type 2 diabetes mellitus with hyperglycemia, with long-term current use of insulin (HCC)    Acute on chronic respiratory failure with hypoxia (HCC)    Mucus plugging of bronchi    Atelectasis    Former smoker    DM (diabetes mellitus), secondary uncontrolled ([address]    Thrombocytopenia ([facility]    Essential hypertension    Hx of AKA (above knee amputation), right (HCC)    Chest pain    Coronary artery disease due to lipid rich plaque    Hypercholesteremia    DKA, type 1, not at goal [facility])    Acute decompensated heart failure ([facility]    Carboxyhemoglobinemia    Acute respiratory failure with hypoxia (HCC)    Suspected COVID-19 virus infection    Abnormal CT scan    Hypomagnesemia    Acute on chronic combined systolic and diastolic heart failure (HCC)    PAD (peripheral artery disease) (HCC)    Narcotic abuse (HCC)    Non-compliance    S/P angioplasty with stent    Acute on chronic combined systolic (congestive) and diastolic (congestive) heart failure (HCC)    Severe dehydration       Plan:     Plan of Care:   BG higher today after restarting steroids last night  Continue to monitor blood sugars as ordered  Pt hopes to be discharged home today    Discharge Plan:  Patient will continue to need insulin at home  Patient to f/u with PCP and endo      Magan Thompson MSN, RN, [facility]  Certified Diabetes Care and

## 2021-07-21 NOTE — CONSULTS
PICC line education:    -Risks  -Benefits  -Alternatives  -Procedure    Discussed the above with patient, verbalized understanding, answered all questions. Provided with information on PICC care to review. PICC tip verified via [employer] (Ok to use). Reported off to patient's  Nurse [facility].

## 2021-07-21 NOTE — CONSULTS
Infectious Diseases   Consult Note        Admission Date: 7/16/2021  Hospital Day: Hospital Day: 6   Attending: Brittany Mon MD  Date of service: 7/21/21     Reason for admission: Severe dehydration [E86.0]    Chief complaint/ Reason for consult: Emphysematous cystitis    Microbiology:        I have reviewed allavailable micro lab data and cultures    · Blood culture (2/2) - collected on 7/16/2021: Negative  · Urine culture  - collected on 7/16/2021: Grade 100,000 CFU per mL of Enterobacter    Susceptibility    Enterobacter cloacae complex (1)    Antibiotic Interpretation JOHNNA Status    ceFAZolin Resistant >=64 mcg/mL     cefepime Sensitive <=0.12 mcg/mL     ciprofloxacin Sensitive <=0.25 mcg/mL     ertapenem Sensitive <=0.12 mcg/mL     gentamicin Sensitive <=1 mcg/mL     levofloxacin Sensitive <=0.12 mcg/mL     nitrofurantoin Sensitive <=16 mcg/mL     piperacillin-tazobactam Sensitive <=4 mcg/mL     trimethoprim-sulfamethoxazole Sensitive <=20 mcg/mL         Antibiotics and immunizations:       Current antibiotics: All antibiotics and their doses were reviewed by me    Recent Abx Admin                   piperacillin-tazobactam (ZOSYN) 3,375 mg in dextrose 5 % 50 mL IVPB extended infusion (mini-bag) (mg) 3,375 mg New Bag 07/21/21 0541     3,375 mg New Bag 07/20/21 2055     3,375 mg New Bag  1402                  Immunization History: All immunization history was reviewed by me today. Immunization History   Administered Date(s) Administered    Influenza Virus Vaccine 11/28/2011    Influenza, Hernan Starch, 6 mo and older, IM, PF (Flulaval, Fluarix) 11/14/2018    Pneumococcal Polysaccharide ([id number]) 11/28/2011, 09/15/2014       Known drug allergies:      All allergies were reviewed and updated    Allergies   Allergen Reactions    Darvocet [Propoxyphene N-Acetaminophen] Nausea Only    Naprosyn [Naproxen] Rash    Ultram [Tramadol Hcl] Rash       Social history:     Social History:  All social andepidemiologic history was reviewed and updated by me today as needed. · Tobacco use:   reports that she quit smoking about 12 months ago. She has a 43.00 pack-year smoking history. She has never used smokeless tobacco.  · Alcohol use:   reports no history of alcohol use. · Currently lives in: [facility]  ·  reports previous drug use. Drug: Opiates . COVID VACCINATION AND LAB RESULT RECORDS:     Internal Administration   First Dose      Second Dose           Last COVID Lab SARS-CoV-2, PCR (no units)   Date Value   08/21/2020 Not Detected     SARS-CoV-2, NAAT (no units)   Date Value   08/21/2020 Not Detected            Assessment:     The patient is a 60 y.o. old female who  has a past medical history of Arthritis, CHF (congestive heart failure) ([facility] [address] Depression, Diabetes mellitus ([facility] [address] Heart attack ([facility] [address] Hepatitis C, Hypercholesteremia (3/11/2020), Hypertension, MRSA (methicillin resistant Staphylococcus aureus) infection (in 2000), Pneumonia, and S/P colonoscopy with polypectomy (2/11). with following problems:    · Emphysematous cystitis  · Bandemia  · Fully vaccinated against COVID-19  · Poorly controlled type II diabetes mellitus  · Essential hypertension  · Chronic hepatitis C  · History of MRSA  · Prolonged QTc interval      Discussion:      The patient had a CT scan of abdomen pelvis without contrast done on 7/16/2021. It showed air in the bladder wall and the bladder lumen. Urine culture grew Enterobacter cloacae    Unfortunately, patient was given IV cefazolin by primary team for 2 days and then was switched to IV Zosyn 2 days later. Patient had a low-grade temperature 99 degrees yesterday. White cell count improved but then went up and was 12,100 on 7/19/2021. No CBC has been done yesterday or today    Blood cultures from admission are negative. She had a 2D echo done yesterday. It showed severe global hypokinesis with ejection fraction of 25 to 30%.     Last EKG on 7/17/2021 showed QTc interval of 492 ms    Serum sodium is 133 today    She is quite encephalopathic and would not wake up on exam    Plan:     Diagnostic Workup:    · Please check CBC today  · Continue to follow fever curve, WBC count and blood cultures  · Follow up on liverand renal functions closely    Antimicrobials:    · Patient will not be a good candidate for switching to fluoroquinolone due to prolonged QTC  · We will switch her from IV Zosyn to IV meropenem 1 g every 8 hours  · We will plan to switch IV meropenem to IV ertapenem at the time of discharge  · We will order a PICC line for her  · We will see how she does on meropenem  · Will need better glycemic control  · We will follow up on the culture results and clinical progress and will make further recommendations accordingly. · Continue close vitals monitoring. · Maintain good glycemic control. · Fall precautions. Aspiration precautions. · Continue to watch for new fever or diarrhea. · DVT prophylaxis. · Discussed all above with patient and RN. Drug Monitoring:    · Continue serial monitoring for antibiotic toxicity as follows: CBC, CMP  · Continue to watch for following: new or worsening fever, hypotension, hives, lip swelling and redness or purulence at vascular access sites. I/v access Management:    · Continue to monitor i.v access sites for erythema, induration, discharge or tenderness. · As always, continue efforts to minimizetubes/lines/drains as clinically appropriate to reduce chances of line associated infections. Current isolation precautions: There are no current isolations documented for this patient. Level of complexity of consult: High     Risk of Complications/Morbidity: High     · Illness(es)/ Infection present that pose threat to life/bodily function. · There is potential for severe exacerbation of infection/side effects of treatment. · Therapy requires intensive monitoring for antimicrobial agent toxicity.     Thank you for involving me in the care of your patient. I will continue to follow. If you have any additional questions, please do not hesitate to contact me. Subjective:     Presenting complaint in ER:     Chief Complaint   Patient presents with    Fatigue     c/o weakness, fatigue, NV for 3 days. Is unable to see due to needing cataract surgery. HPI: Shani Jay is a 60 y.o. female patient, who was seen at the request of Dr. Brittany Mon MD.    History was obtained from chart review as the patient is encephalopathic    The patient was admitted on 7/16/2021. I have been consulted to see the patient for above mentioned reason(s). The patient has multiple medical comorbidities, and presented to the ER for nausea and vomiting and generalized fatigue. Her symptoms have been going on for around 3 days. She had several episodes of vomitings. The patient has longstanding type 2 diabetes mellitus. The patient has been admitted for 5 days. Blood cultures done on admission on 7/16/2021 were negative. Urine culture done on 7/16/2021 grew Enterobacter cloacae. CT scan of abdomen and pelvis was concerning for emphysematous cystitis. Patient has been on IV Zosyn. White cell count went up to 12,100 yesterday    I have been asked for my opinion for management for this patient. Past Medical History: All past medical history reviewed today. Past Medical History:   Diagnosis Date    Arthritis     [facility]congestive heart failure) ([facility] [address]     Depression     Diabetes mellitus ([facility] [address]     Heart attack ([facility] [address]     MI 7/2019, and 6 months ago    Hepatitis C     Dr Anca Combs follows; contracted from ex- (drug user)    Hypercholesteremia 3/11/2020    Hypertension     MRSA (methicillin resistant Staphylococcus aureus) infection in 2000    was in urine and nares    Pneumonia     S/P colonoscopy with polypectomy 2/11    one polyp, Dr Anca Combs. Recall 3 years. Past Surgical History:  All pastsurgical history was reviewed today. Past Surgical History:   Procedure Laterality Date    ABOVE KNEE AMPUTATION      AMPUTATION      right bka    CORONARY ANGIOPLASTY WITH STENT PLACEMENT  07/2019    JOINT REPLACEMENT  2006, 2010    right knee; Dr Lee Situ.  KNEE ARTHROSCOPY      ridht knee X6    LIVER BIOPSY N/A 11/14/2018    LIVER BIOPSY LAPAROSCOPIC performed by Prerna Newby MD at [address] [place] [address] N/A 11/14/2018    LAPAROSCOPIC CHOLECYSTECTOMY WITH INTRAOPERATIVE CHOLANGIOGRAM performed by Prerna Newby MD at [address] [place] [address]  2/9/11    right    UPPER GASTROINTESTINAL ENDOSCOPY  11/13/2018    UPPER GASTROINTESTINAL ENDOSCOPY N/A 11/13/2018    EGD BIOPSY performed by Lindsay Guerrero MD at [address] [place] [address]         Family History: All family history was reviewed today. Problem Relation Age of Onset    Cancer Father     Seizures Brother    [facility] Cancer Sister         colon         Medications: All current and past medications were reviewed. Medications Prior to Admission: lisinopril (PRINIVIL;ZESTRIL) 5 MG tablet, Take 1 tablet by mouth daily  furosemide (LASIX) 40 MG tablet, Take 1 tablet by mouth daily  carvedilol (COREG) 3.125 MG tablet, Take 1 tablet by mouth 2 times daily (with meals)  sertraline (ZOLOFT) 50 MG tablet, Take 50 mg by mouth daily Indications: Depression  mirtazapine (REMERON) 15 MG tablet, Take 7.5 mg by mouth nightly  pregabalin (LYRICA) 100 MG capsule, Take 100 mg by mouth 2 times daily. insulin glargine (LANTUS SOLOSTAR) 100 UNIT/ML injection pen, Inject 15 Units into the skin 2 times daily Indications: Insulin Pump   insulin lispro, 1 Unit Dial, (HUMALOG KWIKPEN) 100 UNIT/ML SOPN, Inject 10 Units into the skin 3 times daily (before meals) Inject 10 units three times daily with each meal in addition to sliding scale.   ticagrelor (BRILINTA) 90 MG TABS tablet, Take 1 tablet by mouth 2 times daily  aspirin 81 MG tablet, Take 1 tablet by mouth daily  atorvastatin (LIPITOR) 40 MG tablet, Take 1 tablet by mouth daily  buprenorphine-naloxone (SUBOXONE) 8-2 MG SUBL SL tablet, Place 1 tablet under the tongue 2 times daily. FREESTYLE LANCETS MISC, 1 each by Other route 4 times daily (before meals and nightly) Patient to check blood sugar 4 times a day and PRN, he is treated with multiple daily injections of insulin that require correction dosing and has had hypoglycemia. A1C  14.5  ICD code- E11.65, Z79.4  blood glucose test strips (FREESTYLE LITE) strip, 1 each by Other route 4 times daily (before meals and nightly) Patient to check blood sugar 4 times a day and PRN, he is treated with multiple daily injections of insulin that require correction dosing and has had hypoglycemia. A1C  14.5  ICD code- E11.65, Z79.4  nitroGLYCERIN (NITROSTAT) 0.4 MG SL tablet, up to max of 3 total doses. If no relief after 1 dose, call 911.      methylPREDNISolone  4 mg Oral QAM AC    methylPREDNISolone  2 mg Oral Dinner    insulin glargine  10 Units Subcutaneous Nightly    lactobacillus  1 capsule Oral BID WC    furosemide  40 mg Oral Daily    piperacillin-tazobactam  3,375 mg Intravenous Q8H    insulin lispro  0-12 Units Subcutaneous TID WC    insulin lispro  0-6 Units Subcutaneous Nightly    aspirin  81 mg Oral Daily    atorvastatin  10 mg Oral Daily    carvedilol  3.125 mg Oral BID WC    [Held by provider] mirtazapine  7.5 mg Oral Nightly    pregabalin  100 mg Oral BID    sertraline  50 mg Oral Daily    ticagrelor  90 mg Oral BID    sodium chloride flush  5-40 mL Intravenous 2 times per day          REVIEW OF SYSTEMS:       Review of Systems   Unable to perform ROS: Mental status change          Objective:       PHYSICAL EXAM:      Vitals:   Vitals:    07/21/21 0846 07/21/21 1157 07/21/21 1215 07/21/21 1220   BP: (!) 143/79   95/64   Pulse: 78   82   Resp: 20   20   Temp: 98.3 °F (36.8 °C)   98 °F (36.7 this visit. MRI ABDOMEN WO CONTRAST   Final Result   Limited evaluation of the liver on this noncontrast MRI. No evidence of   diffuse or focal liver disease is appreciated. CT ABDOMEN PELVIS WO CONTRAST Additional Contrast? None   Final Result   1. Air in the bladder wall and bladder lumen. Correlate with presentation   for emphysematous cystitis. 2. Nonspecific subsegmental lingular opacity could represent atelectasis and   scarring. Correlate with presentation to exclude superimposed pneumonia. 3. Slight nodular contour of the liver. MRI would better evaluate. 4. No small bowel obstruction. 5. Other findings as described. XR CHEST PORTABLE   Final Result   No acute cardiopulmonary disease. Borderline cardiomegaly without overt   failure. Outside records:    Labs, Microbiology, Radiology and pertinent results from Care everywhere, if available, were reviewed as a part ofthe consultation.       Problem list:       Patient Active Problem List   Diagnosis Code    Diabetes mellitus ([facility] [address] E11.9    Knee pain M25.569    Degenerative arthritis of knee M17.10    Other specified anemias D64.89    DKA, type 2, not at goal [facility]) E11.10    Hyperglycemia R73.9    Acute pancreatitis K85.90    Coronary artery disease involving native coronary artery of native heart without angina pectoris I25.10    Diabetes, coma, hyperosmolar (HCC) E11.01    Hypernatremia E87.0    Acute kidney injury ([facility] [address] N17.9    Abnormal LFTs R94.5    Diabetic ketoacidosis without coma associated with type 1 diabetes mellitus (HCC) E10.10    Esophagitis K20.90    Hypotension I95.9    Controlled type 1 diabetes mellitus with hyperglycemia (HCC) E10.65    Sialadenitis K11.20    Abdominal pain R10.9    Adrenal insufficiency (HCC) E27.40    Unstable angina (HCC) I20.0    Chronic pain syndrome G89.4    Constipation K59.00    Uncontrolled type 2 diabetes mellitus with hyperglycemia, with long-term current use of insulin (HCC) E11.65, Z79.4    Acute on chronic respiratory failure with hypoxia (HCC) J96.21    Mucus plugging of bronchi T17.500A    Atelectasis J98.11    Former smoker Z87.891    DM (diabetes mellitus), secondary uncontrolled (HCC) E13.65    Thrombocytopenia (HCC) D69.6    Essential hypertension I10    Hx of AKA (above knee amputation), right ([address] Z89.611    Chest pain R07.9    Coronary artery disease due to lipid rich plaque I25.10, I25.83    Hypercholesteremia E78.00    DKA, type 1, not at goal [facility]) E10.10    Acute decompensated heart failure (HCC) I50.9    Carboxyhemoglobinemia T58. 91XA    Acute respiratory failure with hypoxia (HCC) J96.01    Suspected COVID-19 virus infection Z20.822    Abnormal CT scan R93.89    Hypomagnesemia E83.42    Acute on chronic combined systolic and diastolic heart failure (HCC) I50.43    PAD (peripheral artery disease) (HCC) I73.9    Narcotic abuse (HCC) F11.10    Non-compliance Z91.19    S/P angioplasty with stent Z95.820    Acute on chronic combined systolic (congestive) and diastolic (congestive) heart failure (HCC) I50.43    Severe dehydration E86.0    Non-intractable vomiting R11.10    Pneumonia due to infectious organism J18.9    Emphysematous cystitis N30.80    Bandemia D72.825    COVID-19 vaccine series completed Z92.29    Poorly controlled type 2 diabetes mellitus (HCC) E11.65    Chronic hepatitis C without hepatic coma (HCC) B18.2    History of MRSA infection Z86.14    Prolonged Q-T interval on ECG R94.31         Please note that this chart was generated using Dragon dictation software. Although every effort was made to ensure the accuracy of this automated transcription, some errors in transcription may have occurred inadvertently. If you may need any clarification, please do not hesitate to contact me through EPIC or at the phone number provided below with my electronic signature.   Any pictures or media included in this note were obtained after taking informed verbal consent from the patient and with their approval to include those in the patient's medical record.       Osman Saldivar MD, MPH  7/21/21, 1:04 PM EDT   [facility] Infectious Disease   [address] [place] [address], [address] [place] [address]  Office: [phone]  Fax: [phone]  Clinic days:  Tuesday & Thursday

## 2021-07-22 VITALS
DIASTOLIC BLOOD PRESSURE: 69 MMHG | OXYGEN SATURATION: 93 % | SYSTOLIC BLOOD PRESSURE: 100 MMHG | HEART RATE: 89 BPM | TEMPERATURE: 98 F | RESPIRATION RATE: 18 BRPM | BODY MASS INDEX: 22.01 KG/M2 | HEIGHT: 62 IN | WEIGHT: 119.6 LBS

## 2021-07-22 LAB
ANION GAP SERPL CALCULATED.3IONS-SCNC: 8 MMOL/L (ref 3–16)
BUN BLDV-MCNC: 34 MG/DL (ref 7–20)
CALCIUM SERPL-MCNC: 8.9 MG/DL (ref 8.3–10.6)
CHLORIDE BLD-SCNC: 96 MMOL/L (ref 99–110)
CO2: 31 MMOL/L (ref 21–32)
CORTISOL - AM: 3 UG/DL (ref 4.3–22.4)
CREAT SERPL-MCNC: 0.9 MG/DL (ref 0.6–1.1)
GFR AFRICAN AMERICAN: >60
GFR NON-AFRICAN AMERICAN: >60
GLUCOSE BLD-MCNC: 118 MG/DL (ref 70–99)
GLUCOSE BLD-MCNC: 219 MG/DL (ref 70–99)
GLUCOSE BLD-MCNC: 266 MG/DL (ref 70–99)
GLUCOSE BLD-MCNC: 427 MG/DL (ref 70–99)
GLUCOSE BLD-MCNC: 557 MG/DL (ref 70–99)
HCT VFR BLD CALC: 40.3 % (ref 36–48)
HEMOGLOBIN: 12.9 G/DL (ref 12–16)
MCH RBC QN AUTO: 26 PG (ref 26–34)
MCHC RBC AUTO-ENTMCNC: 32.1 G/DL (ref 31–36)
MCV RBC AUTO: 80.9 FL (ref 80–100)
PDW BLD-RTO: 18.5 % (ref 12.4–15.4)
PERFORMED ON: ABNORMAL
PLATELET # BLD: 121 K/UL (ref 135–450)
PMV BLD AUTO: 10.9 FL (ref 5–10.5)
POTASSIUM SERPL-SCNC: 4.6 MMOL/L (ref 3.5–5.1)
RBC # BLD: 4.98 M/UL (ref 4–5.2)
SODIUM BLD-SCNC: 135 MMOL/L (ref 136–145)
T4 FREE: 1.3 NG/DL (ref 0.9–1.8)
TSH SERPL DL<=0.05 MIU/L-ACNC: 0.93 UIU/ML (ref 0.27–4.2)
WBC # BLD: 10.9 K/UL (ref 4–11)

## 2021-07-22 PROCEDURE — 94760 N-INVAS EAR/PLS OXIMETRY 1: CPT

## 2021-07-22 PROCEDURE — 2580000003 HC RX 258: Performed by: INTERNAL MEDICINE

## 2021-07-22 PROCEDURE — 80048 BASIC METABOLIC PNL TOTAL CA: CPT

## 2021-07-22 PROCEDURE — 6360000002 HC RX W HCPCS: Performed by: INTERNAL MEDICINE

## 2021-07-22 PROCEDURE — 6360000002 HC RX W HCPCS: Performed by: PHYSICIAN ASSISTANT

## 2021-07-22 PROCEDURE — 84443 ASSAY THYROID STIM HORMONE: CPT

## 2021-07-22 PROCEDURE — 6370000000 HC RX 637 (ALT 250 FOR IP): Performed by: INTERNAL MEDICINE

## 2021-07-22 PROCEDURE — 85027 COMPLETE CBC AUTOMATED: CPT

## 2021-07-22 PROCEDURE — 82088 ASSAY OF ALDOSTERONE: CPT

## 2021-07-22 PROCEDURE — 6370000000 HC RX 637 (ALT 250 FOR IP): Performed by: NURSE PRACTITIONER

## 2021-07-22 PROCEDURE — 99232 SBSQ HOSP IP/OBS MODERATE 35: CPT | Performed by: NURSE PRACTITIONER

## 2021-07-22 PROCEDURE — 2700000000 HC OXYGEN THERAPY PER DAY

## 2021-07-22 PROCEDURE — 84439 ASSAY OF FREE THYROXINE: CPT

## 2021-07-22 PROCEDURE — 82533 TOTAL CORTISOL: CPT

## 2021-07-22 RX ORDER — INSULIN LISPRO 100 [IU]/ML
10 INJECTION, SOLUTION INTRAVENOUS; SUBCUTANEOUS ONCE
Status: COMPLETED | OUTPATIENT
Start: 2021-07-22 | End: 2021-07-22

## 2021-07-22 RX ORDER — INSULIN LISPRO 100 [IU]/ML
15 INJECTION, SOLUTION INTRAVENOUS; SUBCUTANEOUS ONCE
Status: DISCONTINUED | OUTPATIENT
Start: 2021-07-22 | End: 2021-07-22

## 2021-07-22 RX ORDER — INSULIN LISPRO 100 [IU]/ML
8 INJECTION, SOLUTION INTRAVENOUS; SUBCUTANEOUS ONCE
Status: COMPLETED | OUTPATIENT
Start: 2021-07-22 | End: 2021-07-22

## 2021-07-22 RX ADMIN — ATORVASTATIN CALCIUM 10 MG: 10 TABLET, FILM COATED ORAL at 09:35

## 2021-07-22 RX ADMIN — SERTRALINE 50 MG: 50 TABLET, FILM COATED ORAL at 09:35

## 2021-07-22 RX ADMIN — FUROSEMIDE 40 MG: 40 TABLET ORAL at 09:35

## 2021-07-22 RX ADMIN — MEROPENEM 1000 MG: 1 INJECTION, POWDER, FOR SOLUTION INTRAVENOUS at 09:38

## 2021-07-22 RX ADMIN — Medication 10 ML: at 09:37

## 2021-07-22 RX ADMIN — CARVEDILOL 3.12 MG: 3.12 TABLET, FILM COATED ORAL at 09:34

## 2021-07-22 RX ADMIN — ASPIRIN 81 MG: 81 TABLET, CHEWABLE ORAL at 09:35

## 2021-07-22 RX ADMIN — INSULIN LISPRO 8 UNITS: 100 INJECTION, SOLUTION INTRAVENOUS; SUBCUTANEOUS at 03:59

## 2021-07-22 RX ADMIN — Medication 10 ML: at 09:36

## 2021-07-22 RX ADMIN — Medication 1 CAPSULE: at 09:34

## 2021-07-22 RX ADMIN — PREGABALIN 100 MG: 100 CAPSULE ORAL at 09:34

## 2021-07-22 RX ADMIN — INSULIN LISPRO 10 UNITS: 100 INJECTION, SOLUTION INTRAVENOUS; SUBCUTANEOUS at 00:58

## 2021-07-22 RX ADMIN — MEROPENEM 1000 MG: 1 INJECTION, POWDER, FOR SOLUTION INTRAVENOUS at 00:21

## 2021-07-22 RX ADMIN — TICAGRELOR 90 MG: 90 TABLET ORAL at 09:35

## 2021-07-22 RX ADMIN — METHYLPREDNISOLONE 4 MG: 4 TABLET ORAL at 09:35

## 2021-07-22 RX ADMIN — KETOROLAC TROMETHAMINE 30 MG: 30 INJECTION, SOLUTION INTRAMUSCULAR; INTRAVENOUS at 09:34

## 2021-07-22 ASSESSMENT — PAIN SCALES - GENERAL
PAINLEVEL_OUTOF10: 7
PAINLEVEL_OUTOF10: 0
PAINLEVEL_OUTOF10: 0

## 2021-07-22 ASSESSMENT — PAIN DESCRIPTION - ORIENTATION: ORIENTATION: MID

## 2021-07-22 ASSESSMENT — PAIN DESCRIPTION - LOCATION: LOCATION: ABDOMEN

## 2021-07-22 ASSESSMENT — PAIN DESCRIPTION - PAIN TYPE: TYPE: ACUTE PAIN

## 2021-07-22 NOTE — PLAN OF CARE
Problem: Skin Integrity:  Goal: Will show no infection signs and symptoms  Description: Will show no infection signs and symptoms  7/21/2021 2224 by Dali Teixeira RN  Outcome: Ongoing  7/21/2021 1120 by Lc Lauren RN  Outcome: Ongoing  Goal: Absence of new skin breakdown  Description: Absence of new skin breakdown  7/21/2021 2224 by Dali Teixeira RN  Outcome: Ongoing  7/21/2021 1120 by Lc Lauren RN  Outcome: Ongoing     Problem: Falls - Risk of:  Goal: Will remain free from falls  Description: Will remain free from falls  7/21/2021 2224 by Dali Teixeira RN  Outcome: Ongoing  7/21/2021 1120 by Lc Lauren RN  Outcome: Ongoing  Note: Patient is a high fall risk. Patient free of falls this shift. Bed low, locked and alarmed at all times. Call light and bedside table is within reach. Orange blanket on bed, arm band on wrist and fall sign posted in the room. Notified patient to ask for assistance when needed. Patient verbalized understanding.     Goal: Absence of physical injury  Description: Absence of physical injury  7/21/2021 2224 by Dali Teixeira RN  Outcome: Ongoing  7/21/2021 1120 by Lc Lauren RN  Outcome: Ongoing     Problem: Fluid Volume:  Goal: Ability to achieve a balanced intake and output will improve  Description: Ability to achieve a balanced intake and output will improve  7/21/2021 2224 by Dali Teixeira RN  Outcome: Ongoing  7/21/2021 1120 by Lc Lauren RN  Outcome: Ongoing     Problem: Serum Glucose Level - Abnormal:  Goal: Ability to maintain appropriate glucose levels has stabilized  Description: Ability to maintain appropriate glucose levels has stabilized  7/21/2021 2224 by Dali Teixeira RN  Outcome: Ongoing  7/21/2021 1120 by Lc Lauren RN  Outcome: Ongoing     Problem: Pain:  Goal: Pain level will decrease  Description: Pain level will decrease  7/21/2021 2224 by Dali Teixeira RN  Outcome: Ongoing  7/21/2021 1120 by Lc Lauren RN  Outcome: Ongoing  Goal: Control of acute pain  Description: Control of acute pain  7/21/2021 2224 by Nadya Cullen RN  Outcome: Ongoing  7/21/2021 1120 by Tereza Li RN  Outcome: Ongoing  Goal: Control of chronic pain  Description: Control of chronic pain  7/21/2021 2224 by Nadya Cullen RN  Outcome: Ongoing  7/21/2021 1120 by Tereza Li RN  Outcome: Ongoing     Problem: Physical Regulation:  Goal: Prevent transmision of infection  Description: Prevent transmision of infection  7/21/2021 2224 by Nadya Cullen RN  Outcome: Ongoing  7/21/2021 1120 by Tereza Li RN  Outcome: Ongoing  Goal: Ability to avoid or minimize complications of infection will improve  Description: Ability to avoid or minimize complications of infection will improve  7/21/2021 2224 by Nadya Cullen RN  Outcome: Ongoing  7/21/2021 1120 by Tereza Li RN  Outcome: Ongoing

## 2021-07-22 NOTE — PROGRESS NOTES
Hospitalist Progress Note      PCP: No primary care provider on file. Date of Admission: 7/16/2021    Chief Complaint: Nausea vomiting abdominal pain    Hospital Course: This 47-year-old female with past medical history of type 2 diabetes, chronic combined CHF with EF 25 to 30%, CAD, hyperlipidemia, hypertension and right BKA presented with complaints of generalized weakness, myalgia, nausea, vomiting for the duration of 3 days. She is admitted for management of dehydration, hyperglycemia, RAQUEL, left knee cellulitis, intractable nausea and vomiting. Patient with intermittent episodes of hypoglycemia and hyperglycemia. Multiple adjustments done to basal insulin. Patient seems to only tolerate 1/6 of her reported home dose insulin. Endocrinology consulted. Patient also with intermittent episodes of hypotension and hypertension. Heart failure meds have been held multiple times. Cardiolog on board     Interval history:  Patient seen and examined today   Overnight events and interval ancillary notes reviewed  Labile BG overnight; endocrine contacted for insulin dose adjustment  Patient stated that she wants to go home to take care of some business  and if not discharged she will leave A. Patient was explained in detail that she needs IV antibiotics per infectious disease recommendations and her insulin dose has to be adjusted because of her labile blood glucose. Patient stated that she understand the ramifications of leaving hospital [address] but has made up her mind and and will leave and couple of hours.     Medications:  Reviewed    Infusion Medications    sodium chloride      dextrose      sodium chloride Stopped (07/19/21 0547)     Scheduled Medications    methylPREDNISolone  4 mg Oral QAM AC    methylPREDNISolone  2 mg Oral Dinner    lidocaine 1 % injection  5 mL Intradermal Once    sodium chloride flush  5-40 mL Intravenous 2 times per day    meropenem  1,000 mg Intravenous Q8H    insulin glargine  13 Units Subcutaneous Nightly    lactobacillus  1 capsule Oral BID     furosemide  40 mg Oral Daily    insulin lispro  0-12 Units Subcutaneous TID WC    insulin lispro  0-6 Units Subcutaneous Nightly    aspirin  81 mg Oral Daily    atorvastatin  10 mg Oral Daily    carvedilol  3.125 mg Oral BID WC    [Held by provider] mirtazapine  7.5 mg Oral Nightly    pregabalin  100 mg Oral BID    sertraline  50 mg Oral Daily    ticagrelor  90 mg Oral BID    sodium chloride flush  5-40 mL Intravenous 2 times per day     PRN Meds: sodium chloride, sodium chloride flush, sodium chloride, perflutren lipid microspheres, naloxone, acetaminophen **OR** [DISCONTINUED] acetaminophen, ketorolac, diphenhydrAMINE, promethazine, glucose, dextrose, glucagon (rDNA), dextrose, sodium chloride flush, sodium chloride, polyethylene glycol, meclizine      Intake/Output Summary (Last 24 hours) at 7/22/2021 0856  Last data filed at 7/21/2021 2024  Gross per 24 hour   Intake 490 ml   Output --   Net 490 ml       Physical Exam Performed:    /80   Pulse 60   Temp 97.8 °F (36.6 °C) (Oral)   Resp 10   Ht 5' 2\" (1.575 m)   Wt 112 lb 6.4 oz (51 kg)   LMP 11/15/2017   SpO2 99%   BMI 20.56 kg/m²     General appearance: No apparent distress, appears stated age and cooperative. HEENT: Pupils equal, round, and reactive to light. Conjunctivae/corneas clear. Neck: Supple, with full range of motion. No jugular venous distention. Trachea midline. Respiratory:  Normal respiratory effort. Clear to auscultation, bilaterally without Rales/Wheezes/Rhonchi. Cardiovascular: Regular rate and rhythm with normal S1/S2 without murmurs, rubs or gallops. Abdomen: Soft, non-tender, non-distended with normal bowel sounds. Musculoskeletal: No clubbing, cyanosis or edema bilaterally. Right BKA  Skin: Skin color, texture, turgor normal.  No rashes or lesions.   Neurologic:  Neurovascularly intact without any focal sensory/motor deficits. Cranial nerves: II-XII intact, grossly non-focal.  Psychiatric: Alert and oriented, thought content appropriate, normal insight  Capillary Refill: Brisk,< 3 seconds   Peripheral Pulses: +2 palpable, equal bilaterally       Labs:   Recent Labs     07/19/21 2117 07/21/21 1705 07/22/21  0422   WBC 12.1* 9.0 10.9   HGB 13.0 13.7 12.9   HCT 40.9 42.3 40.3   PLT 94* 103* 121*     Recent Labs     07/19/21 2117 07/21/21 1705 07/22/21  0422   * 142 135*   K 3.7 4.0 4.6   CL 97* 100 96*   CO2 27 35* 31   BUN 21* 20 34*   CREATININE 0.9 0.8 0.9   CALCIUM 8.3 9.0 8.9     Recent Labs     07/19/21 2117   AST 25   ALT 10   BILITOT 0.3   ALKPHOS 109     No results for input(s): INR in the last 72 hours. No results for input(s): Nikhil Altes in the last 72 hours. Urinalysis:      Lab Results   Component Value Date    NITRU Negative 07/16/2021    WBCUA >100 07/16/2021    BACTERIA 3+ 07/16/2021    RBCUA 5-10 07/16/2021    BLOODU SMALL 07/16/2021    SPECGRAV >1.030 07/16/2021    GLUCOSEU >=1000 07/16/2021    GLUCOSEU NEGATIVE 02/14/2012       Radiology:  MRI ABDOMEN WO CONTRAST   Final Result   Limited evaluation of the liver on this noncontrast MRI. No evidence of   diffuse or focal liver disease is appreciated. CT ABDOMEN PELVIS WO CONTRAST Additional Contrast? None   Final Result   1. Air in the bladder wall and bladder lumen. Correlate with presentation   for emphysematous cystitis. 2. Nonspecific subsegmental lingular opacity could represent atelectasis and   scarring. Correlate with presentation to exclude superimposed pneumonia. 3. Slight nodular contour of the liver. MRI would better evaluate. 4. No small bowel obstruction. 5. Other findings as described. XR CHEST PORTABLE   Final Result   No acute cardiopulmonary disease. Borderline cardiomegaly without overt   failure.                  Assessment/Plan:    Active Hospital Problems    Diagnosis     Non-intractable vomiting [R11.10]     Pneumonia due to infectious organism [J18.9]     Emphysematous cystitis [N30.80]     Bandemia [D72.825]     COVID-19 vaccine series completed [Z92.29]     Poorly controlled type 2 diabetes mellitus ([facility] [E11.65]     Chronic hepatitis C without hepatic coma ([facility] [B18.2]     History of MRSA infection [Z86.14]     Prolonged Q-T interval on ECG [R94.31]     Severe dehydration [E86.0]      Severe dehydration: improved after fluid resusictation  Iv  Fluids discontinued   We will resume diuretics    Uncontrolled diabetes  on Lantus 15 twice daily with sliding scale at home   HgbA1C 11.9 on 7/16/2021   Had several episodes of hypoglycemia at 50s requiring dextrose   Endocrinology on board;appreciate their recs   lantus dose reduced     Secondary adrenal insufficiency likely  history of opiate and suboxone use. On medrol 4 mg AM and 2 mg PM   Per endocrinology pt needs to learn how to take solucortef injections for emergencies when unable to take oral medrol.      Hypotension  Intermittent  Asymptomatic  Unknown etiology at this time    RAQUEL  Resolved  Creatinine normalized    Left knee cellulitis  Not convincing  Given Ancef on admission  We will switch to Zosyn to cover for intra-abdominal pathogens    Abdominal pain with intractable nausea and vomiting  She has leukocytosis and tachycardia  Cultures NTD  It appears that the patient has cystitis    Emphysematous cystitis  CT abdomen and pelvis on 7/16/2021 showed air in the bladder wall and bladder lumen  Urine culture grew Enterobacter cloacae  Was initially given IV cefazolin for 2 days and then switched to IV Zosyn  ID ID consulted; patient switched to IV meropenem on 7/21/2021  PICC line order    Ischemic cardiomyopathy  - history of pci to mid-lad and diag  - ACC C, NYHA II  Echo on 7/16 showed EF of 25 to 30%, mild to moderate concentric left ventricular hypertrophy, hypokinetic inferolateral wall, inferior septum and anteroseptum. Akinetic apex and apical septum  - continue aspirin, statin and ticagrelor  - continue coreg 3.125 mg bid and furosemide 40 mg once daily. Hold lisinopril today  - needs to follow up with cardiology, multiple missed appointments. Discussed that she is high risk for future events (mi and stroke) given uncontrolled diabetes and smoking   - no indication for ICD as patient has not been on appropriate doses of GDMT for 90 days    Abnormal finding on CT abdomen  Nodularity in the liver  MRI does not redemonstrate pathology  DVT Prophylaxis: Lovenox  Diet: ADULT DIET; Regular; 5 carb choices (75 gm/meal);  Low Sodium (2 gm)  Adult Oral Nutrition Supplement; Diabetic Oral Supplement  Code Status: Full Code        Electronically signed by Edison Desir MD on 7/22/2021 at 8:56 AM

## 2021-07-22 NOTE — PROGRESS NOTES
Pt left AMA. AMA form signed and a copy given to pt. Peripheral IV and SHEN PICC removed. All belongings gathered and given to pt. Escorted pt to lobby via wheelchair. Hospitalists and ID made aware.

## 2021-07-22 NOTE — PROGRESS NOTES
Pt cussing at this RN saying she is going to leave AMA at some point in the early morning because \"I keep bothering her and won't let her get any sleep\". This RN kindly told pt I was just doing my job and rounding on her to see if she needed anything. Pt requested to be left alone.

## 2021-07-22 NOTE — PROGRESS NOTES
Unit PCA asked this RN to come into room and assess patient. Pt was in bed with eyes closed and mouth dropped open. She was diaphoretic and very cold. She did not respond to voice until her name was yelled multiple times and the patients shoulder was gently rubbed. The patient then jolted awake and said \"Shut the fuck up mother fuckers I'm awake! God damn get off of me and get the fuck out of my room\". She sprung forward into a sitting position in the bed. This RN informed the patient that aggressive behavior physically or verbally would absolutely not be tolerated. The patient then stated \"She didn't give a fuck\". She then claims she is going to be leaving AMA and that she wants to speak to a doctor now. Blood glucose is now down in 200s. VSS. Bed alarm engaged. Call light within reach.

## 2021-07-22 NOTE — PROGRESS NOTES
During rounds, this RN noticed the pt was kind of lethargic. Clammy, cool, diaphoretic. Bed soaked in sweat. VSS. Pt responded to touch/voice. Blood glucose 557. Provider notified. 10 units lispro administered. Will recheck blood glucose in one hour.

## 2021-07-22 NOTE — PROGRESS NOTES
[address]  HEART FAILURE  Progress Note      Admit Date 7/16/2021     Reason for Consult:      Reason for Consultation/Chief Complaint: fatigue    HPI:    Jorgito Blake is a 60 y.o. female with PMH PMH opiate abuse and IVDU on suboxone, PVD s/p r AKA, HTN, HLD, DM, CAD, PCI feb 2020, HFpEF presented to the hospital with complaints of 3 days of nausea, vomiting and diarrhea. Echo showed drop in EF to 25-30%      Subjective:  Patient is being seen for cardiomyopathy. There were no acute overnight cardiac events. Today Ms. Xuan Ross denies chest pain, shortness of breath, palpitations, feels good and tells me she is leaving today even if it is AMA      Baseline Weight: 135   Wt Readings from Last 3 Encounters:   07/21/21 112 lb 6.4 oz (51 kg)   04/30/21 143 lb 11.2 oz (65.2 kg)   09/25/20 119 lb 1.6 oz (54 kg)          NYHA Class II  Objective:   /80   Pulse 60   Temp 97.8 °F (36.6 °C) (Oral)   Resp 10   Ht 5' 2\" (1.575 m)   Wt 112 lb 6.4 oz (51 kg)   LMP 11/15/2017   SpO2 99%   BMI 20.56 kg/m²       Intake/Output Summary (Last 24 hours) at 7/22/2021 0851  Last data filed at 7/21/2021 2024  Gross per 24 hour   Intake 490 ml   Output --   Net 490 ml      Wt Readings from Last 3 Encounters:   07/21/21 112 lb 6.4 oz (51 kg)   04/30/21 143 lb 11.2 oz (65.2 kg)   09/25/20 119 lb 1.6 oz (54 kg)      In: 250 [P.O.:240;  I.V.:10]  Out: -         Physical Exam:  General Appearance:  Non-obese/Well Nourished  Respiratory:  · Resp Auscultation: Normal breath sounds without dullness  Cardiovascular:  · Auscultation: Regular rate and rhythm, normal S1S2, no m/g/r/c  · Palpation: Normal    · JVD: none  · Pedal Pulses: 1+left, Right AKA  Abdomen:  · Soft, NT, ND, + bs  Extremities:  · No Cyanosis or Clubbing  · Extremities: negative  Neurological/Psychiatric:  · Oriented to time, place, and person  · Non-anxious    MEDICATIONS:   Scheduled Meds:   Scheduled Meds:   methylPREDNISolone  4 mg Oral QAM AC    methylPREDNISolone  2 mg Oral Dinner    lidocaine 1 % injection  5 mL Intradermal Once    sodium chloride flush  5-40 mL Intravenous 2 times per day    meropenem  1,000 mg Intravenous Q8H    insulin glargine  13 Units Subcutaneous Nightly    lactobacillus  1 capsule Oral BID WC    furosemide  40 mg Oral Daily    insulin lispro  0-12 Units Subcutaneous TID WC    insulin lispro  0-6 Units Subcutaneous Nightly    aspirin  81 mg Oral Daily    atorvastatin  10 mg Oral Daily    carvedilol  3.125 mg Oral BID WC    [Held by provider] mirtazapine  7.5 mg Oral Nightly    pregabalin  100 mg Oral BID    sertraline  50 mg Oral Daily    ticagrelor  90 mg Oral BID    sodium chloride flush  5-40 mL Intravenous 2 times per day     Continuous Infusions:   sodium chloride      dextrose      sodium chloride Stopped (07/19/21 0547)     PRN Meds:.sodium chloride, sodium chloride flush, sodium chloride, perflutren lipid microspheres, naloxone, acetaminophen **OR** [DISCONTINUED] acetaminophen, ketorolac, diphenhydrAMINE, promethazine, glucose, dextrose, glucagon (rDNA), dextrose, sodium chloride flush, sodium chloride, polyethylene glycol, meclizine  Continuous Infusions:   sodium chloride      dextrose      sodium chloride Stopped (07/19/21 0547)       Intake/Output Summary (Last 24 hours) at 7/22/2021 0851  Last data filed at 7/21/2021 2024  Gross per 24 hour   Intake 490 ml   Output --   Net 490 ml       Lab Data:  CBC:   Lab Results   Component Value Date    WBC 10.9 07/22/2021    HGB 12.9 07/22/2021     07/22/2021     BMP:  Lab Results   Component Value Date     07/22/2021    K 4.6 07/22/2021    K 3.7 07/19/2021    CL 96 07/22/2021    CO2 31 07/22/2021    BUN 34 07/22/2021    CREATININE 0.9 07/22/2021    GLUCOSE 219 07/22/2021    GLUCOSE 329 05/20/2011     INR:   Lab Results   Component Value Date    INR 0.98 08/02/2020    INR 0.94 08/01/2020    INR 1.05 01/27/2011        CARDIAC LABS  ENZYMES:No results for input(s): CKMB, CKMBINDEX, TROPONINI in the last 72 hours. Invalid input(s): CKTOTAL;3  FASTING LIPID PANEL:  Lab Results   Component Value Date    HDL 34 02/20/2020    LDLCALC 64 02/20/2020    TRIG 117 02/20/2020    TSH 1.73 08/29/2020     LIVER PROFILE:  Lab Results   Component Value Date    AST 25 07/19/2021    AST 40 07/19/2021    ALT 10 07/19/2021    ALT 13 07/19/2021     BNP:   Lab Results   Component Value Date    PROBNP 4,027 07/16/2021    PROBNP 2,955 04/29/2021    PROBNP 4,591 04/27/2021     Iron Studies:    Lab Results   Component Value Date    FERRITIN 193.4 08/21/2020    FERRITIN 40.8 08/02/2020     Lab Results   Component Value Date    IRON 39 04/28/2021    TIBC 344 04/28/2021    FERRITIN 193.4 (H) 08/21/2020      Iron Deficiency Anemia:  No IV Iron Therapy:  No  2017 ACC/AHA HF Guidelines:   intravenous iron replacement in patients with New York Heart Association (NYHA) class II and III HF and iron deficiency(ferritin <100 ng/ml or 100-300 ng/ml if transferrin saturation <20%), to improve functional status and QoL. 1. WEIGHT: Admit Weight: 143 lb (64.9 kg)      Today  Weight: 112 lb 6.4 oz (51 kg)   2.  I/O     Intake/Output Summary (Last 24 hours) at 7/22/2021 0851  Last data filed at 7/21/2021 2024  Gross per 24 hour   Intake 490 ml   Output --   Net 490 ml       Cardiac Testing: Echo 7/20/21:   Echo: Left ventricular cavity size is mildly dilated with normal left ventricular   wall thickness.   Overall left ventricular systolic function appears severely reduced.   Ejection fraction is visually estimated to be 25-30%.   There is severe hypokinesis of all distal segments and apex.   Grade II diastolic dysfunction with elevated LV filling pressures.   The right ventricle is mildly enlarged.   Right ventricular systolic function is mildly reduced.   At least moderate-severe, and possibly severe, mitral regurgitation.   The aortic valve is mildly thickened/calcified but opens adequately with   normal gradients.   Mild to moderate tricuspid regurgitation with a PASP of 56 mmHg.   Trivial pulmonic regurgitation.  Mik Lazarany is a trivial pericardial effusion loculated at the right atrium.   There are large bilateral pleural effusions  Echo:  8/24/20:  Left ventricular size is mildly increased.   Left ventricular systolic function is mildly to moderately depressed with   ejection fraction estimated at 40%.   Anteroapical,anteroseptal, inferoapical hypokinesis.   Grade II diastolic dysfunction with elevated LV filling pressures.   Mild mitral regurgitation.   Inadequate tricuspid regurgitation to estimate systolic pulmonary artery   pressure. Viability:    -There is a moderate-large defect that involves the anterior wall and apex.    -There is slight improvement in the mid anterior wall at 24 hours suggesting    some viability.    -The remainder of the anterior wall and apex appears to be scar. Cath:LM         Normal     LAD       100% mid instent, 99% D1 with WILMA 1 flow                CX         normal      RCA       20% mid                                LVG       40%, anteroapical hypokinesis           LVEDP  10mmHg    Assessment/Plan:     1. ICM- continue coreg,will continue to hold ACE for now given hypotension, no Charlane Pitt due to noncompliance with f/u. Pt ok for discharge from cardiac standpoint, will f/u in chf clinic and address drop in EF  2.  Dehydration- improved, diuretics restarted        I appreciate the opportunity of cooperating in the care of this individual.    CHINA Garner - CNP, ACNP, [address] 7/22/2021, 8:51 AM  Heart Failure  The [address], [address]  Ph: [phone]      Core Measures:   · Discharge instructions:   · LVEF documented:   · ACEI for LV dysfunction:   · Smoking Cessation:

## 2021-07-23 NOTE — DISCHARGE SUMMARY
Uncontrolled diabetes: on Lantus 15 twice daily and SSI at troponin I that is mild lower involvement 1  HgbA1C 11.9 on 7/16/2021; Had several episodes of hypoglycemia ( 50s) requiring dextrose. Endocrinology was consulted and  insulin dose adjusted per the recs     Secondary adrenal insufficiency likely 2/2opiate and suboxone use. restarted on medrol 4 mg AM and 2 mg PM.  Due to some under heart  Per endocrinology pt needs to learn how to take solucortef injections for emergencies when unable to take oral medrol.      Hypotension: Resolved after fluid resuscitation    RAQUEL resolved after fluid resuscitation    Ischemic cardiomyopathy status post PCI to LAD and diagonal  Echo on 7/16 showed EF of 25 to 30%, mild to moderate concentric left ventricular hypertrophy, hypokinetic inferolateral wall, inferior septum and anteroseptum. Akinetic apex and apical septum  Cardiology was consulted and per the recs no indication for ICD as patient has not been on appropriate doses of GDMT for 90 days  continue aspirin, statin and ticagrelor  continue beta-blocker, ACE and furosemide   needs to follow up regularly with cardiology as patient has multiple missed appointments. Discussed that she is high risk for future events (mi and stroke) given uncontrolled diabetes and smoking      Abnormal finding on CT abdomen  Nodularity in the liver  MRI does not redemonstrate pathology      Significant Diagnostic Studies    Radiology:   MRI ABDOMEN WO CONTRAST   Final Result   Limited evaluation of the liver on this noncontrast MRI. No evidence of   diffuse or focal liver disease is appreciated. CT ABDOMEN PELVIS WO CONTRAST Additional Contrast? None   Final Result   1. Air in the bladder wall and bladder lumen. Correlate with presentation   for emphysematous cystitis. 2. Nonspecific subsegmental lingular opacity could represent atelectasis and   scarring. Correlate with presentation to exclude superimposed pneumonia.    3. Slight nodular contour of the liver. MRI would better evaluate. 4. No small bowel obstruction. 5. Other findings as described. XR CHEST PORTABLE   Final Result   No acute cardiopulmonary disease. Borderline cardiomegaly without overt   failure. Consults:     IP CONSULT TO ENDOCRINOLOGY  IP CONSULT TO CARDIOLOGY  IP CONSULT TO DIETITIAN  IP CONSULT TO DIABETES EDUCATOR  IP CONSULT TO INFECTIOUS DISEASES  IP CONSULT TO PHARMACY    Disposition: Home    Condition at Discharge: Stable     Signed:    Marcelino Tian MD   7/23/2021      Thank you No primary care provider on file. for the opportunity to be involved in this patient's care. If you have any questions or concerns please feel free to contact me at [phone].

## 2021-07-24 LAB — ALDOSTERONE: <3 NG/DL

## 2021-09-01 ENCOUNTER — APPOINTMENT (OUTPATIENT)
Dept: GENERAL RADIOLOGY | Age: 56
DRG: 720 | End: 2021-09-01
Payer: COMMERCIAL

## 2021-09-01 ENCOUNTER — HOSPITAL ENCOUNTER (INPATIENT)
Age: 56
LOS: 2 days | Discharge: LEFT BEFORE DISCHARGE | DRG: 720 | End: 2021-09-04
Attending: INTERNAL MEDICINE | Admitting: INTERNAL MEDICINE
Payer: COMMERCIAL

## 2021-09-01 DIAGNOSIS — R31.9 URINARY TRACT INFECTION WITH HEMATURIA, SITE UNSPECIFIED: ICD-10-CM

## 2021-09-01 DIAGNOSIS — R11.2 NAUSEA AND VOMITING, INTRACTABILITY OF VOMITING NOT SPECIFIED, UNSPECIFIED VOMITING TYPE: ICD-10-CM

## 2021-09-01 DIAGNOSIS — N39.0 URINARY TRACT INFECTION WITH HEMATURIA, SITE UNSPECIFIED: ICD-10-CM

## 2021-09-01 DIAGNOSIS — R73.9 HYPERGLYCEMIA: Primary | ICD-10-CM

## 2021-09-01 LAB
GLUCOSE BLD-MCNC: >600 MG/DL (ref 70–99)
PERFORMED ON: ABNORMAL

## 2021-09-01 PROCEDURE — 99282 EMERGENCY DEPT VISIT SF MDM: CPT

## 2021-09-01 PROCEDURE — 71045 X-RAY EXAM CHEST 1 VIEW: CPT

## 2021-09-01 RX ORDER — 0.9 % SODIUM CHLORIDE 0.9 %
2000 INTRAVENOUS SOLUTION INTRAVENOUS ONCE
Status: COMPLETED | OUTPATIENT
Start: 2021-09-01 | End: 2021-09-02

## 2021-09-01 RX ORDER — ONDANSETRON 2 MG/ML
4 INJECTION INTRAMUSCULAR; INTRAVENOUS ONCE
Status: COMPLETED | OUTPATIENT
Start: 2021-09-01 | End: 2021-09-02

## 2021-09-01 RX ORDER — MORPHINE SULFATE 4 MG/ML
4 INJECTION, SOLUTION INTRAMUSCULAR; INTRAVENOUS ONCE
Status: COMPLETED | OUTPATIENT
Start: 2021-09-01 | End: 2021-09-02

## 2021-09-02 ENCOUNTER — APPOINTMENT (OUTPATIENT)
Dept: CT IMAGING | Age: 56
DRG: 720 | End: 2021-09-02
Payer: COMMERCIAL

## 2021-09-02 ENCOUNTER — APPOINTMENT (OUTPATIENT)
Dept: GENERAL RADIOLOGY | Age: 56
DRG: 720 | End: 2021-09-02
Payer: COMMERCIAL

## 2021-09-02 PROBLEM — Z79.4 UNCONTROLLED DIABETES MELLITUS WITH HYPERGLYCEMIA, WITH LONG-TERM CURRENT USE OF INSULIN (HCC): Status: ACTIVE | Noted: 2021-09-02

## 2021-09-02 PROBLEM — E11.65 UNCONTROLLED DIABETES MELLITUS WITH HYPERGLYCEMIA, WITH LONG-TERM CURRENT USE OF INSULIN (HCC): Status: ACTIVE | Noted: 2021-09-02

## 2021-09-02 LAB
A/G RATIO: 0.6 (ref 1.1–2.2)
A/G RATIO: 0.7 (ref 1.1–2.2)
ALBUMIN SERPL-MCNC: 2.3 G/DL (ref 3.4–5)
ALBUMIN SERPL-MCNC: 3.1 G/DL (ref 3.4–5)
ALP BLD-CCNC: 115 U/L (ref 40–129)
ALP BLD-CCNC: 210 U/L (ref 40–129)
ALT SERPL-CCNC: 10 U/L (ref 10–40)
ALT SERPL-CCNC: 13 U/L (ref 10–40)
ANION GAP SERPL CALCULATED.3IONS-SCNC: 11 MMOL/L (ref 3–16)
ANION GAP SERPL CALCULATED.3IONS-SCNC: 16 MMOL/L (ref 3–16)
AST SERPL-CCNC: 14 U/L (ref 15–37)
AST SERPL-CCNC: 18 U/L (ref 15–37)
BACTERIA: ABNORMAL /HPF
BASE EXCESS VENOUS: 3.5 MMOL/L (ref -3–3)
BASOPHILS ABSOLUTE: 0 K/UL (ref 0–0.2)
BASOPHILS ABSOLUTE: 0 K/UL (ref 0–0.2)
BASOPHILS RELATIVE PERCENT: 0.1 %
BASOPHILS RELATIVE PERCENT: 0.2 %
BETA-HYDROXYBUTYRATE: 2.3 MMOL/L (ref 0–0.27)
BILIRUB SERPL-MCNC: 0.3 MG/DL (ref 0–1)
BILIRUB SERPL-MCNC: 0.8 MG/DL (ref 0–1)
BILIRUBIN URINE: NEGATIVE
BLOOD, URINE: ABNORMAL
BUN BLDV-MCNC: 24 MG/DL (ref 7–20)
BUN BLDV-MCNC: 28 MG/DL (ref 7–20)
CALCIUM SERPL-MCNC: 7.7 MG/DL (ref 8.3–10.6)
CALCIUM SERPL-MCNC: 8.8 MG/DL (ref 8.3–10.6)
CARBOXYHEMOGLOBIN: 6.4 % (ref 0–1.5)
CHLORIDE BLD-SCNC: 101 MMOL/L (ref 99–110)
CHLORIDE BLD-SCNC: 89 MMOL/L (ref 99–110)
CLARITY: ABNORMAL
CO2: 24 MMOL/L (ref 21–32)
CO2: 26 MMOL/L (ref 21–32)
COLOR: YELLOW
CORTISOL - AM: 39.3 UG/DL (ref 4.3–22.4)
CREAT SERPL-MCNC: 0.8 MG/DL (ref 0.6–1.1)
CREAT SERPL-MCNC: 0.8 MG/DL (ref 0.6–1.1)
EKG ATRIAL RATE: 111 BPM
EKG DIAGNOSIS: NORMAL
EKG P AXIS: 41 DEGREES
EKG P-R INTERVAL: 140 MS
EKG Q-T INTERVAL: 370 MS
EKG QRS DURATION: 96 MS
EKG QTC CALCULATION (BAZETT): 503 MS
EKG R AXIS: -68 DEGREES
EKG T AXIS: 55 DEGREES
EKG VENTRICULAR RATE: 111 BPM
EOSINOPHILS ABSOLUTE: 0 K/UL (ref 0–0.6)
EOSINOPHILS ABSOLUTE: 0 K/UL (ref 0–0.6)
EOSINOPHILS RELATIVE PERCENT: 0 %
EOSINOPHILS RELATIVE PERCENT: 0.1 %
EPITHELIAL CELLS, UA: 3 /HPF (ref 0–5)
ESTIMATED AVERAGE GLUCOSE: 266.1 MG/DL
GFR AFRICAN AMERICAN: >60
GFR AFRICAN AMERICAN: >60
GFR NON-AFRICAN AMERICAN: >60
GFR NON-AFRICAN AMERICAN: >60
GLOBULIN: 3.9 G/DL
GLOBULIN: 4.7 G/DL
GLUCOSE BLD-MCNC: 106 MG/DL (ref 70–99)
GLUCOSE BLD-MCNC: 133 MG/DL (ref 70–99)
GLUCOSE BLD-MCNC: 138 MG/DL (ref 70–99)
GLUCOSE BLD-MCNC: 363 MG/DL (ref 70–99)
GLUCOSE BLD-MCNC: 41 MG/DL (ref 70–99)
GLUCOSE BLD-MCNC: 473 MG/DL (ref 70–99)
GLUCOSE BLD-MCNC: 538 MG/DL (ref 70–99)
GLUCOSE BLD-MCNC: 542 MG/DL (ref 70–99)
GLUCOSE BLD-MCNC: 667 MG/DL (ref 70–99)
GLUCOSE BLD-MCNC: 69 MG/DL (ref 70–99)
GLUCOSE BLD-MCNC: 78 MG/DL (ref 70–99)
GLUCOSE URINE: >=1000 MG/DL
HBA1C MFR BLD: 10.9 %
HCO3 VENOUS: 27.1 MMOL/L (ref 23–29)
HCT VFR BLD CALC: 35.8 % (ref 36–48)
HCT VFR BLD CALC: 42.3 % (ref 36–48)
HEMOGLOBIN: 11.4 G/DL (ref 12–16)
HEMOGLOBIN: 13.7 G/DL (ref 12–16)
HYALINE CASTS: 6 /LPF (ref 0–8)
KETONES, URINE: 15 MG/DL
LACTIC ACID, SEPSIS: 2.2 MMOL/L (ref 0.4–1.9)
LACTIC ACID, SEPSIS: 3.4 MMOL/L (ref 0.4–1.9)
LACTIC ACID: 1.2 MMOL/L (ref 0.4–2)
LACTIC ACID: 2.6 MMOL/L (ref 0.4–2)
LACTIC ACID: 2.8 MMOL/L (ref 0.4–2)
LACTIC ACID: 5.4 MMOL/L (ref 0.4–2)
LEUKOCYTE ESTERASE, URINE: ABNORMAL
LIPASE: 8 U/L (ref 13–60)
LYMPHOCYTES ABSOLUTE: 0.4 K/UL (ref 1–5.1)
LYMPHOCYTES ABSOLUTE: 1.2 K/UL (ref 1–5.1)
LYMPHOCYTES RELATIVE PERCENT: 3.1 %
LYMPHOCYTES RELATIVE PERCENT: 6.9 %
MAGNESIUM: 1.6 MG/DL (ref 1.8–2.4)
MCH RBC QN AUTO: 27.1 PG (ref 26–34)
MCH RBC QN AUTO: 27.2 PG (ref 26–34)
MCHC RBC AUTO-ENTMCNC: 31.9 G/DL (ref 31–36)
MCHC RBC AUTO-ENTMCNC: 32.4 G/DL (ref 31–36)
MCV RBC AUTO: 83.9 FL (ref 80–100)
MCV RBC AUTO: 85.1 FL (ref 80–100)
METHEMOGLOBIN VENOUS: 0 %
MICROSCOPIC EXAMINATION: YES
MONOCYTES ABSOLUTE: 0.7 K/UL (ref 0–1.3)
MONOCYTES ABSOLUTE: 1.3 K/UL (ref 0–1.3)
MONOCYTES RELATIVE PERCENT: 4.9 %
MONOCYTES RELATIVE PERCENT: 7.4 %
NEUTROPHILS ABSOLUTE: 12.9 K/UL (ref 1.7–7.7)
NEUTROPHILS ABSOLUTE: 15.4 K/UL (ref 1.7–7.7)
NEUTROPHILS RELATIVE PERCENT: 85.5 %
NEUTROPHILS RELATIVE PERCENT: 91.8 %
NITRITE, URINE: NEGATIVE
O2 CONTENT, VEN: 18 VOL %
O2 SAT, VEN: 99 %
O2 THERAPY: ABNORMAL
PCO2, VEN: 37.2 MMHG (ref 40–50)
PDW BLD-RTO: 16.8 % (ref 12.4–15.4)
PDW BLD-RTO: 17 % (ref 12.4–15.4)
PERFORMED ON: ABNORMAL
PERFORMED ON: NORMAL
PH UA: 5 (ref 5–8)
PH VENOUS: 7.47 (ref 7.35–7.45)
PLATELET # BLD: 110 K/UL (ref 135–450)
PLATELET # BLD: 98 K/UL (ref 135–450)
PMV BLD AUTO: 11.5 FL (ref 5–10.5)
PMV BLD AUTO: 11.8 FL (ref 5–10.5)
PO2, VEN: 165 MMHG (ref 25–40)
POTASSIUM REFLEX MAGNESIUM: 3.7 MMOL/L (ref 3.5–5.1)
POTASSIUM SERPL-SCNC: 3 MMOL/L (ref 3.5–5.1)
PRO-BNP: 5137 PG/ML (ref 0–124)
PROTEIN UA: 100 MG/DL
RBC # BLD: 4.21 M/UL (ref 4–5.2)
RBC # BLD: 5.04 M/UL (ref 4–5.2)
RBC UA: ABNORMAL /HPF (ref 0–4)
REPORT: NORMAL
SODIUM BLD-SCNC: 131 MMOL/L (ref 136–145)
SODIUM BLD-SCNC: 136 MMOL/L (ref 136–145)
SPECIFIC GRAVITY UA: 1.02 (ref 1–1.03)
TCO2 CALC VENOUS: 63 MMOL/L
TOTAL PROTEIN: 6.2 G/DL (ref 6.4–8.2)
TOTAL PROTEIN: 7.8 G/DL (ref 6.4–8.2)
TROPONIN: <0.01 NG/ML
TROPONIN: <0.01 NG/ML
TSH REFLEX: 0.53 UIU/ML (ref 0.27–4.2)
URINE REFLEX TO CULTURE: YES
URINE TYPE: ABNORMAL
UROBILINOGEN, URINE: 1 E.U./DL
WBC # BLD: 14.1 K/UL (ref 4–11)
WBC # BLD: 18 K/UL (ref 4–11)
WBC UA: >900 /HPF (ref 0–5)

## 2021-09-02 PROCEDURE — 83690 ASSAY OF LIPASE: CPT

## 2021-09-02 PROCEDURE — 2580000003 HC RX 258: Performed by: INTERNAL MEDICINE

## 2021-09-02 PROCEDURE — 6360000002 HC RX W HCPCS: Performed by: GENERAL ACUTE CARE HOSPITAL

## 2021-09-02 PROCEDURE — 2000000000 HC ICU R&B

## 2021-09-02 PROCEDURE — 93005 ELECTROCARDIOGRAM TRACING: CPT | Performed by: GENERAL ACUTE CARE HOSPITAL

## 2021-09-02 PROCEDURE — 6370000000 HC RX 637 (ALT 250 FOR IP): Performed by: INTERNAL MEDICINE

## 2021-09-02 PROCEDURE — 87086 URINE CULTURE/COLONY COUNT: CPT

## 2021-09-02 PROCEDURE — 83605 ASSAY OF LACTIC ACID: CPT

## 2021-09-02 PROCEDURE — U0005 INFEC AGEN DETEC AMPLI PROBE: HCPCS

## 2021-09-02 PROCEDURE — 6360000002 HC RX W HCPCS: Performed by: INTERNAL MEDICINE

## 2021-09-02 PROCEDURE — 96374 THER/PROPH/DIAG INJ IV PUSH: CPT

## 2021-09-02 PROCEDURE — 96361 HYDRATE IV INFUSION ADD-ON: CPT

## 2021-09-02 PROCEDURE — 83880 ASSAY OF NATRIURETIC PEPTIDE: CPT

## 2021-09-02 PROCEDURE — 2500000003 HC RX 250 WO HCPCS: Performed by: INTERNAL MEDICINE

## 2021-09-02 PROCEDURE — 87150 DNA/RNA AMPLIFIED PROBE: CPT

## 2021-09-02 PROCEDURE — 85025 COMPLETE CBC W/AUTO DIFF WBC: CPT

## 2021-09-02 PROCEDURE — 71045 X-RAY EXAM CHEST 1 VIEW: CPT

## 2021-09-02 PROCEDURE — 93010 ELECTROCARDIOGRAM REPORT: CPT | Performed by: INTERNAL MEDICINE

## 2021-09-02 PROCEDURE — 6360000004 HC RX CONTRAST MEDICATION: Performed by: GENERAL ACUTE CARE HOSPITAL

## 2021-09-02 PROCEDURE — 36415 COLL VENOUS BLD VENIPUNCTURE: CPT

## 2021-09-02 PROCEDURE — 87077 CULTURE AEROBIC IDENTIFY: CPT

## 2021-09-02 PROCEDURE — 83735 ASSAY OF MAGNESIUM: CPT

## 2021-09-02 PROCEDURE — 87186 SC STD MICRODIL/AGAR DIL: CPT

## 2021-09-02 PROCEDURE — 84484 ASSAY OF TROPONIN QUANT: CPT

## 2021-09-02 PROCEDURE — U0003 INFECTIOUS AGENT DETECTION BY NUCLEIC ACID (DNA OR RNA); SEVERE ACUTE RESPIRATORY SYNDROME CORONAVIRUS 2 (SARS-COV-2) (CORONAVIRUS DISEASE [COVID-19]), AMPLIFIED PROBE TECHNIQUE, MAKING USE OF HIGH THROUGHPUT TECHNOLOGIES AS DESCRIBED BY CMS-2020-01-R: HCPCS

## 2021-09-02 PROCEDURE — 82803 BLOOD GASES ANY COMBINATION: CPT

## 2021-09-02 PROCEDURE — 84443 ASSAY THYROID STIM HORMONE: CPT

## 2021-09-02 PROCEDURE — XW033N5 INTRODUCTION OF MEROPENEM-VABORBACTAM ANTI-INFECTIVE INTO PERIPHERAL VEIN, PERCUTANEOUS APPROACH, NEW TECHNOLOGY GROUP 5: ICD-10-PCS | Performed by: INTERNAL MEDICINE

## 2021-09-02 PROCEDURE — 74177 CT ABD & PELVIS W/CONTRAST: CPT

## 2021-09-02 PROCEDURE — 2500000003 HC RX 250 WO HCPCS: Performed by: GENERAL ACUTE CARE HOSPITAL

## 2021-09-02 PROCEDURE — 2580000003 HC RX 258: Performed by: GENERAL ACUTE CARE HOSPITAL

## 2021-09-02 PROCEDURE — 82010 KETONE BODYS QUAN: CPT

## 2021-09-02 PROCEDURE — 81001 URINALYSIS AUTO W/SCOPE: CPT

## 2021-09-02 PROCEDURE — 99291 CRITICAL CARE FIRST HOUR: CPT | Performed by: INTERNAL MEDICINE

## 2021-09-02 PROCEDURE — 82533 TOTAL CORTISOL: CPT

## 2021-09-02 PROCEDURE — 94761 N-INVAS EAR/PLS OXIMETRY MLT: CPT

## 2021-09-02 PROCEDURE — 80053 COMPREHEN METABOLIC PANEL: CPT

## 2021-09-02 PROCEDURE — 83036 HEMOGLOBIN GLYCOSYLATED A1C: CPT

## 2021-09-02 PROCEDURE — 87040 BLOOD CULTURE FOR BACTERIA: CPT

## 2021-09-02 PROCEDURE — 96375 TX/PRO/DX INJ NEW DRUG ADDON: CPT

## 2021-09-02 PROCEDURE — 6370000000 HC RX 637 (ALT 250 FOR IP): Performed by: GENERAL ACUTE CARE HOSPITAL

## 2021-09-02 RX ORDER — INSULIN LISPRO 100 [IU]/ML
0-6 INJECTION, SOLUTION INTRAVENOUS; SUBCUTANEOUS
Status: DISCONTINUED | OUTPATIENT
Start: 2021-09-02 | End: 2021-09-04 | Stop reason: HOSPADM

## 2021-09-02 RX ORDER — ACETAMINOPHEN 650 MG/1
650 SUPPOSITORY RECTAL EVERY 6 HOURS PRN
Status: DISCONTINUED | OUTPATIENT
Start: 2021-09-02 | End: 2021-09-04 | Stop reason: HOSPADM

## 2021-09-02 RX ORDER — INSULIN LISPRO 100 [IU]/ML
0-12 INJECTION, SOLUTION INTRAVENOUS; SUBCUTANEOUS
Status: DISCONTINUED | OUTPATIENT
Start: 2021-09-02 | End: 2021-09-02

## 2021-09-02 RX ORDER — INSULIN LISPRO 100 [IU]/ML
0-18 INJECTION, SOLUTION INTRAVENOUS; SUBCUTANEOUS
Status: DISCONTINUED | OUTPATIENT
Start: 2021-09-02 | End: 2021-09-02

## 2021-09-02 RX ORDER — INSULIN LISPRO 100 [IU]/ML
10 INJECTION, SOLUTION INTRAVENOUS; SUBCUTANEOUS
Status: DISCONTINUED | OUTPATIENT
Start: 2021-09-02 | End: 2021-09-02

## 2021-09-02 RX ORDER — INSULIN LISPRO 100 [IU]/ML
0-6 INJECTION, SOLUTION INTRAVENOUS; SUBCUTANEOUS NIGHTLY
Status: DISCONTINUED | OUTPATIENT
Start: 2021-09-02 | End: 2021-09-02

## 2021-09-02 RX ORDER — ASPIRIN 81 MG/1
81 TABLET ORAL DAILY
Status: DISCONTINUED | OUTPATIENT
Start: 2021-09-02 | End: 2021-09-04 | Stop reason: HOSPADM

## 2021-09-02 RX ORDER — INSULIN LISPRO 100 [IU]/ML
0-9 INJECTION, SOLUTION INTRAVENOUS; SUBCUTANEOUS NIGHTLY
Status: DISCONTINUED | OUTPATIENT
Start: 2021-09-02 | End: 2021-09-02

## 2021-09-02 RX ORDER — SODIUM CHLORIDE 0.9 % (FLUSH) 0.9 %
5-40 SYRINGE (ML) INJECTION EVERY 12 HOURS SCHEDULED
Status: DISCONTINUED | OUTPATIENT
Start: 2021-09-02 | End: 2021-09-04 | Stop reason: HOSPADM

## 2021-09-02 RX ORDER — NICOTINE POLACRILEX 4 MG
15 LOZENGE BUCCAL PRN
Status: DISCONTINUED | OUTPATIENT
Start: 2021-09-02 | End: 2021-09-04 | Stop reason: HOSPADM

## 2021-09-02 RX ORDER — DEXTROSE MONOHYDRATE 25 G/50ML
12.5 INJECTION, SOLUTION INTRAVENOUS PRN
Status: DISCONTINUED | OUTPATIENT
Start: 2021-09-02 | End: 2021-09-02 | Stop reason: SDUPTHER

## 2021-09-02 RX ORDER — SODIUM CHLORIDE 9 MG/ML
INJECTION, SOLUTION INTRAVENOUS
Status: DISCONTINUED
Start: 2021-09-02 | End: 2021-09-02

## 2021-09-02 RX ORDER — SODIUM CHLORIDE 9 MG/ML
INJECTION, SOLUTION INTRAVENOUS CONTINUOUS
Status: DISCONTINUED | OUTPATIENT
Start: 2021-09-02 | End: 2021-09-03

## 2021-09-02 RX ORDER — MORPHINE SULFATE 2 MG/ML
2 INJECTION, SOLUTION INTRAMUSCULAR; INTRAVENOUS EVERY 4 HOURS PRN
Status: DISCONTINUED | OUTPATIENT
Start: 2021-09-02 | End: 2021-09-04 | Stop reason: HOSPADM

## 2021-09-02 RX ORDER — MIRTAZAPINE 15 MG/1
7.5 TABLET, FILM COATED ORAL NIGHTLY
Status: DISCONTINUED | OUTPATIENT
Start: 2021-09-02 | End: 2021-09-04 | Stop reason: HOSPADM

## 2021-09-02 RX ORDER — POLYETHYLENE GLYCOL 3350 17 G/17G
17 POWDER, FOR SOLUTION ORAL DAILY PRN
Status: DISCONTINUED | OUTPATIENT
Start: 2021-09-02 | End: 2021-09-04 | Stop reason: HOSPADM

## 2021-09-02 RX ORDER — ACETAMINOPHEN 325 MG/1
650 TABLET ORAL EVERY 6 HOURS PRN
Status: DISCONTINUED | OUTPATIENT
Start: 2021-09-02 | End: 2021-09-04 | Stop reason: HOSPADM

## 2021-09-02 RX ORDER — DEXTROSE MONOHYDRATE 50 MG/ML
100 INJECTION, SOLUTION INTRAVENOUS PRN
Status: DISCONTINUED | OUTPATIENT
Start: 2021-09-02 | End: 2021-09-04 | Stop reason: HOSPADM

## 2021-09-02 RX ORDER — INSULIN LISPRO 100 [IU]/ML
0-3 INJECTION, SOLUTION INTRAVENOUS; SUBCUTANEOUS NIGHTLY
Status: DISCONTINUED | OUTPATIENT
Start: 2021-09-02 | End: 2021-09-04 | Stop reason: HOSPADM

## 2021-09-02 RX ORDER — SODIUM CHLORIDE, SODIUM LACTATE, POTASSIUM CHLORIDE, CALCIUM CHLORIDE 600; 310; 30; 20 MG/100ML; MG/100ML; MG/100ML; MG/100ML
INJECTION, SOLUTION INTRAVENOUS CONTINUOUS
Status: DISCONTINUED | OUTPATIENT
Start: 2021-09-02 | End: 2021-09-02

## 2021-09-02 RX ORDER — POTASSIUM CHLORIDE 7.45 MG/ML
40 INJECTION INTRAVENOUS ONCE
Status: DISCONTINUED | OUTPATIENT
Start: 2021-09-02 | End: 2021-09-04 | Stop reason: HOSPADM

## 2021-09-02 RX ORDER — MAGNESIUM SULFATE 1 G/100ML
1000 INJECTION INTRAVENOUS PRN
Status: DISCONTINUED | OUTPATIENT
Start: 2021-09-02 | End: 2021-09-04 | Stop reason: HOSPADM

## 2021-09-02 RX ORDER — MECLIZINE HCL 12.5 MG/1
12.5 TABLET ORAL 3 TIMES DAILY PRN
Status: DISCONTINUED | OUTPATIENT
Start: 2021-09-02 | End: 2021-09-04 | Stop reason: HOSPADM

## 2021-09-02 RX ORDER — DEXTROSE MONOHYDRATE 25 G/50ML
12.5 INJECTION, SOLUTION INTRAVENOUS PRN
Status: DISCONTINUED | OUTPATIENT
Start: 2021-09-02 | End: 2021-09-04 | Stop reason: HOSPADM

## 2021-09-02 RX ORDER — SODIUM CHLORIDE 0.9 % (FLUSH) 0.9 %
10 SYRINGE (ML) INJECTION PRN
Status: DISCONTINUED | OUTPATIENT
Start: 2021-09-02 | End: 2021-09-04 | Stop reason: HOSPADM

## 2021-09-02 RX ORDER — PROMETHAZINE HYDROCHLORIDE 25 MG/ML
6.25 INJECTION, SOLUTION INTRAMUSCULAR; INTRAVENOUS EVERY 6 HOURS PRN
Status: DISCONTINUED | OUTPATIENT
Start: 2021-09-02 | End: 2021-09-04 | Stop reason: HOSPADM

## 2021-09-02 RX ORDER — POTASSIUM CHLORIDE 20 MEQ/1
40 TABLET, EXTENDED RELEASE ORAL PRN
Status: DISCONTINUED | OUTPATIENT
Start: 2021-09-02 | End: 2021-09-04 | Stop reason: HOSPADM

## 2021-09-02 RX ORDER — ATORVASTATIN CALCIUM 40 MG/1
40 TABLET, FILM COATED ORAL NIGHTLY
Status: DISCONTINUED | OUTPATIENT
Start: 2021-09-02 | End: 2021-09-04 | Stop reason: HOSPADM

## 2021-09-02 RX ORDER — INSULIN LISPRO 100 [IU]/ML
0-3 INJECTION, SOLUTION INTRAVENOUS; SUBCUTANEOUS NIGHTLY
Status: CANCELLED | OUTPATIENT
Start: 2021-09-02

## 2021-09-02 RX ORDER — 0.9 % SODIUM CHLORIDE 0.9 %
2000 INTRAVENOUS SOLUTION INTRAVENOUS ONCE
Status: COMPLETED | OUTPATIENT
Start: 2021-09-02 | End: 2021-09-02

## 2021-09-02 RX ORDER — INSULIN LISPRO 100 [IU]/ML
0-6 INJECTION, SOLUTION INTRAVENOUS; SUBCUTANEOUS
Status: CANCELLED | OUTPATIENT
Start: 2021-09-02

## 2021-09-02 RX ORDER — INSULIN LISPRO 100 [IU]/ML
20 INJECTION, SOLUTION INTRAVENOUS; SUBCUTANEOUS ONCE
Status: COMPLETED | OUTPATIENT
Start: 2021-09-02 | End: 2021-09-02

## 2021-09-02 RX ORDER — POTASSIUM BICARBONATE 25 MEQ/1
50 TABLET, EFFERVESCENT ORAL
Status: DISPENSED | OUTPATIENT
Start: 2021-09-02 | End: 2021-09-02

## 2021-09-02 RX ORDER — CARVEDILOL 3.12 MG/1
3.12 TABLET ORAL 2 TIMES DAILY WITH MEALS
Status: DISCONTINUED | OUTPATIENT
Start: 2021-09-02 | End: 2021-09-04 | Stop reason: HOSPADM

## 2021-09-02 RX ORDER — POTASSIUM CHLORIDE 7.45 MG/ML
10 INJECTION INTRAVENOUS PRN
Status: DISCONTINUED | OUTPATIENT
Start: 2021-09-02 | End: 2021-09-04 | Stop reason: HOSPADM

## 2021-09-02 RX ORDER — LISINOPRIL 5 MG/1
5 TABLET ORAL DAILY
Status: DISCONTINUED | OUTPATIENT
Start: 2021-09-02 | End: 2021-09-04 | Stop reason: HOSPADM

## 2021-09-02 RX ORDER — PROMETHAZINE HYDROCHLORIDE 25 MG/ML
12.5 INJECTION, SOLUTION INTRAMUSCULAR; INTRAVENOUS ONCE
Status: COMPLETED | OUTPATIENT
Start: 2021-09-02 | End: 2021-09-02

## 2021-09-02 RX ORDER — MAGNESIUM SULFATE IN WATER 40 MG/ML
2000 INJECTION, SOLUTION INTRAVENOUS ONCE
Status: COMPLETED | OUTPATIENT
Start: 2021-09-02 | End: 2021-09-02

## 2021-09-02 RX ORDER — SODIUM CHLORIDE 9 MG/ML
25 INJECTION, SOLUTION INTRAVENOUS PRN
Status: DISCONTINUED | OUTPATIENT
Start: 2021-09-02 | End: 2021-09-03

## 2021-09-02 RX ORDER — NICOTINE POLACRILEX 4 MG
15 LOZENGE BUCCAL PRN
Status: DISCONTINUED | OUTPATIENT
Start: 2021-09-02 | End: 2021-09-02 | Stop reason: SDUPTHER

## 2021-09-02 RX ADMIN — Medication 10 ML: at 21:11

## 2021-09-02 RX ADMIN — INSULIN LISPRO 18 UNITS: 100 INJECTION, SOLUTION INTRAVENOUS; SUBCUTANEOUS at 12:19

## 2021-09-02 RX ADMIN — POTASSIUM CHLORIDE 10 MEQ: 7.46 INJECTION, SOLUTION INTRAVENOUS at 23:30

## 2021-09-02 RX ADMIN — MORPHINE SULFATE 2 MG: 2 INJECTION, SOLUTION INTRAMUSCULAR; INTRAVENOUS at 03:36

## 2021-09-02 RX ADMIN — FAMOTIDINE 20 MG: 10 INJECTION, SOLUTION INTRAVENOUS at 00:49

## 2021-09-02 RX ADMIN — Medication 2 MCG/MIN: at 18:04

## 2021-09-02 RX ADMIN — CARVEDILOL 3.12 MG: 3.12 TABLET, FILM COATED ORAL at 10:51

## 2021-09-02 RX ADMIN — Medication 1000 MG: at 02:19

## 2021-09-02 RX ADMIN — Medication 10 ML: at 10:52

## 2021-09-02 RX ADMIN — INSULIN LISPRO 10 UNITS: 100 INJECTION, SOLUTION INTRAVENOUS; SUBCUTANEOUS at 14:11

## 2021-09-02 RX ADMIN — SODIUM CHLORIDE: 9 INJECTION, SOLUTION INTRAVENOUS at 21:20

## 2021-09-02 RX ADMIN — INSULIN HUMAN 10 UNITS: 100 INJECTION, SOLUTION PARENTERAL at 02:45

## 2021-09-02 RX ADMIN — POTASSIUM CHLORIDE 10 MEQ: 7.46 INJECTION, SOLUTION INTRAVENOUS at 21:58

## 2021-09-02 RX ADMIN — INSULIN GLARGINE 20 UNITS: 100 INJECTION, SOLUTION SUBCUTANEOUS at 12:23

## 2021-09-02 RX ADMIN — MECLIZINE 12.5 MG: 12.5 TABLET ORAL at 06:33

## 2021-09-02 RX ADMIN — PROMETHAZINE HYDROCHLORIDE 6.25 MG: 25 INJECTION INTRAMUSCULAR; INTRAVENOUS at 12:25

## 2021-09-02 RX ADMIN — PROMETHAZINE HYDROCHLORIDE 12.5 MG: 25 INJECTION INTRAMUSCULAR; INTRAVENOUS at 03:37

## 2021-09-02 RX ADMIN — TICAGRELOR 90 MG: 90 TABLET ORAL at 21:05

## 2021-09-02 RX ADMIN — SODIUM CHLORIDE 25 ML: 9 INJECTION, SOLUTION INTRAVENOUS at 06:44

## 2021-09-02 RX ADMIN — INSULIN LISPRO 10 UNITS: 100 INJECTION, SOLUTION INTRAVENOUS; SUBCUTANEOUS at 06:39

## 2021-09-02 RX ADMIN — SODIUM CHLORIDE 1000 ML: 9 INJECTION, SOLUTION INTRAVENOUS at 17:56

## 2021-09-02 RX ADMIN — SODIUM CHLORIDE, POTASSIUM CHLORIDE, SODIUM LACTATE AND CALCIUM CHLORIDE: 600; 310; 30; 20 INJECTION, SOLUTION INTRAVENOUS at 03:36

## 2021-09-02 RX ADMIN — PREGABALIN 100 MG: 25 CAPSULE ORAL at 10:50

## 2021-09-02 RX ADMIN — INSULIN GLARGINE 15 UNITS: 100 INJECTION, SOLUTION SUBCUTANEOUS at 06:38

## 2021-09-02 RX ADMIN — MECLIZINE 12.5 MG: 12.5 TABLET ORAL at 21:53

## 2021-09-02 RX ADMIN — MORPHINE SULFATE 2 MG: 2 INJECTION, SOLUTION INTRAMUSCULAR; INTRAVENOUS at 10:50

## 2021-09-02 RX ADMIN — INSULIN LISPRO 10 UNITS: 100 INJECTION, SOLUTION INTRAVENOUS; SUBCUTANEOUS at 12:20

## 2021-09-02 RX ADMIN — MEROPENEM 1000 MG: 1 INJECTION, POWDER, FOR SOLUTION INTRAVENOUS at 21:04

## 2021-09-02 RX ADMIN — PIPERACILLIN AND TAZOBACTAM 3375 MG: 3; .375 INJECTION, POWDER, LYOPHILIZED, FOR SOLUTION INTRAVENOUS at 06:46

## 2021-09-02 RX ADMIN — ATORVASTATIN CALCIUM 40 MG: 40 TABLET, FILM COATED ORAL at 21:05

## 2021-09-02 RX ADMIN — ENOXAPARIN SODIUM 40 MG: 40 INJECTION SUBCUTANEOUS at 10:50

## 2021-09-02 RX ADMIN — LISINOPRIL 5 MG: 5 TABLET ORAL at 10:51

## 2021-09-02 RX ADMIN — ASPIRIN 81 MG: 81 TABLET, COATED ORAL at 10:51

## 2021-09-02 RX ADMIN — MORPHINE SULFATE 4 MG: 4 INJECTION INTRAVENOUS at 00:49

## 2021-09-02 RX ADMIN — PIPERACILLIN AND TAZOBACTAM 3375 MG: 3; .375 INJECTION, POWDER, LYOPHILIZED, FOR SOLUTION INTRAVENOUS at 14:30

## 2021-09-02 RX ADMIN — MAGNESIUM SULFATE HEPTAHYDRATE 2000 MG: 40 INJECTION, SOLUTION INTRAVENOUS at 19:35

## 2021-09-02 RX ADMIN — IOPAMIDOL 75 ML: 755 INJECTION, SOLUTION INTRAVENOUS at 02:15

## 2021-09-02 RX ADMIN — PREGABALIN 100 MG: 25 CAPSULE ORAL at 21:05

## 2021-09-02 RX ADMIN — SODIUM CHLORIDE 2000 ML: 9 INJECTION, SOLUTION INTRAVENOUS at 01:00

## 2021-09-02 RX ADMIN — ONDANSETRON 4 MG: 2 INJECTION INTRAMUSCULAR; INTRAVENOUS at 00:47

## 2021-09-02 RX ADMIN — MIRTAZAPINE 7.5 MG: 15 TABLET, FILM COATED ORAL at 21:06

## 2021-09-02 RX ADMIN — SODIUM CHLORIDE: 9 INJECTION, SOLUTION INTRAVENOUS at 17:08

## 2021-09-02 RX ADMIN — Medication 15 G: at 16:10

## 2021-09-02 RX ADMIN — TICAGRELOR 90 MG: 90 TABLET ORAL at 14:07

## 2021-09-02 RX ADMIN — DEXTROSE MONOHYDRATE 12.5 G: 25 INJECTION, SOLUTION INTRAVENOUS at 17:08

## 2021-09-02 ASSESSMENT — PAIN DESCRIPTION - ORIENTATION
ORIENTATION: RIGHT;LEFT
ORIENTATION: RIGHT;LEFT

## 2021-09-02 ASSESSMENT — PAIN DESCRIPTION - ONSET
ONSET: ON-GOING
ONSET: ON-GOING

## 2021-09-02 ASSESSMENT — PAIN DESCRIPTION - PROGRESSION
CLINICAL_PROGRESSION: NOT CHANGED

## 2021-09-02 ASSESSMENT — ENCOUNTER SYMPTOMS
WHEEZING: 0
CONSTIPATION: 0
DIARRHEA: 1
CHEST TIGHTNESS: 0
SHORTNESS OF BREATH: 0
BACK PAIN: 0
COUGH: 0
BLOOD IN STOOL: 0
EYE PAIN: 0
NAUSEA: 1
VOMITING: 0
ABDOMINAL PAIN: 1
SORE THROAT: 0

## 2021-09-02 ASSESSMENT — PAIN DESCRIPTION - LOCATION
LOCATION: ABDOMEN
LOCATION: ABDOMEN

## 2021-09-02 ASSESSMENT — PAIN SCALES - GENERAL
PAINLEVEL_OUTOF10: 0
PAINLEVEL_OUTOF10: 0
PAINLEVEL_OUTOF10: 5
PAINLEVEL_OUTOF10: 9
PAINLEVEL_OUTOF10: 7
PAINLEVEL_OUTOF10: 0
PAINLEVEL_OUTOF10: 0
PAINLEVEL_OUTOF10: 9
PAINLEVEL_OUTOF10: 0
PAINLEVEL_OUTOF10: 9
PAINLEVEL_OUTOF10: 10
PAINLEVEL_OUTOF10: 0

## 2021-09-02 ASSESSMENT — PAIN DESCRIPTION - DESCRIPTORS
DESCRIPTORS: ACHING
DESCRIPTORS: ACHING

## 2021-09-02 ASSESSMENT — PAIN DESCRIPTION - FREQUENCY
FREQUENCY: CONTINUOUS
FREQUENCY: CONTINUOUS

## 2021-09-02 ASSESSMENT — PAIN - FUNCTIONAL ASSESSMENT
PAIN_FUNCTIONAL_ASSESSMENT: PREVENTS OR INTERFERES SOME ACTIVE ACTIVITIES AND ADLS
PAIN_FUNCTIONAL_ASSESSMENT: PREVENTS OR INTERFERES SOME ACTIVE ACTIVITIES AND ADLS

## 2021-09-02 ASSESSMENT — PAIN DESCRIPTION - PAIN TYPE
TYPE: ACUTE PAIN
TYPE: ACUTE PAIN

## 2021-09-02 NOTE — PROGRESS NOTES
4 Eyes Skin Assessment     NAME:  Jose Roberto Vitale  YOB: 1965  MEDICAL RECORD NUMBER:  [id number]    The patient is being assess for  Admission    I agree that 2 RN's have performed a thorough Head to Toe Skin Assessment on the patient. ALL assessment sites listed below have been assessed. Areas assessed by both nurses:    Head, Face, Ears, Shoulders, Back, Chest, Arms, Elbows, Hands, Sacrum. Buttock, Coccyx, Ischium and Legs. Feet and Heels        Does the Patient have a Wound?  No noted wound(s)       Dejon Prevention initiated:  Yes   Wound Care Orders initiated:  NA    Pressure Injury (Stage 3,4, Unstageable, DTI, NWPT, and Complex wounds) if present place consult order under [de-identified] NA    New and Established Ostomies if present place consult order under : NA      Nurse 1 eSignature: Electronically signed by Ilene Reagan RN on 9/2/21 at 7:07 AM EDT    **SHARE this note so that the co-signing nurse is able to place an eSignature**    Nurse 2 eSignature: Electronically signed by Jose De Jesus Hill RN on 9/2/21 at 7:08 AM EDT

## 2021-09-02 NOTE — H&P
Hospital Medicine History and Physical    9/2/2021    Date of Admission: 9/2/2021    Date of Service: Pt seen/examined on 9/2/2021 and admitted to inpatient. Assessment/plan:  1. Acute emphysematous cystitis. Patient was recently diagnosed with acute emphysematous cystitis in July 2021; however, she left [address] prior to completion of treatment (she was to be discharged on intravenous antibiotics per infectious disease recommendation at the time). Most recent urine culture reviewed. Start intravenous Zosyn, gentle intravenous fluid. Antiemetics ordered. Consider infectious disease consultation as I suspect patient may need to be discharged on intravenous Invanz. 2. Sepsis secondary to UTI. Initial lactic acid of 2.2. Patient received fluid bolus in the emergency room. Continue lactated [place], antibiotics. Trend lactic acid. 3. Uncontrolled diabetes type 2 with hyperglycemia. Suspect combination of medication noncompliance, underlying infectious process. Patient received 2 L of fluid bolus in the emergency room. Given underlying CHF, will start gentle infusion of lactated ringer at 50 cc an hour. She received 10 units of intravenous insulin in the emergency room. Will resume home dose of scheduled Lantus, first dose now. Will also place on medium dose sliding scale insulin. Monitor Accu-Chek closely and adjust insulin dose as needed. Hemoglobin A1c pending. 4. Nausea and vomiting. Suspect secondary to underlying history of gastroparesis coupled with underlying infectious process. Although she did prescribe diarrhea, it appears that patient has only had about 1 bowel movements daily. For now, will monitor stool count; if significant, consider sending stool study for C. difficile and GI bacterial pathogen. In the meantime, continue intravenous fluid. Will place on as needed Phenergan, meclizine.   Will hold off Zofran for now, especially with QT prolongation. 5. Suprapubic abdominal discomfort. Likely secondary to underlying cystitis. Abdominal exam is actually benign. As needed morphine ordered for pain. Consider transitioning to oral pain regimen in the morning. 6. Prolonged QTc noted on EKG. Will avoid QT-prolonging medications. Currently holding home dose of Zoloft and Remeron. Patient is on Phenergan for nausea (as there is no alternative formulary without QT prolongation at this time). 7. Chronic combined systolic and diastolic CHF. Patient does not appear to be volume overloaded at this time. Monitor volume status closely, especially with history of CHF. Will hold diuretics for now, as patient appears dehydrated. 8. Other comorbidities: Moderate to severe mitral regurgitation, history of coronary artery disease, hyperlipidemia, essential hypertension: Status post right above-knee amputation. Activities: Up with assist  Prophylaxis: Subcutaneous Lovenox  Code status: Full code    ==========================================================  Chief complaint:  Chief Complaint   Patient presents with    Abdominal Pain     abdominal pain that started today, fatigue NVD. O2 88% on room air. hx of COPD. Pt is poor historian at this time. History of Presenting Illness:   This is a pleasant 60 y.o. female with history of uncontrolled diabetes type 2 with hyperglycemia, chronic combined systolic and diastolic CHF (most recent echocardiogram from July 2021 with ejection fraction of 25 to 30% and grade 2 diastolic dysfunction, moderate to severe mitral regurgitation, trivial pericardial effusion (improved from prior echocardiogram from May 2021 with moderate pericardial effusion)), history of CAD (on dual antiplatelet therapies), hyperlipidemia, essential hypertension, who is noncompliant with medical management and recently left AGAINST MEDICAL ADVICE in July 2021, who presents to the emergency room today with complaints of generalized weakness, nausea, vomiting, decreased p.o. intake. She also describes diarrhea, although patient reports having only one bowel movement yesterday, had another bowel movement just upon arrival to the emergency room. She also describes suprapubic abdominal discomfort, increased urinary frequency as well as increased fluid intake. She does not have fever or chills. In the emergency room, patient was noted to have abnormal urinalysis and CT-abdomen/pelvis is concerning for emphysematous cystitis. She was diagnosed with emphysematous cystitis in her recent hospitalization in July 2021, was started on intravenous antibiotics and infectious disease recommends PICC line placement for outpatient antibiotics; however, patient left AGAINST MEDICAL ADVICE during that encounter. Presenting blood glucose was greater than 600. Past Medical History:      Diagnosis Date    Arthritis     [facility]     Depression     Diabetes mellitus [facility]     Heart attack ([facility]     MI 7/2019, and 6 months ago    Hepatitis C     Dr Rossana Christensen follows; contracted from ex- (drug user)    Hypercholesteremia 3/11/2020    Hypertension     MRSA (methicillin resistant Staphylococcus aureus) infection in 2000    was in urine and nares    Pneumonia     S/P colonoscopy with polypectomy 2/11    one polyp, Dr Rossana Christensen. Recall 3 years. Past Surgical History:      Procedure Laterality Date    ABOVE KNEE AMPUTATION      AMPUTATION      right bka    CORONARY ANGIOPLASTY WITH STENT PLACEMENT  07/2019    JOINT REPLACEMENT  2006, 2010    right knee; Dr Nasrin Ramsey.     KNEE ARTHROSCOPY      rid knee X6    LIVER BIOPSY N/A 11/14/2018    LIVER BIOPSY LAPAROSCOPIC performed by Nicko Chase MD at [address] [place] [address] N/A 11/14/2018    LAPAROSCOPIC CHOLECYSTECTOMY WITH INTRAOPERATIVE CHOLANGIOGRAM performed by Nicko Chase MD at [facility]  2/9/11 right    UPPER GASTROINTESTINAL ENDOSCOPY  11/13/2018    UPPER GASTROINTESTINAL ENDOSCOPY N/A 11/13/2018    EGD BIOPSY performed by Marilyn Harris MD at [address]       Medications (prior to admission):  Prior to Admission medications    Medication Sig Start Date End Date Taking? Authorizing Provider   lisinopril (PRINIVIL;ZESTRIL) 5 MG tablet Take 1 tablet by mouth daily 4/30/21   Donella Fothergill Dumouchelle, APRN - CNP   furosemide (LASIX) 40 MG tablet Take 1 tablet by mouth daily 4/30/21   Donella Fothergill Dumouchelle, APRN - CNP   carvedilol (COREG) 3.125 MG tablet Take 1 tablet by mouth 2 times daily (with meals) 9/25/20   CHINA Martell - CNS   nitroGLYCERIN (NITROSTAT) 0.4 MG SL tablet up to max of 3 total doses. If no relief after 1 dose, call 911. 8/4/20   Bob Misty, MD   sertraline (ZOLOFT) 50 MG tablet Take 50 mg by mouth daily Indications: Depression    Historical Provider, MD   mirtazapine (REMERON) 15 MG tablet Take 7.5 mg by mouth nightly    Historical Provider, MD   pregabalin (LYRICA) 100 MG capsule Take 100 mg by mouth 2 times daily. Historical Provider, MD   insulin glargine (LANTUS SOLOSTAR) 100 UNIT/ML injection pen Inject 15 Units into the skin 2 times daily Indications: Insulin Pump     Historical Provider, MD   insulin lispro, 1 Unit Dial, (HUMALOG KWIKPEN) 100 UNIT/ML SOPN Inject 10 Units into the skin 3 times daily (before meals) Inject 10 units three times daily with each meal in addition to sliding scale. Historical Provider, MD   ticagrelor (BRILINTA) 90 MG TABS tablet Take 1 tablet by mouth 2 times daily 3/12/20   Dairl Misty, MD   aspirin 81 MG tablet Take 1 tablet by mouth daily 3/12/20   Dairl Misty, MD   atorvastatin (LIPITOR) 40 MG tablet Take 1 tablet by mouth daily 3/12/20   Dairl Misty, MD   buprenorphine-naloxone (SUBOXONE) 8-2 MG SUBL SL tablet Place 1 tablet under the tongue 2 times daily.      Historical Provider, MD   FREESTYLE LANCETS MISC 1 each by Other route 4 times daily (before meals and nightly) Patient to check blood sugar 4 times a day and PRN, he is treated with multiple daily injections of insulin that require correction dosing and has had hypoglycemia. A1C  14.5  ICD code- E11.65, Z79.4 7/3/19   Jyoti Tran MD   blood glucose test strips (FREESTYLE LITE) strip 1 each by Other route 4 times daily (before meals and nightly) Patient to check blood sugar 4 times a day and PRN, he is treated with multiple daily injections of insulin that require correction dosing and has had hypoglycemia. A1C  14.5  ICD code- E11.65, Z79.4 7/3/19   Jyoti Tran MD       Allergy(ies):  Darvocet [propoxyphene n-acetaminophen], Naprosyn [naproxen], and Ultram [tramadol hcl]    Social History:  TOBACCO:  reports that she quit smoking about 14 months ago. She has a 43.00 pack-year smoking history. She has never used smokeless tobacco.  ETOH:  reports no history of alcohol use. Family History:      Problem Relation Age of Onset    Cancer Father     Seizures Brother    Estrada Cancer Sister         colon       Review of Systems:  Pertinent positives are listed in HPI. At least 10-point ROS reviewed and were negative. Vitals and physical examination:  /71   Pulse 100   Temp 99.6 °F (37.6 °C)   Resp 19   LMP 11/15/2017   SpO2 100%   Gen/overall appearance: Not in acute distress. Alert. Oriented x3. Head: Normocephalic, atraumatic  Eyes: EOMI, good acuity  ENT: Edentulous. Neck: No JVD, thyromegaly  CVS: Nml S1S2, no MRG, RRR  Pulm: Clear bilaterally. No crackles/wheezes  Gastrointestinal: Soft, NT/ND, +BS  Musculoskeletal: Status post right above-knee amputation. No edema in bilateral lower extremities. Neuro: No focal deficit. Moves extremity spontaneously. Psychiatry: Appropriate affect. Not agitated. Skin: Warm, dry with normal turgor.  No rash  Capillary refill: Brisk,< 3 seconds   Peripheral Pulses: +2 palpable, equal bilaterally       Labs/imaging/EKG:  CBC:   Recent Labs     09/02/21 0040   WBC 14.1*   HGB 13.7   PLT 98*     BMP:    Recent Labs     09/02/21 0040   *   K 3.7   CL 89*   CO2 26   BUN 28*   CREATININE 0.8   GLUCOSE 667*     Hepatic:   Recent Labs     09/02/21 0040   AST 18   ALT 13   BILITOT 0.8   ALKPHOS 210*       CT ABDOMEN PELVIS W IV CONTRAST Additional Contrast? None    Result Date: 9/2/2021  EXAMINATION: CT OF THE ABDOMEN AND PELVIS WITH CONTRAST 9/2/2021 1:58 am TECHNIQUE: CT of the abdomen and pelvis was performed with the administration of intravenous contrast. Multiplanar reformatted images are provided for review. Dose modulation, iterative reconstruction, and/or weight based adjustment of the mA/kV was utilized to reduce the radiation dose to as low as reasonably achievable. COMPARISON: CT abdomen and pelvis dated 07/16/2021. HISTORY: ORDERING SYSTEM PROVIDED HISTORY: abd pain/vomiting TECHNOLOGIST PROVIDED HISTORY: Reason for exam:->abd pain/vomiting Additional Contrast?->None Decision Support Exception - unselect if not a suspected or confirmed emergency medical condition->Emergency Medical Condition (MA) Reason for Exam: abd pain/vomiting Acuity: Acute Type of Exam: Initial Relevant Medical/Surgical History: Abdominal Pain (abdominal pain that started today, fatigue NVD. O2 88% on room air. hx of COPD. Pt is poor historian at this time. ) FINDINGS: CARDIOVASCULAR: The heart is normal in size. There is no pericardial effusion. The aorta and branch vessels are patent and normal in caliber. LUNG BASES: There are no focal consolidations or pleural effusions. Scarring versus atelectasis again noted in the lingula. There is bibasilar atelectasis. HEPATOBILIARY: The liver is normal in size. There are no focal hepatic lesions. The patient is status post cholecystectomy. There is expected prominence of the biliary system. SPLEEN: Unremarkable. PANCREAS: The pancreas is atrophic. ADRENAL GLANDS: Unremarkable. KIDNEYS: Kidneys are normal in size and contour. Subtle area of decreased cortical enhancement in the superior pole the left kidney, which may reflect pyelonephritis in the correct clinical setting. There is mild bilateral hydronephrosis and there is urothelial enhancement which may reflect ascending infection. ABDOMINAL NODES: No adenopathy is appreciated. PELVIC ORGANS: The urinary bladder is collapsed around a Pulido catheter. Air is noted within the urinary bladder likely from Pulido catheter insertion. Cystitis cannot be excluded. The uterus is unremarkable. PERITONEUM/MESENTERY/BOWEL: There is questionable thickening of the gastric antrum which may reflect gastritis. There is no bowel obstruction. There is no bowel wall thickening. BONES/SOFT TISSUES: There is no acute osseous or soft tissue abnormality. Mild bilateral hydroureteronephrosis and urothelial enhancement with subtle area of decreased cortical enhancement in the superior pole the left kidney, findings may reflect ascending infection and pyelonephritis in the correct clinical setting. Air is noted within the urinary bladder likely from Pulido catheter insertion. Cystitis cannot be excluded. Questionable thickening of the gastric antrum which may reflect gastritis in the correct clinical setting. XR CHEST PORTABLE    Result Date: 9/2/2021  EXAMINATION: ONE XRAY VIEW OF THE CHEST 9/1/2021 11:48 pm COMPARISON: Chest x-ray dated 07/16/2021. HISTORY: ORDERING SYSTEM PROVIDED HISTORY: SOB TECHNOLOGIST PROVIDED HISTORY: Reason for exam:->SOB Reason for Exam: shortness of breath Acuity: Acute Type of Exam: Initial Relevant Medical/Surgical History: previous MI,hypertension,pneumonia and CHF FINDINGS: HEART/MEDIASTINUM: The cardiomediastinal silhouette is within normal limits. PLEURA/LUNGS: There are no focal consolidations or pleural effusions. There is no appreciable pneumothorax.   There are chronic interstitial markings. BONES/SOFT TISSUE: No acute abnormality. No radiographic evidence of acute pulmonary disease. EKG: Sinus tachycardia, rate 111 beats per minutes. Left anterior fascicular block present. No acute ST/T changes. . I reviewed EKG. Discussed with ER provider. Thank you No primary care provider on file.  for the opportunity to be involved in this patient's care.    -----------------------------  Taylor Guaman MD  [facility]

## 2021-09-02 NOTE — ED NOTES
PT report given to floor RN at this time, she states no further questions or concerns.      Socorro Treviño RN  09/02/21 [id number]

## 2021-09-02 NOTE — ED NOTES
Bed: 02  Expected date:   Expected time:   Means of arrival:   Comments:  Unresponsive EMS pt     Gurpreet Castaneda RN  09/02/21 [id number]

## 2021-09-02 NOTE — CONSULTS
Infectious Diseases   Consult Note        Admission Date: 9/1/2021  Hospital Day: Hospital Day: 2   Attending: Jailyn Guillermo MD  Date of service: 9/2/21     Reason for admission: Hyperglycemia [R73.9]  Urinary tract infection with hematuria, site unspecified [N39.0, R31.9]  Uncontrolled diabetes mellitus with hyperglycemia, with long-term current use of insulin ([address] [E11.65, Z79.4]  Nausea and vomiting, intractability of vomiting not specified, unspecified vomiting type [R11.2]    Chief complaint/ Reason for consult: Septic shock    Microbiology:        I have reviewed allavailable micro lab data and cultures    · Blood culture (1/2) - collected on 9/1/2021: E. coli    · Urine culture  - collected on 9/1/2021: In process      Antibiotics and immunizations:       Current antibiotics: All antibiotics and their doses were reviewed by me    Recent Abx Admin                   piperacillin-tazobactam (ZOSYN) 3,375 mg in sodium chloride 0.9 % 50 mL IVPB extended infusion (mini-bag) (mg) 3,375 mg New Bag 09/02/21 1430    piperacillin-tazobactam (ZOSYN) 3,375 mg in dextrose 5 % 50 mL IVPB extended infusion (mini-bag) (mg) 3,375 mg New Bag 09/02/21 0646    cefTRIAXone (ROCEPHIN) 1000 mg in sterile water 10 mL IV syringe (mg) 1,000 mg Given 09/02/21 0219                  Immunization History: All immunization history was reviewed by me today. Immunization History   Administered Date(s) Administered    Influenza Virus Vaccine 11/28/2011    Influenza, Dulcy Cocks, 6 mo and older, IM, PF (Flulaval, Fluarix) 11/14/2018    Pneumococcal Polysaccharide ([id number]) 11/28/2011, 09/15/2014       Known drug allergies:      All allergies were reviewed and updated    Allergies   Allergen Reactions    Darvocet [Propoxyphene N-Acetaminophen] Nausea Only    Naprosyn [Naproxen] Rash    Ultram [Tramadol Hcl] Rash       Social history:     Social History:  All social andepidemiologic history was reviewed and updated by me today as needed. · Tobacco use:   reports that she quit smoking about 14 months ago. She has a 43.00 pack-year smoking history. She has never used smokeless tobacco.  · Alcohol use:   reports no history of alcohol use. · Currently lives in: [facility]  ·  reports previous drug use. Drug: Opiates . COVID VACCINATION AND LAB RESULT RECORDS:     Internal Administration   First Dose      Second Dose           Last COVID Lab SARS-CoV-2, PCR (no units)   Date Value   08/21/2020 Not Detected     SARS-CoV-2, NAAT (no units)   Date Value   08/21/2020 Not Detected            Assessment:     The patient is a 60 y.o. old female who  has a past medical history of Arthritis, CHF (congestive heart failure) ([facility] [address] Depression, Diabetes mellitus ([facility] [address] Heart attack ([facility] [address] Hepatitis C, Hypercholesteremia (3/11/2020), Hypertension, MRSA (methicillin resistant Staphylococcus aureus) infection (in 2000), Pneumonia, and S/P colonoscopy with polypectomy (2/11). with following problems:    · Septic shock admission with fever, leukocytosis, hypotension requiring vasopressors, lactic acidosis  · Severe metabolic encephalopathy  · E. coli bacteremia   · Left-sided ascending pyelonephritis  · Emphysematous cystitis  · Bilateral hydronephrosis  · Poorly controlled type 2 diabetes mellitus  · Chronic hepatitis C  · Essential hypertension  · History of MRSA      Discussion:      The patient was admitted with fever, leukocytosis, elevated serum lactate level up to 3.4 and hypotension. She has poorly controlled type 2 diabetes mellitus and serum glucose of 667 on presentation. Urine analysis done earlier today showed large leukocyte esterase and 4+ bacteria. Diabetes of CT scan of abdomen and pelvis reviewed. Left-sided pyelonephritis and possible emphysematous cystitis changes noted    1 set of blood culture is growing E. Coli    The patient has become hypotensive and has been moved to the medical ICU later today.   She is very encephalopathic    Plan:     Diagnostic Workup:    · Agree with blood cultures  · Follow-up on urine cultures  · Continue to follow fever curve, WBC count and blood cultures  · Follow up on liverand renal functions closely  · Check lactate level every 6 hours x 4  · Recommend also checking for COVID-19    Antimicrobials:    · The patient is becoming encephalopathic and white cell count is going up despite IV Zosyn  · We will stop IV Zosyn and start IV meropenem 1 g every 8 hours  · Continue to monitor her vitals closely  · IV fluids and vasopressor support to maintain mean arterial pressure greater than 65 mmHG  · We will follow up on the culture results and clinical progress and will make further recommendations accordingly. · Continue close vitals monitoring. · Maintain good glycemic control. · Fall precautions. Aspiration precautions. · Continue to watch for new fever or diarrhea. · DVT prophylaxis. · Critically ill patient. High risk of morbidity and mortality  · Continue close monitoring in ICU setting    Drug Monitoring:    · Continue serial monitoring for antibiotic toxicity as follows: CBC, CMP  · Continue to watch for following: new or worsening fever, hypotension, hives, lip swelling and redness or purulence at vascular access sites. I/v access Management:    · Continue to monitor i.v access sites for erythema, induration, discharge or tenderness. · As always, continue efforts to minimizetubes/lines/drains as clinically appropriate to reduce chances of line associated infections. Current isolation precautions:    Currently active isolation(s): Droplet Plus       Risk of Complications/Morbidity/Mortality: High     · Patient is critically ill and has a potentially life threatening infection that poses threat to life/bodily function.    · There is potential for worsening infection/ sudden clinically significant or life-threatening deterioration in the patient's condition without appropriate antimicrobial therapy. · Complex medical decision making process was involved to select appropriate antimicrobial agents to reverse the cause of patient's severe infection/ illness. · Antimicrobial therapy requires intensive monitoring for toxicity and frequent dose adjustments to prevent toxicity and permanent end-organ dysfunction. Critical care time:  33 minutes      Thank you for involving me in the care of your patient. I will continue to follow. If you have any additional questions, please do not hesitate to contact me. Subjective:     Presenting complaint in ER:     Chief Complaint   Patient presents with    Abdominal Pain     abdominal pain that started today, fatigue NVD. O2 88% on room air. hx of COPD. Pt is poor historian at this time. HPI: Kathryn Garza is a 60 y.o. female patient, who was seen at the request of Dr. Thania Arriaga MD.    History was obtained from chart review as the patient is quite encephalopathic    The patient was admitted on 9/1/2021. I have been consulted to see the patient for above mentioned reason(s). The patient has multiple medical comorbidities, and presented to the ER for generalized weakness, nausea and vomiting and decreased oral intake. The patient was also having diarrhea. In the ER, he was noted to have a low-grade temperature of 99.6 and elevated white cell count of 14,100. The patient was hospitalized. She had a CT scan of abdomen and pelvis with IV contrast done, which ascending left-sided pyelonephritis along with bilateral hydronephrosis changes    She was admitted    I have been asked for my opinion for management for this patient. Past Medical History: All past medical history reviewed today.     Past Medical History:   Diagnosis Date    Arthritis     [facility]est[facility]) [facility]     Depression     Diabetes mellitus ([address]     Heart attack ([facility]     MI 7/2019, and 6 months ago    Hepatitis C     Dr Kee Ibarra follows; contracted from ex- (drug user)    Hypercholesteremia 3/11/2020    Hypertension     MRSA (methicillin resistant Staphylococcus aureus) infection in 2000    was in urine and nares    Pneumonia     S/P colonoscopy with polypectomy 2/11    one polyp, Dr Monica Reyes. Recall 3 years. Past Surgical History: All pastsurgical history was reviewed today. Past Surgical History:   Procedure Laterality Date    ABOVE KNEE AMPUTATION      AMPUTATION      right bka    CORONARY ANGIOPLASTY WITH STENT PLACEMENT  07/2019    JOINT REPLACEMENT  2006, 2010    right knee; Dr Beronica Barrera.  KNEE ARTHROSCOPY      ridht knee X6    LIVER BIOPSY N/A 11/14/2018    LIVER BIOPSY LAPAROSCOPIC performed by Xiang Carson MD at [facility] N/A 11/14/2018    LAPAROSCOPIC CHOLECYSTECTOMY WITH INTRAOPERATIVE CHOLANGIOGRAM performed by Xiang Carson MD at [address] [place] [address]  2/9/11    right    UPPER GASTROINTESTINAL ENDOSCOPY  11/13/2018    UPPER GASTROINTESTINAL ENDOSCOPY N/A 11/13/2018    EGD BIOPSY performed by Lizzie Sewnson MD at [address] [place] [address]         Family History: All family history was reviewed today. Problem Relation Age of Onset    Cancer Father     Seizures Brother    Adams Sicard Cancer Sister         colon         Medications: All current and past medications were reviewed. Medications Prior to Admission: lisinopril (PRINIVIL;ZESTRIL) 5 MG tablet, Take 1 tablet by mouth daily  furosemide (LASIX) 40 MG tablet, Take 1 tablet by mouth daily  carvedilol (COREG) 3.125 MG tablet, Take 1 tablet by mouth 2 times daily (with meals)  nitroGLYCERIN (NITROSTAT) 0.4 MG SL tablet, up to max of 3 total doses.  If no relief after 1 dose, call 911.  sertraline (ZOLOFT) 50 MG tablet, Take 50 mg by mouth daily Indications: Depression  mirtazapine (REMERON) 15 MG tablet, Take 30 mg by mouth nightly   pregabalin (LYRICA) 100 MG capsule, Take 100 mg by mouth 3 times daily. insulin glargine (LANTUS SOLOSTAR) 100 UNIT/ML injection pen, Inject 15 Units into the skin 2 times daily Indications: Insulin Pump   insulin lispro, 1 Unit Dial, (HUMALOG KWIKPEN) 100 UNIT/ML SOPN, Inject 10 Units into the skin 3 times daily (before meals) Inject 10 units three times daily with each meal in addition to sliding scale. ticagrelor (BRILINTA) 90 MG TABS tablet, Take 1 tablet by mouth 2 times daily  aspirin 81 MG tablet, Take 1 tablet by mouth daily  atorvastatin (LIPITOR) 40 MG tablet, Take 1 tablet by mouth daily  buprenorphine-naloxone (SUBOXONE) 8-2 MG SUBL SL tablet, Place 1 tablet under the tongue 2 times daily. FREESTYLE LANCETS MISC, 1 each by Other route 4 times daily (before meals and nightly) Patient to check blood sugar 4 times a day and PRN, he is treated with multiple daily injections of insulin that require correction dosing and has had hypoglycemia. A1C  14.5  ICD code- E11.65, Z79.4  blood glucose test strips (FREESTYLE LITE) strip, 1 each by Other route 4 times daily (before meals and nightly) Patient to check blood sugar 4 times a day and PRN, he is treated with multiple daily injections of insulin that require correction dosing and has had hypoglycemia.             A1C  14.5  ICD code- E11.65, Z79.4     aspirin  81 mg Oral Daily    atorvastatin  40 mg Oral Nightly    [Held by provider] carvedilol  3.125 mg Oral BID WC    [Held by provider] lisinopril  5 mg Oral Daily    mirtazapine  7.5 mg Oral Nightly    pregabalin  100 mg Oral BID    ticagrelor  90 mg Oral BID    sodium chloride flush  5-40 mL IntraVENous 2 times per day    enoxaparin  40 mg SubCUTAneous Daily    sodium chloride  2,000 mL IntraVENous Once    meropenem  1,000 mg IntraVENous Q8H    magnesium sulfate  2,000 mg IntraVENous Once    potassium chloride  40 mEq IntraVENous Once    [START ON 9/3/2021] insulin glargine  15 Units SubCUTAneous Nightly    insulin lispro  0-6 Units SubCUTAneous TID WC    insulin lispro  0-3 Units SubCUTAneous Nightly    potassium bicarbonate  50 mEq Oral Q2H          REVIEW OF SYSTEMS:       Review of Systems   Unable to perform ROS: Mental status change         Objective:       PHYSICAL EXAM:      Vitals:   Vitals:    09/02/21 1245 09/02/21 1400 09/02/21 1530 09/02/21 1657   BP: (!) 75/50 97/64 (!) 91/57 (!) 52/28   Pulse: 89 94 106 84   Resp: 16 16 17 22   Temp: 97.6 °F (36.4 °C) 97.6 °F (36.4 °C) 97.3 °F (36.3 °C) 97.3 °F (36.3 °C)   TempSrc: Temporal Temporal Temporal Temporal   SpO2: 96% 97% 97% 96%   Weight:       Height:           Physical Exam      General: Encephalopathic but protecting the airway so far, hypotensive  HEENT: normocephalic, atraumatic, sclera clear, pupils equal, light reflex preserved bilaterally  Cardiovascular: RRR, no murmurs/rubs/gallops detected  Pulmonary: CTABL, no rhonchi/rales   Abdomen/GI: soft, lower abdomen quite tender on exam  Neuro: Encephalopathic, pupils are equal and reactive to light, moves all extremities  Skin: no rash,   Musculoskeletal:  No obvious joint swelling, redness. No limitation of range of passive motion  Genitourinary: Pulido's catheter in place   Psych: could not assess   Lymphatic/Immunologic: No obvious bruising, no cervical lymphadenopathy    Lines: All vascular access sites are healthy with no local erythema, discharge or tenderness      Intake and output:     I/O last 3 completed shifts: In: 2000 [IV Piggyback:2000]  Out: 2200 [Urine:2200]    Lab Data:   All available labs were reviewed by me today.      CBC:   Recent Labs     09/02/21  0040 09/02/21  1736   WBC 14.1* 18.0*   RBC 5.04 4.21   HGB 13.7 11.4*   HCT 42.3 35.8*   PLT 98* 110*   MCV 83.9 85.1   MCH 27.2 27.1   MCHC 32.4 31.9   RDW 16.8* 17.0*        BMP:  Recent Labs     09/02/21  0040 09/02/21  1736   * 136   K 3.7 3.0*   CL 89* 101   CO2 26 24   BUN 28* 24*   CREATININE 0.8 0.8   CALCIUM 8.8 7.7*   GLUCOSE 667* 138* Hepatic FunctionPanel:   Lab Results   Component Value Date    ALKPHOS 115 09/02/2021    ALT 10 09/02/2021    AST 14 09/02/2021    PROT 6.2 09/02/2021    PROT 7.6 02/14/2012    BILITOT 0.3 09/02/2021    BILIDIR <0.2 11/16/2020    IBILI see below 11/16/2020    LABALBU 2.3 09/02/2021       CPK:   Lab Results   Component Value Date    CKTOTAL 25 (L) 08/21/2020     ESR: No results found for: SEDRATE  CRP: No results found for: CRP      Imaging: All pertinent images and reports for the current visit were reviewed by meduring this visit. XR CHEST PORTABLE   Final Result   Interval worsening with mild-to-moderate interstitial congestion         CT ABDOMEN PELVIS W IV CONTRAST Additional Contrast? None   Final Result   Mild bilateral hydroureteronephrosis and urothelial enhancement with subtle   area of decreased cortical enhancement in the superior pole the left kidney,   findings may reflect ascending infection and pyelonephritis in the correct   clinical setting. Air is noted within the urinary bladder likely from Pulido catheter insertion. Cystitis cannot be excluded. Questionable thickening of the gastric antrum which may reflect gastritis in   the correct clinical setting. XR CHEST PORTABLE   Final Result   No radiographic evidence of acute pulmonary disease. Outside records:    Labs, Microbiology, Radiology and pertinent results from Care everywhere, if available, were reviewed as a part ofthe consultation.       Problem list:       Patient Active Problem List   Diagnosis Code    [facility] E11.9    Knee pain M25.569    Degenerative arthritis of knee M17.10    Other specified anemias D64.89    DKA, type 2, not at goal ([facility] E11.10    Hyperglycemia R73.9    Acute pancreatitis K85.90    Coronary artery disease involving native coronary artery of native heart without angina pectoris I25.10    Diabetes, coma, hyperosmolar (HCC) E11.01    Hypernatremia E87.0  Acute kidney injury ([facility] [address] N17.9    Abnormal LFTs R94.5    Diabetic ketoacidosis without coma associated with type 1 diabetes mellitus (HCC) E10.10    Esophagitis K20.90    Hypotension I95.9    Controlled type 1 diabetes mellitus with hyperglycemia (HCC) E10.65    Sialadenitis K11.20    Abdominal pain R10.9    Adrenal insufficiency (HCC) E27.40    Unstable angina (HCC) I20.0    Chronic pain syndrome G89.4    Constipation K59.00    Uncontrolled type 2 diabetes mellitus with hyperglycemia, with long-term current use of insulin (HCC) E11.65, Z79.4    Acute on chronic respiratory failure with hypoxia ([facility]) J96.21    Mucus plugging of bronchi T17.500A    Atelectasis J98.11    Former smoker Z87.891    DM (diabetes mellitus), secondary uncontrolled (HCC) E13.65    Thrombocytopenia (HCC) D69.6    Essential hypertension I10    Hx of AKA (above knee amputation), right ([facility] Z89.611    Chest pain R07.9    Coronary artery disease due to lipid rich plaque I25.10, I25.83    Hypercholesteremia E78.00    DKA, type 1, not at goal [facility]) E10.10    Acute decompensated heart failure (HCC) I50.9    Carboxyhemoglobinemia T58. 91XA    Acute respiratory failure with hypoxia (HCC) J96.01    Suspected COVID-19 virus infection Z20.822    Abnormal CT scan R93.89    Hypomagnesemia E83.42    Acute on chronic combined systolic and diastolic heart failure (HCC) I50.43    PAD (peripheral artery disease) (HCC) I73.9    Narcotic abuse (HCC) F11.10    Non-compliance Z91.19    S/P angioplasty with stent Z95.820    Acute on chronic combined systolic (congestive) and diastolic (congestive) heart failure (HCC) I50.43    Severe dehydration E86.0    Nausea and vomiting R11.2    Pneumonia due to infectious organism J18.9    Emphysematous cystitis N30.80    Bandemia D72.825    COVID-19 vaccine series completed Z92.29    Poorly controlled type 2 diabetes mellitus (HCC) E11.65    Chronic hepatitis C without hepatic coma (HCC) B18.2    History of MRSA infection Z86.14    Prolonged Q-T interval on ECG R94.31    Uncontrolled diabetes mellitus with hyperglycemia, with long-term current use of insulin (HCC) E11.65, Z79.4    Septic shock (HCC) A41.9, R65.21    Pyelonephritis N12    Hydronephrosis D60.92    Metabolic encephalopathy H52.31         Please note that this chart was generated using Dragon dictation software. Although every effort was made to ensure the accuracy of this automated transcription, some errors in transcription may have occurred inadvertently. If you may need any clarification, please do not hesitate to contact me through EPIC or at the phone number provided below with my electronic signature. Any pictures or media included in this note were obtained after taking informed verbal consent from the patient and with their approval to include those in the patient's medical record.       Corry Arevalo MD, MPH  9/2/21, 12:32 PM EDT   [facility] Infectious Disease   [address] [place] [address], [address] [place] [address]  Office: [phone]  Fax: [phone]  Clinic days:  Tuesday & Thursday

## 2021-09-02 NOTE — ED PROVIDER NOTES
[facility]        Pt Name: Cosme Guerrero  MRN: [id number]  [place] 1965  Date of evaluation: 9/1/2021  Provider: CHINA Escamilla CNP  PCP: No primary care provider on file. Note Started: 2:05 AM EDT       FATIMAH. I have evaluated this patient. My supervising physician was available for consultation. CHIEF COMPLAINT       Chief Complaint   Patient presents with    Abdominal Pain     abdominal pain that started today, fatigue NVD. O2 88% on room air. hx of COPD. Pt is poor historian at this time. HISTORY OF PRESENT ILLNESS   (Location, Timing/Onset, Context/Setting, Quality, Duration, Modifying Factors, Severity, Associated Signs and Symptoms)  Note limiting factors. Chief Complaint: Abdominal pain, vomiting, fatigue    Cosme Guerrero is a 60 y.o. female with history of multiple comorbid's including CHF, COPD (wears supplemental oxygen), current smoker, insulin-dependent diabetes, coronary artery disease, MI, hep C,, hyperlipidemia, hypertension, and a right BKA who presents to the emergency department today for evaluation of vomiting, diarrhea, abdominal pain, and weakness. Symptoms began today. She denies recent travel or known sick contacts. Patient states she has been vaccinated against Covid. Patient states that she has been unable to keep any food or fluids down today. She states she feels as if she is dehydrated. She denies previous abdominal surgeries. She currently reports a pain level of 8 out of 10. She describes his pain as constant and dull with intermittent cramp-like and burning sensations. The pain does not radiate. There are no aggravating or alleviating factors. She has not taken anything for her symptoms. Patient adamantly denies having any chest pain or trouble breathing. Patient states that she feels \"weak\". There has been no fever, rash, or other symptoms.   Patient states that she lives at home with her sister who is her caregiver. Nursing Notes were all reviewed and agreed with or any disagreements were addressed in the HPI. REVIEW OF SYSTEMS    (2-9 systems for level 4, 10 or more for level 5)     Review of Systems   Constitutional: Positive for fatigue. Negative for chills, diaphoresis, fever and unexpected weight change. HENT: Negative for congestion and sore throat. Eyes: Negative for pain and visual disturbance. Respiratory: Negative for cough, chest tightness, shortness of breath and wheezing. Cardiovascular: Negative for chest pain, palpitations and leg swelling. Gastrointestinal: Positive for abdominal pain, diarrhea and nausea. Negative for blood in stool, constipation and vomiting. Endocrine: Positive for polydipsia and polyuria. Genitourinary: Positive for frequency. Negative for difficulty urinating and dysuria. Musculoskeletal: Negative for back pain and neck pain. Skin: Negative for rash and wound. Allergic/Immunologic: Negative for immunocompromised state. Neurological: Negative for dizziness, weakness and light-headedness. Hematological: Does not bruise/bleed easily. Psychiatric/Behavioral: Negative for suicidal ideas. Positives and Pertinent negatives as per HPI. Except as noted above in the ROS, all other systems were reviewed and negative. PAST MEDICAL HISTORY     Past Medical History:   Diagnosis Date    Arthritis     [facility] [facility]     Depression     Diabetes mellitus ([address]     Heart attack ([facility] [address]     MI 7/2019, and 6 months ago    Hepatitis C     Dr Monica Reyes follows; contracted from ex- (drug user)    Hypercholesteremia 3/11/2020    Hypertension     MRSA (methicillin resistant Staphylococcus aureus) infection in 2000    was in urine and nares    Pneumonia     S/P colonoscopy with polypectomy 2/11    one polyp, Dr Monica Reyes. Recall 3 years.          SURGICAL HISTORY     Past Surgical History: Procedure Laterality Date    ABOVE KNEE AMPUTATION      AMPUTATION      right bka    CORONARY ANGIOPLASTY WITH STENT PLACEMENT  07/2019    JOINT REPLACEMENT  2006, 2010    right knee; Dr Cherl Frankel.  KNEE ARTHROSCOPY      ridht knee X6    LIVER BIOPSY N/A 11/14/2018    LIVER BIOPSY LAPAROSCOPIC performed by Eugene Medina MD at [address] [place] [address] N/A 11/14/2018    LAPAROSCOPIC CHOLECYSTECTOMY WITH INTRAOPERATIVE CHOLANGIOGRAM performed by Eugene Medina MD at [address] [place] [address]  2/9/11    right    UPPER GASTROINTESTINAL ENDOSCOPY  11/13/2018    UPPER GASTROINTESTINAL ENDOSCOPY N/A 11/13/2018    EGD BIOPSY performed by Viviana Munguia MD at [address]       Previous Medications    ASPIRIN 81 MG TABLET    Take 1 tablet by mouth daily    ATORVASTATIN (LIPITOR) 40 MG TABLET    Take 1 tablet by mouth daily    BLOOD GLUCOSE TEST STRIPS (FREESTYLE LITE) STRIP    1 each by Other route 4 times daily (before meals and nightly) Patient to check blood sugar 4 times a day and PRN, he is treated with multiple daily injections of insulin that require correction dosing and has had hypoglycemia. A1C  14.5  ICD code- E11.65, Z79.4    BUPRENORPHINE-NALOXONE (SUBOXONE) 8-2 MG SUBL SL TABLET    Place 1 tablet under the tongue 2 times daily. CARVEDILOL (COREG) 3.125 MG TABLET    Take 1 tablet by mouth 2 times daily (with meals)    FREESTYLE LANCETS MISC    1 each by Other route 4 times daily (before meals and nightly) Patient to check blood sugar 4 times a day and PRN, he is treated with multiple daily injections of insulin that require correction dosing and has had hypoglycemia.              A1C  14.5  ICD code- E11.65, Z79.4    FUROSEMIDE (LASIX) 40 MG TABLET    Take 1 tablet by mouth daily    INSULIN GLARGINE (LANTUS SOLOSTAR) 100 UNIT/ML INJECTION PEN    Inject 15 Units into the skin 2 times daily Indications: Insulin Pump INSULIN LISPRO, 1 UNIT DIAL, (HUMALOG KWIKPEN) 100 UNIT/ML SOPN    Inject 10 Units into the skin 3 times daily (before meals) Inject 10 units three times daily with each meal in addition to sliding scale. LISINOPRIL (PRINIVIL;ZESTRIL) 5 MG TABLET    Take 1 tablet by mouth daily    MIRTAZAPINE (REMERON) 15 MG TABLET    Take 7.5 mg by mouth nightly    NITROGLYCERIN (NITROSTAT) 0.4 MG SL TABLET    up to max of 3 total doses. If no relief after 1 dose, call 911. PREGABALIN (LYRICA) 100 MG CAPSULE    Take 100 mg by mouth 2 times daily. SERTRALINE (ZOLOFT) 50 MG TABLET    Take 50 mg by mouth daily Indications: Depression    TICAGRELOR (BRILINTA) 90 MG TABS TABLET    Take 1 tablet by mouth 2 times daily         ALLERGIES     Darvocet [propoxyphene n-acetaminophen], Naprosyn [naproxen], and Ultram [tramadol hcl]    FAMILYHISTORY       Family History   Problem Relation Age of Onset    Cancer Father     Seizures Brother     Cancer Sister         colon          SOCIAL HISTORY       Social History     Tobacco Use    Smoking status: Former Smoker     Packs/day: 1.00     Years: 43.00     Pack years: 43.00     Quit date: 2020     Years since quittin.1    Smokeless tobacco: Never Used   Vaping Use    Vaping Use: Never used   Substance Use Topics    Alcohol use: No    Drug use: Not Currently     Types: Opiates      Comment: opiate/THC       SCREENINGS             PHYSICAL EXAM    (up to 7 for level 4, 8 or more for level 5)     ED Triage Vitals [21 2306]   BP Temp Temp src Pulse Resp SpO2 Height Weight   (!) 145/83 99.6 °F (37.6 °C) -- 124 18 (!) 88 % -- --       Physical Exam  Vitals and nursing note reviewed. Constitutional:       General: She is in acute distress. Appearance: Normal appearance. She is well-developed. She is ill-appearing. She is not toxic-appearing or diaphoretic.       Comments: Appears chronically ill, dry heaving at the time of my arrival   HENT:      Head: Normocephalic and atraumatic. Right Ear: External ear normal.      Left Ear: External ear normal.      Nose: Nose normal.      Mouth/Throat:      Comments: Mucous membranes are dry  Eyes:      General:         Right eye: No discharge. Left eye: No discharge. Cardiovascular:      Rate and Rhythm: Regular rhythm. Tachycardia present. Heart sounds: Normal heart sounds. Pulmonary:      Effort: Pulmonary effort is normal. No respiratory distress. Breath sounds: Normal breath sounds. Abdominal:      General: Abdomen is protuberant. Bowel sounds are normal. There is no distension. Palpations: Abdomen is soft. Tenderness: There is generalized abdominal tenderness. There is no right CVA tenderness, left CVA tenderness or guarding. Musculoskeletal:      Cervical back: Normal range of motion and neck supple. Comments: Right BKA   Skin:     General: Skin is warm and dry. Capillary Refill: Capillary refill takes less than 2 seconds. Neurological:      General: No focal deficit present. Mental Status: She is alert and oriented to person, place, and time.    Psychiatric:         Mood and Affect: Mood normal.         Behavior: Behavior normal.         DIAGNOSTIC RESULTS   LABS:    Labs Reviewed   CBC WITH AUTO DIFFERENTIAL - Abnormal; Notable for the following components:       Result Value    WBC 14.1 (*)     RDW 16.8 (*)     Platelets 98 (*)     MPV 11.8 (*)     Neutrophils Absolute 12.9 (*)     Lymphocytes Absolute 0.4 (*)     All other components within normal limits    Narrative:     Performed at:  [facility] [address], [place], [facility] [address]   Phone [phone]   COMPREHENSIVE METABOLIC PANEL W/ REFLEX TO MG FOR LOW K - Abnormal; Notable for the following components:    Sodium 131 (*)     Chloride 89 (*)     Glucose 667 (*)     BUN 28 (*)     Albumin 3.1 (*)     Albumin/Globulin Ratio 0.7 (*)     Alkaline Phosphatase 210 (*)     All other components within normal limits    Narrative:     Anaya Blancas  [facility] tel. [phone],  Chemistry results called to and read back by Placido Molina, 09/02/2021 01:51,  by Rafaela Chand  Performed at:  [facility] [address], [facility], [address] [employer]   Phone [phone]   LIPASE - Abnormal; Notable for the following components:    Lipase 8.0 (*)     All other components within normal limits    Narrative:     Anaya Blancas  [facility] tel. [phone],  Chemistry results called to and read back by Placido Molina, 09/02/2021 01:51,  by Rafaela Chand  Performed at:  [facility] [address], [facility], [address] [employer]   Phone [phone]   URINE RT REFLEX TO CULTURE - Abnormal; Notable for the following components:    Clarity, UA TURBID (*)     Glucose, Ur >=1000 (*)     Ketones, Urine 15 (*)     Blood, Urine LARGE (*)     Protein,  (*)     Leukocyte Esterase, Urine LARGE (*)     All other components within normal limits    Narrative:     Performed at:  [facility] [address], [facility], [address]   Phone [phone]   LACTATE, SEPSIS - Abnormal; Notable for the following components:    Lactic Acid, Sepsis 2.2 (*)     All other components within normal limits    Narrative:     Performed at:  [facility] [address], [facility], [address]   Phone [phone]   BLOOD GAS, VENOUS - Abnormal; Notable for the following components:    pH, Rowdy 7.472 (*)     pCO2, Rowdy 37.2 (*)     pO2, Rowyd 165.0 (*)     Base Excess, Rowdy 3.5 (*)     Carboxyhemoglobin 6.4 (*)     All other components within normal limits    Narrative:     Performed at:  [facility] [address], [facility], [address]   Phone [phone]   BETA-HYDROXYBUTYRATE - Abnormal; Notable for the following components:    Beta-Hydroxybutyrate 2.30 (*)     All other components within normal limits    Narrative: CALL  [facility] tel. [phone],  Chemistry results called to and read back by Rella Cogan, 09/02/2021 01:51,  by Rafaela Chand  Performed at:  [facility] [address], [place], [facility] [employer]   Phone [phone]   MICROSCOPIC URINALYSIS - Abnormal; Notable for the following components:    RBC, UA 5-10 (*)     Bacteria, UA 4+ (*)     WBC, UA >900 (*)     All other components within normal limits    Narrative:     Performed at:  [facility] [address], [place], [facility] [address] [employer]   Phone [phone]   POCT GLUCOSE - Abnormal; Notable for the following components:    POC Glucose >600 (*)     All other components within normal limits    Narrative:     Performed at:  [facility] [address], [place], [facility]   Phone [phone]   POCT GLUCOSE - Abnormal; Notable for the following components:    POC Glucose 538 (*)     All other components within normal limits    Narrative:     Performed at:  [facility] [address], [place], [facility]   Phone [phone]   CULTURE, BLOOD 2   CULTURE, BLOOD 1   CULTURE, URINE   TROPONIN    Narrative:     Yeny Bellamy  [facility] tel. [phone],  Chemistry results called to and read back by Rella Cogan, 09/02/2021 01:51,  by Rafaela Chand  Performed at:  [facility] [address], [place], [facility]   Phone [phone]   LACTATE, SEPSIS   POCT GLUCOSE       When ordered only abnormal lab results are displayed. All other labs were within normal range or not returned as of this dictation. EKG: When ordered, EKG's are interpreted by the Emergency Department Physician in the absence of a cardiologist.  Please see their note for interpretation of EKG.     RADIOLOGY:   Non-plain film images such as CT, Ultrasound and MRI are read by the radiologist. Plain radiographic images are visualized and preliminarily interpreted by the ED Provider with the below findings:        Interpretation per the Radiologist below, if available at the time of this note:    CT ABDOMEN PELVIS W IV CONTRAST Additional Contrast? None   Final Result   Mild bilateral hydroureteronephrosis and urothelial enhancement with subtle   area of decreased cortical enhancement in the superior pole the left kidney,   findings may reflect ascending infection and pyelonephritis in the correct   clinical setting. Air is noted within the urinary bladder likely from Pulido catheter insertion. Cystitis cannot be excluded. Questionable thickening of the gastric antrum which may reflect gastritis in   the correct clinical setting. XR CHEST PORTABLE   Final Result   No radiographic evidence of acute pulmonary disease. XR CHEST PORTABLE    Result Date: 9/2/2021  EXAMINATION: ONE XRAY VIEW OF THE CHEST 9/1/2021 11:48 pm COMPARISON: Chest x-ray dated 07/16/2021. HISTORY: ORDERING SYSTEM PROVIDED HISTORY: SOB TECHNOLOGIST PROVIDED HISTORY: Reason for exam:->SOB Reason for Exam: shortness of breath Acuity: Acute Type of Exam: Initial Relevant Medical/Surgical History: previous MI,hypertension,pneumonia and CHF FINDINGS: HEART/MEDIASTINUM: The cardiomediastinal silhouette is within normal limits. PLEURA/LUNGS: There are no focal consolidations or pleural effusions. There is no appreciable pneumothorax. There are chronic interstitial markings. BONES/SOFT TISSUE: No acute abnormality. No radiographic evidence of acute pulmonary disease. PROCEDURES   Nursing staff unable to place peripheral IV after multiple attempts. This provider was able to place a 20-gauge to the volar aspect of the left forearm using aseptic technique. Blood work obtained. IV flushed easily with 10 mL of 0.9% normal saline. Patient tolerated this procedure well.   Procedures    CRITICAL CARE TIME   The total critical care time spent while evaluating and treating this patient was at least 31 minutes. This excludes time spent doing separately billable procedures. This includes time at the bedside, data interpretation, medication management, obtaining critical history from collateral sources if the patient is unable to provide it directly, and physician consultation. Specifics of interventions taken and potentially life-threatening diagnostic considerations are listed above in the medical decision making.         CONSULTS:  IP CONSULT TO HOSPITALIST      EMERGENCY DEPARTMENT COURSE and DIFFERENTIAL DIAGNOSIS/MDM:   Vitals:    Vitals:    09/02/21 0145 09/02/21 0200 09/02/21 0230 09/02/21 0245   BP: 103/79 122/71     Pulse: 96 100 103 100   Resp: 20 19 21 19   Temp:       SpO2: 96% 100%         Patient was given the following medications:  Medications   meropenem (MERREM) 1,000 mg in sodium chloride 0.9 % 100 mL IVPB (mini-bag) (has no administration in time range)   promethazine (PHENERGAN) injection 12.5 mg (has no administration in time range)   morphine (PF) injection 2 mg (has no administration in time range)   lactated ringers infusion (has no administration in time range)   ondansetron (ZOFRAN) injection 4 mg (4 mg IntraVENous Given 9/2/21 0047)   famotidine (PEPCID) injection 20 mg (20 mg IntraVENous Given 9/2/21 0049)   morphine injection 4 mg (4 mg IntraVENous Given 9/2/21 0049)   0.9 % sodium chloride bolus (0 mLs IntraVENous Stopped 9/2/21 0223)   cefTRIAXone (ROCEPHIN) 1000 mg in sterile water 10 mL IV syringe (1,000 mg IntraVENous Given 9/2/21 0219)   iopamidol (ISOVUE-370) 76 % injection 75 mL (75 mLs IntraVENous Given 9/2/21 0215)   insulin regular (HUMULIN R;NOVOLIN R) injection 10 Units (10 Units IntraVENous Given 9/2/21 0245)        This is a 57-year-old  female with history of multiple comorbid's including insulin-dependent diabetes, coronary artery disease, hyperlipidemia, hypertension, and CHF who presents to the emergency department today reporting vomiting, diarrhea, and abdominal pain which began today. She has been unable to keep any food or fluids down today. Patient arrives tachycardic and tachypneic. Temp is 99.6. Laboratory studies notable for a glucose of 667 without evidence of DKA. White count is 14.1. Initial lactic acid is 2.2. Urinalysis consistent with UTI. Patient given IV normal saline 2 L, IV insulin,  IV Rocephin, IV Zofran, and IV morphine. CT abdomen pelvis interpreted by radiologist shows findings concerning for possible ascending urinary tract infection. Patient has remained stable throughout ED visit. At this time there is no evidence of any life-threatening or emergent conditions requiring immediate intervention however given patient's history and ED work-up I do feel patient will benefit from admission for further evaluation and treatment. Spoke with hospitalist Dr. Munoz Or who agrees to admit patient. Antibiotic being switched to meropenem based on patient's recent cultures. Patient is in agreement with plan of care. FINAL IMPRESSION      1. Hyperglycemia    2. Nausea and vomiting, intractability of vomiting not specified, unspecified vomiting type    3. Urinary tract infection with hematuria, site unspecified          DISPOSITION/PLAN   DISPOSITION        PATIENT REFERRED TO:  No follow-up provider specified.     DISCHARGE MEDICATIONS:  New Prescriptions    No medications on file       DISCONTINUED MEDICATIONS:  Discontinued Medications    No medications on file              (Please note that portions of this note were completed with a voice recognition program.  Efforts were made to edit the dictations but occasionally words are mis-transcribed.)    CHINA Norris CNP (electronically signed)            CHINA Norris CNP  09/02/21 9248 [facility] APRN - CNP  09/02/21 [id number]

## 2021-09-02 NOTE — PROGRESS NOTES
By nursing patient blood pressure 52/28 is lethargic. Patient rapid response called earlier today was given fluid bolus and responded. On exam patient lethargic with significant sternal rub and does open her eyes not oriented x3. Patient tachycardic regular rhythm. No murmurs appreciated. Patient lungs clear to auscultation bilaterally abdomen soft nontender. Cap refill sluggish greater than 2 seconds dorsal pedis pulse faint on left leg right AKA. dry mucous membrane      Septic shock secondary to pyelonephritis with mild bilateral hydronephrosis  - bolus 2L NS   - then 100cc/hr  - levophed for map>65  - will get urology input no obstruction via stone on imaging but bilateral mild hydro  - continue zosyn per cultures last month should be sensitive  - repeat lactic acid now, cbc, cmp    Hypogylcemia: recheck       Discussed care with patient's daughter Farzaneh Issa. Let her know that her mother is severely ill. Did attempt to make contact with other 2 siblings however did not .   Per daughter patient would want all aggressive measures including CPR intubation if required          WBC increasing will change atbx to meropenem for now  Hypokalemia replace  Hypomagnesemia replace      I spent 35 minutes providing critical care services to this hemodynamically unstable patient

## 2021-09-02 NOTE — PROGRESS NOTES
This nurse noticed pt was more lethargic during afternoon VS monitoring. BP in 70/50s. . Rapid response called at 1300. Bolus of iv fluids administered along with 10 units of humalog. VS rechecked at 1400 with BP 97/64. Pt remains responsive and stable.

## 2021-09-02 NOTE — PLAN OF CARE
Patient with PMHx of uncontrolled diabetes mellitus, HFrEF (last EF 25 to 30%), CAD, hyperlipidemia, hypertension right BKA, secondary adrenal insufficiency and recent admission for emphysematous cystitis (left AGAINST MEDICAL ADVICE)  admitted for hyperglycemia and severe abdominal pain. On admission, patient was febrile. Initial diagnostic lab work showed elevated WBCs, lactic acid: 3.4, blood glucose: 600, bicarb 26, creatinine 0.8. CT abdomen and pelvis concerning for emphysematous cystitis. Patient received 10 units of insulin, IV antibiotics and 2 L of IV fluids in the ED. Rapid response called around 1 PM for low blood pressure, difficulty to arouse and high blood glucose level. BP was 90/60 after receiving 500 mL bolus of LR per stat physician orders. Repeat blood glucose was 363. All the sedating medications were held, insulin was adjusted. Chest x-ray and stat labs were ordered. Repeat lactic acid trended up to 5.3. Orders were placed to transfer the patient to the ICU for close monitoring due to worsening status and possible need for pressors.

## 2021-09-02 NOTE — PROGRESS NOTES
Rapid Response Quick Summary    Room: 8-6764/6611-58    Assessment of concern / patient: Staff reports low BP and elevated blood sugar, patient difficult to arouse     Physician involved:  Dr. Charmaine Zamorano and Dr. Laboy Last     Interventions: Monitoring of vitals and oxygen levels, blood sugar checked, ammonia inhalant waved under nose ( patient opened eyes and was oriented but lethargic), administration of a NS fluid bolus, patient had a small green bile emesis, pharmacist present in the room discussing treatment of blood sugars with the MD .      Disposition:   To remain in current room

## 2021-09-03 LAB
A/G RATIO: 0.5 (ref 1.1–2.2)
ALBUMIN SERPL-MCNC: 2.3 G/DL (ref 3.4–5)
ALP BLD-CCNC: 134 U/L (ref 40–129)
ALT SERPL-CCNC: 10 U/L (ref 10–40)
AMPHETAMINE SCREEN, URINE: ABNORMAL
ANION GAP SERPL CALCULATED.3IONS-SCNC: 7 MMOL/L (ref 3–16)
AST SERPL-CCNC: 19 U/L (ref 15–37)
BARBITURATE SCREEN URINE: ABNORMAL
BASOPHILS ABSOLUTE: 0 K/UL (ref 0–0.2)
BASOPHILS RELATIVE PERCENT: 0.2 %
BENZODIAZEPINE SCREEN, URINE: ABNORMAL
BILIRUB SERPL-MCNC: 0.4 MG/DL (ref 0–1)
BUN BLDV-MCNC: 20 MG/DL (ref 7–20)
CALCIUM SERPL-MCNC: 7.8 MG/DL (ref 8.3–10.6)
CANNABINOID SCREEN URINE: POSITIVE
CHLORIDE BLD-SCNC: 105 MMOL/L (ref 99–110)
CO2: 26 MMOL/L (ref 21–32)
COCAINE METABOLITE SCREEN URINE: ABNORMAL
CREAT SERPL-MCNC: 0.5 MG/DL (ref 0.6–1.1)
EOSINOPHILS ABSOLUTE: 0.1 K/UL (ref 0–0.6)
EOSINOPHILS RELATIVE PERCENT: 0.5 %
GFR AFRICAN AMERICAN: >60
GFR NON-AFRICAN AMERICAN: >60
GLOBULIN: 4.3 G/DL
GLUCOSE BLD-MCNC: 101 MG/DL (ref 70–99)
GLUCOSE BLD-MCNC: 106 MG/DL (ref 70–99)
GLUCOSE BLD-MCNC: 114 MG/DL (ref 70–99)
GLUCOSE BLD-MCNC: 115 MG/DL (ref 70–99)
GLUCOSE BLD-MCNC: 116 MG/DL (ref 70–99)
GLUCOSE BLD-MCNC: 128 MG/DL (ref 70–99)
GLUCOSE BLD-MCNC: 132 MG/DL (ref 70–99)
HCT VFR BLD CALC: 38.7 % (ref 36–48)
HEMOGLOBIN: 12.7 G/DL (ref 12–16)
LACTIC ACID: 1.4 MMOL/L (ref 0.4–2)
LYMPHOCYTES ABSOLUTE: 0.8 K/UL (ref 1–5.1)
LYMPHOCYTES RELATIVE PERCENT: 6.2 %
Lab: ABNORMAL
MAGNESIUM: 1.9 MG/DL (ref 1.8–2.4)
MCH RBC QN AUTO: 27.5 PG (ref 26–34)
MCHC RBC AUTO-ENTMCNC: 32.8 G/DL (ref 31–36)
MCV RBC AUTO: 83.9 FL (ref 80–100)
METHADONE SCREEN, URINE: ABNORMAL
MONOCYTES ABSOLUTE: 0.6 K/UL (ref 0–1.3)
MONOCYTES RELATIVE PERCENT: 4.4 %
NEUTROPHILS ABSOLUTE: 11.9 K/UL (ref 1.7–7.7)
NEUTROPHILS RELATIVE PERCENT: 88.7 %
OPIATE SCREEN URINE: ABNORMAL
OXYCODONE URINE: ABNORMAL
PDW BLD-RTO: 17.4 % (ref 12.4–15.4)
PERFORMED ON: ABNORMAL
PH UA: 6
PHENCYCLIDINE SCREEN URINE: ABNORMAL
PHOSPHORUS: 2.1 MG/DL (ref 2.5–4.9)
PLATELET # BLD: 90 K/UL (ref 135–450)
PMV BLD AUTO: 10.6 FL (ref 5–10.5)
POTASSIUM SERPL-SCNC: 3.9 MMOL/L (ref 3.5–5.1)
PROPOXYPHENE SCREEN: ABNORMAL
RBC # BLD: 4.62 M/UL (ref 4–5.2)
SARS-COV-2: NOT DETECTED
SODIUM BLD-SCNC: 138 MMOL/L (ref 136–145)
TOTAL PROTEIN: 6.6 G/DL (ref 6.4–8.2)
WBC # BLD: 13.4 K/UL (ref 4–11)

## 2021-09-03 PROCEDURE — 83735 ASSAY OF MAGNESIUM: CPT

## 2021-09-03 PROCEDURE — 2580000003 HC RX 258: Performed by: INTERNAL MEDICINE

## 2021-09-03 PROCEDURE — 83605 ASSAY OF LACTIC ACID: CPT

## 2021-09-03 PROCEDURE — 2000000000 HC ICU R&B

## 2021-09-03 PROCEDURE — 6360000002 HC RX W HCPCS: Performed by: INTERNAL MEDICINE

## 2021-09-03 PROCEDURE — 80307 DRUG TEST PRSMV CHEM ANLYZR: CPT

## 2021-09-03 PROCEDURE — C9113 INJ PANTOPRAZOLE SODIUM, VIA: HCPCS | Performed by: INTERNAL MEDICINE

## 2021-09-03 PROCEDURE — 6370000000 HC RX 637 (ALT 250 FOR IP): Performed by: INTERNAL MEDICINE

## 2021-09-03 PROCEDURE — 99233 SBSQ HOSP IP/OBS HIGH 50: CPT | Performed by: INTERNAL MEDICINE

## 2021-09-03 PROCEDURE — 80053 COMPREHEN METABOLIC PANEL: CPT

## 2021-09-03 PROCEDURE — 84100 ASSAY OF PHOSPHORUS: CPT

## 2021-09-03 PROCEDURE — 85025 COMPLETE CBC W/AUTO DIFF WBC: CPT

## 2021-09-03 PROCEDURE — 36415 COLL VENOUS BLD VENIPUNCTURE: CPT

## 2021-09-03 RX ORDER — TRAMADOL HYDROCHLORIDE 50 MG/1
50 TABLET ORAL EVERY 6 HOURS PRN
Status: DISCONTINUED | OUTPATIENT
Start: 2021-09-03 | End: 2021-09-04 | Stop reason: HOSPADM

## 2021-09-03 RX ORDER — DICYCLOMINE HYDROCHLORIDE 10 MG/1
20 CAPSULE ORAL EVERY 6 HOURS PRN
Status: DISCONTINUED | OUTPATIENT
Start: 2021-09-03 | End: 2021-09-04 | Stop reason: HOSPADM

## 2021-09-03 RX ORDER — PANTOPRAZOLE SODIUM 40 MG/10ML
40 INJECTION, POWDER, LYOPHILIZED, FOR SOLUTION INTRAVENOUS DAILY
Status: DISCONTINUED | OUTPATIENT
Start: 2021-09-03 | End: 2021-09-04 | Stop reason: HOSPADM

## 2021-09-03 RX ORDER — TRAMADOL HYDROCHLORIDE 50 MG/1
100 TABLET ORAL EVERY 6 HOURS PRN
Status: DISCONTINUED | OUTPATIENT
Start: 2021-09-03 | End: 2021-09-04 | Stop reason: HOSPADM

## 2021-09-03 RX ADMIN — PROMETHAZINE HYDROCHLORIDE 6.25 MG: 25 INJECTION INTRAMUSCULAR; INTRAVENOUS at 18:32

## 2021-09-03 RX ADMIN — PREGABALIN 100 MG: 25 CAPSULE ORAL at 21:35

## 2021-09-03 RX ADMIN — MECLIZINE 12.5 MG: 12.5 TABLET ORAL at 04:50

## 2021-09-03 RX ADMIN — TRAMADOL HYDROCHLORIDE 100 MG: 50 TABLET, FILM COATED ORAL at 14:49

## 2021-09-03 RX ADMIN — PREGABALIN 100 MG: 25 CAPSULE ORAL at 10:17

## 2021-09-03 RX ADMIN — MIRTAZAPINE 7.5 MG: 15 TABLET, FILM COATED ORAL at 21:35

## 2021-09-03 RX ADMIN — Medication 10 ML: at 21:35

## 2021-09-03 RX ADMIN — ATORVASTATIN CALCIUM 40 MG: 40 TABLET, FILM COATED ORAL at 10:18

## 2021-09-03 RX ADMIN — PANTOPRAZOLE SODIUM 40 MG: 40 INJECTION, POWDER, FOR SOLUTION INTRAVENOUS at 10:18

## 2021-09-03 RX ADMIN — ATORVASTATIN CALCIUM 40 MG: 40 TABLET, FILM COATED ORAL at 21:35

## 2021-09-03 RX ADMIN — TICAGRELOR 90 MG: 90 TABLET ORAL at 21:34

## 2021-09-03 RX ADMIN — DICYCLOMINE HYDROCHLORIDE 20 MG: 10 CAPSULE ORAL at 11:41

## 2021-09-03 RX ADMIN — SODIUM CHLORIDE: 9 INJECTION, SOLUTION INTRAVENOUS at 11:38

## 2021-09-03 RX ADMIN — ASPIRIN 81 MG: 81 TABLET, COATED ORAL at 10:17

## 2021-09-03 RX ADMIN — MEROPENEM 1000 MG: 1 INJECTION, POWDER, FOR SOLUTION INTRAVENOUS at 11:40

## 2021-09-03 RX ADMIN — TRAMADOL HYDROCHLORIDE 100 MG: 50 TABLET, FILM COATED ORAL at 21:21

## 2021-09-03 RX ADMIN — ENOXAPARIN SODIUM 40 MG: 40 INJECTION SUBCUTANEOUS at 10:15

## 2021-09-03 RX ADMIN — PROMETHAZINE HYDROCHLORIDE 6.25 MG: 25 INJECTION INTRAMUSCULAR; INTRAVENOUS at 10:15

## 2021-09-03 RX ADMIN — CARVEDILOL 3.12 MG: 3.12 TABLET, FILM COATED ORAL at 18:22

## 2021-09-03 RX ADMIN — SODIUM CHLORIDE: 9 INJECTION, SOLUTION INTRAVENOUS at 14:34

## 2021-09-03 RX ADMIN — POTASSIUM CHLORIDE 10 MEQ: 7.46 INJECTION, SOLUTION INTRAVENOUS at 04:50

## 2021-09-03 RX ADMIN — POTASSIUM CHLORIDE 10 MEQ: 7.46 INJECTION, SOLUTION INTRAVENOUS at 01:30

## 2021-09-03 RX ADMIN — MEROPENEM 1000 MG: 1 INJECTION, POWDER, FOR SOLUTION INTRAVENOUS at 21:37

## 2021-09-03 RX ADMIN — TICAGRELOR 90 MG: 90 TABLET ORAL at 10:17

## 2021-09-03 ASSESSMENT — ENCOUNTER SYMPTOMS
CHEST TIGHTNESS: 0
EYE REDNESS: 0
PHOTOPHOBIA: 0
NAUSEA: 0
FACIAL SWELLING: 0
RHINORRHEA: 0
SHORTNESS OF BREATH: 0
COLOR CHANGE: 0
CHOKING: 0
TROUBLE SWALLOWING: 0
DIARRHEA: 0
EYE DISCHARGE: 0
ABDOMINAL PAIN: 1
COUGH: 0
STRIDOR: 0
BLOOD IN STOOL: 0
APNEA: 0

## 2021-09-03 ASSESSMENT — PAIN SCALES - GENERAL
PAINLEVEL_OUTOF10: 0
PAINLEVEL_OUTOF10: 0
PAINLEVEL_OUTOF10: 10
PAINLEVEL_OUTOF10: 5
PAINLEVEL_OUTOF10: 10
PAINLEVEL_OUTOF10: 10
PAINLEVEL_OUTOF10: 0
PAINLEVEL_OUTOF10: 0
PAINLEVEL_OUTOF10: 9

## 2021-09-03 NOTE — PROGRESS NOTES
Reassessment complete, see flowsheets. Pt fatigued. NSR on monitor. 1L nasal cannula. Pulido intact and draining, see flowsheets. NS gtt infusing, see MAR. Pt bathed and repositioned for comfort. Bed in lowest position and alarm on. Call light within reach. Pt had episode of emesis-PRN Meclizine given @0450, see MAR. Will continue to monitor.

## 2021-09-03 NOTE — PROGRESS NOTES
Pt BP dropping. Pt awakens easily but fatigued. Perfectserve message sent to  regarding CVC placement. Per Dr. Carmencita Jacobo restart Levophed peripherally, see MAR. Will continue to monitor.

## 2021-09-03 NOTE — PROGRESS NOTES
Droplet precautions discontinued Covid Negative per PCR. Meal ordered. Ginger Ale Diet to bedside now. Pt. To call daughters to let them know she is doing better. Tramadol now. Order for a urine drug screen.

## 2021-09-03 NOTE — PROGRESS NOTES
meropenem per the recs    UTI: Urine culture grew E. Coli  Continue meropenem awaiting cultures and sensitivity    Diabetes mellitus: Uncontrolled   on admission  Hemoglobin A1c 10.9 on 9/2/2021  Continue Lantus and SSI  Monitor blood glucose closely    Acute metabolic encephalopathy: Resolving    Acute combined systolic and diastolic CHF  Echo on 5/9153 showed EF of 20 to 25% and G2 DD moderate severe mitral regurgitation  Continue beta-blockers and ACE better    Hx of heroin abuse  Started on opiate withdrawal protocol    Nausea and vomiting; likely secondary to opiate withdrawal  As needed promethazine ordered  Holding Zofran for prolonged QTC    Prolonged QTc noted on EKG. Will avoid QT-prolonging medications. holding home dose of Zoloft and Remeron. Patient is on Phenergan for nausea (as there is no alternative formulary without QT prolongation at this time). Other comorbidities: Moderate to severe mitral regurgitation, history of coronary artery disease, hyperlipidemia, essential hypertension: Status post right above-knee amputation. DVT PPX: Lovenox  Code:Full Code  Diet: ADULT DIET; Regular; 4 carb choices (60 gm/meal);  Low Sodium (2 gm)    Disposition:     Current Medications  [Held by provider] morphine (PF) injection 2 mg, Q4H PRN  aspirin EC tablet 81 mg, Daily  atorvastatin (LIPITOR) tablet 40 mg, Nightly  [Held by provider] carvedilol (COREG) tablet 3.125 mg, BID WC  [Held by provider] lisinopril (PRINIVIL;ZESTRIL) tablet 5 mg, Daily  mirtazapine (REMERON) tablet 7.5 mg, Nightly  pregabalin (LYRICA) capsule 100 mg, BID  ticagrelor (BRILINTA) tablet 90 mg, BID  dextrose 5 % solution, PRN  sodium chloride flush 0.9 % injection 5-40 mL, 2 times per day  sodium chloride flush 0.9 % injection 10 mL, PRN  0.9 % sodium chloride infusion, PRN  enoxaparin (LOVENOX) injection 40 mg, Daily  polyethylene glycol (GLYCOLAX) packet 17 g, Daily PRN  acetaminophen (TYLENOL) tablet 650 mg, Q6H PRN Or  acetaminophen (TYLENOL) suppository 650 mg, Q6H PRN  promethazine (PHENERGAN) injection 6.25 mg, Q6H PRN  meclizine (ANTIVERT) tablet 12.5 mg, TID PRN  glucose (GLUTOSE) 40 % oral gel 15 g, PRN  dextrose 50 % IV solution, PRN  glucagon (rDNA) injection 1 mg, PRN  dextrose 5 % solution, PRN  0.9 % sodium chloride infusion, Continuous  0.9 % sodium chloride infusion, Continuous  norepinephrine (LEVOPHED) 16 mg in dextrose 5% 250 mL infusion, Continuous  meropenem (MERREM) 1,000 mg in sodium chloride 0.9 % 100 mL IVPB (mini-bag), Q8H  potassium chloride 10 mEq/100 mL IVPB (Peripheral Line), Once  insulin glargine (LANTUS;BASAGLAR) injection pen 15 Units, Nightly  insulin lispro (1 Unit Dial) 0-6 Units, TID WC  insulin lispro (1 Unit Dial) 0-3 Units, Nightly  potassium chloride (KLOR-CON M) extended release tablet 40 mEq, PRN   Or  potassium bicarb-citric acid (EFFER-K) effervescent tablet 40 mEq, PRN   Or  potassium chloride 10 mEq/100 mL IVPB (Peripheral Line), PRN  magnesium sulfate 1000 mg in dextrose 5% 100 mL IVPB, PRN  sodium phosphate 8.34 mmol in dextrose 5 % 250 mL IVPB, PRN   Or  sodium phosphate 16.71 mmol in dextrose 5 % 250 mL IVPB, PRN        Objective:  /74   Pulse 88   Temp 98.8 °F (37.1 °C) (Temporal)   Resp 23   Ht 5' 2\" (1.575 m)   Wt 121 lb 4.1 oz (55 kg)   LMP 11/15/2017   SpO2 99%   BMI 22.18 kg/m²     Intake/Output Summary (Last 24 hours) at 9/3/2021 0827  Last data filed at 9/3/2021 0600  Gross per 24 hour   Intake 1186 ml   Output 2600 ml   Net -1414 ml      Wt Readings from Last 3 Encounters:   09/03/21 121 lb 4.1 oz (55 kg)   07/22/21 119 lb 9.6 oz (54.3 kg)   04/30/21 143 lb 11.2 oz (65.2 kg)       Physical Examination:   General appearance: No apparent distress appears stated age and cooperative. HEENT Normal cephalic, atraumatic without obvious deformity. Pupils equal, round, and reactive to light. Extra ocular muscles intact. Conjunctivae/corneas clear.   Lungs: Clear to auscultation, bilaterally without Rales/Wheezes/Rhonchi with good respiratory effort. Heart: Regular rate and rhythm with Normal S1/S2 without murmurs, rubs or gallops, point of maximum impulse non-displaced  Abdomen: Soft, non-tender or non-distended without rigidity or guarding and positive bowel sounds all four quadrants. Extremities: No clubbing, cyanosis, or edema bilaterally. Full range of motion without deformity and normal gait intact. Neurologic: Alert and oriented X 3, neurovascularly intact with sensory/motor intact upper extremities/lower extremities, bilaterally. Cranial nerves: II-XII intact, grossly non-focal.  Psychiatry: Appropriate affect. Not agitated  Skin: Warm, dry, normal turgor, no rash  Brisk capillary refill, peripheral pulses palpable     Labs and Tests:  CBC:   Recent Labs     09/02/21 0040 09/02/21 1736 09/03/21  0617   WBC 14.1* 18.0* 13.4*   HGB 13.7 11.4* 12.7   PLT 98* 110* 90*     BMP:    Recent Labs     09/02/21 0040 09/02/21 1736 09/03/21  0617   * 136 138   K 3.7 3.0* 3.9   CL 89* 101 105   CO2 26 24 26   BUN 28* 24* 20   CREATININE 0.8 0.8 0.5*   GLUCOSE 667* 138* 114*     Hepatic:   Recent Labs     09/02/21 0040 09/02/21 1736 09/03/21  0617   AST 18 14* 19   ALT 13 10 10   BILITOT 0.8 0.3 0.4   ALKPHOS 210* 115 134*     XR CHEST PORTABLE   Final Result   Interval worsening with mild-to-moderate interstitial congestion         CT ABDOMEN PELVIS W IV CONTRAST Additional Contrast? None   Final Result   Mild bilateral hydroureteronephrosis and urothelial enhancement with subtle   area of decreased cortical enhancement in the superior pole the left kidney,   findings may reflect ascending infection and pyelonephritis in the correct   clinical setting. Air is noted within the urinary bladder likely from Pulido catheter insertion. Cystitis cannot be excluded.       Questionable thickening of the gastric antrum which may reflect gastritis in   the correct

## 2021-09-03 NOTE — PROGRESS NOTES
Both peripheral Iv's not working and removed without complications. Attempted to gain IV access by 2 RN's but unsuccessful.  unable to obtain labs.  to bedside. Per -stop fluids for now and PICC team (or DIT service) will place peripheral IV until ID states pt is okay to receive PICC placement. @0700-call made to DIT service to place peripheral IV. Per DIT tech, order is placed. Will continue to monitor.

## 2021-09-03 NOTE — PROGRESS NOTES
Reassessment complete, see flowsheets. Pt fatigued. NSR on monitor. 1L nasal cannula. Pulido intact and draining, see flowsheets. NS and Levophed gtt infusing, see MAR. Pt repositioned for comfort. Bed in lowest position and alarm on. Call light within reach. Will continue to monitor.

## 2021-09-03 NOTE — CONSULTS
Urology Consult Note  [facility]     Patient: Maxi Carter MRN: [id number]  Room/Bed: [facility]-8954/5826-72   YOB: 1965  Age/Sex: 60 y. o.female  Admission Date: 9/1/2021     Date of Service:  9/3/2021    Consulting Provider: CHINA Thomas - CNP  Admitting/Requesting Physician: Deborah Franklin MD  Primary Care Physician: No primary care provider on file. Reason for Consult: Acute Emphysematous Cystitis, Bilateral Hydronephrosis, Left Pyelonephritis     ASSESSMENT/PLAN     Acute Emphysematous Cystitis- recent dx in July but left AMA  CT a/p shows bilat hydrouretero   Cr stable 0.5  E.coli bacteremia  Leukocytosis 13.4 today, afebrile  Complains of generalized abdominal pain today    Recommendations:  -No urgent  intervention, hydroureteronephrosis 2/2 to UTI  -Continue treatment of E. Coli bacteremia and UTI, appreciate ID recommendations  -Continue luke catheter, VT per medical team when infection improves  -Urology will continue to monitor patient     All the patients questions were answered. She understands the plan as listed above. HISTORY     Chief Complaint:   Chief Complaint   Patient presents with    Abdominal Pain     abdominal pain that started today, fatigue NVD. O2 88% on room air. hx of COPD. Pt is poor historian at this time. History of Present Illness: Maxi Carter is a 60 y.o. female with emphysematous cystitis and bilateral hydrouretero. Onset of symptoms was days ago with imrpoiving course since that time. Symptoms are aggravated by leaving AMA. Symptoms improved with abx. Associated symptoms include generalized abdominal pain. Patient also reports nausea. She has tried the following treatments: abx and fluids.     Past Medical History:  She has a past medical history of Arthritis, CHF (congestive heart failure) ([facility] [address] Depression, Diabetes mellitus ([facility] [address] Heart attack ([facility] [address] Hepatitis C, Hypercholesteremia (3/11/2020), Hypertension, MRSA (methicillin resistant Staphylococcus aureus) infection (in 2000), Pneumonia, and S/P colonoscopy with polypectomy (2/11). Past Surgical History:  She has a past surgical history that includes Knee arthroscopy; Revision total knee arthroplasty (2/9/11); joint replacement (2006, 2010); amputation; above knee amputation; Upper gastrointestinal endoscopy (11/13/2018); Upper gastrointestinal endoscopy (N/A, 11/13/2018); pr lap,cholecystectomy (N/A, 11/14/2018); liver biopsy (N/A, 11/14/2018); and Coronary angioplasty with stent (07/2019). Allergies: Allergies   Allergen Reactions    Darvocet [Propoxyphene N-Acetaminophen] Nausea Only    Naprosyn [Naproxen] Rash    Ultram [Tramadol Hcl] Rash        Social History:  She reports that she quit smoking about 14 months ago. She has a 43.00 pack-year smoking history. She has never used smokeless tobacco. She reports previous drug use. Drug: Opiates . She reports that she does not drink alcohol. Family History:  family history includes Cancer in her father and sister; Seizures in her brother.     Medications:  Scheduled Meds:   aspirin  81 mg Oral Daily    atorvastatin  40 mg Oral Nightly    [Held by provider] carvedilol  3.125 mg Oral BID WC    [Held by provider] lisinopril  5 mg Oral Daily    mirtazapine  7.5 mg Oral Nightly    pregabalin  100 mg Oral BID    ticagrelor  90 mg Oral BID    sodium chloride flush  5-40 mL IntraVENous 2 times per day    enoxaparin  40 mg SubCUTAneous Daily    meropenem  1,000 mg IntraVENous Q8H    potassium chloride  40 mEq IntraVENous Once    insulin glargine  15 Units SubCUTAneous Nightly    insulin lispro  0-6 Units SubCUTAneous TID WC    insulin lispro  0-3 Units SubCUTAneous Nightly     Continuous Infusions:   dextrose      sodium chloride 25 mL (09/02/21 0644)    dextrose      sodium chloride 50 mL/hr at 09/02/21 1708    sodium chloride Stopped (09/03/21 0530)    norepinephrine Stopped (09/03/21 0215) MCH 27.5 09/03/2021    MCHC 32.8 09/03/2021    RDW 17.4 09/03/2021    PLT 90 09/03/2021    MPV 10.6 09/03/2021     BMP:   Lab Results   Component Value Date     09/03/2021    K 3.9 09/03/2021    K 3.7 09/02/2021     09/03/2021    CO2 26 09/03/2021    BUN 20 09/03/2021    CREATININE 0.5 09/03/2021    GLUCOSE 114 09/03/2021    GLUCOSE 329 05/20/2011    CALCIUM 7.8 09/03/2021     Urinalysis:   Lab Results   Component Value Date    COLORU YELLOW 09/02/2021    GLUCOSEU >=1000 09/02/2021    GLUCOSEU NEGATIVE 02/14/2012    BLOODU LARGE 09/02/2021    NITRU Negative 09/02/2021    LEUKOCYTESUR LARGE 09/02/2021     Urine culture: No results for input(s): LABURIN in the last 72 hours. IMAGING     CT ABDOMEN PELVIS W IV CONTRAST Additional Contrast? None    Result Date: 9/2/2021  EXAMINATION: CT OF THE ABDOMEN AND PELVIS WITH CONTRAST 9/2/2021 1:58 am TECHNIQUE: CT of the abdomen and pelvis was performed with the administration of intravenous contrast. Multiplanar reformatted images are provided for review. Dose modulation, iterative reconstruction, and/or weight based adjustment of the mA/kV was utilized to reduce the radiation dose to as low as reasonably achievable. COMPARISON: CT abdomen and pelvis dated 07/16/2021. HISTORY: ORDERING SYSTEM PROVIDED HISTORY: abd pain/vomiting TECHNOLOGIST PROVIDED HISTORY: Reason for exam:->abd pain/vomiting Additional Contrast?->None Decision Support Exception - unselect if not a suspected or confirmed emergency medical condition->Emergency Medical Condition (MA) Reason for Exam: abd pain/vomiting Acuity: Acute Type of Exam: Initial Relevant Medical/Surgical History: Abdominal Pain (abdominal pain that started today, fatigue NVD. O2 88% on room air. hx of COPD. Pt is poor historian at this time. ) FINDINGS: CARDIOVASCULAR: The heart is normal in size. There is no pericardial effusion. The aorta and branch vessels are patent and normal in caliber.  LUNG BASES: There are no focal consolidations or pleural effusions. Scarring versus atelectasis again noted in the lingula. There is bibasilar atelectasis. HEPATOBILIARY: The liver is normal in size. There are no focal hepatic lesions. The patient is status post cholecystectomy. There is expected prominence of the biliary system. SPLEEN: Unremarkable. PANCREAS: The pancreas is atrophic. ADRENAL GLANDS: Unremarkable. KIDNEYS: Kidneys are normal in size and contour. Subtle area of decreased cortical enhancement in the superior pole the left kidney, which may reflect pyelonephritis in the correct clinical setting. There is mild bilateral hydronephrosis and there is urothelial enhancement which may reflect ascending infection. ABDOMINAL NODES: No adenopathy is appreciated. PELVIC ORGANS: The urinary bladder is collapsed around a Pulido catheter. Air is noted within the urinary bladder likely from Pulido catheter insertion. Cystitis cannot be excluded. The uterus is unremarkable. PERITONEUM/MESENTERY/BOWEL: There is questionable thickening of the gastric antrum which may reflect gastritis. There is no bowel obstruction. There is no bowel wall thickening. BONES/SOFT TISSUES: There is no acute osseous or soft tissue abnormality. Mild bilateral hydroureteronephrosis and urothelial enhancement with subtle area of decreased cortical enhancement in the superior pole the left kidney, findings may reflect ascending infection and pyelonephritis in the correct clinical setting. Air is noted within the urinary bladder likely from Pulido catheter insertion. Cystitis cannot be excluded. Questionable thickening of the gastric antrum which may reflect gastritis in the correct clinical setting.      XR CHEST PORTABLE    Result Date: 9/2/2021  EXAMINATION: ONE XRAY VIEW OF THE CHEST 9/2/2021 1:21 pm COMPARISON: Yesterday's exam HISTORY: ORDERING SYSTEM PROVIDED HISTORY: pulmonary edema TECHNOLOGIST PROVIDED HISTORY: Reason for exam:->pulmonary edema Reason for Exam: pulmonary edema Acuity: Unknown Type of Exam: Unknown FINDINGS: Mild cardiomegaly. Mild-to-moderate interstitial congestion noted slight interval worsening. No pneumothorax. Mild osteopenic changes and degenerative changes identified in the bony structures. .     Interval worsening with mild-to-moderate interstitial congestion     XR CHEST PORTABLE    Result Date: 9/2/2021  EXAMINATION: ONE XRAY VIEW OF THE CHEST 9/1/2021 11:48 pm COMPARISON: Chest x-ray dated 07/16/2021. HISTORY: ORDERING SYSTEM PROVIDED HISTORY: SOB TECHNOLOGIST PROVIDED HISTORY: Reason for exam:->SOB Reason for Exam: shortness of breath Acuity: Acute Type of Exam: Initial Relevant Medical/Surgical History: previous MI,hypertension,pneumonia and CHF FINDINGS: HEART/MEDIASTINUM: The cardiomediastinal silhouette is within normal limits. PLEURA/LUNGS: There are no focal consolidations or pleural effusions. There is no appreciable pneumothorax. There are chronic interstitial markings. BONES/SOFT TISSUE: No acute abnormality. No radiographic evidence of acute pulmonary disease.             Electronically signed by: CHINA Zuñiga CNP, 9/3/2021   [facility]  Office contact: [phone]

## 2021-09-03 NOTE — PROGRESS NOTES
Pt. With nausea and vomiting. Had Meclizine at 0500 which was not effective. Not able to take PO meds at this time.

## 2021-09-03 NOTE — PLAN OF CARE
Problem: Skin Integrity:  Goal: Will show no infection signs and symptoms  Description: Will show no infection signs and symptoms  Outcome: Ongoing  Goal: Absence of new skin breakdown  Description: Absence of new skin breakdown  Outcome: Ongoing   Pt has q2 repositioning with pillow support. Foam border applied to sacrum to prevent skin breakdown.

## 2021-09-03 NOTE — PROGRESS NOTES
Pt assessment complete, see flowsheets. Medications given, see MAR. Pt alert and oriented x4 and following commands, but fatigued. NSR on monitor. 1L nasal cannula. Regular diet. Pulido intact and draining, see flowsheets. Pt has L forearm and R wrist peripheral IV access, see flowsheets. Pt has R BKA. NS gtt to start infusing, see MAR. Pt stating feeling nauseous and asking for PRN medication, Meclizine given @ 2153, see MAR.     @2100- to bedside. Levophed gtt stopped. Per Inga Chand will hold off on central line placement since Levophed gtt is off and BP stable. @2126-[employer] message sent to . Pt refusing to take dissolving potassium bicarbonate tablets and has q1 BS checks. Per -use PRN potassium protocol replacement and check BS q4. Will continue to monitor.

## 2021-09-03 NOTE — PROGRESS NOTES
Dr. Kelvin Wolfe at bedside. New orders to give 2 L NS bolus and start levo. MD okay with giving levo through PIV. Night shift hospitalist to place central line at 1900.

## 2021-09-03 NOTE — PROGRESS NOTES
Patient to room 3914 from 4T. Blood glucose 69. D50 given. MD aware. BP low. Valerio Montes, charge RN messaging MD. Patient opens eyes to voice but not interactive. Respirations regular and unlabored. NSR on tele.

## 2021-09-03 NOTE — PROGRESS NOTES
Infectious Diseases   Progress Note      Admission Date: 9/1/2021  Hospital Day: Hospital Day: 3   Attending: Mckenzie Aquino MD  Date of service: 9/3/2021     Chief complaint/ Reason for consult:     · Septic shock admission with fever, leukocytosis, hypotension requiring vasopressors, lactic acidosis  · Severe metabolic encephalopathy  · E. coli bacteremia   · Left-sided ascending pyelonephritis    Microbiology:        I have reviewed allavailable micro lab data and cultures    · Blood culture (2/2) - collected on 9/1/2021: E coli    · Urine culture  - collected on 9/1/2021: > 100,000 cfu/ml of E coli      Antibiotics and immunizations:       Current antibiotics: All antibiotics and their doses were reviewed by me    Recent Abx Admin                   meropenem (MERREM) 1,000 mg in sodium chloride 0.9 % 100 mL IVPB (mini-bag) (mg) 1,000 mg New Bag 09/03/21 1140     1,000 mg New Bag 09/02/21 2104    piperacillin-tazobactam (ZOSYN) 3,375 mg in sodium chloride 0.9 % 50 mL IVPB extended infusion (mini-bag) (mg) 3,375 mg New Bag 09/02/21 1430                  Immunization History: All immunization history was reviewed by me today. Immunization History   Administered Date(s) Administered    Influenza Virus Vaccine 11/28/2011    Influenza, Brooks Pulse, 6 mo and older, IM, PF (Flulaval, Fluarix) 11/14/2018    Pneumococcal Polysaccharide ([id number]) 11/28/2011, 09/15/2014       Known drug allergies: All allergies were reviewed and updated    Allergies   Allergen Reactions    Darvocet [Propoxyphene N-Acetaminophen] Nausea Only    Naprosyn [Naproxen] Rash    Ultram [Tramadol Hcl] Rash       Social history:     Social History:  All social andepidemiologic history was reviewed and updated by me today as needed. · Tobacco use:   reports that she quit smoking about 14 months ago. She has a 43.00 pack-year smoking history.  She has never used smokeless tobacco.  · Alcohol use:   reports no history of alcohol use.  · Currently lives in: [facility]  ·  reports previous drug use. Drug: Opiates . COVID VACCINATION AND LAB RESULT RECORDS:     Internal Administration   First Dose      Second Dose           Last COVID Lab SARS-CoV-2 (no units)   Date Value   09/02/2021 Not Detected     SARS-CoV-2, PCR (no units)   Date Value   08/21/2020 Not Detected     SARS-CoV-2, NAAT (no units)   Date Value   08/21/2020 Not Detected            Assessment:     The patient is a 60 y.o. old female who  has a past medical history of Arthritis, CHF (congestive heart failure) ([facility] [address] Depression, Diabetes mellitus ([facility] [address] Heart attack ([facility] [address] Hepatitis C, Hypercholesteremia (3/11/2020), Hypertension, MRSA (methicillin resistant Staphylococcus aureus) infection (in 2000), Pneumonia, and S/P colonoscopy with polypectomy (2/11). with following problems:    · Septic shock admission with fever, leukocytosis, hypotension requiring vasopressors, lactic acidosis-improving  · Severe metabolic encephalopathy-improving  · E. coli bacteremia -covered with meropenem  · Left-sided ascending pyelonephritis-covered with meropenem  · Emphysematous cystitis-covered on meropenem  · Bilateral hydronephrosis  · Poorly controlled type 2 diabetes mellitus-maintain good glycemic control  · Chronic hepatitis C  · Essential hypertension  · History of MRSA      Discussion:      The patient is afebrile. She is on IV meropenem. Serum creatinine 0.5. Liver functions are okay. Blood cultures and urine cultures from 9/2/2021 have grown E. coli. E. coli susceptibilities are awaited. COVID-19 PCR test was negative. Portable chest x-ray reviewed. Shows worsening bilateral congestion      Plan:     Diagnostic Workup:    · Follow-up on susceptibility of E. coli isolated from blood culture  · Plan to repeat blood culture tomorrow  · Continue to follow  fever curve, WBC count and blood cultures.   · Continue to monitor blood counts, liver and renal function. · Will order urine tox screen    Antimicrobials:    · Will o continue IV meropenem 1 g every 8 hours  · Continue to monitor urine output closely  · We will follow up on the culture results and clinical progress and will make further recommendations accordingly. · Continue close vitals monitoring. · Maintain good glycemic control. · Fall precautions. Aspiration precautions. · Continue to watch for new fever or diarrhea. · DVT prophylaxis. · Discussed all above with patient and RN. Drug Monitoring:    · Continue monitoring for antibiotic toxicity as follows: CBC, CMP   · Continue to watch for following: new or worsening fever, new hypotension, hives, lip swelling and redness or purulence at vascular access sites. I/v access Management:    · Continue to monitor i.v access sites for erythema, induration, discharge or tenderness. · As always, continue efforts to minimize tubes/lines/drains as clinically appropriate to reduce chances of line associated infections. Patient education and counseling:        · The patient was educated in detail about the side-effects of various antibiotics and things to watch for like new rashes, lip swelling, severe reaction, worsening diarrhea, break through fever etc.  · Discussed patient's condition and what to expect. All of the patient's questions were addressed in a satisfactory manner and patient verbalized understanding all instructions. Level of complexity of visit: High     Risk of Complications/Morbidity: High     · Illness(es)/ Infection present that pose threat to life/bodily function. · There is potential for severe exacerbation of infection/side effects of treatment. · Therapy requires intensive monitoring for antimicrobial agent toxicity.     TIME SPENT TODAY:     - Spent over  36 minutes on visit (including interval history, physical exam, review of data including labs, cultures, imaging, development and implementation of treatment plan and coordination of complex care). More than 50 percent of this includes face-to-face time spent with the patient for counseling and coordination of care. Thank you for involving me in the care of your patient. I will continue to follow. If you have anyadditional questions, please do not hesitate to contact me. Subjective: Interval history: Interval history was obtained from chart review and patient/ RN. The patient is waking up. She is tolerating IV meropenem okay. Complains of ongoing lower abdominal pain     REVIEW OF SYSTEMS:     Review of Systems   Constitutional: Positive for fatigue. Negative for chills, diaphoresis and unexpected weight change. HENT: Negative for congestion, ear discharge, ear pain, facial swelling, hearing loss, rhinorrhea and trouble swallowing. Eyes: Negative for photophobia, discharge, redness and visual disturbance. Respiratory: Negative for apnea, cough, choking, chest tightness, shortness of breath and stridor. Cardiovascular: Negative for chest pain and palpitations. Gastrointestinal: Positive for abdominal pain. Negative for blood in stool, diarrhea and nausea. Endocrine: Negative for polydipsia, polyphagia and polyuria. Genitourinary: Negative for difficulty urinating, dysuria, frequency, hematuria, menstrual problem and vaginal discharge. Musculoskeletal: Negative for arthralgias, joint swelling, myalgias and neck stiffness. Skin: Negative for color change and rash. Allergic/Immunologic: Negative for immunocompromised state. Neurological: Negative for dizziness, seizures, speech difficulty, light-headedness and headaches. Hematological: Negative for adenopathy. Psychiatric/Behavioral: Negative for agitation, hallucinations and suicidal ideas. Past Medical History: All past medical history reviewed today.     Past Medical History:   Diagnosis Date    Arthritis     CHF (congestive heart failure) ([facility] [address]     Depression     Diabetes Ear: External ear normal.      Left Ear: External ear normal.      Nose: Nose normal.   Eyes:      General: No scleral icterus. Right eye: No discharge. Left eye: No discharge. Conjunctiva/sclera: Conjunctivae normal.      Pupils: Pupils are equal, round, and reactive to light. Cardiovascular:      Rate and Rhythm: Normal rate and regular rhythm. Heart sounds: No murmur heard. No friction rub. Pulmonary:      Effort: Pulmonary effort is normal.      Breath sounds: No stridor. No wheezing or rales. Chest:      Chest wall: No tenderness. Abdominal:      Palpations: Abdomen is soft. There is no mass. Tenderness: There is abdominal tenderness (Lower abdominal tenderness). There is no guarding or rebound. Musculoskeletal:         General: No tenderness. Cervical back: Normal range of motion and neck supple. Lymphadenopathy:      Cervical: No cervical adenopathy. Skin:     General: Skin is warm and dry. Findings: No erythema or rash. Neurological:      Mental Status: She is alert and oriented to person, place, and time. Motor: No abnormal muscle tone. Psychiatric:         Judgment: Judgment normal.           Lines and drains: All vascular access sites are healthy with no local erythema, discharge or tenderness. Intake and output:    I/O last 3 completed shifts: In: 46 [I.V.:1186]  Out: 2600 [Urine:2600]    Lab Data:   All available labs and old records have been reviewed by me.     CBC:  Recent Labs     09/02/21 0040 09/02/21 1736 09/03/21 0617   WBC 14.1* 18.0* 13.4*   RBC 5.04 4.21 4.62   HGB 13.7 11.4* 12.7   HCT 42.3 35.8* 38.7   PLT 98* 110* 90*   MCV 83.9 85.1 83.9   MCH 27.2 27.1 27.5   MCHC 32.4 31.9 32.8   RDW 16.8* 17.0* 17.4*        BMP:  Recent Labs     09/02/21  0040 09/02/21 1736 09/03/21  0617   * 136 138   K 3.7 3.0* 3.9   CL 89* 101 105   CO2 26 24 26   BUN 28* 24* 20   CREATININE 0.8 0.8 0.5*   CALCIUM 8.8 7.7* 7.8* GLUCOSE 667* 138* 114*        Hepatic Function Panel:   Lab Results   Component Value Date    ALKPHOS 134 09/03/2021    ALT 10 09/03/2021    AST 19 09/03/2021    PROT 6.6 09/03/2021    PROT 7.6 02/14/2012    BILITOT 0.4 09/03/2021    BILIDIR <0.2 11/16/2020    IBILI see below 11/16/2020    LABALBU 2.3 09/03/2021       CPK:   Lab Results   Component Value Date    CKTOTAL 25 (L) 08/21/2020     ESR: No results found for: SEDRATE  CRP: No results found for: CRP        Imaging: All pertinent images and reports for the current visit were reviewed by me during this visit. XR CHEST PORTABLE   Final Result   Interval worsening with mild-to-moderate interstitial congestion         CT ABDOMEN PELVIS W IV CONTRAST Additional Contrast? None   Final Result   Mild bilateral hydroureteronephrosis and urothelial enhancement with subtle   area of decreased cortical enhancement in the superior pole the left kidney,   findings may reflect ascending infection and pyelonephritis in the correct   clinical setting. Air is noted within the urinary bladder likely from Pulido catheter insertion. Cystitis cannot be excluded. Questionable thickening of the gastric antrum which may reflect gastritis in   the correct clinical setting. XR CHEST PORTABLE   Final Result   No radiographic evidence of acute pulmonary disease. Medications: All current and past medications were reviewed.      pantoprazole  40 mg IntraVENous Daily    aspirin  81 mg Oral Daily    atorvastatin  40 mg Oral Nightly    carvedilol  3.125 mg Oral BID WC    lisinopril  5 mg Oral Daily    mirtazapine  7.5 mg Oral Nightly    pregabalin  100 mg Oral BID    ticagrelor  90 mg Oral BID    sodium chloride flush  5-40 mL IntraVENous 2 times per day    enoxaparin  40 mg SubCUTAneous Daily    meropenem  1,000 mg IntraVENous Q8H    potassium chloride  40 mEq IntraVENous Once    insulin glargine  15 Units SubCUTAneous Nightly    insulin lispro  0-6 Units SubCUTAneous TID     insulin lispro  0-3 Units SubCUTAneous Nightly        dextrose      sodium chloride 25 mL (09/02/21 0644)    dextrose      sodium chloride 50 mL/hr at 09/02/21 1708    sodium chloride 100 mL/hr at 09/03/21 1138    norepinephrine Stopped (09/03/21 0215)       dicyclomine, [Held by provider] morphine, dextrose, sodium chloride flush, sodium chloride, polyethylene glycol, acetaminophen **OR** acetaminophen, promethazine, meclizine, glucose, dextrose, glucagon (rDNA), dextrose, potassium chloride **OR** potassium alternative oral replacement **OR** potassium chloride, magnesium sulfate, sodium phosphate IVPB **OR** sodium phosphate IVPB      Problem list:       Patient Active Problem List   Diagnosis Code    Diabetes mellitus ([facility] E11.9    Knee pain M25.569    Degenerative arthritis of knee M17.10    Other specified anemias D64.89    DKA, type 2, not at goal ([facility] [address] E11.10    Hyperglycemia R73.9    Acute pancreatitis K85.90    Coronary artery disease involving native coronary artery of native heart without angina pectoris I25.10    Diabetes, coma, hyperosmolar (HCC) E11.01    Hypernatremia E87.0    Acute kidney injury ([facility] [address] N17.9    Abnormal LFTs R94.5    Diabetic ketoacidosis without coma associated with type 1 diabetes mellitus (HCC) E10.10    Esophagitis K20.90    Hypotension I95.9    Controlled type 1 diabetes mellitus with hyperglycemia (HCC) E10.65    Sialadenitis K11.20    Abdominal pain R10.9    Adrenal insufficiency (HCC) E27.40    Unstable angina (HCC) I20.0    Chronic pain syndrome G89.4    Constipation K59.00    Uncontrolled type 2 diabetes mellitus with hyperglycemia, with long-term current use of insulin (HCC) E11.65, Z79.4    Acute on chronic respiratory failure with hypoxia (HCC) J96.21    Mucus plugging of bronchi T17.500A    Atelectasis J98.11    Former smoker Z87.891    DM (diabetes mellitus), secondary uncontrolled ([facility] [address] E13.65    Thrombocytopenia (HCC) D69.6    Essential hypertension I10    Hx of AKA (above knee amputation), right ([facility] [address] Z89.611    Chest pain R07.9    Coronary artery disease due to lipid rich plaque I25.10, I25.83    Hypercholesteremia E78.00    DKA, type 1, not at goal [facility]) E10.10    Acute decompensated heart failure ([facility] [address] I50.9    Carboxyhemoglobinemia T58. 91XA    Acute respiratory failure with hypoxia (HCC) J96.01    Suspected COVID-19 virus infection Z20.822    Abnormal CT scan R93.89    Hypomagnesemia E83.42    Acute on chronic combined systolic and diastolic heart failure (HCC) I50.43    PAD (peripheral artery disease) (HCC) I73.9    Narcotic abuse (HCC) F11.10    Non-compliance Z91.19    S/P angioplasty with stent Z95.820    Acute on chronic combined systolic (congestive) and diastolic (congestive) heart failure (HCC) I50.43    Severe dehydration E86.0    Nausea and vomiting R11.2    Pneumonia due to infectious organism J18.9    Emphysematous cystitis N30.80    Bandemia D72.825    COVID-19 vaccine series completed Z92.29    Poorly controlled type 2 diabetes mellitus (HCC) E11.65    Chronic hepatitis C without hepatic coma (HCC) B18.2    History of MRSA infection Z86.14    Prolonged Q-T interval on ECG R94.31    Uncontrolled diabetes mellitus with hyperglycemia, with long-term current use of insulin (HCC) E11.65, Z79.4    Septic shock (HCC) A41.9, R65.21    Pyelonephritis N12    Hydronephrosis H73.50    Metabolic encephalopathy C54.31       Please note that this chart was generated using Dragon dictation software. Although every effort was made to ensure the accuracy of this automated transcription, some errors in transcription may have occurred inadvertently. If you may need any clarification, please do not hesitate to contact me through EPIC or at the phone number provided below with my electronic signature.   Any pictures or media included in this note were obtained after

## 2021-09-03 NOTE — PROGRESS NOTES
Comprehensive Nutrition Assessment    Type and Reason for Visit:  Positive Nutrition Screen    Nutrition Recommendations/Plan:  Offer Glucerna BID    Nutrition Assessment:  Pt triggered positive nursing nutrition screen for MST 2 indicating poor PO intake, appetite and weight loss. RD unable to speak with pt as she is in droplet plus precautions and unavailable via phone. Pt with abdominal pain, nausea, vomiting and diarrhea prior to admission. Nausea and vomiting continue this morning. Hx gastroparesis noted. Weight appears stable overall per hx in EMR. Will offer Glucerna BID and monitor for adequate PO intake and tolerance to supplement. Malnutrition Assessment:  Malnutrition Status:  No malnutrition      Estimated Daily Nutrient Needs:  Energy (kcal):  8530-3636; Weight Used for Energy Requirements:  Current (55 kg)     Protein (g):   grams; Weight Used for Protein Requirements:  Current (55 kg; 1.2-2 grams per kg)        Fluid (ml/day):   ; Method Used for Fluid Requirements:  1 ml/kcal      Nutrition Related Findings:  R AKA. No edema noted. Phos 2.1. Wounds:  None       Current Nutrition Therapies:    ADULT DIET; Regular; 4 carb choices (60 gm/meal); Low Sodium (2 gm)    Anthropometric Measures:  · Height: 5' 2\" (157.5 cm)  · Current Body Weight: 121 lb 4.1 oz (55 kg)   · Ideal Body Weight: 110 lbs; % Ideal Body Weight 110.2 %   · BMI: 22.2  · Adjusted Body Weight: 133.5; Amputation   · Adjusted BMI: 24.4    · BMI Categories: Normal Weight (BMI 18.5-24. 9)       Nutrition Diagnosis:   · Inadequate protein-energy intake related to altered GI function as evidenced by nausea, vomiting      Nutrition Interventions:   Food and/or Nutrient Delivery:  Continue Current Diet, Start Tube Feeding  Nutrition Education/Counseling:  Education not indicated   Coordination of Nutrition Care:  Continue to monitor while inpatient    Goals:  Pt will consume at least 50% of meals and supplements       Nutrition Monitoring and Evaluation:   Behavioral-Environmental Outcomes:  None Identified   Food/Nutrient Intake Outcomes:  Food and Nutrient Intake, Supplement Intake  Physical Signs/Symptoms Outcomes:  Nausea or Vomiting, Biochemical Data     Discharge Planning:     Too soon to determine     Electronically signed by Orville Habermann, RD, LD on 9/3/21 at 10:15 AM EDT    Contact: [phone]

## 2021-09-03 NOTE — PROGRESS NOTES
Reports nausea gone. Ready fr some food. Wants pain medication. Pharmacist to work on [employer] dosing. Pt. Wants to stop using heroin.

## 2021-09-03 NOTE — PROGRESS NOTES
Stopped by to see patient about possible central line placement. IV access currently occluded upon entering room; hence, IVF and levophed (that was to be infusing at 2 mcg/kg/min was currently off). Noticed systolic blood pressure greater than 120 mmHg. Recheck blood pressure, she had systolic of 051 and a repeat systolic of 185, but with MAP greater than 80. It appears that patient does not need to be on pressors at this time. Discussed with nursing staff about holding of Levophed infusion. Continue normal saline infusion. Will hold off central line placement at this time as there is no real indication. She currently has 2, 20-gauge peripheral IVs in place.

## 2021-09-04 VITALS
SYSTOLIC BLOOD PRESSURE: 123 MMHG | HEART RATE: 89 BPM | OXYGEN SATURATION: 100 % | DIASTOLIC BLOOD PRESSURE: 70 MMHG | RESPIRATION RATE: 25 BRPM | WEIGHT: 122.8 LBS | TEMPERATURE: 97.4 F | BODY MASS INDEX: 22.6 KG/M2 | HEIGHT: 62 IN

## 2021-09-04 LAB
A/G RATIO: 0.6 (ref 1.1–2.2)
ALBUMIN SERPL-MCNC: 2.3 G/DL (ref 3.4–5)
ALP BLD-CCNC: 109 U/L (ref 40–129)
ALT SERPL-CCNC: 9 U/L (ref 10–40)
ANION GAP SERPL CALCULATED.3IONS-SCNC: 9 MMOL/L (ref 3–16)
AST SERPL-CCNC: 19 U/L (ref 15–37)
BASOPHILS ABSOLUTE: 0 K/UL (ref 0–0.2)
BASOPHILS RELATIVE PERCENT: 0.2 %
BILIRUB SERPL-MCNC: 0.6 MG/DL (ref 0–1)
BLOOD CULTURE, ROUTINE: ABNORMAL
BUN BLDV-MCNC: 13 MG/DL (ref 7–20)
CALCIUM SERPL-MCNC: 8 MG/DL (ref 8.3–10.6)
CHLORIDE BLD-SCNC: 101 MMOL/L (ref 99–110)
CO2: 27 MMOL/L (ref 21–32)
CREAT SERPL-MCNC: <0.5 MG/DL (ref 0.6–1.1)
CULTURE, BLOOD 2: ABNORMAL
CULTURE, BLOOD 2: ABNORMAL
EOSINOPHILS ABSOLUTE: 0.1 K/UL (ref 0–0.6)
EOSINOPHILS RELATIVE PERCENT: 1.1 %
GFR AFRICAN AMERICAN: >60
GFR NON-AFRICAN AMERICAN: >60
GLOBULIN: 3.9 G/DL
GLUCOSE BLD-MCNC: 101 MG/DL (ref 70–99)
GLUCOSE BLD-MCNC: 106 MG/DL (ref 70–99)
HCT VFR BLD CALC: 38.2 % (ref 36–48)
HEMOGLOBIN: 12.6 G/DL (ref 12–16)
LYMPHOCYTES ABSOLUTE: 0.8 K/UL (ref 1–5.1)
LYMPHOCYTES RELATIVE PERCENT: 7.1 %
MAGNESIUM: 1.9 MG/DL (ref 1.8–2.4)
MCH RBC QN AUTO: 27.3 PG (ref 26–34)
MCHC RBC AUTO-ENTMCNC: 33.1 G/DL (ref 31–36)
MCV RBC AUTO: 82.4 FL (ref 80–100)
MONOCYTES ABSOLUTE: 0.5 K/UL (ref 0–1.3)
MONOCYTES RELATIVE PERCENT: 4.8 %
NEUTROPHILS ABSOLUTE: 9.6 K/UL (ref 1.7–7.7)
NEUTROPHILS RELATIVE PERCENT: 86.8 %
ORGANISM: ABNORMAL
PDW BLD-RTO: 16.3 % (ref 12.4–15.4)
PERFORMED ON: ABNORMAL
PHOSPHORUS: 1.8 MG/DL (ref 2.5–4.9)
PLATELET # BLD: 103 K/UL (ref 135–450)
PMV BLD AUTO: 10 FL (ref 5–10.5)
POTASSIUM SERPL-SCNC: 3.1 MMOL/L (ref 3.5–5.1)
RBC # BLD: 4.64 M/UL (ref 4–5.2)
SODIUM BLD-SCNC: 137 MMOL/L (ref 136–145)
TOTAL PROTEIN: 6.2 G/DL (ref 6.4–8.2)
URINE CULTURE, ROUTINE: ABNORMAL
WBC # BLD: 11 K/UL (ref 4–11)

## 2021-09-04 PROCEDURE — 6370000000 HC RX 637 (ALT 250 FOR IP): Performed by: INTERNAL MEDICINE

## 2021-09-04 PROCEDURE — 80053 COMPREHEN METABOLIC PANEL: CPT

## 2021-09-04 PROCEDURE — 85025 COMPLETE CBC W/AUTO DIFF WBC: CPT

## 2021-09-04 PROCEDURE — 83735 ASSAY OF MAGNESIUM: CPT

## 2021-09-04 PROCEDURE — 6360000002 HC RX W HCPCS: Performed by: INTERNAL MEDICINE

## 2021-09-04 PROCEDURE — 84100 ASSAY OF PHOSPHORUS: CPT

## 2021-09-04 PROCEDURE — 2580000003 HC RX 258: Performed by: INTERNAL MEDICINE

## 2021-09-04 RX ORDER — ALPRAZOLAM 0.25 MG/1
0.25 TABLET ORAL ONCE
Status: COMPLETED | OUTPATIENT
Start: 2021-09-04 | End: 2021-09-04

## 2021-09-04 RX ORDER — ALPRAZOLAM 0.25 MG/1
0.25 TABLET ORAL NIGHTLY PRN
Status: DISCONTINUED | OUTPATIENT
Start: 2021-09-04 | End: 2021-09-04 | Stop reason: HOSPADM

## 2021-09-04 RX ADMIN — MEROPENEM 1000 MG: 1 INJECTION, POWDER, FOR SOLUTION INTRAVENOUS at 03:29

## 2021-09-04 RX ADMIN — ALPRAZOLAM 0.25 MG: 0.25 TABLET ORAL at 00:40

## 2021-09-04 RX ADMIN — TRAMADOL HYDROCHLORIDE 100 MG: 50 TABLET, FILM COATED ORAL at 04:38

## 2021-09-04 ASSESSMENT — PAIN SCALES - GENERAL: PAINLEVEL_OUTOF10: 9

## 2021-09-04 NOTE — PROGRESS NOTES
Nadia Kirby is a 60 y.o. female patient. 1. Hyperglycemia    2. Nausea and vomiting, intractability of vomiting not specified, unspecified vomiting type    3. Urinary tract infection with hematuria, site unspecified      Past Medical History:   Diagnosis Date    Arthritis     CHF (congestive heart failure) ([facility] [address]     Depression     Diabetes mellitus ([facility] [address]     Heart attack ([facility] [address]     MI 7/2019, and 6 months ago    Hepatitis C     Dr Chet Pichardo follows; contracted from ex- (drug user)    Hypercholesteremia 3/11/2020    Hypertension     MRSA (methicillin resistant Staphylococcus aureus) infection in 2000    was in urine and nares    Pneumonia     S/P colonoscopy with polypectomy 2/11    one polyp, Dr Chet Pichardo. Recall 3 years. No past surgical history pertinent negatives on file.   Scheduled Meds:   pantoprazole  40 mg IntraVENous Daily    aspirin  81 mg Oral Daily    atorvastatin  40 mg Oral Nightly    carvedilol  3.125 mg Oral BID WC    lisinopril  5 mg Oral Daily    mirtazapine  7.5 mg Oral Nightly    pregabalin  100 mg Oral BID    ticagrelor  90 mg Oral BID    sodium chloride flush  5-40 mL IntraVENous 2 times per day    enoxaparin  40 mg SubCUTAneous Daily    meropenem  1,000 mg IntraVENous Q8H    potassium chloride  40 mEq IntraVENous Once    insulin glargine  15 Units SubCUTAneous Nightly    insulin lispro  0-6 Units SubCUTAneous TID WC    insulin lispro  0-3 Units SubCUTAneous Nightly     Continuous Infusions:   dextrose      dextrose      norepinephrine Stopped (09/03/21 0215)     PRN Meds:ALPRAZolam, dicyclomine, traMADol **OR** traMADol, [Held by provider] morphine, dextrose, sodium chloride flush, polyethylene glycol, acetaminophen **OR** acetaminophen, promethazine, meclizine, glucose, dextrose, glucagon (rDNA), dextrose, potassium chloride **OR** potassium alternative oral replacement **OR** potassium chloride, magnesium sulfate, sodium phosphate IVPB **OR** sodium phosphate IVPB    Allergies   Allergen Reactions    Darvocet [Propoxyphene N-Acetaminophen] Nausea Only    Naprosyn [Naproxen] Rash    Ultram [Tramadol Hcl] Rash     Active Problems:    Nausea and vomiting    Uncontrolled diabetes mellitus with hyperglycemia, with long-term current use of insulin ([facility])    Sepsis ([address]    Pyelonephritis    Hydronephrosis    Metabolic encephalopathy  Resolved Problems:    * No resolved hospital problems. *    Blood pressure 123/70, pulse 89, temperature 97.4 °F (36.3 °C), temperature source Oral, resp. rate 25, height 5' 2\" (1.575 m), weight 122 lb 12.7 oz (55.7 kg), last menstrual period 11/15/2017, SpO2 100 %. Subjective:   Diet: Poor intake. Activity level: Returning to normal.    Pain control: Well controlled. Objective:  Vital signs (most recent): Blood pressure 123/70, pulse 89, temperature 97.4 °F (36.3 °C), temperature source Oral, resp. rate 25, height 5' 2\" (1.575 m), weight 122 lb 12.7 oz (55.7 kg), last menstrual period 11/15/2017, SpO2 100 %. General appearance: Comfortable. Lungs:  Normal effort. Extremities: There is normal range of motion. Neurological: The patient is alert. Assessment:   Condition: In stable condition.        emphysematous cystitis  ecoli uti and sepsis    recc    Pulido  Iv antibx  clin improved this am    Piper Robles MD  9/4/2021

## 2021-09-04 NOTE — PROGRESS NOTES
HOSPITALISTS PROGRESS NOTE    9/4/2021 8:01 AM        Name: Adrienne Kaur . Admitted: 9/1/2021  Primary Care Provider: No primary care provider on file. (Tel: None)      CC: Abdominal pain    Brief Course: This 49-year-old female with PMHx of uncontrolled diabetes mellitus, chronic combined systolic and diastolic CHF(echo on 8/5516 showed EF of 20 to 25% and G2 DD moderate severe mitral regurgitation), CAD, hyperlipidemia, hypertension, right BKA, secondary renal insufficiency and recent admission for emphysematous cystitis who left [address] presented with severe abdominal pain. On admission, patient was febrile. Initial diagnostic lab work showed elevated WBCs, lactic acid: 3.4, blood glucose: 600, bicarb 26, creatinine 0.8. CT abdomen and pelvis concerning for emphysematous cystitis. Patient received 10 units of insulin, IV antibiotics and 2 L of IV fluids in the ED. Interval history:   Overnight events and interval ancillary notes reviewed  On 2 L nasal cannula satting 100%; wean as tolerated to keep sats above 92%  Afebrile overnight: WBCs trended down to 11  Notified by RN and charge nurse that patient is patient adamant to leave the hospital this morning. Patient was explained in detail that patient is very sick and need to get IV antibiotic for E. coli bacteremia and severe left-sided ascending colon nephritis. Patient stated  consequences of that she had leaving the hospital and she can take care of herself and follow-up with her PCP. Patient stated that she has made up her mind and will not stay in the hospital and will not listen to any doctors recommendations: have already called her boyfriend and he is picking her up. Patient was alert and oriented x4 and has a capacity to make the decision. Patient signed the papers and left AGAINST MEDICAL ADVICE.         Assessment & Plan:     Left-sided (PROTONIX) injection 40 mg, Daily  dicyclomine (BENTYL) capsule 20 mg, Q6H PRN  traMADol (ULTRAM) tablet 50 mg, Q6H PRN   Or  traMADol (ULTRAM) tablet 100 mg, Q6H PRN  [Held by provider] morphine (PF) injection 2 mg, Q4H PRN  aspirin EC tablet 81 mg, Daily  atorvastatin (LIPITOR) tablet 40 mg, Nightly  carvedilol (COREG) tablet 3.125 mg, BID WC  lisinopril (PRINIVIL;ZESTRIL) tablet 5 mg, Daily  mirtazapine (REMERON) tablet 7.5 mg, Nightly  pregabalin (LYRICA) capsule 100 mg, BID  ticagrelor (BRILINTA) tablet 90 mg, BID  dextrose 5 % solution, PRN  sodium chloride flush 0.9 % injection 5-40 mL, 2 times per day  sodium chloride flush 0.9 % injection 10 mL, PRN  enoxaparin (LOVENOX) injection 40 mg, Daily  polyethylene glycol (GLYCOLAX) packet 17 g, Daily PRN  acetaminophen (TYLENOL) tablet 650 mg, Q6H PRN   Or  acetaminophen (TYLENOL) suppository 650 mg, Q6H PRN  promethazine (PHENERGAN) injection 6.25 mg, Q6H PRN  meclizine (ANTIVERT) tablet 12.5 mg, TID PRN  glucose (GLUTOSE) 40 % oral gel 15 g, PRN  dextrose 50 % IV solution, PRN  glucagon (rDNA) injection 1 mg, PRN  dextrose 5 % solution, PRN  norepinephrine (LEVOPHED) 16 mg in dextrose 5% 250 mL infusion, Continuous  meropenem (MERREM) 1,000 mg in sodium chloride 0.9 % 100 mL IVPB (mini-bag), Q8H  potassium chloride 10 mEq/100 mL IVPB (Peripheral Line), Once  insulin glargine (LANTUS;BASAGLAR) injection pen 15 Units, Nightly  insulin lispro (1 Unit Dial) 0-6 Units, TID WC  insulin lispro (1 Unit Dial) 0-3 Units, Nightly  potassium chloride (KLOR-CON M) extended release tablet 40 mEq, PRN   Or  potassium bicarb-citric acid (EFFER-K) effervescent tablet 40 mEq, PRN   Or  potassium chloride 10 mEq/100 mL IVPB (Peripheral Line), PRN  magnesium sulfate 1000 mg in dextrose 5% 100 mL IVPB, PRN  sodium phosphate 8.34 mmol in dextrose 5 % 250 mL IVPB, PRN   Or  sodium phosphate 16.71 mmol in dextrose 5 % 250 mL IVPB, PRN        Objective:  /70   Pulse 89   Temp 97.4 °F (36.3 °C) (Oral)   Resp 25   Ht 5' 2\" (1.575 m)   Wt 122 lb 12.7 oz (55.7 kg)   LMP 11/15/2017   SpO2 100%   BMI 22.46 kg/m²     Intake/Output Summary (Last 24 hours) at 9/4/2021 0801  Last data filed at 9/4/2021 0500  Gross per 24 hour   Intake 703 ml   Output 2220 ml   Net -1517 ml      Wt Readings from Last 3 Encounters:   09/04/21 122 lb 12.7 oz (55.7 kg)   07/22/21 119 lb 9.6 oz (54.3 kg)   04/30/21 143 lb 11.2 oz (65.2 kg)       Labs and Tests:  CBC:   Recent Labs     09/02/21 1736 09/03/21  0617 09/04/21  0451   WBC 18.0* 13.4* 11.0   HGB 11.4* 12.7 12.6   * 90* 103*     BMP:    Recent Labs     09/02/21 1736 09/03/21  0617 09/04/21  0451    138 137   K 3.0* 3.9 3.1*    105 101   CO2 24 26 27   BUN 24* 20 13   CREATININE 0.8 0.5* <0.5*   GLUCOSE 138* 114* 106*     Hepatic:   Recent Labs     09/02/21 1736 09/03/21  0617 09/04/21  0451   AST 14* 19 19   ALT 10 10 9*   BILITOT 0.3 0.4 0.6   ALKPHOS 115 134* 109     XR CHEST PORTABLE   Final Result   Interval worsening with mild-to-moderate interstitial congestion         CT ABDOMEN PELVIS W IV CONTRAST Additional Contrast? None   Final Result   Mild bilateral hydroureteronephrosis and urothelial enhancement with subtle   area of decreased cortical enhancement in the superior pole the left kidney,   findings may reflect ascending infection and pyelonephritis in the correct   clinical setting. Air is noted within the urinary bladder likely from Pulido catheter insertion. Cystitis cannot be excluded. Questionable thickening of the gastric antrum which may reflect gastritis in   the correct clinical setting. XR CHEST PORTABLE   Final Result   No radiographic evidence of acute pulmonary disease.              Problem List  Active Problems:    Nausea and vomiting    Uncontrolled diabetes mellitus with hyperglycemia, with long-term current use of insulin (HCC)    Sepsis (HCC)    Pyelonephritis    Hydronephrosis Metabolic encephalopathy  Resolved Problems:    * No resolved hospital problems.  Kyle Richter MD   9/4/2021 8:01 AM

## 2021-09-04 NOTE — PROGRESS NOTES
Pt alert and oriented X4, request to leave AMA, educate pt on consequence of leaving AMA, pt verbalized understanding, Dr Baudilio Baca made aware, ask to tell pt to wait a minute for her to come and see her, pt says , she cant wait, there is nothing MD will say to  to make her change her mind, and request to sign AMA immediately, AMA paper given to pt, pt signed, PIV and luke catheter removed, pt sats 92% at RA, pt say her home is 5 minutes away from the hospital.  Pt friend to transport pt to private car. Will continue to monitor.

## 2021-09-13 NOTE — DISCHARGE SUMMARY
Hospital Medicine Discharge Summary    Patient ID: Nanci Mason      Patient's PCP: No primary care provider on file. Admit Date: 9/1/2021     Discharge Date: 9/4/2021     Admitting Physician: Marilyn Jordan MD     Discharge Physician: Keon Singh MD        Active Hospital Problems    Diagnosis     Uncontrolled diabetes mellitus with hyperglycemia, with long-term current use of insulin ([address] [E11.65, Z79.4]     Sepsis ([address] [A41.9]     Pyelonephritis [N12]     Hydronephrosis [Z54.63]     Metabolic encephalopathy [X70.99]     Nausea and vomiting [R11.2]        This discharge summary is in conjunction with any daily progress note from day of discharge. Hospital Course: This 76-year-old female with PMHx of uncontrolled diabetes mellitus, chronic combined systolic and diastolic CHF(echo on 8/1616 showed EF of 20 to 25% and G2 DD moderate severe mitral regurgitation), CAD, hyperlipidemia, hypertension, right BKA, secondary renal insufficiency and recent admission for emphysematous cystitis who left AGAINST MEDICAL ADVICE presented with severe abdominal pain.      On admission, patient was febrile and hypotensive.  Initial diagnostic lab work showed elevated WBCs, lactic acid: 3.4, blood glucose: 600, bicarb 26, creatinine 0.8. CT abdomen and pelvis showed mild bilateral hydroutero nephrosis and ascending left-sided pyelonephritis. Urine and blood cultures growing E. Coli. Received IV fluids in the ED and was later started on vasopressors. Infectious disease and urology was consulted and patient was started on IV meropenem. During hospital stay, patient became hemodynamically stable and was taken off pressors. Notified by RN and charge nurse on 9/4/2021 that patient is patient adamant to leave the hospital this morning. Patient was explained in detail that patient is very sick and need to get IV antibiotic for E. coli bacteremia and severe left-sided ascending colon nephritis.  Patient stated

## 2021-09-18 ENCOUNTER — APPOINTMENT (OUTPATIENT)
Dept: CT IMAGING | Age: 56
DRG: 773 | End: 2021-09-18
Payer: COMMERCIAL

## 2021-09-18 ENCOUNTER — APPOINTMENT (OUTPATIENT)
Dept: GENERAL RADIOLOGY | Age: 56
DRG: 773 | End: 2021-09-18
Payer: COMMERCIAL

## 2021-09-18 ENCOUNTER — HOSPITAL ENCOUNTER (INPATIENT)
Age: 56
LOS: 2 days | Discharge: HOME | DRG: 773 | End: 2021-09-20
Attending: FAMILY MEDICINE | Admitting: FAMILY MEDICINE
Payer: COMMERCIAL

## 2021-09-18 DIAGNOSIS — R73.9 HYPERGLYCEMIA: Primary | ICD-10-CM

## 2021-09-18 DIAGNOSIS — N30.00 ACUTE CYSTITIS WITHOUT HEMATURIA: ICD-10-CM

## 2021-09-18 DIAGNOSIS — R11.2 NON-INTRACTABLE VOMITING WITH NAUSEA, UNSPECIFIED VOMITING TYPE: ICD-10-CM

## 2021-09-18 PROBLEM — K85.90 PANCREATITIS, RECURRENT: Status: ACTIVE | Noted: 2021-09-18

## 2021-09-18 LAB
A/G RATIO: 0.7 (ref 1.1–2.2)
ALBUMIN SERPL-MCNC: 3.4 G/DL (ref 3.4–5)
ALP BLD-CCNC: 179 U/L (ref 40–129)
ALT SERPL-CCNC: 21 U/L (ref 10–40)
ANION GAP SERPL CALCULATED.3IONS-SCNC: 13 MMOL/L (ref 3–16)
AST SERPL-CCNC: 55 U/L (ref 15–37)
BACTERIA: ABNORMAL /HPF
BASE EXCESS VENOUS: 4.8 MMOL/L (ref -3–3)
BASOPHILS ABSOLUTE: 0 K/UL (ref 0–0.2)
BASOPHILS RELATIVE PERCENT: 0.2 %
BETA-HYDROXYBUTYRATE: 0.3 MMOL/L (ref 0–0.27)
BILIRUB SERPL-MCNC: 0.5 MG/DL (ref 0–1)
BILIRUBIN URINE: NEGATIVE
BLOOD, URINE: ABNORMAL
BUN BLDV-MCNC: 20 MG/DL (ref 7–20)
CALCIUM SERPL-MCNC: 9 MG/DL (ref 8.3–10.6)
CARBOXYHEMOGLOBIN: 7.6 % (ref 0–1.5)
CHLORIDE BLD-SCNC: 92 MMOL/L (ref 99–110)
CLARITY: ABNORMAL
CO2: 25 MMOL/L (ref 21–32)
COLOR: YELLOW
COMMENT UA: ABNORMAL
CREAT SERPL-MCNC: 0.8 MG/DL (ref 0.6–1.1)
EOSINOPHILS ABSOLUTE: 0 K/UL (ref 0–0.6)
EOSINOPHILS RELATIVE PERCENT: 0.3 %
EPITHELIAL CELLS, UA: 1 /HPF (ref 0–5)
GFR AFRICAN AMERICAN: >60
GFR NON-AFRICAN AMERICAN: >60
GLOBULIN: 4.9 G/DL
GLUCOSE BLD-MCNC: 289 MG/DL (ref 70–99)
GLUCOSE BLD-MCNC: 308 MG/DL (ref 70–99)
GLUCOSE BLD-MCNC: 409 MG/DL
GLUCOSE BLD-MCNC: 409 MG/DL (ref 70–99)
GLUCOSE BLD-MCNC: 506 MG/DL
GLUCOSE BLD-MCNC: 506 MG/DL (ref 70–99)
GLUCOSE BLD-MCNC: 556 MG/DL (ref 70–99)
GLUCOSE BLD-MCNC: 631 MG/DL (ref 70–99)
GLUCOSE URINE: 500 MG/DL
HCO3 VENOUS: 27.6 MMOL/L (ref 23–29)
HCT VFR BLD CALC: 44.4 % (ref 36–48)
HEMOGLOBIN: 14.6 G/DL (ref 12–16)
HYALINE CASTS: 1 /LPF (ref 0–8)
KETONES, URINE: NEGATIVE MG/DL
LEUKOCYTE ESTERASE, URINE: ABNORMAL
LIPASE: 492 U/L (ref 13–60)
LYMPHOCYTES ABSOLUTE: 0.8 K/UL (ref 1–5.1)
LYMPHOCYTES RELATIVE PERCENT: 8 %
MCH RBC QN AUTO: 28.4 PG (ref 26–34)
MCHC RBC AUTO-ENTMCNC: 33 G/DL (ref 31–36)
MCV RBC AUTO: 86.2 FL (ref 80–100)
METHEMOGLOBIN VENOUS: 0 %
MICROSCOPIC EXAMINATION: YES
MONOCYTES ABSOLUTE: 0.4 K/UL (ref 0–1.3)
MONOCYTES RELATIVE PERCENT: 4.3 %
NEUTROPHILS ABSOLUTE: 8.9 K/UL (ref 1.7–7.7)
NEUTROPHILS RELATIVE PERCENT: 87.2 %
NITRITE, URINE: NEGATIVE
O2 CONTENT, VEN: 21 VOL %
O2 SAT, VEN: 99 %
O2 THERAPY: ABNORMAL
PCO2, VEN: 34.4 MMHG (ref 40–50)
PDW BLD-RTO: 16.8 % (ref 12.4–15.4)
PERFORMED ON: ABNORMAL
PH UA: 6 (ref 5–8)
PH VENOUS: 7.51 (ref 7.35–7.45)
PLATELET # BLD: 137 K/UL (ref 135–450)
PMV BLD AUTO: 10.8 FL (ref 5–10.5)
PO2, VEN: 140 MMHG (ref 25–40)
POTASSIUM SERPL-SCNC: 4.3 MMOL/L (ref 3.5–5.1)
PROTEIN UA: 30 MG/DL
RBC # BLD: 5.15 M/UL (ref 4–5.2)
RBC UA: 28 /HPF (ref 0–4)
SODIUM BLD-SCNC: 130 MMOL/L (ref 136–145)
SPECIFIC GRAVITY UA: 1.02 (ref 1–1.03)
TCO2 CALC VENOUS: 64 MMOL/L
TOTAL PROTEIN: 8.3 G/DL (ref 6.4–8.2)
TROPONIN: <0.01 NG/ML
URINE REFLEX TO CULTURE: YES
URINE TYPE: ABNORMAL
UROBILINOGEN, URINE: 0.2 E.U./DL
WBC # BLD: 10.3 K/UL (ref 4–11)
WBC UA: >900 /HPF (ref 0–5)

## 2021-09-18 PROCEDURE — 96361 HYDRATE IV INFUSION ADD-ON: CPT

## 2021-09-18 PROCEDURE — 96372 THER/PROPH/DIAG INJ SC/IM: CPT

## 2021-09-18 PROCEDURE — 6370000000 HC RX 637 (ALT 250 FOR IP): Performed by: PHYSICIAN ASSISTANT

## 2021-09-18 PROCEDURE — 87186 SC STD MICRODIL/AGAR DIL: CPT

## 2021-09-18 PROCEDURE — 6360000002 HC RX W HCPCS: Performed by: PHYSICIAN ASSISTANT

## 2021-09-18 PROCEDURE — 2580000003 HC RX 258: Performed by: FAMILY MEDICINE

## 2021-09-18 PROCEDURE — 80053 COMPREHEN METABOLIC PANEL: CPT

## 2021-09-18 PROCEDURE — 1200000000 HC SEMI PRIVATE

## 2021-09-18 PROCEDURE — 96375 TX/PRO/DX INJ NEW DRUG ADDON: CPT

## 2021-09-18 PROCEDURE — 6360000002 HC RX W HCPCS: Performed by: FAMILY MEDICINE

## 2021-09-18 PROCEDURE — 71045 X-RAY EXAM CHEST 1 VIEW: CPT

## 2021-09-18 PROCEDURE — 36415 COLL VENOUS BLD VENIPUNCTURE: CPT

## 2021-09-18 PROCEDURE — 2580000003 HC RX 258: Performed by: PHYSICIAN ASSISTANT

## 2021-09-18 PROCEDURE — 84484 ASSAY OF TROPONIN QUANT: CPT

## 2021-09-18 PROCEDURE — 85025 COMPLETE CBC W/AUTO DIFF WBC: CPT

## 2021-09-18 PROCEDURE — 93005 ELECTROCARDIOGRAM TRACING: CPT | Performed by: PHYSICIAN ASSISTANT

## 2021-09-18 PROCEDURE — 83690 ASSAY OF LIPASE: CPT

## 2021-09-18 PROCEDURE — 74177 CT ABD & PELVIS W/CONTRAST: CPT

## 2021-09-18 PROCEDURE — 96365 THER/PROPH/DIAG IV INF INIT: CPT

## 2021-09-18 PROCEDURE — 6370000000 HC RX 637 (ALT 250 FOR IP): Performed by: FAMILY MEDICINE

## 2021-09-18 PROCEDURE — 83036 HEMOGLOBIN GLYCOSYLATED A1C: CPT

## 2021-09-18 PROCEDURE — 6360000004 HC RX CONTRAST MEDICATION: Performed by: PHYSICIAN ASSISTANT

## 2021-09-18 PROCEDURE — 99284 EMERGENCY DEPT VISIT MOD MDM: CPT

## 2021-09-18 PROCEDURE — 87086 URINE CULTURE/COLONY COUNT: CPT

## 2021-09-18 PROCEDURE — 82803 BLOOD GASES ANY COMBINATION: CPT

## 2021-09-18 PROCEDURE — 81001 URINALYSIS AUTO W/SCOPE: CPT

## 2021-09-18 PROCEDURE — 87077 CULTURE AEROBIC IDENTIFY: CPT

## 2021-09-18 PROCEDURE — 82010 KETONE BODYS QUAN: CPT

## 2021-09-18 RX ORDER — ATORVASTATIN CALCIUM 40 MG/1
40 TABLET, FILM COATED ORAL DAILY
Status: DISCONTINUED | OUTPATIENT
Start: 2021-09-19 | End: 2021-09-20 | Stop reason: HOSPADM

## 2021-09-18 RX ORDER — KETOROLAC TROMETHAMINE 30 MG/ML
15 INJECTION, SOLUTION INTRAMUSCULAR; INTRAVENOUS EVERY 4 HOURS PRN
Status: DISCONTINUED | OUTPATIENT
Start: 2021-09-18 | End: 2021-09-20 | Stop reason: HOSPADM

## 2021-09-18 RX ORDER — SODIUM CHLORIDE 9 MG/ML
INJECTION, SOLUTION INTRAVENOUS CONTINUOUS
Status: DISCONTINUED | OUTPATIENT
Start: 2021-09-18 | End: 2021-09-19

## 2021-09-18 RX ORDER — DEXTROSE MONOHYDRATE 50 MG/ML
100 INJECTION, SOLUTION INTRAVENOUS PRN
Status: DISCONTINUED | OUTPATIENT
Start: 2021-09-18 | End: 2021-09-18 | Stop reason: SDUPTHER

## 2021-09-18 RX ORDER — SODIUM CHLORIDE 9 MG/ML
25 INJECTION, SOLUTION INTRAVENOUS PRN
Status: DISCONTINUED | OUTPATIENT
Start: 2021-09-18 | End: 2021-09-20 | Stop reason: HOSPADM

## 2021-09-18 RX ORDER — LISINOPRIL 5 MG/1
5 TABLET ORAL DAILY
Status: DISCONTINUED | OUTPATIENT
Start: 2021-09-19 | End: 2021-09-20 | Stop reason: HOSPADM

## 2021-09-18 RX ORDER — HYDROXYZINE PAMOATE 25 MG/1
50 CAPSULE ORAL EVERY 8 HOURS PRN
Status: DISCONTINUED | OUTPATIENT
Start: 2021-09-18 | End: 2021-09-20 | Stop reason: HOSPADM

## 2021-09-18 RX ORDER — SODIUM CHLORIDE 0.9 % (FLUSH) 0.9 %
5-40 SYRINGE (ML) INJECTION EVERY 12 HOURS SCHEDULED
Status: DISCONTINUED | OUTPATIENT
Start: 2021-09-18 | End: 2021-09-20 | Stop reason: HOSPADM

## 2021-09-18 RX ORDER — 0.9 % SODIUM CHLORIDE 0.9 %
1000 INTRAVENOUS SOLUTION INTRAVENOUS ONCE
Status: COMPLETED | OUTPATIENT
Start: 2021-09-18 | End: 2021-09-18

## 2021-09-18 RX ORDER — DEXTROSE MONOHYDRATE 50 MG/ML
100 INJECTION, SOLUTION INTRAVENOUS PRN
Status: DISCONTINUED | OUTPATIENT
Start: 2021-09-18 | End: 2021-09-20 | Stop reason: HOSPADM

## 2021-09-18 RX ORDER — PROMETHAZINE HYDROCHLORIDE 25 MG/ML
25 INJECTION, SOLUTION INTRAMUSCULAR; INTRAVENOUS ONCE
Status: COMPLETED | OUTPATIENT
Start: 2021-09-18 | End: 2021-09-18

## 2021-09-18 RX ORDER — INSULIN LISPRO 100 [IU]/ML
0-12 INJECTION, SOLUTION INTRAVENOUS; SUBCUTANEOUS
Status: DISCONTINUED | OUTPATIENT
Start: 2021-09-19 | End: 2021-09-20 | Stop reason: HOSPADM

## 2021-09-18 RX ORDER — ASPIRIN 81 MG/1
81 TABLET, CHEWABLE ORAL DAILY
Status: DISCONTINUED | OUTPATIENT
Start: 2021-09-19 | End: 2021-09-20 | Stop reason: HOSPADM

## 2021-09-18 RX ORDER — NICOTINE POLACRILEX 4 MG
15 LOZENGE BUCCAL PRN
Status: DISCONTINUED | OUTPATIENT
Start: 2021-09-18 | End: 2021-09-18 | Stop reason: SDUPTHER

## 2021-09-18 RX ORDER — ONDANSETRON 2 MG/ML
4 INJECTION INTRAMUSCULAR; INTRAVENOUS EVERY 6 HOURS PRN
Status: DISCONTINUED | OUTPATIENT
Start: 2021-09-18 | End: 2021-09-20 | Stop reason: HOSPADM

## 2021-09-18 RX ORDER — CLONIDINE HYDROCHLORIDE 0.1 MG/1
0.1 TABLET ORAL PRN
Status: DISCONTINUED | OUTPATIENT
Start: 2021-09-18 | End: 2021-09-20 | Stop reason: HOSPADM

## 2021-09-18 RX ORDER — ACETAMINOPHEN 650 MG/1
650 SUPPOSITORY RECTAL EVERY 6 HOURS PRN
Status: DISCONTINUED | OUTPATIENT
Start: 2021-09-18 | End: 2021-09-20 | Stop reason: HOSPADM

## 2021-09-18 RX ORDER — QUETIAPINE FUMARATE 25 MG/1
50 TABLET, FILM COATED ORAL NIGHTLY PRN
Status: DISCONTINUED | OUTPATIENT
Start: 2021-09-18 | End: 2021-09-20 | Stop reason: HOSPADM

## 2021-09-18 RX ORDER — FUROSEMIDE 40 MG/1
40 TABLET ORAL DAILY
Status: DISCONTINUED | OUTPATIENT
Start: 2021-09-19 | End: 2021-09-20 | Stop reason: HOSPADM

## 2021-09-18 RX ORDER — TRAMADOL HYDROCHLORIDE 50 MG/1
50 TABLET ORAL EVERY 4 HOURS PRN
Status: DISCONTINUED | OUTPATIENT
Start: 2021-09-21 | End: 2021-09-20 | Stop reason: HOSPADM

## 2021-09-18 RX ORDER — INSULIN LISPRO 100 [IU]/ML
0-6 INJECTION, SOLUTION INTRAVENOUS; SUBCUTANEOUS NIGHTLY
Status: DISCONTINUED | OUTPATIENT
Start: 2021-09-18 | End: 2021-09-20 | Stop reason: HOSPADM

## 2021-09-18 RX ORDER — SODIUM CHLORIDE 0.9 % (FLUSH) 0.9 %
5-40 SYRINGE (ML) INJECTION PRN
Status: DISCONTINUED | OUTPATIENT
Start: 2021-09-18 | End: 2021-09-20 | Stop reason: HOSPADM

## 2021-09-18 RX ORDER — NICOTINE POLACRILEX 4 MG
15 LOZENGE BUCCAL PRN
Status: DISCONTINUED | OUTPATIENT
Start: 2021-09-18 | End: 2021-09-20 | Stop reason: HOSPADM

## 2021-09-18 RX ORDER — POLYETHYLENE GLYCOL 3350 17 G/17G
17 POWDER, FOR SOLUTION ORAL DAILY PRN
Status: DISCONTINUED | OUTPATIENT
Start: 2021-09-18 | End: 2021-09-20 | Stop reason: HOSPADM

## 2021-09-18 RX ORDER — CARVEDILOL 3.12 MG/1
3.12 TABLET ORAL 2 TIMES DAILY WITH MEALS
Status: DISCONTINUED | OUTPATIENT
Start: 2021-09-18 | End: 2021-09-20 | Stop reason: HOSPADM

## 2021-09-18 RX ORDER — ONDANSETRON 4 MG/1
4 TABLET, ORALLY DISINTEGRATING ORAL EVERY 8 HOURS PRN
Status: DISCONTINUED | OUTPATIENT
Start: 2021-09-18 | End: 2021-09-20 | Stop reason: HOSPADM

## 2021-09-18 RX ORDER — DEXTROSE MONOHYDRATE 25 G/50ML
12.5 INJECTION, SOLUTION INTRAVENOUS PRN
Status: DISCONTINUED | OUTPATIENT
Start: 2021-09-18 | End: 2021-09-18 | Stop reason: SDUPTHER

## 2021-09-18 RX ORDER — KETOROLAC TROMETHAMINE 15 MG/ML
15 INJECTION, SOLUTION INTRAMUSCULAR; INTRAVENOUS EVERY 4 HOURS PRN
Status: DISCONTINUED | OUTPATIENT
Start: 2021-09-18 | End: 2021-09-18 | Stop reason: SDUPTHER

## 2021-09-18 RX ORDER — NITROGLYCERIN 0.4 MG/1
0.4 TABLET SUBLINGUAL EVERY 5 MIN PRN
Status: DISCONTINUED | OUTPATIENT
Start: 2021-09-18 | End: 2021-09-20 | Stop reason: HOSPADM

## 2021-09-18 RX ORDER — ACETAMINOPHEN 325 MG/1
650 TABLET ORAL EVERY 6 HOURS PRN
Status: DISCONTINUED | OUTPATIENT
Start: 2021-09-18 | End: 2021-09-20 | Stop reason: HOSPADM

## 2021-09-18 RX ORDER — DEXTROSE MONOHYDRATE 25 G/50ML
12.5 INJECTION, SOLUTION INTRAVENOUS PRN
Status: DISCONTINUED | OUTPATIENT
Start: 2021-09-18 | End: 2021-09-20 | Stop reason: HOSPADM

## 2021-09-18 RX ORDER — TRAMADOL HYDROCHLORIDE 50 MG/1
50 TABLET ORAL PRN
Status: DISCONTINUED | OUTPATIENT
Start: 2021-09-18 | End: 2021-09-20 | Stop reason: HOSPADM

## 2021-09-18 RX ORDER — MIRTAZAPINE 15 MG/1
30 TABLET, FILM COATED ORAL NIGHTLY
Status: DISCONTINUED | OUTPATIENT
Start: 2021-09-18 | End: 2021-09-20 | Stop reason: HOSPADM

## 2021-09-18 RX ORDER — HYOSCYAMINE SULFATE 0.5 MG/ML
500 INJECTION, SOLUTION SUBCUTANEOUS ONCE
Status: COMPLETED | OUTPATIENT
Start: 2021-09-18 | End: 2021-09-18

## 2021-09-18 RX ADMIN — QUETIAPINE FUMARATE 50 MG: 25 TABLET ORAL at 22:59

## 2021-09-18 RX ADMIN — SODIUM CHLORIDE 1000 ML: 9 INJECTION, SOLUTION INTRAVENOUS at 16:10

## 2021-09-18 RX ADMIN — PROMETHAZINE HYDROCHLORIDE 25 MG: 25 INJECTION INTRAMUSCULAR; INTRAVENOUS at 16:12

## 2021-09-18 RX ADMIN — HYDROXYZINE PAMOATE 50 MG: 25 CAPSULE ORAL at 23:14

## 2021-09-18 RX ADMIN — Medication 1000 MG: at 18:47

## 2021-09-18 RX ADMIN — SODIUM CHLORIDE: 9 INJECTION, SOLUTION INTRAVENOUS at 22:59

## 2021-09-18 RX ADMIN — HYOSCYAMINE SULFATE 500 MCG: 0.5 INJECTION, SOLUTION SUBCUTANEOUS at 17:18

## 2021-09-18 RX ADMIN — INSULIN GLARGINE 15 UNITS: 100 INJECTION, SOLUTION SUBCUTANEOUS at 23:18

## 2021-09-18 RX ADMIN — TICAGRELOR 90 MG: 90 TABLET ORAL at 23:00

## 2021-09-18 RX ADMIN — INSULIN LISPRO 3 UNITS: 100 INJECTION, SOLUTION INTRAVENOUS; SUBCUTANEOUS at 23:17

## 2021-09-18 RX ADMIN — MIRTAZAPINE 30 MG: 15 TABLET, FILM COATED ORAL at 23:15

## 2021-09-18 RX ADMIN — SODIUM CHLORIDE 1 UNITS/HR: 9 INJECTION, SOLUTION INTRAVENOUS at 17:06

## 2021-09-18 RX ADMIN — IOPAMIDOL 75 ML: 755 INJECTION, SOLUTION INTRAVENOUS at 16:23

## 2021-09-18 RX ADMIN — SODIUM CHLORIDE 1000 ML: 9 INJECTION, SOLUTION INTRAVENOUS at 18:05

## 2021-09-18 RX ADMIN — ACETAMINOPHEN 650 MG: 325 TABLET ORAL at 23:14

## 2021-09-18 RX ADMIN — ENOXAPARIN SODIUM 40 MG: 40 INJECTION SUBCUTANEOUS at 22:59

## 2021-09-18 ASSESSMENT — ENCOUNTER SYMPTOMS
SHORTNESS OF BREATH: 0
NAUSEA: 1
ABDOMINAL PAIN: 0
RHINORRHEA: 0
COUGH: 0
VOMITING: 1
DIARRHEA: 0

## 2021-09-18 ASSESSMENT — PAIN DESCRIPTION - PAIN TYPE
TYPE: ACUTE PAIN

## 2021-09-18 ASSESSMENT — PAIN DESCRIPTION - ORIENTATION
ORIENTATION: MID;LOWER
ORIENTATION: RIGHT
ORIENTATION: MID;LOWER

## 2021-09-18 ASSESSMENT — PAIN DESCRIPTION - LOCATION
LOCATION: ABDOMEN;FLANK
LOCATION: ABDOMEN
LOCATION: ABDOMEN;FLANK

## 2021-09-18 ASSESSMENT — PAIN DESCRIPTION - DESCRIPTORS
DESCRIPTORS: SHARP
DESCRIPTORS: SHARP

## 2021-09-18 ASSESSMENT — PAIN SCALES - GENERAL
PAINLEVEL_OUTOF10: 10
PAINLEVEL_OUTOF10: 7
PAINLEVEL_OUTOF10: 9

## 2021-09-18 ASSESSMENT — PAIN DESCRIPTION - FREQUENCY
FREQUENCY: INTERMITTENT
FREQUENCY: INTERMITTENT

## 2021-09-18 ASSESSMENT — PAIN DESCRIPTION - ONSET
ONSET: ON-GOING
ONSET: ON-GOING

## 2021-09-18 NOTE — ED PROVIDER NOTES
[facility]        Pt Name: Cosme Guerrero  MRN: [id number]  [place] 1965  Date of evaluation: 9/18/2021  Provider: Jackie Xiong PA-C  PCP: No primary care provider on file. Note Started: 3:10 PM EDT       FATIMAH. I have evaluated this patient. My supervising physician was available for consultation. CHIEF COMPLAINT       Chief Complaint   Patient presents with    Fatigue     Fatigue, dysuria, and hyperglycemia X3 days.  in triage. T2DM. Hx of heroine use, last used 3 days ago, took suboxone yesterday. HISTORY OF PRESENT ILLNESS   (Location, Timing/Onset, Context/Setting, Quality, Duration, Modifying Factors, Severity, Associated Signs and Symptoms)  Note limiting factors. Chief Complaint: Hyperglycemia    Cosme Guerrero is a 60 y.o. female who presents to the emergency department today for evaluation for hyperglycemia. The patient states that she is an insulin-dependent diabetic, and she states that the last and that she give herself insulin was yesterday. The patient states that over the past 3 days she is nauseous, she states that she has had several episodes of emesis. Patient denies any bilious emesis, hematemesis or coffee-ground emesis. Patient denies any diarrhea. The patient states that she typically snorts fentanyl/heroin, and she states that she last used 3 days ago when she feels that she is going through withdrawal which is making her sugar spike. The patient is reporting generalized body ache as well as abdominal pain which she is rating is a 9/10. Her pain is cramping, and otherwise denies any known alleviating or aggravating factors. She states that she is having looser stools she denies any blood in the stool black tarry appearance of stool. Patient denies any chest pain. She is no shortness of breath. He has no fever chills per no cough or congestion.   Patient does report increased urination although denies any dysuria. Nursing Notes were all reviewed and agreed with or any disagreements were addressed in the HPI. REVIEW OF SYSTEMS    (2-9 systems for level 4, 10 or more for level 5)     Review of Systems   Constitutional: Negative for activity change, appetite change, chills and fever. HENT: Negative for congestion and rhinorrhea. Respiratory: Negative for cough and shortness of breath. Cardiovascular: Negative for chest pain. Gastrointestinal: Positive for nausea and vomiting. Negative for abdominal pain and diarrhea. Genitourinary: Positive for frequency. Negative for difficulty urinating, dysuria and hematuria. Musculoskeletal: Positive for myalgias. Positives and Pertinent negatives as per HPI. Except as noted above in the ROS, all other systems were reviewed and negative. PAST MEDICAL HISTORY     Past Medical History:   Diagnosis Date    Arthritis     [facility]     Depression     Diabetes mellitus [facility]     Heart attack ([facility]     MI 7/2019, and 6 months ago    Hepatitis C     Dr Yenifer Reid follows; contracted from ex- (drug user)    Hypercholesteremia 3/11/2020    Hypertension     MRSA (methicillin resistant Staphylococcus aureus) infection in 2000    was in urine and nares    Pneumonia     S/P colonoscopy with polypectomy 2/11    one polyp, Dr Yenifer Reid. Recall 3 years. SURGICAL HISTORY     Past Surgical History:   Procedure Laterality Date    ABOVE KNEE AMPUTATION      AMPUTATION      right bka    CORONARY ANGIOPLASTY WITH STENT PLACEMENT  07/2019    JOINT REPLACEMENT  2006, 2010    right knee; Dr Jonah Hung.     KNEE ARTHROSCOPY      ridht knee X6    LIVER BIOPSY N/A 11/14/2018    LIVER BIOPSY LAPAROSCOPIC performed by Demetrius Mon MD at [address] [place] [address] N/A 11/14/2018    LAPAROSCOPIC CHOLECYSTECTOMY WITH INTRAOPERATIVE CHOLANGIOGRAM performed by Demetrius Mon MD at [address] [place] [address]  REVISION TOTAL KNEE ARTHROPLASTY  2/9/11    right    UPPER GASTROINTESTINAL ENDOSCOPY  11/13/2018    UPPER GASTROINTESTINAL ENDOSCOPY N/A 11/13/2018    EGD BIOPSY performed by Pura Valdovinos MD at [address]       Previous Medications    ASPIRIN 81 MG TABLET    Take 1 tablet by mouth daily    ATORVASTATIN (LIPITOR) 40 MG TABLET    Take 1 tablet by mouth daily    BLOOD GLUCOSE TEST STRIPS (FREESTYLE LITE) STRIP    1 each by Other route 4 times daily (before meals and nightly) Patient to check blood sugar 4 times a day and PRN, he is treated with multiple daily injections of insulin that require correction dosing and has had hypoglycemia. A1C  14.5  ICD code- E11.65, Z79.4    BUPRENORPHINE-NALOXONE (SUBOXONE) 8-2 MG SUBL SL TABLET    Place 1 tablet under the tongue 2 times daily. CARVEDILOL (COREG) 3.125 MG TABLET    Take 1 tablet by mouth 2 times daily (with meals)    FREESTYLE LANCETS MISC    1 each by Other route 4 times daily (before meals and nightly) Patient to check blood sugar 4 times a day and PRN, he is treated with multiple daily injections of insulin that require correction dosing and has had hypoglycemia. A1C  14.5  ICD code- E11.65, Z79.4    FUROSEMIDE (LASIX) 40 MG TABLET    Take 1 tablet by mouth daily    INSULIN GLARGINE (LANTUS SOLOSTAR) 100 UNIT/ML INJECTION PEN    Inject 15 Units into the skin 2 times daily Indications: Insulin Pump     INSULIN LISPRO, 1 UNIT DIAL, (HUMALOG KWIKPEN) 100 UNIT/ML SOPN    Inject 10 Units into the skin 3 times daily (before meals) Inject 10 units three times daily with each meal in addition to sliding scale. LISINOPRIL (PRINIVIL;ZESTRIL) 5 MG TABLET    Take 1 tablet by mouth daily    MIRTAZAPINE (REMERON) 15 MG TABLET    Take 30 mg by mouth nightly     NITROGLYCERIN (NITROSTAT) 0.4 MG SL TABLET    up to max of 3 total doses. If no relief after 1 dose, call 911.     PREGABALIN (LYRICA) 100 MG soft.      Tenderness: There is generalized abdominal tenderness. There is no guarding or rebound. Musculoskeletal:         General: Normal range of motion. Cervical back: Normal range of motion and neck supple. Skin:     General: Skin is warm and dry. Neurological:      Mental Status: She is alert and oriented to person, place, and time.    Psychiatric:         Behavior: Behavior normal.         DIAGNOSTIC RESULTS   LABS:    Labs Reviewed   CBC WITH AUTO DIFFERENTIAL - Abnormal; Notable for the following components:       Result Value    RDW 16.8 (*)     MPV 10.8 (*)     Neutrophils Absolute 8.9 (*)     Lymphocytes Absolute 0.8 (*)     All other components within normal limits    Narrative:     Performed at:  [facility] [address], [place], [address]   Phone [phone]   COMPREHENSIVE METABOLIC PANEL - Abnormal; Notable for the following components:    Sodium 130 (*)     Chloride 92 (*)     Glucose 631 (*)     Total Protein 8.3 (*)     Albumin/Globulin Ratio 0.7 (*)     Alkaline Phosphatase 179 (*)     AST 55 (*)     All other components within normal limits    Narrative:     Shay Minerisabel  [facility] tel. [phone],  Chemistry results called to and read back by bryan walsh,  09/18/2021 15:40, by CRISTY  Performed at:  [facility] [address], [place], [facility] [address]   Phone [phone]   LIPASE - Abnormal; Notable for the following components:    Lipase 492.0 (*)     All other components within normal limits    Narrative:     Performed at:  [facility] [address], [place], [facility] [place]   Phone [phone]   URINE RT REFLEX TO CULTURE - Abnormal; Notable for the following components:    Clarity, UA TURBID (*)     Glucose, Ur 500 (*)     Blood, Urine LARGE (*)     Protein, UA 30 (*)     Leukocyte Esterase, Urine MODERATE (*)     All other components within normal limits    Narrative: Performed at:  [facility] [address], [place], [address]   Phone [phone]   BLOOD GAS, VENOUS - Abnormal; Notable for the following components:    pH, Rowdy 7.513 (*)     pCO2, Rowdy 34.4 (*)     pO2, Rowdy 140.0 (*)     Base Excess, Rowdy 4.8 (*)     Carboxyhemoglobin 7.6 (*)     All other components within normal limits    Narrative:     Performed at:  [facility] [address], [place], [address]   Phone [phone]   BETA-HYDROXYBUTYRATE - Abnormal; Notable for the following components:    Beta-Hydroxybutyrate 0.30 (*)     All other components within normal limits    Narrative:     [facility]. [phone],  Chemistry results called to and read back by bryan walsh,  09/18/2021 15:40, by CRISTY  Performed at:  [facility] [address], [place], [address]   Phone [phone]   MICROSCOPIC URINALYSIS - Abnormal; Notable for the following components:    Bacteria, UA 4+ (*)     WBC, UA >900 (*)     RBC, UA 28 (*)     All other components within normal limits    Narrative:     Performed at:  [facility] [address], [place], [address]   Phone [phone]   POCT GLUCOSE - Abnormal; Notable for the following components:    POC Glucose 556 (*)     All other components within normal limits    Narrative:     Performed at:  [facility] [address], [place], [address]   Phone [phone]   POCT GLUCOSE - Abnormal; Notable for the following components:    POC Glucose 506 (*)     All other components within normal limits    Narrative:     Performed at:  [facility] [address],  [place], [address]   Phone [phone]   POCT GLUCOSE - Normal   POCT GLUCOSE - Normal   CULTURE, URINE   TROPONIN    Narrative:     Sissy POSTF tel. [phone],  Chemistry results called to and read back by bryan lopes er,  09/18/2021 15:40, by CRISTY  Performed at:  [facility]  [address] [facility], [address]   Phone [phone]   POCT GLUCOSE   POCT GLUCOSE   POCT GLUCOSE   POCT GLUCOSE   POCT GLUCOSE   POCT GLUCOSE   POCT GLUCOSE   POCT GLUCOSE   POCT GLUCOSE   POCT GLUCOSE   POCT GLUCOSE   POCT GLUCOSE   POCT GLUCOSE       When ordered only abnormal lab results are displayed. All other labs were within normal range or not returned as of this dictation. EKG: When ordered, EKG's are interpreted by the Emergency Department Physician in the absence of a cardiologist.  Please see their note for interpretation of EKG. RADIOLOGY:   Non-plain film images such as CT, Ultrasound and MRI are read by the radiologist. Plain radiographic images are visualized and preliminarily interpreted by the ED Provider with the below findings:        Interpretation per the Radiologist below, if available at the time of this note:    CT ABDOMEN PELVIS W IV CONTRAST Additional Contrast? None   Final Result   Normal caliber bowel and normal appendix. Mild diffuse urinary bladder wall thickening could relate to underlying   cystitis. No hydronephrosis or urinary stones. No peripancreatic inflammation or fluid collection. XR CHEST PORTABLE   Final Result   Mild interstitial changes have improved from prior imaging. Pattern may   represent underlying vascular congestion. A resolving pattern of pneumonitis   may be considered clinically. No results found.         PROCEDURES   Unless otherwise noted below, none     Procedures    CRITICAL CARE TIME   N/A    CONSULTS:  IP CONSULT TO GI      EMERGENCY DEPARTMENT COURSE and DIFFERENTIAL DIAGNOSIS/MDM:   Vitals:    Vitals:    09/18/21 1633 09/18/21 1640 09/18/21 1659 09/18/21 1730   BP: 127/81 123/77 128/81 121/88   Pulse: 98 97 98 101   Resp: 11 19 20 15   Temp:       TempSrc:       SpO2: 94% evidence of acute pancreatitis    She was given a liter of fluid, glucose is trending downward, she will need to admitted for further care and evaluation, hospitalist is agreed for admission, patient is updated and agreeable with plan. Stable for admission    FINAL IMPRESSION      1. Hyperglycemia    2. Acute cystitis without hematuria    3. Non-intractable vomiting with nausea, unspecified vomiting type          DISPOSITION/PLAN   DISPOSITION Admitted 09/18/2021 06:12:14 PM      PATIENT REFERRED TO:  No follow-up provider specified.     DISCHARGE MEDICATIONS:  New Prescriptions    No medications on file       DISCONTINUED MEDICATIONS:  Discontinued Medications    No medications on file              (Please note that portions of this note were completed with a voice recognition program.  Efforts were made to edit the dictations but occasionally words are mis-transcribed.)    Jackie Xiong PA-C (electronically signed)            Jackie Xiong PA-C  09/18/21 4548

## 2021-09-18 NOTE — ED NOTES
Spoke to provider about about insulin order and she stated to start at 1 unit per hour     Juanjo Means RN  09/18/21 7997

## 2021-09-18 NOTE — H&P
Negative for confusion,dysarthria. Skin: Negative for itching,rash  Psychiatric: Negative for depression,anxiety, agitation. Endocrine: Negative for polydipsia,polyuria,heat /cold intolerance. Past Medical History:   has a past medical history of Arthritis, CHF (congestive heart failure) ([facility] [address] Depression, Diabetes mellitus ([facility] [address] Heart attack ([facility] [address] Hepatitis C, Hypercholesteremia, Hypertension, MRSA (methicillin resistant Staphylococcus aureus) infection, Pneumonia, and S/P colonoscopy with polypectomy. Past Surgical History:   has a past surgical history that includes Knee arthroscopy; Revision total knee arthroplasty (2/9/11); joint replacement (2006, 2010); amputation; above knee amputation; Upper gastrointestinal endoscopy (11/13/2018); Upper gastrointestinal endoscopy (N/A, 11/13/2018); pr lap,cholecystectomy (N/A, 11/14/2018); liver biopsy (N/A, 11/14/2018); and Coronary angioplasty with stent (07/2019). Medications:  No current facility-administered medications on file prior to encounter. Current Outpatient Medications on File Prior to Encounter   Medication Sig Dispense Refill    insulin glargine (LANTUS SOLOSTAR) 100 UNIT/ML injection pen Inject 15 Units into the skin 2 times daily Indications: Insulin Pump       insulin lispro, 1 Unit Dial, (HUMALOG KWIKPEN) 100 UNIT/ML SOPN Inject 10 Units into the skin 3 times daily (before meals) Inject 10 units three times daily with each meal in addition to sliding scale.  lisinopril (PRINIVIL;ZESTRIL) 5 MG tablet Take 1 tablet by mouth daily 30 tablet 0    furosemide (LASIX) 40 MG tablet Take 1 tablet by mouth daily 30 tablet 0    carvedilol (COREG) 3.125 MG tablet Take 1 tablet by mouth 2 times daily (with meals) 60 tablet 0    nitroGLYCERIN (NITROSTAT) 0.4 MG SL tablet up to max of 3 total doses.  If no relief after 1 dose, call 911. 25 tablet 3    sertraline (ZOLOFT) 50 MG tablet Take 50 mg by mouth daily Indications: Depression      mirtazapine (REMERON) 15 MG tablet Take 30 mg by mouth nightly       pregabalin (LYRICA) 100 MG capsule Take 100 mg by mouth 3 times daily.  ticagrelor (BRILINTA) 90 MG TABS tablet Take 1 tablet by mouth 2 times daily 60 tablet 5    aspirin 81 MG tablet Take 1 tablet by mouth daily 90 tablet 1    atorvastatin (LIPITOR) 40 MG tablet Take 1 tablet by mouth daily 90 tablet 3    buprenorphine-naloxone (SUBOXONE) 8-2 MG SUBL SL tablet Place 1 tablet under the tongue 2 times daily.  FREESTYLE LANCETS MISC 1 each by Other route 4 times daily (before meals and nightly) Patient to check blood sugar 4 times a day and PRN, he is treated with multiple daily injections of insulin that require correction dosing and has had hypoglycemia. A1C  14.5  ICD code- E11.65, Z79.4 100 each 5    blood glucose test strips (FREESTYLE LITE) strip 1 each by Other route 4 times daily (before meals and nightly) Patient to check blood sugar 4 times a day and PRN, he is treated with multiple daily injections of insulin that require correction dosing and has had hypoglycemia. A1C  14.5  ICD code- E11.65, Z79.4 100 each 5       Allergies: Allergies   Allergen Reactions    Darvocet [Propoxyphene N-Acetaminophen] Nausea Only    Naprosyn [Naproxen] Rash    Ultram [Tramadol Hcl] Rash        Social History:   reports that she quit smoking about 14 months ago. She has a 43.00 pack-year smoking history. She has never used smokeless tobacco. She reports current drug use. Drug: Opiates . She reports that she does not drink alcohol. Family History:  family history includes Cancer in her father and sister; Seizures in her brother. ,     Physical Exam:  /88   Pulse 101   Temp 99.2 °F (37.3 °C) (Oral)   Resp 15   Ht 5' 2\" (1.575 m)   LMP 11/15/2017   SpO2 96%   BMI 22.46 kg/m²     General appearance:  Appears comfortable.  Well nourished  Eyes: Sclera clear, pupils equal  ENT: Moist mucus membranes, no thrush. Trachea midline. Cardiovascular: Regular rhythm, normal S1, S2. No murmur, gallop, rub. No edema in lower extremities  Respiratory: Clear to auscultation bilaterally, no wheeze, good inspiratory effort  Gastrointestinal: Abdomen soft, non-tender, not distended, normal bowel sounds  Musculoskeletal: No cyanosis in digits, neck supple  Neurology: Cranial nerves grossly intact. Alert and oriented in time, place and person. No speech or motor deficits  Psychiatry: Appropriate affect. Not agitated  Skin: Warm, dry, normal turgor, no rash    Labs:  CBC:   Lab Results   Component Value Date    WBC 10.3 09/18/2021    RBC 5.15 09/18/2021    HGB 14.6 09/18/2021    HCT 44.4 09/18/2021    MCV 86.2 09/18/2021    MCH 28.4 09/18/2021    MCHC 33.0 09/18/2021    RDW 16.8 09/18/2021     09/18/2021    MPV 10.8 09/18/2021     BMP:    Lab Results   Component Value Date     09/18/2021    K 4.3 09/18/2021    K 3.7 09/02/2021    CL 92 09/18/2021    CO2 25 09/18/2021    BUN 20 09/18/2021    CREATININE 0.8 09/18/2021    CALCIUM 9.0 09/18/2021    GFRAA >60 09/18/2021    GFRAA >60 02/14/2012    LABGLOM >60 09/18/2021    LABGLOM 131.7 05/20/2011    GLUCOSE 409 09/18/2021    GLUCOSE 329 05/20/2011       Chest Xray:   EKG:        Problem List  Active Problems:    Pancreatitis, recurrent  Resolved Problems:    * No resolved hospital problems. *        Assessment/Plan:         1. Chronic pancreatitis  CT abdomen and pelvis showed atrophic pancreas with calcifications  Current heroin use. Denies alcohol use  Lipase is elevated 492  Bowel rest  IV fluids  Pain control with non opiate modalities d/t current heroin abuse  Toradol and ultram ordered  Discussed with pt . Pt agreeable to try no opiate analgesics    2.  Hyperosmolar hyperglycemic state  No DKA   Blood sugar is trending down 631---> 506--> 409  Cont iv fluids   Cont on Lantus and sliding scale insulin    UTI  Cultures pending  Previous urine cultures in 09/21  grew Ecoli sensitive to ceftriaxone  Received a dose in the ED  Will cont to complete 3 day course    Heroin abuse  Last use was 3 days ago   Wants to quit  COWS protocol initiated  SW consult for dc planning    Admit as inpatien. I anticipate hospitalization spanning more than two midnights for investigation and treatment of the above medically necessary diagnoses.       Adan Sheppard MD    9/18/2021 6:25 PM

## 2021-09-19 LAB
A/G RATIO: 0.6 (ref 1.1–2.2)
ALBUMIN SERPL-MCNC: 2.6 G/DL (ref 3.4–5)
ALP BLD-CCNC: 228 U/L (ref 40–129)
ALT SERPL-CCNC: 30 U/L (ref 10–40)
ANION GAP SERPL CALCULATED.3IONS-SCNC: 10 MMOL/L (ref 3–16)
AST SERPL-CCNC: 97 U/L (ref 15–37)
BILIRUB SERPL-MCNC: 0.4 MG/DL (ref 0–1)
BUN BLDV-MCNC: 29 MG/DL (ref 7–20)
CALCIUM SERPL-MCNC: 8.2 MG/DL (ref 8.3–10.6)
CHLORIDE BLD-SCNC: 100 MMOL/L (ref 99–110)
CHOLESTEROL, TOTAL: 88 MG/DL (ref 0–199)
CO2: 27 MMOL/L (ref 21–32)
CREAT SERPL-MCNC: 0.5 MG/DL (ref 0.6–1.1)
EKG ATRIAL RATE: 100 BPM
EKG DIAGNOSIS: NORMAL
EKG P AXIS: 21 DEGREES
EKG P-R INTERVAL: 132 MS
EKG Q-T INTERVAL: 378 MS
EKG QRS DURATION: 104 MS
EKG QTC CALCULATION (BAZETT): 487 MS
EKG R AXIS: -72 DEGREES
EKG T AXIS: 53 DEGREES
EKG VENTRICULAR RATE: 100 BPM
ESTIMATED AVERAGE GLUCOSE: 294.8 MG/DL
GFR AFRICAN AMERICAN: >60
GFR NON-AFRICAN AMERICAN: >60
GLOBULIN: 4.2 G/DL
GLUCOSE BLD-MCNC: 253 MG/DL (ref 70–99)
GLUCOSE BLD-MCNC: 305 MG/DL (ref 70–99)
GLUCOSE BLD-MCNC: 346 MG/DL (ref 70–99)
GLUCOSE BLD-MCNC: 79 MG/DL (ref 70–99)
GLUCOSE BLD-MCNC: 85 MG/DL (ref 70–99)
GLUCOSE BLD-MCNC: 93 MG/DL (ref 70–99)
HBA1C MFR BLD: 11.9 %
HCT VFR BLD CALC: 36.5 % (ref 36–48)
HDLC SERPL-MCNC: 28 MG/DL (ref 40–60)
HEMOGLOBIN: 12.1 G/DL (ref 12–16)
LDL CHOLESTEROL CALCULATED: 41 MG/DL
LIPASE: 134 U/L (ref 13–60)
MCH RBC QN AUTO: 27.8 PG (ref 26–34)
MCHC RBC AUTO-ENTMCNC: 33.1 G/DL (ref 31–36)
MCV RBC AUTO: 84.1 FL (ref 80–100)
PDW BLD-RTO: 16.5 % (ref 12.4–15.4)
PERFORMED ON: ABNORMAL
PERFORMED ON: ABNORMAL
PERFORMED ON: NORMAL
PLATELET # BLD: 107 K/UL (ref 135–450)
PMV BLD AUTO: 11.8 FL (ref 5–10.5)
POTASSIUM REFLEX MAGNESIUM: 3.7 MMOL/L (ref 3.5–5.1)
RBC # BLD: 4.34 M/UL (ref 4–5.2)
SODIUM BLD-SCNC: 137 MMOL/L (ref 136–145)
TOTAL PROTEIN: 6.8 G/DL (ref 6.4–8.2)
TRIGL SERPL-MCNC: 96 MG/DL (ref 0–150)
VLDLC SERPL CALC-MCNC: 19 MG/DL
WBC # BLD: 7.7 K/UL (ref 4–11)

## 2021-09-19 PROCEDURE — 2580000003 HC RX 258: Performed by: NURSE PRACTITIONER

## 2021-09-19 PROCEDURE — 6370000000 HC RX 637 (ALT 250 FOR IP): Performed by: FAMILY MEDICINE

## 2021-09-19 PROCEDURE — 36415 COLL VENOUS BLD VENIPUNCTURE: CPT

## 2021-09-19 PROCEDURE — 83690 ASSAY OF LIPASE: CPT

## 2021-09-19 PROCEDURE — 85027 COMPLETE CBC AUTOMATED: CPT

## 2021-09-19 PROCEDURE — 80061 LIPID PANEL: CPT

## 2021-09-19 PROCEDURE — 80053 COMPREHEN METABOLIC PANEL: CPT

## 2021-09-19 PROCEDURE — 2700000000 HC OXYGEN THERAPY PER DAY

## 2021-09-19 PROCEDURE — 94760 N-INVAS EAR/PLS OXIMETRY 1: CPT

## 2021-09-19 PROCEDURE — 2580000003 HC RX 258: Performed by: FAMILY MEDICINE

## 2021-09-19 PROCEDURE — 93010 ELECTROCARDIOGRAM REPORT: CPT | Performed by: INTERNAL MEDICINE

## 2021-09-19 PROCEDURE — 6360000002 HC RX W HCPCS: Performed by: FAMILY MEDICINE

## 2021-09-19 PROCEDURE — 6370000000 HC RX 637 (ALT 250 FOR IP): Performed by: NURSE PRACTITIONER

## 2021-09-19 PROCEDURE — 1200000000 HC SEMI PRIVATE

## 2021-09-19 RX ORDER — PROMETHAZINE HYDROCHLORIDE 25 MG/1
25 SUPPOSITORY RECTAL EVERY 6 HOURS PRN
Status: DISCONTINUED | OUTPATIENT
Start: 2021-09-19 | End: 2021-09-20 | Stop reason: HOSPADM

## 2021-09-19 RX ORDER — SODIUM CHLORIDE 9 MG/ML
INJECTION, SOLUTION INTRAVENOUS
Status: DISPENSED
Start: 2021-09-19 | End: 2021-09-19

## 2021-09-19 RX ORDER — SODIUM CHLORIDE, SODIUM LACTATE, POTASSIUM CHLORIDE, CALCIUM CHLORIDE 600; 310; 30; 20 MG/100ML; MG/100ML; MG/100ML; MG/100ML
INJECTION, SOLUTION INTRAVENOUS CONTINUOUS
Status: DISCONTINUED | OUTPATIENT
Start: 2021-09-19 | End: 2021-09-20

## 2021-09-19 RX ADMIN — KETOROLAC TROMETHAMINE 15 MG: 30 INJECTION, SOLUTION INTRAMUSCULAR at 16:30

## 2021-09-19 RX ADMIN — KETOROLAC TROMETHAMINE 15 MG: 30 INJECTION, SOLUTION INTRAMUSCULAR at 09:53

## 2021-09-19 RX ADMIN — ONDANSETRON 4 MG: 2 INJECTION INTRAMUSCULAR; INTRAVENOUS at 08:09

## 2021-09-19 RX ADMIN — FUROSEMIDE 40 MG: 40 TABLET ORAL at 13:44

## 2021-09-19 RX ADMIN — LISINOPRIL 5 MG: 5 TABLET ORAL at 13:44

## 2021-09-19 RX ADMIN — INSULIN LISPRO 6 UNITS: 100 INJECTION, SOLUTION INTRAVENOUS; SUBCUTANEOUS at 11:14

## 2021-09-19 RX ADMIN — TICAGRELOR 90 MG: 90 TABLET ORAL at 21:03

## 2021-09-19 RX ADMIN — Medication 1000 MG: at 16:30

## 2021-09-19 RX ADMIN — Medication 10 ML: at 21:03

## 2021-09-19 RX ADMIN — TICAGRELOR 90 MG: 90 TABLET ORAL at 13:45

## 2021-09-19 RX ADMIN — INSULIN GLARGINE 20 UNITS: 100 INJECTION, SOLUTION SUBCUTANEOUS at 10:14

## 2021-09-19 RX ADMIN — MIRTAZAPINE 30 MG: 15 TABLET, FILM COATED ORAL at 21:04

## 2021-09-19 RX ADMIN — ENOXAPARIN SODIUM 40 MG: 40 INJECTION SUBCUTANEOUS at 20:06

## 2021-09-19 RX ADMIN — ONDANSETRON 4 MG: 2 INJECTION INTRAMUSCULAR; INTRAVENOUS at 13:43

## 2021-09-19 RX ADMIN — CLONIDINE HYDROCHLORIDE 0.1 MG: 0.1 TABLET ORAL at 13:44

## 2021-09-19 RX ADMIN — HYDROXYZINE PAMOATE 50 MG: 25 CAPSULE ORAL at 21:34

## 2021-09-19 RX ADMIN — SODIUM CHLORIDE, POTASSIUM CHLORIDE, SODIUM LACTATE AND CALCIUM CHLORIDE: 600; 310; 30; 20 INJECTION, SOLUTION INTRAVENOUS at 21:08

## 2021-09-19 RX ADMIN — PROMETHAZINE HYDROCHLORIDE 25 MG: 25 SUPPOSITORY RECTAL at 10:24

## 2021-09-19 RX ADMIN — TRAMADOL HYDROCHLORIDE 50 MG: 50 TABLET, FILM COATED ORAL at 21:06

## 2021-09-19 RX ADMIN — ONDANSETRON 4 MG: 2 INJECTION INTRAMUSCULAR; INTRAVENOUS at 20:06

## 2021-09-19 RX ADMIN — TRAMADOL HYDROCHLORIDE 50 MG: 50 TABLET, FILM COATED ORAL at 13:44

## 2021-09-19 RX ADMIN — SODIUM CHLORIDE, POTASSIUM CHLORIDE, SODIUM LACTATE AND CALCIUM CHLORIDE: 600; 310; 30; 20 INJECTION, SOLUTION INTRAVENOUS at 15:03

## 2021-09-19 RX ADMIN — KETOROLAC TROMETHAMINE 15 MG: 30 INJECTION, SOLUTION INTRAMUSCULAR at 01:56

## 2021-09-19 RX ADMIN — CLONIDINE HYDROCHLORIDE 0.1 MG: 0.1 TABLET ORAL at 21:06

## 2021-09-19 ASSESSMENT — PAIN SCALES - GENERAL
PAINLEVEL_OUTOF10: 7
PAINLEVEL_OUTOF10: 7
PAINLEVEL_OUTOF10: 8
PAINLEVEL_OUTOF10: 6
PAINLEVEL_OUTOF10: 8
PAINLEVEL_OUTOF10: 10
PAINLEVEL_OUTOF10: 7

## 2021-09-19 ASSESSMENT — PAIN DESCRIPTION - PAIN TYPE
TYPE: ACUTE PAIN
TYPE: ACUTE PAIN

## 2021-09-19 ASSESSMENT — PAIN DESCRIPTION - DESCRIPTORS: DESCRIPTORS: SHARP

## 2021-09-19 ASSESSMENT — PAIN DESCRIPTION - LOCATION
LOCATION: ABDOMEN;FLANK
LOCATION: ABDOMEN

## 2021-09-19 ASSESSMENT — PAIN DESCRIPTION - FREQUENCY: FREQUENCY: INTERMITTENT

## 2021-09-19 ASSESSMENT — PAIN DESCRIPTION - ORIENTATION: ORIENTATION: MID;LOWER

## 2021-09-19 ASSESSMENT — PAIN DESCRIPTION - ONSET: ONSET: ON-GOING

## 2021-09-19 NOTE — CONSULTS
GI Consult Note      Admission Date: 9/18/2021  Hospital Day: Hospital Day: 2  Attending: Clarissa Abdul MD  Date of service: 9/19/21    Subjective:     Chief complaint/ Reason for consult:   Exacerbation of chronic pancreatitis     HPI: Jorgito Blake is a 60 y.o. Female with history of polysubstance abuse with continued IV heroin use, hep C, DM, CHF admitted for right sided abdominal pain that was severe and started a few days prior to presentation. CT scan without findings of pancreatitis but with atrophic pancreas and calcifications suggestive of chronic pancreatitis. She denies any alcohol use. She is a chronic smoker. Work up for her pancreatitis in the past was unrevealing. She has a history of uncontrolled diabetes requiring multiple hospital visits for. Past Medical History:     Past Medical History:   Diagnosis Date    Arthritis     [facility]est[facility] failure) ([facility] [address]     Depression     Diabetes mellitus ([facility] [address]     Heart attack ([facility] [address]     MI 7/2019, and 6 months ago    Hepatitis C     Dr Fercho Delgado follows; contracted from ex- (drug user)    Hypercholesteremia 3/11/2020    Hypertension     MRSA (methicillin resistant Staphylococcus aureus) infection in 2000    was in urine and nares    Pneumonia     S/P colonoscopy with polypectomy 2/11    one polyp, Dr Fercho Delgado. Recall 3 years. Past Surgical History:    Past Surgical History:   Procedure Laterality Date    ABOVE KNEE AMPUTATION      AMPUTATION      right bka    CORONARY ANGIOPLASTY WITH STENT PLACEMENT  07/2019    JOINT REPLACEMENT  2006, 2010    right knee; Dr Megan Silveira.     KNEE ARTHROSCOPY      ridht knee X6    LIVER BIOPSY N/A 11/14/2018    LIVER BIOPSY LAPAROSCOPIC performed by Vazquez Morales MD at [address] [place] [address] N/A 11/14/2018    LAPAROSCOPIC CHOLECYSTECTOMY WITH INTRAOPERATIVE CHOLANGIOGRAM performed by Vazquez Morales MD at [place] [address]  2/9/11 dry, no jaundice  Neuro: Grossly intact, A&OX3    Intake and output:   I/O last 3 completed shifts: In: 1104 [P.O.:300; I.V.:804]  Out: 600 [Urine:600]    Lab Data:      CBC:   Recent Labs     09/18/21  1450 09/19/21  0958   WBC 10.3 7.7   RBC 5.15 4.34   HGB 14.6 12.1   HCT 44.4 36.5    107*   MCV 86.2 84.1   MCH 28.4 27.8   MCHC 33.0 33.1   RDW 16.8* 16.5*        BMP:  Recent Labs     09/18/21  1450 09/18/21  1450 09/18/21  1649 09/18/21  1807 09/19/21  0958   *  --   --   --  137   K 4.3  --   --   --  3.7   CL 92*  --   --   --  100   CO2 25  --   --   --  27   BUN 20  --   --   --  29*   CREATININE 0.8  --   --   --  0.5*   CALCIUM 9.0  --   --   --  8.2*   GLUCOSE 631*   < > 506 409 305*    < > = values in this interval not displayed. Hepatic Function Panel:   Recent Labs     09/18/21  1450 09/19/21  0958   AST 55* 97*   ALT 21 30   BILITOT 0.5 0.4   ALKPHOS 179* 228*       Recent Labs     09/18/21  1450 09/19/21  0958   LIPASE 492.0* 134.0*     No results for input(s): PROTIME, INR in the last 72 hours. No results for input(s): PTT in the last 72 hours. No results for input(s): OCCULTBLD in the last 72 hours. Imaging:    CT ABDOMEN PELVIS W IV CONTRAST Additional Contrast? None   Final Result   Normal caliber bowel and normal appendix. Mild diffuse urinary bladder wall thickening could relate to underlying   cystitis. No hydronephrosis or urinary stones. No peripancreatic inflammation or fluid collection. XR CHEST PORTABLE   Final Result   Mild interstitial changes have improved from prior imaging. Pattern may   represent underlying vascular congestion. A resolving pattern of pneumonitis   may be considered clinically. Known drug Allergies:    Allergies   Allergen Reactions    Darvocet [Propoxyphene N-Acetaminophen] Nausea Only    Naprosyn [Naproxen] Rash    Ultram [Tramadol Hcl] Rash           Assessment:     Active Problems:    Pancreatitis, recurrent  Resolved Problems:    * No resolved hospital problems. *    Patient is a 60 y.o.   Female with history of polysubstance abuse with continued IV heroin use, hep C, DM, CHF admitted for hyperglycemia but found to have findings suggestive of acute on chronic pancreatitis     Recommendations:   -will resend triglycerides  - Will need to stop smoking and stop illicit drugs which could both be potential triggers   - IVF at current rate, pain medication  -diet as tolerated   Potential EUS outpatient for further evaluation       MD Daryl Mann   09/19/21

## 2021-09-19 NOTE — PROGRESS NOTES
4 Eyes Skin Assessment     NAME:  Julio César Stokes  YOB: 1965  MEDICAL RECORD NUMBER:  [id number]    The patient is being assess for  Admission    I agree that 2 RN's have performed a thorough Head to Toe Skin Assessment on the patient. ALL assessment sites listed below have been assessed. Areas assessed by both nurses:    Head, Face, Ears, Shoulders, Back, Chest, Arms, Elbows, Hands, Sacrum. Buttock, Coccyx, Ischium and Legs. Feet and Heels        Does the Patient have a Wound?  No noted wound(s)       Dejon Prevention initiated:  Yes   Wound Care Orders initiated:  NA    Pressure Injury (Stage 3,4, Unstageable, DTI, NWPT, and Complex wounds) if present place consult order under [de-identified] NA    New and Established Ostomies if present place consult order under : NA      Nurse 1 eSignature: Electronically signed by Brady Dempsey RN on 9/19/2021 at 12:12 AM    Nurse 2 eSignature: Electronically signed by Camden Slade RN on 9/20/21 at 3:03 AM EDT

## 2021-09-19 NOTE — ED NOTES
Insulin stopped before transport to floor.   No orders for insulin drip upon admission     Sol Land RN  09/18/21 2002

## 2021-09-19 NOTE — PROGRESS NOTES
Admission assessment complete. Temp 101.1, . Patient reports burning with urination and flank pain. UA sent & dose of Rocephin administered in ER. PRN Tylenol administered for sx. Pt c/o mid abdominal pain w/ intermittent nausea. Medicated (see MAR). Insulin gtt stopped prior to floor admission. BG now 289. Sliding scale and Lantus administered (see MAR). RLE amputee, wheelchair bound at home. External urinary catheter placed per pt request. The care plan and education has been reviewed and mutually agreed upon with the patient. Fall precautions in place. Call light within reach.

## 2021-09-19 NOTE — PROGRESS NOTES
[facility]    9/19/2021 7:43 AM        Name: Massiel Solis . Admitted: 9/18/2021  Primary Care Provider: No primary care provider on file. (Tel: None)      Subjective: Kervin Cárdenas     Reports belly pain for 3 days  Nausea and vomiting for the past 2 days   Reports 3 days of dysuria and frequency     On suboxone     Was sleeping until I awakened her then reported abd pain       Reviewed interval ancillary notes    Current Medications  sodium chloride 0.9 % infusion,   insulin regular (HUMULIN R;NOVOLIN R) 100 Units in sodium chloride 0.9 % 100 mL infusion, Continuous  cefTRIAXone (ROCEPHIN) 1000 mg in sterile water 10 mL IV syringe, Q24H  0.9 % sodium chloride infusion, Continuous  atorvastatin (LIPITOR) tablet 40 mg, Daily  aspirin chewable tablet 81 mg, Daily  carvedilol (COREG) tablet 3.125 mg, BID WC  insulin glargine (LANTUS;BASAGLAR) injection pen 15 Units, BID  furosemide (LASIX) tablet 40 mg, Daily  lisinopril (PRINIVIL;ZESTRIL) tablet 5 mg, Daily  nitroGLYCERIN (NITROSTAT) SL tablet 0.4 mg, Q5 Min PRN  sertraline (ZOLOFT) tablet 50 mg, Daily  ticagrelor (BRILINTA) tablet 90 mg, BID  mirtazapine (REMERON) tablet 30 mg, Nightly  glucose (GLUTOSE) 40 % oral gel 15 g, PRN  dextrose 50 % IV solution, PRN  glucagon (rDNA) injection 1 mg, PRN  dextrose 5 % solution, PRN  sodium chloride flush 0.9 % injection 5-40 mL, 2 times per day  sodium chloride flush 0.9 % injection 5-40 mL, PRN  0.9 % sodium chloride infusion, PRN  enoxaparin (LOVENOX) injection 40 mg, Nightly  ondansetron (ZOFRAN-ODT) disintegrating tablet 4 mg, Q8H PRN   Or  ondansetron (ZOFRAN) injection 4 mg, Q6H PRN  polyethylene glycol (GLYCOLAX) packet 17 g, Daily PRN  acetaminophen (TYLENOL) tablet 650 mg, Q6H PRN   Or  acetaminophen (TYLENOL) suppository 650 mg, Q6H PRN  insulin lispro (1 Unit Dial) 0-12 Units, TID WC  insulin lispro (1 Unit Dial) 0-6 Units, Nightly  [START ON 9/21/2021] traMADol (ULTRAM) tablet 50 mg, Q4H PRN  traMADol (ULTRAM) tablet 50 mg, PRN   And  cloNIDine (CATAPRES) tablet 0.1 mg, PRN  hydrOXYzine (VISTARIL) capsule 50 mg, Q8H PRN  QUEtiapine (SEROQUEL) tablet 50 mg, Nightly PRN  ketorolac (TORADOL) injection 15 mg, Q4H PRN        Objective:  /74   Pulse 100   Temp 98.2 °F (36.8 °C) (Oral)   Resp 22   Ht 5' 2\" (1.575 m)   Wt 110 lb (49.9 kg)   LMP 11/15/2017   SpO2 96%   BMI 20.12 kg/m²     Intake/Output Summary (Last 24 hours) at 9/19/2021 0743  Last data filed at 9/19/2021 0526  Gross per 24 hour   Intake 1104 ml   Output 600 ml   Net 504 ml      Wt Readings from Last 3 Encounters:   09/18/21 110 lb (49.9 kg)   09/04/21 122 lb 12.7 oz (55.7 kg)   07/22/21 119 lb 9.6 oz (54.3 kg)       General appearance:  Appears comfortable when not disturbed , restless when awake   Eyes: Sclera clear. Pupils equal.  ENT: Moist oral mucosa. Trachea midline, no adenopathy. Cardiovascular: Regular rhythm, normal S1, S2. No murmur. No edema in lower extremities  Respiratory: Not using accessory muscles. Good inspiratory effort. Clear to auscultation bilaterally, no wheeze or crackles. GI: Abdomen soft bowel sound present,  Mild tenderness upper abd , no rebound or guarding   Musculoskeletal: No cyanosis in digits, neck supple  Neurology: CN 2-12 grossly intact. No speech or motor deficits  Psych: Normal affect.  Alert and oriented in time, place and person  Skin: Warm, dry, normal turgor    Labs and Tests:  CBC:   Recent Labs     09/18/21  1450 09/19/21  0958   WBC 10.3 7.7   HGB 14.6 12.1    107*     BMP:    Recent Labs     09/18/21  1450 09/18/21  1450 09/18/21  1649 09/18/21  1807 09/19/21  0958   *  --   --   --  137   K 4.3  --   --   --  3.7   CL 92*  --   --   --  100   CO2 25  --   --   --  27   BUN 20  --   --   --  29*   CREATININE 0.8  --   --   --  0.5*   GLUCOSE 631*   < > 506 409 305*    < > = values in this interval not displayed. Hepatic:   Recent Labs     09/18/21  1450   AST 55*   ALT 21   BILITOT 0.5   ALKPHOS 179*     CT abd pelvis    Normal caliber bowel and normal appendix.       Mild diffuse urinary bladder wall thickening could relate to underlying   cystitis.  No hydronephrosis or urinary stones.       No peripancreatic inflammation or fluid collection.               Chest xray:    Mild interstitial changes have improved from prior imaging. Eleonore Hussein may   represent underlying vascular congestion.  A resolving pattern of pneumonitis   may be considered clinically. Results for Ama Ramos (MRN [id number]) as of 9/19/2021 07:45   Ref. Range 9/18/2021 16:50 9/18/2021 18:09 9/18/2021 19:24 9/18/2021 21:12 9/19/2021 07:02   POC Glucose Latest Ref Range: 70 - 99 mg/dl 506 (H) 409 (H) 308 (H) 289 (H) 346 (H)       Problem List  Active Problems:    Pancreatitis, recurrent  Resolved Problems:    * No resolved hospital problems. *       Assessment & Plan:   1. Chronic pancreatitis  :  GI to follow. Continue with supportive care and  IV hydration   2. Uncontrolled Diabetes:  I increased lantus to 20 units BID, continue with humalog correction for now. Will likely need to add prandial when able to eat  3. Nausea;  I added phenergan supp  4. Chronic pain : on suboxone   5. Chronic HF;  Chart review indicates non compliance. Continue with home BB, ACE and diuretic therapy  6. CAD: on brilinta   7. 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

## 2021-09-19 NOTE — PLAN OF CARE
Problem: Falls - Risk of:  Goal: Will remain free from falls  Description: Will remain free from falls  Outcome: Met This Shift  Goal: Absence of physical injury  Description: Absence of physical injury  Outcome: Met This Shift     Problem: Pain:  Goal: Pain level will decrease  Description: Pain level will decrease  Outcome: Ongoing  Goal: Control of acute pain  Description: Control of acute pain  Outcome: Ongoing  Goal: Control of chronic pain  Description: Control of chronic pain  Outcome: Ongoing     Problem: Skin Integrity:  Goal: Will show no infection signs and symptoms  Description: Will show no infection signs and symptoms  Outcome: Met This Shift  Goal: Absence of new skin breakdown  Description: Absence of new skin breakdown  Outcome: Met This Shift  Goal: Skin integrity will be maintained  Description: Skin integrity will be maintained  Outcome: Met This Shift     Problem: Coping:  Goal: Ability to verbalize feelings will improve  Description: Ability to verbalize feelings will improve  Outcome: Ongoing  Goal: Level of anxiety will decrease  Description: Level of anxiety will decrease  Outcome: Ongoing     Problem: Fluid Volume:  Goal: Will maintain adequate fluid volume  Description: Will maintain adequate fluid volume  Outcome: Ongoing     Problem: Health Behavior:  Goal: Ability to identify changes in lifestyle to reduce recurrence of condition will improve  Description: Ability to identify changes in lifestyle to reduce recurrence of condition will improve  Outcome: Ongoing     Problem: Physical Regulation:  Goal: Complications related to the disease process, condition or treatment will be avoided or minimized  Description: Complications related to the disease process, condition or treatment will be avoided or minimized  Outcome: Ongoing  Goal: Hemodynamic stability will improve  Description: Hemodynamic stability will improve  Outcome: Ongoing     Problem: Nutritional:  Goal: Ability to achieve adequate nutritional intake will improve  Description: Ability to achieve adequate nutritional intake will improve  Outcome: Ongoing     Problem: Respiratory:  Goal: Ability to achieve and maintain a regular respiratory rate will improve  Description: Ability to achieve and maintain a regular respiratory rate will improve  Outcome: Ongoing     Problem: Sensory:  Goal: General experience of comfort will improve  Description: General experience of comfort will improve  Outcome: Ongoing

## 2021-09-20 VITALS
BODY MASS INDEX: 21.6 KG/M2 | HEART RATE: 88 BPM | OXYGEN SATURATION: 93 % | WEIGHT: 117.38 LBS | HEIGHT: 62 IN | SYSTOLIC BLOOD PRESSURE: 99 MMHG | TEMPERATURE: 97.8 F | DIASTOLIC BLOOD PRESSURE: 65 MMHG | RESPIRATION RATE: 18 BRPM

## 2021-09-20 LAB
A/G RATIO: 0.6 (ref 1.1–2.2)
ALBUMIN SERPL-MCNC: 2.4 G/DL (ref 3.4–5)
ALP BLD-CCNC: 219 U/L (ref 40–129)
ALT SERPL-CCNC: 23 U/L (ref 10–40)
ANION GAP SERPL CALCULATED.3IONS-SCNC: 9 MMOL/L (ref 3–16)
AST SERPL-CCNC: 62 U/L (ref 15–37)
BILIRUB SERPL-MCNC: <0.2 MG/DL (ref 0–1)
BUN BLDV-MCNC: 19 MG/DL (ref 7–20)
CALCIUM SERPL-MCNC: 7.9 MG/DL (ref 8.3–10.6)
CHLORIDE BLD-SCNC: 102 MMOL/L (ref 99–110)
CO2: 28 MMOL/L (ref 21–32)
CREAT SERPL-MCNC: <0.5 MG/DL (ref 0.6–1.1)
GFR AFRICAN AMERICAN: >60
GFR NON-AFRICAN AMERICAN: >60
GLOBULIN: 3.7 G/DL
GLUCOSE BLD-MCNC: 102 MG/DL (ref 70–99)
GLUCOSE BLD-MCNC: 107 MG/DL (ref 70–99)
GLUCOSE BLD-MCNC: 137 MG/DL (ref 70–99)
GLUCOSE BLD-MCNC: 324 MG/DL (ref 70–99)
LIPASE: 96 U/L (ref 13–60)
MAGNESIUM: 1.8 MG/DL (ref 1.8–2.4)
ORGANISM: ABNORMAL
PERFORMED ON: ABNORMAL
POTASSIUM REFLEX MAGNESIUM: 3.5 MMOL/L (ref 3.5–5.1)
SODIUM BLD-SCNC: 139 MMOL/L (ref 136–145)
TOTAL PROTEIN: 6.1 G/DL (ref 6.4–8.2)
TRIGL SERPL-MCNC: 145 MG/DL (ref 0–150)
URINE CULTURE, ROUTINE: ABNORMAL

## 2021-09-20 PROCEDURE — 83735 ASSAY OF MAGNESIUM: CPT

## 2021-09-20 PROCEDURE — 84478 ASSAY OF TRIGLYCERIDES: CPT

## 2021-09-20 PROCEDURE — 80053 COMPREHEN METABOLIC PANEL: CPT

## 2021-09-20 PROCEDURE — 97161 PT EVAL LOW COMPLEX 20 MIN: CPT

## 2021-09-20 PROCEDURE — 36415 COLL VENOUS BLD VENIPUNCTURE: CPT

## 2021-09-20 PROCEDURE — 6370000000 HC RX 637 (ALT 250 FOR IP): Performed by: NURSE PRACTITIONER

## 2021-09-20 PROCEDURE — 6360000002 HC RX W HCPCS: Performed by: FAMILY MEDICINE

## 2021-09-20 PROCEDURE — 97530 THERAPEUTIC ACTIVITIES: CPT

## 2021-09-20 PROCEDURE — 2700000000 HC OXYGEN THERAPY PER DAY

## 2021-09-20 PROCEDURE — 97166 OT EVAL MOD COMPLEX 45 MIN: CPT

## 2021-09-20 PROCEDURE — 97535 SELF CARE MNGMENT TRAINING: CPT

## 2021-09-20 PROCEDURE — 94760 N-INVAS EAR/PLS OXIMETRY 1: CPT

## 2021-09-20 PROCEDURE — 83690 ASSAY OF LIPASE: CPT

## 2021-09-20 PROCEDURE — 6370000000 HC RX 637 (ALT 250 FOR IP): Performed by: FAMILY MEDICINE

## 2021-09-20 RX ORDER — BUPRENORPHINE AND NALOXONE 8; 2 MG/1; MG/1
1 FILM, SOLUBLE BUCCAL; SUBLINGUAL DAILY
Status: DISCONTINUED | OUTPATIENT
Start: 2021-09-20 | End: 2021-09-20 | Stop reason: HOSPADM

## 2021-09-20 RX ORDER — CIPROFLOXACIN 250 MG/1
250 TABLET, FILM COATED ORAL 2 TIMES DAILY
Qty: 10 TABLET | Refills: 0 | Status: SHIPPED | OUTPATIENT
Start: 2021-09-20 | End: 2021-09-25

## 2021-09-20 RX ADMIN — TRAMADOL HYDROCHLORIDE 50 MG: 50 TABLET, FILM COATED ORAL at 07:58

## 2021-09-20 RX ADMIN — CARVEDILOL 3.12 MG: 3.12 TABLET, FILM COATED ORAL at 07:59

## 2021-09-20 RX ADMIN — ATORVASTATIN CALCIUM 40 MG: 40 TABLET, FILM COATED ORAL at 07:58

## 2021-09-20 RX ADMIN — ACETAMINOPHEN 650 MG: 325 TABLET ORAL at 07:58

## 2021-09-20 RX ADMIN — SERTRALINE 50 MG: 50 TABLET, FILM COATED ORAL at 07:59

## 2021-09-20 RX ADMIN — ASPIRIN 81 MG: 81 TABLET, CHEWABLE ORAL at 07:58

## 2021-09-20 RX ADMIN — KETOROLAC TROMETHAMINE 15 MG: 30 INJECTION, SOLUTION INTRAMUSCULAR at 02:07

## 2021-09-20 RX ADMIN — LISINOPRIL 5 MG: 5 TABLET ORAL at 07:58

## 2021-09-20 RX ADMIN — TICAGRELOR 90 MG: 90 TABLET ORAL at 10:12

## 2021-09-20 RX ADMIN — KETOROLAC TROMETHAMINE 15 MG: 30 INJECTION, SOLUTION INTRAMUSCULAR at 07:59

## 2021-09-20 RX ADMIN — INSULIN LISPRO 8 UNITS: 100 INJECTION, SOLUTION INTRAVENOUS; SUBCUTANEOUS at 13:03

## 2021-09-20 RX ADMIN — BUPRENORPHINE AND NALOXONE 1 FILM: 8; 2 FILM BUCCAL; SUBLINGUAL at 11:03

## 2021-09-20 RX ADMIN — FUROSEMIDE 40 MG: 40 TABLET ORAL at 07:58

## 2021-09-20 ASSESSMENT — PAIN SCALES - GENERAL
PAINLEVEL_OUTOF10: 6
PAINLEVEL_OUTOF10: 8

## 2021-09-20 NOTE — PROGRESS NOTES
CLINICAL PHARMACY NOTE: MEDS TO BEDS    Total # of Prescriptions Filled: 1   The following medications were delivered to the patient:  · Cipro 250 mg    Additional Documentation:    Delivered to Patient=signed  Prince Scott CPhT

## 2021-09-20 NOTE — CARE COORDINATION
Pt preparing for DC, family unable to pick her up per pt, friend(Jarod)  will pick-up pt pending DC time. This RN confirmed with [facility] that pt does have home portable O2. Spoke to daughter Kulwinder Meeks and verified that Rosida Olayinka can pick pt up and that Trinda Olayinka will need to bring the portable O2 for the ride home. Pt has been updated on this information.      Electronically signed by Magen Pugh RN on 9/20/2021 at 11:32 AM

## 2021-09-20 NOTE — PROGRESS NOTES
Occupational Therapy   Occupational Therapy Initial Assessment  Date: 2021   Patient Name: Mikayla Puentes  MRN: [id number]     : 1965    Date of Service: 2021    Discharge Recommendations:Rizwana Sutton scored a 18/24 on the AM-PAC ADL Inpatient form. Current research shows that an AM-PAC score of 18 or greater is typically associated with a discharge to the patient's home setting. Based on the patient's AM-PAC score, and their current ADL deficits, it is recommended that the patient have 2-3 sessions per week of Occupational Therapy at d/c to increase the patient's independence. At this time, this patient demonstrates the endurance and safety to discharge home with Dali De Los Santos (home vs OP services) and a follow up treatment frequency of 2-3x/wk. Please see assessment section for further patient specific details. If patient discharges prior to next session this note will serve as a discharge summary. Please see below for the latest assessment towards goals. OT Equipment Recommendations  Equipment Needed: Yes  Mobility Devices: ADL Assistive Devices  ADL Assistive Devices: Transfer Tub Bench    Assessment   Performance deficits / Impairments: Decreased functional mobility ; Decreased ADL status; Decreased endurance;Decreased high-level IADLs  Assessment: Patient presents with the above defiicts impacting occupational performance and functioning below baseline level. Recommend skilled OT to address deficits and promtoe functional independence. Patient limited by orthostatic BP initially however BP improved during session. Recommend discharge to home with HHOT  Treatment Diagnosis: Decreased functional mobility, ADLs, endurance, and I ADLs associated with acute on chronic pancreatitis.   Prognosis: Good  Decision Making: Medium Complexity  OT Education: OT Role;Plan of Care;Equipment  Patient Education: Patient is an independent learner  Barriers to Learning: visual  REQUIRES OT FOLLOW UP: Yes  Activity Tolerance  Activity Tolerance: Treatment limited secondary to medical complications (free text)  Activity Tolerance: Patient orthostatic. See BP section. Patient with decreased symptoms and increased BP after lying back down and performing ADLs. O2 2L sats [facility]  Safety Devices  Safety Devices in place: Yes  Type of devices: All fall risk precautions in place;Call light within reach; Chair alarm in place; Left in chair;Patient at risk for falls;Nurse notified           Patient Diagnosis(es): The primary encounter diagnosis was Hyperglycemia. Diagnoses of Acute cystitis without hematuria and Non-intractable vomiting with nausea, unspecified vomiting type were also pertinent to this visit. has a past medical history of Arthritis, CHF (congestive heart failure) ([facility] [address] Depression, Diabetes mellitus ([facility] [address] Heart attack ([facility] [address] Hepatitis C, Hypercholesteremia, Hypertension, MRSA (methicillin resistant Staphylococcus aureus) infection, Pneumonia, and S/P colonoscopy with polypectomy. has a past surgical history that includes Knee arthroscopy; Revision total knee arthroplasty (2/9/11); joint replacement (2006, 2010); amputation; above knee amputation; Upper gastrointestinal endoscopy (11/13/2018); Upper gastrointestinal endoscopy (N/A, 11/13/2018); pr lap,cholecystectomy (N/A, 11/14/2018); liver biopsy (N/A, 11/14/2018); and Coronary angioplasty with stent (07/2019). Treatment Diagnosis: Decreased functional mobility, ADLs, endurance, and I ADLs associated with acute on chronic pancreatitis. Restrictions  Restrictions/Precautions  Restrictions/Precautions: Fall Risk (High fall risk)  Position Activity Restriction  Other position/activity restrictions: Nicholas Brumfield is a 60 y.o. female with current IV heroin use, DM , Hep C, CHF, R AKA presents with c/o mid abdominal pain with radiation to back . The sx started one day ago . Associated with nausea and non bilious, non bloody emesis.  Diarrhea for the past 3 days. Also reports urinary frequency. Acute-on-chronic pancreatitis, + for UTI. Subjective   General  Chart Reviewed: Yes  Additional Pertinent Hx: R AKA, polysubstance abuse  Family / Caregiver Present: Yes  Diagnosis: pancreatitis  Subjective  Subjective:  In bed, agreeable to therapy  Patient Currently in Pain: Denies  Vital Signs  Pulse: 92  BP: 98/68 (seated EOB)  BP Location: Left upper arm  Patient Position: Sitting  Orthostatic B/P and Pulse?: Yes  Blood Pressure Lyin/70 (performed following ADLs supine in bed)  Pulse Lyin PER MINUTE  Blood Pressure Sittin/74 (first BP taken with pt sitting supported with HOB raised to 90 degrees; second BP seated at EOB: 116/77, third BP seated at EOB following pt eating breakfast ~5 minutes: 126/83; fourth BP seated in w/c following transfer: 113/76)  Pulse Sittin PER MINUTE (second HR: 85, third HR: 80, fourth HR: 88)  Patient Currently in Pain: Denies  Orthostatic B/P and Pulse?: Yes  Social/Functional History  Social/Functional History  Lives With: Family (brother and sister; pt states sister cares for brother (who has seizures) and sister assiss with pt's medicine management)  Type of Home: Apartment  Home Layout: One level  Home Access: Level entry  Bathroom Shower/Tub: Tub/Shower unit  Bathroom Toilet: Standard  Bathroom Equipment: Grab bars in shower  Bathroom Accessibility: Wheelchair accessible  Home Equipment: Wheelchair-manual, Oxygen (2L O2 24/7)  ADL Assistance: Independent (pt states she has been getting in/out of tub for baths by pulling onto toilet)  Homemaking Assistance: Needs assistance (sister does the cooking and laundry, pt does her own cleaning, pt states she or brother goes to grocery store)  Ambulation Assistance: Independent (w/c bound)  Transfer Assistance: Independent  Active : No  Patient's  Info: daughter assists with transport  Leisure & Hobbies: walking dog, writing in journals  IADL Comments: sister assists with management of medication  Additional Comments: Pt reports no recent falls. Objective   Vision: Impaired  Vision Exceptions: Cataracts (pt states she needs surgery for cataracts)  Hearing: Within functional limits    Orientation  Overall Orientation Status: Within Functional Limits     Balance  Sitting Balance: Stand by assistance (dizzy upon sitting up, multiple attempts. Orthostactic BP on sitting.   Multiple attempts with increasing BP and decreased symptoms of dizziness.)  Standing Balance: Contact guard assistance (Pivot to w/c, CGA due to dizziness)  ADL  Feeding: Setup (due to vision)  Grooming: Setup;Stand by assistance  UE Bathing: Stand by assistance  LE Bathing: Stand by assistance (supine due to dizziness)  UE Dressing: Stand by assistance;Setup  LE Dressing: Stand by assistance (in bed)  Toileting: None  Additional Comments: Patient requires cues for ADLs in unfamiliar environment due to dizziness  Tone RUE  RUE Tone: Normotonic  Tone LUE  LUE Tone: Normotonic  Coordination  Movements Are Fluid And Coordinated: Yes     Bed mobility  Rolling to Left: Modified independent (used bedrail)  Rolling to Right: Modified independent (used bedrail)  Supine to Sit: Stand by assistance (HOB raised)  Sit to Supine: Stand by assistance  Scooting: Stand by assistance  Transfers  Stand Pivot Transfers: Contact guard assistance  Sit to stand: Contact guard assistance  Stand to sit: Contact guard assistance  Transfer Comments: Due to initial dizziness, patient CGA with transfers     Cognition  Overall Cognitive Status: WFL        Sensation  Overall Sensation Status: WFL        LUE AROM (degrees)  LUE AROM : WFL  RUE AROM (degrees)  RUE AROM : WFL                      Plan   Plan  Times per week: 3-5x  Times per day: Daily  Current Treatment Recommendations: Endurance Training, Functional Mobility Training, Safety Education & Training, Self-Care / ADL, Home Management Training      AM-PAC Score AM-PAC Inpatient Daily Activity Raw Score: 18 (09/20/21 1024)  AM-PAC Inpatient ADL T-Scale Score : 38.66 (09/20/21 1024)  ADL Inpatient CMS 0-100% Score: 46.65 (09/20/21 1024)  ADL Inpatient CMS G-Code Modifier : CK (09/20/21 1024)    Goals  Short term goals  Time Frame for Short term goals: Discharge  Short term goal 1: Mod I with functional ADL transfers to w/c  Short term goal 2: Mod I with functional ADL mobility with w/c transporting ADL supplies  Short term goal 3: Mod I with toileting. Short term goal 4: Mod I with dressing tasks       Therapy Time   Individual Concurrent Group Co-treatment   Time In 7340         Time Out 1008         Minutes 58              Timed Code Treatment Minutes:   43    Total Treatment Minutes:  [address] [facility], OT   [address] [facility]  Nikolas Holguin [address]

## 2021-09-20 NOTE — PROGRESS NOTES
Gastroenterology Progress Note    Mikayla Puentes is a 60 y.o. female patient. Active Problems:    Pancreatitis, recurrent  Resolved Problems:    * No resolved hospital problems. *      SUBJECTIVE:  Pain is a lot better. Tolerating diet. No N/V.     ROS:  No fever, chills  No chest pain, palpitations  No SOB, cough  Gastrointestinal ROS: see above    Physical    VITALS:  /80   Pulse 90   Temp 99 °F (37.2 °C) (Oral)   Resp 16   Ht 5' 2\" (1.575 m)   Wt 117 lb 6 oz (53.2 kg)   LMP 11/15/2017   SpO2 98%   BMI 21.47 kg/m²   TEMPERATURE:  Current - Temp: 99 °F (37.2 °C); Max - Temp  Av.1 °F (37.3 °C)  Min: 98.9 °F (37.2 °C)  Max: 99.4 °F (37.4 °C)    NAD  Regular rate   Abdomen soft, ND, NT,  Bowel sounds normal.  Anicteric     Data    Data Review:    Recent Labs     21  1450 21  0958   WBC 10.3 7.7   HGB 14.6 12.1   HCT 44.4 36.5   MCV 86.2 84.1    107*     Recent Labs     21  1450 21  0958 21  0540   * 137 139   K 4.3 3.7 3.5   CL 92* 100 102   CO2 25 27 28   BUN 20 29* 19   CREATININE 0.8 0.5* <0.5*     Recent Labs     21  1450 21  0958 21  0540   AST 55* 97* 62*   ALT 21 30 23   BILITOT 0.5 0.4 <0.2   ALKPHOS 179* 228* 219*     Recent Labs     21  1450 21  0958 21  0540   LIPASE 492.0* 134.0* 96.0*     No results for input(s): PROTIME, INR in the last 72 hours. No results for input(s): PTT in the last 72 hours. ASSESSMENT :    Acute pancreatitis - lipase 492, CT with no acute pancreatitis but with pancreatic calcifications c/w chronic pancreatitis. Pt denies alcohol use. Does use heroin. Smokes tobacco which is a risk factor for pancreatitis. S/p armando. Normal TG and calcium. Elevated liver enzymes - elevated alk phos as far back as . Mild elevation of AST and ALT intermittently in the past. Alk phos 219 and AST 62 currently. S/p armando. H/o HCV in the past as had + HCV AB in .  Could have chronic Hep C. Labs from last year with + HepB S AB over 1,000 c/w immunity and neg Hep B S Ag. IV drug abuse  H/o CHF    PLAN :  - will decrease IVF to 75 ml/hr  - low fat diet as tolerated  - smoking and drug cessation    Discussed with Dr. Cricket Palma, PA-C  [facility]    I have personally performed a face to face diagnostic evaluation on this patient. I have interviewed and examined the patient and I agree with the findings and recommended plan of care. In summary, my findings and plan are the following:  Pt with chronic pancreatitis, hep c, IVDA with heroin, CHF. Pt with epigastric pain and elevated lipase. C/w acute on chronic pancreatitis. Mild epig ttp. Plan IVF, pain control, diet as tolerated. D/w pt need for drug cessation, smoking cessation. Needs outpt f/u for hep c.        Lázaro Mejia MD  [address]

## 2021-09-20 NOTE — PROGRESS NOTES
Comprehensive Nutrition Assessment    Type and Reason for Visit:  Positive Nutrition Screen, Initial    Nutrition Recommendations/Plan:   Continue current 4 CCC diet  Start ONS Glucerna BID    Nutrition Assessment:  Pt seen for MST score of 2 for wt loss and poor appetite. Pt admitted for N, V, and abdominal pain, pt dx w/ acute pancreatitis. Obtained nutrition hx from pt, experienced N,V and pain for 3 days prior to admission resulting in poor appetite, prior to symptoms appetite was good. Pt had some difficulty while on clear liquids but made effort to eat her meals. This morning diet advanced to solid foods, observed pt eating breakfast tray with good tolerance and reported no symptoms of N,V or pain thus far. Pt reported that she felt her appetite coming back. Offered pt a glucerna supplement due to decrease appetite, she accepted. Will continue to follow for diet/supp. tolerance, wt, N/V and pain. Malnutrition Assessment:  Malnutrition Status:  No malnutrition     Estimated Daily Nutrient Needs:  Energy (kcal):  5925-8554; Weight Used for Energy Requirements:  Adjusted (56 kg)     Protein (g):  67-84 g; Weight Used for Protein Requirements:  Adjusted (1.2-1.5 g/ 56 kg)        Fluid (ml/day):  9800-7018; Method Used for Fluid Requirements:  1 ml/kcal      Nutrition Related Findings:  -0.8L, R BKA      Wounds:  None       Current Nutrition Therapies:    ADULT DIET; Regular; 4 carb choices (60 gm/meal)    Anthropometric Measures:  · Height: 5' 2\" (157.5 cm)  · Current Body Weight: 117 lb (53.1 kg)   · Admission Body Weight: 117 lb (53.1 kg)     · Ideal Body Weight: 110 lbs; % Ideal Body Weight 106.4 %   · BMI: 21.4  · Adjusted Body Weight: 123.9; Amputation   · Adjusted BMI: 22.7    · BMI Categories: Normal Weight (BMI 18.5-24. 9)       Nutrition Diagnosis:   · Inadequate oral intake related to altered GI function as evidenced by nausea, vomiting, poor intake prior to admission, diarrhea      Nutrition Interventions:   Food and/or Nutrient Delivery:  Continue Current Diet, Start Oral Nutrition Supplement  Nutrition Education/Counseling:  Education not indicated   Coordination of Nutrition Care:  Continue to monitor while inpatient    Goals:  PO intake of meals and supplement 75% or greater       Nutrition Monitoring and Evaluation:   Behavioral-Environmental Outcomes:  None Identified   Food/Nutrient Intake Outcomes:  Food and Nutrient Intake, Supplement Intake  Physical Signs/Symptoms Outcomes:  Nausea or Vomiting, Weight, Other (Comment) (Pain)     Discharge Planning:    No discharge needs at this time     Electronically signed by Vero Botello on 9/20/21 at 12:22 PM EDT    Contact: [phone]

## 2021-09-20 NOTE — PROGRESS NOTES
Physical Therapy    Facility/Department: [address] ORTHO/NEURO NURSING  Initial Assessment    NAME: Kya Rollins  : 1965  MRN: [id number]    Date of Service: 2021    Discharge Recommendations: Kya Rollins scored a 15/24 on the AM-PAC short mobility form. Current research shows that an AM-PAC score of 18 or greater is typically associated with a discharge to the patient's home setting. Based on the patient's AM-PAC score and their current functional mobility deficits, it is recommended that the patient have 2-3 sessions per week of Physical Therapy at d/c to increase the patient's independence. At this time, this patient demonstrates the endurance and safety to discharge home with HHPT and a follow up treatment frequency of 2-3x/wk. Please see assessment section for further patient specific details. HOME HEALTH CARE: LEVEL 1 STANDARD  - Initial home health evaluation to occur within 24-48 hours, in patient home   - Therapy to evaluate with goal of regaining prior level of functioning   - Therapy to evaluate if patient has [id number] [facility] needs for personal care    If patient discharges prior to next session this note will serve as a discharge summary. Please see below for the latest assessment towards goals. PT Equipment Recommendations  Equipment Needed: No    Assessment   Body structures, Functions, Activity limitations: Decreased functional mobility ; Decreased strength;Decreased balance  Assessment: Pt presents slightly below baseline functional mobility with the above deficits. Pt with PLOF of MOD I with w/c however currently requires CGA/SBA for mobility. Pt with orthostatic BP during session and required increased time to perform tasks due to reported dizziness. Pt would benefit from acute PT services to address deficits and facilitate return to PLOF.   Treatment Diagnosis: impaired balance and transfers  Prognosis: Good  Decision Making: Low Complexity  Clinical Presentation: stable  PT Education: Goals;PT Role;Plan of Care;Transfer Training;General Safety; Functional Mobility Training  Patient Education: d/c recommendations--pt verbalizing understanding  Barriers to Learning: none  REQUIRES PT FOLLOW UP: Yes  Activity Tolerance  Activity Tolerance: Treatment limited secondary to medical complications (free text)  Activity Tolerance: extended time required for rest breaks due to orthostatic BPs and pt reporting dizziness with position changes; pt reporting less dizzines towards end of session , BP readings noted below      Patient Diagnosis(es): The primary encounter diagnosis was Hyperglycemia. Diagnoses of Acute cystitis without hematuria and Non-intractable vomiting with nausea, unspecified vomiting type were also pertinent to this visit. has a past medical history of Arthritis, CHF (congestive heart failure) ([facility] [address] Depression, Diabetes mellitus ([facility] [address] Heart attack ([facility] [address] Hepatitis C, Hypercholesteremia, Hypertension, MRSA (methicillin resistant Staphylococcus aureus) infection, Pneumonia, and S/P colonoscopy with polypectomy. has a past surgical history that includes Knee arthroscopy; Revision total knee arthroplasty (2/9/11); joint replacement (2006, 2010); amputation; above knee amputation; Upper gastrointestinal endoscopy (11/13/2018); Upper gastrointestinal endoscopy (N/A, 11/13/2018); pr lap,cholecystectomy (N/A, 11/14/2018); liver biopsy (N/A, 11/14/2018); and Coronary angioplasty with stent (07/2019). Restrictions  Restrictions/Precautions  Restrictions/Precautions: Fall Risk (High fall risk)  Position Activity Restriction  Other position/activity restrictions: Benja Elam is a 60 y.o. female with current IV heroin use, DM , Hep C, CHF, R AKA presents with c/o mid abdominal pain with radiation to back . The sx started one day ago . Associated with nausea and non bilious, non bloody emesis. Diarrhea for the past 3 days. Also reports urinary frequency. Acute-on-chronic pancreatitis, + for UTI.      Vision/Hearing  Vision: Impaired  Vision Exceptions: Cataracts (pt states she needs surgery for cataracts)  Hearing: Within functional limits       Subjective  General  Chart Reviewed: Yes  Response To Previous Treatment: Not applicable  Family / Caregiver Present: No  Diagnosis: hyperglycemia  Follows Commands: Within Functional Limits  General Comment  Comments: Pt supine in bed upon arrival, CNP present upon arrival.  Subjective  Subjective: Pt reporting no pain at this time, agreeable to PT/OT eval.  Pain Screening  Patient Currently in Pain: Denies  Vital Signs  Pulse: 92  BP: 98/68 (seated EOB)  BP Location: Left upper arm  Patient Position: Sitting  Orthostatic B/P and Pulse?: Yes  Blood Pressure Lyin/70 (performed following ADLs supine in bed)  Pulse Lyin PER MINUTE  Blood Pressure Sittin/74 (first BP taken with pt sitting supported with HOB raised to 90 degrees; second BP seated at EOB: 116/77, third BP seated at EOB following pt eating breakfast ~5 minutes: 126/83; fourth BP seated in w/c following transfer: 113/76)  Pulse Sittin PER MINUTE (second HR: 85, third HR: 80, fourth HR: 88)  Patient Currently in Pain: Denies  Orthostatic B/P and Pulse?: Yes       Orientation  Orientation  Overall Orientation Status: Within Functional Limits     Social/Functional History  Social/Functional History  Lives With: Family (brother and sister; pt states sister cares for brother (who has seizures) and sister assiss with pt's medicine management)  Type of Home: Apartment  Home Layout: One level  Home Access: Level entry  Bathroom Shower/Tub: Tub/Shower unit  Bathroom Toilet: Standard  Bathroom Equipment: Grab bars in shower  Bathroom Accessibility: Wheelchair accessible  Home Equipment: Wheelchair-manual, Oxygen (2L O2 24/7)  ADL Assistance: Independent (pt states she has been getting in/out of tub for baths by pulling onto toilet)  Homemaking Assistance: Needs assistance (sister does the cooking and laundry, pt does her own cleaning, pt states she or brother goes to grocery store)  Ambulation Assistance: Independent (w/c bound)  Transfer Assistance: Independent  Active : No  Patient's  Info: daughter assists with transport  Leisure & Hobbies: walking dog, writing in journals  IADL Comments: sister assists with management of medication  Additional Comments: Pt reports no recent falls. Cognition   Cognition  Overall Cognitive Status: WFL    Objective  AROM RLE (degrees)  RLE AROM: Exceptions  RLE General AROM: hip WFL; R AKA  AROM LLE (degrees)  LLE AROM : WFL  Strength RLE  Strength RLE: Exception  Comment: hip grossly 3+/5; R AKA  Strength LLE  Strength LLE: WFL  Comment: grossly 3+/5  Tone RLE  RLE Tone: Normotonic  Tone LLE  LLE Tone: Normotonic  Motor Control  Gross Motor?: WFL  Sensation  Overall Sensation Status: WFL  Bed mobility  Rolling to Left: Modified independent (used bedrail)  Rolling to Right: Modified independent (used bedrail)  Supine to Sit: Stand by assistance (HOB raised)  Sit to Supine: Modified independent  Scooting: Stand by assistance  Comment: Pt performed sup>sit 2x during session due to reported dizziness upon sitting EOB; rolling performed for pericare and brief change.   Transfers  Sit to Stand: Contact guard assistance  Stand to sit: Contact guard assistance  Bed to Chair: Contact guard assistance  Stand Pivot Transfers: Contact guard assistance  Comment: CGA for pivot EOB>w/c due to reported dizziness, pt hitting buttocks on w/c during transfer, states this happens often during her transfers  Ambulation  Ambulation?: No (pt w/c bound)  Stairs/Curb  Stairs?: No  Wheelchair Activities  Propulsion: Yes  Propulsion 1  Propulsion: Manual  Level: Level Tile  Method: RUE;LUE  Level of Assistance: Supervision  Description/ Details: pt able to perform 180 degree turn and propel w/c backwards to reposition in room at end of session  Distance: 5'     Balance  Posture: Good  Sitting - Static: Good;- (Supervision seated at EOB while eating breakfast and during BP assessments ~20 minutes total)  Sitting - Dynamic: Good;- (SBA seated at EOB during ADLs ~5-8 minutes total)  Standing - Static: Fair;+ (CGA due to dizziness)  Standing - Dynamic: Fair;+ (CGA during pivot due to reported dizzziness)        Plan   Plan  Times per week: 3-5x  Times per day: Daily  Current Treatment Recommendations: Strengthening, Balance Training, Functional Mobility Training, Transfer Training, Wheelchair Mobility Training, Endurance Training, Neuromuscular Re-education, Positioning, Modalities, Equipment Evaluation, Education, & procurement, Patient/Caregiver Education & Training, Safety Education & Training, Home Exercise Program  Safety Devices  Type of devices:  All fall risk precautions in place, Call light within reach, Chair alarm in place, Gait belt, Patient at risk for falls, Nurse notified, Left in chair  Restraints  Initially in place: No    G-Code       OutComes Score        AM-PAC Score  AM-PAC Inpatient Mobility Raw Score : 15 (09/20/21 1006)  AM-PAC Inpatient T-Scale Score : 39.45 (09/20/21 1006)  Mobility Inpatient CMS 0-100% Score: 57.7 (09/20/21 1006)  Mobility Inpatient CMS G-Code Modifier : CK (09/20/21 1006)          Goals  Short term goals  Time Frame for Short term goals: upon d/c  Short term goal 1: Pt will perform supine>sit MOD I  Short term goal 2: Pt will perform transfers with LRAD and supervision  Short term goal 3: Pt will propel w/c 50' MOD I  Patient Goals   Patient goals : to go home and stay clean       Therapy Time   Individual Concurrent Group Co-treatment   Time In 0910         Time Out 1008         Minutes 58              Timed Code Treatment Minutes:   43    Total Treatment Minutes:  [address], [address], [address]

## 2021-09-20 NOTE — PROGRESS NOTES
Pt resting awake in bed. Reports she has been off of heroin for 5 days, was turned away from a rehab facility because she uses a wheelchair. Requested medication for pain. Gave toradol, tylenol and ultram. Right BKA noted. Pt is axox4 and able to answer questions and follow commands throughout assessment. PO meds taken easily with water.

## 2021-09-20 NOTE — PLAN OF CARE
Problem: Falls - Risk of:  Goal: Will remain free from falls  Description: Will remain free from falls  Outcome: Ongoing  Goal: Absence of physical injury  Description: Absence of physical injury  Outcome: Ongoing     Problem: Pain:  Description: Pain management should include both nonpharmacologic and pharmacologic interventions.   Goal: Pain level will decrease  Description: Pain level will decrease  Outcome: Ongoing  Goal: Control of acute pain  Description: Control of acute pain  Outcome: Ongoing  Goal: Control of chronic pain  Description: Control of chronic pain  Outcome: Ongoing     Problem: Skin Integrity:  Goal: Will show no infection signs and symptoms  Description: Will show no infection signs and symptoms  Outcome: Ongoing  Goal: Absence of new skin breakdown  Description: Absence of new skin breakdown  Outcome: Ongoing  Goal: Skin integrity will be maintained  Description: Skin integrity will be maintained  Outcome: Ongoing     Problem: Coping:  Goal: Ability to verbalize feelings will improve  Description: Ability to verbalize feelings will improve  Outcome: Ongoing  Goal: Level of anxiety will decrease  Description: Level of anxiety will decrease  Outcome: Ongoing     Problem: Fluid Volume:  Goal: Will maintain adequate fluid volume  Description: Will maintain adequate fluid volume  Outcome: Ongoing     Problem: Health Behavior:  Goal: Ability to identify changes in lifestyle to reduce recurrence of condition will improve  Description: Ability to identify changes in lifestyle to reduce recurrence of condition will improve  Outcome: Ongoing     Problem: Nutritional:  Goal: Ability to achieve adequate nutritional intake will improve  Description: Ability to achieve adequate nutritional intake will improve  Outcome: Ongoing     Problem: Physical Regulation:  Goal: Complications related to the disease process, condition or treatment will be avoided or minimized  Description: Complications related to the disease process, condition or treatment will be avoided or minimized  Outcome: Ongoing     Problem: Physical Regulation:  Goal: Hemodynamic stability will improve  Description: Hemodynamic stability will improve  Outcome: Ongoing     Problem: Respiratory:  Goal: Ability to achieve and maintain a regular respiratory rate will improve  Description: Ability to achieve and maintain a regular respiratory rate will improve  Outcome: Ongoing     Problem: Sensory:  Goal: General experience of comfort will improve  Description: General experience of comfort will improve  Outcome: Ongoing

## 2021-09-20 NOTE — DISCHARGE SUMMARY
[facility] DISCHARGE SUMMARY    Patient Demographics    Patient. Catalina Godinez  Date of Birth. 1965  MRN. [id number]     Primary care provider. No primary care provider on file. (Tel: None)    Admit date: 9/18/2021    Discharge date 9/20/2021  Note Date: 9/20/2021     Reason for Hospitalization. Chief Complaint   Patient presents with    Fatigue     Fatigue, dysuria, and hyperglycemia X3 days.  in triage. T2DM. Hx of heroine use, last used 3 days ago, took suboxone yesterday. Significant Findings. Active Problems:    Pancreatitis, recurrent  Resolved Problems:    * No resolved hospital problems. *       Problems and results from this hospitalization that need follow up. 1. Heroin use/ Hx of poly substance abuse:  suboxone given on day of d/c. Pt will follow up in the am at [facility] clinic   2. Chronic HF:   Follow up with cardiology     Significant test results and incidental findings.  -Left ventricular cavity size is normal.   -Overall left ventricular systolic function appears severely reduced.   -Ejection fraction is visually estimated to be 25-30%. -There is mild to moderate concentric left ventricular hypertrophy.   -The inferolateral wall, and the anteroseptum, inferoseptum appear   hypokinetic.   -The apex and apical septum appear akinetic.   -There is severe global hypokinesis. -Grade II diastolic dysfunction with elevated LV filling pressures. e/e' =   10.6.   -Aortic valve leaflets are thickened without evidence of stenosis. -Mild tricuspid regurgitation. RVSP = 17 mmHG. -Trivial pericardial effusion.       CT abd pelvis     Normal caliber bowel and normal appendix.       Mild diffuse urinary bladder wall thickening could relate to underlying   cystitis.  No hydronephrosis or urinary stones.       No peripancreatic inflammation or fluid collection.               Chest xray:     Mild interstitial changes have improved from prior imaging. Ewa Frock may   represent underlying vascular congestion.  A resolving pattern of pneumonitis   may be considered clinically. AIC 11.9      Susceptibility     Escherichia coli (2)              Antibiotic Interpretation JOHNNA Status     amoxicillin-clavulanate Sensitive <=8/4 mcg/mL       ampicillin Resistant >16 mcg/mL       ceFAZolin Sensitive <=2 mcg/mL         NOTE: Cefazolin should only be used for uncomplicated UTI         for E.coli or Klebsiella pneumoniae. cefepime Sensitive <=2 mcg/mL       cefTRIAXone Sensitive <=1 mcg/mL       cefuroxime Sensitive <=4 mcg/mL       ciprofloxacin Sensitive <=1 mcg/mL       ertapenem Sensitive <=0.5 mcg/mL       gentamicin Sensitive <=4 mcg/mL       meropenem Sensitive <=1 mcg/mL       nitrofurantoin Sensitive <=32 mcg/mL       piperacillin-tazobactam Sensitive <=16 mcg/mL       trimethoprim-sulfamethoxazole Resistant >2/38 mcg/mL             Invasive procedures and treatments. [facility] Course. The patient sought care in the Ed due to abdominal pain , nausea and vomiting which was initially thought to be due to pancreatitis, however it was likely due to heroin withdrawal.  She was admitted and treated for the followin. Chronic abd pain with nausea and vomiting :  Ct did not suggest any acute pancreatitis. Sx were likely due to heroin withdrawal and were resolved at the time of d/c home with home care services. 2. Uncontrolled Diabetes:  Bg values in range, however on the low side for her with AIC of 11.9   Will decrease lantus to 15 units bid and continue with humalog correction for now. pt understands the need for improved BG control at home   3. UTI:  Culture with E coli:  during her stay she received 2 doses of rocephin and was given 5 days of oral therapy at d/c to complete a 7 day course of therapy   4.  Chronic pain : on suboxone , [facility] am   5. Chronic HF: Continue with home BB, ACE and diuretic therapy  6. CAD: on brilinta     7. 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 Consults. IP CONSULT TO GI  IP CONSULT TO PHARMACY    Physical examination on discharge day. /80   Pulse 90   Temp 99 °F (37.2 °C) (Oral)   Resp 16   Ht 5' 2\" (1.575 m)   Wt 117 lb 6 oz (53.2 kg)   LMP 11/15/2017   SpO2 98%   BMI 21.47 kg/m²   General appearance. Alert. Looks comfortable but looks chronically ill , looks older than stated age   [de-identified]. Sclera clear. Moist mucus membranes. Cardiovascular. Regular rate and rhythm, normal S1, S2. No murmur. Respiratory. Not using accessory muscles. Clear to auscultation bilaterally, no wheeze. Gastrointestinal. Abdomen soft, non-tender, not distended, normal bowel sounds  Neurology. Facial symmetry. No speech deficits. Moving all extremities equally. Extremities. No edema in lower extremities. Right BKA noted   Skin. Warm, dry, normal turgor    Condition at time of discharge stable    Medication instructions provided to patient at discharge. Medication List      START taking these medications    ciprofloxacin 250 MG tablet  Commonly known as: CIPRO  Take 1 tablet by mouth 2 times daily for 5 days  Notes to patient: Ciprofloxacin (Cipro*)  Use: treat infections  Side effects: dizziness, nausea, diarrhea or headache        CONTINUE taking these medications    aspirin 81 MG tablet  Take 1 tablet by mouth daily  Notes to patient: Aspirin  Use: Prevents heart attacks & strokes. Side effects: bleeding or bruising more easily, upset stomach. atorvastatin 40 MG tablet  Commonly known as: LIPITOR  Take 1 tablet by mouth daily  Notes to patient:    Atorvastatin (Lipitor®)  Use: lower bad cholesterol   Side effects: headache, muscle pains, constipation, diarrhea.      blood glucose test strips help heart function after a heart attack, help a weak heart. Side effects: dizziness, headache, cough. mirtazapine 15 MG tablet  Commonly known as: REMERON  Notes to patient: Antidepressant  Use: Major depressive disorder  Side Effects: Fatigue, blurred vision, polyphagia     nitroGLYCERIN 0.4 MG SL tablet  Commonly known as: NITROSTAT  up to max of 3 total doses. If no relief after 1 dose, call 911. Notes to patient: Nitroglycerin/ nitro-bid/ Nitrostat  Use: Treat chest pain  Side effects: headache, flushing, dizziness     pregabalin 100 MG capsule  Commonly known as: LYRICA  Notes to patient: Lyrica (Pregabalin)  Uses: nerve pain, seizures  Side Effects: swelling, sleepiness, blurry vision, weight gain     sertraline 50 MG tablet  Commonly known as: ZOLOFT  Notes to patient: Use: to treat depression  Side effects: confusion, drowsiness, restlessness, sexual dysfunction      ticagrelor 90 MG Tabs tablet  Commonly known as: BRILINTA  Take 1 tablet by mouth 2 times daily  Notes to patient: Ticagrelor (Brilinta®)  Use: Prevents closing of your stent. Side effects: bleeding or bruising more easily           Where to Get Your Medications      These medications were sent to [facility], [address] [place] [address], [address]    Phone: [phone]   · ciprofloxacin 250 MG tablet         Discharge recommendations given to patient. Follow Up. in 1 week   Disposition. home  Activity. As tolerated   Diet: ADULT DIET; Regular; 4 carb choices (60 gm/meal)      Spent > 30 minutes in discharge process.     Signed:  CHINA Lee CNP     9/20/2021 9:04 AM

## 2021-09-20 NOTE — CARE COORDINATION
DISCHARGE SUMMARY     DATE OF DISCHARGE: 09/21/2021    DISCHARGE DESTINATION: Home with family on baseline O2    TRANSPORTATION: Family friend [facility]    ALL CARE MANAGEMENT NEEDS MET, DC HOME WITH NO NEEDS. COMMENTS: Verified home portable O2 available via [facility], Pt and daughter informed that [facility] must bring portable O2 for DC. Pt and daughter verbalized understanding. RN updated.     Electronically signed by Liya Beck RN on 9/20/2021 at 11:42 AM

## 2021-09-20 NOTE — DISCHARGE INSTR - COC
Continuity of Care Form    Patient Name: Jorgito Blake   :  1965  MRN:  [id number]    Admit date:  2021  Discharge date:  ***    Code Status Order: Full Code   Advance Directives:      Admitting Physician:  Jordan Cuellar MD  PCP: No primary care provider on file. Discharging Nurse: [facility] Unit/Room#: 2BW-6967/1117-18  Discharging Unit Phone Number: ***    Emergency Contact:   Extended Emergency Contact Information  Primary Emergency Contact: [address] [place] [address],  [address] Phone: [phone]  Mobile Phone: [phone]  Relation: Child  Secondary Emergency Contact: Donna Warren  Address: [address] [place] [address], [address] [place] [address] Phone: [phone]  Mobile Phone: [phone]  Relation: Child    Past Surgical History:  Past Surgical History:   Procedure Laterality Date    ABOVE KNEE AMPUTATION      AMPUTATION      right bka    CORONARY ANGIOPLASTY WITH STENT PLACEMENT  2019    JOINT REPLACEMENT  ,     right knee; Dr Megan Silveira.     KNEE ARTHROSCOPY      ridht knee X6    LIVER BIOPSY N/A 2018    LIVER BIOPSY LAPAROSCOPIC performed by Vazquez Morales MD at [address] N/A 2018    LAPAROSCOPIC CHOLECYSTECTOMY WITH INTRAOPERATIVE CHOLANGIOGRAM performed by Vazquez Morales MD at [address]  11    right    UPPER GASTROINTESTINAL ENDOSCOPY  2018    UPPER GASTROINTESTINAL ENDOSCOPY N/A 2018    EGD BIOPSY performed by Edgar Patricio MD at [facility]       Immunization History:   Immunization History   Administered Date(s) Administered    Influenza Virus Vaccine 2011    Influenza, Ashley Santiago, 6 mo and older, IM, PF (Flulaval, Fluarix) 2018    Pneumococcal Polysaccharide ([id number]) 2011, 09/15/2014       Active Problems:  Patient Active Problem List   Diagnosis Code    Diabetes mellitus ([facility] E11.9    Knee pain M25.569    Degenerative arthritis of knee M17.10    Other specified anemias D64.89    DKA, type 2, not at goal [facility]) E11.10    Hyperglycemia R73.9    Acute pancreatitis K85.90    Coronary artery disease involving native coronary artery of native heart without angina pectoris I25.10    Diabetes, coma, hyperosmolar ([facility]) E11.01    Hypernatremia E87.0    Acute kidney injury ([facility] N17.9    Abnormal LFTs R94.5    Diabetic ketoacidosis without coma associated with type 1 diabetes mellitus ([facility]) E10.10    Esophagitis K20.90    Hypotension I95.9    Controlled type 1 diabetes mellitus with hyperglycemia ([facility]) E10.65    Sialadenitis K11.20    Abdominal pain R10.9    Adrenal insufficiency ([facility]) E27.40    Unstable angina ([facility]) I20.0    Chronic pain syndrome G89.4    Constipation K59.00    Uncontrolled type 2 diabetes mellitus with hyperglycemia, with long-term current use of insulin ([facility]) E11.65, Z79.4    Acute on chronic respiratory failure with hypoxia ([facility]) J96.21    Mucus plugging of bronchi T17.500A    Atelectasis J98.11    Former smoker Z87.891    DM (diabetes mellitus), secondary uncontrolled ([facility] E13.65    Thrombocytopenia ([facility]) D69.6    Essential hypertension I10    Hx of AKA (above knee amputation), right ([facility] Z89.611    Chest pain R07.9    Coronary artery disease due to lipid rich plaque I25.10, I25.83    Hypercholesteremia E78.00    DKA, type 1, not at goal [facility]) E10.10    Acute decompensated heart failure ([facility] I50.9    Carboxyhemoglobinemia T58. 91XA    Acute respiratory failure with hypoxia ([facility]) J96.01    Suspected COVID-19 virus infection Z20.822    Abnormal CT scan R93.89    Hypomagnesemia E83.42    Acute on chronic combined systolic and diastolic heart failure ([facility]) I50.43    PAD (peripheral artery disease) ([facility]) I73.9    Narcotic abuse ([facility]) F11.10    Non-compliance Z91.19    S/P angioplasty with stent Z95.820    Acute on chronic combined systolic (congestive) and diastolic (congestive) heart failure ([facility]) I50.43    Severe dehydration E86.0    Nausea and vomiting R11.2    Pneumonia due to infectious organism J18.9    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 E11.65    Chronic hepatitis C without hepatic coma (HCC) B18.2    History of MRSA infection Z86.14    Prolonged Q-T interval on ECG R94.31    Uncontrolled diabetes mellitus with hyperglycemia, with long-term current use of insulin (HCC) E11.65, Z79.4    Sepsis ([facility] A41.9    Pyelonephritis N12    Hydronephrosis O42.60    Metabolic encephalopathy A08.08    Pancreatitis, recurrent K85.90       Isolation/Infection:   Isolation            No Isolation          Patient Infection Status       Infection Onset Added Last Indicated Last Indicated By Review Planned Expiration Resolved Resolved By    None active    Resolved    MDRO (multi-drug resistant organism) 09/02/21 09/04/21 09/02/21 Culture, Urine   09/07/21 Kofi Laws RN    COVID-19 Rule Out 09/02/21 09/02/21 09/02/21 COVID-19 (Ordered)   09/03/21 Rule-Out Test Resulted    C-diff Rule Out 07/17/21 07/17/21 07/18/21 Clostridium difficile toxin/antigen (Ordered)   07/18/21 Rule-Out Test Resulted    COVID-19 Rule Out 08/21/20 08/21/20 08/21/20 COVID-19 (Ordered)   08/21/20 Rule-Out Test Resulted    COVID-19 Rule Out 08/01/20 08/01/20 08/02/20 COVID-19 (Ordered)   08/03/20 Rule-Out Test Resulted    MRSA  02/04/11 02/04/11 Zeina Marie Justice   11/12/18 Latonia Hayward RN    Urine CX MRSA + 1/27/2011  2:08 PM [facility].               Nurse Assessment:  Last Vital Signs: BP 98/68 Comment: seated EOB  Pulse 92   Temp 99 °F (37.2 °C) (Oral)   Resp 16   Ht 5' 2\" (1.575 m)   Wt 117 lb 6 oz (53.2 kg)   LMP 11/15/2017   SpO2 98%   BMI 21.47 kg/m²     Last documented pain score (0-10 scale): Pain Level: 6  Last Weight:   Wt Readings from Last 1 Encounters:   09/20/21 117 lb 6 oz (53.2 kg)     Mental Status:  {IP PT MENTAL STATUS::::0}    IV Access:  { WANDA IV ACCESS:222983830:::0}    Nursing Mobility/ADLs:  Walking   {CHP DME ADLs:424321618:::0}  Transfer  {CHP DME ADLs:943574928:::0}  Bathing  {CHP DME ADLs:185771082:::0}  Dressing  {CHP DME ADLs:674582491:::0}  Toileting  {CHP DME ADLs:774305381:::0}  Feeding  {CHP DME ADLs:690717832:::0}  Med Admin  {CHP DME ADLs:114163528:::0}  Med Delivery   { WANDA MED Delivery:999450350:::0}    Wound Care Documentation and Therapy:  Wound 21 Pretibial Left nonhealing burn to LLE (Active)   Number of days: 145        Elimination:  Continence:    Bowel: {YES / GD:47961}  Bladder: {YES / DU:62884}  Urinary Catheter: {Urinary Catheter:296456092:::0}   Colostomy/Ileostomy/Ileal Conduit: {YES / UH:87218}       Date of Last BM: ***    Intake/Output Summary (Last 24 hours) at 2021 1011  Last data filed at 2021 0557  Gross per 24 hour   Intake --   Output 1350 ml   Net -1350 ml     I/O last 3 completed shifts:  In: -   Out: 1350 [Urine:1350]    Safety Concerns:     [address] [employer] Safety Concerns:549490804:::0}    Impairments/Disabilities:      [address] [employer] Impairments/Disabilities:546165151:::0}    Nutrition Therapy:  Current Nutrition Therapy:   [address] [employer] Diet List:958105150:::0}    Routes of Feeding: {CHP DME Other Feedings:709024865:::0}  Liquids: {Slp liquid thickness:90532}  Daily Fluid Restriction: {CHP DME Yes amt example:404264604:::0}  Last Modified Barium Swallow with Video (Video Swallowing Test): {Done Not Done YDLO:046558724:::6}    Treatments at the Time of Hospital Discharge:   Respiratory Treatments: ***  Oxygen Therapy:  {Therapy; copd oxygen:10277:::0}  Ventilator:    {[facility] Vent List:417288536:::0}    Rehab Therapies: {THERAPEUTIC INTERVENTION:0290639313}  Weight Bearing Status/Restrictions: [address] DWYER Weight Bearin:::0}  Other Medical Equipment (for information only, NOT a DME order):  {EQUIPMENT:134600533}  Other Treatments: ***    Patient's personal belongings (please select all that are sent with patient):  {CHP DME Belongings:243416574:::0}    RN SIGNATURE:  {Esignature:730685347:::0}    CASE MANAGEMENT/SOCIAL WORK SECTION    Inpatient Status Date: ***    Readmission Risk Assessment Score:  Readmission Risk              Risk of Unplanned Readmission:  32           Discharging to Facility/ Agency   Name:   Address:  Phone:  Fax:    Dialysis Facility (if applicable)   Name:  Address:  Dialysis Schedule:  Phone:  Fax:    / signature: {Esignature:045068970:::0}    PHYSICIAN SECTION    Prognosis: Good    Condition at Discharge: Stable    Rehab Potential (if transferring to Rehab):     Recommended Labs or Other Treatments After Discharge: skilled RN home care,  Home PT and OT     Physician Certification: I certify the above information and transfer of Benja Elam  is necessary for the continuing treatment of the diagnosis listed and that she requires Home Care for  > 30 days.      Update Admission H&P: No change in H&P    PHYSICIAN SIGNATURE:  Electronically signed by CHINA Pascal CNP on 9/20/21 at 10:12 AM EDT

## 2021-09-20 NOTE — PLAN OF CARE
Nutrition Problem #1: Inadequate oral intake  Intervention: Food and/or Nutrient Delivery: Continue Current Diet, Start Oral Nutrition Supplement  Nutritional Goals: PO intake of meals and supplement 75% or greater

## 2021-09-20 NOTE — CARE COORDINATION
Discharge Planning Assessment     RN discharge planner met with patient/ to discuss reason for admission, current living situation, and potential needs at the time of discharge     Demographics/Insurance verified Yes     Current type of dwelling: apartment  Patient from ECF/ confirmed with:  Prior stay at [facility] s/p NARESH [facility].   Living arrangements: family (brother / sister)     Level of function/Support: fully independent, able to transfer into and out of [facility] indipendantly        DME: 18 ([facility]), W/C bound - R/ AKA     Active with any community resources/agencies/skilled home care:     Medication compliance issues: Suboxone    Electronically signed by Steve Goss RN on 9/20/2021 at 11:23 AM

## 2021-09-20 NOTE — PROGRESS NOTES
Physician Progress Note      PATIENT:               Lilly Lopez  CSN #:                  [id number]  :                       1965  ADMIT DATE:       2021 2:05 PM  [address] [place] DATE:  RESPONDING  PROVIDER #:        Teddy Constantino CNP          QUERY TEXT:    Pt admitted with Sepsis and UTI and has Chronic HF documented. If possible,   please document in progress notes and discharge summary further specificity   regarding the type and acuity of CHF:    The medical record reflects the following:  Risk Factors: Medical History of CHF  Clinical Indicators: Per Progress note on : Chronic HF;  Chart review   indicates non compliance. Continue with home BB, ACE and diuretic therapy. .. Last Echo in 2021 EF 25-30%  Treatment: Continued on oral Coreg and Lasix  Options provided:  -- Chronic Systolic CHF/HFrEF  -- Chronic Diastolic CHF/HFpEF  -- Chronic Systolic and Diastolic CHF  -- Other - I will add my own diagnosis  -- Disagree - Not applicable / Not valid  -- Disagree - Clinically unable to determine / Unknown  -- Refer to Clinical Documentation Reviewer    PROVIDER RESPONSE TEXT:    This patient has chronic systolic and diastolic CHF.     Query created by: Aneesh Caicedo on 2021 9:04 AM      Electronically signed by:  Lloyd Constantino CNP 2021 11:27   AM

## 2021-09-25 ENCOUNTER — HOSPITAL ENCOUNTER (EMERGENCY)
Age: 56
Discharge: HOME | End: 2021-09-26
Attending: EMERGENCY MEDICINE
Payer: COMMERCIAL

## 2021-09-25 VITALS
TEMPERATURE: 97.3 F | OXYGEN SATURATION: 95 % | SYSTOLIC BLOOD PRESSURE: 128 MMHG | WEIGHT: 120 LBS | BODY MASS INDEX: 21.95 KG/M2 | RESPIRATION RATE: 16 BRPM | HEART RATE: 96 BPM | DIASTOLIC BLOOD PRESSURE: 76 MMHG

## 2021-09-25 DIAGNOSIS — R10.13 EPIGASTRIC PAIN: Primary | ICD-10-CM

## 2021-09-25 PROCEDURE — 99283 EMERGENCY DEPT VISIT LOW MDM: CPT

## 2021-09-25 ASSESSMENT — PAIN SCALES - GENERAL: PAINLEVEL_OUTOF10: 10

## 2021-09-25 ASSESSMENT — PAIN DESCRIPTION - LOCATION: LOCATION: ABDOMEN

## 2021-09-26 ENCOUNTER — APPOINTMENT (OUTPATIENT)
Dept: CT IMAGING | Age: 56
End: 2021-09-26
Payer: COMMERCIAL

## 2021-09-26 LAB
A/G RATIO: 0.6 (ref 1.1–2.2)
ALBUMIN SERPL-MCNC: 2.4 G/DL (ref 3.4–5)
ALP BLD-CCNC: 156 U/L (ref 40–129)
ALT SERPL-CCNC: 17 U/L (ref 10–40)
ANION GAP SERPL CALCULATED.3IONS-SCNC: 7 MMOL/L (ref 3–16)
ANISOCYTOSIS: ABNORMAL
AST SERPL-CCNC: 37 U/L (ref 15–37)
BASOPHILS ABSOLUTE: 0 K/UL (ref 0–0.2)
BASOPHILS RELATIVE PERCENT: 0.4 %
BILIRUB SERPL-MCNC: <0.2 MG/DL (ref 0–1)
BUN BLDV-MCNC: 10 MG/DL (ref 7–20)
CALCIUM SERPL-MCNC: 8.1 MG/DL (ref 8.3–10.6)
CHLORIDE BLD-SCNC: 100 MMOL/L (ref 99–110)
CO2: 28 MMOL/L (ref 21–32)
CREAT SERPL-MCNC: <0.5 MG/DL (ref 0.6–1.1)
EOSINOPHILS ABSOLUTE: 0 K/UL (ref 0–0.6)
EOSINOPHILS RELATIVE PERCENT: 1 %
GFR AFRICAN AMERICAN: >60
GFR NON-AFRICAN AMERICAN: >60
GLOBULIN: 4 G/DL
GLUCOSE BLD-MCNC: 361 MG/DL (ref 70–99)
HCT VFR BLD CALC: 35.5 % (ref 36–48)
HEMOGLOBIN: 11.9 G/DL (ref 12–16)
LIPASE: 65 U/L (ref 13–60)
LYMPHOCYTES ABSOLUTE: 1.1 K/UL (ref 1–5.1)
LYMPHOCYTES RELATIVE PERCENT: 36.4 %
MCH RBC QN AUTO: 28.3 PG (ref 26–34)
MCHC RBC AUTO-ENTMCNC: 33.4 G/DL (ref 31–36)
MCV RBC AUTO: 84.6 FL (ref 80–100)
MONOCYTES ABSOLUTE: 0.2 K/UL (ref 0–1.3)
MONOCYTES RELATIVE PERCENT: 7.9 %
NEUTROPHILS ABSOLUTE: 1.6 K/UL (ref 1.7–7.7)
NEUTROPHILS RELATIVE PERCENT: 54.3 %
PDW BLD-RTO: 16 % (ref 12.4–15.4)
PLATELET # BLD: 82 K/UL (ref 135–450)
PLATELET SLIDE REVIEW: ABNORMAL
PMV BLD AUTO: 10.5 FL (ref 5–10.5)
POTASSIUM REFLEX MAGNESIUM: 4.3 MMOL/L (ref 3.5–5.1)
RBC # BLD: 4.2 M/UL (ref 4–5.2)
SLIDE REVIEW: ABNORMAL
SODIUM BLD-SCNC: 135 MMOL/L (ref 136–145)
TOTAL PROTEIN: 6.4 G/DL (ref 6.4–8.2)
WBC # BLD: 2.9 K/UL (ref 4–11)

## 2021-09-26 PROCEDURE — 80053 COMPREHEN METABOLIC PANEL: CPT

## 2021-09-26 PROCEDURE — 85025 COMPLETE CBC W/AUTO DIFF WBC: CPT

## 2021-09-26 PROCEDURE — 6370000000 HC RX 637 (ALT 250 FOR IP): Performed by: EMERGENCY MEDICINE

## 2021-09-26 PROCEDURE — 83690 ASSAY OF LIPASE: CPT

## 2021-09-26 PROCEDURE — 74176 CT ABD & PELVIS W/O CONTRAST: CPT

## 2021-09-26 RX ORDER — 0.9 % SODIUM CHLORIDE 0.9 %
1000 INTRAVENOUS SOLUTION INTRAVENOUS ONCE
Status: DISCONTINUED | OUTPATIENT
Start: 2021-09-26 | End: 2021-09-26 | Stop reason: HOSPADM

## 2021-09-26 RX ORDER — ONDANSETRON 2 MG/ML
4 INJECTION INTRAMUSCULAR; INTRAVENOUS ONCE
Status: DISCONTINUED | OUTPATIENT
Start: 2021-09-26 | End: 2021-09-26 | Stop reason: HOSPADM

## 2021-09-26 RX ORDER — HYDROCODONE BITARTRATE AND ACETAMINOPHEN 5; 325 MG/1; MG/1
1 TABLET ORAL ONCE
Status: COMPLETED | OUTPATIENT
Start: 2021-09-26 | End: 2021-09-26

## 2021-09-26 RX ADMIN — HYDROCODONE BITARTRATE AND ACETAMINOPHEN 1 TABLET: 5; 325 TABLET ORAL at 05:30

## 2021-09-26 RX ADMIN — LIDOCAINE HYDROCHLORIDE: 20 SOLUTION ORAL; TOPICAL at 05:30

## 2021-09-26 ASSESSMENT — ENCOUNTER SYMPTOMS
ALLERGIC/IMMUNOLOGIC NEGATIVE: 1
ABDOMINAL PAIN: 1
EYES NEGATIVE: 1
RESPIRATORY NEGATIVE: 1
NAUSEA: 1

## 2021-09-26 NOTE — ED PROVIDER NOTES
[facility] [facility]        Pt Name: Lainey Jovel  MRN: [id number]  Armslyndagfvictor hugo 1965  Date of evaluation: 9/25/2021  Provider: Kandis Cole MD  PCP: No primary care provider on file. CHIEF COMPLAINT       Chief Complaint   Patient presents with    Abdominal Pain     pt states she has had mid abdominal pain today. denies issues with bm or eating. HISTORY OF PRESENT ILLNESS   (Location/Symptom, Timing/Onset, Context/Setting, Quality, Duration, Modifying Factors, Severity)  Note limiting factors. Lainey Jovel is a 60 y.o. female who comes in with mid abdominal pain that started today. She does not have any problems having bowel movements and in fact,  she has bowel movement on her left hand. She does not have a problem with appetite. She had nausea without vomiting and she states the pain is 10 out of 10 in its intensity. The pain is so bad that is making her cry. No exacerbating or relieving factors no other associated signs or symptoms. Nursing Notes were all reviewed and agreed with or any disagreements were addressed  in the HPI. REVIEW OF SYSTEMS    (2-9 systems for level 4, 10 or more for level 5)     Review of Systems   Constitutional: Negative. HENT: Negative. Eyes: Negative. Respiratory: Negative. Cardiovascular: Negative. Gastrointestinal: Positive for abdominal pain and nausea. Endocrine: Negative. Genitourinary: Negative. Musculoskeletal: Negative. Skin: Negative. Allergic/Immunologic: Negative. Neurological: Negative. Hematological: Negative. Psychiatric/Behavioral: Negative.         PAST MEDICAL HISTORY     Past Medical History:   Diagnosis Date    Arthritis     [facility]     Depression     Diabetes mellitus ([facility]     Heart attack ([facility]     MI 7/2019, and 6 months ago    Hepatitis C     Dr Freddy Cain follows; contracted from ex- (drug user)    Hypercholesteremia 3/11/2020    Hypertension     MRSA (methicillin resistant Staphylococcus aureus) infection in 2000    was in urine and nares    Pneumonia     S/P colonoscopy with polypectomy 2/11    one polyp, Dr Binta Ivan. Recall 3 years. SURGICAL HISTORY     Past Surgical History:   Procedure Laterality Date    ABOVE KNEE AMPUTATION      AMPUTATION      right bka    CORONARY ANGIOPLASTY WITH STENT PLACEMENT  07/2019    JOINT REPLACEMENT  2006, 2010    right knee; Dr Noemi Yen.  KNEE ARTHROSCOPY      ridht knee X6    LIVER BIOPSY N/A 11/14/2018    LIVER BIOPSY LAPAROSCOPIC performed by Jaison Ying MD at [address] [place] [address] N/A 11/14/2018    LAPAROSCOPIC CHOLECYSTECTOMY WITH INTRAOPERATIVE CHOLANGIOGRAM performed by Jaison Ying MD at [address] [place] [address]  2/9/11    right    UPPER GASTROINTESTINAL ENDOSCOPY  11/13/2018    UPPER GASTROINTESTINAL ENDOSCOPY N/A 11/13/2018    EGD BIOPSY performed by Ting Smith MD at [address] [place] [address]       Discharge Medication List as of 9/26/2021  5:30 AM      CONTINUE these medications which have NOT CHANGED    Details   lisinopril (PRINIVIL;ZESTRIL) 5 MG tablet Take 1 tablet by mouth daily, Disp-30 tablet, R-0Normal      furosemide (LASIX) 40 MG tablet Take 1 tablet by mouth daily, Disp-30 tablet, R-0Normal      carvedilol (COREG) 3.125 MG tablet Take 1 tablet by mouth 2 times daily (with meals), Disp-60 tablet,R-0Normal      nitroGLYCERIN (NITROSTAT) 0.4 MG SL tablet up to max of 3 total doses. If no relief after 1 dose, call 911., Disp-25 tablet,R-3Normal      sertraline (ZOLOFT) 50 MG tablet Take 50 mg by mouth daily Indications: DepressionHistorical Med      mirtazapine (REMERON) 15 MG tablet Take 30 mg by mouth nightly Historical Med      pregabalin (LYRICA) 100 MG capsule Take 100 mg by mouth 3 times daily.  Historical Med      insulin glargine (LANTUS SOLOSTAR) 100 UNIT/ML injection pen Inject 15 Units into the skin 2 times daily Indications: Insulin Pump Historical Med      insulin lispro, 1 Unit Dial, (HUMALOG KWIKPEN) 100 UNIT/ML SOPN Inject 10 Units into the skin 3 times daily (before meals) Inject 10 units three times daily with each meal in addition to sliding scale. Historical Med      ticagrelor (BRILINTA) 90 MG TABS tablet Take 1 tablet by mouth 2 times daily, Disp-60 tablet, R-5Normal      aspirin 81 MG tablet Take 1 tablet by mouth daily, Disp-90 tablet, R-1Normal      atorvastatin (LIPITOR) 40 MG tablet Take 1 tablet by mouth daily, Disp-90 tablet, R-3Normal      FREESTYLE LANCETS MISC 4 TIMES DAILY BEFORE MEALS & NIGHTLY Starting Wed 7/3/2019, Disp-100 each, R-5, PrintPatient to check blood sugar 4 times a day and PRN, he is treated with multiple daily injections of insulin that require correction dosing and has had hypoglycemia. A1C  14.5  ICD code- E11.65, Z79.4      blood glucose test strips (FREESTYLE LITE) strip 4 TIMES DAILY BEFORE MEALS & NIGHTLY Starting Wed 7/3/2019, Disp-100 each, R-5, PrintPatient to check blood sugar 4 times a day and PRN, he is treated with multiple daily injections of insulin that require correction dosing and has had hypoglycemia. A1C  14.5  ICD code- E11.65, Z79.4      buprenorphine-naloxone (SUBOXONE) 8-2 MG SUBL SL tablet Place 1 tablet under the tongue 2 times daily.  Historical Med             ALLERGIES     Darvocet [propoxyphene n-acetaminophen], Naprosyn [naproxen], and Ultram [tramadol hcl]    FAMILYHISTORY       Family History   Problem Relation Age of Onset    Cancer Father     Seizures Brother     Cancer Sister         colon        SOCIAL HISTORY       Social History     Socioeconomic History    Marital status: Single     Spouse name: None    Number of children: None    Years of education: None    Highest education level: None   Occupational History    Occupation: disabled   Tobacco Use    Smoking status: Former Smoker     Packs/day: 1.00     Years: 43.00     Pack years: 43.00     Quit date: 2020     Years since quittin.2    Smokeless tobacco: Never Used   Vaping Use    Vaping Use: Never used   Substance and Sexual Activity    Alcohol use: No    Drug use: Yes     Types: Opiates      Comment: opiate/THC, last used 3 days ago, suboxone yesterday.  Sexual activity: Not Currently   Other Topics Concern    None   Social History Narrative    None     Social Determinants of Health     Financial Resource Strain:     Difficulty of Paying Living Expenses:    Food Insecurity:     Worried About Running Out of Food in the Last Year:     [address] in the Last Year:    Transportation Needs:     Lack of Transportation (Medical):  Lack of Transportation (Non-Medical):    Physical Activity:     Days of Exercise per Week:     Minutes of Exercise per Session:    Stress:     Feeling of Stress :    Social Connections:     Frequency of Communication with Friends and Family:     Frequency of Social Gatherings with Friends and Family:     Attends [employer] Services:     Active Member of Clubs or Organizations:     Attends Club or Organization Meetings:     Marital Status:    Intimate Partner Violence:     Fear of Current or Ex-Partner:     Emotionally Abused:     Physically Abused:     Sexually Abused:        SCREENINGS             PHYSICAL EXAM    (up to 7 for level 4, 8 or more for level 5)     ED Triage Vitals [21 2222]   BP Temp Temp Source Pulse Resp SpO2 Height Weight   128/76 97.3 °F (36.3 °C) Temporal 96 16 95 % -- 120 lb (54.4 kg)       Physical Exam  Constitutional:       Comments: Tearful   HENT:      Head: Normocephalic. Mouth/Throat:      Mouth: Mucous membranes are moist.   Eyes:      Extraocular Movements: Extraocular movements intact. Cardiovascular:      Rate and Rhythm: Normal rate and regular rhythm.    Pulmonary: Effort: Pulmonary effort is normal.      Breath sounds: Normal breath sounds. Abdominal:      General: Abdomen is flat. Bowel sounds are normal.      Palpations: Abdomen is soft. Tenderness: There is generalized abdominal tenderness. Hernia: No hernia is present. Skin:     General: Skin is warm. Capillary Refill: Capillary refill takes less than 2 seconds. Neurological:      General: No focal deficit present. Mental Status: She is alert and oriented to person, place, and time. DIAGNOSTIC RESULTS   LABS:    Labs Reviewed   CBC WITH AUTO DIFFERENTIAL - Abnormal; Notable for the following components:       Result Value    WBC 2.9 (*)     Hemoglobin 11.9 (*)     Hematocrit 35.5 (*)     RDW 16.0 (*)     Platelets 82 (*)     Neutrophils Absolute 1.6 (*)     Anisocytosis Occasional (*)     All other components within normal limits    Narrative:     Performed at:  [facility] [address], [place], [email or web address]   Phone [phone]   COMPREHENSIVE METABOLIC PANEL W/ REFLEX TO MG FOR LOW K - Abnormal; Notable for the following components:    Sodium 135 (*)     Glucose 361 (*)     CREATININE <0.5 (*)     Calcium 8.1 (*)     Albumin 2.4 (*)     Albumin/Globulin Ratio 0.6 (*)     Alkaline Phosphatase 156 (*)     All other components within normal limits    Narrative:     Performed at:  [facility] [address], [place], [address] [facility]   Phone [phone]   LIPASE - Abnormal; Notable for the following components:    Lipase 65.0 (*)     All other components within normal limits    Narrative:     Performed at:  [facility] [address], [place], [address] [facility]   Phone [phone]       All other labs were within normal range or not returned as of this dictation.     EKG:       RADIOLOGY:        Interpretation per the Radiologist below, if available at the time of this note:    CT ABDOMEN PELVIS WO CONTRAST Additional Contrast? None   Final Result   1. Questionable thickening of the wall of the transverse colon could be due   to under distension or mild colitis. No pericolonic fat stranding or   secondary sign of appendicitis. 2.  No renal calculi or hydronephrosis. There is no ascites or   pneumoperitoneum. No results found. PROCEDURES   Unless otherwise noted below, none     Procedures    CRITICAL CARE TIME   N/A    CONSULTS:  None      EMERGENCY DEPARTMENT COURSE and DIFFERENTIAL DIAGNOSIS/MDM:   Vitals:    Vitals:    09/25/21 2222   BP: 128/76   Pulse: 96   Resp: 16   Temp: 97.3 °F (36.3 °C)   TempSrc: Temporal   SpO2: 95%   Weight: 120 lb (54.4 kg)       Patient was given thefollowing medications:  Medications   HYDROcodone-acetaminophen (NORCO) 5-325 MG per tablet 1 tablet (1 tablet Oral Given 9/26/21 0530)   aluminum & magnesium hydroxide-simethicone (MAALOX) 30 mL, lidocaine viscous hcl (XYLOCAINE) 5 mL (GI COCKTAIL) ( Oral Given 9/26/21 0530)           This patient comes in with some epigastric and lower abdominal pain. Laboratories are unremarkable. CT scan of the abdomen pelvis is nonacute as well. I am giving her something for pain and then letting her go home. I think she is return for increased abdominal pain, persistent vomiting, or fever. I do not suspect appendicitis bowel obstruction or armando. She is hyperglycemic without DKA.  She is discharged to home     FINAL IMPRESSION      1. Epigastric pain          DISPOSITION/PLAN   DISPOSITION Decision To Discharge 09/26/2021 04:26:45 AM      PATIENT REFERREDTO:  Your doctor    Call today        DISCHARGE MEDICATIONS:  Discharge Medication List as of 9/26/2021  5:30 AM          DISCONTINUED MEDICATIONS:  Discharge Medication List as of 9/26/2021  5:30 AM      STOP taking these medications       ciprofloxacin (CIPRO) 250 MG tablet Comments:   Reason for Stopping:                      (Please note that portions ofthis

## 2021-09-26 NOTE — PROGRESS NOTES
Pt to restroom on her wheelchair. She needed a little assistance getting off toilet. Urine on the counter in room.

## 2022-10-10 NOTE — PROGRESS NOTES
Pt wanting to leave AMA for family issues. She said she feels much better and will come back to the hospital if she is feeling ill again. Will prepare paperwork. Daughter will be by to pick pt up.  MD updated and aware Ambulatory

## 2022-12-01 NOTE — PROGRESS NOTES
Dr. Zak Paulson at bedside for PIV placement. MD OK with sBP in 80s, no new orders given at this time.  Arsen Valdez RN, BSN, CCRN-[facility], 10:56 PM Is it ok to wait until 12/20, your next opening. Her note below states she feels she needs to change depressions meds or dosing because she is having a hard time

## 2023-01-05 NOTE — PROGRESS NOTES
Resting with eyes closed, VSS, assessment as charted. FSBS 80, no correction insulin required. Asking about Phenergan, advised when it is available. Plan to give Phenergan when available, wait 20-30 minutes, then give scheduled oral meds, patient VU and in agreement. No assistance needed

## 2023-06-02 NOTE — ADT AUTH CERT
Utilization Reviews         Pneumonia - Care Day 3 (9/24/2020) by Mariah Hayden RN         Review Status  Review Entered    Completed  9/24/2020 14:30        Criteria Review       Care Day: 3 Care Date: 9/24/2020 Level of Care:    Guideline Day 2    Clinical Status    (X) * No CO2 retention or acidosis    (X) * No requirement for mechanical ventilation    (X) * Hypotension absent    (X) * Afebrile or fever improved    ( ) * No hypoxia on room air or oxygenation improved    9/24/2020 2:30 PM EDT by Ezra Masters      09/24/20 0930   98.1 (36.7)   18   92   126/84    Supine    7   95   High flow nasal cannula    (X) * Mental status improved or at baseline    Activity    (X) * Increased activity    Routes    (X) Oral hydration, medications    (X) Usual diet    Interventions    (X) Incentive spirometry    (X) Pulse oximetry    (X) Head of bed at 30 degrees    (X) Possible oxygen    Medications    (X) IV or oral antibiotics    9/24/2020 2:30 PM EDT by Ezra albarran [id number]    * Milestone    Additional Notes    9/24/2020 05:16    Sodium: 141    Potassium: 3.8    Chloride: 102    CO2: 31    BUN: 16    Creatinine: <0.5 (L)    Anion Gap: 8    GFR Non-: >60    GFR African American: >60    Magnesium: 1.70 (L)    Glucose: 166 (H)    Calcium: 8.4    Phosphorus: 3.0    Pro-BNP: 1,799 (H)    WBC: 7.4    RBC: 4.05    Hemoglobin Quant: 11.0 (L)    Hematocrit: 33.7 (L)    MCV: 83.2    MCH: 27.1    MCHC: 32.5    MPV: 10.0    RDW: 15.2    Platelet Count: 853    Neutrophils %: 63.2    Lymphocyte %: 23.6    Monocytes %: 6.0    Eosinophils %: 6.7    Basophils %: 0.5    Neutrophils Absolute: 4.7    Lymphocytes Absolute: 1.7    Monocytes Absolute: 0.4    Eosinophils Absolute: 0.5    Basophils Absolute: 0.0       Scheduled Meds:lidocaine, 1 patch, Transdermal, Daily    buprenorphine-naloxone, 1 Film, Sublingual, BID    [START ON 9/25/2020] furosemide, 40 mg, Intravenous, Daily    aspirin, 81 mg, Oral, Daily atorvastatin, 40 mg, Oral, Nightly    carvedilol, 3.125 mg, Oral, BID WC    glycopyrrolate-formoterol, 2 puff, Inhalation, BID    hydrOXYzine, 25 mg, Oral, BID    insulin glargine, 32 Units, Subcutaneous, BID    insulin lispro (1 Unit Dial), 10 Units, Subcutaneous, TID WC    mirtazapine, 7.5 mg, Oral, Nightly    pregabalin, 100 mg, Oral, BID    sertraline, 50 mg, Oral, Daily    ticagrelor, 90 mg, Oral, BID    sodium chloride flush, 10 mL, Intravenous, 2 times per day    enoxaparin, 40 mg, Subcutaneous, Daily    cefTRIAXone (ROCEPHIN) IV, 1 g, Intravenous, Q24H    insulin lispro, 0-6 Units, Subcutaneous, TID WC    insulin lispro, 0-3 Units, Subcutaneous, Nightly    lisinopril, 1.25 mg, Oral, Daily       PRN Meds:.methocarbamol x, sodium chloride flush, acetaminophen **OR** acetaminophen, polyethylene glycol, promethazine **OR** ondansetron x1, potassium chloride **OR** potassium alternative oral replacement **OR** potassium chloride, magnesium sulfate, glucose, dextrose, glucagon (rDNA), dextrose, ipratropium-albuterol       Cardio:    HPI:      Ms. Jeremías Lui a 60year old female with history of chronic combined s/dHF with LVEF of 40%, COPD on 4 L of oxygen at home, DM, right sided BKA, HTN, HLD, history of narcotic abuse on suboxone    She lives with family members         She was admitted here last month and then at [facility], intubated secondary to COPD exacerbation.  She is admitted with cough, covid neg.  She was given IV lasix and feels better.  bnp 26  Which is improved from 6099         Subjective:    Patient is being seen for acute on chronic combined s/dHF. There were no acute overnight cardiac events.  Creat <0.5 potassium 3.8 probnp 5755->1799 and -1.7 L out.  She sat up in the bed this morning complaining of chest pain, not on suboxone which she states she was taking 2 days ago, she did receive this while at [facility].  Drug screen pos for THC.  Her oxygen increased to 15 L.  She had neg troponin and LHC with PCI to LAD 2/20.  Stopped smoking 1.5 months ago         Assessment:     1.  Acute on chronic combined systolic and diastolic heart failure, on ace and bb, no aldactone antagonist due to compliance    2.  PAD s/p right BKA    3.  Diabetes    4.  Narcotic abuse on suboxone    5.  ?noncompliance    6.  CAD s/p PCI 2/20         Plan:     1.  Change lasix to 40 mg IV once a day    2.  Continue coreg and lisinopril 1.25 mg daily    3.  CHF nurse following for diet education, fluid restriction and daily weights         I have asked interventional cardiology to see patient for chest pain and recent PCI of LAD 2/20.         She needs to be restarted on her suboxone         Need to consider ECF for rehab and to guarantee that patient is taking meds. Consider palliative care consult.  Per consult note            IM:    Principal Problem:      Acute on chronic respiratory failure with hypoxia ([facility] [address]): Feels much better with shortness of breath compared to yesterday.  Chest has cleared of crackles she had yesterday diuresis    -Continue O2 supplementation    -Continue diuresis           Acute on chronic systolic heart failure [facility]): Her chest sounds much better this morning with no crackles.  Denies any shortness of breath today.     - Continue Lasix 40 mg twice a day    -Monitor intake and output    -Monitor daily weight    -Low-salt diet    -Cardiology following with recommendation    -Monitor renal function and electrolytes and replace as appropriate           UTI: UA noted positive for WBC, bacteria and leukocyte esterase.  Continue ceftriaxone for 3 days total.    Active Problems:      Coronary artery disease involving native coronary artery of native heart without angina pectoris: No chest pains or shortness of breath.    -Continue aspirin, Coreg, Brilinta      Uncontrolled type 2 diabetes mellitus with hyperglycemia, with long-term current use of insulin ([facility] [address]): Continue glargine with sliding scale insulin      Essential hypertension: Stable on Coreg      Hypomagnesemia: We will replete         Subjective:    Overnight Events:    Uneventful this morning. Denies any shortness of breath today. Eliseo Almodovar to restart on her home Suboxone             Pneumonia - Care Day 2 (9/23/2020) by Jennifer Hernandez RN         Review Status  Review Entered    Completed  9/24/2020 13:58        Criteria Review       Care Day: 2 Care Date: 9/23/2020 Level of Care:    Guideline Day 2    Clinical Status    ( ) * No CO2 retention or acidosis    ( ) * No requirement for mechanical ventilation    (X) * Hypotension absent    9/24/2020 1:58 PM EDT by Rashaunwence Francisca      111/71    (X) * Afebrile or fever improved    9/24/2020 1:58 PM EDT by Isela Francisca      Temp 98.9 °F    ( ) * No hypoxia on room air or oxygenation improved    ( ) * Mental status improved or at baseline    Activity    ( ) * Increased activity    * Milestone    Additional Notes    9/23    Subjective:    Overnight Events: This afternoon patient had increased oxygen use from 4 L in the morning to 15 L with acute shortness of breath.              Physical Exam:    /71   Pulse 89   Temp 98.9 °F (37.2 °C) (Oral)   Resp 18   Ht 5' 2\" (1.575 m)   Wt 122 lb 11.2 oz (55.7 kg)   LMP 11/15/2017   SpO2 93%   BMI 22.44 kg/m²    General appearance: No apparent distress, appears stated age and cooperative. HEENT: Normocephalic, atraumatic, MMM, No sclera icterus/conjuctival palor    Neck: Supple, no thyromegally. No jugular venous distention. Respiratory: Diffuse crackles and respiratory with rhonchi    Cardiovascular: S1/S2 without murmurs, rubs or gallops. RRR    Abdomen: Soft, non-tender, non-distended, bowel sounds present. Musculoskeletal: No clubbing, cyanosis or edema bilaterally.      Skin: Skin color, texture, turgor normal.  No rashes or lesions.     Neurologic:  Cranial nerves: II-XII intact, JOSTIN, No focal sensory/motor deficits    Psychiatric: Alert and oriented, thought content appropriate    Capillary Refill: Brisk,< 3 seconds    Peripheral Pulses: +2 palpable, equal bilaterally            Intake/Output Summary (Last 24 hours) at 9/23/2020 1551    Last data filed at 9/23/2020 1540    Gross per 24 hour    Intake 428 ml    Output 1550 ml    Net -1122 ml       Scheduled Medications    · aspirin 81 mg Oral Daily    · atorvastatin 40 mg Oral Nightly    · carvedilol 3.125 mg Oral BID WC    · glycopyrrolate-formoterol 2 puff Inhalation BID    · hydrOXYzine 25 mg Oral BID    · insulin glargine 32 Units Subcutaneous BID    · insulin lispro (1 Unit Dial) 10 Units Subcutaneous TID WC    · lamoTRIgine 100 mg Oral Daily    · mirtazapine 7.5 mg Oral Nightly    · pregabalin 100 mg Oral BID    · sertraline 50 mg Oral Daily    · ticagrelor 90 mg Oral BID    · sodium chloride flush 10 mL Intravenous 2 times per day    · enoxaparin 40 mg Subcutaneous Daily    · cefepime 2 g Intravenous Q12H    · vancomycin 750 mg Intravenous Q12H    · furosemide 40 mg Intravenous Once    · [START ON 9/24/2020] furosemide 40 mg Intravenous BID          PRN Meds:    PRN Medications    sodium chloride flush, acetaminophen **OR** acetaminophen, polyethylene glycol, promethazine **OR** ondansetron, potassium chloride **OR** potassium alternative oral replacement **OR** potassium chloride, magnesium sulfate, glucose, dextrose, glucagon (rDNA), dextrose        Principal Problem:      Acute on chronic respiratory failure with hypoxia [facility]): Patient with worsening respiratory failure with increased O2 demands this afternoon.  Evaluated this afternoon and reporting feeling better. Marcos Abbott is reluctant to go on a BiPAP.  At the time of evaluation sats are 98% on 14 L and seem to be relaxed and not labored in breathing at this time.  Blood pressure stable.  I doubt pneumonia given normal procalcitonin and chest x-ray not convincing for pneumonia but noted with chronic changes as well    -Continue O2 supplementation -Continue diuresis    -We will de-escalate antibiotics to cover UTI           Acute on chronic systolic heart failure [facility]): Patient with acute respiratory failure this afternoon requiring increased O2 demands suddenly.  This is concerning for fluid overload.  Received a dose of Lasix this morning.  Recent echocardiogram noted for EF of 40%.  BNP 5755.  Trace leg edema.  She is got diffuse pain respiratory crackles.  These were not noted on admission.    -We will increase IV diuretic to Lasix 40 mg twice a day and give an extra dose this afternoon    -Monitor intake and output    -Monitor daily weight    -Low-salt diet    -Get cardiology consult    -CHF coordinator consult    -Monitor renal function and electrolytes and replace as appropriate    -Trend troponins           UTI: UA noted positive for WBC, bacteria and leukocyte esterase.  Will de-escalate antibiotic to ceftriaxone         Active Problems:      Coronary artery disease involving native coronary artery of native heart without angina pectoris: No chest pains or shortness of breath.    -Continue aspirin, Coreg, Brilinta      Uncontrolled type 2 diabetes mellitus with hyperglycemia, with long-term current use of insulin ([facility] [address]): Continue glargine with sliding scale insulin      Essential hypertension: Stable on Coreg      Hypomagnesemia: We will replete       DVT Prophylaxis: Subcut enoxaparin    Diet: DIET CARB CONTROL; No Caffeine    Code Status: Full Code         Cardiology    Assessment:      1.  Acute on chronic combined systolic and diastolic HF    2.  PAD, s/p right BKA    3. Diabetes    4.  Narcotic abuse    5.  ?noncompliance         Plan:    1.  Continue IV Lasix    2.  Continue coreg    3.  Add lisinopril 1.25 mg po qd    4.  Repeat BNP level tomorrow    5.  CHF education reinforced.     ~salt restriction    ~fluid restriction    ~medication compliance    ~daily weights and notify of any significant weight gain/loss    ~establish with CHF nurse ~outpatient follow-up with our CHF team         Need to consider ECF for rehab and to guarantee that patient is taking meds. Consider palliative care consult.        WBC 8.5 K/uL      RBC 3.56 M/uL      Hemoglobin 9.6 g/dL      Hematocrit 29.9 %      MCV 84.0 fL      MCH 26.8 pg      MCHC 31.9 g/dL      RDW 15.3 %      Platelets 217 K/uL      MPV 9.7 fL      Neutrophils % 68.8 %      Lymphocytes % 20.1 %      Monocytes % 5.6 %      Eosinophils % 5.1 %      Basophils % 0.4 %      Neutrophils Absolute 5.9 K/uL      Lymphocytes Absolute 1.7 K/uL      Monocytes Absolute 0.5 K/uL      Eosinophils Absolute 0.4 K/uL      Basophils Absolute 0.0 K/uL       Sodium 140 mmol/L      Potassium 3.4 mmol/L      Chloride 103 mmol/L      CO2 31 mmol/L      Anion Gap 6      Glucose 83 mg/dL      BUN 22 mg/dL      CREATININE <0.5 mg/dL      GFR Non- >60      GFR African American >60      Calcium 8.4 mg/dL        Magnesium  1.40Low  mg/dL         Urine, clean catch Updated: 09/23/20 2049      Organism Gram negative kasey      Urine Culture, Routine --      <10,000 CFU/ml    No further workup Opt out

## 2023-11-01 NOTE — PROGRESS NOTES
BG at 2030 WNL. No intervention needed at this time. Patient with specific orders for strict NPO and to maintain mask on at all times. PO meds held. Patient remains tachypneic with respirations ranging from 35-45 while on cpap. spO2 ranging from 89-93. Patient rousable to voice. Restraints and precedex drip remain in place. Patient repositioned in bed to assist with effective breathing. Will continue to monitor. Continue Proscar

## (undated) DEVICE — CATHETER CHOLGM 4.5FR L18IN W/ MTL SUPP TB

## (undated) DEVICE — KIT OR ROOM TURNOVER W/STRAP

## (undated) DEVICE — LAPAROSCOPY PK

## (undated) DEVICE — CATHETER CHOLANGIOGRAM 4.5 FRX3 IN W/ 20018M55 TAUT INTRO

## (undated) DEVICE — CONTROL SYRINGE LUER-LOCK TIP: Brand: MONOJECT

## (undated) DEVICE — MICROSURGICAL INSTRUMENT "J" SHAPED CANNULA 27GA: Brand: ALCON

## (undated) DEVICE — CORD ES L10FT MPLR LAP

## (undated) DEVICE — STERILE POLYISOPRENE POWDER-FREE SURGICAL GLOVES: Brand: PROTEXIS

## (undated) DEVICE — Device

## (undated) DEVICE — STOPCOCK IV PRIMING 0.26ML 3 W W/ TWO FEM LUERLOK PRT 1

## (undated) DEVICE — CHLORAPREP 26ML ORANGE

## (undated) DEVICE — LAPAROSCOPIC SCISSORS: Brand: EPIX LAPAROSCOPIC SCISSORS

## (undated) DEVICE — PROCEDURE KIT ENDOSCP CUST

## (undated) DEVICE — TROCAR ENDOSCP L100MM DIA5MM BLDELSS STBL SL OBT RADLUC

## (undated) DEVICE — NEEDLE HYPO 22GA L1.5IN BLK POLYPR HUB S STL REG BVL STR

## (undated) DEVICE — SUTURE VCRL SZ 4-0 L18IN ABSRB UD L19MM PS-2 3/8 CIR PRIM J496H

## (undated) DEVICE — SOLUTION IRRIGATION BAL SALT SOLUTION 15 ML STRL BSS

## (undated) DEVICE — MOUTHPIECE ENDOSCP L CTRL OPN AND SIDE PORTS DISP

## (undated) DEVICE — TROCAR ENDOSCP L100MM DIA12MM BLNT STBL SL DISP ENDOPATH

## (undated) DEVICE — THE MONARCH® "D" CARTRIDGE IS A SINGLE-USE POLYPROPYLENE CARTRIDGE FOR POSTERIOR CHAMBER IOL DELIVERY: Brand: MONARCH® III

## (undated) DEVICE — TISSUE RETRIEVAL SYSTEM: Brand: INZII RETRIEVAL SYSTEM

## (undated) DEVICE — APPLICATOR ENDOSCP L34CM W/ S STL CANN PLAS OBT STYL FOR

## (undated) DEVICE — SYSTEM FLD MGMT DIA0.9MM 45DEG ULT BAL VISN ACT INTREPID

## (undated) DEVICE — MAX-CORE® DISPOSABLE CORE BIOPSY INSTRUMENT, 18G X 20CM: Brand: MAX-CORE

## (undated) DEVICE — NEEDLE INSUF L120MM DIA2MM DISP FOR PNEUMOPERI ENDOPATH

## (undated) DEVICE — BW-412T DISP COMBO CLEANING BRUSH: Brand: SINGLE USE COMBINATION CLEANING BRUSH

## (undated) DEVICE — TROCAR ENDOSCP L100MM DIA5MM BLDELSS STBL SL THRD OPT VW

## (undated) DEVICE — 35 ML SYRINGE LUER-LOCK TIP: Brand: MONOJECT

## (undated) DEVICE — COVER LT HNDL BLU PLAS

## (undated) DEVICE — SOLUTION IV IRRIG WATER 500ML POUR BRL ST 2F7113

## (undated) DEVICE — DRAPE C ARM UNIV W41XL74IN CLR PLAS XR VELC CLSR POLY STRP

## (undated) DEVICE — HALF SHEET: Brand: CONVERTORS

## (undated) DEVICE — SOLUTION IRRIG BSS ST 500ML

## (undated) DEVICE — SOLUTION IV IRRIG POUR BRL 0.9% SODIUM CHL 2F7124

## (undated) DEVICE — 1 ML TUBERCULIN SYRINGE REGULAR TIP: Brand: MONOJECT

## (undated) DEVICE — SUTURE VCRL SZ 0 L27IN ABSRB VLT L22MM CT-3 1/2 CIR J329H

## (undated) DEVICE — SYRINGE 30CC LUER LOCK: Brand: CARDINAL HEALTH

## (undated) DEVICE — PLUMEPORT LAPAROSCOPIC SMOKE FILTRATION DEVICE: Brand: PLUMEPORT ACTIV

## (undated) DEVICE — APPLIER CLP M/L SHFT DIA5MM 15 LIG LIGAMAX 5

## (undated) DEVICE — PAD N ADH W3XL4IN POLY COT SFT PERF FLM EASILY CUT ABSRB

## (undated) DEVICE — DRAPE,LAP,CHOLE,W/TROUGHS,STERILE: Brand: MEDLINE

## (undated) DEVICE — SET EXTN PRIMING 4.9ML L30IN INCL SLDE CLMP SPIN M LUERLOCK

## (undated) DEVICE — SKIN AFFIX SURG ADHESIVE 72/CS 0.55ML: Brand: MEDLINE

## (undated) DEVICE — SYRINGE, LUER LOCK, 10ML: Brand: MEDLINE

## (undated) DEVICE — GOWN AURORA NONREINF LG: Brand: MEDLINE INDUSTRIES, INC.

## (undated) DEVICE — GLOVE SURG SZ 7.5 L11.73IN FNGR THK9.8MIL STRW LTX POLYMER

## (undated) DEVICE — FORCEPS BX L240CM WRK CHN 2.8MM STD CAP W/ NDL MIC MESH